# Patient Record
Sex: FEMALE | Race: WHITE | NOT HISPANIC OR LATINO | Employment: UNEMPLOYED | URBAN - METROPOLITAN AREA
[De-identification: names, ages, dates, MRNs, and addresses within clinical notes are randomized per-mention and may not be internally consistent; named-entity substitution may affect disease eponyms.]

---

## 2017-01-10 ENCOUNTER — ALLSCRIPTS OFFICE VISIT (OUTPATIENT)
Dept: OTHER | Facility: OTHER | Age: 52
End: 2017-01-10

## 2017-01-10 ENCOUNTER — TRANSCRIBE ORDERS (OUTPATIENT)
Dept: ADMINISTRATIVE | Facility: HOSPITAL | Age: 52
End: 2017-01-10

## 2017-01-10 DIAGNOSIS — R19.7 DIARRHEA, UNSPECIFIED TYPE: ICD-10-CM

## 2017-01-10 DIAGNOSIS — R10.9 ABDOMINAL PAIN, UNSPECIFIED SITE: Primary | ICD-10-CM

## 2017-01-16 ENCOUNTER — HOSPITAL ENCOUNTER (OUTPATIENT)
Dept: RADIOLOGY | Age: 52
Discharge: HOME | End: 2017-01-16
Payer: COMMERCIAL

## 2017-01-16 DIAGNOSIS — R10.9 ABDOMINAL PAIN: ICD-10-CM

## 2017-01-16 PROCEDURE — 74177 CT ABD & PELVIS W/CONTRAST: CPT

## 2017-01-16 RX ADMIN — IOHEXOL 100 ML: 350 INJECTION, SOLUTION INTRAVENOUS at 11:36

## 2017-01-27 ENCOUNTER — APPOINTMENT (OUTPATIENT)
Dept: LAB | Facility: CLINIC | Age: 52
End: 2017-01-27
Payer: COMMERCIAL

## 2017-01-27 DIAGNOSIS — R06.00 DYSPNEA: ICD-10-CM

## 2017-01-27 DIAGNOSIS — R53.83 OTHER FATIGUE: ICD-10-CM

## 2017-01-27 PROCEDURE — 82530 CORTISOL FREE: CPT

## 2017-01-30 ENCOUNTER — LAB (OUTPATIENT)
Dept: LAB | Facility: CLINIC | Age: 52
End: 2017-01-30
Payer: COMMERCIAL

## 2017-01-30 ENCOUNTER — GENERIC CONVERSION - ENCOUNTER (OUTPATIENT)
Dept: OTHER | Facility: OTHER | Age: 52
End: 2017-01-30

## 2017-01-30 DIAGNOSIS — R10.9 ABDOMINAL PAIN, UNSPECIFIED SITE: ICD-10-CM

## 2017-01-30 DIAGNOSIS — R74.8 ABNORMAL LEVELS OF OTHER SERUM ENZYMES: ICD-10-CM

## 2017-01-30 DIAGNOSIS — R53.83 OTHER FATIGUE: ICD-10-CM

## 2017-01-30 DIAGNOSIS — R19.7 DIARRHEA, UNSPECIFIED TYPE: ICD-10-CM

## 2017-01-30 PROCEDURE — 87046 STOOL CULTR AEROBIC BACT EA: CPT

## 2017-01-30 PROCEDURE — 87177 OVA AND PARASITES SMEARS: CPT

## 2017-01-30 PROCEDURE — 87209 SMEAR COMPLEX STAIN: CPT

## 2017-01-30 PROCEDURE — 87045 FECES CULTURE AEROBIC BACT: CPT

## 2017-01-30 PROCEDURE — 87015 SPECIMEN INFECT AGNT CONCNTJ: CPT

## 2017-01-30 PROCEDURE — 89055 LEUKOCYTE ASSESSMENT FECAL: CPT

## 2017-01-30 PROCEDURE — 87899 AGENT NOS ASSAY W/OPTIC: CPT

## 2017-01-30 PROCEDURE — 87493 C DIFF AMPLIFIED PROBE: CPT

## 2017-01-31 LAB — C DIFF TOX GENS STL QL NAA+PROBE: NORMAL

## 2017-02-01 LAB
BACTERIA STL CULT: NORMAL
BACTERIA STL CULT: NORMAL
CORTIS F 24H UR-MRATE: 30 UG/24 HR (ref 0–50)
CORTIS F UR-MCNC: 9 UG/L

## 2017-02-03 LAB
O+P STL CONC: NORMAL
WBC SPEC QL GRAM STN: NORMAL

## 2017-02-28 ENCOUNTER — LAB CONVERSION - ENCOUNTER (OUTPATIENT)
Dept: OTHER | Facility: OTHER | Age: 52
End: 2017-02-28

## 2017-02-28 PROCEDURE — 88305 TISSUE EXAM BY PATHOLOGIST: CPT | Performed by: INTERNAL MEDICINE

## 2017-03-01 ENCOUNTER — LAB REQUISITION (OUTPATIENT)
Dept: LAB | Facility: HOSPITAL | Age: 52
End: 2017-03-01
Payer: COMMERCIAL

## 2017-03-01 DIAGNOSIS — K22.719 BARRETT'S ESOPHAGUS WITH DYSPLASIA: ICD-10-CM

## 2017-03-02 ENCOUNTER — TRANSCRIBE ORDERS (OUTPATIENT)
Dept: LAB | Facility: CLINIC | Age: 52
End: 2017-03-02

## 2017-03-02 ENCOUNTER — APPOINTMENT (OUTPATIENT)
Dept: LAB | Facility: CLINIC | Age: 52
End: 2017-03-02
Payer: COMMERCIAL

## 2017-03-02 DIAGNOSIS — R53.83 OTHER FATIGUE: ICD-10-CM

## 2017-03-02 DIAGNOSIS — E78.5 HYPERLIPIDEMIA: ICD-10-CM

## 2017-03-02 DIAGNOSIS — R73.09 OTHER ABNORMAL GLUCOSE: ICD-10-CM

## 2017-03-02 LAB
ALBUMIN SERPL BCP-MCNC: 3.6 G/DL (ref 3.5–5)
ALP SERPL-CCNC: 109 U/L (ref 46–116)
ALT SERPL W P-5'-P-CCNC: 29 U/L (ref 12–78)
ANION GAP SERPL CALCULATED.3IONS-SCNC: 8 MMOL/L (ref 4–13)
AST SERPL W P-5'-P-CCNC: 15 U/L (ref 5–45)
BILIRUB SERPL-MCNC: 0.5 MG/DL (ref 0.2–1)
BUN SERPL-MCNC: 12 MG/DL (ref 5–25)
CALCIUM SERPL-MCNC: 8.9 MG/DL (ref 8.3–10.1)
CHLORIDE SERPL-SCNC: 106 MMOL/L (ref 100–108)
CHOLEST SERPL-MCNC: 179 MG/DL (ref 50–200)
CO2 SERPL-SCNC: 27 MMOL/L (ref 21–32)
CREAT SERPL-MCNC: 0.8 MG/DL (ref 0.6–1.3)
EST. AVERAGE GLUCOSE BLD GHB EST-MCNC: 123 MG/DL
GFR SERPL CREATININE-BSD FRML MDRD: >60 ML/MIN/1.73SQ M
GLUCOSE SERPL-MCNC: 97 MG/DL (ref 65–140)
HBA1C MFR BLD: 5.9 % (ref 4.2–6.3)
HDLC SERPL-MCNC: 49 MG/DL (ref 40–60)
LDLC SERPL DIRECT ASSAY-MCNC: 99 MG/DL (ref 0–100)
POTASSIUM SERPL-SCNC: 4 MMOL/L (ref 3.5–5.3)
PROT SERPL-MCNC: 6.8 G/DL (ref 6.4–8.2)
SODIUM SERPL-SCNC: 141 MMOL/L (ref 136–145)
TRIGL SERPL-MCNC: 233 MG/DL

## 2017-03-02 PROCEDURE — 80053 COMPREHEN METABOLIC PANEL: CPT

## 2017-03-02 PROCEDURE — 83721 ASSAY OF BLOOD LIPOPROTEIN: CPT

## 2017-03-02 PROCEDURE — 36415 COLL VENOUS BLD VENIPUNCTURE: CPT

## 2017-03-02 PROCEDURE — 83036 HEMOGLOBIN GLYCOSYLATED A1C: CPT

## 2017-03-02 PROCEDURE — 80061 LIPID PANEL: CPT

## 2017-03-03 ENCOUNTER — ALLSCRIPTS OFFICE VISIT (OUTPATIENT)
Dept: OTHER | Facility: OTHER | Age: 52
End: 2017-03-03

## 2017-03-09 ENCOUNTER — APPOINTMENT (OUTPATIENT)
Dept: LAB | Facility: CLINIC | Age: 52
End: 2017-03-09
Payer: COMMERCIAL

## 2017-03-09 ENCOUNTER — TRANSCRIBE ORDERS (OUTPATIENT)
Dept: LAB | Facility: CLINIC | Age: 52
End: 2017-03-09

## 2017-03-09 DIAGNOSIS — R55 SYNCOPE AND COLLAPSE: Primary | ICD-10-CM

## 2017-03-09 DIAGNOSIS — R55 SYNCOPE AND COLLAPSE: ICD-10-CM

## 2017-03-09 PROCEDURE — 83835 ASSAY OF METANEPHRINES: CPT

## 2017-03-09 PROCEDURE — 83497 ASSAY OF 5-HIAA: CPT

## 2017-03-09 PROCEDURE — 82384 ASSAY THREE CATECHOLAMINES: CPT

## 2017-03-11 LAB
5OH-INDOLEACETATE 24H UR-MCNC: 1.8 MG/L
5OH-INDOLEACETATE 24H UR-MRATE: 4.5 MG/24 HR (ref 0–14.9)

## 2017-03-12 LAB
METANEPH 24H UR-MRATE: 100 UG/24 HR (ref 45–290)
METANEPHS 24H UR-MCNC: 40 UG/L
NORMETANEPHRINE 24H UR-MCNC: 117 UG/L
NORMETANEPHRINE 24H UR-MRATE: 293 UG/24 HR (ref 82–500)

## 2017-03-15 LAB
DOPAMINE 24H UR-MRATE: 115 UG/24 HR (ref 0–510)
DOPAMINE UR-MCNC: 46 UG/L
EPINEPH 24H UR-MRATE: 3 UG/24 HR (ref 0–20)
EPINEPH UR-MCNC: 1 UG/L
NOREPINEPH 24H UR-MRATE: 33 UG/24 HR (ref 0–135)
NOREPINEPH UR-MCNC: 13 UG/L

## 2017-03-17 ENCOUNTER — ALLSCRIPTS OFFICE VISIT (OUTPATIENT)
Dept: OTHER | Facility: OTHER | Age: 52
End: 2017-03-17

## 2017-06-13 ENCOUNTER — ALLSCRIPTS OFFICE VISIT (OUTPATIENT)
Dept: OTHER | Facility: OTHER | Age: 52
End: 2017-06-13

## 2017-07-11 ENCOUNTER — GENERIC CONVERSION - ENCOUNTER (OUTPATIENT)
Dept: OTHER | Facility: OTHER | Age: 52
End: 2017-07-11

## 2017-10-01 DIAGNOSIS — M79.7 FIBROMYALGIA: ICD-10-CM

## 2017-10-01 DIAGNOSIS — R73.9 HYPERGLYCEMIA: ICD-10-CM

## 2017-10-01 DIAGNOSIS — E78.5 HYPERLIPIDEMIA: ICD-10-CM

## 2017-11-16 ENCOUNTER — OFFICE VISIT (OUTPATIENT)
Dept: URGENT CARE | Facility: MEDICAL CENTER | Age: 52
End: 2017-11-16
Payer: COMMERCIAL

## 2017-11-16 PROCEDURE — 99213 OFFICE O/P EST LOW 20 MIN: CPT

## 2017-11-21 ENCOUNTER — TRANSCRIBE ORDERS (OUTPATIENT)
Dept: ADMINISTRATIVE | Facility: HOSPITAL | Age: 52
End: 2017-11-21

## 2017-11-21 ENCOUNTER — ALLSCRIPTS OFFICE VISIT (OUTPATIENT)
Dept: OTHER | Facility: OTHER | Age: 52
End: 2017-11-21

## 2017-11-21 DIAGNOSIS — M54.16 LUMBAR RADICULOPATHY: Primary | ICD-10-CM

## 2017-11-21 DIAGNOSIS — R29.898 DEFICIENCIES OF LIMBS: ICD-10-CM

## 2017-11-21 DIAGNOSIS — M54.2 CERVICALGIA: ICD-10-CM
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
## 2017-11-25 ENCOUNTER — HOSPITAL ENCOUNTER (OUTPATIENT)
Dept: RADIOLOGY | Facility: HOSPITAL | Age: 52
Discharge: HOME | End: 2017-11-25
Payer: COMMERCIAL

## 2017-11-25 DIAGNOSIS — M54.2 CERVICALGIA: ICD-10-CM

## 2017-11-25 DIAGNOSIS — M54.16 LUMBAR RADICULOPATHY: ICD-10-CM

## 2017-11-25 DIAGNOSIS — R29.898 DEFICIENCIES OF LIMBS: ICD-10-CM

## 2017-11-25 PROCEDURE — 72141 MRI NECK SPINE W/O DYE: CPT

## 2017-11-25 PROCEDURE — 72148 MRI LUMBAR SPINE W/O DYE: CPT

## 2017-11-28 ENCOUNTER — GENERIC CONVERSION - ENCOUNTER (OUTPATIENT)
Dept: OTHER | Facility: OTHER | Age: 52
End: 2017-11-28

## 2017-11-28 ENCOUNTER — GENERIC CONVERSION - ENCOUNTER (OUTPATIENT)
Dept: INTERNAL MEDICINE CLINIC | Facility: CLINIC | Age: 52
End: 2017-11-28

## 2017-11-28 DIAGNOSIS — R51.9 HEADACHE: ICD-10-CM

## 2017-11-28 DIAGNOSIS — R29.898 OTHER SYMPTOMS AND SIGNS INVOLVING THE MUSCULOSKELETAL SYSTEM: ICD-10-CM

## 2017-11-28 DIAGNOSIS — R27.0 ATAXIA: ICD-10-CM

## 2017-12-01 ENCOUNTER — APPOINTMENT (OUTPATIENT)
Dept: LAB | Facility: CLINIC | Age: 52
End: 2017-12-01
Payer: COMMERCIAL

## 2017-12-01 ENCOUNTER — TRANSCRIBE ORDERS (OUTPATIENT)
Dept: LAB | Facility: CLINIC | Age: 52
End: 2017-12-01

## 2017-12-01 DIAGNOSIS — M79.7 FIBROMYALGIA: ICD-10-CM

## 2017-12-01 DIAGNOSIS — E78.5 HYPERLIPIDEMIA: ICD-10-CM

## 2017-12-01 DIAGNOSIS — R73.9 HYPERGLYCEMIA: ICD-10-CM

## 2017-12-01 LAB
ALBUMIN SERPL BCP-MCNC: 3.7 G/DL (ref 3.5–5)
ALP SERPL-CCNC: 98 U/L (ref 46–116)
ALT SERPL W P-5'-P-CCNC: 31 U/L (ref 12–78)
ANION GAP SERPL CALCULATED.3IONS-SCNC: 6 MMOL/L (ref 4–13)
AST SERPL W P-5'-P-CCNC: 14 U/L (ref 5–45)
BASOPHILS # BLD AUTO: 0.01 THOUSANDS/ΜL (ref 0–0.1)
BASOPHILS NFR BLD AUTO: 0 % (ref 0–1)
BILIRUB SERPL-MCNC: 0.46 MG/DL (ref 0.2–1)
BUN SERPL-MCNC: 15 MG/DL (ref 5–25)
CALCIUM SERPL-MCNC: 9.1 MG/DL (ref 8.3–10.1)
CHLORIDE SERPL-SCNC: 102 MMOL/L (ref 100–108)
CHOLEST SERPL-MCNC: 157 MG/DL (ref 50–200)
CO2 SERPL-SCNC: 29 MMOL/L (ref 21–32)
CREAT SERPL-MCNC: 0.86 MG/DL (ref 0.6–1.3)
EOSINOPHIL # BLD AUTO: 0.24 THOUSAND/ΜL (ref 0–0.61)
EOSINOPHIL NFR BLD AUTO: 3 % (ref 0–6)
ERYTHROCYTE [DISTWIDTH] IN BLOOD BY AUTOMATED COUNT: 12.8 % (ref 11.6–15.1)
EST. AVERAGE GLUCOSE BLD GHB EST-MCNC: 128 MG/DL
GFR SERPL CREATININE-BSD FRML MDRD: 77 ML/MIN/1.73SQ M
GLUCOSE P FAST SERPL-MCNC: 93 MG/DL (ref 65–99)
HBA1C MFR BLD: 6.1 % (ref 4.2–6.3)
HCT VFR BLD AUTO: 42.4 % (ref 34.8–46.1)
HDLC SERPL-MCNC: 59 MG/DL (ref 40–60)
HGB BLD-MCNC: 14.2 G/DL (ref 11.5–15.4)
LDLC SERPL DIRECT ASSAY-MCNC: 80 MG/DL (ref 0–100)
LYMPHOCYTES # BLD AUTO: 1.8 THOUSANDS/ΜL (ref 0.6–4.47)
LYMPHOCYTES NFR BLD AUTO: 19 % (ref 14–44)
MCH RBC QN AUTO: 30.2 PG (ref 26.8–34.3)
MCHC RBC AUTO-ENTMCNC: 33.5 G/DL (ref 31.4–37.4)
MCV RBC AUTO: 90 FL (ref 82–98)
MONOCYTES # BLD AUTO: 0.61 THOUSAND/ΜL (ref 0.17–1.22)
MONOCYTES NFR BLD AUTO: 6 % (ref 4–12)
NEUTROPHILS # BLD AUTO: 6.76 THOUSANDS/ΜL (ref 1.85–7.62)
NEUTS SEG NFR BLD AUTO: 72 % (ref 43–75)
NRBC BLD AUTO-RTO: 0 /100 WBCS
PLATELET # BLD AUTO: 253 THOUSANDS/UL (ref 149–390)
PMV BLD AUTO: 12.1 FL (ref 8.9–12.7)
POTASSIUM SERPL-SCNC: 4 MMOL/L (ref 3.5–5.3)
PROT SERPL-MCNC: 6.9 G/DL (ref 6.4–8.2)
RBC # BLD AUTO: 4.7 MILLION/UL (ref 3.81–5.12)
SODIUM SERPL-SCNC: 137 MMOL/L (ref 136–145)
TRIGL SERPL-MCNC: 163 MG/DL
WBC # BLD AUTO: 9.47 THOUSAND/UL (ref 4.31–10.16)

## 2017-12-01 PROCEDURE — 80053 COMPREHEN METABOLIC PANEL: CPT

## 2017-12-01 PROCEDURE — 85025 COMPLETE CBC W/AUTO DIFF WBC: CPT

## 2017-12-01 PROCEDURE — 36415 COLL VENOUS BLD VENIPUNCTURE: CPT

## 2017-12-01 PROCEDURE — 80061 LIPID PANEL: CPT

## 2017-12-01 PROCEDURE — 83721 ASSAY OF BLOOD LIPOPROTEIN: CPT

## 2017-12-01 PROCEDURE — 83036 HEMOGLOBIN GLYCOSYLATED A1C: CPT

## 2017-12-05 ENCOUNTER — GENERIC CONVERSION - ENCOUNTER (OUTPATIENT)
Dept: OTHER | Facility: OTHER | Age: 52
End: 2017-12-05

## 2017-12-05 ENCOUNTER — HOSPITAL ENCOUNTER (OUTPATIENT)
Dept: RADIOLOGY | Facility: HOSPITAL | Age: 52
Discharge: HOME | End: 2017-12-05
Payer: COMMERCIAL

## 2017-12-05 DIAGNOSIS — M54.16 LUMBAR RADICULOPATHY: ICD-10-CM

## 2017-12-05 DIAGNOSIS — M54.2 CERVICALGIA: ICD-10-CM

## 2017-12-05 DIAGNOSIS — R29.898 DEFICIENCIES OF LIMBS: ICD-10-CM

## 2017-12-05 PROCEDURE — A9585 GADOBUTROL INJECTION: HCPCS | Performed by: RADIOLOGY

## 2017-12-05 PROCEDURE — 70553 MRI BRAIN STEM W/O & W/DYE: CPT

## 2017-12-05 RX ADMIN — GADOBUTROL 8.75 ML: 604.72 INJECTION INTRAVENOUS at 15:00

## 2017-12-14 ENCOUNTER — GENERIC CONVERSION - ENCOUNTER (OUTPATIENT)
Dept: INTERNAL MEDICINE CLINIC | Facility: CLINIC | Age: 52
End: 2017-12-14

## 2018-01-02 ENCOUNTER — ALLSCRIPTS OFFICE VISIT (OUTPATIENT)
Dept: OTHER | Facility: OTHER | Age: 53
End: 2018-01-02

## 2018-01-03 NOTE — PROGRESS NOTES
Assessment   1  Lumbar radiculopathy (724 4) (M54 16)   2  Hypertension (401 9) (I10)   3  Hyperglycemia (790 29) (R73 9)   4  Hyperreflexia (796 1) (R29 2)   5  HTN (hypertension) (401 9) (I10)   6  Depression with anxiety (300 4) (F41 8)      1  Lumbar radiculopathy-neurosurgery feels on review of her MRI of her L-spine she has a definite disc herniation in the right medial L5-S1 foramen which displaces L5 and S1 nerve roots  She has some dorsiflexion weakness of the right foot but it is improving  She responded to steroids  At this point she will continue to walk to help with her foot weakness  She is scheduled to see Neurosurgery this week  If she has worsening pain she may require epidurals but at this point monitoring  2   Hyper reflexia-MRI of cervical spine shows disc bulging but spinal canal adequate  MRI of the brain shows no obvious abnormalities  No definite recent with hyper reflexia but appears to be benign  She was counseled on this-she was very fearful she would have MS with her sister's history of this there is no evidence of this     3  Pre diabetes-hemoglobin A1c 6 1 although this may be influenced by her recent use of oral corticosteroids and we reviewed this  She will have repeat prior to next visit     4  Hypertension-aggravated by weight-much improved with beta-blocker     5  Episodes of feeling poorly with palpitation-electrical feeling -24 urine for free cortisol normal   Dexamethasone suppression test normal   24 urine for 5 HIAA as well as 24 urine for fractionated metanephrines, catecholamines all normal   Symptoms resolved on beta-blocker     6 abdominal bloating with weight gain  CAT scan of abdomen pelvis showed questionable thickening of the colon-rule out inflammatory colitis  Stool for C&S, C  difficile, O&P negative  Saw Dr Summer Napoles told her on exam that her Klonopin was normal  We will be tracking that down     #7 dyspepsia-recent EGD benign   She was told this by Dr Mcbride-tract on results of this     #8 prior sensation of burning of the legs-watch for developing neuropathy-no asymptomatic     #9 hyperlipidemia with mixed dyslipidemia  Former smoker  Has prediabetes as well as family history of CAD  Now back on atorvastatin  Triglyceride had climbed with weight gain  4P prior to next visit     #10 prediabetes-most recent hemoglobin A1c 5 9-for recheck prior to next visit     #11 multiple antibiotic allergies-full discussion as per note of August 2015     #12 facial pain-was told by ENT should significant issues-much improved post sinus surgery     #13 hoarseness-ENT told her she had significant LPR  Now on an H2 blocker or PPI  Was also being considered for 24 pH probe     #14 allergies-screening allergy blood work benign but had abnormal skin testing and on hyposensitization therapy [address]     15  Progressive weight gain-felt related to her psychiatric meds  Initial 24 urine for free cortisol elevated but repeat normal as well as dexamethasone suppression     16  Folliculitis-require treatment in the past with quiescent          All other problems as per note of April 2015          MEDICAL REGIMEN: Colace and Senokot when needed, metoprolol ER 25 mg daily, singular 10 mg daily, omega-3 fish oil 1 g daily, Prozac 80 mg a day using 40 mg dosing, BuSpar 30 mg twice a day, Latuda 40 mg-half in the a m  and hold the p m , omeprazole she thinks 40 mg daily, Zantac 300 mg daily, atorvastatin 10 mg daily, lorazepam 1 mg by mouth twice a day when necessary, intermittent use of Benadryl, Robaxin 750 milligrams b i d  p r n ,          Appointment over the next several months with prior chemistry profile, cholesterol profile, hemoglobin A1c           Plan   Hyperlipidemia, Pre-diabetes    · (1) CHOLESTEROL, TOTAL; Status:Active - Retrospective Authorization;  Requested    for:01Apr2018;    · (1) COMPREHENSIVE METABOLIC PANEL; Status:Active - Retrospective Authorization; Requested for:01Apr2018;    · (1) HDL,DIRECT; Status:Active - Retrospective Authorization; Requested for:01Apr2018;    · (1) HEMOGLOBIN A1C; Status:Active - Retrospective Authorization; Requested    for:01Apr2018;    · (1) LDL,DIRECT; Status:Active - Retrospective Authorization; Requested for:01Apr2018;    · (1) TRIGLYCERIDE; Status:Active - Retrospective Authorization; Requested    for:01Apr2018;       Continue current medical regimen  Trying to follow appropriate diet with exercise  Obtain lab work prior to next visit       History of Present Illness   HPI: She returns for follow-up exam  She had an appointment with Neurosurgery  Neurosurgery felt she had significant herniated disc which was not read on the MRI  MRI of the brain was reviewed as well  He states lumbar MRI shows definite disc herniation on the right medial L5-S1 foramen and lateral recess which displaces L5 and S1 nerve roots  He felt cervical MRI shows bulging disc but no significant abnormality of the cord  Felt MRI showed nothing to suggest MS  He felt there was a thickening of the mucosa in the right maxillary sinus  He felt she has impressive hyper reflexia but there is no evidence for any cervical spinal canal compromise  Felt she was responding to conservative therapy and treated her with additional steroids  He felt she should have some walking the strength in her foot  Felt if her symptoms worsen she might need an epidural  He recommended she follow up with her ENT in reference to the sinus abnormality  had laboratory testing done prior to this visit showing CBC normal, chemistry profile normal with a sugar 93 and a creatinine 0 86, A1c 6 1, cholesterol 157, triglycerides 163, HDL 59 LDL 80     patient denies any systemic symptoms  Specifically there has been no evidence of fever, night sweats, significant weight loss or significant decrease in appetite  She said she has improved in reference to her depression   Have to watch carefully with use of the steroids but at this point appears to be tolerating  We had a long discussion today regarding the above  We reviewed hyper reflexia and she understands  She is very happy to know that MRI of the brain showed no major pathology  was a 30 minutes visit with more than 50% of the time spent counseling the patient and formulating a treatment plan  Multiple questions answered      Review of Systems   Complete-Female:      Constitutional: feeling poorly-- and-- feeling tired, but-- no fever,-- no recent weight gain-- and-- no chills  Eyes: No complaints of eye pain, no red eyes, no eyesight problems, no discharge, no dry eyes, no itching of eyes  ENT: no complaints of earache, no loss of hearing, no nose bleeds, no nasal discharge, no sore throat, no hoarseness  Cardiovascular: No complaints of slow heart rate, no fast heart rate, no chest pain, no palpitations, no leg claudication, no lower extremity edema  Respiratory: No complaints of shortness of breath, no wheezing, no cough, no SOB on exertion, no orthopnea, no PND  Gastrointestinal: No complaints of abdominal pain, no constipation, no nausea or vomiting, no diarrhea, no bloody stools,-- no abdominal pain,-- no nausea,-- no vomiting,-- no constipation,-- no diarrhea-- and-- no blood in stools  Genitourinary: No complaints of dysuria, no incontinence, no pelvic pain, no dysmenorrhea, no vaginal discharge or bleeding  Musculoskeletal: limb pain, but-- no arthralgias,-- no joint swelling,-- no myalgias,-- no joint stiffness-- and-- no limb swelling  Integumentary: No complaints of skin rash or lesions, no itching, no skin wounds, no breast pain or lump  Neurological: numbness,-- tingling,-- limb weakness-- and-- difficulty walking, but-- no headache,-- no confusion,-- no dizziness,-- no convulsions-- and-- no fainting        Psychiatric: anxiety,-- sleep disturbances-- and-- depression, but-- not suicidal,-- no personality change-- and-- no emotional problems  Endocrine: No complaints of proptosis, no hot flashes, no muscle weakness, no deepening of the voice, no feelings of weakness  Hematologic/Lymphatic: No complaints of swollen glands, no swollen glands in the neck, does not bleed easily, does not bruise easily  Active Problems   1  Abdominal pain (789 00) (R10 9)   2  Allergy (995 3) (T78 40XA)   3  Arm weakness (729 89) (R29 898)   4  Asthma (493 90) (J45 909)   5  Ataxia (781 3) (R27 0)   6  Atrophic vaginitis (627 3) (N95 2)   7  Breast hypertrophy (611 1) (N62)   8  Closed displaced fracture of neck of right radius with delayed healing, subsequent     encounter (V54 12) (S52 131G)   9  Constipation (564 00) (K59 00)   10  Depression with anxiety (300 4) (F41 8)   11  Dyspnea (786 09) (R06 00)   12  Elbow pain (719 42) (M25 529)   13  Elevated liver enzymes (790 5) (R74 8)   14  Encounter for gynecological examination without abnormal finding (V72 31) (Z01 419)   15  Encounter for screening mammogram for malignant neoplasm of breast (V76 12)      (Z12 31)   16  Esophageal reflux (530 81) (K21 9)   17  Fatigue (780 79) (R53 83)   18  Fibromyalgia (729 1) (M79 7)   19  Foot drop (736 79) (M21 379)   20  HTN (hypertension) (401 9) (I10)   21  Hyperglycemia (790 29) (R73 9)   22  Hyperlipidemia (272 4) (E78 5)   23  Hypertension (401 9) (I10)   24  Laryngopharyngeal reflux (478 79) (K21 9)   25  Lumbar radiculopathy (724 4) (M54 16)   26  Near syncope (780 2) (R55)   27  Neck pain (723 1) (M54 2)   28  Need for immunization against influenza (V04 81) (Z23)   29  Nicotine dependence (305 1)   30  Pain in lower limb (729 5) (M79 606)   31  Pain in upper limb (729 5) (M79 603)   32  Pre-diabetes (790 29) (R73 03)   33  S/P bilateral breast reduction (V45 89) (Z98 890)   34  S/P bilateral breast reduction (V45 89) (Z98 890)   35  Sciatica of right side (724 3) (M54 31)   36   Severe headache (784  0) (R51)   37  Skin rash (782 1) (R21)   38  SOB (shortness of breath) on exertion (786 05) (R06 02)   39  Syncope (780 2) (R55)   40  Transient right leg weakness (729 89) (R29 898)   41  Weight gain (783 1) (R63 5)    Past Medical History   1  History of Anxiety (300 00) (F41 9)   2  Former smoker (V15 82) (N92 679)   3  History of allergic rhinitis (V12 69) (Z87 09)   4  History of Myofascial pain syndrome (729 1) (M79 1)   5  History of Previously Pregnant With 2  Normal Vaginal Deliveries   6  History of Visit for pre-operative examination (V72 84) (T47 708)  Active Problems And Past Medical History Reviewed: The active problems and past medical history were reviewed and updated today  Surgical History   1  History of Laparoscopy With Vaginal Hysterectomy   2  History of Tubal Ligation  Surgical History Reviewed: The surgical history was reviewed and updated today  Family History   Brother    1  Family history of Diabetes Mellitus (V18 0)  Maternal Grandmother    2  Family history of Diabetes Mellitus (V18 0)   3  Family history of Hypertension (V17 49)  Maternal Grandfather    4  Family history of Colon Cancer (V16 0)   5  Family history of Diabetes Mellitus (V18 0)    Social History    · Being A Social Drinker   · Caffeine Use   · Current Smoker (305 1)   · Daily Coffee Consumption (1  Cups/Day)   · Daily Cola Consumption (3  Cans/Day)   · Denied: History of Drug Use   · Marital History - Currently   Social History Reviewed: The social history was reviewed and updated today  The social history was reviewed and is unchanged  Current Meds    1  Atorvastatin Calcium 10 MG Oral Tablet; Take 1 tablet daily; Therapy: 13Bky8249 to ([facility])  Requested for: 29ZJI4248; Last     Rx:46Ezm0728 Ordered   2  BusPIRone HCl - 15 MG Oral Tablet; Therapy: 95NLV9708 to (Last Rx:33Tjf0145)  Requested for: 35MMP9420 Ordered   3   Fluticasone Propionate 50 MCG/ACT Nasal Suspension (Flonase); USE 2 SPRAYS IN     EACH NOSTRIL ONCE DAILY; Therapy: 71FIR3305 to (Last Rx:73Qmr3896)  Requested for: 38Gmh9196 Ordered   4  LORazepam 1 MG Oral Tablet; Therapy: 74KDY0348 to (Last Rx:57Ypx8522)  Requested for: 90DTM9112 Ordered   5  Osphena 60 MG Oral Tablet; Take 1 tablet daily; Therapy: 46ESU3693 to (Evaluate:12Mar2018)  Requested for: 81LGF8003; Last     Rx:17Mar2017 Ordered   6  PROzac TABS (FLUoxetine HCl); Therapy: (Recorded:83Zom7472) to Recorded   7  Verapamil HCl  MG Oral Tablet Extended Release; TAKE 1 TABLET DAILY; Therapy: 09SFT9536 to (24-20-52-61)  Requested for: 25VBI7454; Last     Rx:11Yyg7248 Ordered   8  Zantac TABS (RaNITidine HCl) Recorded    Allergies   1  Augmentin TABS   2  Ceftin TABS   3  Levaquin TABS   4  Tetracyclines    Vitals   Signs   Recorded: 02Jan2018 08:38PM   Heart Rate: 78  Respiration: 14  Systolic: 307  Diastolic: 82  Recorded: 52EFE4599 04:49PM   Patient Refused Weight: Yes  Height Unobtainable: Yes  Patient Refused Weight: Yes    Physical Exam        Constitutional      General appearance: No acute distress, well appearing and well nourished  Head and Face      Head and face: Normal        Palpation of the face and sinuses: No sinus tenderness  Eyes      Conjunctiva and lids: No swelling, erythema or discharge  Pupils and irises: Equal, round, reactive to light  Ears, Nose, Mouth, and Throat      External inspection of ears and nose: Normal        Otoscopic examination: Tympanic membranes translucent with normal light reflex  Canals patent without erythema  Hearing: Normal        Nasal mucosa, septum, and turbinates: Normal without edema or erythema  Lips, teeth, and gums: Normal, good dentition  Oropharynx: Normal with no erythema, edema, exudate or lesions  Neck      Neck: Supple, symmetric, trachea midline, no masses  Thyroid: Normal, no thyromegaly  Pulmonary      Respiratory effort: No increased work of breathing or signs of respiratory distress  Percussion of chest: Normal        Palpation of chest: Normal        Auscultation of lungs: Clear to auscultation  Cardiovascular      Palpation of heart: Normal PMI, no thrills  Auscultation of heart: Normal rate and rhythm, normal S1 and S2, no murmurs  Carotid pulses: 2+ bilaterally  Abdominal aorta: Normal        Femoral pulses: 2+ bilaterally  Pedal pulses: 2+ bilaterally  Examination of extremities for edema and/or varicosities: Normal        Chest      Breasts: Normal, no dimpling or skin changes appreciated  Palpation of breasts and axillae: Normal, no masses palpated  Abdomen      Abdomen: Non-tender, no masses  Liver and spleen: No hepatomegaly or splenomegaly  Examination for hernias: No hernia appreciated  Anus, perineum, and rectum: Normal sphincter tone, no masses, no prolapse  Lymphatic      Palpation of lymph nodes in neck: No lymphadenopathy  Palpation of lymph nodes in axillae: No lymphadenopathy  Palpation of lymph nodes in groin: No lymphadenopathy  Palpation of lymph nodes in other areas: No lymphadenopathy  Musculoskeletal      Gait and station: Normal        Digits and nails: Normal without clubbing or cyanosis  Joints, bones, and muscles: Normal        Range of motion: Normal        Stability: Normal        Muscle strength/tone: Abnormal  -- Right foot dorsiflexion weakness decreased from prior exam  Questionable right arm weakness  Skin      Skin and subcutaneous tissue: Normal without rashes or lesions  Palpation of skin and subcutaneous tissue: Normal turgor  Neurologic      Cranial nerves: Cranial nerves II-XII intact  Reflexes: Abnormal  -- Hyper reflexia of the knee and ankles jerks with clonus  Sensation: No sensory loss         Psychiatric      Judgment and insight: Normal        Orientation to person, place, and time: Normal        Recent and remote memory: Intact  Mood and affect: Normal        Future Appointments      Date/Time Provider Specialty Site   03/23/2018 01:00 PM Cielo Parra, [facility] Obstetrics/Gynecology [facility]   05/09/2018 02:00 PM RASHAD Sheriff   Internal Medicine Evert Wynne MD     Signatures    Electronically signed by : RASHAD Sheikh ; Jan 2 2018  8:46PM EST                       (Author)

## 2018-01-13 VITALS — HEART RATE: 68 BPM | RESPIRATION RATE: 14 BRPM | SYSTOLIC BLOOD PRESSURE: 120 MMHG | DIASTOLIC BLOOD PRESSURE: 76 MMHG

## 2018-01-13 VITALS — SYSTOLIC BLOOD PRESSURE: 124 MMHG | HEART RATE: 78 BPM | RESPIRATION RATE: 14 BRPM | DIASTOLIC BLOOD PRESSURE: 80 MMHG

## 2018-01-13 VITALS
HEIGHT: 65 IN | DIASTOLIC BLOOD PRESSURE: 92 MMHG | BODY MASS INDEX: 32.72 KG/M2 | HEART RATE: 72 BPM | WEIGHT: 196.38 LBS | RESPIRATION RATE: 14 BRPM | SYSTOLIC BLOOD PRESSURE: 146 MMHG

## 2018-01-14 VITALS
HEIGHT: 65 IN | DIASTOLIC BLOOD PRESSURE: 84 MMHG | SYSTOLIC BLOOD PRESSURE: 130 MMHG | HEART RATE: 78 BPM | RESPIRATION RATE: 14 BRPM | WEIGHT: 191.25 LBS | BODY MASS INDEX: 31.86 KG/M2

## 2018-01-15 VITALS
WEIGHT: 195 LBS | HEIGHT: 65 IN | SYSTOLIC BLOOD PRESSURE: 122 MMHG | DIASTOLIC BLOOD PRESSURE: 78 MMHG | BODY MASS INDEX: 32.49 KG/M2

## 2018-01-22 VITALS — SYSTOLIC BLOOD PRESSURE: 130 MMHG | RESPIRATION RATE: 14 BRPM | HEART RATE: 78 BPM | DIASTOLIC BLOOD PRESSURE: 80 MMHG

## 2018-01-22 VITALS — DIASTOLIC BLOOD PRESSURE: 82 MMHG | HEART RATE: 78 BPM | SYSTOLIC BLOOD PRESSURE: 130 MMHG | RESPIRATION RATE: 14 BRPM

## 2018-03-08 RX ORDER — OSPEMIFENE 60 MG/1
TABLET, FILM COATED ORAL
Qty: 90 TABLET | OUTPATIENT
Start: 2018-03-08

## 2018-03-12 DIAGNOSIS — N95.2 ATROPHIC VAGINITIS: Primary | ICD-10-CM

## 2018-03-12 RX ORDER — OSPEMIFENE 60 MG/1
TABLET, FILM COATED ORAL
Qty: 90 TABLET | Refills: 3 | Status: SHIPPED | OUTPATIENT
Start: 2018-03-12 | End: 2018-04-11 | Stop reason: SDUPTHER

## 2018-03-14 DIAGNOSIS — N95.2 ATROPHIC VAGINITIS: ICD-10-CM

## 2018-03-15 RX ORDER — OSPEMIFENE 60 MG/1
TABLET, FILM COATED ORAL
Qty: 90 TABLET | OUTPATIENT
Start: 2018-03-15

## 2018-04-01 DIAGNOSIS — E78.5 HYPERLIPIDEMIA: ICD-10-CM

## 2018-04-01 DIAGNOSIS — R73.03 PREDIABETES: ICD-10-CM

## 2018-04-11 DIAGNOSIS — N95.2 ATROPHIC VAGINITIS: Primary | ICD-10-CM

## 2018-07-20 ENCOUNTER — APPOINTMENT (OUTPATIENT)
Dept: LAB | Facility: MEDICAL CENTER | Age: 53
End: 2018-07-20
Payer: COMMERCIAL

## 2018-07-20 DIAGNOSIS — E78.5 HYPERLIPIDEMIA: ICD-10-CM

## 2018-07-20 DIAGNOSIS — R73.03 PREDIABETES: ICD-10-CM

## 2018-07-20 LAB
ALBUMIN SERPL BCP-MCNC: 3.8 G/DL (ref 3.5–5)
ALP SERPL-CCNC: 109 U/L (ref 46–116)
ALT SERPL W P-5'-P-CCNC: 41 U/L (ref 12–78)
ANION GAP SERPL CALCULATED.3IONS-SCNC: 4 MMOL/L (ref 4–13)
AST SERPL W P-5'-P-CCNC: 22 U/L (ref 5–45)
BILIRUB SERPL-MCNC: 0.3 MG/DL (ref 0.2–1)
BUN SERPL-MCNC: 17 MG/DL (ref 5–25)
CALCIUM SERPL-MCNC: 8.7 MG/DL (ref 8.3–10.1)
CHLORIDE SERPL-SCNC: 108 MMOL/L (ref 100–108)
CHOLEST SERPL-MCNC: 136 MG/DL (ref 50–200)
CO2 SERPL-SCNC: 27 MMOL/L (ref 21–32)
CREAT SERPL-MCNC: 0.85 MG/DL (ref 0.6–1.3)
EST. AVERAGE GLUCOSE BLD GHB EST-MCNC: 103 MG/DL
GFR SERPL CREATININE-BSD FRML MDRD: 78 ML/MIN/1.73SQ M
GLUCOSE P FAST SERPL-MCNC: 86 MG/DL (ref 65–99)
HBA1C MFR BLD: 5.2 % (ref 4.2–6.3)
HDLC SERPL-MCNC: 37 MG/DL (ref 40–60)
LDLC SERPL DIRECT ASSAY-MCNC: 77 MG/DL (ref 0–100)
POTASSIUM SERPL-SCNC: 4 MMOL/L (ref 3.5–5.3)
PROT SERPL-MCNC: 7.2 G/DL (ref 6.4–8.2)
SODIUM SERPL-SCNC: 139 MMOL/L (ref 136–145)
TRIGL SERPL-MCNC: 121 MG/DL

## 2018-07-20 PROCEDURE — 80061 LIPID PANEL: CPT

## 2018-07-20 PROCEDURE — 80053 COMPREHEN METABOLIC PANEL: CPT

## 2018-07-20 PROCEDURE — 83721 ASSAY OF BLOOD LIPOPROTEIN: CPT

## 2018-07-20 PROCEDURE — 36415 COLL VENOUS BLD VENIPUNCTURE: CPT

## 2018-07-20 PROCEDURE — 83036 HEMOGLOBIN GLYCOSYLATED A1C: CPT

## 2018-07-23 ENCOUNTER — OFFICE VISIT (OUTPATIENT)
Dept: INTERNAL MEDICINE CLINIC | Facility: CLINIC | Age: 53
End: 2018-07-23
Payer: COMMERCIAL

## 2018-07-23 VITALS
BODY MASS INDEX: 30.99 KG/M2 | SYSTOLIC BLOOD PRESSURE: 128 MMHG | DIASTOLIC BLOOD PRESSURE: 78 MMHG | HEART RATE: 72 BPM | WEIGHT: 186 LBS | RESPIRATION RATE: 14 BRPM | HEIGHT: 65 IN

## 2018-07-23 DIAGNOSIS — R73.03 PREDIABETES: Primary | ICD-10-CM

## 2018-07-23 DIAGNOSIS — E78.5 HYPERLIPIDEMIA, UNSPECIFIED HYPERLIPIDEMIA TYPE: ICD-10-CM

## 2018-07-23 DIAGNOSIS — I10 HYPERTENSION, UNSPECIFIED TYPE: ICD-10-CM

## 2018-07-23 PROBLEM — M21.379 FOOT DROP: Status: ACTIVE | Noted: 2017-11-28

## 2018-07-23 PROBLEM — M54.16 LUMBAR RADICULOPATHY: Status: ACTIVE | Noted: 2017-11-21

## 2018-07-23 PROBLEM — R29.2 HYPERREFLEXIA: Status: ACTIVE | Noted: 2018-01-02

## 2018-07-23 PROBLEM — R63.5 WEIGHT GAIN: Status: ACTIVE | Noted: 2017-01-10

## 2018-07-23 PROBLEM — M54.2 NECK PAIN: Status: ACTIVE | Noted: 2017-11-21

## 2018-07-23 PROBLEM — M54.16 LUMBAR RADICULOPATHY: Chronic | Status: ACTIVE | Noted: 2017-11-21

## 2018-07-23 PROBLEM — R29.2 HYPERREFLEXIA: Chronic | Status: ACTIVE | Noted: 2018-01-02

## 2018-07-23 PROCEDURE — 3078F DIAST BP <80 MM HG: CPT | Performed by: INTERNAL MEDICINE

## 2018-07-23 PROCEDURE — 3074F SYST BP LT 130 MM HG: CPT | Performed by: INTERNAL MEDICINE

## 2018-07-23 PROCEDURE — 99214 OFFICE O/P EST MOD 30 MIN: CPT | Performed by: INTERNAL MEDICINE

## 2018-07-23 PROCEDURE — 3008F BODY MASS INDEX DOCD: CPT | Performed by: INTERNAL MEDICINE

## 2018-07-23 NOTE — PROGRESS NOTES
MEDICAL REGIMEN: Colace and Senokot when needed, metoprolol ER 25 mg daily, singular 10 mg daily, omega-3 fish oil 1 g daily, Prozac 80 mg a day using 40 mg dosing, BuSpar 30 mg twice a day, Latuda 40 mg-half in the a m  and hold the p m , omeprazole she thinks 40 mg daily, Zantac 300 mg daily, atorvastatin 10 mg daily, lorazepam 1 mg by mouth twice a day when necessary, intermittent use of Benadryl, Robaxin 750 milligrams b i d  p r n ,       D/C-   Metoprolol   Omega 3 fish oil   Zantac     Changes-  Latuda- only on 20mg daily     Add-  Verapramil- 240mg once daily   Miralax- daily

## 2018-07-23 NOTE — PROGRESS NOTES
Assessment/Plan:  1  Lumbar radiculopathy-neurosurgery felt on review her MRI of her L-spine she definite disc herniation in the right medial L5-S1 foramina which displaces L5 and S1 nerve roots  Had prior dorsiflexion weakness of the foot that is resolved  Responded to steroids and did not require epidural  2  Hyper reflexia-MRI of cervical spine shows bulging disc with spinal canal adequate  MRI of the brain shows no obvious abnormalities  Still has some hyper reflexia but appears to be benign  She was worried about MS with her sister's history  3  Pre diabetes-A1c climbed to 6 1 this now normal at 5 2  For repeat prior to next visit    4  Hypertension-aggravated by weight-much improved with beta-blocker     5  Episodes of feeling poorly with palpitation-electrical feeling -24 urine for free cortisol normal   Dexamethasone suppression test normal   24 urine for 5 HIAA as well as 24 urine for fractionated metanephrines, catecholamines all normal   Symptoms resolved on beta-blocker     6 abdominal bloating with weight gain  CAT scan of abdomen pelvis showed questionable thickening of the colon-rule out inflammatory colitis  Stool for C&S, C  difficile, O&P negative  Saw Dr Kathia Altamirano told her on exam that her Klonopin was normal  We will be tracking that down     #7 dyspepsia-recent EGD benign  She was told this by Dr Mcbride-tract on results of this     #8 prior sensation of burning of the legs-watch for developing neuropathy-no asymptomatic     #9 hyperlipidemia with mixed dyslipidemia  Former smoker  Has prediabetes as well as family history of CAD  Now back on atorvastatin  Triglyceride had climbed with weight gain   4P prior to next visit     #10 prediabetes-most recent hemoglobin A1c 5 9-for recheck prior to next visit     #11 multiple antibiotic allergies-full discussion as per note of August 2015     #12 facial pain-was told by ENT should significant issues-much improved post sinus surgery     #13 hoarseness-ENT told her she had significant LPR  Now on an H2 blocker or PPI  Was also being considered for 24 pH probe REVIEW NEXT VISIT REGARDING STATUS WITH ENT-IF STILL ON MEDS WILL NEED FOLLOW-UP B12 LEVEL ETC    #14 allergies-screening allergy blood work benign but had abnormal skin testing and on hyposensitization therapy [address]     15  Progressive weight gain-felt related to her psychiatric meds  Initial 24 urine for free cortisol elevated but repeat normal as well as dexamethasone suppression     16  Folliculitis-require treatment in the past with quiescent          All other problems as per note of April 2015          MEDICAL REGIMEN: Colace and Senokot when needed, metoprolol ER 25 mg daily, singular 10 mg daily, omega-3 fish oil 1 g daily, Prozac 80 mg a day using 40 mg dosing, BuSpar 30 mg twice a day, Latuda 40 mg-half in the a m  and hold the p m , omeprazole she thinks 40 mg daily, Zantac 300 mg daily, atorvastatin 10 mg daily, lorazepam 1 mg by mouth twice a day when necessary, intermittent use of Benadryl, Robaxin 750 milligrams b i d  p r n ,    Appointment in 6 months prior chemistry profile cholesterol profile hemoglobin A1c         No problem-specific Assessment & Plan notes found for this encounter  Diagnoses and all orders for this visit:    Prediabetes  -     Comprehensive metabolic panel; Future  -     HEMOGLOBIN A1C W/ EAG ESTIMATION; Future  -     Cholesterol, total; Future  -     Triglycerides; Future  -     LDL cholesterol, direct; Future  -     HDL cholesterol; Future    Hyperlipidemia, unspecified hyperlipidemia type  -     Comprehensive metabolic panel; Future  -     HEMOGLOBIN A1C W/ EAG ESTIMATION; Future  -     Cholesterol, total; Future  -     Triglycerides; Future  -     LDL cholesterol, direct; Future  -     HDL cholesterol; Future    Hypertension, unspecified type  -     Comprehensive metabolic panel; Future  -     HEMOGLOBIN A1C W/ EAG ESTIMATION;  Future  - Cholesterol, total; Future  -     Triglycerides; Future  -     LDL cholesterol, direct; Future  -     HDL cholesterol; Future          Subjective:      Patient ID: Maury Holstein is a 46 y o  female  She is trying to lose weight and follow appropriate diet  She has normalized her A1c from prior value of 6  Labs done prior to this visit show no LDL is 77 HDL 37 triglycerides 121 cholesterol 136 A1c of 5 2 chemistry profile normal with a fasting sugar of 86 with a creatinine of 0 85  She knows the value of exercise  She is exercising some  She was doing yoga previously but has stop that  She says her mental health is relatively stable  She is watching her grandchild 5 days per week which helps greatly with her mental health  She see psychiatrist and/or psychologist monthly  She has pre diabetes  A1c was 6 and is now normal   I applauded her for her efforts  She has known hypertension  This is aggravated by her weight  She is avoiding salt and decongestants  She denies hematemesis pounding of her heart sweats and headache  She remains on her statin  She understands the difference between status associated myalgias and arthralgias from degenerative arthritis  Triglycerides at climb with weight gain  She was a former smoker    In reference to her lumbar radiculopathy all symptoms resolved  She did not need an epidural   Dorsiflexion weakness has resolved and has not evident on examination today  This patient denies any systemic symptoms  Specifically there has been no evidence of fever, night sweats, significant weight loss or significant decrease in appetite  The following portions of the patient's history were reviewed and updated as appropriate: current medications, past family history, past medical history, past social history, past surgical history and problem list     Review of Systems   Constitutional: Positive for fatigue  Respiratory: Negative      Cardiovascular: Negative  Gastrointestinal: Negative  Endocrine: Negative  Genitourinary: Negative  Musculoskeletal: Negative  Neurological: Negative  Hematological: Negative  Psychiatric/Behavioral: Positive for dysphoric mood  Objective:      /78   Pulse 72   Resp 14   Ht 5' 5" (1 651 m)   Wt 84 4 kg (186 lb)   LMP  (LMP Unknown)   BMI 30 95 kg/m²          Physical Exam   Constitutional: She is oriented to person, place, and time  She appears well-developed and well-nourished  No distress  HENT:   Head: Normocephalic and atraumatic  Right Ear: External ear normal    Left Ear: External ear normal    Nose: Nose normal    Mouth/Throat: Oropharynx is clear and moist  No oropharyngeal exudate  Eyes: Conjunctivae and EOM are normal  Pupils are equal, round, and reactive to light  Right eye exhibits no discharge  Left eye exhibits no discharge  No scleral icterus  Neck: Normal range of motion  Neck supple  No JVD present  No tracheal deviation present  No thyromegaly present  Cardiovascular: Normal rate, regular rhythm, normal heart sounds and intact distal pulses  Exam reveals no gallop and no friction rub  No murmur heard  Pulmonary/Chest: Effort normal and breath sounds normal  No respiratory distress  She has no wheezes  She has no rales  She exhibits no tenderness  Abdominal: Soft  Bowel sounds are normal  She exhibits no distension and no mass  There is no tenderness  There is no rebound and no guarding  Genitourinary:   Genitourinary Comments: Evidence of prior breast reduction surgery   Musculoskeletal: Normal range of motion  She exhibits no edema or deformity  Lymphadenopathy:     She has no cervical adenopathy  Neurological: She is alert and oriented to person, place, and time  She has normal reflexes  No cranial nerve deficit  She exhibits normal muscle tone  Coordination normal    Skin: Skin is warm and dry  No rash noted  No erythema     Psychiatric: She has a normal mood and affect  Her behavior is normal  Judgment and thought content normal    Vitals reviewed

## 2018-08-06 ENCOUNTER — TELEPHONE (OUTPATIENT)
Dept: INTERNAL MEDICINE CLINIC | Facility: CLINIC | Age: 53
End: 2018-08-06

## 2018-08-06 NOTE — TELEPHONE ENCOUNTER
Patient wants to know does she still need to go to her obgyn, she only goes to get the orders for her mammos  Can you take over that

## 2019-01-08 DIAGNOSIS — E78.2 MIXED HYPERLIPIDEMIA: Primary | ICD-10-CM

## 2019-01-08 RX ORDER — ATORVASTATIN CALCIUM 10 MG/1
10 TABLET, FILM COATED ORAL DAILY
Qty: 90 TABLET | Refills: 3 | Status: SHIPPED | OUTPATIENT
Start: 2019-01-08 | End: 2020-04-15 | Stop reason: SDUPTHER

## 2019-01-21 DIAGNOSIS — Z12.39 SCREENING BREAST EXAMINATION: Primary | ICD-10-CM

## 2019-02-04 ENCOUNTER — APPOINTMENT (OUTPATIENT)
Dept: LAB | Facility: MEDICAL CENTER | Age: 54
End: 2019-02-04
Payer: COMMERCIAL

## 2019-02-04 DIAGNOSIS — E78.5 HYPERLIPIDEMIA, UNSPECIFIED HYPERLIPIDEMIA TYPE: ICD-10-CM

## 2019-02-04 DIAGNOSIS — I10 HYPERTENSION, UNSPECIFIED TYPE: ICD-10-CM

## 2019-02-04 DIAGNOSIS — R73.03 PREDIABETES: ICD-10-CM

## 2019-02-04 LAB
ALBUMIN SERPL BCP-MCNC: 4 G/DL (ref 3.5–5)
ALP SERPL-CCNC: 93 U/L (ref 46–116)
ALT SERPL W P-5'-P-CCNC: 30 U/L (ref 12–78)
ANION GAP SERPL CALCULATED.3IONS-SCNC: 6 MMOL/L (ref 4–13)
AST SERPL W P-5'-P-CCNC: 21 U/L (ref 5–45)
BILIRUB SERPL-MCNC: 0.37 MG/DL (ref 0.2–1)
BUN SERPL-MCNC: 13 MG/DL (ref 5–25)
CALCIUM SERPL-MCNC: 9.4 MG/DL (ref 8.3–10.1)
CHLORIDE SERPL-SCNC: 107 MMOL/L (ref 100–108)
CHOLEST SERPL-MCNC: 155 MG/DL (ref 50–200)
CO2 SERPL-SCNC: 26 MMOL/L (ref 21–32)
CREAT SERPL-MCNC: 0.77 MG/DL (ref 0.6–1.3)
EST. AVERAGE GLUCOSE BLD GHB EST-MCNC: 117 MG/DL
GFR SERPL CREATININE-BSD FRML MDRD: 88 ML/MIN/1.73SQ M
GLUCOSE P FAST SERPL-MCNC: 84 MG/DL (ref 65–99)
HBA1C MFR BLD: 5.7 % (ref 4.2–6.3)
HDLC SERPL-MCNC: 53 MG/DL (ref 40–60)
LDLC SERPL DIRECT ASSAY-MCNC: 84 MG/DL (ref 0–100)
POTASSIUM SERPL-SCNC: 4.2 MMOL/L (ref 3.5–5.3)
PROT SERPL-MCNC: 7 G/DL (ref 6.4–8.2)
SODIUM SERPL-SCNC: 139 MMOL/L (ref 136–145)
TRIGL SERPL-MCNC: 111 MG/DL

## 2019-02-04 PROCEDURE — 83721 ASSAY OF BLOOD LIPOPROTEIN: CPT

## 2019-02-04 PROCEDURE — 83036 HEMOGLOBIN GLYCOSYLATED A1C: CPT

## 2019-02-04 PROCEDURE — 80053 COMPREHEN METABOLIC PANEL: CPT

## 2019-02-04 PROCEDURE — 36415 COLL VENOUS BLD VENIPUNCTURE: CPT

## 2019-02-04 PROCEDURE — 80061 LIPID PANEL: CPT

## 2019-02-06 ENCOUNTER — OFFICE VISIT (OUTPATIENT)
Dept: INTERNAL MEDICINE CLINIC | Facility: CLINIC | Age: 54
End: 2019-02-06
Payer: COMMERCIAL

## 2019-02-06 ENCOUNTER — TELEPHONE (OUTPATIENT)
Dept: INTERNAL MEDICINE CLINIC | Facility: CLINIC | Age: 54
End: 2019-02-06

## 2019-02-06 VITALS
DIASTOLIC BLOOD PRESSURE: 70 MMHG | HEART RATE: 78 BPM | SYSTOLIC BLOOD PRESSURE: 120 MMHG | HEIGHT: 65 IN | RESPIRATION RATE: 14 BRPM | WEIGHT: 186.6 LBS | BODY MASS INDEX: 31.09 KG/M2

## 2019-02-06 DIAGNOSIS — K21.9 GASTROESOPHAGEAL REFLUX DISEASE WITHOUT ESOPHAGITIS: Primary | ICD-10-CM

## 2019-02-06 DIAGNOSIS — E78.01 FAMILIAL HYPERCHOLESTEROLEMIA: ICD-10-CM

## 2019-02-06 DIAGNOSIS — M85.89 OSTEOPENIA OF MULTIPLE SITES: ICD-10-CM

## 2019-02-06 DIAGNOSIS — Z12.39 ENCOUNTER FOR BREAST CANCER SCREENING OTHER THAN MAMMOGRAM: ICD-10-CM

## 2019-02-06 DIAGNOSIS — K21.9 LARYNGOPHARYNGEAL REFLUX: Chronic | ICD-10-CM

## 2019-02-06 DIAGNOSIS — R73.03 PRE-DIABETES: Chronic | ICD-10-CM

## 2019-02-06 PROBLEM — G47.33 OBSTRUCTIVE SLEEP APNEA SYNDROME: Status: ACTIVE | Noted: 2019-02-06

## 2019-02-06 PROCEDURE — 99214 OFFICE O/P EST MOD 30 MIN: CPT | Performed by: INTERNAL MEDICINE

## 2019-02-06 PROCEDURE — 3008F BODY MASS INDEX DOCD: CPT | Performed by: INTERNAL MEDICINE

## 2019-02-06 NOTE — PROGRESS NOTES
Assessment/Plan:  1  Sleep apnea-patient told she had mild obstructive sleep apnea by ENT-will be obtaining results of sleep study  She is now on CPAP  2  Hypertension-aggravated by weight-was on a beta-blocker but now off that and stable-remains on verapamil  3  Hoarseness-ENT told her she had significant LPR  Was on a PPI and H2 blocker  She is now off both of these and stable  She does have a follow-up ENT appointment  4  Lumbar radiculopathy-neurosurgery felt on review of her MRI of her L-spine she definite disc herniation on the right medial L5-S1 foramina which displaced L5 and S1 nerve roots  Had prior weakness of the foot that resolved  She responded to steroids and did not require an epidural  5  Hyper reflexia-MRI of cervical spine shows bulging disc with spinal canal adequate  MRI the brain shows no obvious abnormalities  REVIEW NEXT VISIT  6  Pre diabetes-A1c had been as high as 6 1  Prior to this visit now shows a value of 5 7-trying to follow appropriate diet  7  Episodes of feeling poorly with palpitation electrical feeling  Dexamethasone suppression test normal   24 urine for free cortisol normal   24 urine for 5 HIAA as well as 24 urine for fractionated metanephrines and catecholamines all normal   Symptoms had resolved on a beta-blocker  Now off the beta-blocker and stable  8  Abdominal bloating with weight gain-CT scan of abdomen pelvis showed questionable thickening of the colon  Stool studies negative  GI told her her exam was benign  9  Dyspepsia-had a benign EGD done by Dr Mcbride  10  Hyperlipidemia with mixed dyslipidemia  Former smoker  Family history CAD  Stable on statin therapy  She knows lose weight  11  Multiple antibiotic allergies-full discussion as per note of August 2015  12  Facial pain-was told by ENT she had significant issues-much improved post sinus surgery  13   Allergies-allergy blood work benign but had abnormal skin testing on hypo sensitization therapy via Dr Joseph  14  Progressive weight gain-felt related to her psychiatric meds  Initial 24 urine for free cortisol elevated but repeat normal as well as dexamethasone suppression test   Does have some mild sleep apnea apparently in this is being treated  Reviewed appropriate diet including restriction of carbohydrates  15  General care-patient had hysterectomy with unilateral oophorectomy approximately age 37-DEXA scan ordered    All other problems as per note of April 2015      MEDICAL REGIMEN:      Singulair 10 milligrams daily, lorazepam 1 milligram b i d , , Prozac 80 milligrams a day using 40 milligram dosing, buspirone 30 milligrams b i d ,Latuda-40 milligrams-half in a m  And hold the p m , atorvastatin 10 milligrams daily, Robaxin 750 milligrams b i d  P r n , verapamil  milligrams  DOUBLE CHECK NEXT VISIT,    Await results of DEXA scan  Appointment in 6 months with prior chemistry profile intermittent use of Benadryl, Robaxin 750 milligrams b i d  P r n , Colace and Senokot as needed    Addendum -obtain results of sleep study done in November 2018-read as mild obstructive sleep apnea    Addendum-DEXA scan done in February 2019 normal- JAMES CHART NEXT VISIT    Addendum- patient had a mammogram done  In March of 2019 showing abnormal area the left breast -she then underwent an ultrasound showing a circumscribed mass in the upper central breast correlates with simple cyst- patient was told she could return to routine mammogram testing in 1 year which would be March of 2020  No problem-specific Assessment & Plan notes found for this encounter  Diagnoses and all orders for this visit:    Gastroesophageal reflux disease without esophagitis  -     Comprehensive metabolic panel; Future  -     HEMOGLOBIN A1C W/ EAG ESTIMATION; Future  -     Cholesterol, total; Future  -     Triglycerides; Future  -     HDL cholesterol; Future  -     LDL cholesterol, direct;  Future    Familial hypercholesterolemia  - Comprehensive metabolic panel; Future  -     HEMOGLOBIN A1C W/ EAG ESTIMATION; Future  -     Cholesterol, total; Future  -     Triglycerides; Future  -     HDL cholesterol; Future  -     LDL cholesterol, direct; Future    Laryngopharyngeal reflux    Pre-diabetes    Osteopenia of multiple sites  -     DXA bone density spine hip and pelvis; Future    Encounter for breast cancer screening other than mammogram  -     Mammo screening bilateral w cad; Future          Subjective:      Patient ID: Tom Souza is a 53 y o  female  ENT had her do a home sleep study and she was told she has mild sleep apnea  She is now on CPAP and feels much better  Adjustments were done via the respiratory therapist at a local [address]  She says she feels much better in that regard  She had lost weight previously but has gained it back  We reviewed the literature regarding weight loss  She is trying to restrict carbohydrates  Recommended she exercise for 150 minutes per week  Recommended that she follow a low-carbohydrate diet  Reviewed that her sleep apnea treatment may also make it easier for her to lose weight  This is all counseled by the tendency towards we can associated with his psychiatric meds  She is well aware this  She is off her PPI or H2 blocker  She had significant LPR previously  She feels overall much better in that regard  She is also off her beta-blocker  She had elevated BP aggravated previously by her weight  We treated her because of some palpitations with beta-blocker  At this point she is off that  She denies any palpitations, syncope or near syncope chest pain or pressure  She remains on her statin  She understands the difference between status associated myalgias and arthralgias from degenerative arthritis  Labs done prior to this visit show BUN 13 creatinine 0 77 cholesterol 155 triglycerides 111 LDL 84 HDL 53      This patient denies any systemic symptoms  Specifically there has been no evidence of fever, night sweats, significant weight loss or significant decrease in appetite  Long discussion today held regarding diet and weight loss  This was a 30 minutes visit with more than 50% of the time spent counseling the patient and formulating a treatment plan  Multiple questions answered        The following portions of the patient's history were reviewed and updated as appropriate: allergies, current medications, past family history, past medical history, past social history, past surgical history and problem list     Review of Systems   Constitutional: Positive for fatigue  Respiratory: Negative  Cardiovascular: Negative  Gastrointestinal: Negative  Endocrine: Negative  Genitourinary: Negative  Musculoskeletal: Negative  Neurological: Negative  Hematological: Negative  Psychiatric/Behavioral: Negative  Objective:      Ht 5' 5" (1 651 m)   Wt 84 6 kg (186 lb 9 6 oz)   LMP  (LMP Unknown)   BMI 31 05 kg/m²          Physical Exam   Constitutional: She is oriented to person, place, and time  She appears well-developed and well-nourished  No distress  HENT:   Head: Normocephalic and atraumatic  Right Ear: External ear normal    Left Ear: External ear normal    Nose: Nose normal    Mouth/Throat: Oropharynx is clear and moist  No oropharyngeal exudate  Eyes: Pupils are equal, round, and reactive to light  Conjunctivae and EOM are normal  Right eye exhibits no discharge  Left eye exhibits no discharge  No scleral icterus  Neck: Normal range of motion  Neck supple  No JVD present  No tracheal deviation present  No thyromegaly present  Cardiovascular: Normal rate, regular rhythm, normal heart sounds and intact distal pulses  Exam reveals no gallop and no friction rub  No murmur heard  Pulmonary/Chest: Effort normal and breath sounds normal  No respiratory distress  She has no wheezes  She has no rales   She exhibits no tenderness  Abdominal: Soft  Bowel sounds are normal  She exhibits no distension and no mass  There is no tenderness  There is no rebound and no guarding  Musculoskeletal: Normal range of motion  She exhibits no edema or deformity  Lymphadenopathy:     She has no cervical adenopathy  Neurological: She is alert and oriented to person, place, and time  She has normal reflexes  No cranial nerve deficit  She exhibits normal muscle tone  Coordination normal    Skin: Skin is warm and dry  No rash noted  No erythema  Psychiatric: She has a normal mood and affect  Her behavior is normal  Judgment and thought content normal    Vitals reviewed

## 2019-02-13 NOTE — TELEPHONE ENCOUNTER
Received incorrect report again, not providing the sleep study    Lm to fax full report over with the impression

## 2019-02-22 DIAGNOSIS — K08.89 TOOTHACHE: Primary | ICD-10-CM

## 2019-02-25 ENCOUNTER — HOSPITAL ENCOUNTER (OUTPATIENT)
Dept: RADIOLOGY | Facility: MEDICAL CENTER | Age: 54
Discharge: HOME | End: 2019-02-25
Payer: COMMERCIAL

## 2019-02-25 VITALS — WEIGHT: 188 LBS | BODY MASS INDEX: 31.32 KG/M2 | HEIGHT: 65 IN

## 2019-02-25 DIAGNOSIS — Z12.39 ENCOUNTER FOR BREAST CANCER SCREENING OTHER THAN MAMMOGRAM: ICD-10-CM

## 2019-02-25 DIAGNOSIS — M85.89 OSTEOPENIA OF MULTIPLE SITES: ICD-10-CM

## 2019-02-25 PROCEDURE — 77080 DXA BONE DENSITY AXIAL: CPT

## 2019-02-25 PROCEDURE — 77067 SCR MAMMO BI INCL CAD: CPT

## 2019-02-27 ENCOUNTER — TELEPHONE (OUTPATIENT)
Dept: INTERNAL MEDICINE CLINIC | Facility: CLINIC | Age: 54
End: 2019-02-27

## 2019-02-27 NOTE — TELEPHONE ENCOUNTER
----- Message from Aramis Tsai MD sent at 2/26/2019  2:45 PM EST -----  Please call the patient regarding her DEXA scan-normal

## 2019-03-05 ENCOUNTER — HOSPITAL ENCOUNTER (OUTPATIENT)
Dept: ULTRASOUND IMAGING | Facility: CLINIC | Age: 54
Discharge: HOME | End: 2019-03-05
Payer: COMMERCIAL

## 2019-03-05 ENCOUNTER — HOSPITAL ENCOUNTER (OUTPATIENT)
Dept: MAMMOGRAPHY | Facility: CLINIC | Age: 54
Discharge: HOME | End: 2019-03-05
Payer: COMMERCIAL

## 2019-03-05 VITALS — BODY MASS INDEX: 30.82 KG/M2 | WEIGHT: 185 LBS | HEIGHT: 65 IN

## 2019-03-05 DIAGNOSIS — R92.8 ABNORMAL MAMMOGRAM: ICD-10-CM

## 2019-03-05 PROCEDURE — G0279 TOMOSYNTHESIS, MAMMO: HCPCS

## 2019-03-05 PROCEDURE — 76642 ULTRASOUND BREAST LIMITED: CPT

## 2019-03-05 PROCEDURE — 77065 DX MAMMO INCL CAD UNI: CPT

## 2019-03-13 ENCOUNTER — OFFICE VISIT (OUTPATIENT)
Dept: URGENT CARE | Facility: CLINIC | Age: 54
End: 2019-03-13
Payer: COMMERCIAL

## 2019-03-13 ENCOUNTER — TELEPHONE (OUTPATIENT)
Dept: INTERNAL MEDICINE CLINIC | Facility: CLINIC | Age: 54
End: 2019-03-13

## 2019-03-13 VITALS
BODY MASS INDEX: 30.82 KG/M2 | DIASTOLIC BLOOD PRESSURE: 86 MMHG | RESPIRATION RATE: 18 BRPM | TEMPERATURE: 98.7 F | HEIGHT: 65 IN | WEIGHT: 185 LBS | OXYGEN SATURATION: 98 % | HEART RATE: 102 BPM | SYSTOLIC BLOOD PRESSURE: 132 MMHG

## 2019-03-13 DIAGNOSIS — J01.01 ACUTE RECURRENT MAXILLARY SINUSITIS: Primary | ICD-10-CM

## 2019-03-13 PROCEDURE — 99203 OFFICE O/P NEW LOW 30 MIN: CPT | Performed by: NURSE PRACTITIONER

## 2019-03-13 RX ORDER — CLINDAMYCIN HYDROCHLORIDE 300 MG/1
300 CAPSULE ORAL 4 TIMES DAILY
Qty: 28 CAPSULE | Refills: 0 | Status: ON HOLD | OUTPATIENT
Start: 2019-03-13 | End: 2019-03-18 | Stop reason: SDUPTHER

## 2019-03-13 NOTE — TELEPHONE ENCOUNTER
Patient doesn't have thermometer , she states she's miserable , she states she may go to a walk in [facility] center

## 2019-03-13 NOTE — TELEPHONE ENCOUNTER
Want to make sure she does not have septic call trigeminal neuralgia - this needs full appointment with me as opposed to ENT- have her keep the appointment on Friday  At that time we can talk about other medication and evaluation    If she is running fever she needs to be seen before Friday

## 2019-03-13 NOTE — TELEPHONE ENCOUNTER
Patient states that doesn't help her at all any other suggestions ? ?   - patient wants to know should she call ENT     But she is scheduled for Friday @ [address] , is moved

## 2019-03-13 NOTE — PATIENT INSTRUCTIONS

## 2019-03-13 NOTE — TELEPHONE ENCOUNTER
PATIENT IS GOING TO URGENT CARE, SHE IS STILL ON SCHEDULE FOR Friday SINCE SHE STILL WANTS TO SEE YOU

## 2019-03-13 NOTE — PROGRESS NOTES
[facility]        NAME: Messi Hamilton is a 53 y o  female  : 1965    MRN: [id number]  DATE: 2019  TIME: 12:04 PM    Assessment and Plan   Acute recurrent maxillary sinusitis [J01 01]  1  Acute recurrent maxillary sinusitis  clindamycin (CLEOCIN) 300 MG capsule         Patient Instructions     Flonase nasal spray as directed  Saline nasal rinses as needed  Follow up with PCP in 3-5 days  Proceed to  ER if symptoms worsen  Chief Complaint     Chief Complaint   Patient presents with    Sinusitis     Pt c/o sinus pain, sinus pressure, and jaw pain x 1 day  History of Present Illness       Sinusitis   This is a new problem  The current episode started yesterday  Associated symptoms include chills, congestion, headaches, sinus pressure and swollen glands  Pertinent negatives include no shortness of breath or sore throat  (Right-sided facial pain that extends into the jaw and right neck area ) Past treatments include nothing  She has a history of recurrent sinus infections and has had sinus surgeries in the past   She reports that the whole right side of her face feels swollen  She states that the only antibiotic she can take is clindamycin  She reports having reactions to every other antibiotic she has ever been prescribed  Review of Systems   Review of Systems   Constitutional: Positive for chills  HENT: Positive for congestion, sinus pressure and sinus pain  Negative for sore throat  Eyes: Negative  Respiratory: Negative  Negative for shortness of breath  Cardiovascular: Negative  Gastrointestinal: Negative  Genitourinary: Negative  Musculoskeletal: Negative  Neurological: Positive for headaches           Current Medications       Current Outpatient Medications:     atorvastatin (LIPITOR) 10 mg tablet, Take 1 tablet (10 mg total) by mouth daily, Disp: 90 tablet, Rfl: 3    busPIRone (BUSPAR) 30 MG tablet, 2 (two) times a day  , Disp: , Rfl:    clindamycin (CLEOCIN) 300 MG capsule, Take 1 capsule (300 mg total) by mouth 4 (four) times a day for 7 days, Disp: 28 capsule, Rfl: 0    FLUoxetine (PROZAC) 40 MG capsule, Take 80 mg by mouth 2 (two) times a day  , Disp: , Rfl:     LATUDA 20 MG tablet, 20 mg daily with breakfast  , Disp: , Rfl:     levocetirizine (XYZAL) 5 MG tablet, , Disp: , Rfl:     LORazepam (ATIVAN) 1 mg tablet, Take 1 mg by mouth every 6 (six) hours as needed for anxiety, Disp: , Rfl:     montelukast (SINGULAIR) 10 mg tablet, Take 1 tablet (10 mg total) by mouth daily for 360 days, Disp: 90 tablet, Rfl: 3    polyethylene glycol (MIRALAX) 17 g packet, Take 17 g by mouth daily, Disp: , Rfl:     ranitidine (ZANTAC) 300 MG capsule, Take 300 mg by mouth every evening, Disp: , Rfl:     verapamil (CALAN-SR) 240 mg CR tablet, Take 1 tablet by mouth daily, Disp: , Rfl:     Current Allergies     Allergies as of 03/13/2019 - Reviewed 03/13/2019   Allergen Reaction Noted    Other  09/29/2016    Augmentin es-600  [amoxicillin-pot clavulanate]  04/20/2013    Cefuroxime  04/20/2013    Erythromycin  12/12/2016    Levofloxacin  04/20/2013    Morphine  12/12/2016    Morphine and related Hives 09/29/2016    Oxycodone-acetaminophen  12/12/2016    Penicillins  12/12/2016    Percocet [oxycodone-acetaminophen] Hives 09/29/2016    Sulfa antibiotics  12/12/2016    Tetracyclines & related  04/20/2013            The following portions of the patient's history were reviewed and updated as appropriate: allergies, current medications, past family history, past medical history, past social history, past surgical history and problem list      Past Medical History:   Diagnosis Date    Anesthesia complication     V TACH after her reduction mammoplasty, done at [address] Depression     Ethmoid sinusitis     GERD (gastroesophageal reflux disease)     Maxillary sinusitis     Multiple allergies     Myofascial pain syndrome     Nasal turbinate hypertrophy        Past Surgical History:   Procedure Laterality Date    HYSTERECTOMY  2004    LAPAROSCOPY      with vaginal hysterectomy; 11/3/2003 rso    OOPHORECTOMY Left 2004    PARTIAL HYSTERECTOMY  2004    AR NASAL/SINUS ENDOSCOPY,RMV TISS MAXILL SINUS N/A 9/30/2016    Procedure: IMAGE GUIDED FUNCTIONAL ENDOSCOPIC SINUS SURGERY;  Surgeon: Shannon Grewal MD;  Location: [facility];  Service: ENT    REDUCTION MAMMAPLASTY Bilateral 02/27/2015    TUBAL LIGATION      WISDOM TOOTH EXTRACTION         Family History   Problem Relation Age of Onset    CLEM disease Mother     Atrial fibrillation Father     Heart disease Father     Diabetes Brother     Diabetes Maternal Grandmother     Hypertension Maternal Grandmother     Colon cancer Maternal Grandfather     Diabetes Maternal Grandfather     Cancer Paternal Grandfather     Endometrial cancer Cousin     Breast cancer Cousin          Medications have been verified  Objective   /86   Pulse 102   Temp 98 7 °F (37 1 °C)   Resp 18   Ht 5' 5" (1 651 m)   Wt 83 9 kg (185 lb)   LMP  (LMP Unknown)   SpO2 98%   BMI 30 79 kg/m²        Physical Exam     Physical Exam   Constitutional: She is oriented to person, place, and time  She appears well-developed and well-nourished  No distress  HENT:   Head: Normocephalic and atraumatic  Right Ear: External ear normal    Left Ear: External ear normal    Nose: Mucosal edema and rhinorrhea present  Right sinus exhibits maxillary sinus tenderness  Right sinus exhibits no frontal sinus tenderness  Left sinus exhibits no maxillary sinus tenderness and no frontal sinus tenderness  Mouth/Throat: Oropharynx is clear and moist  No oropharyngeal exudate, posterior oropharyngeal edema or posterior oropharyngeal erythema  Eyes: Pupils are equal, round, and reactive to light  Conjunctivae and EOM are normal    Neck: Normal range of motion  Neck supple     Cardiovascular: Normal rate, regular rhythm and normal heart sounds  Pulmonary/Chest: Effort normal and breath sounds normal  No stridor  No respiratory distress  She has no wheezes  She has no rales  Abdominal: Soft  Bowel sounds are normal    Lymphadenopathy:     She has cervical adenopathy  Neurological: She is alert and oriented to person, place, and time  Skin: Skin is warm and dry  She is not diaphoretic  Psychiatric: She has a normal mood and affect  Her behavior is normal  Judgment and thought content normal    Nursing note and vitals reviewed

## 2019-03-13 NOTE — TELEPHONE ENCOUNTER
Use acetaminophen as needed  Schedule her on  Friday March 15th in place of [employer] Mrs Conner Watts to Friday April 5

## 2019-03-13 NOTE — TELEPHONE ENCOUNTER
Facial pain going down to her jaw  - last night her face down to her neck was red  - dry mouth  - chills - she states it been like a heat flash , but its cold, happens out of no where      I asked if she had any other symptoms & she denied

## 2019-03-13 NOTE — TELEPHONE ENCOUNTER
She can either be seen today at 5:00 p m   As a same-day appointment or go to [facility] center

## 2019-03-14 ENCOUNTER — HOSPITAL ENCOUNTER (INPATIENT)
Facility: HOSPITAL | Age: 54
LOS: 2 days | Discharge: HOME | DRG: 158 | End: 2019-03-18
Attending: EMERGENCY MEDICINE | Admitting: INTERNAL MEDICINE
Payer: MEDICARE

## 2019-03-14 ENCOUNTER — APPOINTMENT (EMERGENCY)
Dept: RADIOLOGY | Facility: HOSPITAL | Age: 54
DRG: 158 | End: 2019-03-14
Payer: MEDICARE

## 2019-03-14 DIAGNOSIS — K01.1 TOOTH IMPACTION: ICD-10-CM

## 2019-03-14 DIAGNOSIS — L03.211 FACIAL CELLULITIS: Primary | ICD-10-CM

## 2019-03-14 DIAGNOSIS — R22.0 RIGHT FACIAL SWELLING: ICD-10-CM

## 2019-03-14 DIAGNOSIS — J01.01 ACUTE RECURRENT MAXILLARY SINUSITIS: ICD-10-CM

## 2019-03-14 DIAGNOSIS — K01.1 IMPACTED THIRD MOLAR TOOTH: ICD-10-CM

## 2019-03-14 LAB
ANION GAP SERPL CALCULATED.3IONS-SCNC: 10 MMOL/L (ref 4–13)
BASOPHILS # BLD AUTO: 0.02 THOUSANDS/ΜL (ref 0–0.1)
BASOPHILS NFR BLD AUTO: 0 % (ref 0–1)
BUN SERPL-MCNC: 7 MG/DL (ref 5–25)
CALCIUM SERPL-MCNC: 9.2 MG/DL (ref 8.3–10.1)
CHLORIDE SERPL-SCNC: 100 MMOL/L (ref 100–108)
CO2 SERPL-SCNC: 26 MMOL/L (ref 21–32)
CREAT SERPL-MCNC: 0.74 MG/DL (ref 0.6–1.3)
EOSINOPHIL # BLD AUTO: 0.26 THOUSAND/ΜL (ref 0–0.61)
EOSINOPHIL NFR BLD AUTO: 2 % (ref 0–6)
ERYTHROCYTE [DISTWIDTH] IN BLOOD BY AUTOMATED COUNT: 12.3 % (ref 11.6–15.1)
GFR SERPL CREATININE-BSD FRML MDRD: 91 ML/MIN/1.73SQ M
GLUCOSE SERPL-MCNC: 115 MG/DL (ref 65–140)
HCT VFR BLD AUTO: 45.8 % (ref 34.8–46.1)
HGB BLD-MCNC: 15 G/DL (ref 11.5–15.4)
IMM GRANULOCYTES # BLD AUTO: 0.04 THOUSAND/UL (ref 0–0.2)
IMM GRANULOCYTES NFR BLD AUTO: 0 % (ref 0–2)
LYMPHOCYTES # BLD AUTO: 2.03 THOUSANDS/ΜL (ref 0.6–4.47)
LYMPHOCYTES NFR BLD AUTO: 16 % (ref 14–44)
MCH RBC QN AUTO: 29.8 PG (ref 26.8–34.3)
MCHC RBC AUTO-ENTMCNC: 32.8 G/DL (ref 31.4–37.4)
MCV RBC AUTO: 91 FL (ref 82–98)
MONOCYTES # BLD AUTO: 1.28 THOUSAND/ΜL (ref 0.17–1.22)
MONOCYTES NFR BLD AUTO: 10 % (ref 4–12)
NEUTROPHILS # BLD AUTO: 8.73 THOUSANDS/ΜL (ref 1.85–7.62)
NEUTS SEG NFR BLD AUTO: 72 % (ref 43–75)
NRBC BLD AUTO-RTO: 0 /100 WBCS
PLATELET # BLD AUTO: 219 THOUSANDS/UL (ref 149–390)
PMV BLD AUTO: 11 FL (ref 8.9–12.7)
POTASSIUM SERPL-SCNC: 3.9 MMOL/L (ref 3.5–5.3)
RBC # BLD AUTO: 5.04 MILLION/UL (ref 3.81–5.12)
SODIUM SERPL-SCNC: 136 MMOL/L (ref 136–145)
WBC # BLD AUTO: 12.36 THOUSAND/UL (ref 4.31–10.16)

## 2019-03-14 PROCEDURE — 99219 PR INITIAL OBSERVATION CARE/DAY 50 MINUTES: CPT | Performed by: INTERNAL MEDICINE

## 2019-03-14 PROCEDURE — 80048 BASIC METABOLIC PNL TOTAL CA: CPT | Performed by: EMERGENCY MEDICINE

## 2019-03-14 PROCEDURE — 87040 BLOOD CULTURE FOR BACTERIA: CPT | Performed by: INTERNAL MEDICINE

## 2019-03-14 PROCEDURE — 85025 COMPLETE CBC W/AUTO DIFF WBC: CPT | Performed by: EMERGENCY MEDICINE

## 2019-03-14 PROCEDURE — 99285 EMERGENCY DEPT VISIT HI MDM: CPT

## 2019-03-14 PROCEDURE — 70491 CT SOFT TISSUE NECK W/DYE: CPT

## 2019-03-14 PROCEDURE — 94660 CPAP INITIATION&MGMT: CPT

## 2019-03-14 PROCEDURE — 36415 COLL VENOUS BLD VENIPUNCTURE: CPT | Performed by: EMERGENCY MEDICINE

## 2019-03-14 RX ORDER — POLYETHYLENE GLYCOL 3350 17 G/17G
17 POWDER, FOR SOLUTION ORAL DAILY
Status: DISCONTINUED | OUTPATIENT
Start: 2019-03-14 | End: 2019-03-18 | Stop reason: HOSPADM

## 2019-03-14 RX ORDER — LORATADINE 10 MG/1
10 TABLET ORAL DAILY
Status: DISCONTINUED | OUTPATIENT
Start: 2019-03-14 | End: 2019-03-18 | Stop reason: HOSPADM

## 2019-03-14 RX ORDER — SACCHAROMYCES BOULARDII 250 MG
250 CAPSULE ORAL 2 TIMES DAILY
Status: DISCONTINUED | OUTPATIENT
Start: 2019-03-14 | End: 2019-03-18 | Stop reason: HOSPADM

## 2019-03-14 RX ORDER — BUSPIRONE HYDROCHLORIDE 10 MG/1
30 TABLET ORAL 2 TIMES DAILY
Status: DISCONTINUED | OUTPATIENT
Start: 2019-03-14 | End: 2019-03-18 | Stop reason: HOSPADM

## 2019-03-14 RX ORDER — ATORVASTATIN CALCIUM 10 MG/1
10 TABLET, FILM COATED ORAL
Status: DISCONTINUED | OUTPATIENT
Start: 2019-03-14 | End: 2019-03-18 | Stop reason: HOSPADM

## 2019-03-14 RX ORDER — VERAPAMIL HYDROCHLORIDE 240 MG/1
240 TABLET, FILM COATED, EXTENDED RELEASE ORAL DAILY
Status: DISCONTINUED | OUTPATIENT
Start: 2019-03-14 | End: 2019-03-18 | Stop reason: HOSPADM

## 2019-03-14 RX ORDER — PREDNISONE 20 MG/1
40 TABLET ORAL DAILY
Status: DISCONTINUED | OUTPATIENT
Start: 2019-03-15 | End: 2019-03-18 | Stop reason: HOSPADM

## 2019-03-14 RX ORDER — LORAZEPAM 1 MG/1
1 TABLET ORAL EVERY 6 HOURS PRN
Status: DISCONTINUED | OUTPATIENT
Start: 2019-03-14 | End: 2019-03-18 | Stop reason: HOSPADM

## 2019-03-14 RX ORDER — ECHINACEA PURPUREA EXTRACT 125 MG
2 TABLET ORAL 2 TIMES DAILY
Status: DISCONTINUED | OUTPATIENT
Start: 2019-03-14 | End: 2019-03-18 | Stop reason: HOSPADM

## 2019-03-14 RX ORDER — IBUPROFEN 400 MG/1
400 TABLET ORAL EVERY 6 HOURS PRN
Status: DISCONTINUED | OUTPATIENT
Start: 2019-03-14 | End: 2019-03-18 | Stop reason: HOSPADM

## 2019-03-14 RX ORDER — FLUOXETINE HYDROCHLORIDE 20 MG/1
40 CAPSULE ORAL 2 TIMES DAILY
Status: DISCONTINUED | OUTPATIENT
Start: 2019-03-14 | End: 2019-03-15

## 2019-03-14 RX ORDER — ACETAMINOPHEN 325 MG/1
650 TABLET ORAL EVERY 6 HOURS PRN
Status: DISCONTINUED | OUTPATIENT
Start: 2019-03-14 | End: 2019-03-18 | Stop reason: HOSPADM

## 2019-03-14 RX ORDER — CLINDAMYCIN PHOSPHATE 600 MG/50ML
600 INJECTION INTRAVENOUS EVERY 8 HOURS
Status: DISCONTINUED | OUTPATIENT
Start: 2019-03-14 | End: 2019-03-14

## 2019-03-14 RX ORDER — FAMOTIDINE 20 MG/1
40 TABLET, FILM COATED ORAL
Status: DISCONTINUED | OUTPATIENT
Start: 2019-03-14 | End: 2019-03-15

## 2019-03-14 RX ORDER — OXYMETAZOLINE HYDROCHLORIDE 0.05 G/100ML
2 SPRAY NASAL EVERY 12 HOURS SCHEDULED
Status: DISPENSED | OUTPATIENT
Start: 2019-03-14 | End: 2019-03-17

## 2019-03-14 RX ORDER — MONTELUKAST SODIUM 10 MG/1
10 TABLET ORAL DAILY
Status: DISCONTINUED | OUTPATIENT
Start: 2019-03-14 | End: 2019-03-18 | Stop reason: HOSPADM

## 2019-03-14 RX ORDER — OXYMETAZOLINE HYDROCHLORIDE 0.05 G/100ML
2 SPRAY NASAL EVERY 12 HOURS SCHEDULED
Status: DISCONTINUED | OUTPATIENT
Start: 2019-03-14 | End: 2019-03-14

## 2019-03-14 RX ORDER — IBUPROFEN 600 MG/1
600 TABLET ORAL ONCE
Status: COMPLETED | OUTPATIENT
Start: 2019-03-14 | End: 2019-03-14

## 2019-03-14 RX ADMIN — OXYMETAZOLINE HYDROCHLORIDE 2 SPRAY: 0.05 SPRAY NASAL at 22:19

## 2019-03-14 RX ADMIN — MONTELUKAST SODIUM 10 MG: 10 TABLET, FILM COATED ORAL at 18:30

## 2019-03-14 RX ADMIN — LORAZEPAM 1 MG: 1 TABLET ORAL at 19:44

## 2019-03-14 RX ADMIN — Medication 2 SPRAY: at 22:20

## 2019-03-14 RX ADMIN — Medication 250 MG: at 18:30

## 2019-03-14 RX ADMIN — LORATADINE 10 MG: 10 TABLET ORAL at 18:30

## 2019-03-14 RX ADMIN — ATORVASTATIN CALCIUM 10 MG: 10 TABLET, FILM COATED ORAL at 18:29

## 2019-03-14 RX ADMIN — ACETAMINOPHEN 650 MG: 325 TABLET ORAL at 19:17

## 2019-03-14 RX ADMIN — VANCOMYCIN HYDROCHLORIDE 1250 MG: 10 INJECTION, POWDER, LYOPHILIZED, FOR SOLUTION INTRAVENOUS at 19:26

## 2019-03-14 RX ADMIN — IBUPROFEN 600 MG: 600 TABLET ORAL at 14:12

## 2019-03-14 RX ADMIN — ENOXAPARIN SODIUM 40 MG: 40 INJECTION SUBCUTANEOUS at 18:29

## 2019-03-14 RX ADMIN — BUSPIRONE HYDROCHLORIDE 30 MG: 10 TABLET ORAL at 18:32

## 2019-03-14 RX ADMIN — IOHEXOL 85 ML: 350 INJECTION, SOLUTION INTRAVENOUS at 13:08

## 2019-03-14 NOTE — PROGRESS NOTES
Vancomycin Assessment    Bernardino Bernheim is a 53 y o  female who is currently receiving vancomycin 1250mg q 12 h for skin-soft tissue infection     Relevant clinical data and objective history reviewed:  Creatinine   Date Value Ref Range Status   03/14/2019 0 74 0 60 - 1 30 mg/dL Final     Comment:     Standardized to IDMS reference method   02/04/2019 0 77 0 60 - 1 30 mg/dL Final     Comment:     Standardized to IDMS reference method   07/20/2018 0 85 0 60 - 1 30 mg/dL Final     Comment:     Standardized to IDMS reference method   04/20/2015 0 67 0 60 - 1 30 mg/dL Final     Comment:     Standardized to IDMS reference method     /55   Pulse 83   Temp 99 4 °F (37 4 °C) (Tympanic)   Resp 18   Ht 5' 5 25" (1 657 m)   Wt 83 9 kg (185 lb)   LMP  (LMP Unknown)   SpO2 95%   BMI 30 55 kg/m²   No intake/output data recorded  Lab Results   Component Value Date/Time    BUN 7 03/14/2019 12:19 PM    BUN 11 04/20/2015 11:35 AM    WBC 12 36 (H) 03/14/2019 12:19 PM    WBC 9 19 04/20/2015 11:35 AM    HGB 15 0 03/14/2019 12:19 PM    HGB 14 2 04/20/2015 11:35 AM    HCT 45 8 03/14/2019 12:19 PM    HCT 42 7 04/20/2015 11:35 AM    MCV 91 03/14/2019 12:19 PM    MCV 89 04/20/2015 11:35 AM     03/14/2019 12:19 PM     04/20/2015 11:35 AM     Temp Readings from Last 3 Encounters:   03/14/19 99 4 °F (37 4 °C) (Tympanic)   03/13/19 98 7 °F (37 1 °C)   09/30/16 99 2 °F (37 3 °C) (Tympanic Core)     Vancomycin Days of Therapy: 1    Assessment/Plan  The patient is currently on vancomycin utilizing scheduled dosing based on adjusted body weight (due to obesity)  The patient is currently receiving 1250mg q 12 h  Pharmacy will also follow closely for s/sx of nephrotoxicity, infusion reactions and appropriateness of therapy  BMP and CBC will be ordered per protocol  Plan for trough as patient approaches steady state, prior to the 5th  dose at approximately 1630 on 3/16     Due to infection severity, will target a trough of 15-20 (appropriate for most indications)   Pharmacy will continue to follow the patient?s culture results and clinical progress daily      [employer], Pharmacist

## 2019-03-14 NOTE — PLAN OF CARE
Problem: PAIN - ADULT  Goal: Verbalizes/displays adequate comfort level or baseline comfort level  Description  Interventions:  - Encourage patient to monitor pain and request assistance  - Assess pain using appropriate pain scale  - Administer analgesics based on type and severity of pain and evaluate response  - Implement non-pharmacological measures as appropriate and evaluate response  - Consider cultural and social influences on pain and pain management  - Notify physician/advanced practitioner if interventions unsuccessful or patient reports new pain  Outcome: Progressing     Problem: INFECTION - ADULT  Goal: Absence or prevention of progression during hospitalization  Description  INTERVENTIONS:  - Assess and monitor for signs and symptoms of infection  - Monitor lab/diagnostic results  - Monitor all insertion sites, i e  indwelling lines, tubes, and drains  - Monitor endotracheal (as able) and nasal secretions for changes in amount and color  - [place] appropriate cooling/warming therapies per order  - Administer medications as ordered  - Instruct and encourage patient and family to use good hand hygiene technique  - Identify and instruct in appropriate isolation precautions for identified infection/condition  Outcome: Progressing  Goal: Absence of fever/infection during neutropenic period  Description  INTERVENTIONS:  - Monitor WBC  - Implement neutropenic guidelines  Outcome: Progressing     Problem: SAFETY ADULT  Goal: Patient will remain free of falls  Description  INTERVENTIONS:  - Assess patient frequently for physical needs  -  Identify cognitive and physical deficits and behaviors that affect risk of falls    -  [place] fall precautions as indicated by assessment   - Educate patient/family on patient safety including physical limitations  - Instruct patient to call for assistance with activity based on assessment  - Modify environment to reduce risk of injury  - Consider OT/PT consult to assist with strengthening/mobility  Outcome: Progressing  Goal: Maintain or return to baseline ADL function  Description  INTERVENTIONS:  -  Assess patient's ability to carry out ADLs; assess patient's baseline for ADL function and identify physical deficits which impact ability to perform ADLs (bathing, care of mouth/teeth, toileting, grooming, dressing, etc )  - Assess/evaluate cause of self-care deficits   - Assess range of motion  - Assess patient's mobility; develop plan if impaired  - Assess patient's need for assistive devices and provide as appropriate  - Encourage maximum independence but intervene and supervise when necessary  ¯ Involve family in performance of ADLs  ¯ Assess for home care needs following discharge   ¯ Request OT consult to assist with ADL evaluation and planning for discharge  ¯ Provide patient education as appropriate  Outcome: Progressing  Goal: Maintain or return mobility status to optimal level  Description  INTERVENTIONS:  - Assess patient's baseline mobility status (ambulation, transfers, stairs, etc )    - Identify cognitive and physical deficits and behaviors that affect mobility  - Identify mobility aids required to assist with transfers and/or ambulation (gait belt, sit-to-stand, lift, walker, cane, etc )  - [place] fall precautions as indicated by assessment  - Record patient progress and toleration of activity level on Mobility SBAR; progress patient to next Phase/Stage  - Instruct patient to call for assistance with activity based on assessment  - Request Rehabilitation consult to assist with strengthening/weightbearing, etc   Outcome: Progressing     Problem: DISCHARGE PLANNING  Goal: Discharge to home or other facility with appropriate resources  Description  INTERVENTIONS:  - Identify barriers to discharge w/patient and caregiver  - Arrange for needed discharge resources and transportation as appropriate  - Identify discharge learning needs (meds, wound care, etc )  - Arrange for interpretive services to assist at discharge as needed  - Refer to Case Management Department for coordinating discharge planning if the patient needs post-hospital services based on physician/advanced practitioner order or complex needs related to functional status, cognitive ability, or social support system  Outcome: Progressing     Problem: Knowledge Deficit  Goal: Patient/family/caregiver demonstrates understanding of disease process, treatment plan, medications, and discharge instructions  Description  Complete learning assessment and assess knowledge base    Interventions:  - Provide teaching at level of understanding  - Provide teaching via preferred learning methods  Outcome: Progressing

## 2019-03-14 NOTE — H&P
H&P- González Greenberg 1965, 53 y o  female MRN: [id number]    Unit/Bed#: ED 10 Encounter: [id number]    Primary Care Provider: Dandy Vivas MD   Date and time admitted to hospital: 3/14/2019 11:33 AM        Obstructive sleep apnea syndrome  Assessment & Plan  Continue with CPAP HS    * Facial cellulitis  Assessment & Plan  Patient presenting with facial cellulitis that did not improve with p o  Clindamycin, patient took at least 6 tablets of 300 mg clindamycin in the past 24 hours without improvement of the symptoms  History of bilateral antrectomies with recurrent sinusitis and severe allergy  Allergies to multiple antibiotics causing hives and hypotension  Patient is willing to try vancomycin, never tried in the past  Vancomycin IV b i d  With pharmacy to dose  Consult ENT  Consult Infectious if not improving on Vanco  Tylenol and ibuprofen p r n  For pain and fever  Monitor clinically  Serial facial exam        VTE Prophylaxis: Enoxaparin (Lovenox)  / sequential compression device   Code Status:  Full code  POLST: POLST is not applicable to this patient  Discussion with family:   at bedside    Anticipated Length of Stay:  Patient will be admitted on an Observation basis with an anticipated length of stay of  greater than 2 midnights  Justification for Hospital Stay:  Refer to above    Total Time for Visit, including Counseling / Coordination of Care: 1 hour  Greater than 50% of this total time spent on direct patient counseling and coordination of care  Chief Complaint:   Facial swelling    History of Present Illness:    González Greenebrg is a 53 y o  female who presents with facial swelling    This is a 62-year-old female with past medical history of recurrent sinusitis status post bilateral antrectomy, multiple allergist currently ongoing treatment with [facility] as an outpatient, history of anxiety and depression, history of multiple antibiotic allergies with hypotension and hives all came to the hospital today for evaluation of right facial swelling  As per patient she started to have sinus pressure and pain, throbbing, constant, on the right side of her face with involvement of the mandible  She tried to reach out to her ENT, she was advised to go for evaluation to urgent care clinic and she was started on clindamycin about 24 hours ago  300 mg 4 times a day, she took 6 doses  She woke up this morning although with known improvement of the pain and also patient started to note developing swelling of the right side of her face including the eye about yesterday afternoon which also did not improve with antibiotics by mouth  She came to the hospital today for further evaluation  CT scan in the ER shows a bilateral sinusitis with postsurgical changes without any lymphadenopathy or drainable collection  No fever or chills are reported  No postnasal drip, no nasal discharge, no cough  Review of Systems:    Review of Systems   Constitutional: Negative for chills, diaphoresis, fatigue and fever  HENT: Positive for facial swelling and sinus pain  Negative for congestion, dental problem, ear pain, postnasal drip and trouble swallowing  Respiratory: Negative for choking and shortness of breath  All other systems reviewed and are negative        Past Medical and Surgical History:     Past Medical History:   Diagnosis Date    Anesthesia complication     V TACH after her reduction mammoplasty, done at [address] Depression     Ethmoid sinusitis     GERD (gastroesophageal reflux disease)     Maxillary sinusitis     Multiple allergies     Myofascial pain syndrome     Nasal turbinate hypertrophy        Past Surgical History:   Procedure Laterality Date    HYSTERECTOMY  2004    LAPAROSCOPY      with vaginal hysterectomy; 11/3/2003 rso    OOPHORECTOMY Left 2004    PARTIAL HYSTERECTOMY  2004    AL NASAL/SINUS ENDOSCOPY,RMV TISS MAXILL SINUS N/A 9/30/2016 Procedure: IMAGE GUIDED FUNCTIONAL ENDOSCOPIC SINUS SURGERY;  Surgeon: Derrek Lowery MD;  Location: [facility];  Service: ENT    REDUCTION MAMMAPLASTY Bilateral 02/27/2015    TUBAL LIGATION      WISDOM TOOTH EXTRACTION         Meds/Allergies:    Prior to Admission medications    Medication Sig Start Date End Date Taking? Authorizing Provider   atorvastatin (LIPITOR) 10 mg tablet Take 1 tablet (10 mg total) by mouth daily 1/8/19 1/8/20 Yes Vikram Blackwell MD   busPIRone (BUSPAR) 30 MG tablet 2 (two) times a day   2/25/18  Yes Historical Provider, MD   clindamycin (CLEOCIN) 300 MG capsule Take 1 capsule (300 mg total) by mouth 4 (four) times a day for 7 days 3/13/19 3/20/19 Yes WILLY Hanley   FLUoxetine (PROZAC) 40 MG capsule Take 80 mg by mouth 2 (two) times a day     Yes Historical Provider, MD   LATUDA 20 MG tablet 20 mg daily with breakfast   2/25/18  Yes Historical Provider, MD   levocetirizine (XYZAL) 5 MG tablet  2/26/18  Yes Historical Provider, MD   LORazepam (ATIVAN) 1 mg tablet Take 1 mg by mouth every 6 (six) hours as needed for anxiety   Yes Historical Provider, MD   montelukast (SINGULAIR) 10 mg tablet Take 1 tablet (10 mg total) by mouth daily for 360 days 2/21/19 2/16/20 Yes Radha Mendiola,    polyethylene glycol (MIRALAX) 17 g packet Take 17 g by mouth daily   Yes Historical Provider, MD   ranitidine (ZANTAC) 300 MG capsule Take 300 mg by mouth every evening   Yes Historical Provider, MD   verapamil (CALAN-SR) 240 mg CR tablet Take 1 tablet by mouth daily 7/14/17  Yes Historical Provider, MD         Allergies:    Allergies   Allergen Reactions    Other      Most all antibiotics- hives, hypotensive    "no problem with clindamycin "    Augmentin Es-600  [Amoxicillin-Pot Clavulanate]     Cefuroxime     Erythromycin     Levofloxacin     Morphine     Morphine And Related Hives    Oxycodone-Acetaminophen     Penicillins     Percocet [Oxycodone-Acetaminophen] Hives    Sulfa Antibiotics     Tetracyclines & Related        Social History:     Marital Status: /Civil Union     Substance Use History:   Social History     Substance and Sexual Activity   Alcohol Use Yes    Comment: social     Social History     Tobacco Use   Smoking Status Current Some Day Smoker     Social History     Substance and Sexual Activity   Drug Use No       Family History:    non-contributory    Physical Exam:     Vitals:   Blood Pressure: 118/55 (03/14/19 1408)  Pulse: 83 (03/14/19 1408)  Temperature: 99 4 °F (37 4 °C) (03/14/19 1116)  Temp Source: Tympanic (03/14/19 1116)  Respirations: 18 (03/14/19 1408)  Height: 5' 5 25" (165 7 cm) (03/14/19 1116)  Weight - Scale: 83 9 kg (185 lb) (03/14/19 1116)  SpO2: 95 % (03/14/19 1408)    Physical Exam   Constitutional: She is oriented to person, place, and time  She appears well-developed  No distress  HENT:   Head: Normocephalic and atraumatic  Swelling of upper lower eyelid on the right, erythema and mild swelling of the zygomathic area, mild erythema extending close to the corner of the mouth on the right  Unremarkable the left   Cardiovascular: Normal rate, regular rhythm and normal heart sounds  Exam reveals no friction rub  No murmur heard  Pulmonary/Chest: Effort normal and breath sounds normal  No stridor  No respiratory distress  She has no wheezes  Abdominal: Soft  Bowel sounds are normal  She exhibits no distension  There is no tenderness  There is no guarding  Musculoskeletal: She exhibits no edema  Neurological: She is alert and oriented to person, place, and time  Skin: There is erythema (Erythema of the face as described)  Psychiatric: She has a normal mood and affect  Judgment and thought content normal            Additional Data:     Lab Results: I have personally reviewed pertinent reports        Results from last 7 days   Lab Units 03/14/19  1219   WBC Thousand/uL 12 36*   HEMOGLOBIN g/dL 15 0   HEMATOCRIT % 45 8   PLATELETS Thousands/uL 219   NEUTROS PCT % 72   LYMPHS PCT % 16   MONOS PCT % 10   EOS PCT % 2     Results from last 7 days   Lab Units 03/14/19  1219   SODIUM mmol/L 136   POTASSIUM mmol/L 3 9   CHLORIDE mmol/L 100   CO2 mmol/L 26   BUN mg/dL 7   CREATININE mg/dL 0 74   ANION GAP mmol/L 10   CALCIUM mg/dL 9 2   GLUCOSE RANDOM mg/dL 115                       Imaging: I have personally reviewed pertinent reports  CT soft tissue neck with contrast   Final Result by Ra Cullen MD (03/14 6675)         1  Bilateral maxillary sinus disease, right greater than left, with evidence of prior bilateral antrectomies  No air-fluid levels  2  Otherwise, unremarkable exam  No abnormal fluid collection/abscess nor pathologic lymphadenopathy  Workstation performed: [id number]                 [employer] / Epic Records Reviewed: Yes     ** Please Note: This note has been constructed using a voice recognition system   **

## 2019-03-14 NOTE — TELEPHONE ENCOUNTER
Pt's  called to cancel Friday appt due to pt being admitted to the hospital, will call back to schedule follow up when released

## 2019-03-14 NOTE — ASSESSMENT & PLAN NOTE
Patient presenting with facial cellulitis that did not improve with p o  Clindamycin, patient took at least 6 tablets of 300 mg clindamycin in the past 24 hours without improvement of the symptoms  History of bilateral antrectomies with recurrent sinusitis and severe allergy  Allergies to multiple antibiotics causing hives and hypotension  Patient is willing to try vancomycin, never tried in the past  Vancomycin IV b i d  With pharmacy to dose  Consult ENT  Consult Infectious  Tylenol and ibuprofen p r n   For pain and fever  Monitor clinically  Serial facial exam

## 2019-03-15 PROBLEM — J01.01 ACUTE RECURRENT MAXILLARY SINUSITIS: Status: ACTIVE | Noted: 2019-03-14

## 2019-03-15 PROBLEM — R22.0 RIGHT FACIAL SWELLING: Status: ACTIVE | Noted: 2019-03-14

## 2019-03-15 PROBLEM — K01.1 TOOTH IMPACTION: Status: ACTIVE | Noted: 2019-03-14

## 2019-03-15 PROBLEM — K01.1 IMPACTED THIRD MOLAR TOOTH: Status: ACTIVE | Noted: 2019-03-14

## 2019-03-15 LAB
ALBUMIN SERPL BCP-MCNC: 3.4 G/DL (ref 3.5–5)
ALP SERPL-CCNC: 94 U/L (ref 46–116)
ALT SERPL W P-5'-P-CCNC: 26 U/L (ref 12–78)
ANION GAP SERPL CALCULATED.3IONS-SCNC: 7 MMOL/L (ref 4–13)
AST SERPL W P-5'-P-CCNC: 14 U/L (ref 5–45)
BILIRUB SERPL-MCNC: 0.55 MG/DL (ref 0.2–1)
BUN SERPL-MCNC: 8 MG/DL (ref 5–25)
CALCIUM SERPL-MCNC: 8.8 MG/DL (ref 8.3–10.1)
CHLORIDE SERPL-SCNC: 103 MMOL/L (ref 100–108)
CO2 SERPL-SCNC: 27 MMOL/L (ref 21–32)
CREAT SERPL-MCNC: 0.7 MG/DL (ref 0.6–1.3)
ERYTHROCYTE [DISTWIDTH] IN BLOOD BY AUTOMATED COUNT: 12.1 % (ref 11.6–15.1)
GFR SERPL CREATININE-BSD FRML MDRD: 98 ML/MIN/1.73SQ M
GLUCOSE P FAST SERPL-MCNC: 101 MG/DL (ref 65–99)
GLUCOSE SERPL-MCNC: 101 MG/DL (ref 65–140)
HCT VFR BLD AUTO: 40.8 % (ref 34.8–46.1)
HGB BLD-MCNC: 13.2 G/DL (ref 11.5–15.4)
MAGNESIUM SERPL-MCNC: 2.3 MG/DL (ref 1.6–2.6)
MCH RBC QN AUTO: 29.5 PG (ref 26.8–34.3)
MCHC RBC AUTO-ENTMCNC: 32.4 G/DL (ref 31.4–37.4)
MCV RBC AUTO: 91 FL (ref 82–98)
PHOSPHATE SERPL-MCNC: 3.4 MG/DL (ref 2.7–4.5)
PLATELET # BLD AUTO: 185 THOUSANDS/UL (ref 149–390)
PMV BLD AUTO: 11.3 FL (ref 8.9–12.7)
POTASSIUM SERPL-SCNC: 3.5 MMOL/L (ref 3.5–5.3)
PROT SERPL-MCNC: 7 G/DL (ref 6.4–8.2)
RBC # BLD AUTO: 4.47 MILLION/UL (ref 3.81–5.12)
SODIUM SERPL-SCNC: 137 MMOL/L (ref 136–145)
WBC # BLD AUTO: 10.36 THOUSAND/UL (ref 4.31–10.16)

## 2019-03-15 PROCEDURE — 85027 COMPLETE CBC AUTOMATED: CPT | Performed by: INTERNAL MEDICINE

## 2019-03-15 PROCEDURE — 94660 CPAP INITIATION&MGMT: CPT

## 2019-03-15 PROCEDURE — 84100 ASSAY OF PHOSPHORUS: CPT | Performed by: INTERNAL MEDICINE

## 2019-03-15 PROCEDURE — 94760 N-INVAS EAR/PLS OXIMETRY 1: CPT

## 2019-03-15 PROCEDURE — 80053 COMPREHEN METABOLIC PANEL: CPT | Performed by: INTERNAL MEDICINE

## 2019-03-15 PROCEDURE — 99226 PR SBSQ OBSERVATION CARE/DAY 35 MINUTES: CPT | Performed by: NURSE PRACTITIONER

## 2019-03-15 PROCEDURE — 83735 ASSAY OF MAGNESIUM: CPT | Performed by: INTERNAL MEDICINE

## 2019-03-15 RX ORDER — FLUOXETINE HYDROCHLORIDE 20 MG/1
80 CAPSULE ORAL DAILY
Status: DISCONTINUED | OUTPATIENT
Start: 2019-03-15 | End: 2019-03-18 | Stop reason: HOSPADM

## 2019-03-15 RX ADMIN — LORATADINE 10 MG: 10 TABLET ORAL at 08:32

## 2019-03-15 RX ADMIN — LORAZEPAM 1 MG: 1 TABLET ORAL at 17:32

## 2019-03-15 RX ADMIN — OXYMETAZOLINE HYDROCHLORIDE 2 SPRAY: 0.05 SPRAY NASAL at 20:22

## 2019-03-15 RX ADMIN — Medication 250 MG: at 17:19

## 2019-03-15 RX ADMIN — MONTELUKAST SODIUM 10 MG: 10 TABLET, FILM COATED ORAL at 08:32

## 2019-03-15 RX ADMIN — PREDNISONE 40 MG: 20 TABLET ORAL at 08:32

## 2019-03-15 RX ADMIN — FLUOXETINE 40 MG: 20 CAPSULE ORAL at 08:32

## 2019-03-15 RX ADMIN — IBUPROFEN 400 MG: 400 TABLET ORAL at 02:03

## 2019-03-15 RX ADMIN — VANCOMYCIN HYDROCHLORIDE 1250 MG: 10 INJECTION, POWDER, LYOPHILIZED, FOR SOLUTION INTRAVENOUS at 17:24

## 2019-03-15 RX ADMIN — ATORVASTATIN CALCIUM 10 MG: 10 TABLET, FILM COATED ORAL at 17:19

## 2019-03-15 RX ADMIN — Medication 2 SPRAY: at 08:37

## 2019-03-15 RX ADMIN — IBUPROFEN 400 MG: 400 TABLET ORAL at 08:33

## 2019-03-15 RX ADMIN — Medication 2 SPRAY: at 17:20

## 2019-03-15 RX ADMIN — FLUOXETINE HYDROCHLORIDE 40 MG: 20 CAPSULE ORAL at 11:30

## 2019-03-15 RX ADMIN — OXYMETAZOLINE HYDROCHLORIDE 2 SPRAY: 0.05 SPRAY NASAL at 08:35

## 2019-03-15 RX ADMIN — VERAPAMIL HYDROCHLORIDE 240 MG: 240 TABLET, FILM COATED, EXTENDED RELEASE ORAL at 08:36

## 2019-03-15 RX ADMIN — IBUPROFEN 400 MG: 400 TABLET ORAL at 17:30

## 2019-03-15 RX ADMIN — Medication 250 MG: at 08:36

## 2019-03-15 RX ADMIN — POLYETHYLENE GLYCOL 3350 17 G: 17 POWDER, FOR SOLUTION ORAL at 08:33

## 2019-03-15 RX ADMIN — BUSPIRONE HYDROCHLORIDE 30 MG: 10 TABLET ORAL at 08:31

## 2019-03-15 RX ADMIN — LURASIDONE HYDROCHLORIDE 20 MG: 20 TABLET, FILM COATED ORAL at 08:30

## 2019-03-15 RX ADMIN — BUSPIRONE HYDROCHLORIDE 30 MG: 10 TABLET ORAL at 17:20

## 2019-03-15 RX ADMIN — VANCOMYCIN HYDROCHLORIDE 1250 MG: 10 INJECTION, POWDER, LYOPHILIZED, FOR SOLUTION INTRAVENOUS at 04:17

## 2019-03-15 RX ADMIN — ENOXAPARIN SODIUM 40 MG: 40 INJECTION SUBCUTANEOUS at 17:19

## 2019-03-15 NOTE — SOCIAL WORK
Met with the patient to complete assessment and explain role of CM  She lives in a ranch style home with one ROHINI whit her spouse  Spouse works FT  Prior to admission she was independent with all care  Only DME is CPAP from [facility]  Patient has  No HHC  Her PCP is Dr Norris Romo  She has prescription coverage and uses [employer], [employer]  Patient has no Advance directive  Her primary contacts are her sister, Reyes Rain, [phone] and son, Nemesio Gardiner [phone]  Patient reports she will have transportation home and help if needed  She has no past inpatient MH or D&A treatment  Patient follows with her psychiatrist and therapist monthly for treatment of depression and anxiety  CM reviewed d/c planning process including the following: identifying help at home, patient preference for d/c planning needs, Discharge Lounge, [facility] Meds to Bed program, availability of treatment team to discuss questions or concerns patient and/or family may have regarding understanding medications and recognizing signs and symptoms once discharged  CM also encouraged patient to follow up with all recommended appointments after discharge  Patient advised of importance for patient and family to participate in managing patients medical well being  Patient/caregiver received discharge checklist  Content reviewed  Patient/caregiver encouraged to participate in discharge plan of care prior to discharge home

## 2019-03-15 NOTE — UTILIZATION REVIEW
Initial Clinical Review    03/16/19 1315  Inpatient Admission Once     Transfer Service: Hospitalist       Question Answer Comment   Admitting Physician Soham BROWNE    Level of Care Med Surg    Estimated length of stay More than 2 Midnights    Certification I certify that inpatient services are medically necessary for this patient for a duration of greater than two midnights  See H&P and MD Progress Notes for additional information about the patient's course of treatment         Start Status    03/16/19 1315 Completed Details       03/16/19 1323     Swelling and pain nio improvement  24 hrs s/p iv vanco  Will admit as inpatient due to failed outpatient treatment  Pain and swelling right side of face and right eye     03-16-19  SURGERY DATE: 3/16/2019     Surgeon(s) and Role:     * Leyda Hernandez DDS - Primary     Preop Diagnosis:  Right facial swelling [R22 0]  Facial cellulitis [L03 211]  Acute recurrent maxillary sinusitis [J01 01]  Tooth impaction [K01 1]  Impacted third molar tooth [K01 1]     Post-Op Diagnosis Codes:     * Right facial swelling [R22 0]     * Facial cellulitis [L03 211]     * Acute recurrent maxillary sinusitis [J01 01]     * Tooth impaction [K01 1]     * Impacted third molar tooth [K01 1]     Procedure(s) (LRB):  EXTRACTION TOOTH #1 (Impacted Third Molar Tooth) (Right)     Specimen(s):  ID Type Source Tests Collected by Time Destination   A : right maxilla Tissue Soft Tissue, Other ANAEROBIC CULTURE AND GRAM STAIN, CULTURE, TISSUE AND GRAM STAIN        Hob at 30 degree   ice to face  Saline lock  Iv clindamycin  Po prednisone            03/14/19 1520  Place in Observation (expected length of stay for this patient is less than two midnights) (ED Bridging Orders Panel) Once     Transfer Service: General Medicine       Question Answer Comment   Admitting Physician Marilyn Casas    Level of Care Med Surg       Start Status    03/14/19 1520 Completed Details       03/14/19 4408 Admission: Date/Time/Statement:    Orders Placed This Encounter   Procedures    Place in Observation (expected length of stay for this patient is less than two midnights)     Standing Status:   Standing     Number of Occurrences:   1     Order Specific Question:   Admitting Physician     Answer:   Thao Noonan [[id number]]     Order Specific Question:   Level of Care     Answer:   Med Surg [16]     ED: Date/Time/Mode of Arrival:   ED Arrival Information     Expected Arrival Acuity Means of Arrival Escorted By Service Admission Type    - 3/14/2019 10:58 Urgent Walk-In Self Hospitalist Urgent    Arrival Complaint    swollen face        Chief Complaint:   Chief Complaint   Patient presents with    Facial Swelling     Pt reports being seen by [facility] now  Pt is currently taking abx for a sinus infection  Pt presents with swelling to the R side of the face  Pt states "The inside of my mouth is swelling" Pt denies a cough  Assessment/Plan:  54 YO FEMALE PRESENTED TO ER WITH FACIAL SWELLING S/P BEING TREATED WITH CLINDAMYCIN  TOOK 6 DOSES NOW SWELLING TO RIGHT SIDE OF HER FACE INCLUDING EYE  (+) SINUS PAIN    ED Vital Signs:   ED Triage Vitals   Temperature Pulse Respirations Blood Pressure SpO2   03/14/19 1116 03/14/19 1116 03/14/19 1116 03/14/19 1116 03/14/19 1116   99 4 °F (37 4 °C) 96 18 136/68 96 %      Temp Source Heart Rate Source Patient Position - Orthostatic VS BP Location FiO2 (%)   03/14/19 1116 03/14/19 1230 03/14/19 1230 03/14/19 1830 --   Tympanic Monitor Lying Left arm       Pain Score       03/14/19 1230       No Pain        Wt Readings from Last 1 Encounters:   03/14/19 83 9 kg (185 lb)     WBC 12 36  CT SOFT TISSUE AND NECK  1  Bilateral maxillary sinus disease, right greater than left, with evidence of prior bilateral antrectomies   No air-fluid levels        2  Otherwise, unremarkable exam  No abnormal fluid collection/abscess nor pathologic lymphadenopathy            ED Treatment: Medication Administration from 03/14/2019 1058 to 03/14/2019 1639       Date/Time Order Dose Route Action Action by Comments     03/14/2019 1308 iohexol (OMNIPAQUE) 350 MG/ML injection (MULTI-DOSE) 85 mL 85 mL Intravenous Given Moreno Borne      03/14/2019 1412 ibuprofen (MOTRIN) tablet 600 mg 600 mg Oral Given Drea Bell RN      03/14/2019 1553 polyethylene glycol (MIRALAX) packet 17 g 17 g Oral Not Given Dung Fitzpatrick RN      03/14/2019 1554 verapamil (CALAN-SR) CR tablet 240 mg 240 mg Oral Not Given Dung Fitzpatrick RN pt took this morning     03/14/2019 1553 nicotine (NICODERM CQ) 7 mg/24hr TD 24 hr patch 1 patch 1 patch Transdermal Not Given Dung Fitzpatrick RN      03/14/2019 1547 clindamycin (CLEOCIN) IVPB (premix) 600 mg   Intravenous Canceled Entry Dung Fitzpatrick RN         Past Medical/Surgical History:    Active Ambulatory Problems     Diagnosis Date Noted    Nasal turbinate hypertrophy     Maxillary sinusitis     Ethmoid sinusitis     Allergy 08/12/2016    Asthma 04/26/2013    Atrophic vaginitis 12/10/2012    Depression with anxiety 04/20/2013    Elevated liver enzymes 08/12/2016    Esophageal reflux 04/26/2013    Fibromyalgia 12/18/2013    Foot drop 11/28/2017    HTN (hypertension) 03/03/2017    Hyperlipidemia 10/31/2014    Hyperreflexia 01/02/2018    Laryngopharyngeal reflux 08/12/2016    Lumbar radiculopathy 11/21/2017    Neck pain 11/21/2017    Pre-diabetes 10/31/2014    Weight gain 01/10/2017    Obstructive sleep apnea syndrome 02/06/2019     Resolved Ambulatory Problems     Diagnosis Date Noted    Hypertension 06/13/2017     Past Medical History:   Diagnosis Date    Anesthesia complication     Anxiety     Depression     Ethmoid sinusitis     GERD (gastroesophageal reflux disease)     Maxillary sinusitis     Multiple allergies     Myofascial pain syndrome     Nasal turbinate hypertrophy      Admitting Diagnosis: Swollen face [R22 0]  Right facial swelling [R22 0]  Acute recurrent maxillary sinusitis [J01 01]  Facial cellulitis [L03 211]  Age/Sex: 53 y o  female  Admission Orders:  Scheduled Meds:   Current Facility-Administered Medications:  acetaminophen 650 mg Oral Q6H PRN Kaiden Oreilly MD    atorvastatin 10 mg Oral Daily With Kizzy Silveira MD    busPIRone 30 mg Oral BID Kaiden Oreilly MD    enoxaparin 40 mg Subcutaneous Daily Kaiden Oreilly MD    famotidine 40 mg Oral HS Kaiden Oreilly MD    FLUoxetine 40 mg Oral BID Kaiden Oreilly MD    ibuprofen 400 mg Oral Q6H PRN Kadien Oreilly MD    loratadine 10 mg Oral Daily Kaiden Oreilly MD    LORazepam 1 mg Oral Q6H PRN Kaiden Oreilly MD    lurasidone 20 mg Oral Daily With Breakfast Kaiden Oreilly MD    montelukast 10 mg Oral Daily Kaiden Oreilly MD    nicotine 1 patch Transdermal Daily Kaiden Oreilly MD    oxymetazoline 2 spray Each Nare Q12H [facility] Shira Queen MD    polyethylene glycol 17 g Oral Daily Kaiden Oreilly MD    predniSONE 40 mg Oral Daily Shira uQeen MD    saccharomyces boulardii 250 mg Oral BID Kaiden Oreilly MD    sodium chloride 2 spray Each Nare BID Shira Queen MD    vancomycin 17 5 mg/kg (Adjusted) Intravenous Q12H Kaiden Oreilly MD Last Rate: 1,250 mg (03/15/19 0417)   verapamil 240 mg Oral Daily Kaiden Oreilly MD      [facility]   [address] [place] [address] Utilization Review Department  Phone: [phone]; Fax [phone]  [email or web address]  ATTENTION: Please call with any questions or concerns to [phone]  and carefully listen to the prompts so that you are directed to the right person  Send all requests for admission clinical reviews, approved or denied determinations and any other requests to fax [phone]   All voicemails are confidential       Tracking Number: [id number] Authorization Number:    Status: PENDED

## 2019-03-15 NOTE — PROGRESS NOTES
Progress Note - [facility] 1965, 53 y o  female MRN: [id number]    Unit/Bed#: -01 Encounter: [id number]    Primary Care Provider: Gabby Cassidy MD   Date and time admitted to hospital: 3/14/2019 11:33 AM        * Facial cellulitis  Assessment & Plan  · Presented  with facial cellulitis that did not improve with p o  Clindamycin, patient took at least 6 tablets of 300 mg clindamycin in the past 24 hours prior to admission without improvement of the symptoms  · History of bilateral antrectomies with recurrent sinusitis and severe allergy  · CT Bilateral maxillary sinus disease, right greater than left, with evidence of prior bilateral antrectomies  No air-fluid levels, no fluid collection of abscess  · Continue vanco (has multi allergies to abx), pharmacy following for dosing  · ENT following, concerned that this is dental in origin, awaiting OMFS consult  · Continue pain control  · Serial exams  · Follow up BC, trend WBC count    Obstructive sleep apnea syndrome  Assessment & Plan  · Due to swelling and pain of right face and eye not able to tolerate CPAP mask  · Continue to encourage CPAP as kathy    HTN (hypertension)  Assessment & Plan  · BP controlled  · Continue calan-sr      VTE Pharmacologic Prophylaxis:   Pharmacologic: Enoxaparin (Lovenox)  Mechanical VTE Prophylaxis in Place: No    Patient Centered Rounds: I have performed bedside rounds with nursing staff today  Discussions with Specialists or Other Care Team Provider: ENT    Education and Discussions with Family / Patient: patient    Time Spent for Care: 30 minutes  More than 50% of total time spent on counseling and coordination of care as described above      Current Length of Stay: 0 day(s)    Current Patient Status: Observation   Certification Statement: will require another MN secondary to need for OMFS eval and IV abx    Discharge Plan: home when medically stable and cleared by ENTKeely will need another 24-48 hours    Code Status: Level 1 - Full Code      Subjective:   Reports pain in face but thinks swelling improved  No visual disturbance  No fever or chills  No cough or SOB    Objective:     Vitals:   Temp (24hrs), Av °F (37 2 °C), Min:98 3 °F (36 8 °C), Max:99 9 °F (37 7 °C)    Temp:  [98 3 °F (36 8 °C)-99 9 °F (37 7 °C)] 99 9 °F (37 7 °C)  HR:  [83-93] 86  Resp:  [18] 18  BP: (101-126)/(54-70) 118/64  SpO2:  [93 %-95 %] 95 %  Body mass index is 30 55 kg/m²  Input and Output Summary (last 24 hours): Intake/Output Summary (Last 24 hours) at 3/15/2019 1524  Last data filed at 3/14/2019 2300  Gross per 24 hour   Intake 240 ml   Output 550 ml   Net -310 ml       Physical Exam:     Physical Exam   Constitutional: She is oriented to person, place, and time  She appears well-developed and well-nourished  No distress  HENT:   Head: Normocephalic  Mouth/Throat: Oropharynx is clear and moist    Eyes: Pupils are equal, round, and reactive to light  EOM are normal  Right eye exhibits no discharge  Left eye exhibits no discharge  Neck: Neck supple  Cardiovascular: Normal rate and regular rhythm  Pulmonary/Chest: Effort normal and breath sounds normal  No respiratory distress  Abdominal: Soft  Bowel sounds are normal  She exhibits no distension  Musculoskeletal: Normal range of motion  She exhibits no edema  Neurological: She is alert and oriented to person, place, and time  No cranial nerve deficit  Skin: Skin is warm and dry  There is erythema  Periorbital  swelling with erythema right eye   Psychiatric: She has a normal mood and affect  Her behavior is normal    Vitals reviewed        Additional Data:     Labs:    Results from last 7 days   Lab Units 03/15/19  0453 19  1219   WBC Thousand/uL 10 36* 12 36*   HEMOGLOBIN g/dL 13 2 15 0   HEMATOCRIT % 40 8 45 8   PLATELETS Thousands/uL 185 219   NEUTROS PCT %  --  72   LYMPHS PCT %  --  16   MONOS PCT %  --  10   EOS PCT %  --  2     Results from last 7 days   Lab Units 03/15/19  0453   POTASSIUM mmol/L 3 5   CHLORIDE mmol/L 103   CO2 mmol/L 27   BUN mg/dL 8   CREATININE mg/dL 0 70   CALCIUM mg/dL 8 8   ALK PHOS U/L 94   ALT U/L 26   AST U/L 14           * I Have Reviewed All Lab Data Listed Above  * Additional Pertinent Lab Tests Reviewed: Gerald 66 Admission Reviewed    Imaging:    Imaging Reports Reviewed Today Include: CT soft tissue neck  Imaging Personally Reviewed by Myself Includes:  none    Recent Cultures (last 7 days):           Last 24 Hours Medication List:     Current Facility-Administered Medications:  acetaminophen 650 mg Oral Q6H PRN Hope Melendrez MD    atorvastatin 10 mg Oral Daily With Chuy Stephens MD    busPIRone 30 mg Oral BID Hope Melendrez MD    enoxaparin 40 mg Subcutaneous Daily Hope Melendrez MD    FLUoxetine 80 mg Oral Daily WILLY Anders    ibuprofen 400 mg Oral Q6H PRN Hope Melendrez MD    loratadine 10 mg Oral Daily Hope Melendrez MD    LORazepam 1 mg Oral Q6H PRN Hope Melendrez MD    lurasidone 20 mg Oral Daily With Breakfast Hope Melendrez MD    montelukast 10 mg Oral Daily Hope Melendrez MD    nicotine 1 patch Transdermal Daily Hope Melendrez MD    oxymetazoline 2 spray Each Nare Q12H [facility] Papo Lerma MD    polyethylene glycol 17 g Oral Daily Hope Melendrez MD    predniSONE 40 mg Oral Daily Papo Lerma MD    saccharomyces boulardii 250 mg Oral BID Hope Melendrez MD    sodium chloride 2 spray Each Nare BID Papo Lerma MD    vancomycin 17 5 mg/kg (Adjusted) Intravenous Q12H Hope Melendrez MD Last Rate: 1,250 mg (03/15/19 0417)   verapamil 240 mg Oral Daily Hope Melendrez MD         Today, Patient Was Seen By: Druscilla Goodell, CRNP    ** Please Note: Dictation voice to text software may have been used in the creation of this document   **

## 2019-03-15 NOTE — ASSESSMENT & PLAN NOTE
· Presented  with facial cellulitis that did not improve with p o  Clindamycin, patient took at least 6 tablets of 300 mg clindamycin in the past 24 hours prior to admission without improvement of the symptoms  · History of bilateral antrectomies with recurrent sinusitis and severe allergy  · CT Bilateral maxillary sinus disease, right greater than left, with evidence of prior bilateral antrectomies   No air-fluid levels, no fluid collection of abscess  · Continue vanco (has multi allergies to abx), pharmacy following for dosing  · ENT following, concerned that this is dental in origin, awaiting OMFS consult  · Continue pain control  · Serial exams  · Follow up BC, trend WBC count

## 2019-03-15 NOTE — ASSESSMENT & PLAN NOTE
· Due to swelling and pain of right face and eye not able to tolerate CPAP mask  · Continue to encourage CPAP as kathy

## 2019-03-15 NOTE — CONSULTS
Patient Name: Nimo Noriega YOB: 1965    Medical Record No : [id number]     Admit/Registration Date: 3/14/2019 11:33 AM  Date of Consult: 03/15/19      Oral and Maxillofacial Surgery Consult Note    Assessment:  53 y o  female with right midface cellulitis related to acute maxillary sinusitis  Reoccurring sinusitis possibly due to odontogenic source  Teeth #1,2,3,5,15 have periapical pathology associated with them and have communication with the adjacent maxillary sinus  Teeth #2,3,5,15 appear restorable with root canal therapy which can be performed by an endodontist in the outpatient setting  Complete bony impacted tooth #1 would require extraction to eliminate it as a potential source of infection  Reviewed findings with patient in detail  Patient elects to have complete bony impacted tooth #1 extracted under general anesthesia tomorrow and upon discharge seek out an endodontist to evaluate teeth #2,3,5,15 for root canal therapy  All R/B/A to surgical extraction of tooth #1 discussed with patient  Patient is amendable to treatment signed informed consent  Plan/Recs:  -Add on OR tomorrow for Surgical Extraction of Tooth #1 under GA/ETT  -NPO MN  -Continue Prophylactic antibiotic course, IV Vancomycin  -Motrin and Tylenol prn pain    -----------------------------------------    Chief Complaint:  Right Facial Swelling    HPI:  53 y o  female who presents with right sided midface swelling and pain  She reports swelling and pain began on Tuesday 3/12 and worsened progressively since despite going to urgent care and beginning Clindamycin course  As swelling progresses and began extending toward neck, patient [facility]  She reports a history of bilateral maxillary sinus antrostomies right upper jaw in the past for sinusitis but has reoccurring issues with her sinuses  She reports the ENT service has concerns her dentition may be seeding infection into the sinuses   She also reports at the age of 23 an oral surgeon was extracting her third molars but left the upper right one because of its location near the sinus and encountering fat in the area  Pain location: right cheek and under eye,   Pain quality: throbbing  Pain scale: 5/10  Pain radiates to: right upper jaw  Pain relieved by: partial pain control with ibuprofen    Pre-admission records reviewed  PMH/PSH/Meds/Allergies Reviewed      Past Medical History:   Diagnosis Date    Anesthesia complication     V TACH after her reduction mammoplasty, done at [address] Depression     Ethmoid sinusitis     GERD (gastroesophageal reflux disease)     Maxillary sinusitis     Multiple allergies     Myofascial pain syndrome     Nasal turbinate hypertrophy      Past Surgical History:   Procedure Laterality Date    HYSTERECTOMY  2004    LAPAROSCOPY      with vaginal hysterectomy; 11/3/2003 rso    OOPHORECTOMY Left 2004    PARTIAL HYSTERECTOMY  2004    WA NASAL/SINUS ENDOSCOPY,RMV TISS MAXILL SINUS N/A 9/30/2016    Procedure: IMAGE GUIDED FUNCTIONAL ENDOSCOPIC SINUS SURGERY;  Surgeon: Bette Durant MD;  Location: [facility];  Service: ENT    REDUCTION MAMMAPLASTY Bilateral 02/27/2015    TUBAL LIGATION      WISDOM TOOTH EXTRACTION         Allergies   Allergen Reactions    Other      Most all antibiotics- hives, hypotensive    "no problem with clindamycin "    Augmentin Es-600  [Amoxicillin-Pot Clavulanate]     Cefuroxime     Erythromycin     Levofloxacin     Morphine     Morphine And Related Hives    Oxycodone-Acetaminophen     Penicillins     Percocet [Oxycodone-Acetaminophen] Hives    Sulfa Antibiotics     Tetracyclines & Related        Social History     Socioeconomic History    Marital status: /Civil Union     Spouse name: Not on file    Number of children: Not on file    Years of education: Not on file    Highest education level: Not on file   Occupational History    Not on file Social Needs    Financial resource strain: Not on file    Food insecurity:     Worry: Not on file     Inability: Not on file    Transportation needs:     Medical: Not on file     Non-medical: Not on file   Tobacco Use    Smoking status: Current Some Day Smoker    Smokeless tobacco: Never Used   Substance and Sexual Activity    Alcohol use: Not Currently     Comment: social    Drug use: No    Sexual activity: Not on file   Lifestyle    Physical activity:     Days per week: Not on file     Minutes per session: Not on file    Stress: Not on file   Relationships    Social connections:     Talks on phone: Not on file     Gets together: Not on file     Attends Yazdanism service: Not on file     Active member of club or organization: Not on file     Attends meetings of clubs or organizations: Not on file     Relationship status: Not on file    Intimate partner violence:     Fear of current or ex partner: Not on file     Emotionally abused: Not on file     Physically abused: Not on file     Forced sexual activity: Not on file   Other Topics Concern    Not on file   Social History Narrative    Caffeine use    Daily coffee consumption ( 1 cup/day)    Daily cola consumption (3 cans/day)       Scheduled Medications    Current Facility-Administered Medications:  acetaminophen 650 mg Oral Q6H PRN Angela Carter MD    atorvastatin 10 mg Oral Daily With Jenn Castrejon MD    busPIRone 30 mg Oral BID Angela Carter MD    enoxaparin 40 mg Subcutaneous Daily Angela Carter MD    FLUoxetine 80 mg Oral Daily WILLY Anders    ibuprofen 400 mg Oral Q6H PRN Angela Carter MD    loratadine 10 mg Oral Daily Angela Carter MD    LORazepam 1 mg Oral Q6H PRN Angela Carter MD    lurasidone 20 mg Oral Daily With Breakfast Angela Carter MD    montelukast 10 mg Oral Daily Angela Carter MD    nicotine 1 patch Transdermal Daily Angela Carter MD    oxymetazoline 2 spray Each Nare Q12H [address] Pawan Castellanos MD    polyethylene glycol 17 g Oral Daily Roberta No MD    predniSONE 40 mg Oral Daily Pawan Castellanos MD    saccharomyces boulardii 250 mg Oral BID Roberta No MD    sodium chloride 2 spray Each Nare BID Pawan Castellanos MD    vancomycin 17 5 mg/kg (Adjusted) Intravenous Q12H Roberta No MD Last Rate: 1,250 mg (03/15/19 0417)   verapamil 240 mg Oral Daily Roberta No MD        PRN Medications    acetaminophen    ibuprofen    LORazepam    Medication Infusions       Review of Systems  ROS was positive for right sided facial pain and swelling, oral pain   ROS was negative for fever, chills, nausea, vomitting, altered sensorium, gait disturbance, dizziness, ear drainage, dysuria     Vitals:    Temp:  [98 3 °F (36 8 °C)-99 9 °F (37 7 °C)] 99 2 °F (37 3 °C)  HR:  [83-93] 88  Resp:  [18] 18  BP: (101-130)/(54-70) 130/68  Wt Readings from Last 1 Encounters:   03/14/19 83 9 kg (185 lb)     Ht Readings from Last 1 Encounters:   03/14/19 5' 5 25" (1 657 m)     Body mass index is 30 55 kg/m²  Respiratory    Lab Data (Last 4 hours)    None         O2/Vent Data (Last 4 hours)    None              Patient Lines/Drains/Airways Status    Active Airway     None              I/O:  Current Diet Order:        Diet Orders   (From admission, onward)            Start     Ordered    03/14/19 1542  Diet Regular; Regular House  Diet effective now     Question Answer Comment   Diet Type Regular    Regular Regular House    RD to adjust diet per protocol?  Yes        03/14/19 1541           Intake/Output Summary (Last 24 hours) at 3/15/2019 1706  Last data filed at 3/14/2019 2300  Gross per 24 hour   Intake 240 ml   Output 550 ml   Net -310 ml       Labs:  Results from last 7 days   Lab Units 03/15/19  0453 03/14/19  1219   WBC Thousand/uL 10 36* 12 36*   HEMOGLOBIN g/dL 13 2 15 0   HEMATOCRIT % 40 8 45 8   PLATELETS Thousands/uL 185 219     Results from last 7 days   Lab Units 03/15/19  045 03/14/19  1219   POTASSIUM mmol/L 3 5 3 9   CHLORIDE mmol/L 103 100   CO2 mmol/L 27 26   BUN mg/dL 8 7   CREATININE mg/dL 0 70 0 74   CALCIUM mg/dL 8 8 9 2             Pain Management Panel     There is no flowsheet data to display  Imaging:   CT Sof Tissue Neck: bilateral maxillary sinusitis (right greater than left), periapical radiolucencies #3,5,15; complete bony impacted third molar tooth #1 intimate with adjacent maxillary sinus and roots of #2      Physical Exam:   General: Integment: skin warm and dry, patient is WD/WN, Voice quality: wnl, in NAD  Neuro Exam: AAO x 3, CN V,VII grossly intact, mentation and affect wnl  Head: Normocephalic  Face: Right midface edema and tenderness  Ears: Pinna wnl bilaterally, no otorrhea, hearing grossly intact   Eyes/Periorbital: Pupils equal, round, reactive to light and Extraocular movements intact, righht infraorbital erythema and ecchymosis, no proptosis  Nose: External nose symmetrical/no gross deformity, no nasal crepitus, no nasal septal hematoma, no rhinorrhea, no epistaxis, bilateral nares patent   Oral Exam:Lips and mucosal surfaces wnl, floor of mouth is soft with no palpable masses, tongue protrusion is midline and has full range of motion, no pharyngeal edema or exudate   Dentalalveolar Exam: right maxillary buccal gingival sensitive to palpation, no gross mobility of teeth, no purulence drainage, maxillary and mandibular vestibules non fluctuant and occlusion stable, gold crown #15-no sensitivity to palpation, CHARLES 4 cm, lateral excursive movements wnl   Lymph/Neck Exam: Neck is soft, trachea is midline, no gross cervical lymphadenopathy bilaterally  Musculoskeletal: Neck with FROM  Cardiovascular: regular rate and rhythm   Respiratory: symmetric chest rise   Gastrointestinal: nondistended  Genitourinary: Defer   Extremities: No gross peripheral edema          Leyda Goldberg DDS

## 2019-03-15 NOTE — PROGRESS NOTES
Pharmacy Vancomycin Consult Service    Vanco Assessment:  1  Indication/Cultures/Status: soft tissue infection (cellulitis); blood cultures pending  2  Renal function: stable with baseline SCr ~0 7; last Scr 0 7 (3/15)  3  Days of therapy: 2  4  Current dose: 1250 mg IV q12h (17 5 mg/kg using Adj BW)  5  Last level: n/a  6  Goal trough: 15-20    Vanco Plan:  1  Continue current dose  2  Next level: plan for trough on 3/16 at 1630 prior to the 5th dose as patient approaches steady state    Pharmacy will continue to follow closely for s/sx of nephrotoxicity, infusion reactions and appropriateness of therapy  BMP and CBC will be ordered per protocol  We will continue to follow the patient?s culture results and clinical progress daily      Kelly Flower, PharmD  Pharmacist

## 2019-03-15 NOTE — CONSULTS
Consultation - ENT   Matthew Meeks 53 y o  female MRN: [id number]  Unit/Bed#: -01 Encounter: [id number]      Assessment/Plan     Assessment:  Acute on chronic sinusitis - mild; max antrostomies are patent; small ethmoid cell on the right adjacent to lamina papyracea with some opacification - although this could be a potential source of infection, it is not very suspicious    Right preseptal orbital cellulitis    Nonerupted right 3rd molar  -penetrates the maxillary sinus and exits somewhat laterally through the sinus wall; another potential source of infection      Plan:  Would manage this episode nonsurgically  Abx to cover sinus/odontogenic pathogens  Short course of steroids (ie prednisone 40 daily x 1 week, 20 daily x 1 week)  Afrin x 3 days  Nasal saline irrigations BID  Consider OMFS consult to evaluate for possible odontogenic source of infection  Will follow    History of Present Illness   Physician Requesting Consult: Angela Carter MD  Reason for Consult / Principal Problem: Right facial cellulitis  HPI: Matthew Meeks is a 53y o  year old female who presented with progressive right facial swelling and pain for the last 2 days  Started on clindamycin yesterday  Symptoms progressed  Started on vancomycin here  Known to me for hx of chronic sinusitis s/p FESS  Adheres to nasal/sinus regimen and allergy treatment  Denied blurry or double vision  No odontalgia  She does have hx of unerupted 3rd molar right side that was never removed  CT neck from today personally reviewed    -patent maxillary antrostomies with mucosal thickening R>L  -ethmoid air cell adjacent to lamina papyracea is opacified on the right  -right maxillary molar roots are within the maxillary sinus including 3rd molar that extends lateral through the sinus at an angle    Inpatient consult to ENT  Consult performed by: Caryle Billings, MD  Consult ordered by:  Angela Carter MD          Review of Systems  Gen: no fatigue, no fevers/chills  ENT: as per HPI  GI: no nausea  Allergy/Immun: +allergies/asthma  Neuro: +headache  Heme: no bleeding/bruising concerns  Pulm: no SOB; no asthma; no cough  CV: no chest pain or palpitations  Derm: no rashes      Historical Information   Past Medical History:   Diagnosis Date    Anesthesia complication     V TACH after her reduction mammoplasty, done at [address] Depression     Ethmoid sinusitis     GERD (gastroesophageal reflux disease)     Maxillary sinusitis     Multiple allergies     Myofascial pain syndrome     Nasal turbinate hypertrophy      Past Surgical History:   Procedure Laterality Date    HYSTERECTOMY  2004    LAPAROSCOPY      with vaginal hysterectomy; 11/3/2003 rso    OOPHORECTOMY Left 2004    PARTIAL HYSTERECTOMY  2004    [place] NASAL/SINUS ENDOSCOPY,[facility] TISS MAXILL SINUS N/A 9/30/2016    Procedure: IMAGE GUIDED FUNCTIONAL ENDOSCOPIC SINUS SURGERY;  Surgeon: Anil Guzman MD;  Location: [facility];  Service: ENT    REDUCTION MAMMAPLASTY Bilateral 02/27/2015    TUBAL LIGATION      WISDOM TOOTH EXTRACTION       Social History   Social History     Substance and Sexual Activity   Alcohol Use Not Currently    Comment: social     Social History     Substance and Sexual Activity   Drug Use No     Social History     Tobacco Use   Smoking Status Current Some Day Smoker   Smokeless Tobacco Never Used     Family History:   Family History   Problem Relation Age of Onset    CLEM disease Mother     Atrial fibrillation Father     Heart disease Father     Diabetes Brother     Diabetes Maternal Grandmother     Hypertension Maternal Grandmother     Colon cancer Maternal Grandfather     Diabetes Maternal Grandfather     Cancer Paternal Grandfather     Endometrial cancer Cousin     Breast cancer Cousin        Meds/Allergies   current meds:   Current Facility-Administered Medications   Medication Dose Route Frequency    acetaminophen (TYLENOL) tablet 650 mg  650 mg Oral Q6H PRN    atorvastatin (LIPITOR) tablet 10 mg  10 mg Oral Daily With Dinner    busPIRone (BUSPAR) tablet 30 mg  30 mg Oral BID    enoxaparin (LOVENOX) subcutaneous injection 40 mg  40 mg Subcutaneous Daily    famotidine (PEPCID) tablet 40 mg  40 mg Oral HS    FLUoxetine (PROzac) capsule 40 mg  40 mg Oral BID    ibuprofen (MOTRIN) tablet 400 mg  400 mg Oral Q6H PRN    loratadine (CLARITIN) tablet 10 mg  10 mg Oral Daily    LORazepam (ATIVAN) tablet 1 mg  1 mg Oral Q6H PRN    [START ON 3/15/2019] lurasidone (LATUDA) tablet 20 mg  20 mg Oral Daily With Breakfast    montelukast (SINGULAIR) tablet 10 mg  10 mg Oral Daily    nicotine (NICODERM CQ) 7 mg/24hr TD 24 hr patch 1 patch  1 patch Transdermal Daily    polyethylene glycol (MIRALAX) packet 17 g  17 g Oral Daily    saccharomyces boulardii (FLORASTOR) capsule 250 mg  250 mg Oral BID    vancomycin (VANCOCIN) 1,250 mg in sodium chloride 0 9 % 250 mL IVPB  17 5 mg/kg (Adjusted) Intravenous Q12H    verapamil (CALAN-SR) CR tablet 240 mg  240 mg Oral Daily    and PTA meds:   Prior to Admission Medications   Prescriptions Last Dose Informant Patient Reported? Taking?    FLUoxetine (PROZAC) 40 MG capsule   Yes Yes   Sig: Take 80 mg by mouth 2 (two) times a day     LATUDA 20 MG tablet   Yes Yes   Si mg daily with breakfast     LORazepam (ATIVAN) 1 mg tablet   Yes Yes   Sig: Take 1 mg by mouth every 6 (six) hours as needed for anxiety   atorvastatin (LIPITOR) 10 mg tablet   No Yes   Sig: Take 1 tablet (10 mg total) by mouth daily   busPIRone (BUSPAR) 30 MG tablet   Yes Yes   Si (two) times a day     clindamycin (CLEOCIN) 300 MG capsule   No Yes   Sig: Take 1 capsule (300 mg total) by mouth 4 (four) times a day for 7 days   levocetirizine (XYZAL) 5 MG tablet   Yes Yes   montelukast (SINGULAIR) 10 mg tablet   No Yes   Sig: Take 1 tablet (10 mg total) by mouth daily for 360 days   polyethylene glycol (MIRALAX) 17 g packet   Yes Yes   Sig: Take 17 g by mouth daily   ranitidine (ZANTAC) 300 MG capsule   Yes Yes   Sig: Take 300 mg by mouth every evening   verapamil (CALAN-SR) 240 mg CR tablet   Yes Yes   Sig: Take 1 tablet by mouth daily      Facility-Administered Medications: None     No current facility-administered medications on file prior to encounter        Current Outpatient Medications on File Prior to Encounter   Medication Sig Dispense Refill    atorvastatin (LIPITOR) 10 mg tablet Take 1 tablet (10 mg total) by mouth daily 90 tablet 3    busPIRone (BUSPAR) 30 MG tablet 2 (two) times a day        clindamycin (CLEOCIN) 300 MG capsule Take 1 capsule (300 mg total) by mouth 4 (four) times a day for 7 days 28 capsule 0    FLUoxetine (PROZAC) 40 MG capsule Take 80 mg by mouth 2 (two) times a day        LATUDA 20 MG tablet 20 mg daily with breakfast        levocetirizine (XYZAL) 5 MG tablet       LORazepam (ATIVAN) 1 mg tablet Take 1 mg by mouth every 6 (six) hours as needed for anxiety      montelukast (SINGULAIR) 10 mg tablet Take 1 tablet (10 mg total) by mouth daily for 360 days 90 tablet 3    polyethylene glycol (MIRALAX) 17 g packet Take 17 g by mouth daily      ranitidine (ZANTAC) 300 MG capsule Take 300 mg by mouth every evening      verapamil (CALAN-SR) 240 mg CR tablet Take 1 tablet by mouth daily         Allergies   Allergen Reactions    Other      Most all antibiotics- hives, hypotensive    "no problem with clindamycin "    Augmentin Es-600  [Amoxicillin-Pot Clavulanate]     Cefuroxime     Erythromycin     Levofloxacin     Morphine     Morphine And Related Hives    Oxycodone-Acetaminophen     Penicillins     Percocet [Oxycodone-Acetaminophen] Hives    Sulfa Antibiotics     Tetracyclines & Related          Objective     Vitals:    03/14/19 1116 03/14/19 1230 03/14/19 1408 03/14/19 1830   BP: 136/68 118/60 118/55 126/70   BP Location:    Left arm   Pulse: 96 87 83 93   Resp: 18 18 18 18   Temp: 99 4 °F (37 4 °C)   98 3 °F (36 8 °C)   TempSrc: Tympanic   Oral   SpO2: 96% 95% 95% 94%   Weight: 83 9 kg (185 lb)      Height: 5' 5 25" (1 657 m)            Intake/Output Summary (Last 24 hours) at 3/14/2019 2013  Last data filed at 3/14/2019 1917  Gross per 24 hour   Intake 240 ml   Output --   Net 240 ml       Invasive Devices     Peripheral Intravenous Line            Peripheral IV 03/14/19 Right Antecubital less than 1 day                Physical Exam  Gen: NAD, awake/alert, no stridor  Head: Normocephalic/atraumatic  Eyes: EOMI, no chemosis or injection; +right periorbital edema  Face: Right sided facial edema/periorbital edema  Ears: pinnae unremarkable  Nose: no external abnormality; nares patent, somwhat dry;  inferior turbinates pink; clear secretions; no pus; no polyps  Oral cavity: no lesions; tongue soft/mobile; no dental pain to pressure/percussion  Oropharynx: no lesions; tonsils without ulceration/exudate; soft palate/posterior wall unremarkable  Neck:soft, nontender, no masses or LAD  CN: III-XII grossly intact  Salivary glands: parotids/submandibular glands soft, nontender    Lab Results:   CBC:   Lab Results   Component Value Date    WBC 12 36 (H) 03/14/2019    HGB 15 0 03/14/2019    HCT 45 8 03/14/2019    MCV 91 03/14/2019     03/14/2019    MCH 29 8 03/14/2019    MCHC 32 8 03/14/2019    RDW 12 3 03/14/2019    MPV 11 0 03/14/2019    NRBC 0 03/14/2019   , CMP:   Lab Results   Component Value Date    SODIUM 136 03/14/2019    K 3 9 03/14/2019     03/14/2019    CO2 26 03/14/2019    BUN 7 03/14/2019    CREATININE 0 74 03/14/2019    CALCIUM 9 2 03/14/2019    EGFR 91 03/14/2019     Imaging Studies: I have personally reviewed pertinent films in PACS  EKG, Pathology, and Other Studies:     Code Status: Level 1 - Full Code  Advance Directive and Living Will:      Power of :    POLST:

## 2019-03-15 NOTE — PROGRESS NOTES
S: feeling much better  Prednisone has helped bring down the edema  Less pain/light sensitivity  O:  Vitals:    03/14/19 1408 03/14/19 1830 03/14/19 2300 03/15/19 0700   BP: 118/55 126/70 101/54 118/64   BP Location:  Left arm Left arm Right arm   Pulse: 83 93 83 86   Resp: 18 18 18 18   Temp:  98 3 °F (36 8 °C) 98 7 °F (37 1 °C) 99 9 °F (37 7 °C)   TempSrc:  Oral Oral Oral   SpO2: 95% 94% 93% 95%   Weight:       Height:         NAD  Right facial/periorbital edema improved  Nares patent    A/P: right facial cellulitis- favor odontogenic origin over sinus   Doing better today  -cont abx/steroids  -nasal regimen  -await OMFS eval  -From ENT standpoint, pt may be suitable for discharge as soon as tomorrow

## 2019-03-16 ENCOUNTER — ANESTHESIA (OUTPATIENT)
Dept: PERIOP | Facility: HOSPITAL | Age: 54
DRG: 158 | End: 2019-03-16
Payer: MEDICARE

## 2019-03-16 ENCOUNTER — ANESTHESIA EVENT (OUTPATIENT)
Dept: PERIOP | Facility: HOSPITAL | Age: 54
DRG: 158 | End: 2019-03-16
Payer: MEDICARE

## 2019-03-16 PROBLEM — D72.829 LEUKOCYTOSIS: Status: ACTIVE | Noted: 2019-03-16

## 2019-03-16 PROBLEM — Z72.0 TOBACCO USE: Status: ACTIVE | Noted: 2019-03-16

## 2019-03-16 LAB — VANCOMYCIN TROUGH SERPL-MCNC: 11 UG/ML (ref 10–20)

## 2019-03-16 PROCEDURE — 87205 SMEAR GRAM STAIN: CPT | Performed by: DENTIST

## 2019-03-16 PROCEDURE — 0CTW0Z0 RESECTION OF UPPER TOOTH, SINGLE, OPEN APPROACH: ICD-10-PCS | Performed by: DENTIST

## 2019-03-16 PROCEDURE — 87075 CULTR BACTERIA EXCEPT BLOOD: CPT | Performed by: DENTIST

## 2019-03-16 PROCEDURE — 99232 SBSQ HOSP IP/OBS MODERATE 35: CPT | Performed by: PHYSICIAN ASSISTANT

## 2019-03-16 PROCEDURE — 87176 TISSUE HOMOGENIZATION CULTR: CPT | Performed by: DENTIST

## 2019-03-16 PROCEDURE — 87070 CULTURE OTHR SPECIMN AEROBIC: CPT | Performed by: DENTIST

## 2019-03-16 PROCEDURE — 80202 ASSAY OF VANCOMYCIN: CPT | Performed by: INTERNAL MEDICINE

## 2019-03-16 RX ORDER — LIDOCAINE HYDROCHLORIDE AND EPINEPHRINE 10; 10 MG/ML; UG/ML
INJECTION, SOLUTION INFILTRATION; PERINEURAL AS NEEDED
Status: DISCONTINUED | OUTPATIENT
Start: 2019-03-16 | End: 2019-03-16 | Stop reason: HOSPADM

## 2019-03-16 RX ORDER — KETOROLAC TROMETHAMINE 30 MG/ML
INJECTION, SOLUTION INTRAMUSCULAR; INTRAVENOUS AS NEEDED
Status: DISCONTINUED | OUTPATIENT
Start: 2019-03-16 | End: 2019-03-16 | Stop reason: SURG

## 2019-03-16 RX ORDER — BUPIVACAINE HYDROCHLORIDE AND EPINEPHRINE 5; 5 MG/ML; UG/ML
INJECTION, SOLUTION PERINEURAL AS NEEDED
Status: DISCONTINUED | OUTPATIENT
Start: 2019-03-16 | End: 2019-03-16 | Stop reason: HOSPADM

## 2019-03-16 RX ORDER — SODIUM CHLORIDE, SODIUM LACTATE, POTASSIUM CHLORIDE, CALCIUM CHLORIDE 600; 310; 30; 20 MG/100ML; MG/100ML; MG/100ML; MG/100ML
20 INJECTION, SOLUTION INTRAVENOUS CONTINUOUS
Status: DISCONTINUED | OUTPATIENT
Start: 2019-03-16 | End: 2019-03-16

## 2019-03-16 RX ORDER — FENTANYL CITRATE/PF 50 MCG/ML
25 SYRINGE (ML) INJECTION
Status: DISCONTINUED | OUTPATIENT
Start: 2019-03-16 | End: 2019-03-16 | Stop reason: HOSPADM

## 2019-03-16 RX ORDER — PROPOFOL 10 MG/ML
INJECTION, EMULSION INTRAVENOUS AS NEEDED
Status: DISCONTINUED | OUTPATIENT
Start: 2019-03-16 | End: 2019-03-16 | Stop reason: SURG

## 2019-03-16 RX ORDER — DEXAMETHASONE SODIUM PHOSPHATE 10 MG/ML
INJECTION, SOLUTION INTRAMUSCULAR; INTRAVENOUS AS NEEDED
Status: DISCONTINUED | OUTPATIENT
Start: 2019-03-16 | End: 2019-03-16 | Stop reason: SURG

## 2019-03-16 RX ORDER — ONDANSETRON 2 MG/ML
4 INJECTION INTRAMUSCULAR; INTRAVENOUS ONCE AS NEEDED
Status: DISCONTINUED | OUTPATIENT
Start: 2019-03-16 | End: 2019-03-16 | Stop reason: HOSPADM

## 2019-03-16 RX ORDER — VANCOMYCIN HYDROCHLORIDE 1 G/200ML
1000 INJECTION, SOLUTION INTRAVENOUS EVERY 8 HOURS
Status: DISCONTINUED | OUTPATIENT
Start: 2019-03-16 | End: 2019-03-17

## 2019-03-16 RX ORDER — ONDANSETRON 2 MG/ML
INJECTION INTRAMUSCULAR; INTRAVENOUS AS NEEDED
Status: DISCONTINUED | OUTPATIENT
Start: 2019-03-16 | End: 2019-03-16 | Stop reason: SURG

## 2019-03-16 RX ORDER — SODIUM CHLORIDE, SODIUM LACTATE, POTASSIUM CHLORIDE, CALCIUM CHLORIDE 600; 310; 30; 20 MG/100ML; MG/100ML; MG/100ML; MG/100ML
INJECTION, SOLUTION INTRAVENOUS CONTINUOUS PRN
Status: DISCONTINUED | OUTPATIENT
Start: 2019-03-16 | End: 2019-03-16 | Stop reason: SURG

## 2019-03-16 RX ORDER — SUCCINYLCHOLINE/SOD CL,ISO/PF 100 MG/5ML
SYRINGE (ML) INTRAVENOUS AS NEEDED
Status: DISCONTINUED | OUTPATIENT
Start: 2019-03-16 | End: 2019-03-16 | Stop reason: SURG

## 2019-03-16 RX ORDER — CHLORHEXIDINE GLUCONATE 0.12 MG/ML
RINSE ORAL AS NEEDED
Status: DISCONTINUED | OUTPATIENT
Start: 2019-03-16 | End: 2019-03-16 | Stop reason: HOSPADM

## 2019-03-16 RX ORDER — DIPHENHYDRAMINE HCL 25 MG
25 TABLET ORAL EVERY 6 HOURS PRN
Status: DISCONTINUED | OUTPATIENT
Start: 2019-03-16 | End: 2019-03-18 | Stop reason: HOSPADM

## 2019-03-16 RX ORDER — FENTANYL CITRATE 50 UG/ML
INJECTION, SOLUTION INTRAMUSCULAR; INTRAVENOUS AS NEEDED
Status: DISCONTINUED | OUTPATIENT
Start: 2019-03-16 | End: 2019-03-16 | Stop reason: SURG

## 2019-03-16 RX ADMIN — VANCOMYCIN HYDROCHLORIDE 1000 MG: 1 INJECTION, SOLUTION INTRAVENOUS at 13:17

## 2019-03-16 RX ADMIN — KETOROLAC TROMETHAMINE 15 MG: 30 INJECTION, SOLUTION INTRAMUSCULAR at 08:44

## 2019-03-16 RX ADMIN — ONDANSETRON 4 MG: 2 INJECTION INTRAMUSCULAR; INTRAVENOUS at 08:24

## 2019-03-16 RX ADMIN — ENOXAPARIN SODIUM 40 MG: 40 INJECTION SUBCUTANEOUS at 17:06

## 2019-03-16 RX ADMIN — FENTANYL CITRATE 25 MCG: 50 INJECTION, SOLUTION INTRAMUSCULAR; INTRAVENOUS at 08:24

## 2019-03-16 RX ADMIN — VANCOMYCIN HYDROCHLORIDE 1250 MG: 10 INJECTION, POWDER, LYOPHILIZED, FOR SOLUTION INTRAVENOUS at 05:01

## 2019-03-16 RX ADMIN — MONTELUKAST SODIUM 10 MG: 10 TABLET, FILM COATED ORAL at 09:00

## 2019-03-16 RX ADMIN — PHENYLEPHRINE HYDROCHLORIDE 100 MCG: 10 INJECTION INTRAVENOUS at 08:42

## 2019-03-16 RX ADMIN — FENTANYL CITRATE 25 MCG: 50 INJECTION, SOLUTION INTRAMUSCULAR; INTRAVENOUS at 08:13

## 2019-03-16 RX ADMIN — LORAZEPAM 1 MG: 1 TABLET ORAL at 13:07

## 2019-03-16 RX ADMIN — LORATADINE 10 MG: 10 TABLET ORAL at 09:00

## 2019-03-16 RX ADMIN — VANCOMYCIN HYDROCHLORIDE 1000 MG: 1 INJECTION, SOLUTION INTRAVENOUS at 21:22

## 2019-03-16 RX ADMIN — BUSPIRONE HYDROCHLORIDE 30 MG: 10 TABLET ORAL at 17:06

## 2019-03-16 RX ADMIN — PROPOFOL 200 MG: 10 INJECTION, EMULSION INTRAVENOUS at 08:16

## 2019-03-16 RX ADMIN — IBUPROFEN 400 MG: 400 TABLET ORAL at 05:03

## 2019-03-16 RX ADMIN — OXYMETAZOLINE HYDROCHLORIDE 2 SPRAY: 0.05 SPRAY NASAL at 21:15

## 2019-03-16 RX ADMIN — DIPHENHYDRAMINE HCL 25 MG: 25 TABLET, FILM COATED ORAL at 14:35

## 2019-03-16 RX ADMIN — ATORVASTATIN CALCIUM 10 MG: 10 TABLET, FILM COATED ORAL at 17:06

## 2019-03-16 RX ADMIN — DEXAMETHASONE SODIUM PHOSPHATE 10 MG: 10 INJECTION, SOLUTION INTRAMUSCULAR; INTRAVENOUS at 08:24

## 2019-03-16 RX ADMIN — BUSPIRONE HYDROCHLORIDE 30 MG: 10 TABLET ORAL at 10:13

## 2019-03-16 RX ADMIN — Medication 250 MG: at 17:07

## 2019-03-16 RX ADMIN — PREDNISONE 40 MG: 20 TABLET ORAL at 09:00

## 2019-03-16 RX ADMIN — FLUOXETINE HYDROCHLORIDE 80 MG: 20 CAPSULE ORAL at 10:13

## 2019-03-16 RX ADMIN — SODIUM CHLORIDE, SODIUM LACTATE, POTASSIUM CHLORIDE, AND CALCIUM CHLORIDE: .6; .31; .03; .02 INJECTION, SOLUTION INTRAVENOUS at 08:00

## 2019-03-16 RX ADMIN — Medication 100 MG: at 08:16

## 2019-03-16 RX ADMIN — PHENYLEPHRINE HYDROCHLORIDE 50 MCG: 10 INJECTION INTRAVENOUS at 08:30

## 2019-03-16 RX ADMIN — LIDOCAINE HYDROCHLORIDE 100 MG: 20 INJECTION, SOLUTION INTRAVENOUS at 08:16

## 2019-03-16 NOTE — ASSESSMENT & PLAN NOTE
· Presented  with facial cellulitis that did not improve with p o  Clindamycin  · History of bilateral antrectomies with recurrent sinusitis and severe allergy  · CT soft tissue neck revealed Bilateral maxillary sinus disease, right greater than left, with evidence of prior bilateral antrectomies  No air-fluid levels, no fluid collection of abscess  · Continue vanco per OMFS recs   (has multi allergies to abx), pharmacy following for dosing  · ENT following and suspected that this is dental in origin  · OMFS consult appreciated - they performed extraction of tooth #1 today  · Pain is well controlled  · Currently on PO steroids which ENT recommends: prednisone 40mg daily x1 week then 20mg daily x 1 week  · Will need close outpatient follow up with ENT and OMFS  · Follow up cultures    ·

## 2019-03-16 NOTE — ASSESSMENT & PLAN NOTE
· Patient initially stated that she quit smoking but then admits that she smokes more when she is feeling anxious  · Complete cessation advised and discussed  · Nicotine patch on board

## 2019-03-16 NOTE — ANESTHESIA POSTPROCEDURE EVALUATION
Post-Op Assessment Note    CV Status:  Stable  Pain Score: 0    Pain management: adequate     Mental Status:  Alert and awake   Hydration Status:  Euvolemic   PONV Controlled:  Controlled   Airway Patency:  Patent   Post Op Vitals Reviewed: Yes      Staff: CRNA           BP   123/59   Temp   98 5   Pulse  96   Resp   16   SpO2   100

## 2019-03-16 NOTE — ASSESSMENT & PLAN NOTE
· Due to swelling and pain of right face and eye was not able to tolerate CPAP mask  · Continue to encourage CPAP as tolerated

## 2019-03-16 NOTE — PROGRESS NOTES
Vancomycin IV Pharmacy-to-Dose Consultation    Saman Blakely is a 53 y o  female who is currently receiving Vancomycin IV with management by the Pharmacy Consult service  Relevant clinical data and objective history reviewed:  Temp Readings from Last 3 Encounters:   03/16/19 97 6 °F (36 4 °C)   03/13/19 98 7 °F (37 1 °C)   09/30/16 99 2 °F (37 3 °C) (Tympanic Core)     /61   Pulse 72   Temp 97 6 °F (36 4 °C)   Resp 14   Ht 5' 5 25" (1 657 m)   Wt 83 9 kg (185 lb)   LMP  (LMP Unknown)   SpO2 98%   BMI 30 55 kg/m²     I/O last 3 completed shifts: In: 520 [P O :240; IV Piggyback:280]  Out: 1450 [Urine:1450]    Lab Results   Component Value Date/Time    BUN 8 03/15/2019 04:53 AM    BUN 11 04/20/2015 11:35 AM    WBC 10 36 (H) 03/15/2019 04:53 AM    WBC 9 19 04/20/2015 11:35 AM    HGB 13 2 03/15/2019 04:53 AM    HGB 14 2 04/20/2015 11:35 AM    HCT 40 8 03/15/2019 04:53 AM    HCT 42 7 04/20/2015 11:35 AM    MCV 91 03/15/2019 04:53 AM    MCV 89 04/20/2015 11:35 AM     03/15/2019 04:53 AM     04/20/2015 11:35 AM     Creatinine   Date Value Ref Range Status   03/15/2019 0 70 0 60 - 1 30 mg/dL Final     Comment:     Standardized to IDMS reference method   03/14/2019 0 74 0 60 - 1 30 mg/dL Final     Comment:     Standardized to IDMS reference method   04/20/2015 0 67 0 60 - 1 30 mg/dL Final     Comment:     Standardized to IDMS reference method     Vancomycin Tr   Date Value Ref Range Status   03/16/2019 11 0 10 0 - 20 0 ug/mL Final         Vancomycin Assessment:  Indication: Facial cellulitis and Maxillary sinusitis  3/14 BC x2 - NG x 24 hours  3/16 Anaerobic Cx & gram stain - pending  3/16 Tissue cx from extraction socket - pending  Renal Function: SCr= 0 7 (stable);  CrCl ~ 83 mL/min  Potential Nephrotoxicity Factors:  Medications: Ibuprofen (ordered PRN moderate pain, fever)  Days of Therapy: 3  Current Dose: 1250 mg q12h   Goal Trough: 15-20 (appropriate for most indications)   Goal AUC(24h): 400-600  Last Level: 3/16 @ 0411= 11 (drawn ~ 20 minutes early)    Vancomycin Plan:  New Dosing: change to 1000 mg q8h (next dose: 3/16 @ 1300 )  Predicted Trough: 15 5  Next Level: 3/17 @ 1230    Pharmacy will continue to follow closely for s/sx of nephrotoxicity, infusion reactions and appropriateness of therapy  BMP and CBC will be ordered per protocol  We will continue to follow the patient's culture results and clinical progress daily         Von Ignacio, PharmD  Emergency Medicine Clinical Pharmacist

## 2019-03-16 NOTE — OP NOTE
OPERATIVE REPORT  PATIENT NAME: Keaton Malcolm    :  1965  MRN: [id number]  Pt Location: [facility] 06    SURGERY DATE: 3/16/2019    Surgeon(s) and Role:     * Leyda Goldberg DDS - Primary    Preop Diagnosis:  Right facial swelling [R22 0]  Facial cellulitis [L03 211]  Acute recurrent maxillary sinusitis [J01 01]  Tooth impaction [K01 1]  Impacted third molar tooth [K01 1]    Post-Op Diagnosis Codes:     * Right facial swelling [R22 0]     * Facial cellulitis [L03 211]     * Acute recurrent maxillary sinusitis [J01 01]     * Tooth impaction [K01 1]     * Impacted third molar tooth [K01 1]    Procedure(s) (LRB):  EXTRACTION TOOTH #1 (Impacted Third Molar Tooth) (Right)    Specimen(s):  ID Type Source Tests Collected by Time Destination   A : right maxilla Tissue Soft Tissue, Other ANAEROBIC CULTURE AND GRAM STAIN, CULTURE, TISSUE AND GRAM STAIN Leyda Goldberg DDS 3/16/2019 6168        Estimated Blood Loss:   Minimal    Drains:  * No LDAs found *    Anesthesia Type:   General    Operative Indications:  Right facial swelling [R22 0]  Facial cellulitis [L03 211]  Acute recurrent maxillary sinusitis [J01 01]  Tooth impaction [K01 1]  Impacted third molar tooth [K01 1]    Operative Findings:  Third Molar #1 communicating partially with maxillary sinus and partially in the subperiosteal plane of buccal mucosa    Complications:   None    Procedure and Technique:  Mrs Ida Bearden was greeted at the bedside in the prep and hold area  I reviewed previously signed informed consent  All questions regarding extraction of teeth #1 under general anesthesia were answered  The patient was brought in the operating room and placed in a supine position on the operating room table  A time out was performed with surgical, nursing, and anesthesia staff verifying patient procedure and laterality  Anesthesia placed appropriate monitors and intubated patient without issue  The patient was draped in the usual sterile fashion    A throat pack was moistened and placed in the oropharynx  6 cc of 1% lidocaine 1:100,000 epinephrine was used to anesthetize right buccal nerve, superior alveolar nerve, greater palatine nerve  Injections were reinforced with 0 5% marcaine 1:200,000 epinephrine, a total of 6 cc  Attention was first made to the right maxilla  A 15 blade was used to make a sulcular incision with distal release  A full thickness mucoperiosteal flap was reflected  A brewster drill with #8 round bur and copious normal saline irrigation was used to make a bony window exposing tooth #1 in its length  An elevator was used to luxate the tooth and it was extracted with a forceps  Aerobic and anaerobic cultures taken from socket and sent for microbiology evaluation  No purulent drainage encountered  Copious normal saline irrigation of the flap and sockets was performed  Adjacent buccal fat was teased into the extraction site  3-0 chromic gut sutures were placed  Primary closure achieved  Positive hemostasis was achieved  The throat pack was removed and the oral cavity suctioned  Sterile drapes were removed and the face cleansed with normal saline and dried  Patient was emerged from anesthesia and transported to the post anesthesia care unit  All sponge counts were correct with nursing staff  No complications encountered   I was present for the entire procedure    Patient Disposition:  PACU  and extubated and stable    SIGNATURE: Mark Bhakta DDS  DATE: March 16, 2019  TIME: 8:52 AM

## 2019-03-16 NOTE — PROGRESS NOTES
S: feeling much better  Had right maxillary 3rd molar extraction today  Facial swelling almost completely resolved    O:  Vitals:    03/16/19 0915 03/16/19 0930 03/16/19 1010 03/16/19 1049   BP: 106/55 104/61 109/57    Pulse: 80 72 76    Resp: 16 14 16    Temp:  97 6 °F (36 4 °C) 98 °F (36 7 °C)    TempSrc:   Oral    SpO2: 100% 98% 95% 93%   Weight:       Height:         NAD  Right facial/periorbital edema minimal  Nares patent    A/P: right facial cellulitis, probably odontogenic s/p right max 3rd molar extraction   Doing well   -Ok to d/c tomorrow from ENT standpoint  -duration of antibiotics as per OMFS (would favor total of 10-14 day course, clindamycin or similar)  -prednisone 40 daily for 1 week, then 20 daily for 1 week  -follow up with ENT in 1-2 weeks

## 2019-03-16 NOTE — ASSESSMENT & PLAN NOTE
· OMFS consult appreciated - they performed extraction of tooth #1 today  · Continue IV Vanco per OMFS recs  · Pain controlled  · Follow up cultures  · Will need close follow up with OMFS

## 2019-03-16 NOTE — PROGRESS NOTES
Vancomycin IV Pharmacy-to-Dose Consultation    Paul Mancini is a 53 y o  female who is currently receiving Vancomycin IV with management by the Pharmacy Consult service  Relevant clinical data and objective history reviewed:  Temp Readings from Last 3 Encounters:   03/16/19 97 6 °F (36 4 °C)   03/13/19 98 7 °F (37 1 °C)   09/30/16 99 2 °F (37 3 °C) (Tympanic Core)     /61   Pulse 72   Temp 97 6 °F (36 4 °C)   Resp 14   Ht 5' 5 25" (1 657 m)   Wt 83 9 kg (185 lb)   LMP  (LMP Unknown)   SpO2 98%   BMI 30 55 kg/m²     I/O last 3 completed shifts: In: 520 [P O :240; IV Piggyback:280]  Out: 1450 [Urine:1450]    Lab Results   Component Value Date/Time    BUN 8 03/15/2019 04:53 AM    BUN 11 04/20/2015 11:35 AM    WBC 10 36 (H) 03/15/2019 04:53 AM    WBC 9 19 04/20/2015 11:35 AM    HGB 13 2 03/15/2019 04:53 AM    HGB 14 2 04/20/2015 11:35 AM    HCT 40 8 03/15/2019 04:53 AM    HCT 42 7 04/20/2015 11:35 AM    MCV 91 03/15/2019 04:53 AM    MCV 89 04/20/2015 11:35 AM     03/15/2019 04:53 AM     04/20/2015 11:35 AM     Creatinine   Date Value Ref Range Status   03/15/2019 0 70 0 60 - 1 30 mg/dL Final     Comment:     Standardized to IDMS reference method   03/14/2019 0 74 0 60 - 1 30 mg/dL Final     Comment:     Standardized to IDMS reference method   04/20/2015 0 67 0 60 - 1 30 mg/dL Final     Comment:     Standardized to IDMS reference method     Vancomycin Tr   Date Value Ref Range Status   03/16/2019 11 0 10 0 - 20 0 ug/mL Final       Vancomycin Assessment:  Indication: Facial cellulitis and Maxillary sinusitis  3/14 BC x2 - NG x 24 hours  3/16 Anaerobic Cx & gram stain - pending  3/16 Tissue cx from extraction socket - pending  Renal Function: SCr= 0 7 (stable);  CrCl ~ 83 mL/min  Potential Nephrotoxicity Factors:  Medications: Ibuprofen (ordered PRN moderate pain, fever)  Days of Therapy: 3  Current Dose: 1250 mg q12h   Goal Trough: 15-20 (appropriate for most indications)   Goal AUC(24h): 400-600  Last Level: 3/16 @ 0411= 11 (drawn ~ 20 minutes early)    Vancomycin Plan:  New Dosing: change to 1750 mg q12h (next dose: 3/16 @ 1700 )  Predicted Trough: 15  Next Level: 3/18 @ 0430    Pharmacy will continue to follow closely for s/sx of nephrotoxicity, infusion reactions and appropriateness of therapy  BMP and CBC will be ordered per protocol  We will continue to follow the patient's culture results and clinical progress daily         Nuris Carpenter, PharmD  Emergency Medicine Clinical Pharmacist

## 2019-03-16 NOTE — PROGRESS NOTES
Progress Note - Alyssa Mcconnell 1965, 53 y o  female MRN: [id number]    Unit/Bed#: -01 Encounter: [id number]    Primary Care Provider: Angelica Vivas MD   Date and time admitted to hospital: 3/14/2019 11:33 AM        * Facial cellulitis  Assessment & Plan  · Presented  with facial cellulitis that did not improve with p o  Clindamycin  · History of bilateral antrectomies with recurrent sinusitis and severe allergy  · CT soft tissue neck revealed Bilateral maxillary sinus disease, right greater than left, with evidence of prior bilateral antrectomies  No air-fluid levels, no fluid collection of abscess  · Continue vanco per OMFS recs   (has multi allergies to abx), pharmacy following for dosing  · ENT following and suspected that this is dental in origin  · OMFS consult appreciated - they performed extraction of tooth #1 today  · Pain is well controlled  · Currently on PO steroids which ENT recommends: prednisone 40mg daily x1 week then 20mg daily x 1 week  · Will need close outpatient follow up with ENT and OMFS  · Follow up cultures    ·     Impacted third molar tooth  Assessment & Plan  · OMFS consult appreciated - they performed extraction of tooth #1 today  · Continue IV Vanco per OMFS recs  · Pain controlled  · Follow up cultures  · Will need close follow up with OMFS    Leukocytosis  Assessment & Plan  · Suspect secondary to the above  · Continue IV vanco per OMFS recs  · CBC in AM    HTN (hypertension)  Assessment & Plan  · BP controlled  · Continue verapamil    Obstructive sleep apnea syndrome  Assessment & Plan  · Due to swelling and pain of right face and eye was not able to tolerate CPAP mask  · Continue to encourage CPAP as tolerated    Depression with anxiety  Assessment & Plan  · Continue home meds  · She is to follow up with her psychiatrist and psychologist     Tobacco use  Assessment & Plan  · Patient initially stated that she quit smoking but then admits that she smokes more when she is feeling anxious  · Complete cessation advised and discussed  · Nicotine patch on board      VTE Pharmacologic Prophylaxis:   Pharmacologic: Enoxaparin (Lovenox)  Mechanical VTE Prophylaxis in Place: Yes    Patient Centered Rounds: I have performed bedside rounds with nursing staff today  Discussions with Specialists or Other Care Team Provider: nurse    Education and Discussions with Family / Patient: patient; when asked if there was anyone she would like me to call today with an update the patient kindly declined     Time Spent for Care: 30 minutes  More than 50% of total time spent on counseling and coordination of care as described above  Current Length of Stay: 0 day(s)    Current Patient Status: Inpatient   Certification Statement: The patient, admitted on an observation basis, will now require > 2 midnight hospital stay due to OMFS recommending continuing IV vanco post-rocedurally, await culture results    Discharge Plan: pending ENT and OMFS clearance, possibly d/c tomorrow    Code Status: Level 1 - Full Code      Subjective:   Ms Samy Duenas reports that she is feeling much better today  Her pain is well controlled and the area is numb from the anesthesia  She denies any difficulty swallowing or breathing  She is feeling anxious    Objective:     Vitals:   Temp (24hrs), Av 4 °F (36 9 °C), Min:97 6 °F (36 4 °C), Max:99 2 °F (37 3 °C)    Temp:  [97 6 °F (36 4 °C)-99 2 °F (37 3 °C)] 98 °F (36 7 °C)  HR:  [] 76  Resp:  [14-18] 16  BP: (104-130)/(55-68) 109/57  SpO2:  [93 %-100 %] 93 %  Body mass index is 30 55 kg/m²  Input and Output Summary (last 24 hours): Intake/Output Summary (Last 24 hours) at 3/16/2019 1323  Last data filed at 3/16/2019 1306  Gross per 24 hour   Intake 1567 32 ml   Output 900 ml   Net 667 32 ml       Physical Exam:     Physical Exam   Constitutional: She is oriented to person, place, and time     Patient seen sitting upright comfortably resting in bed with lunch tray in front of her  Very pleasant and cooperative   HENT:   Minimal right sided facial swelling   Cardiovascular: Normal rate and regular rhythm  Pulmonary/Chest: Effort normal and breath sounds normal  No stridor  No respiratory distress  She has no wheezes  Abdominal: Soft  Bowel sounds are normal  There is no tenderness  Musculoskeletal: She exhibits no edema  Neurological: She is alert and oriented to person, place, and time  Skin: Skin is warm and dry  Psychiatric: She has a normal mood and affect  Her behavior is normal    Vitals reviewed  Additional Data:     Labs:    Results from last 7 days   Lab Units 03/15/19  0453 03/14/19  1219   WBC Thousand/uL 10 36* 12 36*   HEMOGLOBIN g/dL 13 2 15 0   HEMATOCRIT % 40 8 45 8   PLATELETS Thousands/uL 185 219   NEUTROS PCT %  --  72   LYMPHS PCT %  --  16   MONOS PCT %  --  10   EOS PCT %  --  2     Results from last 7 days   Lab Units 03/15/19  0453   SODIUM mmol/L 137   POTASSIUM mmol/L 3 5   CHLORIDE mmol/L 103   CO2 mmol/L 27   BUN mg/dL 8   CREATININE mg/dL 0 70   ANION GAP mmol/L 7   CALCIUM mg/dL 8 8   ALBUMIN g/dL 3 4*   TOTAL BILIRUBIN mg/dL 0 55   ALK PHOS U/L 94   ALT U/L 26   AST U/L 14   GLUCOSE RANDOM mg/dL 101                           * I Have Reviewed All Lab Data Listed Above  * Additional Pertinent Lab Tests Reviewed: All Labs Within Last 24 Hours Reviewed    Imaging:    Imaging Reports Reviewed Today Include: CT neck  Imaging Personally Reviewed by Myself Includes:  None    Recent Cultures (last 7 days):     Results from last 7 days   Lab Units 03/14/19  1554 03/14/19  1551   BLOOD CULTURE  No Growth at 24 hrs  No Growth at 24 hrs         Last 24 Hours Medication List:     Current Facility-Administered Medications:  acetaminophen 650 mg Oral Q6H PRN Leyda Zepeda, DDS    atorvastatin 10 mg Oral Daily With Dinner Leyda Berry, DASHAWNS    busPIRone 30 mg Oral BID Leyda Lyons, DDS    diphenhydrAMINE 25 mg Oral Q6H PRN Sourav Murphy RADHA    enoxaparin 40 mg Subcutaneous Daily Assfrancis A Eloy, DDS    FLUoxetine 80 mg Oral Daily Assabi A Eloy, DDS    ibuprofen 400 mg Oral Q6H PRN Assabi A Eloy, DDS    lactated ringers 20 mL/hr Intravenous Continuous Meghan Lainez CRNA    loratadine 10 mg Oral Daily Assabi A Eloy, DDS    LORazepam 1 mg Oral Q6H PRN Assfrancis Mccarthy, DDS    lurasidone 20 mg Oral Daily With Breakfast Assabi A Eloy, DDS    montelukast 10 mg Oral Daily Assabi A Eloy, DDS    nicotine 1 patch Transdermal Daily Assabi A Eloy, DDS    oxymetazoline 2 spray Each Nare Q12H [address] Assabi A Eloy, DDS    polyethylene glycol 17 g Oral Daily Assabi A Eloy, DDS    predniSONE 40 mg Oral Daily Assabi A Eloy, DDS    saccharomyces boulardii 250 mg Oral BID Assabi A Eloy, DDS    sodium chloride 2 spray Each Nare BID Assabi A Eloy, DDS    vancomycin 1,000 mg Intravenous Q8H Dayan Gillis MD Last Rate: 1,000 mg (03/16/19 1317)   verapamil 240 mg Oral Daily Leyda Mohamud DDS         Today, Patient Was Seen By: Kerney Opitz, PA-C    ** Please Note: Dictation voice to text software may have been used in the creation of this document   **

## 2019-03-16 NOTE — PROGRESS NOTES
Oral and Maxillofacial Surgery Note:    54 y/o F POD #0 s/p Surgical Extraction of Complete Bony Impacted Third Molar Tooth #1  Hemostatic  No complications  Site primarily closed with 3-0 chromic gut sutures  Cultures taken from extraction socket  Recs:  -HOB 30 degrees  -Ice to face 20 min on, 10 min off, up to 24 hours  -Diet: dental soft 48 hrs  -Trend CBC  -Follow cultures  -Antibiotics: continue IV Vancomycin empiric antibiotic course  -Moistened gauze with biting pressure, change every 30-45 min prn bleeding  -Peridex 0 2% rinse BID x 5 days, start day after extraction  -Warm salt water rinses start day after extraction  -Maintain good oral hygiene, brush teeth BID start day after extractions  -No spitting, no straws, no suction  -Sinus precautions: no nose blowing or bearing down forcefully  -OMS following  ?   Leyda Barrios DDS

## 2019-03-16 NOTE — ANESTHESIA PREPROCEDURE EVALUATION
Review of Systems/Medical History  Patient summary reviewed        Cardiovascular  Hyperlipidemia, Hypertension , Dysrhythmias (V TACH after her reduction mammoplasty, done at [facility]) ,    Pulmonary  Smoker cigarette smoker  , Tobacco cessation counseling given , Asthma , Sleep apnea CPAP,        GI/Hepatic    GERD ,        Negative  ROS        Endo/Other  Negative endo/other ROS      GYN  Negative gynecology ROS          Hematology  Negative hematology ROS      Musculoskeletal  Negative musculoskeletal ROS        Neurology  Negative neurology ROS      Psychology   Anxiety, Depression ,              Physical Exam    Airway    Mallampati score: II  TM Distance: >3 FB  Neck ROM: full     Dental       Cardiovascular  Cardiovascular exam normal    Pulmonary  Pulmonary exam normal     Other Findings        Anesthesia Plan  ASA Score- 2     Anesthesia Type- general with ASA Monitors  Additional Monitors:   Airway Plan:         Plan Factors-    Induction- intravenous  Postoperative Plan-     Informed Consent- Anesthetic plan and risks discussed with patient  I personally reviewed this patient with the CRNA  Discussed and agreed on the Anesthesia Plan with the CHAD Chung

## 2019-03-17 LAB
ANION GAP SERPL CALCULATED.3IONS-SCNC: 6 MMOL/L (ref 4–13)
BUN SERPL-MCNC: 10 MG/DL (ref 5–25)
CALCIUM SERPL-MCNC: 8.8 MG/DL (ref 8.3–10.1)
CHLORIDE SERPL-SCNC: 104 MMOL/L (ref 100–108)
CO2 SERPL-SCNC: 26 MMOL/L (ref 21–32)
CREAT SERPL-MCNC: 0.57 MG/DL (ref 0.6–1.3)
ERYTHROCYTE [DISTWIDTH] IN BLOOD BY AUTOMATED COUNT: 11.8 % (ref 11.6–15.1)
GFR SERPL CREATININE-BSD FRML MDRD: 105 ML/MIN/1.73SQ M
GLUCOSE SERPL-MCNC: 116 MG/DL (ref 65–140)
HCT VFR BLD AUTO: 36.2 % (ref 34.8–46.1)
HGB BLD-MCNC: 11.9 G/DL (ref 11.5–15.4)
MCH RBC QN AUTO: 29.7 PG (ref 26.8–34.3)
MCHC RBC AUTO-ENTMCNC: 32.9 G/DL (ref 31.4–37.4)
MCV RBC AUTO: 90 FL (ref 82–98)
PLATELET # BLD AUTO: 214 THOUSANDS/UL (ref 149–390)
PMV BLD AUTO: 11.4 FL (ref 8.9–12.7)
POTASSIUM SERPL-SCNC: 3.8 MMOL/L (ref 3.5–5.3)
RBC # BLD AUTO: 4.01 MILLION/UL (ref 3.81–5.12)
SODIUM SERPL-SCNC: 136 MMOL/L (ref 136–145)
VANCOMYCIN TROUGH SERPL-MCNC: 17.5 UG/ML (ref 10–20)
WBC # BLD AUTO: 12.93 THOUSAND/UL (ref 4.31–10.16)

## 2019-03-17 PROCEDURE — 80202 ASSAY OF VANCOMYCIN: CPT | Performed by: INTERNAL MEDICINE

## 2019-03-17 PROCEDURE — 99233 SBSQ HOSP IP/OBS HIGH 50: CPT | Performed by: PHYSICIAN ASSISTANT

## 2019-03-17 PROCEDURE — 80048 BASIC METABOLIC PNL TOTAL CA: CPT | Performed by: PHYSICIAN ASSISTANT

## 2019-03-17 PROCEDURE — 99254 IP/OBS CNSLTJ NEW/EST MOD 60: CPT | Performed by: INTERNAL MEDICINE

## 2019-03-17 PROCEDURE — 85027 COMPLETE CBC AUTOMATED: CPT | Performed by: PHYSICIAN ASSISTANT

## 2019-03-17 RX ORDER — CLINDAMYCIN PHOSPHATE 600 MG/50ML
600 INJECTION INTRAVENOUS EVERY 8 HOURS
Status: DISCONTINUED | OUTPATIENT
Start: 2019-03-17 | End: 2019-03-18 | Stop reason: HOSPADM

## 2019-03-17 RX ADMIN — PREDNISONE 40 MG: 20 TABLET ORAL at 08:09

## 2019-03-17 RX ADMIN — BUSPIRONE HYDROCHLORIDE 30 MG: 10 TABLET ORAL at 17:48

## 2019-03-17 RX ADMIN — Medication 2 SPRAY: at 08:15

## 2019-03-17 RX ADMIN — OXYMETAZOLINE HYDROCHLORIDE 2 SPRAY: 0.05 SPRAY NASAL at 08:13

## 2019-03-17 RX ADMIN — VANCOMYCIN HYDROCHLORIDE 1000 MG: 1 INJECTION, SOLUTION INTRAVENOUS at 05:09

## 2019-03-17 RX ADMIN — IBUPROFEN 400 MG: 400 TABLET ORAL at 02:52

## 2019-03-17 RX ADMIN — LORAZEPAM 1 MG: 1 TABLET ORAL at 13:04

## 2019-03-17 RX ADMIN — LORATADINE 10 MG: 10 TABLET ORAL at 08:09

## 2019-03-17 RX ADMIN — BUSPIRONE HYDROCHLORIDE 30 MG: 10 TABLET ORAL at 08:09

## 2019-03-17 RX ADMIN — ENOXAPARIN SODIUM 40 MG: 40 INJECTION SUBCUTANEOUS at 17:47

## 2019-03-17 RX ADMIN — ATORVASTATIN CALCIUM 10 MG: 10 TABLET, FILM COATED ORAL at 17:47

## 2019-03-17 RX ADMIN — LURASIDONE HYDROCHLORIDE 20 MG: 20 TABLET, FILM COATED ORAL at 08:14

## 2019-03-17 RX ADMIN — CLINDAMYCIN PHOSPHATE 600 MG: 600 INJECTION, SOLUTION INTRAVENOUS at 21:19

## 2019-03-17 RX ADMIN — Medication 2 SPRAY: at 17:48

## 2019-03-17 RX ADMIN — FLUOXETINE HYDROCHLORIDE 80 MG: 20 CAPSULE ORAL at 08:11

## 2019-03-17 RX ADMIN — Medication 250 MG: at 17:48

## 2019-03-17 RX ADMIN — CLINDAMYCIN PHOSPHATE 600 MG: 600 INJECTION, SOLUTION INTRAVENOUS at 14:06

## 2019-03-17 RX ADMIN — VERAPAMIL HYDROCHLORIDE 240 MG: 240 TABLET, FILM COATED, EXTENDED RELEASE ORAL at 08:14

## 2019-03-17 RX ADMIN — Medication 250 MG: at 08:14

## 2019-03-17 RX ADMIN — MONTELUKAST SODIUM 10 MG: 10 TABLET, FILM COATED ORAL at 08:09

## 2019-03-17 RX ADMIN — IBUPROFEN 400 MG: 400 TABLET ORAL at 21:17

## 2019-03-17 RX ADMIN — POLYETHYLENE GLYCOL 3350 17 G: 17 POWDER, FOR SOLUTION ORAL at 21:17

## 2019-03-17 NOTE — ASSESSMENT & PLAN NOTE
· Patient initially stated that she quit smoking but then admits that she smokes more when she is feeling anxious  · Complete cessation advised and discussed  · Nicotine patch on board - patient is refusing

## 2019-03-17 NOTE — DISCHARGE INSTRUCTIONS
Mrs Katty Blackwell (: 1965) has a history of chronic sinusitis with reoccurring acute episodes  She has been managed by ENT for antrostomies but after admission to [facility] for right midface and infraorbital swelling and CT imaging, ENT service noted potential dental pathology and had concerns that this may be the source of reoccurring infection  [facility] OMS consultation revealed periapical radiolucent lesion associated with teeth #2,3,5,15 with roots intimate with maxillary sinuses  Complete bony impacted third molar tooth intimate with right maxillary sinus was extracted this admission to eliminate its potential contribution to infection  Mrs Navas requires endodontic evaluation and treatment of any necrotic teeth potentially contributing to reoccurring sinusitis  Of note, she has many drug allergies and treatment is required in a more urgent nature while she is on current antibiotic regiment  [facility] Oral [facility] Maxillof[facility]    --    POST OPERATIVE INSTRUCTIONS FOLLOWING ORAL SURGERY    Swelling: To reduce swelling, place ice bag on your face up to 12 hours following surgery  This is an important factor in keeping swelling to a minimum  Swelling is common and need not cause alarm  Rinsing: DO NOT RINSE for the first 12 hours after surgery  After 24 hours it is important to rinse, using warm salt water (not over the counter mouthwash) 3 to 4 times a day, particularly after eating  Brush areas of mouth not affected by the surgery starting tomorrow  Spitting: DO NOT SPIT OUT frequent spitting will cause bleeding to continue  Exercise Jaw: In some cases following oral surgery, it becomes difficult to open your mouth  Exercise your jaw frequently by attempting to open your mouth wide  You may experience discomfort at first, however, with continued exercise the discomfort is reduced      Diet: After having oral surgery it is recommended the patient maintain a semi-liquid diet for 24 hours  A regular diet should be resumed as soon as possible, avoiding peanuts, pretzels, and foods with seeds  Vomiting: Occasionally, patients will have nausea after surgery  Tea, ginger ale, and soup broth will help this complication  Pain: A prescription for pain relieving drugs is given when surgery is extensive  For lesser surgical procedures, it is recommended the patient use Motrin or Advil  If you are in pain and the drug you are taking does not help, please contact us and we will try to remedy the situation  Bleeding: Bite on gauze for 30 minutes  If the bleeding continues, bite on new gauze for an additional 30 minutes  If bleeding continues, place a damp tea bag over the socket and continue to bite down for an additional 30 minutes  Frequent gauze changes allow bleeding to continue  Smoking: It is important you DO NOT SMOKE after surgery  Smoking caused dry socket, which is very painful  Concerns: May call [facility] for Oral Surgery and Implantology at [phone]  Emergency: Go to the [address] at [facility] and ask for the Oral Surgeon on call  DO NOT WAIT until your post-operative appointment to consult us  [facility] [phone]

## 2019-03-17 NOTE — CONSULTS
Vancomycin IV Pharmacy-to-Dose Consultation    Rosario Bailey is a 53 y o  female who was receiving Vancomycin IV with management by the Pharmacy Consult service  The patient?s Vancomycin therapy has been discontinued and changed to clindamycin  Thank you for allowing us to take part in this patient's care  Pharmacy will sign-off now; please call or re-consult if there are any questions        Inez Avendano, PharmABBY  Emergency Medicine Clinical Pharmacist

## 2019-03-17 NOTE — ASSESSMENT & PLAN NOTE
· Presented  with facial cellulitis that did not improve with p o  Clindamycin  · History of bilateral antrectomies with recurrent sinusitis and severe allergy  · CT soft tissue neck revealed Bilateral maxillary sinus disease, right greater than left, with evidence of prior bilateral antrectomies  No air-fluid levels, no fluid collection of abscess  · Continue vanco per OMFS recs  (has multi allergies to abx), pharmacy following for dosing  · ENT following and suspected that this is dental in origin  · OMFS consult appreciated - they performed extraction of tooth #1 on 3/16/19  I am awaiting call back from [facility] to discuss patient further  · Pain remains well controlled  · Currently on PO steroids which ENT recommends: prednisone 40mg daily x1 week then 20mg daily x 1 week  · Will need close outpatient follow up with ENT, OMFS, and an endodontist (for which [facility] has provided the patient with 2 possible recommendations)  · Tissue culture and anaerobic cultures pending - I discussed with the micro lab this AM, final cultures hopefully resulted by tomorrow  · Patient had a bump in white count today (12 93 up from 10 36) however she is on steroids   She is afebrile, VSS, and pain and swelling are improved

## 2019-03-17 NOTE — CONSULTS
Consultation - Infectious Disease   Oliverio Brady [address] y o  female MRN: [id number]  Unit/Bed#: -01 Encounter: [id number]      IMPRESSION & RECOMMENDATIONS:   Impression/Recommendations:  1  Facial cellulitis  Due to # 2/3  Failure to improve with clindamycin as outpatient likely due to need for dental extraction  Clinically stable without sepsis  Improving status post dental extraction  Rec:  · Attempt to narrow antibiotics to clindamycin  · Serial exams  · If continues to improve anticipate transition to oral antibiotics as below    2  Acute maxillary sinusitis  # 3  Clinically improving  Rec:  · Continue clindamycin for now    3  Periapical dental infection  Status post extraction of impacted molar  OR cultures pending  Rec:  · Continue clindamycin for now  · Follow-up OR cultures  · If ontinues to improve anticipate transition to clindamycin 300 mg p o  Q 6 hours to continue until evaluated by endodontist next week  4   Multiple antibiotic allergies  Has tolerated clindamycin  Rec:  · Changed to clindamycin as above    Antibiotics:  Vancomycin # 4    Thank you for this consultation  We will follow along with you  HISTORY OF PRESENT ILLNESS:  Reason for Consult:  Facial cellulitis    HPI: Oliverio Brady is a [address] y o  female with multiple antibiotic allergies followed closely by an allergist   She presented to the emergency department on 03/14 with facial swelling and sinus pressure  She was seen by an urgent care clinic 24 hours prior and was started on clindamycin  Despite taking that she continued to have symptoms and came to the hospital   Upon presentation she was noted to be afebrile with a normal WBC  She underwent a CT which showed bilateral sinusitis  She was seen by both ENT and OMFS who felt that she had acute maxillary sinusitis due to impacted wisdom tooth and periapical infection of several other teeth  She was started on vancomycin and steroids    She was taken to the operating room and underwent surgical extraction of her wisdom tube  Since admission her symptoms have improved  We are asked to comment in antibiotic management in the setting of multiple antibiotics allergies  REVIEW OF SYSTEMS:  Denies fevers, chills, sweats, nausea, vomiting, or diarrhea  A complete system-based review of systems is otherwise negative      PAST MEDICAL HISTORY:  Past Medical History:   Diagnosis Date    Anesthesia complication     V TACH after her reduction mammoplasty, done at [address] Depression     Ethmoid sinusitis     GERD (gastroesophageal reflux disease)     Maxillary sinusitis     Multiple allergies     Myofascial pain syndrome     Nasal turbinate hypertrophy      Past Surgical History:   Procedure Laterality Date    HYSTERECTOMY  2004    LAPAROSCOPY      with vaginal hysterectomy; 11/3/2003 rso    OOPHORECTOMY Left 2004    PARTIAL HYSTERECTOMY  2004    OK NASAL/SINUS ENDOSCOPY,RMV TISS MAXILL SINUS N/A 9/30/2016    Procedure: IMAGE GUIDED FUNCTIONAL ENDOSCOPIC SINUS SURGERY;  Surgeon: Macy Cobian MD;  Location: [facility];  Service: ENT    REDUCTION MAMMAPLASTY Bilateral 02/27/2015    TUBAL LIGATION      WISDOM TOOTH EXTRACTION         FAMILY HISTORY:  Non-contributory    SOCIAL HISTORY:  Social History     Substance and Sexual Activity   Alcohol Use Not Currently    Comment: social     Social History     Substance and Sexual Activity   Drug Use No     Social History     Tobacco Use   Smoking Status Current Some Day Smoker   Smokeless Tobacco Never Used       ALLERGIES:  Allergies   Allergen Reactions    Other      Most all antibiotics- hives, hypotensive    "no problem with clindamycin "    Augmentin Es-600  [Amoxicillin-Pot Clavulanate]     Cefuroxime     Erythromycin     Levofloxacin     Morphine     Morphine And Related Hives    Oxycodone-Acetaminophen     Penicillins     Percocet [Oxycodone-Acetaminophen] Hives    Sulfa Antibiotics     Tetracyclines & Related        MEDICATIONS:  All current active medications have been reviewed  PHYSICAL EXAM:  Vitals:  Temp:  [98 °F (36 7 °C)-98 5 °F (36 9 °C)] 98 °F (36 7 °C)  HR:  [76-85] 79  Resp:  [18] 18  BP: (110-130)/(60-75) 130/70  SpO2:  [91 %-98 %] 98 %  Temp (24hrs), Av 3 °F (36 8 °C), Min:98 °F (36 7 °C), Max:98 5 °F (36 9 °C)  Current: Temperature: 98 °F (36 7 °C)     Physical Exam:  General:  Well-nourished, well-developed, in no acute distress  Eyes:  Conjunctive clear with no hemorrhages or effusions  Oropharynx:  No ulcers, no lesions  Neck:  Supple, no lymphadenopathy  Lungs:  Clear to auscultation bilaterally, no accessory muscle use  Cardiac:  Regular rate and rhythm, no murmurs  Abdomen:  Soft, non-tender, non-distended  Extremities:  No peripheral cyanosis, clubbing, or edema  Skin:  No rashes, no ulcers  Neurological:  Moves all four extremities spontaneously, sensation grossly intact    LABS, IMAGING, & OTHER STUDIES:  Lab Results:  I have personally reviewed pertinent labs  Results from last 7 days   Lab Units 19  0511 03/15/19  0453 03/14/19  1219   POTASSIUM mmol/L 3 8 3 5 3 9   CHLORIDE mmol/L 104 103 100   CO2 mmol/L 26 27 26   BUN mg/dL 10 8 7   CREATININE mg/dL 0 57* 0 70 0 74   EGFR ml/min/1 73sq m 105 98 91   CALCIUM mg/dL 8 8 8 8 9 2   AST U/L  --  14  --    ALT U/L  --  26  --    ALK PHOS U/L  --  94  --      Results from last 7 days   Lab Units 19  0511 03/15/19  0453 19  1219   WBC Thousand/uL 12 93* 10 36* 12 36*   HEMOGLOBIN g/dL 11 9 13 2 15 0   PLATELETS Thousands/uL 214 185 219     Results from last 7 days   Lab Units 19  0842 19  1554 19  1551   BLOOD CULTURE   --  No Growth at 48 hrs  No Growth at 48 hrs  GRAM STAIN RESULT  Rare Polys  2+ RBC's  No bacteria seen  --   --        Imaging Studies:   I have personally reviewed pertinent imaging study reports and images in PACS    CT head bilateral maxillary sinus disease    EKG, Pathology, and Other Studies:   I have personally reviewed pertinent reports

## 2019-03-17 NOTE — PLAN OF CARE
Problem: PAIN - ADULT  Goal: Verbalizes/displays adequate comfort level or baseline comfort level  Description  Interventions:  - Encourage patient to monitor pain and request assistance  - Assess pain using appropriate pain scale  - Administer analgesics based on type and severity of pain and evaluate response  - Implement non-pharmacological measures as appropriate and evaluate response  - Consider cultural and social influences on pain and pain management  - Notify physician/advanced practitioner if interventions unsuccessful or patient reports new pain  Outcome: Progressing     Problem: INFECTION - ADULT  Goal: Absence or prevention of progression during hospitalization  Description  INTERVENTIONS:  - Assess and monitor for signs and symptoms of infection  - Monitor lab/diagnostic results  - Monitor all insertion sites, i e  indwelling lines, tubes, and drains  - Monitor endotracheal (as able) and nasal secretions for changes in amount and color  - [place] appropriate cooling/warming therapies per order  - Administer medications as ordered  - Instruct and encourage patient and family to use good hand hygiene technique  - Identify and instruct in appropriate isolation precautions for identified infection/condition  Outcome: Progressing     Problem: SAFETY ADULT  Goal: Patient will remain free of falls  Description  INTERVENTIONS:  - Assess patient frequently for physical needs  -  Identify cognitive and physical deficits and behaviors that affect risk of falls    -  [place] fall precautions as indicated by assessment   - Educate patient/family on patient safety including physical limitations  - Instruct patient to call for assistance with activity based on assessment  - Modify environment to reduce risk of injury  - Consider OT/PT consult to assist with strengthening/mobility  Outcome: Progressing     Problem: DISCHARGE PLANNING  Goal: Discharge to home or other facility with appropriate resources  Description  INTERVENTIONS:  - Identify barriers to discharge w/patient and caregiver  - Arrange for needed discharge resources and transportation as appropriate  - Identify discharge learning needs (meds, wound care, etc )  - Arrange for interpretive services to assist at discharge as needed  - Refer to Case Management Department for coordinating discharge planning if the patient needs post-hospital services based on physician/advanced practitioner order or complex needs related to functional status, cognitive ability, or social support system  Outcome: Progressing     Problem: Knowledge Deficit  Goal: Patient/family/caregiver demonstrates understanding of disease process, treatment plan, medications, and discharge instructions  Description  Complete learning assessment and assess knowledge base    Interventions:  - Provide teaching at level of understanding  - Provide teaching via preferred learning methods  Outcome: Progressing     Problem: DISCHARGE PLANNING - CARE MANAGEMENT  Goal: Discharge to post-acute care or home with appropriate resources  Description  INTERVENTIONS:  - Conduct assessment to determine patient/family and health care team treatment goals, and need for post-acute services based on payer coverage, community resources, and patient preferences, and barriers to discharge  - Address psychosocial, clinical, and financial barriers to discharge as identified in assessment in conjunction with the patient/family and health care team  - Arrange appropriate level of post-acute services according to patient's   needs and preference and payer coverage in collaboration with the physician and health care team  - Communicate with and update the patient/family, physician, and health care team regarding progress on the discharge plan  - Arrange appropriate transportation to post-acute venues   Outcome: Progressing     Problem: Potential for Falls  Goal: Patient will remain free of falls  Description  INTERVENTIONS:  - Assess patient frequently for physical needs  -  Identify cognitive and physical deficits and behaviors that affect risk of falls    -  [place] fall precautions as indicated by assessment   - Educate patient/family on patient safety including physical limitations  - Instruct patient to call for assistance with activity based on assessment  - Modify environment to reduce risk of injury  - Consider OT/PT consult to assist with strengthening/mobility  Outcome: Progressing     Problem: SAFETY ADULT  Goal: Maintain or return to baseline ADL function  Description  INTERVENTIONS:  -  Assess patient's ability to carry out ADLs; assess patient's baseline for ADL function and identify physical deficits which impact ability to perform ADLs (bathing, care of mouth/teeth, toileting, grooming, dressing, etc )  - Assess/evaluate cause of self-care deficits   - Assess range of motion  - Assess patient's mobility; develop plan if impaired  - Assess patient's need for assistive devices and provide as appropriate  - Encourage maximum independence but intervene and supervise when necessary  ¯ Involve family in performance of ADLs  ¯ Assess for home care needs following discharge   ¯ Request OT consult to assist with ADL evaluation and planning for discharge  ¯ Provide patient education as appropriate  Outcome: Adequate for Discharge  Goal: Maintain or return mobility status to optimal level  Description  INTERVENTIONS:  - Assess patient's baseline mobility status (ambulation, transfers, stairs, etc )    - Identify cognitive and physical deficits and behaviors that affect mobility  - Identify mobility aids required to assist with transfers and/or ambulation (gait belt, sit-to-stand, lift, walker, cane, etc )  - [place] fall precautions as indicated by assessment  - Record patient progress and toleration of activity level on Mobility SBAR; progress patient to next Phase/Stage  - Instruct patient to call for assistance with activity based on assessment  - Request Rehabilitation consult to assist with strengthening/weightbearing, etc   Outcome: Adequate for Discharge     Problem: INFECTION - ADULT  Goal: Absence of fever/infection during neutropenic period  Description  INTERVENTIONS:  - Monitor WBC  - Implement neutropenic guidelines  Outcome: Completed

## 2019-03-17 NOTE — PROGRESS NOTES
Oral and Maxillofacial Surgery Note:    52 y/o F POD #1 s/p Surgical Extraction of Complete Bony Impacted Third Molar Tooth #1  AVSS, afebrile, increase in leukocytosis 12 36->10 36->12 95 (likely due to immediate post op period), Cx: gram stain rare polys, 2+ rbcs, no bacteria  Patient reports facial swelling is reduced and pain is well controlled  She is tolerating a soft diet      Right midface and infraorbital edema reduced, right infraorbital erythema resolved, right midface non fluctuant, #1 surgical site closed primarily-no drainage, sutures intact; good oral hygiene, FOM soft/no elevation, FROM tongue, no orophyrngeal edema    Recs:  -ID Consult recommended to better roxann antibiotics, patient has multiple drug allergies  -HOB 30 degrees  -Ice to face 20 min on, 10 min off, up to 24 hours  -Diet: dental soft 24 hrs, then advance to normal diet as tolerated  -Trend CBC  -Follow cultures  -Antibiotics: continue IV Vancomycin empiric antibiotic course  -Moistened gauze with biting pressure, change every 30-45 min prn bleeding  -Peridex 0 2% rinse BID x 5 days  -Warm salt water rinses  -Maintain good oral hygiene, brush teeth BID start day after extractions  -No spitting, no straws, no suction  -Sinus precautions: no nose blowing or bearing down forcefully  -OMS following, will require root canal therapy as outpatient with an endodontist (two local endodontist contact information given to patient)    Heather Bach DDS

## 2019-03-17 NOTE — PROGRESS NOTES
Patient suitable for d/c from ENT standpoint  Final dispo deferred to primary team and OMFS  May follow up with ENT in 1-2 weeks  Agree with clindamycin, steroid taper, home nasal regimen

## 2019-03-17 NOTE — PROGRESS NOTES
Vancomycin IV Pharmacy-to-Dose Consultation    Casper Conway is a 53 y o  female who is currently receiving Vancomycin IV with management by the Pharmacy Consult service  Relevant clinical data and objective history reviewed:    /70 (BP Location: Right arm)   Pulse 79   Temp 98 °F (36 7 °C) (Oral)   Resp 18   Ht 5' 5 25" (1 657 m)   Wt 83 9 kg (185 lb)   LMP  (LMP Unknown)   SpO2 98%   BMI 30 55 kg/m²     I/O last 3 completed shifts: In: 2067 3 [P O :540; I V :550; IV Piggyback:977 3]  Out: 900 [Urine:900]    Lab Results   Component Value Date/Time    BUN 10 03/17/2019 05:11 AM    BUN 11 04/20/2015 11:35 AM    WBC 12 93 (H) 03/17/2019 05:11 AM    WBC 9 19 04/20/2015 11:35 AM    HGB 11 9 03/17/2019 05:11 AM    HGB 14 2 04/20/2015 11:35 AM    HCT 36 2 03/17/2019 05:11 AM    HCT 42 7 04/20/2015 11:35 AM    MCV 90 03/17/2019 05:11 AM    MCV 89 04/20/2015 11:35 AM     03/17/2019 05:11 AM     04/20/2015 11:35 AM     Creatinine   Date Value Ref Range Status   03/17/2019 0 57 (L) 0 60 - 1 30 mg/dL Final     Comment:     Standardized to IDMS reference method   03/15/2019 0 70 0 60 - 1 30 mg/dL Final     Comment:     Standardized to IDMS reference method   04/20/2015 0 67 0 60 - 1 30 mg/dL Final     Comment:     Standardized to IDMS reference method     Vancomycin Tr   Date Value Ref Range Status   03/17/2019 17 5 10 0 - 20 0 ug/mL Final   03/16/2019 11 0 10 0 - 20 0 ug/mL Final     Vancomycin Assessment:  1  Indication: Bilateral maxillary sinus disease  · Micro:   3/14 BC x2 - NG x 48 hours  3/16 Anaerobic Cx & gram stain - pending  3/16 Tissue cx from extraction socket - no bacteria seen to date  2  Renal Function: SCr= 0 57; CrCl ~ 100 mL/min  3  Potential Nephrotoxicity Factors:  · Medications: Ibuprofen PRN moderate pain, fever  4  Days of Therapy: 4  5  Current Dose: 1000 mg q8h   6  Goal Trough: 15-20 (appropriate for most indications)   7   Last Level: 3/17 @ 1214= 17 5 (drawn ~ 15 minutes early)    Vancomycin Plan:  1  Dosing: continue 1000 mg q8h  2  Next Level: 3/22 @ 1230    Pharmacy will continue to follow closely for s/sx of nephrotoxicity, infusion reactions and appropriateness of therapy  BMP and CBC will be ordered per protocol  We will continue to follow the patient's culture results and clinical progress daily      Shelby Anne, PharmD  Emergency Medicine Clinical Pharmacist

## 2019-03-17 NOTE — ASSESSMENT & PLAN NOTE
· OMFS consult appreciated - they performed extraction of tooth #1 3/16/19  · Continue IV Vanco per OMFS recs - patient seems to be tolerating well  · Pain remains controlled  · Follow up final cultures  · Will need close follow up with OMFS and endodontist

## 2019-03-17 NOTE — ASSESSMENT & PLAN NOTE
· Suspect steroid-induced  · Continue IV vanco per OMFS recs  · CBC in AM  · She is afebrile, VSS, pain ad swelling are improving

## 2019-03-17 NOTE — PROGRESS NOTES
Progress Note - Romaine Nava 1965, 53 y o  female MRN: [id number]    Unit/Bed#: -01 Encounter: [id number]    Primary Care Provider: Lakshmi Hernandez MD   Date and time admitted to hospital: 3/14/2019 11:33 AM        * Facial cellulitis  Assessment & Plan  · Presented  with facial cellulitis that did not improve with p o  Clindamycin  · History of bilateral antrectomies with recurrent sinusitis and severe allergy  · CT soft tissue neck revealed Bilateral maxillary sinus disease, right greater than left, with evidence of prior bilateral antrectomies  No air-fluid levels, no fluid collection of abscess  · Continue vanco per OMFS recs  (has multi allergies to abx), pharmacy following for dosing  · ENT following and suspected that this is dental in origin  · OMFS consult appreciated - they performed extraction of tooth #1 on 3/16/19  I am awaiting call back from OMFS to discuss patient further  · Pain remains well controlled  · Currently on PO steroids which ENT recommends: prednisone 40mg daily x1 week then 20mg daily x 1 week  · Will need close outpatient follow up with ENT, OMFS, and an endodontist (for which [facility] has provided the patient with 2 possible recommendations)  · Tissue culture and anaerobic cultures pending - I discussed with the micro lab this AM, final cultures hopefully resulted by tomorrow  · Patient had a bump in white count today (12 93 up from 10 36) however she is on steroids   She is afebrile, VSS, and pain and swelling are improved    Impacted third molar tooth  Assessment & Plan  · OMFS consult appreciated - they performed extraction of tooth #1 3/16/19  · Continue IV Vanco per OMFS recs - patient seems to be tolerating well  · Pain remains controlled  · Follow up final cultures  · Will need close follow up with OMFS and endodontist    Leukocytosis  Assessment & Plan  · Suspect steroid-induced  · Continue IV vanco per OMFS recs  · CBC in AM  · She is afebrile, VSS, pain ad swelling are improving    HTN (hypertension)  Assessment & Plan  · BP controlled  · Continue verapamil    Obstructive sleep apnea syndrome  Assessment & Plan  · Due to swelling and pain of right face and eye was not able to tolerate CPAP mask  · Continue to encourage CPAP as tolerated    Depression with anxiety  Assessment & Plan  · Continue home meds  · She is to follow up with her psychiatrist and psychologist     Tobacco use  Assessment & Plan  · Patient initially stated that she quit smoking but then admits that she smokes more when she is feeling anxious  · Complete cessation advised and discussed  · Nicotine patch on board - patient is refusing      VTE Pharmacologic Prophylaxis:   Pharmacologic: Enoxaparin (Lovenox)  Mechanical VTE Prophylaxis in Place: Yes    Patient Centered Rounds: I have performed bedside rounds with nursing staff today  Ginger    Discussions with Specialists or Other Care Team Provider: Micro Lab  Also have page out to OMFS, awaiting return call    Education and Discussions with Family / Patient: Patient; when asked if there was anyone she would like me to call today with an update, the patient kindly declined  I reiterated to the patient that if she or any family members have any questions or would like to discuss I am available    Time Spent for Care: 20 minutes  More than 50% of total time spent on counseling and coordination of care as described above  Current Length of Stay: 1 day(s)    Current Patient Status: Inpatient   Certification Statement: The patient will continue to require additional inpatient hospital stay due to Continue IV antibiotics, await final culture result    Discharge Plan:  Pending OMFS recommendations and final cultures  Will need close follow-up with PCP, OMFS, endodontist, and ENT  Steroids as above  Await the above for antibiotic recommendations    Code Status: Level 1 - Full Code      Subjective:   Ms Robert Vivas reports that she is feeling better today  She reports occasional soreness or her tooth was extracted  She reports that her swelling is much improved  She denies any difficulty speaking, swallowing, or breathing  Denies chest pain  No diarrhea or nausea  Appetite is improved today  Objective:     Vitals:   Temp (24hrs), Av 3 °F (36 8 °C), Min:98 °F (36 7 °C), Max:98 5 °F (36 9 °C)    Temp:  [98 °F (36 7 °C)-98 5 °F (36 9 °C)] 98 °F (36 7 °C)  HR:  [76-85] 79  Resp:  [18] 18  BP: (110-130)/(60-75) 130/70  SpO2:  [91 %-98 %] 98 %  Body mass index is 30 55 kg/m²  Input and Output Summary (last 24 hours): Intake/Output Summary (Last 24 hours) at 3/17/2019 1023  Last data filed at 3/16/2019 2222  Gross per 24 hour   Intake 740 ml   Output --   Net 740 ml       Physical Exam:     Physical Exam   Constitutional: She is oriented to person, place, and time  No distress  Patient seen lying in bed comfortably resting  She is very pleasant and cooperative  HENT:   Minimal, if any swelling on right side of face   Cardiovascular: Normal rate and regular rhythm  Pulmonary/Chest: Effort normal and breath sounds normal  No stridor  No respiratory distress  She has no wheezes  Abdominal: Soft  Bowel sounds are normal  There is no tenderness  Musculoskeletal: She exhibits no edema  Neurological: She is alert and oriented to person, place, and time  Skin: Skin is warm and dry  She is not diaphoretic  Psychiatric: She has a normal mood and affect  Her behavior is normal    Vitals reviewed  Additional Data:     Labs:    Results from last 7 days   Lab Units 19  0511  19  1219   WBC Thousand/uL 12 93*   < > 12 36*   HEMOGLOBIN g/dL 11 9   < > 15 0   HEMATOCRIT % 36 2   < > 45 8   PLATELETS Thousands/uL 214   < > 219   NEUTROS PCT %  --   --  72   LYMPHS PCT %  --   --  16   MONOS PCT %  --   --  10   EOS PCT %  --   --  2    < > = values in this interval not displayed       Results from last 7 days   Lab Units 19  0511 03/15/19  0453   SODIUM mmol/L 136 137   POTASSIUM mmol/L 3 8 3 5   CHLORIDE mmol/L 104 103   CO2 mmol/L 26 27   BUN mg/dL 10 8   CREATININE mg/dL 0 57* 0 70   ANION GAP mmol/L 6 7   CALCIUM mg/dL 8 8 8 8   ALBUMIN g/dL  --  3 4*   TOTAL BILIRUBIN mg/dL  --  0 55   ALK PHOS U/L  --  94   ALT U/L  --  26   AST U/L  --  14   GLUCOSE RANDOM mg/dL 116 101                           * I Have Reviewed All Lab Data Listed Above  * Additional Pertinent Lab Tests Reviewed: All Labs Within Last 24 Hours Reviewed    Imaging:    Imaging Reports Reviewed Today Include: None  Imaging Personally Reviewed by Myself Includes:  None    Recent Cultures (last 7 days):     Results from last 7 days   Lab Units 03/16/19  0842 03/14/19  1554 03/14/19  1551   BLOOD CULTURE   --  No Growth at 48 hrs  No Growth at 48 hrs     GRAM STAIN RESULT  Rare Polys  2+ RBC's  No bacteria seen  --   --        Last 24 Hours Medication List:     Current Facility-Administered Medications:  acetaminophen 650 mg Oral Q6H PRN Leyda Wolf, DDS    atorvastatin 10 mg Oral Daily With Dinner Leyda Choe, DDS    busPIRone 30 mg Oral BID Leyda Wolf, DDS    diphenhydrAMINE 25 mg Oral Q6H PRN Petra Dyson PA-C    enoxaparin 40 mg Subcutaneous Daily Leyda Lyons, DDS    FLUoxetine 80 mg Oral Daily Leyda Lyons, DDS    ibuprofen 400 mg Oral Q6H PRN Leyda Wolf, DDS    loratadine 10 mg Oral Daily Leyda Lyons, DDS    LORazepam 1 mg Oral Q6H PRN Leyda Wolf, DDS    lurasidone 20 mg Oral Daily With Breakfast Leyda Lyons, DDS    montelukast 10 mg Oral Daily Leyda Lyons, DDS    nicotine 1 patch Transdermal Daily Leyda Lyons, DDS    oxymetazoline 2 spray Each Nare Q12H [facility] Leyda Lyons, DDS    polyethylene glycol 17 g Oral Daily Leyda Lyons, DDS    predniSONE 40 mg Oral Daily Leyda Lyons, DDS    saccharomyces boulardii 250 mg Oral BID Leyda Lyons, DDS    sodium chloride 2 spray Each Nare BID Leyda Lyons DDS    vancomycin 1,000 mg Intravenous Q8H Carmela Thomas MD Last Rate: 1,000 mg (03/17/19 2194)   verapamil 240 mg Oral Daily Leyda Lamb DDS         Today, Patient Was Seen By: Jessica Herrera PA-C    ** Please Note: Dictation voice to text software may have been used in the creation of this document   **

## 2019-03-18 VITALS
HEART RATE: 64 BPM | BODY MASS INDEX: 30.82 KG/M2 | RESPIRATION RATE: 16 BRPM | SYSTOLIC BLOOD PRESSURE: 112 MMHG | HEIGHT: 65 IN | TEMPERATURE: 98.1 F | OXYGEN SATURATION: 93 % | DIASTOLIC BLOOD PRESSURE: 67 MMHG | WEIGHT: 185 LBS

## 2019-03-18 PROBLEM — D72.829 LEUKOCYTOSIS: Status: RESOLVED | Noted: 2019-03-16 | Resolved: 2019-03-18

## 2019-03-18 PROBLEM — K01.1 IMPACTED THIRD MOLAR TOOTH: Status: RESOLVED | Noted: 2019-03-14 | Resolved: 2019-03-18

## 2019-03-18 LAB
BACTERIA SPEC ANAEROBE CULT: NORMAL
BACTERIA TISS AEROBE CULT: NORMAL
ERYTHROCYTE [DISTWIDTH] IN BLOOD BY AUTOMATED COUNT: 11.8 % (ref 11.6–15.1)
GRAM STN SPEC: NORMAL
HCT VFR BLD AUTO: 34.6 % (ref 34.8–46.1)
HGB BLD-MCNC: 11.3 G/DL (ref 11.5–15.4)
MCH RBC QN AUTO: 29.7 PG (ref 26.8–34.3)
MCHC RBC AUTO-ENTMCNC: 32.7 G/DL (ref 31.4–37.4)
MCV RBC AUTO: 91 FL (ref 82–98)
PLATELET # BLD AUTO: 200 THOUSANDS/UL (ref 149–390)
PMV BLD AUTO: 11.2 FL (ref 8.9–12.7)
RBC # BLD AUTO: 3.81 MILLION/UL (ref 3.81–5.12)
WBC # BLD AUTO: 8.93 THOUSAND/UL (ref 4.31–10.16)

## 2019-03-18 PROCEDURE — 99239 HOSP IP/OBS DSCHRG MGMT >30: CPT | Performed by: PHYSICIAN ASSISTANT

## 2019-03-18 PROCEDURE — 99232 SBSQ HOSP IP/OBS MODERATE 35: CPT | Performed by: INTERNAL MEDICINE

## 2019-03-18 PROCEDURE — 85027 COMPLETE CBC AUTOMATED: CPT | Performed by: PHYSICIAN ASSISTANT

## 2019-03-18 RX ORDER — PREDNISONE 20 MG/1
TABLET ORAL
Qty: 13 TABLET | Refills: 0 | Status: ON HOLD | OUTPATIENT
Start: 2019-03-19 | End: 2021-04-03 | Stop reason: CLARIF

## 2019-03-18 RX ORDER — ECHINACEA PURPUREA EXTRACT 125 MG
2 TABLET ORAL 2 TIMES DAILY
Qty: 15 ML | Refills: 0 | Status: SHIPPED | OUTPATIENT
Start: 2019-03-18

## 2019-03-18 RX ORDER — CLINDAMYCIN HYDROCHLORIDE 300 MG/1
300 CAPSULE ORAL 4 TIMES DAILY
Qty: 20 CAPSULE | Refills: 0 | Status: SHIPPED | OUTPATIENT
Start: 2019-03-18 | End: 2019-03-23

## 2019-03-18 RX ADMIN — LORATADINE 10 MG: 10 TABLET ORAL at 08:53

## 2019-03-18 RX ADMIN — PREDNISONE 40 MG: 20 TABLET ORAL at 08:53

## 2019-03-18 RX ADMIN — BUSPIRONE HYDROCHLORIDE 30 MG: 10 TABLET ORAL at 08:55

## 2019-03-18 RX ADMIN — LORAZEPAM 1 MG: 1 TABLET ORAL at 09:55

## 2019-03-18 RX ADMIN — CLINDAMYCIN PHOSPHATE 600 MG: 600 INJECTION, SOLUTION INTRAVENOUS at 15:06

## 2019-03-18 RX ADMIN — FLUOXETINE HYDROCHLORIDE 80 MG: 20 CAPSULE ORAL at 08:56

## 2019-03-18 RX ADMIN — VERAPAMIL HYDROCHLORIDE 240 MG: 240 TABLET, FILM COATED, EXTENDED RELEASE ORAL at 08:55

## 2019-03-18 RX ADMIN — Medication 250 MG: at 08:55

## 2019-03-18 RX ADMIN — CLINDAMYCIN PHOSPHATE 600 MG: 600 INJECTION, SOLUTION INTRAVENOUS at 05:34

## 2019-03-18 RX ADMIN — LURASIDONE HYDROCHLORIDE 20 MG: 20 TABLET, FILM COATED ORAL at 08:55

## 2019-03-18 RX ADMIN — IBUPROFEN 400 MG: 400 TABLET ORAL at 08:52

## 2019-03-18 RX ADMIN — Medication 2 SPRAY: at 08:54

## 2019-03-18 RX ADMIN — MONTELUKAST SODIUM 10 MG: 10 TABLET, FILM COATED ORAL at 08:53

## 2019-03-18 NOTE — NURSING NOTE
Discharge reviewed with patient, prescriptions sent to [facility] to be picked up by patient  Patient discharged via wheelchair with PCA to go home with parents

## 2019-03-18 NOTE — PROGRESS NOTES
S/p extraction and I#D  POD #2  Improvement over 24 hours  Minimal swelling  Cultures show sensitivity to clindamycin  D/w medicine and pt  Reinforced need to see endodontist after discharge  Discharge today

## 2019-03-18 NOTE — DISCHARGE SUMMARY
Discharge Summary - [facility] Internal Medicine    Patient Information: oPrsche Salvador 53 y o  female MRN: [id number]  Unit/Bed#: -01 Encounter: [id number]    Discharging Physician / Practitioner: Tsering Dawson PA-C  PCP: Bhargavi Williamson MD  Admission Date: 3/14/2019  Discharge Date: 03/18/19    Reason for Admission: facial cellulitis     Discharge Diagnoses:     Principal Problem:    Facial cellulitis  Active Problems:    HTN (hypertension)    Obstructive sleep apnea syndrome    Depression with anxiety    Tobacco use  Resolved Problems:    Impacted third molar tooth    Leukocytosis      Consultations During Hospital Stay:  · Pharmacy  · ENT  · OMFS  · ID    Procedures Performed:   · CT soft tissue neck with contrast  · BMP/CMP  · CBC  · Blood cultures x2  · Tissue culture and Gram stain  · Anaerobic culture and Gram stain  · Extraction tooth #1     Significant Findings:   · CT soft tissue neck:  Bilateral maxillary sinus disease, right greater than left, with evidence of prior bilateral antrectomy is  No fluid air levels  Otherwise unremarkable exam   No abnormal fluid collection/abscess or pathologic lymphadenopathy  · Blood cultures with no growth x2 at 74 hours  · Tissue culture with mixed respiratory nirmala  · Anaerobic culture with no anaerobes isolated  · WBC count:  12 36, 10 36, 12 93, 8 93    Incidental Findings:   · None     Test Results Pending at Discharge (will require follow up): · None     Outpatient Tests Requested:  · Follow up with Endodontist as soon as possible  · Follow up with ENT  · Follow up with PCP  · Follow up with OMFS    Complications:  None    Hospital Course:     Porsche Salvador is a 53 y o  female with a history of bilateral antrectomies with recurrent cellulitis and multiple antibiotic allergies, hypertension, sleep apnea on CPAP, depression and anxiety, and tobacco use who originally presented to the hospital on 3/14/2019 due to facial swelling    She was placed on clindamycin as outpatient prior to admission, and took 6 doses prior to admission (300mg Q6hr)  She experienced swelling of the right side of her face and no improvement of the pain despite her outpatient antibiotics and came to the emergency department  CT soft tissue neck revealed bilateral maxillary sinus disease, right greater than left, with evidence of prior bilateral antrostomies with no fluid air levels and no abnormal fluid collection or abscess or pathologic lymphadenopathy  She was initially started on vancomycin and pharmacy was consulted for dosing  She was initially seen in consultation by ENT who recommended steroids, Afrin, and nasal irrigations along with OMFS consult for possible odontogenic source of infection  White count on admission was elevated at 12 36  The patient was unable to tolerate her CPAP while in the hospital due to pain and swelling of the right side of her face  She was seen in consultation by OMFS who performed extraction of tooth #1 on 3/16/19  White count trended throughout hospitalization as above  ID was consulted for assistance with abx management given her multiple abx allergies  Blood cultures with no growth x2 at 72 hours  Cultures taken during tooth extraction revealed mixed respiratory nirmala and no anaerobes  ID transitioned the patient's abx to clindamycin which the patient tolerated  I discussed with ID on day of discharge who recommended discharge with PO clindaymcin 300mg Q6hr until seen by endodontist or for 5 more days, whichever is longer  Patient was seen by OMFS on day of discharge who I also spoke to  She is recommended to follow up with an endodontist as soon as possible  She states that she still has another 5 5 days of her original outpatient clindamycin (300mg tabs) and we discussed that she will be prescribed another 5 days in case she is not able to get in to see an endodontist sooner than the course of abx she has left   She was informed and agreeable that if she is not able to see an endodontist before the above clindamycin scripts run out, she is to contact her PCP/OMFS/ENT for further clindamycin until she is able to see the endodontist  She is prescribed steroid taper (40mg prednisone daily for 1 week, followed by 20mg daily for 1 week as was recommended by her ENT - she completed the first 4 days of the 40mg dosing in the hospital)  Patient educated with concerning signs and symptoms for which to prompt medical attention/return to ED  Condition at Discharge: stable     Discharge Day Visit / Exam:     Subjective:    Ms Mica Clay reports some right extraction site discomfort and swelling  Anxious and wants to go home today  Vitals: Blood Pressure: 112/67 (03/18/19 0700)  Pulse: 64 (03/18/19 0700)  Temperature: 98 1 °F (36 7 °C) (03/18/19 0700)  Temp Source: Oral (03/18/19 0700)  Respirations: 16 (03/18/19 0700)  Height: 5' 5 25" (165 7 cm) (03/14/19 1116)  Weight - Scale: 83 9 kg (185 lb) (03/14/19 1116)  SpO2: 93 % (03/18/19 0700)  Exam:   Physical Exam   Constitutional: She is oriented to person, place, and time  No distress  Patient seen lying in bed comfortably resting  She is very pleasant and cooperative  No acute distress   Cardiovascular: Normal rate and regular rhythm  Pulmonary/Chest: Effort normal and breath sounds normal  No stridor  No respiratory distress  She has no wheezes  Abdominal: Soft  Bowel sounds are normal  There is no tenderness  Musculoskeletal: She exhibits no edema  Neurological: She is alert and oriented to person, place, and time  Skin: Skin is warm and dry  She is not diaphoretic  Psychiatric: She has a normal mood and affect  Her behavior is normal    Vitals reviewed  Discharge instructions/Information to patient and family:   See after visit summary for information provided to patient and family        Provisions for Follow-Up Care:  See after visit summary for information related to follow-up care and any pertinent home health orders  Disposition:     Home    For Discharges to [facility] SNF:   · Not Applicable to this Patient - Not Applicable to this Patient    Planned Readmission: None     Discharge Statement:  I spent 40 minutes discharging the patient  This time was spent on the day of discharge  I had direct contact with the patient on the day of discharge  Greater than 50% of the total time was spent examining patient, answering all patient questions, arranging and discussing plan of care with patient as well as directly providing post-discharge instructions  Additional time then spent on discharge activities  Discharge Medications:  See after visit summary for reconciled discharge medications provided to patient and family  ** Please Note: Dragon 360 Dictation voice to text software may have been used in the creation of this document   **

## 2019-03-18 NOTE — PROGRESS NOTES
Progress Note - Infectious Disease   [place]  53 y o  female MRN: [id number]  Unit/Bed#: -01 Encounter: [id number]      Impression/Recommendations:  1  Facial cellulitis  Due to # 2/3  Failure to improve with clindamycin as outpatient likely due to need for dental extraction  Clinically stable without sepsis  Improving status post dental extraction  Rec:  ? Continue antibiotics as below     2  Acute maxillary sinusitis  # 3  Clinically improving  Rec:  ? Continue clindamycin for now     3  Periapical dental infection  Status post extraction of impacted molar  OR cultures with mixed nirmala  Rec:  ? OK to D/C on clindamycin 300 mg PO Q6 to continue until evaluated by endodontist this week or for 5 more days which ever is longer     4   Multiple antibiotic allergies  Has tolerated clindamycin  Rec:  ? Monitor closely clindamycin as above     Antibiotics:  Clindamycin  Antibiotics #5    Subjective:  Patient seen on AM rounds  Feels better today  Still with mild facial pain and swelling  Denies fevers, chills, sweats, nausea, vomiting, or diarrhea  24 Hour Events:  No documented fevers, chills, sweats, nausea, vomiting, or diarrhea  Objective:  Vitals:  Temp:  [97 9 °F (36 6 °C)-98 1 °F (36 7 °C)] 98 1 °F (36 7 °C)  HR:  [64-74] 64  Resp:  [16] 16  BP: (112-118)/(55-67) 112/67  SpO2:  [93 %-94 %] 93 %  Temp (24hrs), Av °F (36 7 °C), Min:97 9 °F (36 6 °C), Max:98 1 °F (36 7 °C)  Current: Temperature: 98 1 °F (36 7 °C)    Physical Exam:   General:  No acute distress  Psychiatric:  Awake and alert  Face:  No appreciable facial swelling or erythema  Pulmonary:  Normal respiratory excursion without accessory muscle use  Abdomen:  Soft, nontender  Extremities:  No edema  Skin:  No rashes    Lab Results:  I have personally reviewed pertinent labs    Results from last 7 days   Lab Units 19  0511 03/15/19  0453 19  1219   POTASSIUM mmol/L 3 8 3 5 3 9   CHLORIDE mmol/L 104 103 100 CO2 mmol/L 26 27 26   BUN mg/dL 10 8 7   CREATININE mg/dL 0 57* 0 70 0 74   EGFR ml/min/1 73sq m 105 98 91   CALCIUM mg/dL 8 8 8 8 9 2   AST U/L  --  14  --    ALT U/L  --  26  --    ALK PHOS U/L  --  94  --      Results from last 7 days   Lab Units 03/18/19  0532 03/17/19  0511 03/15/19  0453   WBC Thousand/uL 8 93 12 93* 10 36*   HEMOGLOBIN g/dL 11 3* 11 9 13 2   PLATELETS Thousands/uL 200 214 185     Results from last 7 days   Lab Units 03/16/19  0842 03/14/19  1554 03/14/19  1551   BLOOD CULTURE   --  No Growth at 72 hrs  No Growth at 72 hrs  GRAM STAIN RESULT  Rare Polys  2+ RBC's  No bacteria seen  --   --        Imaging Studies:   I have personally reviewed pertinent imaging study reports and images in PACS  EKG, Pathology, and Other Studies:   I have personally reviewed pertinent reports

## 2019-03-18 NOTE — PLAN OF CARE
Problem: PAIN - ADULT  Goal: Verbalizes/displays adequate comfort level or baseline comfort level  Description  Interventions:  - Encourage patient to monitor pain and request assistance  - Assess pain using appropriate pain scale  - Administer analgesics based on type and severity of pain and evaluate response  - Implement non-pharmacological measures as appropriate and evaluate response  - Consider cultural and social influences on pain and pain management  - Notify physician/advanced practitioner if interventions unsuccessful or patient reports new pain  Outcome: Progressing     Problem: INFECTION - ADULT  Goal: Absence or prevention of progression during hospitalization  Description  INTERVENTIONS:  - Assess and monitor for signs and symptoms of infection  - Monitor lab/diagnostic results  - Monitor all insertion sites, i e  indwelling lines, tubes, and drains  - Monitor endotracheal (as able) and nasal secretions for changes in amount and color  - [place] appropriate cooling/warming therapies per order  - Administer medications as ordered  - Instruct and encourage patient and family to use good hand hygiene technique  - Identify and instruct in appropriate isolation precautions for identified infection/condition  Outcome: Progressing     Problem: SAFETY ADULT  Goal: Patient will remain free of falls  Description  INTERVENTIONS:  - Assess patient frequently for physical needs  -  Identify cognitive and physical deficits and behaviors that affect risk of falls    -  [place] fall precautions as indicated by assessment   - Educate patient/family on patient safety including physical limitations  - Instruct patient to call for assistance with activity based on assessment  - Modify environment to reduce risk of injury  - Consider OT/PT consult to assist with strengthening/mobility  Outcome: Progressing     Problem: DISCHARGE PLANNING  Goal: Discharge to home or other facility with appropriate resources  Description  INTERVENTIONS:  - Identify barriers to discharge w/patient and caregiver  - Arrange for needed discharge resources and transportation as appropriate  - Identify discharge learning needs (meds, wound care, etc )  - Arrange for interpretive services to assist at discharge as needed  - Refer to Case Management Department for coordinating discharge planning if the patient needs post-hospital services based on physician/advanced practitioner order or complex needs related to functional status, cognitive ability, or social support system  Outcome: Progressing     Problem: Knowledge Deficit  Goal: Patient/family/caregiver demonstrates understanding of disease process, treatment plan, medications, and discharge instructions  Description  Complete learning assessment and assess knowledge base    Interventions:  - Provide teaching at level of understanding  - Provide teaching via preferred learning methods  Outcome: Progressing     Problem: DISCHARGE PLANNING - CARE MANAGEMENT  Goal: Discharge to post-acute care or home with appropriate resources  Description  INTERVENTIONS:  - Conduct assessment to determine patient/family and health care team treatment goals, and need for post-acute services based on payer coverage, community resources, and patient preferences, and barriers to discharge  - Address psychosocial, clinical, and financial barriers to discharge as identified in assessment in conjunction with the patient/family and health care team  - Arrange appropriate level of post-acute services according to patient's   needs and preference and payer coverage in collaboration with the physician and health care team  - Communicate with and update the patient/family, physician, and health care team regarding progress on the discharge plan  - Arrange appropriate transportation to post-acute venues   Outcome: Progressing     Problem: Potential for Falls  Goal: Patient will remain free of falls  Description  INTERVENTIONS:  - Assess patient frequently for physical needs  -  Identify cognitive and physical deficits and behaviors that affect risk of falls    -  [place] fall precautions as indicated by assessment   - Educate patient/family on patient safety including physical limitations  - Instruct patient to call for assistance with activity based on assessment  - Modify environment to reduce risk of injury  - Consider OT/PT consult to assist with strengthening/mobility  Outcome: Progressing     Problem: SAFETY ADULT  Goal: Maintain or return to baseline ADL function  Description  INTERVENTIONS:  -  Assess patient's ability to carry out ADLs; assess patient's baseline for ADL function and identify physical deficits which impact ability to perform ADLs (bathing, care of mouth/teeth, toileting, grooming, dressing, etc )  - Assess/evaluate cause of self-care deficits   - Assess range of motion  - Assess patient's mobility; develop plan if impaired  - Assess patient's need for assistive devices and provide as appropriate  - Encourage maximum independence but intervene and supervise when necessary  ¯ Involve family in performance of ADLs  ¯ Assess for home care needs following discharge   ¯ Request OT consult to assist with ADL evaluation and planning for discharge  ¯ Provide patient education as appropriate  Outcome: Adequate for Discharge  Goal: Maintain or return mobility status to optimal level  Description  INTERVENTIONS:  - Assess patient's baseline mobility status (ambulation, transfers, stairs, etc )    - Identify cognitive and physical deficits and behaviors that affect mobility  - Identify mobility aids required to assist with transfers and/or ambulation (gait belt, sit-to-stand, lift, walker, cane, etc )  - [place] fall precautions as indicated by assessment  - Record patient progress and toleration of activity level on Mobility SBAR; progress patient to next Phase/Stage  - Instruct patient to call for assistance with activity based on assessment  - Request Rehabilitation consult to assist with strengthening/weightbearing, etc   Outcome: Adequate for Discharge

## 2019-03-19 ENCOUNTER — TRANSITIONAL CARE MANAGEMENT (OUTPATIENT)
Dept: INTERNAL MEDICINE CLINIC | Facility: CLINIC | Age: 54
End: 2019-03-19

## 2019-03-19 LAB
BACTERIA BLD CULT: NORMAL
BACTERIA BLD CULT: NORMAL

## 2019-03-21 RX ORDER — CHLORHEXIDINE GLUCONATE 0.12 MG/ML
RINSE ORAL
Qty: 473 ML | Refills: 3 | Status: ON HOLD | OUTPATIENT
Start: 2019-03-21 | End: 2021-04-03 | Stop reason: CLARIF

## 2019-03-23 NOTE — ED PROVIDER NOTES
History  Chief Complaint   Patient presents with    Facial Swelling     Pt reports being seen by [facility] [address]'[facility] now  Pt is currently taking abx for a sinus infection  Pt presents with swelling to the R side of the face  Pt states "The inside of my mouth is swelling" Pt denies a cough  41-year-old female, history of previous sinus problems and status post sinus surgery, presenting to the emergency department for evaluation of a several day history of progressive right-sided facial erythema and edema  Patient has been seen for this, and was started on clindamycin, and states that she has taken a total of 5 doses of the clindamycin, but presents to the emergency department today because the erythema and swelling that originally started around the angle of her mandible, has progressed to include her lower eyelid as well  No reported fever  No significant dental pain  Patient states that she does have some mild erythema and pain in the right superior neck  No change in vision  Mild trismus is present  No change in hearing  No chest pain, cough, shortness of breath  Prior to Admission Medications   Prescriptions Last Dose Informant Patient Reported? Taking?    FLUoxetine (PROZAC) 40 MG capsule   Yes Yes   Sig: Take 80 mg by mouth 2 (two) times a day     LATUDA 20 MG tablet   Yes Yes   Si mg daily with breakfast     LORazepam (ATIVAN) 1 mg tablet   Yes Yes   Sig: Take 1 mg by mouth every 6 (six) hours as needed for anxiety   atorvastatin (LIPITOR) 10 mg tablet   No Yes   Sig: Take 1 tablet (10 mg total) by mouth daily   busPIRone (BUSPAR) 30 MG tablet   Yes Yes   Si (two) times a day     clindamycin (CLEOCIN) 300 MG capsule   No Yes   Sig: Take 1 capsule (300 mg total) by mouth 4 (four) times a day for 7 days   levocetirizine (XYZAL) 5 MG tablet   Yes Yes   montelukast (SINGULAIR) 10 mg tablet   No Yes   Sig: Take 1 tablet (10 mg total) by mouth daily for 360 days   polyethylene glycol (MIRALAX) 17 g packet   Yes Yes   Sig: Take 17 g by mouth daily   ranitidine (ZANTAC) 300 MG capsule   Yes Yes   Sig: Take 300 mg by mouth every evening   verapamil (CALAN-SR) 240 mg CR tablet   Yes Yes   Sig: Take 1 tablet by mouth daily      Facility-Administered Medications: None       Past Medical History:   Diagnosis Date    Anesthesia complication     V TACH after her reduction mammoplasty, done at [facility] Ethmoid sinusitis     GERD (gastroesophageal reflux disease)     Maxillary sinusitis     Multiple allergies     Myofascial pain syndrome     Nasal turbinate hypertrophy        Past Surgical History:   Procedure Laterality Date    HYSTERECTOMY  2004    LAPAROSCOPY      with vaginal hysterectomy; 11/3/2003 rso    OOPHORECTOMY Left 2004    PARTIAL HYSTERECTOMY  2004    UT NASAL/SINUS ENDOSCOPY,RMV TISS MAXILL SINUS N/A 9/30/2016    Procedure: IMAGE GUIDED FUNCTIONAL ENDOSCOPIC SINUS SURGERY;  Surgeon: James Miranda MD;  Location: [facility];  Service: ENT    REDUCTION MAMMAPLASTY Bilateral 02/27/2015    TOOTH EXTRACTION Right 3/16/2019    Procedure: EXTRACTION TOOTH #1 (Impacted Third Molar Tooth); Surgeon: Ruby Phillips DDS;  Location: [facility];  Service: Maxillofacial    TUBAL LIGATION      WISDOM TOOTH EXTRACTION         Family History   Problem Relation Age of Onset    CLEM disease Mother     Atrial fibrillation Father     Heart disease Father     Diabetes Brother     Diabetes Maternal Grandmother     Hypertension Maternal Grandmother     Colon cancer Maternal Grandfather     Diabetes Maternal Grandfather     Cancer Paternal Grandfather     Endometrial cancer Cousin     Breast cancer Cousin      I have reviewed and agree with the history as documented      Social History     Tobacco Use    Smoking status: Current Some Day Smoker    Smokeless tobacco: Never Used   Substance Use Topics    Alcohol use: Not Currently     Comment: social    Drug use: No        Review of Systems   Constitutional: Negative for diaphoresis, fatigue and fever  HENT: Positive for facial swelling  Negative for congestion, drooling, mouth sores and sore throat  Eyes: Negative for redness and visual disturbance  Respiratory: Negative for cough and shortness of breath  Cardiovascular: Negative for chest pain, palpitations and leg swelling  Gastrointestinal: Negative for abdominal pain, diarrhea and vomiting  Genitourinary: Negative for difficulty urinating and dysuria  Musculoskeletal: Negative for neck pain and neck stiffness  Skin: Negative for color change and rash  Neurological: Negative for speech difficulty and headaches  All other systems reviewed and are negative  Physical Exam  Physical Exam   Constitutional: She is oriented to person, place, and time  She appears well-developed and well-nourished  HENT:   Head: Atraumatic  Right Ear: External ear normal    Left Ear: External ear normal    Mouth/Throat: Oropharynx is clear and moist    Eyes: Pupils are equal, round, and reactive to light  Conjunctivae are normal    Neck: Normal range of motion  Neck supple  Cardiovascular: Normal rate, regular rhythm and intact distal pulses  Pulmonary/Chest: Effort normal and breath sounds normal    Abdominal: Soft  There is no tenderness  Musculoskeletal: Normal range of motion  Neurological: She is alert and oriented to person, place, and time  She has normal strength  Skin: Skin is warm and dry         Vital Signs  ED Triage Vitals   Temperature Pulse Respirations Blood Pressure SpO2   03/14/19 1116 03/14/19 1116 03/14/19 1116 03/14/19 1116 03/14/19 1116   99 4 °F (37 4 °C) 96 18 136/68 96 %      Temp Source Heart Rate Source Patient Position - Orthostatic VS BP Location FiO2 (%)   03/14/19 1116 03/14/19 1230 03/14/19 1230 03/14/19 1830 --   Tympanic Monitor Lying Left arm       Pain Score       03/14/19 1230       No Pain Vitals:    03/17/19 0700 03/17/19 1500 03/17/19 2300 03/18/19 0700   BP: 130/70 112/55 118/59 112/67   Pulse: 79 74 71 64   Patient Position - Orthostatic VS: Lying  Lying Lying         Visual Acuity      ED Medications  Medications   oxymetazoline (AFRIN) 0 05 % nasal spray 2 spray (2 sprays Each Nare Given 3/17/19 0813)   iohexol (OMNIPAQUE) 350 MG/ML injection (MULTI-DOSE) 85 mL (85 mL Intravenous Given 3/14/19 1308)   ibuprofen (MOTRIN) tablet 600 mg (600 mg Oral Given 3/14/19 1412)       Diagnostic Studies  Results Reviewed     Procedure Component Value Units Date/Time    Blood culture [[id number]] Collected:  03/14/19 1551    Lab Status:  Final result Specimen:  Blood from Arm, Right Updated:  03/19/19 1901     Blood Culture No Growth After 5 Days  Blood culture [[id number]] Collected:  03/14/19 1554    Lab Status:  Final result Specimen:  Blood from Arm, Left Updated:  03/19/19 1901     Blood Culture No Growth After 5 Days  Comprehensive metabolic panel [[id number]]  (Abnormal) Collected:  03/15/19 0453    Lab Status:  Final result Specimen:  Blood from Arm, Left Updated:  03/15/19 9840     Sodium 137 mmol/L      Potassium 3 5 mmol/L      Chloride 103 mmol/L      CO2 27 mmol/L      ANION GAP 7 mmol/L      BUN 8 mg/dL      Creatinine 0 70 mg/dL      Glucose 101 mg/dL      Glucose, Fasting 101 mg/dL      Calcium 8 8 mg/dL      AST 14 U/L      ALT 26 U/L      Alkaline Phosphatase 94 U/L      Total Protein 7 0 g/dL      Albumin 3 4 g/dL      Total Bilirubin 0 55 mg/dL      eGFR 98 ml/min/1 73sq m     Narrative:       National Kidney Disease Education Program recommendations are as follows:  GFR calculation is accurate only with a steady state creatinine  Chronic Kidney disease less than 60 ml/min/1 73 sq  meters  Kidney failure less than 15 ml/min/1 73 sq  meters      Magnesium [[id number]]  (Normal) Collected:  03/15/19 0073    Lab Status:  Final result Specimen:  Blood from Arm, Left Updated:  03/15/19 6143     Magnesium 2 3 mg/dL     Phosphorus [[id number]]  (Normal) Collected:  03/15/19 0453    Lab Status:  Final result Specimen:  Blood from Arm, Left Updated:  03/15/19 0625     Phosphorus 3 4 mg/dL     CBC (With Platelets) [[id number]]  (Abnormal) Collected:  03/15/19 0453    Lab Status:  Final result Specimen:  Blood from Arm, Left Updated:  03/15/19 0552     WBC 10 36 Thousand/uL      RBC 4 47 Million/uL      Hemoglobin 13 2 g/dL      Hematocrit 40 8 %      MCV 91 fL      MCH 29 5 pg      MCHC 32 4 g/dL      RDW 12 1 %      Platelets 474 Thousands/uL      MPV 11 3 fL     Basic metabolic panel [[id number]] Collected:  03/14/19 1219    Lab Status:  Final result Specimen:  Blood from Arm, Right Updated:  03/14/19 1241     Sodium 136 mmol/L      Potassium 3 9 mmol/L      Chloride 100 mmol/L      CO2 26 mmol/L      ANION GAP 10 mmol/L      BUN 7 mg/dL      Creatinine 0 74 mg/dL      Glucose 115 mg/dL      Calcium 9 2 mg/dL      eGFR 91 ml/min/1 73sq m     Narrative:       National Kidney Disease Education Program recommendations are as follows:  GFR calculation is accurate only with a steady state creatinine  Chronic Kidney disease less than 60 ml/min/1 73 sq  meters  Kidney failure less than 15 ml/min/1 73 sq  meters      CBC and differential [[id number]]  (Abnormal) Collected:  03/14/19 1219    Lab Status:  Final result Specimen:  Blood from Arm, Right Updated:  03/14/19 1227     WBC 12 36 Thousand/uL      RBC 5 04 Million/uL      Hemoglobin 15 0 g/dL      Hematocrit 45 8 %      MCV 91 fL      MCH 29 8 pg      MCHC 32 8 g/dL      RDW 12 3 %      MPV 11 0 fL      Platelets 285 Thousands/uL      nRBC 0 /100 WBCs      Neutrophils Relative 72 %      Immat GRANS % 0 %      Lymphocytes Relative 16 %      Monocytes Relative 10 %      Eosinophils Relative 2 %      Basophils Relative 0 %      Neutrophils Absolute 8 73 Thousands/µL      Immature Grans Absolute 0 04 Thousand/uL      Lymphocytes Absolute 2 03 Thousands/µL Monocytes Absolute 1 28 Thousand/µL      Eosinophils Absolute 0 26 Thousand/µL      Basophils Absolute 0 02 Thousands/µL                  CT soft tissue neck with contrast   Final Result by David Brand MD (03/14 0843)         1  Bilateral maxillary sinus disease, right greater than left, with evidence of prior bilateral antrectomies  No air-fluid levels  2  Otherwise, unremarkable exam  No abnormal fluid collection/abscess nor pathologic lymphadenopathy  Workstation performed: [id number]                    Procedures  Procedures       Phone Contacts  ED Phone Contact    ED Course                               MDM  Number of Diagnoses or Management Options  Facial cellulitis:   Right facial swelling:   Diagnosis management comments: Facial cellulitis  Plan is CT face  Likely admission for failure of outpatient antibiotics        Disposition  Final diagnoses:   Facial cellulitis   Right facial swelling     Time reflects when diagnosis was documented in both MDM as applicable and the Disposition within this note     Time User Action Codes Description Comment    3/14/2019  3:18 PM Miranda Morris Add [X76 098] Facial cellulitis     3/14/2019  3:18 PM Darleene Dayo [R22 0] Right facial swelling     3/14/2019  3:38 PM Nalini Lush Modify [G43 097] Facial cellulitis     3/14/2019  3:39 PM Nalini Lush Modify [Y26 286] Facial cellulitis     3/14/2019  3:39 PM Nalini Lush Add [J01 01] Acute recurrent maxillary sinusitis     3/14/2019  3:39 PM Nalini Lush Modify [Y33 983] Facial cellulitis     3/14/2019  3:39 PM Nalini Lush Modify [X03 035] Facial cellulitis     3/15/2019  1:51 PM [place] Keto L Add [K01 1] Tooth impaction     3/15/2019  5:05 PM Leyda Orozco Add [K01 1] Impacted third molar tooth     3/16/2019  8:42 AM Racheal Searsmont Modify [K97 654] Facial cellulitis     3/16/2019  8:42 AM Racheal Searsmont Modify [R22 0] Right facial swelling     3/16/2019  8:42 AM Yunior Gray Modify [J01 01] Acute recurrent maxillary sinusitis     3/16/2019  8:42 AM Jacobo Putnam J Modify [K01 1] Tooth impaction     3/16/2019  8:42 AM Jacobo Putnam J Modify [K01 1] Impacted third molar tooth     3/16/2019 10:08 AM Peter Punt Modify [P18 482] Facial cellulitis     3/16/2019 10:08 AM Epter Punt Modify [R22 0] Right facial swelling     3/16/2019 10:08 AM Peter Punt Modify [J01 01] Acute recurrent maxillary sinusitis     3/16/2019 10:08 AM Peter Punt Modify [K01 1] Tooth impaction     3/16/2019 10:08 AM Karlee Stacy Modify [K01 1] Impacted third molar tooth       ED Disposition     ED Disposition Condition Date/Time Comment    Admit Stable Thu Mar 14, 2019  3:19 PM Case was discussed with Dr Georgia Shankar and the patient's admission status was agreed to be observation to the service of Dr Larissa Berger up With Specialties Details Why 800 Umair Felipe MD Internal Medicine Schedule an appointment as soon as possible for a visit  [address]  [address], [address] [place] [address]      Vickie Santiago MD Otolaryngology Schedule an appointment as soon as possible for a visit  [facility] [address] Oral Maxillofacial Surgery, Oral Surgery Schedule an appointment as soon as possible for a visit  [address]            Discharge Medication List as of 3/18/2019  3:35 PM      START taking these medications    Details   predniSONE 20 mg tablet Take 40mg PO daily x 3 days beginning 3/19/19, followed by 20mg PO daily x 1 week, Print      sodium chloride (OCEAN) 0 65 % nasal spray 2 sprays into each nostril 2 (two) times a day, Starting Mon 3/18/2019, Print         CONTINUE these medications which have CHANGED    Details   clindamycin (CLEOCIN) 300 MG capsule Take 1 capsule (300 mg total) by mouth 4 (four) times a day for 5 days, Starting Mon 3/18/2019, Until Sat 3/23/2019, Print         CONTINUE these medications which have NOT CHANGED    Details   atorvastatin (LIPITOR) 10 mg tablet Take 1 tablet (10 mg total) by mouth daily, Starting Tue 1/8/2019, Until Wed 1/8/2020, Normal      busPIRone (BUSPAR) 30 MG tablet 2 (two) times a day  , Starting Sun 2/25/2018, Historical Med      FLUoxetine (PROZAC) 40 MG capsule Take 80 mg by mouth 2 (two) times a day  , Historical Med      LATUDA 20 MG tablet 20 mg daily with breakfast  , Starting Sun 2/25/2018, Historical Med      levocetirizine (XYZAL) 5 MG tablet Starting Mon 2/26/2018, Historical Med      LORazepam (ATIVAN) 1 mg tablet Take 1 mg by mouth every 6 (six) hours as needed for anxiety, Until Discontinued, Historical Med      montelukast (SINGULAIR) 10 mg tablet Take 1 tablet (10 mg total) by mouth daily for 360 days, Starting Thu 2/21/2019, Until Sun 2/16/2020, Normal      polyethylene glycol (MIRALAX) 17 g packet Take 17 g by mouth daily, Until Discontinued, Historical Med      ranitidine (ZANTAC) 300 MG capsule Take 300 mg by mouth every evening, Until Discontinued, Historical Med      verapamil (CALAN-SR) 240 mg CR tablet Take 1 tablet by mouth daily, Starting Fri 7/14/2017, Historical Med           Outpatient Discharge Orders   Discharge Diet     Call provider for:  persistent nausea or vomiting     Call provider for:  severe uncontrolled pain     Call provider for:  redness, tenderness, or signs of infection (pain, swelling, redness, odor or green/yellow discharge around incision site)     Call provider for: active or persistent bleeding     Call provider for:  difficulty breathing, headache or visual disturbances     Call provider for:  hives     Call provider for:  persistent dizziness or light-headedness     Call provider for:  extreme fatigue       ED Provider  Electronically Signed by           Sydney Rubio MD  03/22/19 8097

## 2019-03-25 ENCOUNTER — OFFICE VISIT (OUTPATIENT)
Dept: INTERNAL MEDICINE CLINIC | Facility: CLINIC | Age: 54
End: 2019-03-25
Payer: COMMERCIAL

## 2019-03-25 VITALS — RESPIRATION RATE: 14 BRPM | SYSTOLIC BLOOD PRESSURE: 130 MMHG | HEART RATE: 72 BPM | DIASTOLIC BLOOD PRESSURE: 80 MMHG

## 2019-03-25 DIAGNOSIS — I10 ESSENTIAL HYPERTENSION: Chronic | ICD-10-CM

## 2019-03-25 DIAGNOSIS — I10 HYPERTENSION, UNSPECIFIED TYPE: Primary | ICD-10-CM

## 2019-03-25 DIAGNOSIS — K01.1 TOOTH IMPACTION: ICD-10-CM

## 2019-03-25 DIAGNOSIS — R22.0 RIGHT FACIAL SWELLING: Primary | ICD-10-CM

## 2019-03-25 DIAGNOSIS — J01.01 ACUTE RECURRENT MAXILLARY SINUSITIS: ICD-10-CM

## 2019-03-25 PROCEDURE — 1111F DSCHRG MED/CURRENT MED MERGE: CPT | Performed by: INTERNAL MEDICINE

## 2019-03-25 PROCEDURE — 99496 TRANSJ CARE MGMT HIGH F2F 7D: CPT | Performed by: INTERNAL MEDICINE

## 2019-03-25 RX ORDER — VERAPAMIL HYDROCHLORIDE 240 MG/1
240 TABLET, FILM COATED, EXTENDED RELEASE ORAL DAILY
Qty: 90 TABLET | Refills: 3 | Status: SHIPPED | OUTPATIENT
Start: 2019-03-25 | End: 2020-08-13 | Stop reason: SDUPTHER

## 2019-03-25 NOTE — PROGRESS NOTES
Assessment/Plan:   1  Facial swelling with right paraseptal cellulitis triggered by underlying dental disease with associated acute maxillary sinusitis  Underwent removal of impacted 3rd molar in the hospital and had outpatient surgery with removal of another 2  Completing therapy with clindamycin and prednisone  CT scan of soft tissue of the neck in the hospital showed bilateral maxillary sinus disease with evidence of  Prior bilateral antrectomy is but no air-fluid levels  2  History of sinus disease-as noted had prior sinus surgery  3  Sleep apnea- mild-now on therapy with CPAP although is temporarily office while in the hospital  4  Hypertension-aggravated by weight-was on a beta-blocker but now off that and stable-remains on verapamil  5  Hoarseness-ENT told her she had significant LPR  Was on a PPI and H2 blocker  She is now off both of these and stable  She does have a follow-up ENT appointment  6  Lumbar radiculopathy-neurosurgery felt on review of her MRI of her L-spine she definite disc herniation on the right medial L5-S1 foramina which displaced L5 and S1 nerve roots  Had prior weakness of the foot that resolved  She responded to steroids and did not require an epidural  7  Hyper reflexia-MRI of cervical spine shows bulging disc with spinal canal adequate  MRI the brain shows no obvious abnormalities  REVIEW NEXT VISIT  8  Pre diabetes-A1c had been as high as 6 1  Prior  To last visit showed a value of 5 7-watching for exacerbation with steroid therapy  9  Episodes of feeling poorly with palpitation electrical feeling  Dexamethasone suppression test normal   24 urine for free cortisol normal   24 urine for 5 HIAA as well as 24 urine for fractionated metanephrines and catecholamines all normal   Symptoms had resolved on a beta-blocker  Now off the beta-blocker and stable  10  Abdominal bloating with weight gain-CT scan of abdomen pelvis showed questionable thickening of the colon    Stool studies negative  GI told her her exam was benign  11  Dyspepsia-had a benign EGD done by Dr Mcbride  12  Hyperlipidemia with mixed dyslipidemia  Former smoker  Family history CAD  Stable on statin therapy  She knows lose weight  13  Multiple antibiotic allergies-full discussion as per note of August 2015  14  Allergies-allergy blood work benign but had abnormal skin testing on hypo sensitization therapy via Dr Joseph  15  Progressive weight gain-felt related to her psychiatric meds  Initial 24 urine for free cortisol elevated but repeat normal as well as dexamethasone suppression test   Does have some mild sleep apnea apparently in this is being treated  Reviewed appropriate diet including restriction of carbohydrates  16 General care-patient had hysterectomy with unilateral oophorectomy approximately age 37-DEXA scan  Done in February of this year and normal  17  Abnormal mammogram in March 2019 showing area the left breast -then underwent ultrasound showing an area in the left breast corresponding to simple cyst-returning to repeat mammography in 1 year which will be March of 2001     All other problems as per note of April 2015        MEDICAL REGIMEN:                                                 completing clindamycin 300 milligrams q i d  For total of 2 weeks post discharge, probiotic, prednisone taper  Singulair 10 milligrams daily, lorazepam 1 milligram b i d , , Prozac 80 milligrams a day using 40 milligram dosing, buspirone 30 milligrams b i d ,Latuda-40 milligrams-half in a m  And hold the p m , atorvastatin 10 milligrams daily, Robaxin 750 milligrams b i d  P r n , verapamil  milligrams  Daily, intermittent use of Benadryl, Robaxin 750 milligrams p o  B i d  P r n , Colace and Senokot as needed             No problem-specific Assessment & Plan notes found for this encounter         Diagnoses and all orders for this visit:    Right facial swelling    Tooth impaction    Acute recurrent maxillary sinusitis    Essential hypertension          Subjective:      Patient ID: Beverley Taylor is a 53 y o  female  She is seen after recent hospitalization  She developed commented facial swelling  She was unsure she had a fever  She was admitted to the hospital and had full evaluation  She has a history of  Prior sinus surgery  CT scan soft tissue of the neck showed bilateral maxillary sinus disease right greater than left with evidence of bilateral surgery with no air-fluid levels  She was treated with vancomycin initially  She was treated with steroids  She had evidence of an elevated white count of 12 36  Oral surgery saw the patient and performed extraction of 2 on March 16th  She was seen by infectious disease  Blood cultures were negative  She was transferred to oral clindamycin  As an outpatient she subsequently underwent removal of a 2nd 2  At this point she is overall feeling better  Facial swelling has cleared and she is on a prednisone taper  Appetite is improved  She has not had any further fever  It was felt in the hospital she had a right preseptal orbital cellulitis  He was felt her dental issues were predisposing to this sinusitis and facial cellulitis prior to discharge her white count was normal with a hemoglobin of 11 3 and a creatinine of point 5 7  Magnesium was normal     She was fearful the above would exacerbate her hypertension but BP appears stable at this point  She has a history of multiple antibiotic allergies as per prior discussion  Tolerated vancomycin was then transitioned to clindamycin as noted      The following portions of the patient's history were reviewed and updated as appropriate: allergies, current medications, past family history, past medical history, past social history, past surgical history and problem list     Review of Systems   Constitutional: Negative  HENT:         Facial swelling   Respiratory: Negative      Cardiovascular: Negative  Gastrointestinal: Negative  Endocrine: Negative  Genitourinary: Negative  Musculoskeletal: Negative  Neurological: Negative  Hematological: Negative  Psychiatric/Behavioral: Negative  Objective:      LMP  (LMP Unknown)          Physical Exam   Constitutional: She is oriented to person, place, and time  She appears well-developed and well-nourished  No distress  HENT:   Head: Normocephalic and atraumatic  Right Ear: External ear normal    Left Ear: External ear normal    Nose: Nose normal    Mouth/Throat: No oropharyngeal exudate  Facial swelling has resolved  Evidence of removal of 2 teeth with recent oral surgery   Eyes: Pupils are equal, round, and reactive to light  Conjunctivae and EOM are normal  Right eye exhibits no discharge  Left eye exhibits no discharge  No scleral icterus  Neck: Normal range of motion  Neck supple  No JVD present  No tracheal deviation present  No thyromegaly present  Cardiovascular: Normal rate, regular rhythm and intact distal pulses  Exam reveals no gallop and no friction rub  No murmur heard  Pulmonary/Chest: Effort normal and breath sounds normal  No respiratory distress  She has no wheezes  She has no rales  She exhibits no tenderness  Abdominal: Soft  Bowel sounds are normal  She exhibits no distension and no mass  There is no tenderness  There is no rebound and no guarding  Musculoskeletal: Normal range of motion  She exhibits no edema or deformity  Lymphadenopathy:     She has no cervical adenopathy  Neurological: She is alert and oriented to person, place, and time  She has normal reflexes  She displays normal reflexes  No cranial nerve deficit  She exhibits normal muscle tone  Coordination normal    Skin: Skin is warm and dry  No rash noted  No erythema  Psychiatric: She has a normal mood and affect  Her behavior is normal  Judgment and thought content normal    Vitals reviewed

## 2019-08-09 PROBLEM — H10.13 ALLERGIC CONJUNCTIVITIS OF BOTH EYES: Status: ACTIVE | Noted: 2019-08-09

## 2019-08-09 PROBLEM — J30.1 SEASONAL ALLERGIC RHINITIS DUE TO POLLEN: Status: ACTIVE | Noted: 2019-08-09

## 2019-08-09 PROBLEM — J30.89 ALLERGIC RHINITIS DUE TO HOUSE DUST MITE: Status: ACTIVE | Noted: 2019-08-09

## 2019-08-26 PROBLEM — K21.9 REFLUX LARYNGITIS: Status: ACTIVE | Noted: 2019-08-26

## 2019-08-26 PROBLEM — K21.9 GASTROESOPHAGEAL REFLUX DISEASE: Status: ACTIVE | Noted: 2019-08-26

## 2019-08-26 PROBLEM — J04.0 REFLUX LARYNGITIS: Status: ACTIVE | Noted: 2019-08-26

## 2019-08-26 PROBLEM — J34.2 NASAL SEPTAL DEVIATION: Status: ACTIVE | Noted: 2019-08-26

## 2019-08-26 PROBLEM — K90.41 GLUTEN INTOLERANCE: Status: ACTIVE | Noted: 2019-08-26

## 2019-08-26 PROBLEM — J38.4 LARYNGEAL EDEMA: Status: ACTIVE | Noted: 2019-08-26

## 2019-10-15 ENCOUNTER — OFFICE VISIT (OUTPATIENT)
Dept: INTERNAL MEDICINE CLINIC | Facility: CLINIC | Age: 54
End: 2019-10-15
Payer: MEDICARE

## 2019-10-15 VITALS — HEART RATE: 78 BPM | DIASTOLIC BLOOD PRESSURE: 82 MMHG | SYSTOLIC BLOOD PRESSURE: 130 MMHG | RESPIRATION RATE: 14 BRPM

## 2019-10-15 DIAGNOSIS — R73.9 HYPERGLYCEMIA: ICD-10-CM

## 2019-10-15 DIAGNOSIS — E78.01 FAMILIAL HYPERCHOLESTEROLEMIA: Primary | Chronic | ICD-10-CM

## 2019-10-15 DIAGNOSIS — R53.83 FATIGUE, UNSPECIFIED TYPE: ICD-10-CM

## 2019-10-15 DIAGNOSIS — M79.671 RIGHT FOOT PAIN: ICD-10-CM

## 2019-10-15 DIAGNOSIS — Z23 NEED FOR INFLUENZA VACCINATION: ICD-10-CM

## 2019-10-15 PROCEDURE — 90471 IMMUNIZATION ADMIN: CPT

## 2019-10-15 PROCEDURE — 90682 RIV4 VACC RECOMBINANT DNA IM: CPT

## 2019-10-15 PROCEDURE — 99213 OFFICE O/P EST LOW 20 MIN: CPT | Performed by: INTERNAL MEDICINE

## 2019-10-15 PROCEDURE — G0402 INITIAL PREVENTIVE EXAM: HCPCS | Performed by: INTERNAL MEDICINE

## 2019-10-15 NOTE — PROGRESS NOTES
Assessment/Plan:   1  Health maintenance -given influenza vaccine today  2  Right foot pain -1st MTP -likely underlying DJD and she was counseled on this  3  Hypertension -aggravated by weight loss -was on a beta-blocker previously but now off that is stable  Remains on Rapamune  4  Sleep apnea -mild -now on therapy with CPAP as she uses at a minimum of of 46 hours per night    All other problems as per noted March 2019 and April 2015       MEDICAL REGIMEN:      Probiotic, Singulair 10 milligrams daily, lorazepam 1 milligram b i d , Prozac 80 milligrams a day using 40 milligram dosing, buspirone 30 milligrams b i d , now off Latuda and on Abilify 5 milligrams daily, atorvastatin 10 milligrams daily, Robaxin 750 milligrams b i d  P r n , verapamil  milligrams daily, intermittent use of Benadryl,  Prior use of Colace and Senokot    Will be going for labs now  Has  A separate slip for labs prior to next visit    Depression Screening Follow-up Plan: Patient's depression screening was positive with a PHQ-2 score of 2  Their PHQ-9 score was 4  Continue regular follow-up with their psychologist/therapist/psychiatrist who is managing their mental health condition(s)  No problem-specific Assessment & Plan notes found for this encounter  Diagnoses and all orders for this visit:    Familial hypercholesterolemia  -     Comprehensive metabolic panel; Future  -     HEMOGLOBIN A1C W/ EAG ESTIMATION; Future  -     Cholesterol, total; Future  -     Triglycerides; Future  -     HDL cholesterol; Future  -     LDL cholesterol, direct; Future    Fatigue, unspecified type  -     Comprehensive metabolic panel; Future  -     HEMOGLOBIN A1C W/ EAG ESTIMATION; Future  -     Cholesterol, total; Future  -     Triglycerides; Future  -     HDL cholesterol; Future  -     LDL cholesterol, direct; Future    Hyperglycemia  -     Comprehensive metabolic panel; Future  -     HEMOGLOBIN A1C W/ EAG ESTIMATION;  Future  -     Cholesterol, total; Future  -     Triglycerides; Future  -     HDL cholesterol; Future  -     LDL cholesterol, direct; Future    Need for influenza vaccination  -     influenza vaccine, [id number], quadrivalent, recombinant, PF, 0 5 mL, for patients 18 yr+ (FLUBLOK)          Subjective:      Patient ID: Fabian Dhaliwal is a 53 y o  female  she was here for her Medicare wellness wanted to go over couple of other issues  -she is having some pain in the right 1st MTP joint she wants to note this is related to her history of lumbar radiculopathy  She can have occasional numbness in the area  She denies numbness or pain in the rest of the foot  She denies radicular symptoms at present  She has evidence on exam of a bunion we reviewed that she likely has underlying DJD of the 1st MTP joint of that foot  Her  became unemployed and her medical insurance changed  She was switched because of this and cost from Iotum 2 Abilify -she hopes to resume Latuda over the next month  She     She was post have labs prior to this visit and did go     She was given influenza vaccine today  A the -she is aware that she will need pneumococcal vaccine at age 71  A    She is having a difficult time losing weight  She understands this is aggravated by use of the antipsychotic  She has a she had difficulty in the past with Abilify as noted is now on that    She is considering fasting twice per week for 16 hours  We reviewed mechanism of action affect on fasting  on insulin levels  A      This patient denies any systemic symptoms  Specifically there has been no evidence of fever, night sweats, significant weight loss or significant decrease in appetite  a she has not had any further issues with her recent dental surgery  She feels as though the areas have a healed              The following portions of the patient's history were reviewed and updated as appropriate: allergies, current medications, past family history, past medical history, past social history, past surgical history and problem list     Review of Systems   Constitutional: Negative  Respiratory: Negative  Cardiovascular: Negative  Gastrointestinal: Negative  Endocrine: Negative  Genitourinary: Negative  Musculoskeletal: Negative  Pain in the right 1st MTP joint   Neurological: Negative  Hematological: Negative  Psychiatric/Behavioral: Positive for dysphoric mood  Objective:      LMP  (LMP Unknown)          Physical Exam   Constitutional: She is oriented to person, place, and time  She appears well-developed and well-nourished  No distress  HENT:   Head: Normocephalic and atraumatic  Right Ear: External ear normal    Left Ear: External ear normal    Nose: Nose normal    Mouth/Throat: Oropharynx is clear and moist  No oropharyngeal exudate  Eyes: Pupils are equal, round, and reactive to light  Conjunctivae and EOM are normal  Right eye exhibits no discharge  Left eye exhibits no discharge  No scleral icterus  Neck: Normal range of motion  Neck supple  No JVD present  No tracheal deviation present  No thyromegaly present  Cardiovascular: Normal rate, regular rhythm and intact distal pulses  Exam reveals no gallop and no friction rub  No murmur heard  Pulmonary/Chest: Effort normal and breath sounds normal  No respiratory distress  She has no wheezes  She has no rales  She exhibits no tenderness  Abdominal: Soft  Bowel sounds are normal  She exhibits no distension and no mass  There is no tenderness  There is no rebound and no guarding  Musculoskeletal: Normal range of motion  She exhibits no edema or deformity  Pain and tenderness of the right 1st MTP joint   Lymphadenopathy:     She has no cervical adenopathy  Neurological: She is alert and oriented to person, place, and time  She has normal reflexes  She displays normal reflexes  No cranial nerve deficit  She exhibits normal muscle tone   Coordination normal  Skin: Skin is warm and dry  No rash noted  No erythema  Evidence of resolving subcu infection of the left breast   Psychiatric: She has a normal mood and affect   Her behavior is normal  Judgment and thought content normal

## 2019-10-15 NOTE — PROGRESS NOTES
A a a stay a a pr Assessment and Plan:     Problem List Items Addressed This Visit     None           Preventive health issues were discussed with patient, and age appropriate screening tests were ordered as noted in patient's After Visit Summary  Personalized health advice and appropriate referrals for health education or preventive services given if needed, as noted in patient's After Visit Summary       History of Present Illness:     Patient presents for Medicare Annual Wellness visit    Patient Care Team:  Love Aschoff, MD as PCP - General  Josephina Hu, MD Love Aschoff, MD Abby Lambing, MD Lena Kohut, MD     Problem List:     Patient Active Problem List   Diagnosis    Nasal turbinate hypertrophy    Maxillary sinusitis    Ethmoid sinusitis    Allergy    Asthma    Atrophic vaginitis    Depression with anxiety    Elevated liver enzymes    Esophageal reflux    Fibromyalgia    Foot drop    HTN (hypertension)    Hyperlipidemia    Hyperreflexia    Laryngopharyngeal reflux    Lumbar radiculopathy    Neck pain    Pre-diabetes    Weight gain    Obstructive sleep apnea syndrome    Facial cellulitis    Right facial swelling    Acute recurrent maxillary sinusitis    Tooth impaction    Tobacco use    Seasonal allergic rhinitis due to pollen    Allergic rhinitis due to house dust mite    Allergic conjunctivitis of both eyes    Nasal septal deviation    Gastroesophageal reflux disease    Reflux laryngitis    Laryngeal edema    Gluten intolerance      Past Medical and Surgical History:     Past Medical History:   Diagnosis Date    Anesthesia complication     V TACH after her reduction mammoplasty, done at [address] Depression     Ethmoid sinusitis     GERD (gastroesophageal reflux disease)     Maxillary sinusitis     Multiple allergies     Myofascial pain syndrome     Nasal turbinate hypertrophy      Past Surgical History:   Procedure Laterality Date    HYSTERECTOMY  2004    LAPAROSCOPY      with vaginal hysterectomy; 11/3/2003 rso    OOPHORECTOMY Left 2004    PARTIAL HYSTERECTOMY  2004    MN NASAL/SINUS ENDOSCOPY,RMV TISS MAXILL SINUS N/A 9/30/2016    Procedure: IMAGE GUIDED FUNCTIONAL ENDOSCOPIC SINUS SURGERY;  Surgeon: Sherry De La O MD;  Location: [facility];  Service: ENT    REDUCTION MAMMAPLASTY Bilateral 02/27/2015    TOOTH EXTRACTION Right 3/16/2019    Procedure: EXTRACTION TOOTH #1 (Impacted Third Molar Tooth);   Surgeon: Jeanette Barr DDS;  Location: [facility];  Service: Maxillofacial    TUBAL LIGATION      WISDOM TOOTH EXTRACTION        Family History:     Family History   Problem Relation Age of Onset    CLEM disease Mother     Atrial fibrillation Father     Heart disease Father     Diabetes Brother     Diabetes Maternal Grandmother     Hypertension Maternal Grandmother     Colon cancer Maternal Grandfather     Diabetes Maternal Grandfather     Cancer Paternal Grandfather     Endometrial cancer Cousin     Breast cancer Cousin       Social History:     Social History     Socioeconomic History    Marital status: /Civil Union     Spouse name: Not on file    Number of children: Not on file    Years of education: Not on file    Highest education level: Not on file   Occupational History    Not on file   Social Needs    Financial resource strain: Not on file    Food insecurity:     Worry: Not on file     Inability: Not on file    Transportation needs:     Medical: Not on file     Non-medical: Not on file   Tobacco Use    Smoking status: Current Some Day Smoker    Smokeless tobacco: Never Used   Substance and Sexual Activity    Alcohol use: Not Currently     Comment: social    Drug use: No    Sexual activity: Not on file   Lifestyle    Physical activity:     Days per week: Not on file     Minutes per session: Not on file    Stress: Not on file   Relationships    Social connections:     Talks on phone: Not on file     Gets together: Not on file     Attends Judaism service: Not on file     Active member of club or organization: Not on file     Attends meetings of clubs or organizations: Not on file     Relationship status: Not on file    Intimate partner violence:     Fear of current or ex partner: Not on file     Emotionally abused: Not on file     Physically abused: Not on file     Forced sexual activity: Not on file   Other Topics Concern    Not on file   Social History Narrative    Caffeine use    Daily coffee consumption ( 1 cup/day)    Daily cola consumption (3 cans/day)       Medications and Allergies:     Current Outpatient Medications   Medication Sig Dispense Refill    atorvastatin (LIPITOR) 10 mg tablet Take 1 tablet (10 mg total) by mouth daily 90 tablet 3    busPIRone (BUSPAR) 30 MG tablet 2 (two) times a day        chlorhexidine (PERIDEX) 0 12 % solution RINSE MOUTH WITH 15 MILLILITERS EVERY 12 HOURS AS DIRECTED 473 mL 3    FLUoxetine (PROZAC) 40 MG capsule Take 80 mg by mouth 2 (two) times a day        LATUDA 20 MG tablet 20 mg daily with breakfast        levocetirizine (XYZAL) 5 MG tablet       LORazepam (ATIVAN) 1 mg tablet Take 1 mg by mouth 2 (two) times a day       montelukast (SINGULAIR) 10 mg tablet Take 1 tablet (10 mg total) by mouth daily for 360 days 90 tablet 3    polyethylene glycol (MIRALAX) 17 g packet Take 17 g by mouth daily      predniSONE 20 mg tablet Take 40mg PO daily x 3 days beginning 3/19/19, followed by 20mg PO daily x 1 week 13 tablet 0    sodium chloride (OCEAN) 0 65 % nasal spray 2 sprays into each nostril 2 (two) times a day 15 mL 0    verapamil (CALAN-SR) 240 mg CR tablet Take 1 tablet (240 mg total) by mouth daily 90 tablet 3     No current facility-administered medications for this visit        Allergies   Allergen Reactions    Other      Most all antibiotics- hives, hypotensive    "no problem with clindamycin "    Augmentin Es-600  [Amoxicillin-Pot Clavulanate]     Cefuroxime     Erythromycin     Levofloxacin     Morphine     Morphine And Related Hives    Oxycodone-Acetaminophen     Penicillins     Percocet [Oxycodone-Acetaminophen] Hives    Sulfa Antibiotics     Tetracyclines & Related       Immunizations:     Immunization History   Administered Date(s) Administered    INFLUENZA 11/05/2015, 11/10/2016, 11/21/2017, 10/26/2018    Influenza Quadrivalent Preservative Free 3 years and older IM 10/31/2014    Influenza Quadrivalent, 6-35 Months IM 11/21/2017    Influenza TIV (IM) 11/17/2012, 10/30/2013, 11/05/2015, 11/10/2016    Influenza, injectable, quadrivalent, preservative free 0 5 mL 10/26/2018    Tdap 01/23/2014      Health Maintenance:         Topic Date Due    Hepatitis C Screening  1965    Cervical Cancer Screening  02/05/1985    MAMMOGRAM  03/05/2021    CRC Screening: Colonoscopy  02/28/2027         Topic Date Due    Pneumococcal Vaccine: Pediatrics (0 to 5 Years) and At-Risk Patients (6 to 59 Years) (1 of 1 - PPSV23) 02/05/1970    INFLUENZA VACCINE  07/01/2019      Medicare Health Risk Assessment:     LMP  (LMP Unknown)      Brad Lopez is here for her Initial Wellness visit  Health Risk Assessment:   Patient rates overall health as very good  Patient feels that their physical health rating is slightly better  Eyesight was rated as same  Hearing was rated as same  Patient feels that their emotional and mental health rating is same  Pain experienced in the last 7 days has been none  Patient states that she has experienced no weight loss or gain in last 6 months  Depression Screening:   PHQ-2 Score: 2  PHQ-9 Score: 4      Fall Risk Screening: In the past year, patient has experienced: no history of falling in past year      Urinary Incontinence Screening:   Patient has not leaked urine accidently in the last six months  Home Safety:  Patient does not have trouble with stairs inside or outside of their home   Patient has working smoke alarms and has working carbon monoxide detector  Home safety hazards include: none  Nutrition:   Current diet is Regular  Medications:   Patient is not currently taking any over-the-counter supplements  Patient is able to manage medications  Activities of Daily Living (ADLs)/Instrumental Activities of Daily Living (IADLs):   Walk and transfer into and out of bed and chair?: Yes  Dress and groom yourself?: Yes    Bathe or shower yourself?: Yes    Feed yourself?  Yes  Do your laundry/housekeeping?: Yes  Manage your money, pay your bills and track your expenses?: Yes  Make your own meals?: Yes    Do your own shopping?: Yes    Previous Hospitalizations:   Any hospitalizations or ED visits within the last 12 months?: Yes    How many hospitalizations have you had in the last year?: 1-2    Hospitalization Comments: 3/14/2019 - 3/18/2019 (4 days)  [address]  Facial cellulitis   Principal problem     Advance Care Planning:   Living will: No    Durable POA for healthcare: No    Advanced directive: No      Cognitive Screening:   Provider or family/friend/caregiver concerned regarding cognition?: No    PREVENTIVE SCREENINGS      Cardiovascular Screening:    General: History Lipid Disorder and Risks and Benefits Discussed      Diabetes Screening:     General: Screening Current and Risks and Benefits Discussed      Colorectal Cancer Screening:     General: Screening Current      Breast Cancer Screening:     General: Screening Current and Risks and Benefits Discussed      Cervical Cancer Screening:    General: Risks and Benefits Discussed and Screening Current      Osteoporosis Screening:    General: Risks and Benefits Discussed and Screening Not Indicated      Lung Cancer Screening:     General: Screening Not Indicated      Hepatitis C Screening:    General: Screening Current      Elaina Stern MD

## 2019-10-15 NOTE — PATIENT INSTRUCTIONS
Medicare Preventive Visit Patient Instructions  Thank you for completing your Welcome to Medicare Visit or Medicare Annual Wellness Visit today  Your next wellness visit will be due in one year (10/15/2020)  The screening/preventive services that you may require over the next 5-10 years are detailed below  Some tests may not apply to you based off risk factors and/or age  Screening tests ordered at today's visit but not completed yet may show as past due  Also, please note that scanned in results may not display below  Preventive Screenings:  Service Recommendations Previous Testing/Comments   Colorectal Cancer Screening  * Colonoscopy    * Fecal Occult Blood Test (FOBT)/Fecal Immunochemical Test (FIT)  * Fecal DNA/Cologuard Test  * Flexible Sigmoidoscopy Age: 54-65 years old   Colonoscopy: every 10 years (may be performed more frequently if at higher risk)  OR  FOBT/FIT: every 1 year  OR  Cologuard: every 3 years  OR  Sigmoidoscopy: every 5 years  Screening may be recommended earlier than age 48 if at higher risk for colorectal cancer  Also, an individualized decision between you and your healthcare provider will decide whether screening between the ages of 74-80 would be appropriate  Colonoscopy: 02/28/2017  FOBT/FIT: Not on file  Cologuard: Not on file  Sigmoidoscopy: Not on file    Screening Current     Breast Cancer Screening Age: 36 years old  Frequency: every 1-2 years  Not required if history of left and right mastectomy Mammogram: 03/05/2019    Screening Current   Cervical Cancer Screening Between the ages of 21-29, pap smear recommended once every 3 years  Between the ages of 33-67, can perform pap smear with HPV co-testing every 5 years     Recommendations may differ for women with a history of total hysterectomy, cervical cancer, or abnormal pap smears in past  Pap Smear: Not on file       Hepatitis C Screening Once for adults born between 1945 and 1965  More frequently in patients at high risk for Hepatitis C Hep C Antibody: Not on file       Diabetes Screening 1-2 times per year if you're at risk for diabetes or have pre-diabetes Fasting glucose: 101 mg/dL   A1C: 5 7 %    Screening Current   Cholesterol Screening Once every 5 years if you don't have a lipid disorder  May order more often based on risk factors  Lipid panel: Not on file    Screening Not Indicated  History Lipid Disorder     Other Preventive Screenings Covered by Medicare:  1  Abdominal Aortic Aneurysm (AAA) Screening: covered once if your at risk  You're considered to be at risk if you have a family history of AAA  2  Lung Cancer Screening: covers low dose CT scan once per year if you meet all of the following conditions: (1) Age 50-69; (2) No signs or symptoms of lung cancer; (3) Current smoker or have quit smoking within the last 15 years; (4) You have a tobacco smoking history of at least 30 pack years (packs per day multiplied by number of years you smoked); (5) You get a written order from a healthcare provider  3  Glaucoma Screening: covered annually if you're considered high risk: (1) You have diabetes OR (2) Family history of glaucoma OR (3)  aged 48 and older OR (3)  American aged 72 and older  3  Osteoporosis Screening: covered every 2 years if you meet one of the following conditions: (1) You're estrogen deficient and at risk for osteoporosis based off medical history and other findings; (2) Have a vertebral abnormality; (3) On glucocorticoid therapy for more than 3 months; (4) Have primary hyperparathyroidism; (5) On osteoporosis medications and need to assess response to drug therapy  · Last bone density test (DXA Scan): 02/25/2019   5  HIV Screening: covered annually if you're between the age of 15-65  Also covered annually if you are younger than 13 and older than 72 with risk factors for HIV infection  For pregnant patients, it is covered up to 3 times per pregnancy      Immunizations:  Immunization Recommendations   Influenza Vaccine Annual influenza vaccination during flu season is recommended for all persons aged >= 6 months who do not have contraindications   Pneumococcal Vaccine (Prevnar and Pneumovax)  * Prevnar = PCV13  * Pneumovax = PPSV23   Adults 25-60 years old: 1-3 doses may be recommended based on certain risk factors  Adults 72 years old: Prevnar (PCV13) vaccine recommended followed by Pneumovax (PPSV23) vaccine  If already received PPSV23 since turning 65, then PCV13 recommended at least one year after PPSV23 dose  Hepatitis B Vaccine 3 dose series if at intermediate or high risk (ex: diabetes, end stage renal disease, liver disease)   Tetanus (Td) Vaccine - COST NOT COVERED BY MEDICARE PART B Following completion of primary series, a booster dose should be given every 10 years to maintain immunity against tetanus  Td may also be given as tetanus wound prophylaxis  Tdap Vaccine - COST NOT COVERED BY MEDICARE PART B Recommended at least once for all adults  For pregnant patients, recommended with each pregnancy  Shingles Vaccine (Shingrix) - COST NOT COVERED BY MEDICARE PART B  2 shot series recommended in those aged 48 and above     Health Maintenance Due:      Topic Date Due    Hepatitis C Screening  1965    Cervical Cancer Screening  02/05/1985    MAMMOGRAM  03/05/2021    CRC Screening: Colonoscopy  02/28/2027     Immunizations Due:      Topic Date Due    Pneumococcal Vaccine: Pediatrics (0 to 5 Years) and At-Risk Patients (6 to 59 Years) (1 of 1 - PPSV23) 02/05/1970    INFLUENZA VACCINE  07/01/2019     Advance Directives   What are advance directives? Advance directives are legal documents that state your wishes and plans for medical care  These plans are made ahead of time in case you lose your ability to make decisions for yourself  Advance directives can apply to any medical decision, such as the treatments you want, and if you want to donate organs     What are the types of advance directives? There are many types of advance directives, and each state has rules about how to use them  You may choose a combination of any of the following:  · Living will: This is a written record of the treatment you want  You can also choose which treatments you do not want, which to limit, and which to stop at a certain time  This includes surgery, medicine, IV fluid, and tube feedings  · Durable power of  for healthcare [facility]): This is a written record that states who you want to make healthcare choices for you when you are unable to make them for yourself  This person, called a proxy, is usually a family member or a friend  You may choose more than 1 proxy  · Do not resuscitate (DNR) order:  A DNR order is used in case your heart stops beating or you stop breathing  It is a request not to have certain forms of treatment, such as CPR  A DNR order may be included in other types of advance directives  · Medical directive: This covers the care that you want if you are in a coma, near death, or unable to make decisions for yourself  You can list the treatments you want for each condition  Treatment may include pain medicine, surgery, blood transfusions, dialysis, IV or tube feedings, and a ventilator (breathing machine)  · Values history: This document has questions about your views, beliefs, and how you feel and think about life  This information can help others choose the care that you would choose  Why are advance directives important? An advance directive helps you control your care  Although spoken wishes may be used, it is better to have your wishes written down  Spoken wishes can be misunderstood, or not followed  Treatments may be given even if you do not want them  An advance directive may make it easier for your family to make difficult choices about your care     Cigarette Smoking and Your Health   Risks to your health if you smoke:  Nicotine and other chemicals found in tobacco damage every cell in your body  Even if you are a light smoker, you have an increased risk for cancer, heart disease, and lung disease  If you are pregnant or have diabetes, smoking increases your risk for complications  Benefits to your health if you stop smoking:   · You decrease respiratory symptoms such as coughing, wheezing, and shortness of breath  · You reduce your risk for cancers of the lung, mouth, throat, kidney, bladder, pancreas, stomach, and cervix  If you already have cancer, you increase the benefits of chemotherapy  You also reduce your risk for cancer returning or a second cancer from developing  · You reduce your risk for heart disease, blood clots, heart attack, and stroke  · You reduce your risk for lung infections, and diseases such as pneumonia, asthma, chronic bronchitis, and emphysema  · Your circulation improves  More oxygen can be delivered to your body  If you have diabetes, you lower your risk for complications, such as kidney, artery, and eye diseases  You also lower your risk for nerve damage  Nerve damage can lead to amputations, poor vision, and blindness  · You improve your body's ability to heal and to fight infections  For more information and support to stop smoking:   · [employer]  Phone: 9- 694 - [phone]  Web Address: www [employer]  Weight Management   Why it is important to manage your weight:  Being overweight increases your risk of health conditions such as heart disease, high blood pressure, type 2 diabetes, and certain types of cancer  It can also increase your risk for osteoarthritis, sleep apnea, and other respiratory problems  Aim for a slow, steady weight loss  Even a small amount of weight loss can lower your risk of health problems  How to lose weight safely:  A safe and healthy way to lose weight is to eat fewer calories and get regular exercise   You can lose up about 1 pound a week by decreasing the number of calories you eat by 500 calories each day  Healthy meal plan for weight management:  A healthy meal plan includes a variety of foods, contains fewer calories, and helps you stay healthy  A healthy meal plan includes the following:  · Eat whole-grain foods more often  A healthy meal plan should contain fiber  Fiber is the part of grains, fruits, and vegetables that is not broken down by your body  Whole-grain foods are healthy and provide extra fiber in your diet  Some examples of whole-grain foods are whole-wheat breads and pastas, oatmeal, brown rice, and bulgur  · Eat a variety of vegetables every day  Include dark, leafy greens such as spinach, kale, ariana greens, and mustard greens  Eat yellow and orange vegetables such as carrots, sweet potatoes, and winter squash  · Eat a variety of fruits every day  Choose fresh or canned fruit (canned in its own juice or light syrup) instead of juice  Fruit juice has very little or no fiber  · Eat low-fat dairy foods  Drink fat-free (skim) milk or 1% milk  Eat fat-free yogurt and low-fat cottage cheese  Try low-fat cheeses such as mozzarella and other reduced-fat cheeses  · Choose meat and other protein foods that are low in fat  Choose beans or other legumes such as split peas or lentils  Choose fish, skinless poultry (chicken or turkey), or lean cuts of red meat (beef or pork)  Before you cook meat or poultry, cut off any visible fat  · Use less fat and oil  Try baking foods instead of frying them  Add less fat, such as margarine, sour cream, regular salad dressing and mayonnaise to foods  Eat fewer high-fat foods  Some examples of high-fat foods include french fries, doughnuts, ice cream, and cakes  · Eat fewer sweets  Limit foods and drinks that are high in sugar  This includes candy, cookies, regular soda, and sweetened drinks  Exercise:  Exercise at least 30 minutes per day on most days of the week  Some examples of exercise include walking, biking, dancing, and swimming  You can also fit in more physical activity by taking the stairs instead of the elevator or parking farther away from stores  Ask your healthcare provider about the best exercise plan for you  © Copyright [employer] 2018 Information is for End User's use only and may not be sold, redistributed or otherwise used for commercial purposes   All illustrations and images included in CareNotes® are the copyrighted property of [employer]  or [address] [place] [address] [employer]

## 2019-12-09 PROBLEM — R51.9 SEVERE HEADACHE: Status: ACTIVE | Noted: 2017-11-21

## 2019-12-09 PROBLEM — J30.81 ALLERGIC RHINITIS DUE TO ANIMAL (CAT) (DOG) HAIR AND DANDER: Status: ACTIVE | Noted: 2019-12-09

## 2019-12-09 PROBLEM — Z88.1: Status: ACTIVE | Noted: 2019-12-09

## 2019-12-09 PROBLEM — Z88.1 ALLERGY TO CEPHALOSPORIN: Status: ACTIVE | Noted: 2019-12-09

## 2019-12-09 PROBLEM — L20.84 INTRINSIC ATOPIC DERMATITIS: Status: ACTIVE | Noted: 2019-12-09

## 2019-12-09 PROBLEM — R73.9 HYPERGLYCEMIA: Status: ACTIVE | Noted: 2019-12-09

## 2019-12-09 PROBLEM — J30.0 VASOMOTOR RHINITIS: Status: ACTIVE | Noted: 2019-12-09

## 2019-12-09 PROBLEM — S52.131A CLOSED DISPLACED FRACTURE OF NECK OF RIGHT RADIUS: Status: ACTIVE | Noted: 2019-12-09

## 2019-12-09 PROBLEM — R29.898 ARM WEAKNESS: Status: ACTIVE | Noted: 2019-12-09

## 2019-12-09 PROBLEM — K59.00 CONSTIPATION: Status: ACTIVE | Noted: 2019-12-09

## 2019-12-09 PROBLEM — R21 SKIN RASH: Status: ACTIVE | Noted: 2019-12-09

## 2019-12-09 PROBLEM — R55 SYNCOPE: Status: ACTIVE | Noted: 2019-12-09

## 2019-12-09 PROBLEM — Z98.890 S/P BILATERAL BREAST REDUCTION: Status: ACTIVE | Noted: 2019-12-09

## 2019-12-09 PROBLEM — R10.9 ABDOMINAL PAIN: Status: ACTIVE | Noted: 2019-12-09

## 2019-12-09 PROBLEM — R29.898 TRANSIENT RIGHT LEG WEAKNESS: Status: ACTIVE | Noted: 2019-12-09

## 2019-12-09 PROBLEM — F17.200 NICOTINE DEPENDENCE: Status: ACTIVE | Noted: 2019-12-09

## 2019-12-09 PROBLEM — M79.603 PAIN IN UPPER LIMB: Status: ACTIVE | Noted: 2019-12-09

## 2019-12-09 PROBLEM — R06.02 SOB (SHORTNESS OF BREATH) ON EXERTION: Status: ACTIVE | Noted: 2019-12-09

## 2019-12-09 PROBLEM — N62 BREAST HYPERTROPHY: Status: ACTIVE | Noted: 2019-12-09

## 2019-12-09 PROBLEM — Z91.048 ALLERGY TO MOLD: Status: ACTIVE | Noted: 2019-12-09

## 2019-12-09 PROBLEM — R27.0 ATAXIA: Status: ACTIVE | Noted: 2019-12-09

## 2020-01-07 PROBLEM — R49.0 DYSPHONIA: Status: ACTIVE | Noted: 2020-01-07

## 2020-02-18 PROBLEM — J31.0 CHRONIC RHINITIS: Status: ACTIVE | Noted: 2020-02-18

## 2020-04-14 ENCOUNTER — TELEPHONE (OUTPATIENT)
Dept: INTERNAL MEDICINE CLINIC | Facility: CLINIC | Age: 55
End: 2020-04-14

## 2020-04-15 ENCOUNTER — OFFICE VISIT (OUTPATIENT)
Dept: INTERNAL MEDICINE CLINIC | Facility: CLINIC | Age: 55
End: 2020-04-15
Payer: COMMERCIAL

## 2020-04-15 VITALS — RESPIRATION RATE: 14 BRPM | SYSTOLIC BLOOD PRESSURE: 128 MMHG | HEART RATE: 72 BPM | DIASTOLIC BLOOD PRESSURE: 78 MMHG

## 2020-04-15 DIAGNOSIS — E03.9 HYPOTHYROIDISM, UNSPECIFIED TYPE: ICD-10-CM

## 2020-04-15 DIAGNOSIS — E78.01 FAMILIAL HYPERCHOLESTEROLEMIA: Chronic | ICD-10-CM

## 2020-04-15 DIAGNOSIS — I10 ESSENTIAL HYPERTENSION: Chronic | ICD-10-CM

## 2020-04-15 DIAGNOSIS — R73.03 PRE-DIABETES: Primary | Chronic | ICD-10-CM

## 2020-04-15 DIAGNOSIS — G47.33 OBSTRUCTIVE SLEEP APNEA SYNDROME: ICD-10-CM

## 2020-04-15 DIAGNOSIS — K21.9 LARYNGOPHARYNGEAL REFLUX: Chronic | ICD-10-CM

## 2020-04-15 DIAGNOSIS — E78.2 MIXED HYPERLIPIDEMIA: ICD-10-CM

## 2020-04-15 DIAGNOSIS — K21.9 GASTROESOPHAGEAL REFLUX DISEASE, ESOPHAGITIS PRESENCE NOT SPECIFIED: ICD-10-CM

## 2020-04-15 DIAGNOSIS — Z12.31 VISIT FOR SCREENING MAMMOGRAM: ICD-10-CM

## 2020-04-15 LAB — SL AMB POCT HEMOGLOBIN AIC: 5.7 (ref ?–6.5)

## 2020-04-15 PROCEDURE — 99214 OFFICE O/P EST MOD 30 MIN: CPT | Performed by: INTERNAL MEDICINE

## 2020-04-15 PROCEDURE — 3074F SYST BP LT 130 MM HG: CPT | Performed by: INTERNAL MEDICINE

## 2020-04-15 PROCEDURE — 3078F DIAST BP <80 MM HG: CPT | Performed by: INTERNAL MEDICINE

## 2020-04-15 PROCEDURE — 4004F PT TOBACCO SCREEN RCVD TLK: CPT | Performed by: INTERNAL MEDICINE

## 2020-04-15 PROCEDURE — 83036 HEMOGLOBIN GLYCOSYLATED A1C: CPT | Performed by: INTERNAL MEDICINE

## 2020-04-15 RX ORDER — ARIPIPRAZOLE 10 MG/1
10 TABLET ORAL DAILY
Status: ON HOLD | COMMUNITY
Start: 2020-04-01 | End: 2021-04-03 | Stop reason: ALTCHOICE

## 2020-04-15 RX ORDER — LORAZEPAM 0.5 MG/1
0.5 TABLET ORAL 2 TIMES DAILY
COMMUNITY
Start: 2020-03-19

## 2020-04-15 RX ORDER — ATORVASTATIN CALCIUM 10 MG/1
10 TABLET, FILM COATED ORAL DAILY
Qty: 90 TABLET | Refills: 3 | Status: SHIPPED | OUTPATIENT
Start: 2020-04-15 | End: 2021-01-08 | Stop reason: SDUPTHER

## 2020-08-13 DIAGNOSIS — I10 HYPERTENSION, UNSPECIFIED TYPE: ICD-10-CM

## 2020-08-13 RX ORDER — VERAPAMIL HYDROCHLORIDE 240 MG/1
240 TABLET, FILM COATED, EXTENDED RELEASE ORAL DAILY
Qty: 90 TABLET | Refills: 3 | Status: SHIPPED | OUTPATIENT
Start: 2020-08-13 | End: 2021-04-13 | Stop reason: HOSPADM

## 2020-08-22 ENCOUNTER — APPOINTMENT (OUTPATIENT)
Dept: LAB | Facility: CLINIC | Age: 55
End: 2020-08-22
Payer: COMMERCIAL

## 2020-08-22 ENCOUNTER — TRANSCRIBE ORDERS (OUTPATIENT)
Dept: URGENT CARE | Facility: CLINIC | Age: 55
End: 2020-08-22

## 2020-08-22 DIAGNOSIS — R73.03 PRE-DIABETES: Chronic | ICD-10-CM

## 2020-08-22 DIAGNOSIS — E78.01 FAMILIAL HYPERCHOLESTEROLEMIA: Chronic | ICD-10-CM

## 2020-08-22 DIAGNOSIS — I10 ESSENTIAL HYPERTENSION: Chronic | ICD-10-CM

## 2020-08-22 DIAGNOSIS — E03.9 HYPOTHYROIDISM, UNSPECIFIED TYPE: ICD-10-CM

## 2020-08-22 DIAGNOSIS — Z79.899 ENCOUNTER FOR LONG-TERM (CURRENT) USE OF OTHER MEDICATIONS: Primary | ICD-10-CM

## 2020-08-22 LAB
ALBUMIN SERPL BCP-MCNC: 4.2 G/DL (ref 3.5–5)
ALP SERPL-CCNC: 95 U/L (ref 46–116)
ALT SERPL W P-5'-P-CCNC: 30 U/L (ref 12–78)
ANION GAP SERPL CALCULATED.3IONS-SCNC: 5 MMOL/L (ref 4–13)
AST SERPL W P-5'-P-CCNC: 17 U/L (ref 5–45)
BASOPHILS # BLD AUTO: 0.03 THOUSANDS/ΜL (ref 0–0.1)
BASOPHILS NFR BLD AUTO: 0 % (ref 0–1)
BILIRUB SERPL-MCNC: 0.47 MG/DL (ref 0.2–1)
BUN SERPL-MCNC: 13 MG/DL (ref 5–25)
CALCIUM SERPL-MCNC: 9.3 MG/DL (ref 8.3–10.1)
CHLORIDE SERPL-SCNC: 106 MMOL/L (ref 100–108)
CHOLEST SERPL-MCNC: 188 MG/DL (ref 50–200)
CO2 SERPL-SCNC: 29 MMOL/L (ref 21–32)
CREAT SERPL-MCNC: 0.76 MG/DL (ref 0.6–1.3)
EOSINOPHIL # BLD AUTO: 0.87 THOUSAND/ΜL (ref 0–0.61)
EOSINOPHIL NFR BLD AUTO: 12 % (ref 0–6)
ERYTHROCYTE [DISTWIDTH] IN BLOOD BY AUTOMATED COUNT: 12.1 % (ref 11.6–15.1)
EST. AVERAGE GLUCOSE BLD GHB EST-MCNC: 111 MG/DL
GFR SERPL CREATININE-BSD FRML MDRD: 88 ML/MIN/1.73SQ M
GLUCOSE P FAST SERPL-MCNC: 113 MG/DL (ref 65–99)
HBA1C MFR BLD: 5.5 %
HCT VFR BLD AUTO: 45.6 % (ref 34.8–46.1)
HDLC SERPL-MCNC: 43 MG/DL
HGB BLD-MCNC: 15.2 G/DL (ref 11.5–15.4)
IMM GRANULOCYTES # BLD AUTO: 0.01 THOUSAND/UL (ref 0–0.2)
IMM GRANULOCYTES NFR BLD AUTO: 0 % (ref 0–2)
LDLC SERPL CALC-MCNC: 113 MG/DL (ref 0–100)
LDLC SERPL DIRECT ASSAY-MCNC: 115 MG/DL (ref 0–100)
LYMPHOCYTES # BLD AUTO: 1.9 THOUSANDS/ΜL (ref 0.6–4.47)
LYMPHOCYTES NFR BLD AUTO: 27 % (ref 14–44)
MCH RBC QN AUTO: 30.5 PG (ref 26.8–34.3)
MCHC RBC AUTO-ENTMCNC: 33.3 G/DL (ref 31.4–37.4)
MCV RBC AUTO: 91 FL (ref 82–98)
MONOCYTES # BLD AUTO: 0.56 THOUSAND/ΜL (ref 0.17–1.22)
MONOCYTES NFR BLD AUTO: 8 % (ref 4–12)
NEUTROPHILS # BLD AUTO: 3.63 THOUSANDS/ΜL (ref 1.85–7.62)
NEUTS SEG NFR BLD AUTO: 53 % (ref 43–75)
NONHDLC SERPL-MCNC: 145 MG/DL
NRBC BLD AUTO-RTO: 0 /100 WBCS
PLATELET # BLD AUTO: 206 THOUSANDS/UL (ref 149–390)
PMV BLD AUTO: 12.1 FL (ref 8.9–12.7)
POTASSIUM SERPL-SCNC: 4.5 MMOL/L (ref 3.5–5.3)
PROT SERPL-MCNC: 7.2 G/DL (ref 6.4–8.2)
RBC # BLD AUTO: 4.99 MILLION/UL (ref 3.81–5.12)
SODIUM SERPL-SCNC: 140 MMOL/L (ref 136–145)
TRIGL SERPL-MCNC: 158 MG/DL
TSH SERPL DL<=0.05 MIU/L-ACNC: 0.86 UIU/ML (ref 0.36–3.74)
WBC # BLD AUTO: 7 THOUSAND/UL (ref 4.31–10.16)

## 2020-08-22 PROCEDURE — 83721 ASSAY OF BLOOD LIPOPROTEIN: CPT

## 2020-08-22 PROCEDURE — 36415 COLL VENOUS BLD VENIPUNCTURE: CPT

## 2020-08-22 PROCEDURE — 83036 HEMOGLOBIN GLYCOSYLATED A1C: CPT

## 2020-08-22 PROCEDURE — 80061 LIPID PANEL: CPT

## 2020-08-22 PROCEDURE — 84443 ASSAY THYROID STIM HORMONE: CPT

## 2020-08-22 PROCEDURE — 85025 COMPLETE CBC W/AUTO DIFF WBC: CPT

## 2020-08-22 PROCEDURE — 80053 COMPREHEN METABOLIC PANEL: CPT

## 2020-08-26 ENCOUNTER — OFFICE VISIT (OUTPATIENT)
Dept: INTERNAL MEDICINE CLINIC | Facility: CLINIC | Age: 55
End: 2020-08-26
Payer: COMMERCIAL

## 2020-08-26 VITALS
WEIGHT: 187.6 LBS | SYSTOLIC BLOOD PRESSURE: 120 MMHG | DIASTOLIC BLOOD PRESSURE: 78 MMHG | TEMPERATURE: 98 F | HEIGHT: 65 IN | HEART RATE: 72 BPM | BODY MASS INDEX: 31.25 KG/M2 | RESPIRATION RATE: 14 BRPM

## 2020-08-26 DIAGNOSIS — E78.01 FAMILIAL HYPERCHOLESTEROLEMIA: Primary | Chronic | ICD-10-CM

## 2020-08-26 DIAGNOSIS — I10 ESSENTIAL HYPERTENSION: Chronic | ICD-10-CM

## 2020-08-26 DIAGNOSIS — R73.03 PRE-DIABETES: Chronic | ICD-10-CM

## 2020-08-26 DIAGNOSIS — Z00.00 HEALTHCARE MAINTENANCE: ICD-10-CM

## 2020-08-26 DIAGNOSIS — R63.5 WEIGHT GAIN: ICD-10-CM

## 2020-08-26 PROBLEM — F32.A DEPRESSION: Status: ACTIVE | Noted: 2020-08-26

## 2020-08-26 PROCEDURE — 3074F SYST BP LT 130 MM HG: CPT | Performed by: INTERNAL MEDICINE

## 2020-08-26 PROCEDURE — 4004F PT TOBACCO SCREEN RCVD TLK: CPT | Performed by: INTERNAL MEDICINE

## 2020-08-26 PROCEDURE — 99214 OFFICE O/P EST MOD 30 MIN: CPT | Performed by: INTERNAL MEDICINE

## 2020-08-26 PROCEDURE — 3008F BODY MASS INDEX DOCD: CPT | Performed by: INTERNAL MEDICINE

## 2020-08-26 PROCEDURE — 3078F DIAST BP <80 MM HG: CPT | Performed by: INTERNAL MEDICINE

## 2020-08-26 NOTE — PROGRESS NOTES
Assessment/Plan:   1  Obesity -predisposed by lack of activity as well as her meds- specifically Abilify which can lead to significant weight gain  Also aggravated by inadequately treated sleep apnea  Prior screen for Cushing's including dexamethasone suppression test in 24 urine for free cortisol normal -as noted she is now following the keto diet we went over this in detail  2  Hyperlipidemia -aggravated by the keto diet -has mixed dyslipidemia  Former smoker  Family history of CAD  She remains on statin  I did not change her dose as expect improvement when she is no longer on her current diet  3  Sleep apnea -mild -encouraged her to resume therapy with CPAP potentially help with weight loss  4  Prediabetes -for repeat A1c prior to next visit  5  Hypertension -stable on current dose of verapamil  In the past was also on a beta-blocker-continue current regimen  6  On psychiatric disease significant anxiety depression -as noted seen psychiatry currently Prozac Abilify  BuSpar and lorazepam    All other problems as per note of April 1020 in April 2015       MEDICAL REGIMEN:      Probiotic, lorazepam 1 milligram b i d , Prozac 80 milligrams daily using 40 milligram dosing, buspirone 30 milligrams b i d , Abilify 5 milligrams daily, atorvastatin 10 milligrams daily, Robaxin 750 milligrams b i d  p r n , rapid muscle are 240 milligrams daily, intermittent use of Benadryl  Appointment several months chemistry profile cholesterol profile hepatitis-C testing    Addendum- seen by ENT group in September they feel she is doing better with clarifix-- -they felt she should continue allergy management with saline nasal rinse daily, Flonase and has a last seen spray, continue reflux diet, resume CPAP -they stated she may require future septoplasty and inferior turbinate reduction and they are seeing her again 6      No problem-specific Assessment & Plan notes found for this encounter         Diagnoses and all orders for this visit:    Familial hypercholesterolemia  -     Comprehensive metabolic panel; Future  -     Cholesterol, total; Future  -     HDL cholesterol; Future  -     LDL cholesterol, direct; Future  -     Triglycerides; Future  -     HEMOGLOBIN A1C W/ EAG ESTIMATION; Future  -     Hepatitis C antibody; Future    Weight gain    Essential hypertension  -     Comprehensive metabolic panel; Future  -     Cholesterol, total; Future  -     HDL cholesterol; Future  -     LDL cholesterol, direct; Future  -     Triglycerides; Future  -     HEMOGLOBIN A1C W/ EAG ESTIMATION; Future  -     Hepatitis C antibody; Future    Pre-diabetes  -     Comprehensive metabolic panel; Future  -     Cholesterol, total; Future  -     HDL cholesterol; Future  -     LDL cholesterol, direct; Future  -     Triglycerides; Future  -     HEMOGLOBIN A1C W/ EAG ESTIMATION; Future  -     Hepatitis C antibody; Future    Healthcare maintenance  -     Comprehensive metabolic panel; Future  -     Cholesterol, total; Future  -     HDL cholesterol; Future  -     LDL cholesterol, direct; Future  -     Triglycerides; Future  -     HEMOGLOBIN A1C W/ EAG ESTIMATION; Future  -     Hepatitis C antibody; Future          Subjective:      Patient ID: Lisa Nails is a 63 y o  female  She recently started the keto diet and attempt to lose weight  As noted she is on meds which can aggravate her weight  She is very much frustrated with this  She feels better however on the diet and we talked about this in detail  Noted she is now on Abilify 5 milligrams daily which appears to have helped her  She had a minor surgical procedure earlier this year in reference to her sinuses it was told she may need more major surgery in the future  She has mixed dyslipidemia  She remains on atorvastatin 10 milligrams daily    She had been a smoker in the past Results for orders placed or performed in visit on 08/22/20  -CBC and differential       Result Value             Ref Range           WBC                         7 00              4 31 - 10 16*       RBC                         4 99              3 81 - 5 12 *       Hemoglobin                  15 2              11 5 - 15 4 *       Hematocrit                  45 6              34 8 - 46 1 %       MCV                         91                82 - 98 fL          MCH                         30 5              26 8 - 34 3 *       MCHC                        33 3              31 4 - 37 4 *       RDW                         12 1              11 6 - 15 1 %       MPV                         12 1              8 9 - 12 7 fL       Platelets                   206               149 - 390 Th*       nRBC                        0                 /100 WBCs           Neutrophils Relative        53                43 - 75 %           Immat GRANS %               0                 0 - 2 %             Lymphocytes Relative        27                14 - 44 %           Monocytes Relative          8                 4 - 12 %            Eosinophils Relative        12 (H)            0 - 6 %             Basophils Relative          0                 0 - 1 %             Neutrophils Absolute        3 63              1 85 - 7 62 *       Immature Grans Absolute     0 01              0 00 - 0 20 *       Lymphocytes Absolute        1 90              0 60 - 4 47 *       Monocytes Absolute          0 56              0 17 - 1 22 *       Eosinophils Absolute        0 87 (H)          0 00 - 0 61 *       Basophils Absolute          0 03              0 00 - 0 10 *  -Comprehensive metabolic panel       Result                      Value             Ref Range           Sodium                      140               136 - 145 mm*       Potassium                   4 5               3 5 - 5 3 mm*       Chloride                    106               100 - 108 mm*       CO2                         29                21 - 32 mmol*       ANION GAP 5                 4 - 13 mmol/L       BUN                         13                5 - 25 mg/dL        Creatinine                  0 76              0 60 - 1 30 *       Glucose, Fasting            113 (H)           65 - 99 mg/dL       Calcium                     9 3               8 3 - 10 1 m*       AST                         17                5 - 45 U/L          ALT                         30                12 - 78 U/L         Alkaline Phosphatase        95                46 - 116 U/L        Total Protein               7 2               6 4 - 8 2 g/*       Albumin                     4 2               3 5 - 5 0 g/*       Total Bilirubin             0 47              0 20 - 1 00 *       eGFR                        88                ml/min/1 73s*  -LDL cholesterol, direct       Result                      Value             Ref Range           LDL Direct                  115 (H)           0 - 100 mg/dl  -TSH, 3rd generation       Result                      Value             Ref Range           TSH 3RD GENERATON           0 860             0 358 - 3 74*  -HEMOGLOBIN A1C W/ EAG ESTIMATION       Result                      Value             Ref Range           Hemoglobin A1C              5 5               Normal 3 8-5*       EAG                         111               mg/dl          -Lipid panel       Result                      Value             Ref Range           Cholesterol                 188               50 - 200 mg/*       Triglycerides               158 (H)           <=150 mg/dL         HDL, Direct                 43                >=40 mg/dL          LDL Calculated              113 (H)           0 - 100 mg/dL       Non-HDL-Chol (CHOL-HDL)     145               mg/dl          I did not increase the dose of her statin because of the likely affect of the change in diet on her lipids  She has known hypertension  Stable on current dose of verapamil  She avoid salt and decongestants    She does have sleep apnea and is aware she needs to use her CPAP more often  She is aware this can help with weight loss  We had previously gone over this in detail  This patient denies any systemic symptoms  Specifically there has been no evidence of fever, night sweats, significant weight loss or significant decrease in appetite  The following portions of the patient's history were reviewed and updated as appropriate: allergies, current medications, past family history, past medical history, past social history, past surgical history and problem list     Review of Systems   Constitutional: Positive for fatigue  HENT: Negative  Respiratory: Negative  Cardiovascular: Negative  Gastrointestinal: Negative  Endocrine: Negative  Genitourinary: Negative  Musculoskeletal: Negative  Skin: Negative  Neurological: Negative  Hematological: Negative  Psychiatric/Behavioral: Negative  Objective:      Temp 98 °F (36 7 °C) (Oral)   Ht 5' 5" (1 651 m)   Wt 85 1 kg (187 lb 9 6 oz)   LMP  (LMP Unknown)   BMI 31 22 kg/m²          Physical Exam  Vitals signs reviewed  Constitutional:       General: She is not in acute distress  Appearance: Normal appearance  She is obese  She is not ill-appearing, toxic-appearing or diaphoretic  HENT:      Head: Normocephalic and atraumatic  Right Ear: Tympanic membrane, ear canal and external ear normal       Left Ear: Tympanic membrane, ear canal and external ear normal       Nose: Nose normal  No congestion or rhinorrhea  Mouth/Throat:      Mouth: Mucous membranes are moist       Pharynx: Oropharynx is clear  No oropharyngeal exudate or posterior oropharyngeal erythema  Eyes:      General: No scleral icterus  Right eye: No discharge  Left eye: No discharge  Extraocular Movements: Extraocular movements intact  Pupils: Pupils are equal, round, and reactive to light     Neck:      Musculoskeletal: Normal range of motion and neck supple  No neck rigidity or muscular tenderness  Vascular: No carotid bruit  Cardiovascular:      Rate and Rhythm: Normal rate and regular rhythm  Pulses: Normal pulses  Heart sounds: Normal heart sounds  No murmur  No friction rub  No gallop  Pulmonary:      Effort: Pulmonary effort is normal  No respiratory distress  Breath sounds: Normal breath sounds  No stridor  No wheezing, rhonchi or rales  Chest:      Chest wall: No tenderness  Abdominal:      General: Abdomen is flat  Bowel sounds are normal  There is no distension  Palpations: Abdomen is soft  There is no mass  Tenderness: There is no abdominal tenderness  There is no right CVA tenderness, left CVA tenderness, guarding or rebound  Hernia: No hernia is present  Musculoskeletal: Normal range of motion  General: No swelling, tenderness, deformity or signs of injury  Right lower leg: No edema  Left lower leg: No edema  Lymphadenopathy:      Cervical: No cervical adenopathy  Skin:     General: Skin is warm and dry  Coloration: Skin is not jaundiced or pale  Findings: No bruising, erythema, lesion or rash  Comments:  Residual changes of folliculitis of the breast tissue   Neurological:      General: No focal deficit present  Mental Status: She is alert and oriented to person, place, and time  Mental status is at baseline  Cranial Nerves: No cranial nerve deficit  Sensory: No sensory deficit  Motor: No weakness  Coordination: Coordination normal       Gait: Gait normal       Deep Tendon Reflexes: Reflexes normal    Psychiatric:         Mood and Affect: Mood normal          Behavior: Behavior normal          Thought Content:  Thought content normal          Judgment: Judgment normal

## 2020-11-06 ENCOUNTER — TELEMEDICINE (OUTPATIENT)
Dept: INTERNAL MEDICINE CLINIC | Facility: CLINIC | Age: 55
End: 2020-11-06
Payer: COMMERCIAL

## 2020-11-06 DIAGNOSIS — R53.83 OTHER FATIGUE: ICD-10-CM

## 2020-11-06 DIAGNOSIS — R53.83 OTHER FATIGUE: Primary | ICD-10-CM

## 2020-11-06 PROCEDURE — U0003 INFECTIOUS AGENT DETECTION BY NUCLEIC ACID (DNA OR RNA); SEVERE ACUTE RESPIRATORY SYNDROME CORONAVIRUS 2 (SARS-COV-2) (CORONAVIRUS DISEASE [COVID-19]), AMPLIFIED PROBE TECHNIQUE, MAKING USE OF HIGH THROUGHPUT TECHNOLOGIES AS DESCRIBED BY CMS-2020-01-R: HCPCS | Performed by: INTERNAL MEDICINE

## 2020-11-06 PROCEDURE — 99213 OFFICE O/P EST LOW 20 MIN: CPT | Performed by: INTERNAL MEDICINE

## 2020-11-07 LAB — SARS-COV-2 RNA SPEC QL NAA+PROBE: NOT DETECTED

## 2020-11-09 ENCOUNTER — TELEPHONE (OUTPATIENT)
Dept: INTERNAL MEDICINE CLINIC | Facility: CLINIC | Age: 55
End: 2020-11-09

## 2020-11-09 DIAGNOSIS — J01.01 ACUTE RECURRENT MAXILLARY SINUSITIS: Primary | ICD-10-CM

## 2020-11-09 RX ORDER — CLINDAMYCIN HYDROCHLORIDE 300 MG/1
300 CAPSULE ORAL 3 TIMES DAILY
Qty: 30 CAPSULE | Refills: 0 | Status: SHIPPED | OUTPATIENT
Start: 2020-11-09 | End: 2020-11-19

## 2021-01-02 ENCOUNTER — LAB (OUTPATIENT)
Dept: LAB | Facility: CLINIC | Age: 56
End: 2021-01-02
Payer: COMMERCIAL

## 2021-01-02 DIAGNOSIS — R73.03 PRE-DIABETES: Chronic | ICD-10-CM

## 2021-01-02 DIAGNOSIS — Z00.00 HEALTHCARE MAINTENANCE: ICD-10-CM

## 2021-01-02 DIAGNOSIS — I10 ESSENTIAL HYPERTENSION: Chronic | ICD-10-CM

## 2021-01-02 DIAGNOSIS — E78.01 FAMILIAL HYPERCHOLESTEROLEMIA: Chronic | ICD-10-CM

## 2021-01-02 LAB
ALBUMIN SERPL BCP-MCNC: 3.7 G/DL (ref 3.5–5)
ALP SERPL-CCNC: 110 U/L (ref 46–116)
ALT SERPL W P-5'-P-CCNC: 30 U/L (ref 12–78)
ANION GAP SERPL CALCULATED.3IONS-SCNC: 3 MMOL/L (ref 4–13)
AST SERPL W P-5'-P-CCNC: 19 U/L (ref 5–45)
BILIRUB SERPL-MCNC: 0.34 MG/DL (ref 0.2–1)
BUN SERPL-MCNC: 16 MG/DL (ref 5–25)
CALCIUM SERPL-MCNC: 9.5 MG/DL (ref 8.3–10.1)
CHLORIDE SERPL-SCNC: 105 MMOL/L (ref 100–108)
CHOLEST SERPL-MCNC: 237 MG/DL (ref 50–200)
CO2 SERPL-SCNC: 29 MMOL/L (ref 21–32)
CREAT SERPL-MCNC: 0.8 MG/DL (ref 0.6–1.3)
EST. AVERAGE GLUCOSE BLD GHB EST-MCNC: 117 MG/DL
GFR SERPL CREATININE-BSD FRML MDRD: 83 ML/MIN/1.73SQ M
GLUCOSE P FAST SERPL-MCNC: 103 MG/DL (ref 65–99)
HBA1C MFR BLD: 5.7 %
HCV AB SER QL: NORMAL
HDLC SERPL-MCNC: 47 MG/DL
LDLC SERPL DIRECT ASSAY-MCNC: 155 MG/DL (ref 0–100)
POTASSIUM SERPL-SCNC: 4.4 MMOL/L (ref 3.5–5.3)
PROT SERPL-MCNC: 7.2 G/DL (ref 6.4–8.2)
SODIUM SERPL-SCNC: 137 MMOL/L (ref 136–145)
TRIGL SERPL-MCNC: 144 MG/DL

## 2021-01-02 PROCEDURE — 86803 HEPATITIS C AB TEST: CPT

## 2021-01-02 PROCEDURE — 83721 ASSAY OF BLOOD LIPOPROTEIN: CPT

## 2021-01-02 PROCEDURE — 83036 HEMOGLOBIN GLYCOSYLATED A1C: CPT

## 2021-01-02 PROCEDURE — 80061 LIPID PANEL: CPT

## 2021-01-02 PROCEDURE — 36415 COLL VENOUS BLD VENIPUNCTURE: CPT

## 2021-01-02 PROCEDURE — 80053 COMPREHEN METABOLIC PANEL: CPT

## 2021-01-08 ENCOUNTER — OFFICE VISIT (OUTPATIENT)
Dept: INTERNAL MEDICINE CLINIC | Facility: CLINIC | Age: 56
End: 2021-01-08
Payer: COMMERCIAL

## 2021-01-08 VITALS
HEART RATE: 72 BPM | SYSTOLIC BLOOD PRESSURE: 130 MMHG | RESPIRATION RATE: 14 BRPM | TEMPERATURE: 98.6 F | DIASTOLIC BLOOD PRESSURE: 80 MMHG

## 2021-01-08 DIAGNOSIS — E78.01 FAMILIAL HYPERCHOLESTEROLEMIA: Chronic | ICD-10-CM

## 2021-01-08 DIAGNOSIS — E55.9 VITAMIN D DEFICIENCY: ICD-10-CM

## 2021-01-08 DIAGNOSIS — F41.8 DEPRESSION WITH ANXIETY: Chronic | ICD-10-CM

## 2021-01-08 DIAGNOSIS — E78.2 MIXED HYPERLIPIDEMIA: ICD-10-CM

## 2021-01-08 DIAGNOSIS — R73.03 PRE-DIABETES: Chronic | ICD-10-CM

## 2021-01-08 DIAGNOSIS — I10 ESSENTIAL HYPERTENSION: Primary | Chronic | ICD-10-CM

## 2021-01-08 PROCEDURE — 3075F SYST BP GE 130 - 139MM HG: CPT | Performed by: INTERNAL MEDICINE

## 2021-01-08 PROCEDURE — 99214 OFFICE O/P EST MOD 30 MIN: CPT | Performed by: INTERNAL MEDICINE

## 2021-01-08 PROCEDURE — 3725F SCREEN DEPRESSION PERFORMED: CPT | Performed by: INTERNAL MEDICINE

## 2021-01-08 PROCEDURE — 4004F PT TOBACCO SCREEN RCVD TLK: CPT | Performed by: INTERNAL MEDICINE

## 2021-01-08 PROCEDURE — 3079F DIAST BP 80-89 MM HG: CPT | Performed by: INTERNAL MEDICINE

## 2021-01-08 RX ORDER — ATORVASTATIN CALCIUM 10 MG/1
10 TABLET, FILM COATED ORAL DAILY
Qty: 90 TABLET | Refills: 3 | Status: SHIPPED | OUTPATIENT
Start: 2021-01-08 | End: 2022-03-11 | Stop reason: SDUPTHER

## 2021-01-08 NOTE — PROGRESS NOTES
BMI Counseling: There is no height or weight on file to calculate BMI  The BMI is above normal  Nutrition recommendations include decreasing portion sizes, encouraging healthy choices of fruits and vegetables and moderation in carbohydrate intake  Exercise recommendations include moderate physical activity 150 minutes/week and exercising 3-5 times per week  No pharmacotherapy was ordered  Assessment/Plan:   1  Health maintenance- encourage patient to obtain COVID vaccine  2  Some pain -suspect DJD of the 1st carpometacarpal joint-she wants to use an over-the-counter brace  If unsuccessful she can colon be referred for formal brace with physical therapy   3  Pre diabetes -A1c 5 7-trying to follow appropriate diet  4  obesity-predisposed by lack of activity as well as her meds -specifically Abilify  Aggravated potentially by inadequately treated sleep apnea  Prior screen for Cushing's including dexamethasone suppression test as well as 24 urine for free cortisol normal   5  Hyperlipidemia-aggravated by her meds -Abilify -strong family history CAD  I increased her atorvastatin 20 milligrams daily  6  Sleep apnea-mild -encouraged her to resume therapy with CPAP  7  Hypertension -stable on current dose of rapid mold - in the past was also on a beta-blocker  8  Longstanding psychiatric disease with significant anxiety depression -stable on Prozac, Abilify, BuSpar and lorazepam  9  Rhinitis- she has allergy management, saline nasal rinse, Flonase nasal spray,Azelastine nasal spray and patient recently underwent the Clarifix procedure which she feels helped  10  History of abnormal mammogram March 2019 showing area the left breast -then underwent ultrasound showing it corresponded with simple cyst -she is long overdue for follow-up mammogram and says she will be going  11  Right foot pain -1st MTP -felt to represent DJD  12   Facial swelling with right paraseptal cellulitis triggered by underlying dental disease with associated acute maxillary sinusitis   Underwent removal of impacted 3rd molar in the hospital and had outpatient surgery with removal of another 2   Completing therapy with clindamycin and prednisone   CT scan of soft tissue of the neck in the hospital showed bilateral maxillary sinus disease with evidence of  Prior bilateral antrectomy is but no air-fluid levels  13  History of sinus disease-as noted had prior sinus surgery  14  Hoarseness-ENT told her she had significant LPR   Was on a PPI and H2 blocker   She is now off both of these and stable     15  Lumbar radiculopathy-neurosurgery felt on review of her MRI of her L-spine she definite disc herniation on the right medial L5-S1 foramina which displaced L5 and S1 nerve roots   Had prior weakness of the foot that resolved   She responded to steroids and did not require an epidural  16  Hyper reflexia-MRI of cervical spine shows bulging disc with spinal canal adequate   MRI the brain shows no obvious abnormalities   REVIEW NEXT VISIT  17  Episodes of feeling poorly with palpitation electrical feeling   Dexamethasone suppression test normal   24 urine for free cortisol normal   24 urine for 5 HIAA as well as 24 urine for fractionated metanephrines and catecholamines all normal   Symptoms had resolved on a beta-blocker   Now off the beta-blocker and stable  18  Abdominal bloating with weight gain-CT scan of abdomen pelvis showed questionable thickening of the colon   Stool studies negative   GI told her her exam was benign  23  Dyspepsia-had a benign EGD done by Dr Mcbride  20  Multiple antibiotic allergies-full discussion as per note of August 2015  21   Allergies-allergy blood work benign but had abnormal skin testing on hypo sensitization therapy via Alexi Cousins  22 General care-patient had hysterectomy with unilateral oophorectomy approximately age -DEXA scan  Done in February 2019 normal     All other problems as per note of April 2015      MEDICAL REGIMEN:      Probiotic, lorazepam 1 milligram b i d , Prozac 80 milligrams daily using 40 milligram dosing, buspirone 30 milligrams b i d , Abilify 5 milligrams daily, atorvastatin -using up 10 milligrams and switching to 20 milligrams daily, Robaxin 750 milligrams b i d  p r n , rap of mill 240 milligrams daily,Azelastin nasal spray, aFlonase nasal spray, intermittent use of Benadryl    Addendum- patient saw Margarethlissette Green in March 2021- he commented she is doing better post Clarifix with nasal congestion and mucus improved but not completely resolved  They felt she should continue allergy management, saline nasal  Rinse at least once daily,Xhance trial to replace Flonase, Astelin nasal spray and consider an oral antihistamine  They felt she should resume CPAP and consider in the future CT, septoplasty, inferior turbinate reduction        No problem-specific Assessment & Plan notes found for this encounter  Diagnoses and all orders for this visit:    Essential hypertension  -     Comprehensive metabolic panel; Future  -     Cholesterol, total; Future  -     HDL cholesterol; Future  -     LDL cholesterol, direct; Future  -     Triglycerides; Future  -     Vitamin D 25 hydroxy; Future  -     HEMOGLOBIN A1C W/ EAG ESTIMATION; Future    Familial hypercholesterolemia  -     Comprehensive metabolic panel; Future  -     Cholesterol, total; Future  -     HDL cholesterol; Future  -     LDL cholesterol, direct; Future  -     Triglycerides; Future  -     Vitamin D 25 hydroxy; Future  -     HEMOGLOBIN A1C W/ EAG ESTIMATION; Future    Pre-diabetes  -     Comprehensive metabolic panel; Future  -     Cholesterol, total; Future  -     HDL cholesterol; Future  -     LDL cholesterol, direct; Future  -     Triglycerides; Future  -     Vitamin D 25 hydroxy; Future  -     HEMOGLOBIN A1C W/ EAG ESTIMATION; Future    Vitamin D deficiency  -     Comprehensive metabolic panel;  Future  -     Cholesterol, total; Future  -     HDL cholesterol; Future  -     LDL cholesterol, direct; Future  -     Triglycerides; Future  -     Vitamin D 25 hydroxy; Future  -     HEMOGLOBIN A1C W/ EAG ESTIMATION; Future    Depression with anxiety        Subjective:      Patient ID: Orion Mcguire is a 63 y o  female  We talked about multiple issues  She has yet to restart her CPAP with her history mild sleep apnea  We reviewed that this may potentially help with weight loss  She has yet to have her follow-up mammogram will be scheduling that  We talked about COVID vaccine she hopes to obtain that in the future    Results for orders placed or performed in visit on 01/02/21  -Comprehensive metabolic panel       Result                      Value             Ref Range           Sodium                      137               136 - 145 mm*       Potassium                   4 4               3 5 - 5 3 mm*       Chloride                    105               100 - 108 mm*       CO2                         29                21 - 32 mmol*       ANION GAP                   3 (L)             4 - 13 mmol/L       BUN                         16                5 - 25 mg/dL        Creatinine                  0 80              0 60 - 1 30 *       Glucose, Fasting            103 (H)           65 - 99 mg/dL       Calcium                     9 5               8 3 - 10 1 m*       AST                         19                5 - 45 U/L          ALT                         30                12 - 78 U/L         Alkaline Phosphatase        110               46 - 116 U/L        Total Protein               7 2               6 4 - 8 2 g/*       Albumin                     3 7               3 5 - 5 0 g/*       Total Bilirubin             0 34              0 20 - 1 00 *       eGFR                        83                ml/min/1 73s*  -Cholesterol, total       Result                      Value             Ref Range           Cholesterol                 237 (H)           50 - 200 mg/*  -HDL cholesterol       Result                      Value             Ref Range           HDL, Direct                 47                >=40 mg/dL     -LDL cholesterol, direct       Result                      Value             Ref Range           LDL Direct                  155 (H)           0 - 100 mg/dl  -Triglycerides       Result                      Value             Ref Range           Triglycerides               144               <=150 mg/dL    -HEMOGLOBIN A1C W/ EAG ESTIMATION       Result                      Value             Ref Range           Hemoglobin A1C              5 7 (H)           Normal 3 8-5*       EAG                         117               mg/dl          -Hepatitis C antibody       Result                      Value             Ref Range           Hepatitis C Ab              Non-reactive      Non-reactive     She has known prediabetes  Hemoglobin A1c 5 7  She is no longer following the keto diet was trying to substitute fruits and vegetables for breads and pasta as etcetera  She remains on her statin  She has a strong family history of vascular disease  Lipids will be aggravated by her use of Abilify we reviewed that  She is currently on 10 milligrams daily  She had missed several doses prior to her labs which may be influenced some  The I recommended she increase to 20 milligrams daily  She will use upper 10 milligrams and then switch to 20 milligrams     In the past there was an issue with hyper reflexia  She has some of this on exam but has no pathologic reflexes  This does not appear to be an issue and monitoring     She was seen by the ENT group in September  They felt she was doing better with Clarifix a a-they felt she should continue allergy management with saline nasal spray, Flonase and antihistamine nasal spray    She has known hypertension  BP adequately controlled on current therapy  Avoiding salt decongestants  Does not use frequent nonsteroidals    As noted has mild sleep apnea and hopes to resume treatment of that    She has some pain with her left thumb at the 1st carpometacarpal joint  She wanted to know about use of her brace  She will be trying an over-the-counter brace but if notes no improvement can be referred to physical therapy      The following portions of the patient's history were reviewed and updated as appropriate: allergies, current medications, past family history, past medical history, past social history, past surgical history and problem list     Review of Systems   Constitutional: Negative  HENT: Negative  Respiratory: Negative  Cardiovascular: Negative  Gastrointestinal: Negative  Endocrine: Negative  Genitourinary: Negative  Musculoskeletal: Negative  Left thumb pain   Skin: Negative  Neurological: Negative  Hematological: Negative  Psychiatric/Behavioral: Negative  Objective:      Temp 98 6 °F (37 °C) (Oral)   LMP  (LMP Unknown)          Physical Exam  Vitals signs reviewed  Constitutional:       General: She is not in acute distress  Appearance: Normal appearance  She is not ill-appearing, toxic-appearing or diaphoretic  HENT:      Head: Normocephalic and atraumatic  Right Ear: Tympanic membrane, ear canal and external ear normal       Left Ear: Tympanic membrane, ear canal and external ear normal       Nose: Nose normal  No congestion or rhinorrhea  Mouth/Throat:      Mouth: Mucous membranes are moist       Pharynx: Oropharynx is clear  No oropharyngeal exudate or posterior oropharyngeal erythema  Eyes:      General: No scleral icterus  Right eye: No discharge  Left eye: No discharge  Extraocular Movements: Extraocular movements intact  Pupils: Pupils are equal, round, and reactive to light  Neck:      Musculoskeletal: Normal range of motion and neck supple  No neck rigidity or muscular tenderness  Vascular: No carotid bruit  Cardiovascular:      Rate and Rhythm: Normal rate and regular rhythm  Pulses: Normal pulses  Heart sounds: Normal heart sounds  No murmur  No friction rub  No gallop  Pulmonary:      Effort: Pulmonary effort is normal  No respiratory distress  Breath sounds: Normal breath sounds  No stridor  No wheezing, rhonchi or rales  Chest:      Chest wall: No tenderness  Abdominal:      General: Abdomen is flat  Bowel sounds are normal  There is no distension  Palpations: Abdomen is soft  There is no mass  Tenderness: There is no abdominal tenderness  There is no right CVA tenderness, left CVA tenderness, guarding or rebound  Hernia: No hernia is present  Musculoskeletal: Normal range of motion  General: No swelling, tenderness, deformity or signs of injury  Right lower leg: No edema  Left lower leg: No edema  Comments:  Pain on range of motion of the 1st carpometacarpal joint of left hand   Lymphadenopathy:      Cervical: No cervical adenopathy  Skin:     General: Skin is warm and dry  Coloration: Skin is not jaundiced or pale  Findings: No bruising, erythema, lesion or rash  Comments:  Scar from prior breast surgery   Neurological:      General: No focal deficit present  Mental Status: She is alert and oriented to person, place, and time  Mental status is at baseline  Cranial Nerves: No cranial nerve deficit  Sensory: No sensory deficit  Motor: No weakness  Coordination: Coordination normal       Gait: Gait normal       Deep Tendon Reflexes: Reflexes normal    Psychiatric:         Mood and Affect: Mood normal          Behavior: Behavior normal          Thought Content:  Thought content normal          Judgment: Judgment normal

## 2021-03-10 DIAGNOSIS — Z23 ENCOUNTER FOR IMMUNIZATION: ICD-10-CM

## 2021-03-17 ENCOUNTER — IMMUNIZATIONS (OUTPATIENT)
Dept: FAMILY MEDICINE CLINIC | Facility: HOSPITAL | Age: 56
End: 2021-03-17

## 2021-03-17 DIAGNOSIS — Z23 ENCOUNTER FOR IMMUNIZATION: Primary | ICD-10-CM

## 2021-03-17 PROCEDURE — 0001A SARS-COV-2 / COVID-19 MRNA VACCINE (PFIZER-BIONTECH) 30 MCG: CPT

## 2021-03-17 PROCEDURE — 91300 SARS-COV-2 / COVID-19 MRNA VACCINE (PFIZER-BIONTECH) 30 MCG: CPT

## 2021-03-22 PROBLEM — R22.0 RIGHT FACIAL SWELLING: Status: RESOLVED | Noted: 2019-03-14 | Resolved: 2021-03-22

## 2021-03-22 PROBLEM — L03.211 FACIAL CELLULITIS: Status: RESOLVED | Noted: 2019-03-14 | Resolved: 2021-03-22

## 2021-04-01 ENCOUNTER — TELEMEDICINE (OUTPATIENT)
Dept: INTERNAL MEDICINE CLINIC | Facility: CLINIC | Age: 56
End: 2021-04-01
Payer: COMMERCIAL

## 2021-04-01 ENCOUNTER — TELEPHONE (OUTPATIENT)
Dept: INTERNAL MEDICINE CLINIC | Facility: CLINIC | Age: 56
End: 2021-04-01

## 2021-04-01 DIAGNOSIS — R50.9 FEVER, UNSPECIFIED FEVER CAUSE: ICD-10-CM

## 2021-04-01 DIAGNOSIS — R50.9 FEVER, UNSPECIFIED FEVER CAUSE: Primary | ICD-10-CM

## 2021-04-01 LAB — SARS-COV-2 RNA RESP QL NAA+PROBE: NEGATIVE

## 2021-04-01 PROCEDURE — 99213 OFFICE O/P EST LOW 20 MIN: CPT | Performed by: INTERNAL MEDICINE

## 2021-04-01 PROCEDURE — U0005 INFEC AGEN DETEC AMPLI PROBE: HCPCS | Performed by: INTERNAL MEDICINE

## 2021-04-01 PROCEDURE — U0003 INFECTIOUS AGENT DETECTION BY NUCLEIC ACID (DNA OR RNA); SEVERE ACUTE RESPIRATORY SYNDROME CORONAVIRUS 2 (SARS-COV-2) (CORONAVIRUS DISEASE [COVID-19]), AMPLIFIED PROBE TECHNIQUE, MAKING USE OF HIGH THROUGHPUT TECHNOLOGIES AS DESCRIBED BY CMS-2020-01-R: HCPCS | Performed by: INTERNAL MEDICINE

## 2021-04-01 NOTE — PROGRESS NOTES
COVID-19 Outpatient Progress Note    Assessment/Plan:    Problem List Items Addressed This Visit        Other    Fever - Primary    Relevant Orders    Novel Coronavirus (Covid-19),PCR [facility] - Collected at [facility] or [facility]         Disposition:     I recommended self-quarantine for 10 days and to watch for symptoms until 14 days after exposure  If patient were to develop symptoms, they should self isolate and call our office for further guidance  I referred patient to one of our centralized sites for a COVID-19 swab  I have spent 8 minutes directly with the patient  Greater than 50% of this time was spent in counseling/coordination of care regarding: instructions for management  Impression 1  Fever -chills-congestion with temple 1 of 2 3 and diffuse myalgias- date of onset March 31st-rule out COVID -recommended the patient go for testing and self quarantine until results are available  As noted patient has had 1st dose of COVID vaccination is due for her 2nd dose week of April 5th       Encounter provider General Renzo MD    Provider located at [address] [place] [address]  Via [address]  [phone]    Recent Visits  No visits were found meeting these conditions  Showing recent visits within past 7 days and meeting all other requirements     Today's Visits  Date Type Provider Dept   04/01/21 [address],  [facility] Internal Med   04/01/21 Telephone [address] Internal Med   Showing today's visits and meeting all other requirements     Future Appointments  No visits were found meeting these conditions  Showing future appointments within next 150 days and meeting all other requirements        Patient agrees to participate in a virtual check in via telephone or video visit instead of presenting to the office to address urgent/immediate medical needs   Patient is aware this is a billable service  After connecting through Telephone, the patient was identified by name and date of birth  Radha Garcia was informed that this was a telemedicine visit and that the exam was being conducted confidentially over secure lines  My office door was closed  No one else was in the room  Radha Garcia acknowledged consent and understanding of privacy and security of the telemedicine visit  I informed the patient that I have reviewed her record in Epic and presented the opportunity for her to ask any questions regarding the visit today  The patient agreed to participate  Subjective:   Radha Garcia is a 61 y o  female who is concerned about COVID-19  Patient's symptoms include fever, chills, nasal congestion (unknown ), rhinorrhea and headache  Date of symptom onset: 3/31/2021    Exposure:   Contact with a person who is under investigation (PUI) for or who is positive for COVID-19 within the last 14 days?: No    Hospitalized recently for fever and/or lower respiratory symptoms?: No      Currently a healthcare worker that is involved in direct patient care?: No      Works in a special setting where the risk of COVID-19 transmission may be high? (this may include long-term care, correctional and snf facilities; homeless shelters; assisted-living facilities and group homes ): No      Resident in a special setting where the risk of COVID-19 transmission may be high? (this may include long-term care, correctional and snf facilities; homeless shelters; assisted-living facilities and group homes ): No       This patient began on March 31st with fever, diffuse aches and chills  Recent temp was 102 3 degrees  She denies cough, shortness of breath  She denies definite COVID exposure  She denies GI symptoms    She had her 1st dose of coronavirus vaccine is due for her 2nd dose next week    Lab Results   Component Value Date    SARSCOV2 Not Detected 11/06/2020     Past Medical History:   Diagnosis Date    Allergies     Anesthesia complication     V TACH after her reduction mammoplasty, done at [address] Depression     Ethmoid sinusitis     GERD (gastroesophageal reflux disease)     Maxillary sinusitis     Multiple allergies     Myofascial pain syndrome     Nasal turbinate hypertrophy     Wears glasses      Past Surgical History:   Procedure Laterality Date    LAPAROSCOPY      with vaginal hysterectomy; 11/3/2003 rso    OOPHORECTOMY Left 2004    PARTIAL HYSTERECTOMY  2004    MI NASAL/SINUS ENDOSCOPY,RMV TISS MAXILL SINUS N/A 9/30/2016    Procedure: IMAGE GUIDED FUNCTIONAL ENDOSCOPIC SINUS SURGERY;  Surgeon: Aaliyah Garcia MD;  Location: [facility];  Service: ENT    REDUCTION MAMMAPLASTY Bilateral 02/27/2015    TOOTH EXTRACTION Right 3/16/2019    Procedure: EXTRACTION TOOTH #1 (Impacted Third Molar Tooth);   Surgeon: Reuben Camp DDS;  Location: [facility];  Service: Maxillofacial    TUBAL LIGATION      WISDOM TOOTH EXTRACTION       Current Outpatient Medications   Medication Sig Dispense Refill    ARIPiprazole (ABILIFY) 10 mg tablet Take 10 mg by mouth daily      atorvastatin (LIPITOR) 10 mg tablet Take 1 tablet (10 mg total) by mouth daily 90 tablet 3    azelastine (ASTELIN) 0 1 % nasal spray 1 spray into each nostril 2 (two) times a day Use in each nostril as directed 1 Bottle 6    busPIRone (BUSPAR) 30 MG tablet 2 (two) times a day        chlorhexidine (PERIDEX) 0 12 % solution RINSE MOUTH WITH 15 MILLILITERS EVERY 12 HOURS AS DIRECTED 473 mL 3    FLUoxetine (PROZAC) 40 MG capsule Take 80 mg by mouth 2 (two) times a day        LATUDA 20 MG tablet 20 mg daily with breakfast        levocetirizine (XYZAL) 5 MG tablet       LORazepam (ATIVAN) 0 5 mg tablet Take 0 5 mg by mouth 2 (two) times a day      LORazepam (ATIVAN) 1 mg tablet Take 1 mg by mouth 2 (two) times a day       montelukast (SINGULAIR) 10 mg tablet Take 1 tablet (10 mg total) by mouth daily for 360 days 90 tablet 3    polyethylene glycol (MIRALAX) 17 g packet Take 17 g by mouth daily      predniSONE 20 mg tablet Take 40mg PO daily x 3 days beginning 3/19/19, followed by 20mg PO daily x 1 week 13 tablet 0    sodium chloride (OCEAN) 0 65 % nasal spray 2 sprays into each nostril 2 (two) times a day 15 mL 0    verapamil (CALAN-SR) 240 mg CR tablet Take 1 tablet (240 mg total) by mouth daily 90 tablet 3     No current facility-administered medications for this visit  Allergies   Allergen Reactions    Other      Most all antibiotics- hives, hypotensive    "no problem with clindamycin "    Augmentin Es-600  [Amoxicillin-Pot Clavulanate]     Cefuroxime     Erythromycin     Levofloxacin     Morphine     Morphine And Related Hives    Oxycodone-Acetaminophen     Penicillins     Percocet [Oxycodone-Acetaminophen] Hives    Sulfa Antibiotics     Tetracyclines & Related        Review of Systems   Constitutional: Positive for chills and fever  HENT: Positive for congestion (unknown ) and rhinorrhea  Neurological: Positive for headaches  Objective: There were no vitals filed for this visit  Physical Exam  VIRTUAL VISIT DISCLAIMER    [employer] acknowledges that she has consented to an online visit or consultation  She understands that the online visit is based solely on information provided by her, and that, in the absence of a face-to-face physical evaluation by the physician, the diagnosis she receives is both limited and provisional in terms of accuracy and completeness  This is not intended to replace a full medical face-to-face evaluation by the physician  [employer] understands and accepts these terms

## 2021-04-01 NOTE — TELEPHONE ENCOUNTER
PT STATED THAT YESTERDAY SHE STARTED WITH SINUS CONGESTION, POST NASAL DRIP, CHILLS, FATIGUE, A TEMP  3, RALLY BAD BODY ACHES BUT NO COUGH AND NO SOB    PT HASN'T BEEN AROUND ANYONE SICK AND HAD HER FIRST COVID VACCINE ABOUT 2 WEEKS     HER GRANDSON WHO SHE WATCHES DOES HAVE A 'TYPICAL COLD/RUNNY NOSE"    PLEASE ADVISE

## 2021-04-02 ENCOUNTER — TELEMEDICINE (OUTPATIENT)
Dept: INTERNAL MEDICINE CLINIC | Facility: CLINIC | Age: 56
End: 2021-04-02
Payer: COMMERCIAL

## 2021-04-02 ENCOUNTER — TELEPHONE (OUTPATIENT)
Dept: INTERNAL MEDICINE CLINIC | Facility: CLINIC | Age: 56
End: 2021-04-02

## 2021-04-02 DIAGNOSIS — R50.9 FEVER, UNSPECIFIED FEVER CAUSE: Primary | ICD-10-CM

## 2021-04-02 PROCEDURE — 99214 OFFICE O/P EST MOD 30 MIN: CPT | Performed by: INTERNAL MEDICINE

## 2021-04-02 RX ORDER — CLINDAMYCIN HYDROCHLORIDE 300 MG/1
300 CAPSULE ORAL 4 TIMES DAILY
Qty: 40 CAPSULE | Refills: 0 | Status: SHIPPED | OUTPATIENT
Start: 2021-04-02 | End: 2021-04-13 | Stop reason: HOSPADM

## 2021-04-02 NOTE — PROGRESS NOTES
COVID-19 Outpatient Progress Note    Assessment/Plan:    Problem List Items Addressed This Visit        Other    Fever - Primary         Disposition:     I have spent 8 minutes directly with the patient  Greater than 50% of this time was spent in counseling/coordination of care regarding: instructions for management  1  Fever with facial pain, diffuse aches, chills, slight cough  Date of onset of symptoms March 31st- patient tested COVID negative but I feel we should still treated as potential COVID with a false negative test   She has a history of recurrent sinusitis and be because of this empirically also treating with Clindamycin 300 mg qid for 10 days       Encounter provider Deangelo Argueta MD    Provider located at [address] [place] [address] [id number] [facility] [id number] [phone]    Recent Visits  Date Type Provider Dept   04/01/21 [facility] [address],  [facility] Internal Med   04/01/21 Telephone [address] Internal Med   Showing recent visits within past 7 days and meeting all other requirements     Today's Visits  Date Type Provider Dept   04/02/21 [address] [place] [address] [address], MD [address] [place] [address] Internal Med   Showing today's visits and meeting all other requirements     Future Appointments  No visits were found meeting these conditions  Showing future appointments within next 150 days and meeting all other requirements        Patient agrees to participate in a virtual check in via telephone or video visit instead of presenting to the office to address urgent/immediate medical needs  Patient is aware this is a billable service  After connecting through Telephone, the patient was identified by name and date of birth  Jessica Moreno was informed that this was a telemedicine visit and that the exam was being conducted confidentially over secure lines   Jessica Moreno acknowledged consent and understanding of privacy and security of the telemedicine visit  I informed the patient that I have reviewed her record in Epic and presented the opportunity for her to ask any questions regarding the visit today  The patient agreed to participate  Subjective:   Jessica Moreno is a 61 y o  female who has been screened for COVID-19  Patient's symptoms include fever, chills, nasal congestion, cough and headache  Kalani Lu has been staying home and has isolated themselves in her home  She is taking care to not share personal items and is cleaning all surfaces that are touched often, like counters, tabletops, and doorknobs using household cleaning sprays or wipes  She is wearing a mask when she leaves her room  Date of symptom onset: 3/31/2021     This patient continues to feel poorly- she has temperature of 102 8 degrees  She has slight cough  She has myalgias with chills  She has minimal sore throat  She denies GI symptoms  COVID tested came back negative  She is worried this all represents sinusitis with her history of sinus disease and multiple antibiotic allergies  I told the patient I am very concerned that this may represent COVID with a false negative test which can occurred up to 20% of patients  I recommended we treat his COVID that she self quarantine for 10 days from onset of symptoms    Empirically also placed on clindamycin 300 mg qid for 10 days  With her history of significant recurrent sinusitis    Lab Results   Component Value Date    SARSCOV2 Negative 04/01/2021    SARSCOV2 Not Detected 11/06/2020     Past Medical History:   Diagnosis Date    Allergies     Anesthesia complication     V TACH after her reduction mammoplasty, done at [address] Depression     Ethmoid sinusitis     GERD (gastroesophageal reflux disease)     Maxillary sinusitis     Multiple allergies     Myofascial pain syndrome     Nasal turbinate hypertrophy     Wears glasses      Past Surgical History:   Procedure Laterality Date    LAPAROSCOPY      with vaginal hysterectomy; 11/3/2003 rso    OOPHORECTOMY Left 2004    PARTIAL HYSTERECTOMY  2004    LA NASAL/SINUS ENDOSCOPY,RMV TISS MAXILL SINUS N/A 9/30/2016    Procedure: IMAGE GUIDED FUNCTIONAL ENDOSCOPIC SINUS SURGERY;  Surgeon: Nils Watson MD;  [facility];  Service: ENT    REDUCTION MAMMAPLASTY Bilateral 02/27/2015    TOOTH EXTRACTION Right 3/16/2019    Procedure: EXTRACTION TOOTH #1 (Impacted Third Molar Tooth);   Surgeon: Dixie Holter, DDS;  [facility];  Service: Maxillofacial    TUBAL LIGATION      WISDOM TOOTH EXTRACTION       Current Outpatient Medications   Medication Sig Dispense Refill    ARIPiprazole (ABILIFY) 10 mg tablet Take 10 mg by mouth daily      atorvastatin (LIPITOR) 10 mg tablet Take 1 tablet (10 mg total) by mouth daily 90 tablet 3    azelastine (ASTELIN) 0 1 % nasal spray 1 spray into each nostril 2 (two) times a day Use in each nostril as directed 1 Bottle 6    busPIRone (BUSPAR) 30 MG tablet 2 (two) times a day        chlorhexidine (PERIDEX) 0 12 % solution RINSE MOUTH WITH 15 MILLILITERS EVERY 12 HOURS AS DIRECTED 473 mL 3    FLUoxetine (PROZAC) 40 MG capsule Take 80 mg by mouth 2 (two) times a day        LATUDA 20 MG tablet 20 mg daily with breakfast        levocetirizine (XYZAL) 5 MG tablet       LORazepam (ATIVAN) 0 5 mg tablet Take 0 5 mg by mouth 2 (two) times a day      LORazepam (ATIVAN) 1 mg tablet Take 1 mg by mouth 2 (two) times a day       montelukast (SINGULAIR) 10 mg tablet Take 1 tablet (10 mg total) by mouth daily for 360 days 90 tablet 3    polyethylene glycol (MIRALAX) 17 g packet Take 17 g by mouth daily      predniSONE 20 mg tablet Take 40mg PO daily x 3 days beginning 3/19/19, followed by 20mg PO daily x 1 week 13 tablet 0    sodium chloride (OCEAN) 0 65 % nasal spray 2 sprays into each nostril 2 (two) times a day 15 mL 0    verapamil (CALAN-SR) 240 mg CR tablet Take 1 tablet (240 mg total) by mouth daily 90 tablet 3     No current facility-administered medications for this visit  Allergies   Allergen Reactions    Other      Most all antibiotics- hives, hypotensive    "no problem with clindamycin "    Augmentin Es-600  [Amoxicillin-Pot Clavulanate]     Cefuroxime     Erythromycin     Levofloxacin     Morphine     Morphine And Related Hives    Oxycodone-Acetaminophen     Penicillins     Percocet [Oxycodone-Acetaminophen] Hives    Sulfa Antibiotics     Tetracyclines & Related        Review of Systems   Constitutional: Positive for chills and fever  HENT: Positive for congestion  Respiratory: Positive for cough  Neurological: Positive for headaches  Objective: There were no vitals filed for this visit  Physical Exam  VIRTUAL VISIT DISCLAIMER    Alba Mora acknowledges that she has consented to an online visit or consultation  She understands that the online visit is based solely on information provided by her, and that, in the absence of a face-to-face physical evaluation by the physician, the diagnosis she receives is both limited and provisional in terms of accuracy and completeness  This is not intended to replace a full medical face-to-face evaluation by the physician  lAba Mora understands and accepts these terms

## 2021-04-02 NOTE — TELEPHONE ENCOUNTER
PT'S  CALLED, SAID THAT SHE IS FEELING WORSE THEN YESTERDAY       HE SAID THAT HER TEMP IS STILL  6, SHE DOES TAKE TYLENOL BUT IT DOESN'T BRING IT DOWN AND IT INCREASED  8    SHE WANTS YOU TO CALL HER

## 2021-04-02 NOTE — TELEPHONE ENCOUNTER
----- Message from Sarthak Glasgow MD sent at 4/2/2021  9:12 AM EDT -----   Let patient know I sent her script  for clindamycin to the [facility] in [place] that she usually uses

## 2021-04-03 ENCOUNTER — HOSPITAL ENCOUNTER (INPATIENT)
Facility: HOSPITAL | Age: 56
LOS: 10 days | Discharge: HOME | DRG: 871 | End: 2021-04-13
Attending: EMERGENCY MEDICINE | Admitting: INTERNAL MEDICINE
Payer: COMMERCIAL

## 2021-04-03 ENCOUNTER — APPOINTMENT (EMERGENCY)
Dept: RADIOLOGY | Facility: HOSPITAL | Age: 56
DRG: 871 | End: 2021-04-03
Payer: COMMERCIAL

## 2021-04-03 DIAGNOSIS — E87.1 HYPONATREMIA: ICD-10-CM

## 2021-04-03 DIAGNOSIS — J18.9 PNEUMONIA: Primary | ICD-10-CM

## 2021-04-03 DIAGNOSIS — J32.9 SINUSITIS: ICD-10-CM

## 2021-04-03 DIAGNOSIS — J96.01 ACUTE RESPIRATORY FAILURE WITH HYPOXIA (HCC): ICD-10-CM

## 2021-04-03 DIAGNOSIS — D72.829 LEUKOCYTOSIS: ICD-10-CM

## 2021-04-03 PROBLEM — R94.6 ABNORMAL THYROID FUNCTION TEST: Status: ACTIVE | Noted: 2021-04-03

## 2021-04-03 PROBLEM — Z88.1 ALLERGY TO MULTIPLE ANTIBIOTICS: Status: ACTIVE | Noted: 2021-04-03

## 2021-04-03 LAB
ALBUMIN SERPL BCP-MCNC: 2.7 G/DL (ref 3.5–5)
ALP SERPL-CCNC: 95 U/L (ref 46–116)
ALT SERPL W P-5'-P-CCNC: 28 U/L (ref 12–78)
ANION GAP SERPL CALCULATED.3IONS-SCNC: 10 MMOL/L (ref 4–13)
ANION GAP SERPL CALCULATED.3IONS-SCNC: 7 MMOL/L (ref 4–13)
ANION GAP SERPL CALCULATED.3IONS-SCNC: 8 MMOL/L (ref 4–13)
APTT PPP: 27 SECONDS (ref 23–37)
AST SERPL W P-5'-P-CCNC: 26 U/L (ref 5–45)
BACTERIA UR QL AUTO: ABNORMAL /HPF
BASOPHILS # BLD MANUAL: 0 THOUSAND/UL (ref 0–0.1)
BASOPHILS NFR MAR MANUAL: 0 % (ref 0–1)
BILIRUB SERPL-MCNC: 0.5 MG/DL (ref 0.2–1)
BILIRUB UR QL STRIP: NEGATIVE
BUN SERPL-MCNC: 11 MG/DL (ref 5–25)
BUN SERPL-MCNC: 11 MG/DL (ref 5–25)
BUN SERPL-MCNC: 12 MG/DL (ref 5–25)
CALCIUM ALBUM COR SERPL-MCNC: 9.9 MG/DL (ref 8.3–10.1)
CALCIUM SERPL-MCNC: 8.7 MG/DL (ref 8.3–10.1)
CALCIUM SERPL-MCNC: 8.8 MG/DL (ref 8.3–10.1)
CALCIUM SERPL-MCNC: 8.9 MG/DL (ref 8.3–10.1)
CHLORIDE SERPL-SCNC: 95 MMOL/L (ref 100–108)
CHLORIDE SERPL-SCNC: 96 MMOL/L (ref 100–108)
CHLORIDE SERPL-SCNC: 98 MMOL/L (ref 100–108)
CLARITY UR: CLEAR
CO2 SERPL-SCNC: 22 MMOL/L (ref 21–32)
CO2 SERPL-SCNC: 24 MMOL/L (ref 21–32)
CO2 SERPL-SCNC: 25 MMOL/L (ref 21–32)
COLOR UR: YELLOW
CREAT SERPL-MCNC: 0.64 MG/DL (ref 0.6–1.3)
CREAT SERPL-MCNC: 0.67 MG/DL (ref 0.6–1.3)
CREAT SERPL-MCNC: 0.69 MG/DL (ref 0.6–1.3)
EOSINOPHIL # BLD MANUAL: 0 THOUSAND/UL (ref 0–0.4)
EOSINOPHIL NFR BLD MANUAL: 0 % (ref 0–6)
ERYTHROCYTE [DISTWIDTH] IN BLOOD BY AUTOMATED COUNT: 12.7 % (ref 11.6–15.1)
FLUAV RNA RESP QL NAA+PROBE: NEGATIVE
FLUBV RNA RESP QL NAA+PROBE: NEGATIVE
GFR SERPL CREATININE-BSD FRML MDRD: 97 ML/MIN/1.73SQ M
GFR SERPL CREATININE-BSD FRML MDRD: 98 ML/MIN/1.73SQ M
GFR SERPL CREATININE-BSD FRML MDRD: 99 ML/MIN/1.73SQ M
GLUCOSE SERPL-MCNC: 109 MG/DL (ref 65–140)
GLUCOSE SERPL-MCNC: 120 MG/DL (ref 65–140)
GLUCOSE SERPL-MCNC: 130 MG/DL (ref 65–140)
GLUCOSE UR STRIP-MCNC: NEGATIVE MG/DL
HCT VFR BLD AUTO: 35.5 % (ref 34.8–46.1)
HGB BLD-MCNC: 11.8 G/DL (ref 11.5–15.4)
HGB UR QL STRIP.AUTO: ABNORMAL
HYALINE CASTS #/AREA URNS LPF: ABNORMAL /LPF
INR PPP: 1.12 (ref 0.84–1.19)
KETONES UR STRIP-MCNC: ABNORMAL MG/DL
L PNEUMO1 AG UR QL IA.RAPID: NEGATIVE
LACTATE SERPL-SCNC: 1.4 MMOL/L (ref 0.5–2)
LEUKOCYTE ESTERASE UR QL STRIP: NEGATIVE
LG PLATELETS BLD QL SMEAR: PRESENT
LYMPHOCYTES # BLD AUTO: 0.93 THOUSAND/UL (ref 0.6–4.47)
LYMPHOCYTES # BLD AUTO: 6 % (ref 14–44)
MCH RBC QN AUTO: 29.5 PG (ref 26.8–34.3)
MCHC RBC AUTO-ENTMCNC: 33.2 G/DL (ref 31.4–37.4)
MCV RBC AUTO: 89 FL (ref 82–98)
MONOCYTES # BLD AUTO: 0.31 THOUSAND/UL (ref 0–1.22)
MONOCYTES NFR BLD: 2 % (ref 4–12)
NEUTROPHILS # BLD MANUAL: 14.32 THOUSAND/UL (ref 1.85–7.62)
NEUTS BAND NFR BLD MANUAL: 3 % (ref 0–8)
NEUTS SEG NFR BLD AUTO: 89 % (ref 43–75)
NITRITE UR QL STRIP: NEGATIVE
NON-SQ EPI CELLS URNS QL MICRO: ABNORMAL /HPF
NRBC BLD AUTO-RTO: 0 /100 WBCS
OSMOLALITY UR: 489 MMOL/KG
PH UR STRIP.AUTO: 6.5 [PH]
PLATELET # BLD AUTO: 144 THOUSANDS/UL (ref 149–390)
PLATELET BLD QL SMEAR: ABNORMAL
PMV BLD AUTO: 11.7 FL (ref 8.9–12.7)
POTASSIUM SERPL-SCNC: 3.5 MMOL/L (ref 3.5–5.3)
POTASSIUM SERPL-SCNC: 3.6 MMOL/L (ref 3.5–5.3)
POTASSIUM SERPL-SCNC: 4.1 MMOL/L (ref 3.5–5.3)
PROCALCITONIN SERPL-MCNC: 0.86 NG/ML
PROCALCITONIN SERPL-MCNC: 1.08 NG/ML
PROT SERPL-MCNC: 6.8 G/DL (ref 6.4–8.2)
PROT UR STRIP-MCNC: ABNORMAL MG/DL
PROTHROMBIN TIME: 14.4 SECONDS (ref 11.6–14.5)
RBC # BLD AUTO: 4 MILLION/UL (ref 3.81–5.12)
RBC #/AREA URNS AUTO: ABNORMAL /HPF
RBC MORPH BLD: NORMAL
RSV RNA RESP QL NAA+PROBE: NEGATIVE
S PNEUM AG UR QL: NEGATIVE
SARS-COV-2 RNA RESP QL NAA+PROBE: NEGATIVE
SODIUM 24H UR-SCNC: <5 MOL/L
SODIUM SERPL-SCNC: 127 MMOL/L (ref 136–145)
SODIUM SERPL-SCNC: 128 MMOL/L (ref 136–145)
SODIUM SERPL-SCNC: 130 MMOL/L (ref 136–145)
SP GR UR STRIP.AUTO: 1.04 (ref 1–1.03)
TOTAL CELLS COUNTED SPEC: 100
TSH SERPL DL<=0.05 MIU/L-ACNC: 0.28 UIU/ML (ref 0.36–3.74)
UROBILINOGEN UR QL STRIP.AUTO: 1 E.U./DL
WBC # BLD AUTO: 15.56 THOUSAND/UL (ref 4.31–10.16)
WBC #/AREA URNS AUTO: ABNORMAL /HPF

## 2021-04-03 PROCEDURE — 84300 ASSAY OF URINE SODIUM: CPT | Performed by: EMERGENCY MEDICINE

## 2021-04-03 PROCEDURE — 87449 NOS EACH ORGANISM AG IA: CPT | Performed by: EMERGENCY MEDICINE

## 2021-04-03 PROCEDURE — 80048 BASIC METABOLIC PNL TOTAL CA: CPT | Performed by: INTERNAL MEDICINE

## 2021-04-03 PROCEDURE — 83935 ASSAY OF URINE OSMOLALITY: CPT | Performed by: EMERGENCY MEDICINE

## 2021-04-03 PROCEDURE — 84145 PROCALCITONIN (PCT): CPT | Performed by: EMERGENCY MEDICINE

## 2021-04-03 PROCEDURE — 99223 1ST HOSP IP/OBS HIGH 75: CPT | Performed by: INTERNAL MEDICINE

## 2021-04-03 PROCEDURE — RECHECK: Performed by: INTERNAL MEDICINE

## 2021-04-03 PROCEDURE — 96374 THER/PROPH/DIAG INJ IV PUSH: CPT

## 2021-04-03 PROCEDURE — 84145 PROCALCITONIN (PCT): CPT | Performed by: INTERNAL MEDICINE

## 2021-04-03 PROCEDURE — 93005 ELECTROCARDIOGRAM TRACING: CPT

## 2021-04-03 PROCEDURE — 71260 CT THORAX DX C+: CPT

## 2021-04-03 PROCEDURE — 99285 EMERGENCY DEPT VISIT HI MDM: CPT | Performed by: EMERGENCY MEDICINE

## 2021-04-03 PROCEDURE — 85027 COMPLETE CBC AUTOMATED: CPT | Performed by: EMERGENCY MEDICINE

## 2021-04-03 PROCEDURE — 85007 BL SMEAR W/DIFF WBC COUNT: CPT | Performed by: EMERGENCY MEDICINE

## 2021-04-03 PROCEDURE — 71045 X-RAY EXAM CHEST 1 VIEW: CPT

## 2021-04-03 PROCEDURE — 85610 PROTHROMBIN TIME: CPT | Performed by: EMERGENCY MEDICINE

## 2021-04-03 PROCEDURE — 87040 BLOOD CULTURE FOR BACTERIA: CPT | Performed by: EMERGENCY MEDICINE

## 2021-04-03 PROCEDURE — 99285 EMERGENCY DEPT VISIT HI MDM: CPT

## 2021-04-03 PROCEDURE — 36415 COLL VENOUS BLD VENIPUNCTURE: CPT | Performed by: EMERGENCY MEDICINE

## 2021-04-03 PROCEDURE — 84443 ASSAY THYROID STIM HORMONE: CPT | Performed by: INTERNAL MEDICINE

## 2021-04-03 PROCEDURE — 83605 ASSAY OF LACTIC ACID: CPT | Performed by: EMERGENCY MEDICINE

## 2021-04-03 PROCEDURE — 0241U HB NFCT DS VIR RESP RNA 4 TRGT: CPT | Performed by: EMERGENCY MEDICINE

## 2021-04-03 PROCEDURE — 80053 COMPREHEN METABOLIC PANEL: CPT | Performed by: EMERGENCY MEDICINE

## 2021-04-03 PROCEDURE — 81001 URINALYSIS AUTO W/SCOPE: CPT | Performed by: EMERGENCY MEDICINE

## 2021-04-03 PROCEDURE — 85730 THROMBOPLASTIN TIME PARTIAL: CPT | Performed by: EMERGENCY MEDICINE

## 2021-04-03 RX ORDER — KETOROLAC TROMETHAMINE 30 MG/ML
15 INJECTION, SOLUTION INTRAMUSCULAR; INTRAVENOUS ONCE
Status: COMPLETED | OUTPATIENT
Start: 2021-04-03 | End: 2021-04-03

## 2021-04-03 RX ORDER — BUSPIRONE HYDROCHLORIDE 10 MG/1
30 TABLET ORAL 2 TIMES DAILY
Status: DISCONTINUED | OUTPATIENT
Start: 2021-04-03 | End: 2021-04-03

## 2021-04-03 RX ORDER — MONTELUKAST SODIUM 10 MG/1
10 TABLET ORAL DAILY
Status: DISCONTINUED | OUTPATIENT
Start: 2021-04-03 | End: 2021-04-03

## 2021-04-03 RX ORDER — LORAZEPAM 0.5 MG/1
0.5 TABLET ORAL 2 TIMES DAILY
Status: DISCONTINUED | OUTPATIENT
Start: 2021-04-03 | End: 2021-04-05

## 2021-04-03 RX ORDER — AZELASTINE 1 MG/ML
1 SPRAY, METERED NASAL 2 TIMES DAILY
Status: DISCONTINUED | OUTPATIENT
Start: 2021-04-03 | End: 2021-04-13 | Stop reason: HOSPADM

## 2021-04-03 RX ORDER — MELATONIN
2000 DAILY
COMMUNITY

## 2021-04-03 RX ORDER — ACETAMINOPHEN 325 MG/1
650 TABLET ORAL EVERY 6 HOURS PRN
Status: DISCONTINUED | OUTPATIENT
Start: 2021-04-03 | End: 2021-04-13 | Stop reason: HOSPADM

## 2021-04-03 RX ORDER — FLUOXETINE HYDROCHLORIDE 20 MG/1
80 CAPSULE ORAL DAILY
Status: DISCONTINUED | OUTPATIENT
Start: 2021-04-03 | End: 2021-04-13 | Stop reason: HOSPADM

## 2021-04-03 RX ORDER — VERAPAMIL HYDROCHLORIDE 240 MG/1
240 TABLET, FILM COATED, EXTENDED RELEASE ORAL DAILY
Status: DISCONTINUED | OUTPATIENT
Start: 2021-04-03 | End: 2021-04-05

## 2021-04-03 RX ORDER — ATORVASTATIN CALCIUM 10 MG/1
10 TABLET, FILM COATED ORAL DAILY
Status: DISCONTINUED | OUTPATIENT
Start: 2021-04-03 | End: 2021-04-13 | Stop reason: HOSPADM

## 2021-04-03 RX ORDER — ONDANSETRON 2 MG/ML
4 INJECTION INTRAMUSCULAR; INTRAVENOUS EVERY 6 HOURS PRN
Status: DISCONTINUED | OUTPATIENT
Start: 2021-04-03 | End: 2021-04-13 | Stop reason: HOSPADM

## 2021-04-03 RX ORDER — ALBUTEROL SULFATE 90 UG/1
2 AEROSOL, METERED RESPIRATORY (INHALATION) EVERY 4 HOURS PRN
Status: DISCONTINUED | OUTPATIENT
Start: 2021-04-03 | End: 2021-04-13 | Stop reason: HOSPADM

## 2021-04-03 RX ORDER — BUSPIRONE HYDROCHLORIDE 10 MG/1
10 TABLET ORAL EVERY EVENING
Status: DISCONTINUED | OUTPATIENT
Start: 2021-04-04 | End: 2021-04-13 | Stop reason: HOSPADM

## 2021-04-03 RX ORDER — SODIUM CHLORIDE 9 MG/ML
125 INJECTION, SOLUTION INTRAVENOUS CONTINUOUS
Status: DISCONTINUED | OUTPATIENT
Start: 2021-04-03 | End: 2021-04-05

## 2021-04-03 RX ORDER — FLUOXETINE HYDROCHLORIDE 20 MG/1
80 CAPSULE ORAL 2 TIMES DAILY
Status: DISCONTINUED | OUTPATIENT
Start: 2021-04-03 | End: 2021-04-03

## 2021-04-03 RX ORDER — ARIPIPRAZOLE 10 MG/1
10 TABLET ORAL DAILY
Status: DISCONTINUED | OUTPATIENT
Start: 2021-04-03 | End: 2021-04-03

## 2021-04-03 RX ADMIN — IOHEXOL 85 ML: 350 INJECTION, SOLUTION INTRAVENOUS at 03:38

## 2021-04-03 RX ADMIN — ENOXAPARIN SODIUM 40 MG: 40 INJECTION SUBCUTANEOUS at 09:50

## 2021-04-03 RX ADMIN — LORAZEPAM 0.5 MG: 0.5 TABLET ORAL at 09:46

## 2021-04-03 RX ADMIN — SODIUM CHLORIDE 125 ML/HR: 0.9 INJECTION, SOLUTION INTRAVENOUS at 23:34

## 2021-04-03 RX ADMIN — CEFTRIAXONE 1000 MG: 1 INJECTION, POWDER, FOR SOLUTION INTRAMUSCULAR; INTRAVENOUS at 16:50

## 2021-04-03 RX ADMIN — FLUOXETINE 80 MG: 20 CAPSULE ORAL at 09:47

## 2021-04-03 RX ADMIN — BUSPIRONE HYDROCHLORIDE 10 MG: 10 TABLET ORAL at 20:49

## 2021-04-03 RX ADMIN — KETOROLAC TROMETHAMINE 15 MG: 30 INJECTION, SOLUTION INTRAMUSCULAR at 02:47

## 2021-04-03 RX ADMIN — ACETAMINOPHEN 650 MG: 325 TABLET, FILM COATED ORAL at 23:33

## 2021-04-03 RX ADMIN — LORAZEPAM 0.5 MG: 0.5 TABLET ORAL at 18:07

## 2021-04-03 RX ADMIN — CEFEPIME HYDROCHLORIDE 2000 MG: 2 INJECTION, POWDER, FOR SOLUTION INTRAVENOUS at 05:37

## 2021-04-03 RX ADMIN — SODIUM CHLORIDE 125 ML/HR: 0.9 INJECTION, SOLUTION INTRAVENOUS at 15:07

## 2021-04-03 RX ADMIN — ATORVASTATIN CALCIUM 10 MG: 10 TABLET, FILM COATED ORAL at 20:50

## 2021-04-03 RX ADMIN — ACETAMINOPHEN 650 MG: 325 TABLET, FILM COATED ORAL at 13:25

## 2021-04-03 RX ADMIN — SODIUM CHLORIDE 125 ML/HR: 0.9 INJECTION, SOLUTION INTRAVENOUS at 06:48

## 2021-04-03 NOTE — PROGRESS NOTES
[address] 1965, 61 y o  female MRN: [id number]  Unit/Bed#: CW2 218-01 Encounter: [id number]  Primary Care Provider: Moreno Bowman MD   Date and time admitted to hospital: 4/3/2021  1:47 AM    [facility] Internal Medicine Post Admit Check:     Date of visit: 04/03/21    The patient was admitted earlier to the [facility] Internal Medicine Service  Please see initial intake history and physical for detailed admission history  This is a supplemental update following a post admit checkup  Subjective: reports not feeling well overall  Started with diarrhea this AM  Ongoing nasal congestion, nonproductive cough  Not overly SOB but oxygen does help  Exam:   Gen: awake, alert, oriented  NAD  On 2L via NC  CV:RRR  Lungs: decreased, overall clear without wheezes  Abd: soft, nontender, normal BS  Ext: no edema  Skin: no diaphoresis    I offered to call family with update, patient politely declined  Assessment and Plan:  * Community acquired pneumonia of right lower lobe of lung  Assessment & Plan  Presents with 4 day history of reported flu-like symptoms and sinus congestion, initially treated by PCP as acute sinusitis with clindamycin    Presented to ER with worsening symptoms and increased WOB  · X-ray and CT chest suspicious for multifocal pneumonia; notably COVID-19/influenza/RSV PCR negative and patient with recent prior negative COVID-19 test     · Procalcitonin is additionally noted as elevated  · Patient with reported multiple allergies to antibiotics, seems to have tolerated cefepime provided in ED will continue for now  · Consult ID for additional input given several allergies  · Follow up results of urine Legionella/strep, blood cultures, check sputum culture if able   · Trend procal  · Trend WBC, temperature curve, hemodynamics  · Continue supplemental oxygen and titrate as needed to maintain SaO2 greater than 88%    Allergy to multiple antibiotics  Assessment & Plan  Consult ID as above  · For now, seems to be tolerating cefepime    Hyponatremia  Assessment & Plan  Sodium 127 on admission, possible hypovolemia as patient admits to decreased oral intake  · Urine Na < 5, UOsm 489  · Started IV fluids for now and will f/u repeat BMP this afternoon  · TSH low, check FTF  · If persistently abnormal despite IVF, consider renal input     Abnormal thyroid function test  Assessment & Plan  TSH low, check FT4  · Would likely repeat as outpatient in 4 weeks when not acutely ill    Tobacco use  Assessment & Plan  · Nicotine patch offered however patient declines  · Counseled on smoking cessation    HTN (hypertension)  Assessment & Plan  · Continue home verapamil with hold parameters  · Monitor blood pressure per protocol    Depression with anxiety  Assessment & Plan  · Mood stable  · Continue home medications       Kayla El PA-C

## 2021-04-03 NOTE — H&P
[address] [facility] 1965, 61 y o  female MRN: [id number]  Unit/Bed#: CW2 218-01 Encounter: [id number]  Primary Care Provider: Emanuel Sanderson MD   Date and time admitted to hospital: 4/3/2021  1:47 AM    * Community acquired pneumonia of right lower lobe of lung  Assessment & Plan  · Patient with 4 day history of reported flu-like symptoms and sinus congestion, initially treated by PCP as acute sinusitis with clindamycin  Presented this evening with worsening symptoms and now increased work of breathing  · X-ray and CT chest suspicious for multifocal pneumonia; notably COVID-19/influenza/RSV PCR negative and patient with recent prior negative COVID-19 test   Procalcitonin is additionally noted as elevated  · Patient with reported multiple allergies to antibiotics, seems to have tolerated cefepime provided in ED will continue for now  Low threshold to discuss with ID if reaction should occur  · Follow up results of urine Legionella/strep, check sputum culture  Repeat procalcitonin tomorrow  · Trend WBC, temperature curve, hemodynamics  · Continue supplemental oxygen and titrate as needed to maintain SaO2 greater than 88%    Hyponatremia  Assessment & Plan  · Sodium 127 on admission, possible hypovolemia as patient admits to decreased oral intake  · Urine studies were obtained in ED, will follow-up    Start IV fluids for now and will repeat BMP this afternoon  · Follow-up TSH level    Tobacco use  Assessment & Plan  · Nicotine patch offered however patient declines  · Counseled on smoking cessation    HTN (hypertension)  Assessment & Plan  · Continue home verapamil with hold parameters  · Monitor blood pressure per protocol    Depression with anxiety  Assessment & Plan  · Mood stable, continue BuSpar, Abilify, Prozac, Ativan        VTE Prophylaxis: Enoxaparin (Lovenox)  / sequential compression device   Code Status: Level 1 - Full Code  POLST: POLST form is not discussed and not completed at this time  Discussion with family:  Offered however patient declines at this time    Anticipated Length of Stay:  Patient will be admitted on an Inpatient basis with an anticipated length of stay of  greater than 2 midnights  Justification for Hospital Stay: Please see detailed plans noted above  Chief Complaint:     Fever, sinus pain, fatigue  History of Present Illness:  Beverley Taylor is a 61 y o  female who presented with worsening symptoms of apparent sinus infection and increased work of breathing  Has past medical history significant for recurrent sinusitis s/p bilateral antrectomy, anxiety/depression, hypertension, and reported history multiple antibiotic allergies with apparent development of urticaria with some antibiotics  She reported onset of symptoms similar to prior apparent infectious sinusitis episodes including fevers/chills, nasal congestion, rhinorrhea, and headache approximately 03/31/2021, additionally associated with decreased appetite and subsequently diminished oral intake  She was seen by her PCP via telemedicine visit 04/01/2021 and empirically treated with clindamycin; additionally COVID-19 PCR was ordered and obtained and found to be negative  Unfortunately patient had worsening of symptoms and presented this evening for further evaluation; additionally was noted by family with increased work of breathing however patient denies any shortness of breath herself  Was found with hypoxia to 88% on ED arrival improved with 3 L NC  Subsequent workup with CXR and CT chest suspicious for multifocal pneumonia; a repeat COVID-19/influenza/RSV PCR was found to be negative  Additionally patient was found hyponatremic to 127 which urine studies were obtained  She was started on cefepime in light of multiple allergies and is admitted for further treatment of apparent community-acquired pneumonia      Currently, patient is lying in bed in no acute distress, feeling somewhat improved from presentation  Without other acute complaints at this time aside from those previously mentioned  Review of Systems:    Constitutional:  Endorsed fever, chills, decreased appetite, fatigue  Eyes:  Denies change in visual acuity   HENT:  Denies sore throat but endorsed nasal congestion and sinus pain  Respiratory:  Denies shortness of breath but endorses cough  Cardiovascular:  Denies chest pain or edema   GI:  Denies abdominal pain, nausea, vomiting, bloody stools or diarrhea   :  Denies dysuria   Musculoskeletal:  Denies back pain or joint pain   Integument:  Denies rash   Neurologic:  Denies headache, focal weakness or sensory changes   Endocrine:  Denies polyuria or polydipsia   Lymphatic:  Denies swollen glands   Psychiatric:  Denies depression or anxiety     Past Medical and Surgical History:   Past Medical History:   Diagnosis Date    Allergies     Anesthesia complication     V TACH after her reduction mammoplasty, done at [address] [place] [address] Ethmoid sinusitis     GERD (gastroesophageal reflux disease)     Maxillary sinusitis     Multiple allergies     Myofascial pain syndrome     Nasal turbinate hypertrophy     Wears glasses      Past Surgical History:   Procedure Laterality Date    LAPAROSCOPY      with vaginal hysterectomy; 11/3/2003 rso    OOPHORECTOMY Left 2004    PARTIAL HYSTERECTOMY  2004    LA NASAL/SINUS ENDOSCOPY,RMV TISS MAXILL SINUS N/A 9/30/2016    Procedure: IMAGE GUIDED FUNCTIONAL ENDOSCOPIC SINUS SURGERY;  Surgeon: Rosalio Coronel MD;  [facility];  Service: ENT    REDUCTION MAMMAPLASTY Bilateral 02/27/2015    TOOTH EXTRACTION Right 3/16/2019    Procedure: EXTRACTION TOOTH #1 (Impacted Third Molar Tooth);   Surgeon: Tyler Rodriguez DDS;  [facility];  Service: Maxillofacial    TUBAL LIGATION      WISDOM TOOTH EXTRACTION         Meds/Allergies:  Medications Prior to Admission   Medication    atorvastatin (LIPITOR) 10 mg tablet    busPIRone (BUSPAR) 30 MG tablet    clindamycin (CLEOCIN) 300 MG capsule    FLUoxetine (PROZAC) 40 MG capsule    LORazepam (ATIVAN) 1 mg tablet    verapamil (CALAN-SR) 240 mg CR tablet    ARIPiprazole (ABILIFY) 10 mg tablet    azelastine (ASTELIN) 0 1 % nasal spray    chlorhexidine (PERIDEX) 0 12 % solution    LATUDA 20 MG tablet    levocetirizine (XYZAL) 5 MG tablet    LORazepam (ATIVAN) 0 5 mg tablet    montelukast (SINGULAIR) 10 mg tablet    polyethylene glycol (MIRALAX) 17 g packet    predniSONE 20 mg tablet    sodium chloride (OCEAN) 0 65 % nasal spray       Allergies:    Allergies   Allergen Reactions    Other      Most all antibiotics- hives, hypotensive    "no problem with clindamycin "    Augmentin Es-600  [Amoxicillin-Pot Clavulanate]     Cefuroxime     Erythromycin     Levofloxacin     Morphine     Morphine And Related Hives    Oxycodone-Acetaminophen     Penicillins     Percocet [Oxycodone-Acetaminophen] Hives    Sulfa Antibiotics     Tetracyclines & Related      History:  Marital Status: /Civil Union     Substance Use History:   Social History     Substance and Sexual Activity   Alcohol Use Not Currently    Comment: social     Social History     Tobacco Use   Smoking Status Current Some Day Smoker    Types: Cigarettes   Smokeless Tobacco Never Used     Social History     Substance and Sexual Activity   Drug Use No       Family History:  Family History   Problem Relation Age of Onset    CLEM disease Mother     Atrial fibrillation Mother     Atrial fibrillation Father     Heart disease Father     Diabetes Maternal Grandmother     Hypertension Maternal Grandmother     Colon cancer Maternal Grandfather     Diabetes Maternal Grandfather     Cancer Paternal Grandfather     Endometrial cancer Cousin     Breast cancer Cousin     Multiple sclerosis Sister     No Known Problems Son     No Known Problems Son        Physical Exam: Vitals:   Blood Pressure: 119/56 (04/03/21 0150)  Pulse: 79 (04/03/21 0150)  Temperature: 98 7 °F (37 1 °C) (04/03/21 0150)  Temp Source: Oral (04/03/21 0150)  Respirations: 22 (04/03/21 0150)  Height: 5' 5" (165 1 cm) (04/03/21 0150)  Weight - Scale: 83 9 kg (185 lb) (04/03/21 0150)  SpO2: 95 % (04/03/21 0150)    Constitutional:  Well developed, well nourished, no acute distress, non-toxic appearance   Eyes:  PERRL, conjunctiva normal   HENT:  Atraumatic, external ears normal, nose normal, oropharynx moist, no pharyngeal exudates  Neck- normal range of motion, no tenderness, supple   Respiratory:  No respiratory distress, normal breath sounds, no rales, no wheezing   Cardiovascular:  Normal rate, normal rhythm, no murmurs, no gallops, no rubs   GI:  Soft, nondistended, normal bowel sounds, nontender, no organomegaly, no mass, no rebound, no guarding   :  No costovertebral angle tenderness   Musculoskeletal:  No edema, no tenderness, no deformities  Back- no tenderness  Integument:  Well hydrated, no rash   Lymphatic:  No lymphadenopathy noted   Neurologic:  Alert &awake, communicative, CN 2-12 normal, normal motor function, normal sensory function, no focal deficits noted   Psychiatric:  Speech and behavior appropriate       Lab Results: I have personally reviewed pertinent reports  Results from last 7 days   Lab Units 04/03/21  0232   WBC Thousand/uL 15 56*   HEMOGLOBIN g/dL 11 8   HEMATOCRIT % 35 5   PLATELETS Thousands/uL 144*   LYMPHO PCT % 6*   MONO PCT % 2*   EOS PCT % 0     Results from last 7 days   Lab Units 04/03/21  0232   POTASSIUM mmol/L 3 5   CHLORIDE mmol/L 95*   CO2 mmol/L 22   BUN mg/dL 11   CREATININE mg/dL 0 64   CALCIUM mg/dL 8 9   ALK PHOS U/L 95   ALT U/L 28   AST U/L 26     Results from last 7 days   Lab Units 04/03/21  0232   INR  1 12       EKG: Normal sinus rhythm    Imaging: I have personally reviewed pertinent reports        Ct Chest W Contrast    Result Date: 4/3/2021  Narrative: CT CHEST WITH IV CONTRAST INDICATION:   shortness of breath  COMPARISON:  None  TECHNIQUE: CT examination of the chest was performed  Axial, sagittal, and coronal 2D reformatted images were created from the source data and submitted for interpretation  Radiation dose length product (DLP) for this visit:  384 31 mGy-cm   This examination, like all CT scans performed in the [facility], was performed utilizing techniques to minimize radiation dose exposure, including the use of iterative  reconstruction and automated exposure control  IV Contrast:  85 mL of iohexol (OMNIPAQUE) FINDINGS: LUNGS:  Multifocal mostly consolidative airspace disease with a perihilar predominance  There is no tracheal or endobronchial lesion  PLEURA:  Small bilateral pleural effusions  HEART/GREAT VESSELS:  Unremarkable for patient's age  MEDIASTINUM AND OLAMIDE:  Unremarkable  CHEST WALL AND LOWER NECK:   Unremarkable  VISUALIZED STRUCTURES IN THE UPPER ABDOMEN:  Unremarkable  OSSEOUS STRUCTURES:  No acute fracture or destructive osseous lesion  Impression: Multifocal mostly consolidative airspace disease with a perihilar predominance most likely to represent pneumonia Workstation performed: [id number]         ** Please Note: Dragon 360 Dictation voice to text software was used in the creation of this document   **

## 2021-04-03 NOTE — ASSESSMENT & PLAN NOTE
Sodium 127 on admission, possible hypovolemia as patient admits to decreased oral intake  · Urine Na < 5, UOsm 489  · Started IV fluids for now and will f/u repeat BMP this afternoon  · TSH low, check FTF  · If persistently abnormal despite IVF, consider renal input

## 2021-04-03 NOTE — ED ATTENDING ATTESTATION
Lizabeth Rosenthal DO, saw and evaluated the patient  I have discussed the patient with the resident/non-physician practitioner and agree with the resident's/non-physician practitioner's findings, Plan of Care, and MDM as documented in the resident's/non-physician practitioner's note, except where noted  All available labs and Radiology studies were reviewed  At this point I agree with the current assessment done in the Emergency Department  I have conducted an independent evaluation of this patient including a focused history and a physical exam     ED Note - Nickolas Mathias 61 y o  female MRN: [id number]  Unit/Bed#: ED 26 Encounter: [id number]    History of Present Illness   HPI  Nickolas Mathias is a 61 y o  female who presents for evaluation of malaise, fatigue and generalized myalgias associated with fever T-max 103°  Patient describes rhinorrhea nasal congestion with postnasal drip  Has been tolerating p o  Liquid intake  Patient recently had COVID vaccination but was tested negative approximately 2 days ago  No chest pain, shortness of breath, nausea vomiting or dizziness  No lower extremity edema or swelling  No urinary symptoms  No wounds or skin breakdown reported  REVIEW OF SYSTEMS  See HPI for further details  12 systems reviewed and otherwise negative except as noted     Historical Information     PAST MEDICAL HISTORY  Past Medical History:   Diagnosis Date    Allergies     Anesthesia complication     V TACH after her reduction mammoplasty, done at [address] Depression     Ethmoid sinusitis     GERD (gastroesophageal reflux disease)     Maxillary sinusitis     Multiple allergies     Myofascial pain syndrome     Nasal turbinate hypertrophy     Wears glasses        FAMILY HISTORY  Family History   Problem Relation Age of Onset    CLEM disease Mother     Atrial fibrillation Mother     Atrial fibrillation Father     Heart disease Father     Diabetes Maternal Grandmother     Hypertension Maternal Grandmother     Colon cancer Maternal Grandfather     Diabetes Maternal Grandfather     Cancer Paternal Grandfather     Endometrial cancer Cousin     Breast cancer Cousin     Multiple sclerosis Sister     No Known Problems Son     No Known Problems Son        SOCIAL HISTORY  Social History     Socioeconomic History    Marital status: /Civil Union     Spouse name: Micki Ashraf Number of children: 2    Years of education: None    Highest education level: None   Occupational History    None   Social Needs    Financial resource strain: None    Food insecurity     Worry: None     Inability: None    Transportation needs     Medical: None     Non-medical: None   Tobacco Use    Smoking status: Current Some Day Smoker     Types: Cigarettes    Smokeless tobacco: Never Used   Substance and Sexual Activity    Alcohol use: Not Currently     Comment: social    Drug use: No    Sexual activity: None   Lifestyle    Physical activity     Days per week: 5 days     Minutes per session: 30 min    Stress: None   Relationships    Social connections     Talks on phone: None     Gets together: None     Attends Baptism service: None     Active member of club or organization: None     Attends meetings of clubs or organizations: None     Relationship status: None    Intimate partner violence     Fear of current or ex partner: None     Emotionally abused: None     Physically abused: None     Forced sexual activity: None   Other Topics Concern    None   Social History Narrative    Caffeine use    Daily coffee consumption ( 1 cup/day)    Daily cola consumption (3 cans/day)        Patient lives in a home that was built in [address] delfin in the bedroom     Unfinished basement-dry-damp-no mold-musty smell     Dehumidifier in the basement     No air  or purifiers     No humidifier-has c-pap     Home is smoke free     Window air conditioning Patient lives close the wooded area and open fields         Dog(Jose A) he is allowed in the bedroom         Caffeine: 2 cups of coffee daily                     Occasionally drinks ice tea and diet soda                     Hot tea rarely     Chocolate consumed occasionally         Patient lives with spouse            SURGICAL HISTORY  Past Surgical History:   Procedure Laterality Date    LAPAROSCOPY      with vaginal hysterectomy; 11/3/2003 rso    OOPHORECTOMY Left 2004    PARTIAL HYSTERECTOMY  2004    OK NASAL/SINUS ENDOSCOPY,RMV TISS MAXILL SINUS N/A 9/30/2016    Procedure: IMAGE GUIDED FUNCTIONAL ENDOSCOPIC SINUS SURGERY;  Surgeon: Derrek Lowery MD;  [facility];  Service: ENT    REDUCTION MAMMAPLASTY Bilateral 02/27/2015    TOOTH EXTRACTION Right 3/16/2019    Procedure: EXTRACTION TOOTH #1 (Impacted Third Molar Tooth); Surgeon: Missy Frost DDS;  [facility];  Service: Maxillofacial    TUBAL LIGATION      WISDOM TOOTH EXTRACTION       Meds/Allergies     CURRENT MEDICATIONS  No current facility-administered medications for this encounter       Current Outpatient Medications:     atorvastatin (LIPITOR) 10 mg tablet, Take 1 tablet (10 mg total) by mouth daily, Disp: 90 tablet, Rfl: 3    busPIRone (BUSPAR) 30 MG tablet, Take 10 mg by mouth , Disp: , Rfl:     clindamycin (CLEOCIN) 300 MG capsule, Take 1 capsule (300 mg total) by mouth 4 (four) times a day for 10 days, Disp: 40 capsule, Rfl: 0    FLUoxetine (PROZAC) 40 MG capsule, Take 80 mg by mouth 2 (two) times a day  , Disp: , Rfl:     LORazepam (ATIVAN) 1 mg tablet, Take 1 mg by mouth 2 (two) times a day , Disp: , Rfl:     verapamil (CALAN-SR) 240 mg CR tablet, Take 1 tablet (240 mg total) by mouth daily, Disp: 90 tablet, Rfl: 3    ARIPiprazole (ABILIFY) 10 mg tablet, Take 10 mg by mouth daily, Disp: , Rfl:     azelastine (ASTELIN) 0 1 % nasal spray, 1 spray into each nostril 2 (two) times a day Use in each nostril as directed, Disp: 1 Bottle, Rfl: 6    chlorhexidine (PERIDEX) 0 12 % solution, RINSE MOUTH WITH 15 MILLILITERS EVERY 12 HOURS AS DIRECTED, Disp: 473 mL, Rfl: 3    LATUDA 20 MG tablet, 20 mg daily with breakfast  , Disp: , Rfl:     levocetirizine (XYZAL) 5 MG tablet, , Disp: , Rfl:     LORazepam (ATIVAN) 0 5 mg tablet, Take 0 5 mg by mouth 2 (two) times a day, Disp: , Rfl:     montelukast (SINGULAIR) 10 mg tablet, Take 1 tablet (10 mg total) by mouth daily for 360 days, Disp: 90 tablet, Rfl: 3    polyethylene glycol (MIRALAX) 17 g packet, Take 17 g by mouth daily, Disp: , Rfl:     predniSONE 20 mg tablet, Take 40mg PO daily x 3 days beginning 3/19/19, followed by 20mg PO daily x 1 week, Disp: 13 tablet, Rfl: 0    sodium chloride (OCEAN) 0 65 % nasal spray, 2 sprays into each nostril 2 (two) times a day, Disp: 15 mL, Rfl: 0  (Not in a hospital admission)      ALLERGIES  Allergies   Allergen Reactions    Other      Most all antibiotics- hives, hypotensive    "no problem with clindamycin "    Augmentin Es-600  [Amoxicillin-Pot Clavulanate]     Cefuroxime     Erythromycin     Levofloxacin     Morphine     Morphine And Related Hives    Oxycodone-Acetaminophen     Penicillins     Percocet [Oxycodone-Acetaminophen] Hives    Sulfa Antibiotics     Tetracyclines & Related      Objective     PHYSICAL EXAM    VITAL SIGNS: Blood pressure 119/56, pulse 79, temperature 98 7 °F (37 1 °C), temperature source Oral, resp  rate 22, height 5' 5" (1 651 m), weight 83 9 kg (185 lb), SpO2 95 %, not currently breastfeeding      Constitutional:  Appears well developed and well nourished, no acute distress, non-toxic but ill appearance, appears fatigued   Eyes:  PERRL, EOMI, conjunctivae pink, sclerae non-icteric    HENT:  Normocephalic/Atraumatic, no rhinorrhea, mucous membranes moist   Neck: normal range of motion, no tenderness, supple   Respiratory:  No respiratory distress, normal breath sounds   Cardiovascular:  Normal rate, normal rhythm    GI:  Soft, non-tender, non-distended  :  No CVAT, no flank ecchymosis  Musculoskeletal:  No swelling or edema, no tenderness, no deformities  Integument:  Pink, warm, dry, Well hydrated, no rash, no erythema, no bullae   Lymphatic:  No cervical/ tonsillar/ submandibular lymphadenopathy noted   Neurologic:  Awake, Alert & oriented x 3, CN 2-12 intact, no focal neurological deficits, motor function intact  Psychiatric:  Speech and behavior appropriate       ED COURSE and MDM:    Assessment/Plan   Assessment:  Guevara Beach is a 61 y o  female presents for evaluation of malaise, fatigue generalized weakness and myalgias  Fever  Plan:  Labs, IV fluids, imaging as appropriate, infectious/ sepsis workup, Antibiotics/ antivirals, symptom management, disposition as appropriate  CRITICAL CARE TIME:   0      Portions of the record may have been created with voice recognition software  Occasional wrong word or "sound a like" substitutions may have occurred due to the inherent limitations of voice recognition software       ED Provider  Electronically Signed by

## 2021-04-03 NOTE — ED NOTES
Pt 88% on RA at this time, pt reports no SOB, pt placed on 3L NC and O2 currently stable at 97%     Anel Flores, BRO  04/03/21 [address] [facility] [address] Cora Cowden, BRO  04/03/21 0095

## 2021-04-03 NOTE — ASSESSMENT & PLAN NOTE
· Patient with 4 day history of reported flu-like symptoms and sinus congestion, initially treated by PCP as acute sinusitis with clindamycin  Presented this evening with worsening symptoms and now increased work of breathing  · X-ray and CT chest suspicious for multifocal pneumonia; notably COVID-19/influenza/RSV PCR negative and patient with recent prior negative COVID-19 test   Procalcitonin is additionally noted as elevated  · Patient with reported multiple allergies to antibiotics, seems to have tolerated cefepime provided in ED will continue for now  Low threshold to discuss with ID if reaction should occur  · Follow up results of urine Legionella/strep, check sputum culture    Repeat procalcitonin tomorrow  · Trend WBC, temperature curve, hemodynamics  · Continue supplemental oxygen and titrate as needed to maintain SaO2 greater than 88%

## 2021-04-03 NOTE — CONSULTS
Consultation - Infectious Disease   González Greenberg 61 y o  female MRN: [id number]  Unit/Bed#: CW2 218-01 Encounter: [id number]      IMPRESSION & RECOMMENDATIONS:   Impression/Recommendations:  1  Sepsis, POA  Tachypnea, leukocytosis  Secondary to #2  No other clear explanation  Negative UA  Patient remains hemodynamically stable     -antibiotic plan as below  -monitor temperatures and hemodynamics  -recheck CBC in a m   -follow up pending blood cultures  -supportive care    2  Multifocal pneumonia  Consider viral etiology given constellation of symptoms, positive sick contacts  Negative COVID-19 PCR x2  Negative influenza, RSV PCR  Consider early bacterial pneumonia based on CT findings, elevated procalcitonin  Low suspicion for MDRO     -discontinue cefepime  -start IV ceftriaxone 1 g Q 24 hours  -follow-up urine Legionella and strep antigens  -recheck procalcitonin level in a m   -monitor respiratory symptoms    3  Multiple antibiotic allergies  Patient has extensive antibiotic allergy history, which significantly limits treatment options  She has previously tolerated vancomycin and clindamycin  Patient tolerated IV cefepime given in the ER     -antibiotic plan as above  -monitor closely for reactions    4  Chronic ethmoid sinusitis  With prior bilateral antrectomy  Follows with ENT last seen on 03/01/2021     5  Tobacco use  Risk factor for pulmonary infection  Recommend smoking cessation  Antibiotics:  Cefepime 1    I discussed above plan with patient, and with Hernesto Jacome from Internal Medicine Service  I personally reviewed prior hospitalization records  Thank you for this consultation  We will follow along with you          HISTORY OF PRESENT ILLNESS:  Reason for Consult:  Pneumonia    HPI: González Greenberg is a 61 y o  female with multiple antibiotic allergies, chronic ethmoid sinusitis post bilateral antrectomy, tobacco use who presented to ER due to concern by her family for increased work of breathing  O2 sat was 88% in ED and improved with 3 L NC  She was initially evaluated via telemedicine visit on 04/01 due to complaint of fever, chills, nasal congestion, rhinorrhea and headache  She tested negative for COVID-19 on 04/01 and again on 04/03  She was prescribed clindamycin on 04/02 but ultimately presented to ER due to aforementioned complaints  CT chest shows multifocal consolidative airspace disease  Leukocytosis of 15  No high fevers  Patient received dose of IV cefepime  States she feels about the same  Denies shortness of breath  Mild dry cough  Does complain of sinus pain  Currently no runny nose, loss of sense of smell or taste, or sore throat  She was taking care of her grandchildren a week ago who have had runny noses  She lives with her  who also currently has some congestion  Denies chest pain, abdominal pain, urinary symptoms, diarrhea  REVIEW OF SYSTEMS:  A complete system-based review of systems is otherwise negative  PAST MEDICAL HISTORY:  Past Medical History:   Diagnosis Date    Allergies     Anesthesia complication     V TACH after her reduction mammoplasty, done at [address] Chronic rhinitis 2/18/2020    Depression     Ethmoid sinusitis     GERD (gastroesophageal reflux disease)     Maxillary sinusitis     Multiple allergies     Myofascial pain syndrome     Nasal turbinate hypertrophy     Wears glasses      Past Surgical History:   Procedure Laterality Date    LAPAROSCOPY      with vaginal hysterectomy; 11/3/2003 rso    OOPHORECTOMY Left 2004    PARTIAL HYSTERECTOMY  2004    DE NASAL/SINUS ENDOSCOPY,RMV TISS MAXILL SINUS N/A 9/30/2016    Procedure: IMAGE GUIDED FUNCTIONAL ENDOSCOPIC SINUS SURGERY;  Surgeon: Caryle Billings, MD;  [facility];  Service: ENT    REDUCTION MAMMAPLASTY Bilateral 02/27/2015    TOOTH EXTRACTION Right 3/16/2019    Procedure: EXTRACTION TOOTH #1 (Impacted Third Molar Tooth);   Surgeon: Leyda Goodwin DDS;  [facility];  Service: Maxillofacial    TUBAL LIGATION      WISDOM TOOTH EXTRACTION         FAMILY HISTORY:  Non-contributory    SOCIAL HISTORY:  Social History     Substance and Sexual Activity   Alcohol Use Not Currently    Comment: social     Social History     Substance and Sexual Activity   Drug Use No     Social History     Tobacco Use   Smoking Status Current Some Day Smoker    Types: Cigarettes   Smokeless Tobacco Never Used       ALLERGIES:  Allergies   Allergen Reactions    Other      Most all antibiotics- hives, hypotensive    "no problem with clindamycin "    Augmentin Es-600  [Amoxicillin-Pot Clavulanate]     Cefuroxime     Erythromycin     Levofloxacin     Morphine     Morphine And Related Hives    Oxycodone-Acetaminophen     Penicillins     Percocet [Oxycodone-Acetaminophen] Hives    Sulfa Antibiotics     Tetracyclines & Related        MEDICATIONS:  All current active medications have been reviewed  PHYSICAL EXAM:  Vitals:  Temp:  [98 7 °F (37 1 °C)-100 1 °F (37 8 °C)] 100 1 °F (37 8 °C)  HR:  [77-85] 85  Resp:  [18-22] 18  BP: (100-119)/(56) 100/56  SpO2:  [91 %-95 %] 91 %  Temp (24hrs), Av 2 °F (37 3 °C), Min:98 7 °F (37 1 °C), Max:100 1 °F (37 8 °C)  Current: Temperature: 100 1 °F (37 8 °C)     Physical Exam:  General:  Awake, alert, nontoxic, resting comfortably in bed  Eyes:  Conjunctive clear with no hemorrhages or effusions  Oropharynx:  No ulcers, no lesions  Neck:  Supple, no lymphadenopathy  Lungs:  Nonlabored respirations  Abdomen:  Soft, non-tender, non-distended  Extremities:  No peripheral cyanosis, clubbing, or edema  Skin:  No rashes, no ulcers  Neurological:  Moves all four extremities spontaneously    LABS, IMAGING, & OTHER STUDIES:  Lab Results:  I have personally reviewed pertinent labs    Results from last 7 days   Lab Units 21  0232   POTASSIUM mmol/L 3 5   CHLORIDE mmol/L 95*   CO2 mmol/L 22   BUN mg/dL 11   CREATININE mg/dL 0 64   EGFR ml/min/1 73sq m 99   CALCIUM mg/dL 8 9   AST U/L 26   ALT U/L 28   ALK PHOS U/L 95     Results from last 7 days   Lab Units 04/03/21  0232   WBC Thousand/uL 15 56*   HEMOGLOBIN g/dL 11 8   PLATELETS Thousands/uL 144*     Results from last 7 days   Lab Units 04/03/21  0237 04/03/21  0232   BLOOD CULTURE  Received in Microbiology Lab  Culture in Progress  Received in Microbiology Lab  Culture in Progress  Imaging Studies:   I have personally reviewed pertinent imaging study reports and images in PACS  CT chest shows multifocal mostly consolidative airspace disease with a perihilar predominance most likely    EKG, Pathology, and Other Studies:   I have personally reviewed pertinent reports

## 2021-04-03 NOTE — RESPIRATORY THERAPY NOTE
RT Protocol Note  Nickolas Mathias 61 y o  female MRN: [id number]  Unit/Bed#: CW2 218-01 Encounter: [id number]    Assessment    Principal Problem:    Community acquired pneumonia of right lower lobe of lung  Active Problems:    Depression with anxiety    HTN (hypertension)    Tobacco use    Hyponatremia      Home Pulmonary Medications:  Uses Albuterol MDI at home prn       Past Medical History:   Diagnosis Date    Allergies     Anesthesia complication     V TACH after her reduction mammoplasty, done at [address] Depression     Ethmoid sinusitis     GERD (gastroesophageal reflux disease)     Maxillary sinusitis     Multiple allergies     Myofascial pain syndrome     Nasal turbinate hypertrophy     Wears glasses      Social History     Socioeconomic History    Marital status: /Civil Union     Spouse name: Thalia Andrew Number of children: 2    Years of education: None    Highest education level: None   Occupational History    None   Social Needs    Financial resource strain: None    Food insecurity     Worry: None     Inability: None    Transportation needs     Medical: None     Non-medical: None   Tobacco Use    Smoking status: Current Some Day Smoker     Types: Cigarettes    Smokeless tobacco: Never Used   Substance and Sexual Activity    Alcohol use: Not Currently     Comment: social    Drug use: No    Sexual activity: None   Lifestyle    Physical activity     Days per week: 5 days     Minutes per session: 30 min    Stress: None   Relationships    Social connections     Talks on phone: None     Gets together: None     Attends Scientologist service: None     Active member of club or organization: None     Attends meetings of clubs or organizations: None     Relationship status: None    Intimate partner violence     Fear of current or ex partner: None     Emotionally abused: None     Physically abused: None     Forced sexual activity: None   Other Topics Concern    None Social History Narrative    Caffeine use    Daily coffee consumption ( 1 cup/day)    Daily cola consumption (3 cans/day)        Patient lives in a home that was built in [employer] delfin in the bedroom     Unfinished basement-dry-damp-no mold-musty smell     Dehumidifier in the basement     No air  or purifiers     No humidifier-has c-pap     Home is smoke free     Window air conditioning     Patient lives close the wooded area and open fields         Dog(Jose A) he is allowed in the bedroom         Caffeine: 2 cups of coffee daily                     Occasionally drinks ice tea and diet soda                     Hot tea rarely     Chocolate consumed occasionally         Patient lives with spouse            Subjective Breathing feels good       Objective    Physical Exam:   Assessment Type: Assess only  General Appearance: Alert, Awake  Respiratory Pattern: Normal  Chest Assessment: Chest expansion symmetrical  Bilateral Breath Sounds: Clear    Vitals:  Blood pressure 100/56, pulse 77, temperature 98 7 °F (37 1 °C), temperature source Oral, resp  rate 18, height 5' 5" (1 651 m), weight 83 9 kg (185 lb), SpO2 93 %, not currently breastfeeding  Imaging and other studies: I have personally reviewed pertinent reports  Plan    Respiratory Plan: Home Bronchodilator Patient pathway        Resp Comments: Pt  in no resp  distress  Pt uses Albuterol MDI at home for Asthma  Will continue same here  Pt feels breathing is good

## 2021-04-03 NOTE — UTILIZATION REVIEW
Notification of Inpatient Admission/Inpatient Authorization Request   This is a Notification of Inpatient Admission for [address]  Be advised that this patient was admitted to our facility under Inpatient Status  Contact Rocio Ramos at [phone] for additional admission information  [facility]  DEDICATED [phone]  Patient Name:   Monroe Bronson   YOB: 1965       [address]   [address]:   [place] [address]  Tax ID: [id number]  NPI: [id number] Attending Provider/NPI:  Phone:  Address: Jose Alejandro Rodriguez [[id number]]  [phone]  Same as Facility   Place of Service Code: 24 Place of Service Name:  [address] [place] [address]   Start Date: 4/3/21 5483 Discharge Date & Time: No discharge date for patient encounter  Type of Admission: Inpatient Status Discharge Disposition (if discharged): Home/Self Care   Patient Diagnoses: Pneumonia [J18 9]  Fever [R50 9]     Orders: Admission Orders (From admission, onward)     Ordered        04/03/21 0511  Inpatient Admission  Once                    Assigned Utilization Review Contact: Rocio Ramos  Utilization   Network Utilization Review Department  Phone: [phone]; Fax [phone]  Email: [email or web address]   ATTENTION PAYERS: Please call the assigned Utilization  directly with any questions or concerns ALL voicemails in the department are confidential  Send all requests for admission clinical reviews, approved or denied determinations and any other requests to dedicated fax number belonging to the campus where the patient is receiving treatment

## 2021-04-03 NOTE — ED PROVIDER NOTES
History  Chief Complaint   Patient presents with    Fever - 76 years or older     pt reports fatigue and generalized weakness since Wednesday, Tmax of 103 1 at 0000, pt had neg COVID test yesterday, PCP started her on clindamycin due to prev history of sinus cellulitis      69-year-old female presents the emergency department for fever, fatigue, myalgias, right maxillary sinus pain, rhinorrhea/ congestion  History of orbital cellulitis 2 years ago  Hx of occasional tobacco use  Patient was given her 1st COVID immunization 2 weeks ago  On Wednesday of this week patient developed symptoms mentioned above  Called her primary care provider who placed her on clindamycin yesterday for sinusitis  Denies pain with EOM movements  Denies swelling of face  Notes pain over right maxillary sinus with post nasal drip  No nausea, vomiting, diarrhea, dysuria, hematuria, increased frequency of urination  Patient received her flu immunization this year  Fatigue  Timing:  Constant  Progression:  Worsening  Chronicity:  New  Ineffective treatments:  None tried  Associated symptoms: fever and myalgias    Fever:     Timing:  Constant    Temp source:  Oral      Prior to Admission Medications   Prescriptions Last Dose Informant Patient Reported? Taking?    ARIPiprazole (ABILIFY) 10 mg tablet   Yes No   Sig: Take 10 mg by mouth daily   FLUoxetine (PROZAC) 40 MG capsule 4/3/2021 at Unknown time  Yes Yes   Sig: Take 80 mg by mouth 2 (two) times a day     LATUDA 20 MG tablet   Yes No   Si mg daily with breakfast     LORazepam (ATIVAN) 0 5 mg tablet   Yes No   Sig: Take 0 5 mg by mouth 2 (two) times a day   LORazepam (ATIVAN) 1 mg tablet 4/3/2021 at Unknown time  Yes Yes   Sig: Take 1 mg by mouth 2 (two) times a day    atorvastatin (LIPITOR) 10 mg tablet 4/3/2021 at Unknown time  No Yes   Sig: Take 1 tablet (10 mg total) by mouth daily   azelastine (ASTELIN) 0 1 % nasal spray   No No   Si spray into each nostril 2 (two) times a day Use in each nostril as directed   busPIRone (BUSPAR) 30 MG tablet 4/3/2021 at Unknown time  Yes Yes   Sig: Take 10 mg by mouth    chlorhexidine (PERIDEX) 0 12 % solution   No No   Sig: RINSE MOUTH WITH 15 MILLILITERS EVERY 12 HOURS AS DIRECTED   clindamycin (CLEOCIN) 300 MG capsule 4/3/2021 at Unknown time  No Yes   Sig: Take 1 capsule (300 mg total) by mouth 4 (four) times a day for 10 days   levocetirizine (XYZAL) 5 MG tablet   Yes No   montelukast (SINGULAIR) 10 mg tablet   No No   Sig: Take 1 tablet (10 mg total) by mouth daily for 360 days   polyethylene glycol (MIRALAX) 17 g packet   Yes No   Sig: Take 17 g by mouth daily   predniSONE 20 mg tablet   No No   Sig: Take 40mg PO daily x 3 days beginning 3/19/19, followed by 20mg PO daily x 1 week   sodium chloride (OCEAN) 0 65 % nasal spray   No No   Si sprays into each nostril 2 (two) times a day   verapamil (CALAN-SR) 240 mg CR tablet 4/3/2021 at Unknown time  No Yes   Sig: Take 1 tablet (240 mg total) by mouth daily      Facility-Administered Medications: None       Past Medical History:   Diagnosis Date    Allergies     Anesthesia complication     V TACH after her reduction mammoplasty, done at [address] [place] [address] Ethmoid sinusitis     GERD (gastroesophageal reflux disease)     Maxillary sinusitis     Multiple allergies     Myofascial pain syndrome     Nasal turbinate hypertrophy     Wears glasses        Past Surgical History:   Procedure Laterality Date    LAPAROSCOPY      with vaginal hysterectomy; 11/3/2003 rso    OOPHORECTOMY Left 2004    PARTIAL HYSTERECTOMY      NM NASAL/SINUS ENDOSCOPY,RMV TISS MAXILL SINUS N/A 2016    Procedure: IMAGE GUIDED FUNCTIONAL ENDOSCOPIC SINUS SURGERY;  Surgeon: Naomi Campoverde MD;  Location: [facility];  Service: ENT    REDUCTION MAMMAPLASTY Bilateral 2015    TOOTH EXTRACTION Right 3/16/2019    Procedure: EXTRACTION TOOTH #1 (Impacted Third Molar Tooth); Surgeon: Dixie Holter, DDS;  Location: [facility];  Service: Maxillofacial    TUBAL LIGATION      WISDOM TOOTH EXTRACTION         Family History   Problem Relation Age of Onset    CLEM disease Mother     Atrial fibrillation Mother     Atrial fibrillation Father     Heart disease Father     Diabetes Maternal Grandmother     Hypertension Maternal Grandmother     Colon cancer Maternal Grandfather     Diabetes Maternal Grandfather     Cancer Paternal Grandfather     Endometrial cancer Cousin     Breast cancer Cousin     Multiple sclerosis Sister     No Known Problems Son     No Known Problems Son      I have reviewed and agree with the history as documented  E-Cigarette/Vaping     E-Cigarette/Vaping Substances     Social History     Tobacco Use    Smoking status: Current Some Day Smoker     Types: Cigarettes    Smokeless tobacco: Never Used   Substance Use Topics    Alcohol use: Not Currently     Comment: social    Drug use: No        Review of Systems   Constitutional: Positive for activity change, appetite change, chills, fatigue and fever  Musculoskeletal: Positive for myalgias  All other systems reviewed and are negative  Physical Exam  ED Triage Vitals [04/03/21 0150]   Temperature Pulse Respirations Blood Pressure SpO2   98 7 °F (37 1 °C) 79 22 119/56 95 %      Temp Source Heart Rate Source Patient Position - Orthostatic VS BP Location FiO2 (%)   Oral Monitor Lying Left arm --      Pain Score       4             Orthostatic Vital Signs  Vitals:    04/03/21 0150   BP: 119/56   Pulse: 79   Patient Position - Orthostatic VS: Lying       Physical Exam  Vitals signs and nursing note reviewed  Constitutional:       General: She is not in acute distress  Appearance: Normal appearance  She is ill-appearing  Comments: Fatigued     HENT:      Head: Normocephalic and atraumatic        Right Ear: External ear normal       Left Ear: External ear normal       Nose: Nose normal       Mouth/Throat:      Mouth: Mucous membranes are moist    Eyes:      General:         Right eye: No discharge  Left eye: No discharge  Conjunctiva/sclera: Conjunctivae normal    Neck:      Musculoskeletal: Normal range of motion  Cardiovascular:      Rate and Rhythm: Normal rate and regular rhythm  Pulses: Normal pulses  Heart sounds: No murmur  Pulmonary:      Comments: Increased respiratory effort  3L Nasal canula satting 96 percent  Abdominal:      General: Abdomen is flat  There is no distension  Tenderness: There is no abdominal tenderness  Musculoskeletal: Normal range of motion  Skin:     General: Skin is warm  Capillary Refill: Capillary refill takes less than 2 seconds  Findings: No rash  Neurological:      General: No focal deficit present  Mental Status: She is alert  Mental status is at baseline     Psychiatric:         Mood and Affect: Mood normal          Behavior: Behavior normal          ED Medications  Medications   ARIPiprazole (ABILIFY) tablet 10 mg (has no administration in time range)   atorvastatin (LIPITOR) tablet 10 mg (has no administration in time range)   azelastine (ASTELIN) 0 1 % nasal spray 1 spray (has no administration in time range)   FLUoxetine (PROzac) capsule 80 mg (has no administration in time range)   verapamil (CALAN-SR) CR tablet 240 mg (has no administration in time range)   montelukast (SINGULAIR) tablet 10 mg (has no administration in time range)   LORazepam (ATIVAN) tablet 0 5 mg (has no administration in time range)   busPIRone (BUSPAR) tablet 30 mg (has no administration in time range)   acetaminophen (TYLENOL) tablet 650 mg (has no administration in time range)   ondansetron (ZOFRAN) injection 4 mg (has no administration in time range)   enoxaparin (LOVENOX) subcutaneous injection 40 mg (has no administration in time range)   cefepime (MAXIPIME) 2,000 mg in dextrose 5 % 50 mL IVPB (has no administration in time range)   sodium chloride 0 9 % infusion (has no administration in time range)   ketorolac (TORADOL) injection 15 mg (15 mg Intravenous Given 4/3/21 0247)   iohexol (OMNIPAQUE) 350 MG/ML injection (MULTI-DOSE) 85 mL (85 mL Intravenous Given 4/3/21 7628)   cefepime (MAXIPIME) 2 g/50 mL dextrose IVPB (2,000 mg Intravenous New Bag 4/3/21 8537)       Diagnostic Studies  Results Reviewed     Procedure Component Value Units Date/Time    Sodium, urine, random [[id number]] Collected: 04/03/21 0539    Lab Status: In process Specimen: Urine, Clean Catch Updated: 04/03/21 0551    UA w Reflex to Microscopic w Reflex to Culture [[id number]] Collected: 04/03/21 0540    Lab Status: In process Specimen: Urine, Other Updated: 04/03/21 0551    Legionella antigen, urine [[id number]] Collected: 04/03/21 0539    Lab Status: In process Specimen: Urine, Clean Catch Updated: 04/03/21 0551    Strep Pneumoniae, Urine [[id number]] Collected: 04/03/21 0539    Lab Status: In process Specimen: Urine, Clean Catch Updated: 04/03/21 0551    Osmolality, urine [[id number]] Collected: 04/03/21 0539    Lab Status: In process Specimen: Urine, Clean Catch Updated: 04/03/21 0551    Procalcitonin with AM Reflex [[id number]]  (Abnormal) Collected: 04/03/21 0232    Lab Status: Final result Specimen: Blood from Arm, Right Updated: 04/03/21 0341     Procalcitonin 1 08 ng/ml     Procalcitonin Reflex [[id number]]     Lab Status: No result Specimen: Blood     COVID19, Influenza A/B, RSV [facility], [facility] [[id number]]  (Normal) Collected: 04/03/21 0231    Lab Status: Final result Specimen: Nares from Nasopharyngeal Swab Updated: 04/03/21 0339     SARS-CoV-2 Negative     INFLUENZA A PCR Negative     INFLUENZA B PCR Negative     RSV PCR Negative    Narrative: This test has been authorized by FDA under an EUA (Emergency Use Assay) for use by authorized laboratories    Clinical caution and judgement should be used with the interpretation of these results with consideration of the clinical impression and other laboratory testing  Testing reported as "Positive" or "Negative" has been proven to be accurate according to standard laboratory validation requirements  All testing is performed with control materials showing appropriate reactivity at standard intervals  CBC and differential [[id number]]  (Abnormal) Collected: 04/03/21 0232    Lab Status: Final result Specimen: Blood from Arm, Right Updated: 04/03/21 0336     WBC 15 56 Thousand/uL      RBC 4 00 Million/uL      Hemoglobin 11 8 g/dL      Hematocrit 35 5 %      MCV 89 fL      MCH 29 5 pg      MCHC 33 2 g/dL      RDW 12 7 %      MPV 11 7 fL      Platelets 254 Thousands/uL      nRBC 0 /100 WBCs     Narrative: This is an appended report  These results have been appended to a previously verified report      Manual Differential(PHLEBS Do Not Order) [[id number]]  (Abnormal) Collected: 04/03/21 0232    Lab Status: Final result Specimen: Blood from Arm, Right Updated: 04/03/21 0336     Segmented % 89 %      Bands % 3 %      Lymphocytes % 6 %      Monocytes % 2 %      Eosinophils, % 0 %      Basophils % 0 %      Absolute Neutrophils 14 32 Thousand/uL      Lymphocytes Absolute 0 93 Thousand/uL      Monocytes Absolute 0 31 Thousand/uL      Eosinophils Absolute 0 00 Thousand/uL      Basophils Absolute 0 00 Thousand/uL      Total Counted 100     RBC Morphology Normal     Platelet Estimate Borderline     Large Platelet Present    Osmolality-"If this is regarding a toxic alcohol, please STOP and consult medical  for further guidance " [[id number]]     Lab Status: No result Specimen: Blood     Comprehensive metabolic panel [[id number]]  (Abnormal) Collected: 04/03/21 0232    Lab Status: Final result Specimen: Blood from Arm, Right Updated: 04/03/21 0307     Sodium 127 mmol/L      Potassium 3 5 mmol/L      Chloride 95 mmol/L      CO2 22 mmol/L      ANION GAP 10 mmol/L      BUN 11 mg/dL      Creatinine 0 64 mg/dL      Glucose 130 mg/dL      Calcium 8 9 mg/dL      Corrected Calcium 9 9 mg/dL      AST 26 U/L      ALT 28 U/L      Alkaline Phosphatase 95 U/L      Total Protein 6 8 g/dL      Albumin 2 7 g/dL      Total Bilirubin 0 50 mg/dL      eGFR 99 ml/min/1 73sq m     Narrative:      [place] guidelines for Chronic Kidney Disease (CKD):     Stage 1 with normal or high GFR (GFR > 90 mL/min/1 73 square meters)    Stage 2 Mild CKD (GFR = 60-89 mL/min/1 73 square meters)    Stage 3A Moderate CKD (GFR = 45-59 mL/min/1 73 square meters)    Stage 3B Moderate CKD (GFR = 30-44 mL/min/1 73 square meters)    Stage 4 Severe CKD (GFR = 15-29 mL/min/1 73 square meters)    Stage 5 End Stage CKD (GFR <15 mL/min/1 73 square meters)  Note: GFR calculation is accurate only with a steady state creatinine    Lactic acid [[id number]]  (Normal) Collected: 04/03/21 0232    Lab Status: Final result Specimen: Blood from Arm, Right Updated: 04/03/21 0307     LACTIC ACID 1 4 mmol/L     Narrative:      Result may be elevated if tourniquet was used during collection  Protime-INR [[id number]]  (Normal) Collected: 04/03/21 0232    Lab Status: Final result Specimen: Blood from Arm, Right Updated: 04/03/21 0300     Protime 14 4 seconds      INR 1 12    APTT [[id number]]  (Normal) Collected: 04/03/21 0232    Lab Status: Final result Specimen: Blood from Arm, Right Updated: 04/03/21 0300     PTT 27 seconds     Blood culture #2 [[id number]] Collected: 04/03/21 0237    Lab Status: In process Specimen: Blood from Arm, Left Updated: 04/03/21 0245    Blood culture #1 [[id number]] Collected: 04/03/21 0232    Lab Status:  In process Specimen: Blood from Arm, Right Updated: 04/03/21 0245                 CT chest w contrast   Final Result by Alonso Sandoval MD (04/03 7457)      Multifocal mostly consolidative airspace disease with a perihilar predominance most likely to represent pneumonia               Workstation performed: [id number]         XR chest 1 view portable    (Results Pending)         Procedures  Procedures      ED Course  ED Course as of Apr 03 0622   Sat Apr 03, 2021   0252 ECG dictated by me  Normal sinus rhythm, ventricular rate 70 beats per minute, normal axis, no ST elevations, no ST depressions, no T-wave inversions, p r  176   QRS 94      0311 Sodium(!): 127   0311 WBC(!): 15 56                             SBIRT 20yo+      Most Recent Value   SBIRT (23 yo +)   In order to provide better care to our patients, we are screening all of our patients for alcohol and drug use  Would it be okay to ask you these screening questions? No Filed at: 04/03/2021 0157                MDM  Number of Diagnoses or Management Options  Hyponatremia: new and requires workup  Leukocytosis: new and requires workup  Pneumonia: new and requires workup  Diagnosis management comments: 58-year-old female presents with fever, fatigue, myalgias  Appears fatigued on examination  Afebrile here, but recently took Tylenol  Likely influenza  Possibly COVID  Will check  Clear to auscultation bilaterally, but needing supplemental oxygen  Will evaluate for pneumonia, pneumothorax, mass  Xray with R inflitrate, suspicious for possible mass vs multifocal pneumonia  Will send sepsis workup including evaluations for urinary tract infection, blood counts, electrolyte abnormalities, arrhythmia  No suspicion orbital cellulitis at this point  No pain with extraocular muscle movements  No significant swelling of the face  Hyponatremia noted  Will order CT  CT shows multifocal pneumonia  Started on cefepime secondary to multiple allergies  WBC elevated  Elevated procal  Will order legionella antigen and S pneumo  Will admit to medicine for pneumonia         Amount and/or Complexity of Data Reviewed  Clinical lab tests: ordered and reviewed  Tests in the radiology section of CPT®: ordered and reviewed  Review and summarize past medical records: yes  Discuss the patient with other providers: yes  Independent visualization of images, tracings, or specimens: yes    Risk of Complications, Morbidity, and/or Mortality  Presenting problems: moderate  Diagnostic procedures: moderate  Management options: moderate    Patient Progress  Patient progress: stable      Disposition  Final diagnoses:   Pneumonia   Hyponatremia   Leukocytosis     Time reflects when diagnosis was documented in both MDM as applicable and the Disposition within this note     Time User Action Codes Description Comment    4/3/2021  5:10 AM Georgiana Mandujano Add [J18 9] Pneumonia     4/3/2021  6:19 AM Kat Morrison Add [E87 1] Hyponatremia     4/3/2021  6:21 AM Georgiana Delbert Add [H27 092] Leukocytosis       ED Disposition     ED Disposition Condition Date/Time Comment    Admit Stable Sat Apr 3, 2021  5:10 AM Case was discussed with kelby and the patient's admission status was agreed to be Admission Status: inpatient status to the service of Dr Marilyn Bashir   Follow-up Information    None         Current Discharge Medication List      CONTINUE these medications which have NOT CHANGED    Details   atorvastatin (LIPITOR) 10 mg tablet Take 1 tablet (10 mg total) by mouth daily  Qty: 90 tablet, Refills: 3    Associated Diagnoses: Mixed hyperlipidemia      busPIRone (BUSPAR) 30 MG tablet Take 10 mg by mouth       clindamycin (CLEOCIN) 300 MG capsule Take 1 capsule (300 mg total) by mouth 4 (four) times a day for 10 days  Qty: 40 capsule, Refills: 0    Associated Diagnoses: Fever, unspecified fever cause      FLUoxetine (PROZAC) 40 MG capsule Take 80 mg by mouth 2 (two) times a day        !!  LORazepam (ATIVAN) 1 mg tablet Take 1 mg by mouth 2 (two) times a day       verapamil (CALAN-SR) 240 mg CR tablet Take 1 tablet (240 mg total) by mouth daily  Qty: 90 tablet, Refills: 3    Associated Diagnoses: Hypertension, unspecified type      ARIPiprazole (ABILIFY) 10 mg tablet Take 10 mg by mouth daily azelastine (ASTELIN) 0 1 % nasal spray 1 spray into each nostril 2 (two) times a day Use in each nostril as directed  Qty: 1 Bottle, Refills: 6    Associated Diagnoses: Chronic rhinitis      chlorhexidine (PERIDEX) 0 12 % solution RINSE MOUTH WITH 15 MILLILITERS EVERY 12 HOURS AS DIRECTED  Qty: 473 mL, Refills: 3    Associated Diagnoses: Toothache      LATUDA 20 MG tablet 20 mg daily with breakfast        levocetirizine (XYZAL) 5 MG tablet       !! LORazepam (ATIVAN) 0 5 mg tablet Take 0 5 mg by mouth 2 (two) times a day      montelukast (SINGULAIR) 10 mg tablet Take 1 tablet (10 mg total) by mouth daily for 360 days  Qty: 90 tablet, Refills: 3    Associated Diagnoses: Allergic rhinitis caused by mold; Allergic rhinitis due to animal (cat) (dog) hair and dander; Seasonal allergic rhinitis due to pollen      polyethylene glycol (MIRALAX) 17 g packet Take 17 g by mouth daily      predniSONE 20 mg tablet Take 40mg PO daily x 3 days beginning 3/19/19, followed by 20mg PO daily x 1 week  Qty: 13 tablet, Refills: 0    Associated Diagnoses: Acute recurrent maxillary sinusitis      sodium chloride (OCEAN) 0 65 % nasal spray 2 sprays into each nostril 2 (two) times a day  Qty: 15 mL, Refills: 0    Associated Diagnoses: Acute recurrent maxillary sinusitis       ! ! - Potential duplicate medications found  Please discuss with provider  No discharge procedures on file  PDMP Review       Value Time User    PDMP Reviewed  Yes 4/3/2021  5:43 AM Krystian Sheriff DO           ED Provider  Attending physically available and evaluated Guilherme Price  I managed the patient along with the ED Attending      Electronically Signed by         Shabana Shore DO  04/03/21 9520

## 2021-04-03 NOTE — PLAN OF CARE
Problem: Potential for Falls  Goal: Patient will remain free of falls  Description: INTERVENTIONS:  - Assess patient frequently for physical needs  -  Identify cognitive and physical deficits and behaviors that affect risk of falls    -  [place] fall precautions as indicated by assessment   - Educate patient/family on patient safety including physical limitations  - Instruct patient to call for assistance with activity based on assessment  - Modify environment to reduce risk of injury  - Consider OT/PT consult to assist with strengthening/mobility  Outcome: Progressing     Problem: PAIN - ADULT  Goal: Verbalizes/displays adequate comfort level or baseline comfort level  Description: Interventions:  - Encourage patient to monitor pain and request assistance  - Assess pain using appropriate pain scale  - Administer analgesics based on type and severity of pain and evaluate response  - Implement non-pharmacological measures as appropriate and evaluate response  - Consider cultural and social influences on pain and pain management  - Notify physician/advanced practitioner if interventions unsuccessful or patient reports new pain  Outcome: Progressing     Problem: INFECTION - ADULT  Goal: Absence or prevention of progression during hospitalization  Description: INTERVENTIONS:  - Assess and monitor for signs and symptoms of infection  - Monitor lab/diagnostic results  - Monitor all insertion sites, i e  indwelling lines, tubes, and drains  - Monitor endotracheal if appropriate and nasal secretions for changes in amount and color  - [place] appropriate cooling/warming therapies per order  - Administer medications as ordered  - Instruct and encourage patient and family to use good hand hygiene technique  - Identify and instruct in appropriate isolation precautions for identified infection/condition  Outcome: Progressing  Goal: Absence of fever/infection during neutropenic period  Description: INTERVENTIONS:  - Monitor WBC    Outcome: Progressing     Problem: SAFETY ADULT  Goal: Patient will remain free of falls  Description: INTERVENTIONS:  - Assess patient frequently for physical needs  -  Identify cognitive and physical deficits and behaviors that affect risk of falls    -  [place] fall precautions as indicated by assessment   - Educate patient/family on patient safety including physical limitations  - Instruct patient to call for assistance with activity based on assessment  - Modify environment to reduce risk of injury  - Consider OT/PT consult to assist with strengthening/mobility  Outcome: Progressing  Goal: Maintain or return to baseline ADL function  Description: INTERVENTIONS:  -  Assess patient's ability to carry out ADLs; assess patient's baseline for ADL function and identify physical deficits which impact ability to perform ADLs (bathing, care of mouth/teeth, toileting, grooming, dressing, etc )  - Assess/evaluate cause of self-care deficits   - Assess range of motion  - Assess patient's mobility; develop plan if impaired  - Assess patient's need for assistive devices and provide as appropriate  - Encourage maximum independence but intervene and supervise when necessary  - Involve family in performance of ADLs  - Assess for home care needs following discharge   - Consider OT consult to assist with ADL evaluation and planning for discharge  - Provide patient education as appropriate  Outcome: Progressing  Goal: Maintain or return mobility status to optimal level  Description: INTERVENTIONS:  - Assess patient's baseline mobility status (ambulation, transfers, stairs, etc )    - Identify cognitive and physical deficits and behaviors that affect mobility  - Identify mobility aids required to assist with transfers and/or ambulation (gait belt, sit-to-stand, lift, walker, cane, etc )  - [place] fall precautions as indicated by assessment  - Record patient progress and toleration of activity level on Mobility SBAR; progress patient to next Phase/Stage  - Instruct patient to call for assistance with activity based on assessment  - Consider rehabilitation consult to assist with strengthening/weightbearing, etc   Outcome: Progressing     Problem: DISCHARGE PLANNING  Goal: Discharge to home or other facility with appropriate resources  Description: INTERVENTIONS:  - Identify barriers to discharge w/patient and caregiver  - Arrange for needed discharge resources and transportation as appropriate  - Identify discharge learning needs (meds, wound care, etc )  - Arrange for interpretive services to assist at discharge as needed  - Refer to Case Management Department for coordinating discharge planning if the patient needs post-hospital services based on physician/advanced practitioner order or complex needs related to functional status, cognitive ability, or social support system  Outcome: Progressing     Problem: Knowledge Deficit  Goal: Patient/family/caregiver demonstrates understanding of disease process, treatment plan, medications, and discharge instructions  Description: Complete learning assessment and assess knowledge base  Interventions:  - Provide teaching at level of understanding  - Provide teaching via preferred learning methods  Outcome: Progressing     Problem: Nutrition/Hydration-ADULT  Goal: Nutrient/Hydration intake appropriate for improving, restoring or maintaining nutritional needs  Description: Monitor and assess patient's nutrition/hydration status for malnutrition  Collaborate with interdisciplinary team and initiate plan and interventions as ordered  Monitor patient's weight and dietary intake as ordered or per policy  Utilize nutrition screening tool and intervene as necessary  Determine patient's food preferences and provide high-protein, high-caloric foods as appropriate       INTERVENTIONS:  - Monitor oral intake, urinary output, labs, and treatment plans  - Assess nutrition and hydration status and recommend course of action  - Evaluate amount of meals eaten  - Assist patient with eating if necessary   - Allow adequate time for meals  - Recommend/ encourage appropriate diets, oral nutritional supplements, and vitamin/mineral supplements  - Order, calculate, and assess calorie counts as needed  - Recommend, monitor, and adjust tube feedings and TPN/PPN based on assessed needs  - Assess need for intravenous fluids  - Provide specific nutrition/hydration education as appropriate  - Include patient/family/caregiver in decisions related to nutrition  Outcome: Progressing

## 2021-04-03 NOTE — ASSESSMENT & PLAN NOTE
Presents with 4 day history of reported flu-like symptoms and sinus congestion, initially treated by PCP as acute sinusitis with clindamycin    Presented to ER with worsening symptoms and increased WOB  · X-ray and CT chest suspicious for multifocal pneumonia; notably COVID-19/influenza/RSV PCR negative and patient with recent prior negative COVID-19 test     · Procalcitonin is additionally noted as elevated  · Patient with reported multiple allergies to antibiotics, seems to have tolerated cefepime provided in ED will continue for now  · Consult ID for additional input given several allergies  · Follow up results of urine Legionella/strep, blood cultures, check sputum culture if able   · Trend procal  · Trend WBC, temperature curve, hemodynamics  · Continue supplemental oxygen and titrate as needed to maintain SaO2 greater than 88%

## 2021-04-03 NOTE — ASSESSMENT & PLAN NOTE
· Sodium 127 on admission, possible hypovolemia as patient admits to decreased oral intake  · Urine studies were obtained in ED, will follow-up    Start IV fluids for now and will repeat BMP this afternoon  · Follow-up TSH level

## 2021-04-04 PROBLEM — J96.01 ACUTE RESPIRATORY FAILURE WITH HYPOXIA (HCC): Status: ACTIVE | Noted: 2021-04-04

## 2021-04-04 LAB
ANION GAP SERPL CALCULATED.3IONS-SCNC: 6 MMOL/L (ref 4–13)
BUN SERPL-MCNC: 9 MG/DL (ref 5–25)
CALCIUM SERPL-MCNC: 8.2 MG/DL (ref 8.3–10.1)
CHLORIDE SERPL-SCNC: 102 MMOL/L (ref 100–108)
CO2 SERPL-SCNC: 24 MMOL/L (ref 21–32)
CREAT SERPL-MCNC: 0.61 MG/DL (ref 0.6–1.3)
ERYTHROCYTE [DISTWIDTH] IN BLOOD BY AUTOMATED COUNT: 12.9 % (ref 11.6–15.1)
GFR SERPL CREATININE-BSD FRML MDRD: 101 ML/MIN/1.73SQ M
GLUCOSE SERPL-MCNC: 98 MG/DL (ref 65–140)
HCT VFR BLD AUTO: 34.5 % (ref 34.8–46.1)
HGB BLD-MCNC: 11.4 G/DL (ref 11.5–15.4)
MCH RBC QN AUTO: 29.5 PG (ref 26.8–34.3)
MCHC RBC AUTO-ENTMCNC: 33 G/DL (ref 31.4–37.4)
MCV RBC AUTO: 89 FL (ref 82–98)
NRBC BLD AUTO-RTO: 0 /100 WBCS
PLATELET # BLD AUTO: 159 THOUSANDS/UL (ref 149–390)
PMV BLD AUTO: 11.8 FL (ref 8.9–12.7)
POTASSIUM SERPL-SCNC: 3.5 MMOL/L (ref 3.5–5.3)
PROCALCITONIN SERPL-MCNC: 0.63 NG/ML
RBC # BLD AUTO: 3.86 MILLION/UL (ref 3.81–5.12)
SODIUM SERPL-SCNC: 132 MMOL/L (ref 136–145)
T4 FREE SERPL-MCNC: 0.91 NG/DL (ref 0.76–1.46)
WBC # BLD AUTO: 12.03 THOUSAND/UL (ref 4.31–10.16)

## 2021-04-04 PROCEDURE — 99233 SBSQ HOSP IP/OBS HIGH 50: CPT | Performed by: INTERNAL MEDICINE

## 2021-04-04 PROCEDURE — 84439 ASSAY OF FREE THYROXINE: CPT | Performed by: INTERNAL MEDICINE

## 2021-04-04 PROCEDURE — 84145 PROCALCITONIN (PCT): CPT | Performed by: INTERNAL MEDICINE

## 2021-04-04 PROCEDURE — 99232 SBSQ HOSP IP/OBS MODERATE 35: CPT | Performed by: INTERNAL MEDICINE

## 2021-04-04 PROCEDURE — 80048 BASIC METABOLIC PNL TOTAL CA: CPT | Performed by: INTERNAL MEDICINE

## 2021-04-04 PROCEDURE — 94760 N-INVAS EAR/PLS OXIMETRY 1: CPT

## 2021-04-04 PROCEDURE — 85027 COMPLETE CBC AUTOMATED: CPT | Performed by: INTERNAL MEDICINE

## 2021-04-04 RX ORDER — LORATADINE 10 MG/1
10 TABLET ORAL DAILY
Status: DISCONTINUED | OUTPATIENT
Start: 2021-04-04 | End: 2021-04-13 | Stop reason: HOSPADM

## 2021-04-04 RX ORDER — DIPHENHYDRAMINE HCL 25 MG
25 TABLET ORAL EVERY 6 HOURS PRN
Status: DISCONTINUED | OUTPATIENT
Start: 2021-04-04 | End: 2021-04-13 | Stop reason: HOSPADM

## 2021-04-04 RX ORDER — ECHINACEA PURPUREA EXTRACT 125 MG
1 TABLET ORAL
Status: DISCONTINUED | OUTPATIENT
Start: 2021-04-04 | End: 2021-04-13 | Stop reason: HOSPADM

## 2021-04-04 RX ADMIN — LORAZEPAM 0.5 MG: 0.5 TABLET ORAL at 17:08

## 2021-04-04 RX ADMIN — AZELASTINE HYDROCHLORIDE 1 SPRAY: 137 SPRAY, METERED NASAL at 08:39

## 2021-04-04 RX ADMIN — VERAPAMIL HYDROCHLORIDE 240 MG: 240 TABLET ORAL at 08:40

## 2021-04-04 RX ADMIN — LORATADINE 10 MG: 10 TABLET ORAL at 15:00

## 2021-04-04 RX ADMIN — ACETAMINOPHEN 650 MG: 325 TABLET, FILM COATED ORAL at 07:29

## 2021-04-04 RX ADMIN — ACETAMINOPHEN 650 MG: 325 TABLET, FILM COATED ORAL at 20:40

## 2021-04-04 RX ADMIN — AZELASTINE HYDROCHLORIDE 1 SPRAY: 137 SPRAY, METERED NASAL at 17:08

## 2021-04-04 RX ADMIN — CEFTRIAXONE 1000 MG: 1 INJECTION, POWDER, FOR SOLUTION INTRAMUSCULAR; INTRAVENOUS at 17:09

## 2021-04-04 RX ADMIN — SODIUM CHLORIDE 125 ML/HR: 0.9 INJECTION, SOLUTION INTRAVENOUS at 22:53

## 2021-04-04 RX ADMIN — FLUOXETINE 80 MG: 20 CAPSULE ORAL at 08:39

## 2021-04-04 RX ADMIN — ENOXAPARIN SODIUM 40 MG: 40 INJECTION SUBCUTANEOUS at 08:39

## 2021-04-04 RX ADMIN — ACETAMINOPHEN 650 MG: 325 TABLET, FILM COATED ORAL at 13:31

## 2021-04-04 RX ADMIN — DIPHENHYDRAMINE HCL 25 MG: 25 TABLET ORAL at 13:31

## 2021-04-04 RX ADMIN — LORAZEPAM 0.5 MG: 0.5 TABLET ORAL at 08:39

## 2021-04-04 NOTE — PROGRESS NOTES
[facility]  Progress Note - Anabella Cuenca 1965, 61 y o  female MRN: [id number]  Unit/Bed#: CW2 218-01 Encounter: [id number]  Primary Care Provider: Sajan Hidalgo MD   Date and time admitted to hospital: 4/3/2021  1:47 AM    * Community acquired pneumonia of right lower lobe of lung  Assessment & Plan  Presented with 4 day history of reported flu-like symptoms and sinus congestion, initially treated by PCP as acute sinusitis with clindamycin  Presented to ER with worsening symptoms and increased WOB  · X-ray and CT chest suspicious for multifocal pneumonia; notably COVID-19/influenza/RSV PCR negative and patient with recent prior negative COVID-19 test     · Procalcitonin is additionally noted as elevated  · Patient with reported multiple allergies to antibiotics  Was initially started on cefepime, switched to IV Ceftriaxone per ID  · Legionella/strep urinary antigens negative  Blood cultures thus far negative   Procalcitonin improving   · Trend WBC, temperature curve, hemodynamics  · Continue supplemental oxygen and titrate as needed to maintain SaO2 greater than 88%    Allergy to multiple antibiotics  Assessment & Plan  Consulted ID as above  · Was switched from cefepime to ceftriaxone     Hyponatremia  Assessment & Plan  Sodium 127 on admission, possible hypovolemia as patient admits to decreased oral intake x several days  · Urine Na < 5, UOsm 489  · Started IV fluids, continue for now given improvement in Na    Abnormal thyroid function test  Assessment & Plan  TSH low, FT4 normal  · Would likely repeat as outpatient in 4 weeks when not acutely ill    Tobacco use  Assessment & Plan  · Nicotine patch offered however patient declines  · Counseled on smoking cessation    HTN (hypertension)  Assessment & Plan  · Continue home verapamil with hold parameters  · Monitor blood pressure per protocol    Depression with anxiety  Assessment & Plan  · Mood stable  · Continue home medications     Acute respiratory failure with hypoxia (HCC)  Assessment & Plan  Likely 2/2 PNA  · Encourage IS  · Treat as above  · Wean oxygen as able         VTE Pharmacologic Prophylaxis: VTE Score: 3 Moderate Risk (Score 3-4) - Pharmacological DVT Prophylaxis Ordered: enoxaparin (Lovenox)  Patient Centered Rounds: I performed bedside rounds with nursing staff today  Discussions with Specialists or Other Care Team Provider: Appreciate ID input    Education and Discussions with Family / Patient: Patient declined call to   Time Spent for Care: 30 minutes  More than 50% of total time spent on counseling and coordination of care as described above  Current Length of Stay: 1 day(s)  Current Patient Status: Inpatient   Certification Statement: The patient will continue to require additional inpatient hospital stay due to as above   Discharge Plan: Anticipate discharge in 48 hrs to home  Code Status: Level 1 - Full Code    Subjective:   Doing okay today  Clinically looks better however is reporting ongoing sinus pain/congestion and requesting benadryl  Diarrhea resolved  Cough nonproductive  Objective:     Vitals:   Temp (24hrs), Av 5 °F (38 1 °C), Min:98 °F (36 7 °C), Max:102 2 °F (39 °C)    Temp:  [98 °F (36 7 °C)-102 2 °F (39 °C)] 100 8 °F (38 2 °C)  HR:  [] 86  Resp:  [18-19] 18  BP: (114-127)/() 116/74  SpO2:  [88 %-91 %] 90 %  Body mass index is 30 79 kg/m²  Input and Output Summary (last 24 hours): Intake/Output Summary (Last 24 hours) at 2021 0915  Last data filed at 4/3/2021 2245  Gross per 24 hour   Intake 3282 5 ml   Output --   Net 3282 5 ml       Physical Exam:   Physical Exam  Vitals signs and nursing note reviewed  Constitutional:       Comments: On 3L via NC    Cardiovascular:      Rate and Rhythm: Normal rate and regular rhythm  Pulmonary:      Comments: Decreased, no distress   Abdominal:      General: There is no distension  Musculoskeletal:      Right lower leg: No edema  Left lower leg: No edema  Skin:     Coloration: Skin is not pale  Neurological:      Mental Status: She is oriented to person, place, and time  Psychiatric:         Mood and Affect: Mood normal           Additional Data:     Labs:  Results from last 7 days   Lab Units 04/04/21  0517 04/03/21  0232   WBC Thousand/uL 12 03* 15 56*   HEMOGLOBIN g/dL 11 4* 11 8   HEMATOCRIT % 34 5* 35 5   PLATELETS Thousands/uL 159 144*   BANDS PCT %  --  3   LYMPHO PCT %  --  6*   MONO PCT %  --  2*   EOS PCT %  --  0     Results from last 7 days   Lab Units 04/04/21  0517  04/03/21  0232   SODIUM mmol/L 132*   < > 127*   POTASSIUM mmol/L 3 5   < > 3 5   CHLORIDE mmol/L 102   < > 95*   CO2 mmol/L 24   < > 22   BUN mg/dL 9   < > 11   CREATININE mg/dL 0 61   < > 0 64   ANION GAP mmol/L 6   < > 10   CALCIUM mg/dL 8 2*   < > 8 9   ALBUMIN g/dL  --   --  2 7*   TOTAL BILIRUBIN mg/dL  --   --  0 50   ALK PHOS U/L  --   --  95   ALT U/L  --   --  28   AST U/L  --   --  26   GLUCOSE RANDOM mg/dL 98   < > 130    < > = values in this interval not displayed  Results from last 7 days   Lab Units 04/03/21  0232   INR  1 12             Results from last 7 days   Lab Units 04/04/21  0518 04/03/21  0834 04/03/21  0232   LACTIC ACID mmol/L  --   --  1 4   PROCALCITONIN ng/ml 0 63* 0 86* 1 08*       Lines/Drains:  Invasive Devices     Peripheral Intravenous Line            Peripheral IV 04/03/21 Right Antecubital 1 day                      Imaging: No pertinent imaging reviewed  Recent Cultures (last 7 days):   Results from last 7 days   Lab Units 04/03/21  0539 04/03/21  0237 04/03/21  0232   BLOOD CULTURE   --  No Growth at 24 hrs  No Growth at 24 hrs     LEGIONELLA URINARY ANTIGEN  Negative  --   --        Last 24 Hours Medication List:   Current Facility-Administered Medications   Medication Dose Route Frequency Provider Last Rate    acetaminophen  650 mg Oral Q6H PRN Jeane Evans Pedro Luis,       albuterol  2 puff Inhalation Q4H PRN Rochelle Estrada MD      atorvastatin  10 mg Oral Daily Alvarez Milton, DO      azelastine  1 spray Each Nare BID Alvarez Milton, DO      busPIRone  10 mg Oral QPM Nella Millan PA-C      cefTRIAXone  1,000 mg Intravenous Q24H Jasvir Arroyo DO Stopped (04/03/21 1722)    diphenhydrAMINE  25 mg Oral Q6H PRN Neean Sadler PA-C      enoxaparin  40 mg Subcutaneous Daily Alvarez Milton, DO      FLUoxetine  80 mg Oral Daily Neena Sadler PA-C      glycerin-hypromellose-  1 drop Both Eyes Q3H PRN Neena Sadler PA-C      LORazepam  0 5 mg Oral BID Alvarez Milton, DO      ondansetron  4 mg Intravenous Q6H PRN Alvarez Milton, DO      sodium chloride  125 mL/hr Intravenous Continuous Alvarez Milton,  mL/hr (04/03/21 2334)    verapamil  240 mg Oral Daily Alvarez Milton, DO          Today, Patient Was Seen By: Neena Sadler PA-C    **Please Note: This note may have been constructed using a voice recognition system  **

## 2021-04-04 NOTE — ASSESSMENT & PLAN NOTE
Sodium 127 on admission, possible hypovolemia as patient admits to decreased oral intake x several days  · Urine Na < 5, UOsm 489  · Started IV fluids, continue for now given improvement in Na

## 2021-04-04 NOTE — PROGRESS NOTES
Progress Note - Infectious Disease   Keaton Malcolm 61 y o  female MRN: [id number]  Unit/Bed#: CW2 218-01 Encounter: [id number]      IMPRESSION & RECOMMENDATIONS:   Impression/Recommendations:  1  Sepsis, POA  Tachypnea, leukocytosis  Secondary to #2  No other clear explanation  Negative UA  Patient remains hemodynamically stable  Blood cultures are negative thus far      -antibiotic plan as below  -monitor temperatures and hemodynamics  -recheck CBC in a m   -follow up pending blood cultures  -supportive care     2  Multifocal pneumonia  Consider viral etiology given constellation of symptoms, positive sick contacts  Negative COVID-19 PCR x2  Negative influenza, RSV PCR  Consider early bacterial pneumonia based on CT findings, elevated procalcitonin  Negative Legionella, strep  Low suspicion for MDRO  Procalcitonin level is improving      -continue IV ceftriaxone 1 g Q 24 hours  -recheck procalcitonin level in a m   -monitor respiratory symptoms  -anticipate ultimate transition to cefdinir to complete 7 days total      3  Multiple antibiotic allergies  Patient has extensive antibiotic allergy history, which significantly limits treatment options  She has previously tolerated vancomycin and clindamycin  Patient tolerated IV cefepime given in the ER      -antibiotic plan as above  -monitor closely for reactions     4  Chronic ethmoid sinusitis  With prior bilateral antrectomy  Follows with ENT last seen on 03/01/2021      5  Tobacco use  Risk factor for pulmonary infection  Recommend smoking cessation      Antibiotics:  Antibiotic 2  Ceftriaxone           Subjective:  States she feels about the same today  Requiring between 3-4 L supplemental oxygen  T-max 102 2°  No worsening shortness of breath or cough  Denies other new focal symptoms        Objective:  Vitals:  Temp:  [98 °F (36 7 °C)-102 2 °F (39 °C)] 100 8 °F (38 2 °C)  HR:  [] 86  Resp:  [18-19] 18  BP: (114-127)/() 116/74  SpO2:  [88 %-91 %] 90 %  Temp (24hrs), Av 5 °F (38 1 °C), Min:98 °F (36 7 °C), Max:102 2 °F (39 °C)  Current: Temperature: (!) 100 8 °F (38 2 °C)    Physical Exam:   General:  No acute distress, nontoxic  HEENT:  Atraumatic normocephalic, no oral lesion  Psychiatric:  Awake and alert  Pulmonary:  Normal respiratory excursion without accessory muscle use  Abdomen:  Soft, nontender  Extremities:  No edema  Skin:  No rashes  Neuro: Moves all extremities spontaneously    Lab Results:  I have personally reviewed pertinent labs  Results from last 7 days   Lab Units 21  0517 21  2034 21  1301 21  0232   POTASSIUM mmol/L 3 5 3 6 4 1 3 5   CHLORIDE mmol/L 102 98* 96* 95*   CO2 mmol/L 24 24 25 22   BUN mg/dL 9 11 12 11   CREATININE mg/dL 0 61 0 69 0 67 0 64   EGFR ml/min/1 73sq m 101 97 98 99   CALCIUM mg/dL 8 2* 8 7 8 8 8 9   AST U/L  --   --   --  26   ALT U/L  --   --   --  28   ALK PHOS U/L  --   --   --  95     Results from last 7 days   Lab Units 21  0517 21  0232   WBC Thousand/uL 12 03* 15 56*   HEMOGLOBIN g/dL 11 4* 11 8   PLATELETS Thousands/uL 159 144*     Results from last 7 days   Lab Units 21  0539 21  0237 21  0232   BLOOD CULTURE   --  No Growth at 24 hrs  No Growth at 24 hrs  LEGIONELLA URINARY ANTIGEN  Negative  --   --        Imaging Studies:   I have personally reviewed pertinent imaging study reports and images in PACS  EKG, Pathology, and Other Studies:   I have personally reviewed pertinent reports

## 2021-04-04 NOTE — ASSESSMENT & PLAN NOTE
Presented with 4 day history of reported flu-like symptoms and sinus congestion, initially treated by PCP as acute sinusitis with clindamycin  Presented to ER with worsening symptoms and increased WOB  · X-ray and CT chest suspicious for multifocal pneumonia; notably COVID-19/influenza/RSV PCR negative and patient with recent prior negative COVID-19 test     · Procalcitonin is additionally noted as elevated  · Patient with reported multiple allergies to antibiotics  Was initially started on cefepime, switched to IV Ceftriaxone per ID  · Legionella/strep urinary antigens negative  Blood cultures thus far negative   Procalcitonin improving   · Trend WBC, temperature curve, hemodynamics  · Continue supplemental oxygen and titrate as needed to maintain SaO2 greater than 88%

## 2021-04-04 NOTE — QUICK NOTE
Spoke with family will request ENT eval due to c/o burning b/l eyes, sinuses and worsening congestion  Will continue abx  Order benadryl PRN, Claritin daily  Add saline spray  TT'd ENT on call to make aware of consultation  Will defer imaging at this time unless recommended by ENT      Brisa Self PA-C

## 2021-04-05 ENCOUNTER — APPOINTMENT (INPATIENT)
Dept: RADIOLOGY | Facility: HOSPITAL | Age: 56
DRG: 871 | End: 2021-04-05
Payer: COMMERCIAL

## 2021-04-05 PROBLEM — J32.9 CHRONIC SINUSITIS: Status: ACTIVE | Noted: 2021-04-05

## 2021-04-05 LAB
ANION GAP SERPL CALCULATED.3IONS-SCNC: 6 MMOL/L (ref 4–13)
B PARAP IS1001 DNA NPH QL NAA+NON-PROBE: NOT DETECTED
B PERT.PT PRMT NPH QL NAA+NON-PROBE: NOT DETECTED
BUN SERPL-MCNC: 6 MG/DL (ref 5–25)
C PNEUM DNA NPH QL NAA+NON-PROBE: NOT DETECTED
CALCIUM SERPL-MCNC: 7.9 MG/DL (ref 8.3–10.1)
CCP AB SER IA-ACNC: 1
CHLORIDE SERPL-SCNC: 101 MMOL/L (ref 100–108)
CO2 SERPL-SCNC: 24 MMOL/L (ref 21–32)
CREAT SERPL-MCNC: 0.55 MG/DL (ref 0.6–1.3)
ERYTHROCYTE [DISTWIDTH] IN BLOOD BY AUTOMATED COUNT: 13 % (ref 11.6–15.1)
FLUAV RNA NPH QL NAA+NON-PROBE: NOT DETECTED
FLUBV RNA NPH QL NAA+NON-PROBE: NOT DETECTED
GFR SERPL CREATININE-BSD FRML MDRD: 104 ML/MIN/1.73SQ M
GLUCOSE SERPL-MCNC: 112 MG/DL (ref 65–140)
HADV DNA NPH QL NAA+NON-PROBE: NOT DETECTED
HCT VFR BLD AUTO: 35.1 % (ref 34.8–46.1)
HGB BLD-MCNC: 11.6 G/DL (ref 11.5–15.4)
HMPV RNA NPH QL NAA+NON-PROBE: NOT DETECTED
HPIV 1+2+3+4 RNA NPH QL NAA+NON-PROBE: NOT DETECTED
HPIV 1+2+3+4 RNA NPH QL NAA+NON-PROBE: NOT DETECTED
M PNEUMO DNA NPH QL NAA+NON-PROBE: NOT DETECTED
MCH RBC QN AUTO: 29.3 PG (ref 26.8–34.3)
MCHC RBC AUTO-ENTMCNC: 33 G/DL (ref 31.4–37.4)
MCV RBC AUTO: 89 FL (ref 82–98)
NRBC BLD AUTO-RTO: 0 /100 WBCS
PLATELET # BLD AUTO: 188 THOUSANDS/UL (ref 149–390)
PMV BLD AUTO: 11.5 FL (ref 8.9–12.7)
POTASSIUM SERPL-SCNC: 3.2 MMOL/L (ref 3.5–5.3)
PROCALCITONIN SERPL-MCNC: 0.44 NG/ML
RBC # BLD AUTO: 3.96 MILLION/UL (ref 3.81–5.12)
RSV RNA NPH QL NAA+NON-PROBE: NOT DETECTED
RV+EV RNA NPH QL NAA+NON-PROBE: NOT DETECTED
SODIUM SERPL-SCNC: 131 MMOL/L (ref 136–145)
WBC # BLD AUTO: 12.99 THOUSAND/UL (ref 4.31–10.16)

## 2021-04-05 PROCEDURE — NC001 PR NO CHARGE: Performed by: INTERNAL MEDICINE

## 2021-04-05 PROCEDURE — 99222 1ST HOSP IP/OBS MODERATE 55: CPT | Performed by: INTERNAL MEDICINE

## 2021-04-05 PROCEDURE — 86431 RHEUMATOID FACTOR QUANT: CPT | Performed by: INTERNAL MEDICINE

## 2021-04-05 PROCEDURE — 94760 N-INVAS EAR/PLS OXIMETRY 1: CPT

## 2021-04-05 PROCEDURE — 87486 CHLMYD PNEUM DNA AMP PROBE: CPT | Performed by: INTERNAL MEDICINE

## 2021-04-05 PROCEDURE — 80048 BASIC METABOLIC PNL TOTAL CA: CPT | Performed by: INTERNAL MEDICINE

## 2021-04-05 PROCEDURE — 99233 SBSQ HOSP IP/OBS HIGH 50: CPT | Performed by: INTERNAL MEDICINE

## 2021-04-05 PROCEDURE — 87081 CULTURE SCREEN ONLY: CPT | Performed by: INTERNAL MEDICINE

## 2021-04-05 PROCEDURE — 94640 AIRWAY INHALATION TREATMENT: CPT

## 2021-04-05 PROCEDURE — 87633 RESP VIRUS 12-25 TARGETS: CPT | Performed by: INTERNAL MEDICINE

## 2021-04-05 PROCEDURE — 86200 CCP ANTIBODY: CPT | Performed by: INTERNAL MEDICINE

## 2021-04-05 PROCEDURE — 85027 COMPLETE CBC AUTOMATED: CPT | Performed by: INTERNAL MEDICINE

## 2021-04-05 PROCEDURE — 71045 X-RAY EXAM CHEST 1 VIEW: CPT

## 2021-04-05 PROCEDURE — 86255 FLUORESCENT ANTIBODY SCREEN: CPT | Performed by: INTERNAL MEDICINE

## 2021-04-05 PROCEDURE — 86430 RHEUMATOID FACTOR TEST QUAL: CPT | Performed by: INTERNAL MEDICINE

## 2021-04-05 PROCEDURE — 86038 ANTINUCLEAR ANTIBODIES: CPT | Performed by: INTERNAL MEDICINE

## 2021-04-05 PROCEDURE — 87798 DETECT AGENT NOS DNA AMP: CPT | Performed by: INTERNAL MEDICINE

## 2021-04-05 PROCEDURE — 87581 M.PNEUMON DNA AMP PROBE: CPT | Performed by: INTERNAL MEDICINE

## 2021-04-05 PROCEDURE — 84145 PROCALCITONIN (PCT): CPT | Performed by: INTERNAL MEDICINE

## 2021-04-05 PROCEDURE — 94664 DEMO&/EVAL PT USE INHALER: CPT

## 2021-04-05 RX ORDER — SACCHAROMYCES BOULARDII 250 MG
250 CAPSULE ORAL 2 TIMES DAILY
Status: DISCONTINUED | OUTPATIENT
Start: 2021-04-05 | End: 2021-04-13 | Stop reason: HOSPADM

## 2021-04-05 RX ORDER — METHYLPREDNISOLONE SODIUM SUCCINATE 125 MG/2ML
70 INJECTION, POWDER, LYOPHILIZED, FOR SOLUTION INTRAMUSCULAR; INTRAVENOUS DAILY
Status: DISCONTINUED | OUTPATIENT
Start: 2021-04-05 | End: 2021-04-05

## 2021-04-05 RX ORDER — LORAZEPAM 0.5 MG/1
0.5 TABLET ORAL EVERY 12 HOURS PRN
Status: DISCONTINUED | OUTPATIENT
Start: 2021-04-05 | End: 2021-04-13 | Stop reason: HOSPADM

## 2021-04-05 RX ORDER — METHYLPREDNISOLONE SODIUM SUCCINATE 125 MG/2ML
60 INJECTION, POWDER, LYOPHILIZED, FOR SOLUTION INTRAMUSCULAR; INTRAVENOUS EVERY 6 HOURS SCHEDULED
Status: DISCONTINUED | OUTPATIENT
Start: 2021-04-05 | End: 2021-04-09

## 2021-04-05 RX ORDER — LORAZEPAM 2 MG/ML
0.25 INJECTION INTRAMUSCULAR ONCE
Status: COMPLETED | OUTPATIENT
Start: 2021-04-05 | End: 2021-04-05

## 2021-04-05 RX ADMIN — CEFEPIME HYDROCHLORIDE 2000 MG: 2 INJECTION, POWDER, FOR SOLUTION INTRAVENOUS at 19:30

## 2021-04-05 RX ADMIN — METRONIDAZOLE 500 MG: 500 INJECTION, SOLUTION INTRAVENOUS at 21:59

## 2021-04-05 RX ADMIN — METHYLPREDNISOLONE SODIUM SUCCINATE 60 MG: 125 INJECTION, POWDER, FOR SOLUTION INTRAMUSCULAR; INTRAVENOUS at 23:23

## 2021-04-05 RX ADMIN — CEFEPIME HYDROCHLORIDE 2000 MG: 2 INJECTION, POWDER, FOR SOLUTION INTRAVENOUS at 10:55

## 2021-04-05 RX ADMIN — ALBUTEROL SULFATE 2 PUFF: 90 AEROSOL, METERED RESPIRATORY (INHALATION) at 08:19

## 2021-04-05 RX ADMIN — FLUOXETINE 80 MG: 20 CAPSULE ORAL at 09:37

## 2021-04-05 RX ADMIN — ENOXAPARIN SODIUM 40 MG: 40 INJECTION SUBCUTANEOUS at 09:37

## 2021-04-05 RX ADMIN — AZELASTINE HYDROCHLORIDE 1 SPRAY: 137 SPRAY, METERED NASAL at 09:36

## 2021-04-05 RX ADMIN — BUSPIRONE HYDROCHLORIDE 10 MG: 10 TABLET ORAL at 21:36

## 2021-04-05 RX ADMIN — ACETAMINOPHEN 650 MG: 325 TABLET, FILM COATED ORAL at 17:24

## 2021-04-05 RX ADMIN — Medication 250 MG: at 09:37

## 2021-04-05 RX ADMIN — VANCOMYCIN HYDROCHLORIDE 1500 MG: 10 INJECTION, POWDER, LYOPHILIZED, FOR SOLUTION INTRAVENOUS at 17:20

## 2021-04-05 RX ADMIN — METRONIDAZOLE 500 MG: 500 INJECTION, SOLUTION INTRAVENOUS at 15:39

## 2021-04-05 RX ADMIN — LORAZEPAM 0.5 MG: 0.5 TABLET ORAL at 09:37

## 2021-04-05 RX ADMIN — METHYLPREDNISOLONE SODIUM SUCCINATE 70 MG: 125 INJECTION, POWDER, FOR SOLUTION INTRAMUSCULAR; INTRAVENOUS at 13:51

## 2021-04-05 RX ADMIN — LORAZEPAM 0.25 MG: 2 INJECTION INTRAMUSCULAR; INTRAVENOUS at 13:51

## 2021-04-05 RX ADMIN — ACETAMINOPHEN 650 MG: 325 TABLET, FILM COATED ORAL at 09:37

## 2021-04-05 RX ADMIN — VANCOMYCIN HYDROCHLORIDE 1500 MG: 10 INJECTION, POWDER, LYOPHILIZED, FOR SOLUTION INTRAVENOUS at 23:23

## 2021-04-05 RX ADMIN — ATORVASTATIN CALCIUM 10 MG: 10 TABLET, FILM COATED ORAL at 21:36

## 2021-04-05 RX ADMIN — METHYLPREDNISOLONE SODIUM SUCCINATE 60 MG: 125 INJECTION, POWDER, FOR SOLUTION INTRAMUSCULAR; INTRAVENOUS at 17:21

## 2021-04-05 RX ADMIN — Medication 250 MG: at 18:17

## 2021-04-05 RX ADMIN — LORATADINE 10 MG: 10 TABLET ORAL at 09:37

## 2021-04-05 NOTE — CONSULTS
PULMONOLOGY CONSULT NOTE     Name: Julieta Shirley   Age & Sex: 61 y o  female   MRN: [id number]  Unit/Bed#: CW2 218-01   Encounter: [id number]        Reason for consultation:  Hypoxic respiratory failure and multifocal pneumonia    Requesting  Provider: Cristin Andujar PA-C    Assessment:     1  Acute hypoxic respiratory failure -  Secondary to multifocal pneumonia  Concern for cryptogenic organizing pneumonia  2  Sirs/sepsis -  POA -  Tachypnea, leukocytosis -  Suspect secondary to multifocal pneumonia versus cryptogenic organizing pneumonia versus other viral or atypical sources  3  Multifocal pneumonia versus cryptogenic organizing pneumonia  4  Abnormal chest CT -  Secondary to above  5  Chronic ethmoidal sinusitis -  Follows ENT as an outpatient -  Status post bilateral antrectomy  6  Allergies -  Pollen/season, dust mite, cat and dog hair  7  Peripheral eosinophilia -  Eosinophilic count 442 in August 2020 -  Possibly secondary to underlying allergies  also consider Eosinophilic granulomatosis with polyangiitis  8  Nicotine dependence    Plan:    - patient currently saturating over 90% on 15 L via mid flow  - continue supplemental oxygen and titrate as tolerated; goal SpO2 greater than 90%  - continue antibiotics per Infectious Disease  - given patient's age, history of sinusitis, peripheral eosinophilia, chest CT findings, and lack of robust response to antibiotics, there is concern that patient has cryptogenic organizing pneumonia  - plan for bronchoscopy with BAL to aid in diagnosis and to rule out Bacterial infections, atypical infections, and fungal etiology; bronchoscopy scheduled for 1:30 p m  on 4/6/21  NPO after midnight  - Given patient's rapid decline and increased oxygen requirements  Will start IV Solu-Medrol 70 mg daily with plan for a 12 week taper  - obtain CLINT, rheumatoid factor, CCP, Anca as cryptogenic organizing pneumonia may be associated with rheumatologic etiology   Increased suspicion for rheumatoid etiology given chronic sinusitis and  Elevated eosinophil count  - repeat CT chest in 6-8 weeks as an outpatient  - outpatient follow-up with ENT regarding sinusitis  - counseled on  Tobacco cessation      History of Present Illness   HPI:  Matthew Meeks is a 61 y o  female  With chronic sinusitis status post bilateral antrectomy, anxiety/depression, hypertension, multiple antibiotic allergies, nicotine dependence presenting for fever/chills, nasal congestion, rhinorrhea, headache, shortness of breath on 4/3/21  Patient had onset of symptoms approximately 3/31/21  COVID negative  Patient initially started on antibiotics via her  Primary care provider but this did not alleviate her symptoms  She came to the emergency department for further evaluation  In the emergency department patient was found to be hypoxic to 88% with improvement with 3 L nasal cannula oxygen  CT chest revealed multifocal pneumonia  Infectious disease consultant patient was empirically started on antibiotics  Despite antibiotic treatment patient continued to have fever, chills, increased oxygen requirements which have gone as high as 15 L by mid flow  Pulmonology consulted for increasing oxygen requirements in setting of multifocal pneumonia  Review of systems:  12 point review of systems was completed and was otherwise negative except as listed in HPI        Historical Information   Past Medical History:   Diagnosis Date    Allergies     Anesthesia complication     V TACH after her reduction mammoplasty, done at [address] Chronic rhinitis 2/18/2020    Depression     Ethmoid sinusitis     GERD (gastroesophageal reflux disease)     Maxillary sinusitis     Multiple allergies     Myofascial pain syndrome     Nasal turbinate hypertrophy     Wears glasses      Past Surgical History:   Procedure Laterality Date    LAPAROSCOPY      with vaginal hysterectomy; 11/3/2003 rso    OOPHORECTOMY Left 2004    PARTIAL HYSTERECTOMY  2004    UT NASAL/SINUS ENDOSCOPY,RMV TISS MAXILL SINUS N/A 9/30/2016    Procedure: IMAGE GUIDED FUNCTIONAL ENDOSCOPIC SINUS SURGERY;  Surgeon: Naomi Campoverde MD;  [facility]: [facility];  Service: ENT    REDUCTION MAMMAPLASTY Bilateral 02/27/2015    TOOTH EXTRACTION Right 3/16/2019    Procedure: EXTRACTION TOOTH #1 (Impacted Third Molar Tooth);   Surgeon: Kendal Gonzalez DDS;  Location: [facility];  Service: Maxillofacial    TUBAL LIGATION      WISDOM TOOTH EXTRACTION       Family History   Problem Relation Age of Onset    CLEM disease Mother     Atrial fibrillation Mother     Atrial fibrillation Father     Heart disease Father     Diabetes Maternal Grandmother     Hypertension Maternal Grandmother     Colon cancer Maternal Grandfather     Diabetes Maternal Grandfather     Cancer Paternal Grandfather     Endometrial cancer Cousin     Breast cancer Cousin     Multiple sclerosis Sister     No Known Problems Son     No Known Problems Son        Occupational History:  noncontributory    Social History:  Current some day smoker    Meds/Allergies   Current Facility-Administered Medications   Medication Dose Route Frequency    acetaminophen (TYLENOL) tablet 650 mg  650 mg Oral Q6H PRN    albuterol (PROVENTIL HFA,VENTOLIN HFA) inhaler 2 puff  2 puff Inhalation Q4H PRN    atorvastatin (LIPITOR) tablet 10 mg  10 mg Oral Daily    azelastine (ASTELIN) 0 1 % nasal spray 1 spray  1 spray Each Nare BID    busPIRone (BUSPAR) tablet 10 mg  10 mg Oral QPM    cefepime (MAXIPIME) 2,000 mg in dextrose 5 % 50 mL IVPB  2,000 mg Intravenous Q12H    diphenhydrAMINE (BENADRYL) tablet 25 mg  25 mg Oral Q6H PRN    enoxaparin (LOVENOX) subcutaneous injection 40 mg  40 mg Subcutaneous Daily    FLUoxetine (PROzac) capsule 80 mg  80 mg Oral Daily    glycerin-hypromellose- (ARTIFICIAL TEARS) ophthalmic solution 1 drop  1 drop Both Eyes Q3H PRN    loratadine (CLARITIN) tablet 10 mg 10 mg Oral Daily    LORazepam (ATIVAN) tablet 0 5 mg  0 5 mg Oral BID    ondansetron (ZOFRAN) injection 4 mg  4 mg Intravenous Q6H PRN    saccharomyces boulardii (FLORASTOR) capsule 250 mg  250 mg Oral BID    sodium chloride (OCEAN) 0 65 % nasal spray 1 spray  1 spray Each Nare Q1H PRN    verapamil (CALAN-SR) CR tablet 240 mg  240 mg Oral Daily     Medications Prior to Admission   Medication    atorvastatin (LIPITOR) 10 mg tablet    azelastine (ASTELIN) 0 1 % nasal spray    busPIRone (BUSPAR) 30 MG tablet    cholecalciferol (VITAMIN D3) 1,000 units tablet    clindamycin (CLEOCIN) 300 MG capsule    FLUoxetine (PROZAC) 40 MG capsule    LORazepam (ATIVAN) 1 mg tablet    polyethylene glycol (MIRALAX) 17 g packet    sodium chloride (OCEAN) 0 65 % nasal spray    verapamil (CALAN-SR) 240 mg CR tablet    LORazepam (ATIVAN) 0 5 mg tablet    montelukast (SINGULAIR) 10 mg tablet     Allergies   Allergen Reactions    Other      Most all antibiotics- hives, hypotensive    "no problem with clindamycin "    Augmentin Es-600  [Amoxicillin-Pot Clavulanate]     Cefuroxime     Erythromycin     Levofloxacin     Morphine     Morphine And Related Hives    Oxycodone-Acetaminophen     Penicillins     Percocet [Oxycodone-Acetaminophen] Hives    Sulfa Antibiotics     Tetracyclines & Related        Vitals: Blood pressure 102/54, pulse 69, temperature 98 9 °F (37 2 °C), resp  rate 20, height 5' 5" (1 651 m), weight 83 9 kg (185 lb), SpO2 93 %, not currently breastfeeding , Body mass index is 30 79 kg/m²  Intake/Output Summary (Last 24 hours) at 4/5/2021 1051  Last data filed at 4/5/2021 1025  Gross per 24 hour   Intake 2460 83 ml   Output 2025 ml   Net 435 83 ml       Physical Exam  Vitals signs reviewed  Constitutional:       General: She is not in acute distress  Appearance: She is ill-appearing  She is not toxic-appearing or diaphoretic  HENT:      Head: Normocephalic and atraumatic        Right Ear: External ear normal       Left Ear: External ear normal    Eyes:      General: No scleral icterus  Right eye: No discharge  Left eye: No discharge  Conjunctiva/sclera: Conjunctivae normal    Cardiovascular:      Rate and Rhythm: Normal rate and regular rhythm  Pulses: Normal pulses  Heart sounds: Normal heart sounds  No murmur  No friction rub  No gallop  Pulmonary:      Effort: No respiratory distress  Breath sounds: No stridor  No wheezing, rhonchi or rales  Comments: Increased work of breathing  Mildly diminished breath sounds at bilateral lung bases  Chest:      Chest wall: No tenderness  Abdominal:      General: Abdomen is flat  Bowel sounds are normal  There is no distension  Palpations: Abdomen is soft  Tenderness: There is no abdominal tenderness  There is no guarding or rebound  Musculoskeletal:      Right lower leg: No edema  Left lower leg: No edema  Skin:     General: Skin is warm and dry  Neurological:      Mental Status: She is alert and oriented to person, place, and time  Labs: I have personally reviewed pertinent lab results    Laboratory and Diagnostics  Results from last 7 days   Lab Units 04/05/21  0624 04/04/21  0517 04/03/21  0232   WBC Thousand/uL 12 99* 12 03* 15 56*   HEMOGLOBIN g/dL 11 6 11 4* 11 8   HEMATOCRIT % 35 1 34 5* 35 5   PLATELETS Thousands/uL 188 159 144*   BANDS PCT %  --   --  3   MONO PCT %  --   --  2*     Results from last 7 days   Lab Units 04/05/21  0624 04/04/21  0517 04/03/21  2034 04/03/21  1301 04/03/21  0232   SODIUM mmol/L 131* 132* 130* 128* 127*   POTASSIUM mmol/L 3 2* 3 5 3 6 4 1 3 5   CHLORIDE mmol/L 101 102 98* 96* 95*   CO2 mmol/L 24 24 24 25 22   ANION GAP mmol/L 6 6 8 7 10   BUN mg/dL 6 9 11 12 11   CREATININE mg/dL 0 55* 0 61 0 69 0 67 0 64   CALCIUM mg/dL 7 9* 8 2* 8 7 8 8 8 9   GLUCOSE RANDOM mg/dL 112 98 109 120 130   ALT U/L  --   --   --   --  28   AST U/L  --   --   --   -- 26   ALK PHOS U/L  --   --   --   --  95   ALBUMIN g/dL  --   --   --   --  2 7*   TOTAL BILIRUBIN mg/dL  --   --   --   --  0 50          Results from last 7 days   Lab Units 04/03/21  0232   INR  1 12   PTT seconds 27          Results from last 7 days   Lab Units 04/03/21  0232   LACTIC ACID mmol/L 1 4                     Results from last 7 days   Lab Units 04/05/21  0624 04/04/21  0518 04/03/21  0834 04/03/21  0232   PROCALCITONIN ng/ml 0 44* 0 63* 0 86* 1 08*           Imaging and other studies: I have personally reviewed pertinent reports  and I have personally reviewed pertinent films in PACS     CT chest 04/03/2021: bilateral areas of focal consolidation with ground-glass opacity surrounding consolidated areas  Small bilateral pleural effusion  Chest x-ray 4/3/21:  Bilateral infiltrates      Pulmonary function testing: n/a      EKG, Pathology, and Other Studies: I have personally reviewed pertinent reports  Code Status: Level 1 - Full Code        Portions of the record may have been created with voice recognition software  Occasional wrong word or "sound a like" substitutions may have occurred due to the inherent limitations of voice recognition software  Read the chart carefully and recognize, using context, where substitutions have occurred      DO [facility]  Pulmonary/Critical Care Fellowship, PGY-4

## 2021-04-05 NOTE — UTILIZATION REVIEW
Initial Clinical Review    Admission: Date/Time/Statement:   Admission Orders (From admission, onward)     Ordered        04/03/21 0511  Inpatient Admission  Once                   Orders Placed This Encounter   Procedures    Inpatient Admission     Standing Status:   Standing     Number of Occurrences:   1     Order Specific Question:   Level of Care     Answer:   Med Surg [16]     Order Specific Question:   Estimated length of stay     Answer:   More than 2 Midnights     Order Specific Question:   Certification     Answer:   I certify that inpatient services are medically necessary for this patient for a duration of greater than two midnights  See H&P and MD Progress Notes for additional information about the patient's course of treatment  ED Arrival Information     Expected Arrival Acuity Means of Arrival Escorted By Service Admission Type    - 4/3/2021 01:47 Emergent Wheelchair Family Member Hospitalist Emergency    Arrival Complaint    -Weakness with increasing SOB        Chief Complaint   Patient presents with    Fever - 75 years or older     pt reports fatigue and generalized weakness since Wednesday, Tmax of 103 1 at 0000, pt had neg COVID test yesterday, PCP started her on clindamycin due to prev history of sinus cellulitis      Assessment/Plan: 61year old female, presented to the ED @ [facility] from home via wheel chaired in by family member  Admitted as Inpatient due to  CAP right lower lobe   4 day h/o flu like symptoms  Treated by PCP for sinusitis with clindamycin  This evening worswening SOB, PO 80% - applied O2 @ 3L via NC  X-ray and CT chest suspicious for multifocal pneumonia  IV flds  IV abx  Follow up on cultures  Prakash WBC, Temperature curve and hemodynamics  Continue O2 and titrate to maintain sats > 88%  Hyponatremia:  127, IV flds  Repeat BNP in afternoon      VTE Prophylaxis: Enoxaparin (Lovenox)  / sequential compression device     04/02/2021 Consult ID:  Sepsis, POA   Tachypnea, leukocytosis  Secondary to #2  No other clear explanation  Negative UA  Patient remains hemodynamically stable   -antibiotic plan as below   -monitor temperatures and hemodynamics  -recheck CBC in a m   -follow up pending blood cultures  -supportive care  2  Multifocal pneumonia  Consider viral etiology given constellation of symptoms, positive sick contacts  Negative COVID-19 PCR x2  Negative influenza, RSV PCR  Consider early bacterial pneumonia based on CT findings, elevated procalcitonin  Low suspicion for MDRO   -discontinue cefepime   -start IV ceftriaxone 1 g Q 24 hours  -follow-up urine Legionella and strep antigens  -recheck procalcitonin level in a m   -monitor respiratory symptoms  04/04/2021  Progress Note:  Continue with new IV Abx  Pulmonary toilet - IS, cough, deep breath  Wean O2 as able  04/05/2021  Consult Pulmonary:  Hypoxic respiratory failure and multifocal pneumonia  patient currently saturating over 90% on 15 L via mid flow  Continue IV Abx  Bronchoscopy scheduled for 1:30 p m  on 4/6/21  NPO after midnight    04/05/2021  Progress Note:  Overnight 4/4-4/5 developed worsening hypoxia and WOB requiring midflow  Currently on 15L  Suspicion for cryptogenic organizing pneumonia  Suspect steroids are needed however will first require a bronchoscopy to r/o fungal infection        ED Triage Vitals [04/03/21 0150]   Temperature Pulse Respirations Blood Pressure SpO2   98 7 °F (37 1 °C) 79 22 119/56 95 %      Temp Source Heart Rate Source Patient Position - Orthostatic VS BP Location FiO2 (%)   Oral Monitor Lying Left arm --      Pain Score       4          Wt Readings from Last 1 Encounters:   04/03/21 83 9 kg (185 lb)     Additional Vital Signs:   Date/Time  Temp  Pulse  Resp  BP  MAP (mmHg)  SpO2  Calculated FIO2 (%) - Nasal Cannula  Nasal Cannula O2 Flow Rate (L/min)  O2 Device  Patient Position - Orthostatic VS   04/05/21 0812  --  --  --  --  -- 93 %  80  15 L/min  --  --   04/05/21 0810  --  --  --  --  --  87 %Abnormal   60  10 L/min  Mid flow nasal cannula  --   04/05/21 0718  98 9 °F (37 2 °C)  69  20  102/54  --  92 %  60  10 L/min  Mid flow nasal cannula  --   04/05/21 0352  --  --  --  --  --  --  60  10 L/min  Mid flow nasal cannula  --   04/05/21 0300  --  --  24Abnormal   --  --  --  --  --  --  --   04/04/21 2133  --  --  --  --  --  --  36  4 L/min  Nasal cannula  --   04/04/21 15:43:58  99 3 °F (37 4 °C)  72  17  99/48Abnormal   65  89 %Abnormal   --  --  Nasal cannula  Lying   04/04/21 1205  100 2 °F (37 9 °C)  --  --  --  --  --  --  --  --  --   04/04/21 0745  --  86  --  --  --  90 %  --  --  --  --   04/04/21 07:19:34  100 8 °F (38 2 °C)Abnormal   86  18  116/74  88  91 %  32  3 L/min  Nasal cannula  Lying   04/04/21 0032  99 3 °F (37 4 °C)  --  --  --  --  --  --  --  --  --   04/03/21 23:00:45  --  96  --  116/73  87  88 %Abnormal   --  --  --  Lying   04/03/21 22:55:42  102 2 °F (39 °C)Abnormal   96  19  121/101Abnormal   108  88 %Abnormal   --  --  --  Lying   04/03/21 15:10:06  98 °F (36 7 °C)  86  --  114/53  73  91 %  --  --  --  --   04/03/21 13:21:45  102 °F (38 9 °C)Abnormal   93  --  --  --  91 %  --  --  --  --   04/03/21 13:05:21  --  109Abnormal   --  127/60  82  90 %  --  --  --  --   04/03/21 08:20:39  100 1 °F (37 8 °C)  85  18  --  --  91 %  30  2 5 L/min  Nasal cannula  --   04/03/21 0800  --  --  --  --  --  --  32  3 L/min  Nasal cannula  --   04/03/21 06:53:10  98 7 °F (37 1 °C)  77  18  100/56  71  93 %  --  --  --  Lying   04/03/21 0157  --  --  --  --  --  --  --  --  Nasal cannula  --     Date and Time Eye Opening Best Verbal Response Best Motor Response [place] Coma Scale Score   04/04/21 2133 4 5 6 15   04/04/21 0900 4 5 6 15   04/04/21 0100 4 5 6 15   04/03/21 0820 4 5 6 15   04/03/21 0157 4 5 6 15     04/03/2021 @ 0343  CT chest:  Multifocal mostly consolidative airspace disease with a perihilar predominance most likely to represent pneumonia    04/03/02021  @ 1116  Chest X:  Bilateral multilobar alveolar infiltrates       Pertinent Labs/Diagnostic Test Results:   Results from last 7 days   Lab Units 04/03/21  0231 04/01/21  1145   SARS-COV-2  Negative Negative     Results from last 7 days   Lab Units 04/05/21  0624 04/04/21  0517 04/03/21  0232   WBC Thousand/uL 12 99* 12 03* 15 56*   HEMOGLOBIN g/dL 11 6 11 4* 11 8   HEMATOCRIT % 35 1 34 5* 35 5   PLATELETS Thousands/uL 188 159 144*   BANDS PCT %  --   --  3     Results from last 7 days   Lab Units 04/05/21  0624 04/04/21  0517 04/03/21  2034 04/03/21  1301 04/03/21  0232   SODIUM mmol/L 131* 132* 130* 128* 127*   POTASSIUM mmol/L 3 2* 3 5 3 6 4 1 3 5   CHLORIDE mmol/L 101 102 98* 96* 95*   CO2 mmol/L 24 24 24 25 22   ANION GAP mmol/L 6 6 8 7 10   BUN mg/dL 6 9 11 12 11   CREATININE mg/dL 0 55* 0 61 0 69 0 67 0 64   EGFR ml/min/1 73sq m 104 101 97 98 99   CALCIUM mg/dL 7 9* 8 2* 8 7 8 8 8 9     Results from last 7 days   Lab Units 04/03/21  0232   AST U/L 26   ALT U/L 28   ALK PHOS U/L 95   TOTAL PROTEIN g/dL 6 8   ALBUMIN g/dL 2 7*   TOTAL BILIRUBIN mg/dL 0 50     Results from last 7 days   Lab Units 04/05/21  0624 04/04/21  0517 04/03/21 2034 04/03/21  1301 04/03/21  0232   GLUCOSE RANDOM mg/dL 112 98 109 120 130     Results from last 7 days   Lab Units 04/03/21  0232   PROTIME seconds 14 4   INR  1 12   PTT seconds 27     Results from last 7 days   Lab Units 04/03/21  0232   TSH 3RD GENERATON uIU/mL 0 285*     Results from last 7 days   Lab Units 04/05/21  0624 04/04/21  0518 04/03/21  0834 04/03/21  0232   PROCALCITONIN ng/ml 0 44* 0 63* 0 86* 1 08*     Results from last 7 days   Lab Units 04/03/21  0232   LACTIC ACID mmol/L 1 4     Results from last 7 days   Lab Units 04/03/21  0540 04/03/21  0539   CLARITY UA  Clear  --    COLOR UA  Yellow  --    SPEC GRAV UA  1 043*  --    PH UA  6 5  --    GLUCOSE UA mg/dl Negative  --    KETONES UA mg/dl 40 (2+)*  --    BLOOD UA  Small*  --    PROTEIN UA mg/dl 30 (1+)*  --    NITRITE UA  Negative  --    BILIRUBIN UA  Negative  --    UROBILINOGEN UA E U /dl 1 0  --    LEUKOCYTES UA  Negative  --    WBC UA /hpf 2-4  --    RBC UA /hpf 4-10*  --    BACTERIA UA /hpf None Seen  --    EPITHELIAL CELLS WET PREP /hpf None Seen  --    SODIUM UR   --  <5     Results from last 7 days   Lab Units 04/03/21  0539 04/03/21  0231   STREP PNEUMONIAE ANTIGEN, URINE  Negative  --    LEGIONELLA URINARY ANTIGEN  Negative  --    INFLUENZA A PCR   --  Negative   INFLUENZA B PCR   --  Negative   RSV PCR   --  Negative     Results from last 7 days   Lab Units 04/03/21  0237 04/03/21  0232   BLOOD CULTURE  No Growth at 48 hrs  No Growth at 48 hrs       Results from last 7 days   Lab Units 04/03/21  0232   TOTAL COUNTED  100     ED Treatment:   Medication Administration from 04/03/2021 0146 to 04/03/2021 0602       Date/Time Order Dose Route Action     04/03/2021 0247 ketorolac (TORADOL) injection 15 mg 15 mg Intravenous Given     04/03/2021 0338 iohexol (OMNIPAQUE) 350 MG/ML injection (MULTI-DOSE) 85 mL 85 mL Intravenous Given     04/03/2021 0537 cefepime (MAXIPIME) 2 g/50 mL dextrose IVPB 2,000 mg Intravenous New Bag        Past Medical History:   Diagnosis Date    Allergies     Anesthesia complication     V TACH after her reduction mammoplasty, done at [address] Chronic rhinitis 2/18/2020    Depression     Ethmoid sinusitis     GERD (gastroesophageal reflux disease)     Maxillary sinusitis     Multiple allergies     Myofascial pain syndrome     Nasal turbinate hypertrophy     Wears glasses      Present on Admission:   Community acquired pneumonia of right lower lobe of lung   HTN (hypertension)   Hyponatremia   Depression with anxiety   Tobacco use      Admitting Diagnosis: Pneumonia [J18 9]  Fever [R50 9]  Age/Sex: 61 y o  female  Admission Orders:  Scheduled Medications:  atorvastatin, 10 mg, Oral, Daily  azelastine, 1 spray, Each Nare, BID  busPIRone, 10 mg, Oral, QPM  cefepime, 2,000 mg, Intravenous, Q12H  enoxaparin, 40 mg, Subcutaneous, Daily  FLUoxetine, 80 mg, Oral, Daily  loratadine, 10 mg, Oral, Daily  LORazepam, 0 5 mg, Oral, BID  saccharomyces boulardii, 250 mg, Oral, BID  verapamil, 240 mg, Oral, Daily      Continuous IV Infusions:     PRN Meds:  acetaminophen, 650 mg, Oral, Q6H PRN  albuterol, 2 puff, Inhalation, Q4H PRN  diphenhydrAMINE, 25 mg, Oral, Q6H PRN  glycerin-hypromellose-, 1 drop, Both Eyes, Q3H PRN  ondansetron, 4 mg, Intravenous, Q6H PRN  sodium chloride, 1 spray, Each Nare, Q1H PRN      NPO, Oral Care  Elevate HOB 30 degrees or greater  Azael SCDs  IP CONSULT TO INFECTIOUS DISEASES  IP CONSULT TO ENT  IP CONSULT TO PULMONOLOGY    Network Utilization Review Department  ATTENTION: Please call with any questions or concerns to [phone] and carefully listen to the prompts so that you are directed to the right person  All voicemails are confidential   Henok Soliman all requests for admission clinical reviews, approved or denied determinations and any other requests to dedicated fax number below belonging to the campus where the patient is receiving treatment   List of dedicated fax numbers for the Facilities:  [address] DENIALS (Administrative/Medical Necessity) [phone]   [address] (Maternity/NICU/Pediatrics) [phone]   [address] Storm Dimas 7413 (  Christopher Conklin "Danielle" 103) [address] [place] [address] Lilliam Reyes [address] [facility] [address] [place] [address] [phone]

## 2021-04-05 NOTE — ASSESSMENT & PLAN NOTE
Consulted ID as above  · Was switched from cefepime to ceftriaxone by ID  · Will now switch back to IV Cefepime pending bronch

## 2021-04-05 NOTE — PROGRESS NOTES
[facility]  Progress Note - Jamin Rueda 1965, 61 y o  female MRN: [id number]  Unit/Bed#: CW2 218-01 Encounter: [id number]  Primary Care Provider: Zonia Yap MD   Date and time admitted to hospital: 4/3/2021  1:47 AM    * Community acquired pneumonia of right lower lobe of lung  Assessment & Plan  Presented with 4 day history of reported flu-like symptoms and sinus congestion, initially treated by PCP as acute sinusitis with clindamycin  Presented to ER with worsening symptoms and increased WOB  · X-ray and CT chest suspicious for multifocal pneumonia; notably COVID-19/influenza/RSV PCR negative and patient with recent prior negative COVID-19 test     · Procalcitonin is additionally noted as elevated though improving  · Patient with reported multiple allergies to antibiotics  Was initially started on cefepime, switched to IV Ceftriaxone per ID, however will switch back to cefepime now pending bronch  · Legionella/strep urinary antigens negative  Blood cultures thus far negative  · Trend WBC, temperature curve, hemodynamics  · Continue supplemental oxygen and titrate as needed to maintain SaO2 greater than 88%  · Overnight 4/4-4/5 developed worsening hypoxia and WOB requiring midflow  Currently on 15L  · Spoke with pulmonary and ID  Suspicion for cryptogenic organizing pneumonia  Suspect steroids are needed however will first require a bronchoscopy to r/o fungal infection  · Upgrade to Level 2 SD    Acute respiratory failure with hypoxia (HCC)  Assessment & Plan  Likely 2/2 PNA  Given lack of response to abx, suspecting organizing PNA  · Encourage IS/OOB as able  · Now on mid-flow NC, COVID negative   Pulmonary does not feel need to repeat  · Treating as above  · Will require bronchoscopy     Allergy to multiple antibiotics  Assessment & Plan  Consulted ID as above  · Was switched from cefepime to ceftriaxone by ID  · Will now switch back to IV Cefepime pending bronch     Hyponatremia  Assessment & Plan  Sodium 127 on admission, possible hypovolemia as patient admits to decreased oral intake x several days  · Urine Na < 5, UOsm 489  · Started IV fluids with some improvement however now worsening again  · Hold further IVF  · AM BMP  · Consider FR/salt tabs if continues to worsen     Abnormal thyroid function test  Assessment & Plan  TSH low, FT4 normal  · Would likely repeat as outpatient in 4 weeks when not acutely ill    Tobacco use  Assessment & Plan  Nicotine patch offered however patient declines  · Counseled on smoking cessation    HTN (hypertension)  Assessment & Plan  Continue home verapamil with hold parameters  · Monitor blood pressure per protocol    Depression with anxiety  Assessment & Plan  Mood stable presently  · Continue home medications         VTE Pharmacologic Prophylaxis: VTE Score: 3 Moderate Risk (Score 3-4) - Pharmacological DVT Prophylaxis Ordered: enoxaparin (Lovenox)  Patient Centered Rounds: I performed bedside rounds with nursing staff today  Discussions with Specialists or Other Care Team Provider: Pulmonary, ID, Charge RN    Education and Discussions with Family / Patient: Updated  (sister) at bedside  Time Spent for Care: 45 minutes  More than 50% of total time spent on counseling and coordination of care as described above  Current Length of Stay: 2 day(s)  Current Patient Status: Inpatient   Certification Statement: The patient will continue to require additional inpatient hospital stay due to worsening respiratory status, need for bronch   Discharge Plan: Anticipate discharge in >72 hrs to home  Code Status: Level 1 - Full Code    Subjective:   Feels much worse today  Overnight required mid flow nasal cannula  She denies any chest pain  Still complaining of a dry nonproductive cough  No nausea or vomiting  She is able to drink water  No further diarrhea       Objective:     Vitals:   Temp (24hrs), Av 5 °F (37 5 °C), Min:98 9 °F (37 2 °C), Max:100 2 °F (37 9 °C)    Temp:  [98 9 °F (37 2 °C)-100 2 °F (37 9 °C)] 98 9 °F (37 2 °C)  HR:  [69-72] 69  Resp:  [17-24] 20  BP: ()/(48-54) 102/54  SpO2:  [87 %-93 %] 93 %  Body mass index is 30 79 kg/m²  Input and Output Summary (last 24 hours): Intake/Output Summary (Last 24 hours) at 4/5/2021 1005  Last data filed at 4/5/2021 0900  Gross per 24 hour   Intake 2460 83 ml   Output 1700 ml   Net 760 83 ml       Physical Exam:   Physical Exam  Vitals signs and nursing note reviewed  Constitutional:       Appearance: She is obese  She is ill-appearing  Comments: On midflow NC   Cardiovascular:      Rate and Rhythm: Normal rate  Pulmonary:      Comments: Tachypnea noted, decreased throughout without wheezes   Abdominal:      General: There is no distension  Musculoskeletal:      Right lower leg: No edema  Left lower leg: No edema  Skin:     Coloration: Skin is pale  Neurological:      Mental Status: She is oriented to person, place, and time  Psychiatric:         Mood and Affect: Mood normal           Additional Data:     Labs:  Results from last 7 days   Lab Units 04/05/21 0624 04/03/21  0232   WBC Thousand/uL 12 99*   < > 15 56*   HEMOGLOBIN g/dL 11 6   < > 11 8   HEMATOCRIT % 35 1   < > 35 5   PLATELETS Thousands/uL 188   < > 144*   BANDS PCT %  --   --  3   LYMPHO PCT %  --   --  6*   MONO PCT %  --   --  2*   EOS PCT %  --   --  0    < > = values in this interval not displayed       Results from last 7 days   Lab Units 04/05/21  0624 04/03/21  0232   SODIUM mmol/L 131*   < > 127*   POTASSIUM mmol/L 3 2*   < > 3 5   CHLORIDE mmol/L 101   < > 95*   CO2 mmol/L 24   < > 22   BUN mg/dL 6   < > 11   CREATININE mg/dL 0 55*   < > 0 64   ANION GAP mmol/L 6   < > 10   CALCIUM mg/dL 7 9*   < > 8 9   ALBUMIN g/dL  --   --  2 7*   TOTAL BILIRUBIN mg/dL  --   --  0 50   ALK PHOS U/L  --   --  95   ALT U/L  --   --  28   AST U/L  --   --  26   GLUCOSE RANDOM mg/dL 112   < > 130    < > = values in this interval not displayed  Results from last 7 days   Lab Units 04/03/21  0232   INR  1 12             Results from last 7 days   Lab Units 04/05/21  0624 04/04/21  0518 04/03/21  0834 04/03/21  0232   LACTIC ACID mmol/L  --   --   --  1 4   PROCALCITONIN ng/ml 0 44* 0 63* 0 86* 1 08*       Lines/Drains:  Invasive Devices     Peripheral Intravenous Line            Peripheral IV 04/03/21 Right Antecubital 2 days                      Imaging: No pertinent imaging reviewed  Recent Cultures (last 7 days):   Results from last 7 days   Lab Units 04/03/21  0539 04/03/21  0237 04/03/21  0232   BLOOD CULTURE   --  No Growth at 48 hrs  No Growth at 48 hrs     LEGIONELLA URINARY ANTIGEN  Negative  --   --        Last 24 Hours Medication List:   Current Facility-Administered Medications   Medication Dose Route Frequency Provider Last Rate    acetaminophen  650 mg Oral Q6H PRN Donya Catherine, DO      albuterol  2 puff Inhalation Q4H PRN Dom Vergara MD      atorvastatin  10 mg Oral Daily Donya Catherine, DO      azelastine  1 spray Each Nare BID Donya Catherine,       busPIRone  10 mg Oral QPM Darcie Monroe PA-C      cefepime  2,000 mg Intravenous Q12H Vearl Ringer, PA-C      diphenhydrAMINE  25 mg Oral Q6H PRN Vearl Ringer, PA-C      enoxaparin  40 mg Subcutaneous Daily Donya Catherine, DO      FLUoxetine  80 mg Oral Daily Vearl Ringer, PA-C      glycerin-hypromellose-  1 drop Both Eyes Q3H PRN Vearl Ringer, PA-C      loratadine  10 mg Oral Daily Vearl Ringer, PA-C      LORazepam  0 5 mg Oral BID Donya Catherine,       ondansetron  4 mg Intravenous Q6H PRN Donya Catherine, DO      saccharomyces boulardii  250 mg Oral BID Vearl Ringer, PA-C      sodium chloride  1 spray Each Nare Q1H PRN Vearl Ringer, PA-C      verapamil  240 mg Oral Daily Donya Catherine,           Today, Patient Was Seen By: Emilia Vegas PA-C    **Please Note: This note may have been constructed using a voice recognition system  **

## 2021-04-05 NOTE — QUICK NOTE
Escalating oxygen requirements noted  Spoke with family and Dr Jim Boyce at bedside  Planned to trial on HiFlo, patient failed this  Will be sent to MICU  Dr Jim Boyce had mentioned adding vanc/flagyl  ID does not feel this is bacterial however ok with this if MICU team agrees       Joshua Callahan PA-C

## 2021-04-05 NOTE — ASSESSMENT & PLAN NOTE
Presented with 4 day history of reported flu-like symptoms and sinus congestion, initially treated by PCP as acute sinusitis with clindamycin  Presented to ER with worsening symptoms and increased WOB  · X-ray and CT chest suspicious for multifocal pneumonia; notably COVID-19/influenza/RSV PCR negative and patient with recent prior negative COVID-19 test     · Procalcitonin is additionally noted as elevated though improving  · Patient with reported multiple allergies to antibiotics  Was initially started on cefepime, switched to IV Ceftriaxone per ID, however will switch back to cefepime now pending bronch  · Legionella/strep urinary antigens negative  Blood cultures thus far negative  · Trend WBC, temperature curve, hemodynamics  · Continue supplemental oxygen and titrate as needed to maintain SaO2 greater than 88%  · Overnight 4/4-4/5 developed worsening hypoxia and WOB requiring midflow  Currently on 15L  · Spoke with pulmonary and ID  Suspicion for cryptogenic organizing pneumonia   Suspect steroids are needed however will first require a bronchoscopy to r/o fungal infection  · Upgrade to Level 2 SD

## 2021-04-05 NOTE — PROGRESS NOTES
Progress Note - Infectious Disease   Ivana Inch 61 y o  female MRN: [id number]  Unit/Bed#: CW2 218-01 Encounter: [id number]      IMPRESSION & RECOMMENDATIONS:   Impression/Recommendations:  1  Sepsis, POA  Tachypnea, leukocytosis  Secondary to #2  No other clear explanation  Negative UA  Blood cultures are negative  Fevers have come down  Patient remains hemodynamically stable      -antibiotic plan as below  -monitor temperatures and hemodynamics  -recheck CBC in a m   -supportive care     2  Multifocal pneumonia  Consider viral etiology given constellation of symptoms, positive sick contacts  Negative COVID-19 PCR x2  Negative influenza, RSV PCR  Consider early bacterial pneumonia based on CT findings, elevated procalcitonin  Low suspicion for MDRO  Procalcitonin level is coming down  However, worsening hypoxia overnight      -will change back to IV cefepime for now pending bronchoscopy findings  -follow-up pulmonology evaluation  Tentative plan for bronchoscopy noted  Will follow-up findings to guide further antibiotic recommendations  -monitor respiratory symptoms    3  Acute hypoxic respiratory failure  Secondary to #2 likely viral in etiology  Doubt fungal process as patient is not immunocompromised  Also consider other process such as cryptogenic organizing pneumonia  O2 requirements have increased, now on mid flow     -antibiotic plan as above  -okay to trial steroid from ID standpoint  -follow-up pulmonology recommendations  -monitor O2 requirements     4  Multiple antibiotic allergies  Patient has extensive antibiotic allergy history, which significantly limits treatment options  She has previously tolerated vancomycin and clindamycin  Patient tolerated IV cefepime given in the ER      -antibiotic plan as above  -monitor closely for reactions     5  Chronic ethmoid sinusitis  With prior bilateral antrectomy  Follows with ENT last seen on 03/01/2021      6  Tobacco use    Risk factor for pulmonary infection  Recommend smoking cessation      Antibiotics:  Antibiotic 4  Ceftriaxone 3     I discussed above plan with patient, and with Rosa Tsai from Internal Medicine Service  Subjective:  Fevers have come down  However, developed worsening hypoxia overnight now on 15 L mid flow  Minimal cough  She does have congestion  Objective:  Vitals:  Temp:  [98 9 °F (37 2 °C)-100 2 °F (37 9 °C)] 98 9 °F (37 2 °C)  HR:  [69-72] 69  Resp:  [17-24] 20  BP: ()/(48-54) 102/54  SpO2:  [87 %-93 %] 93 %  Temp (24hrs), Av 5 °F (37 5 °C), Min:98 9 °F (37 2 °C), Max:100 2 °F (37 9 °C)  Current: Temperature: 98 9 °F (37 2 °C)    Physical Exam:   General:  No acute distress, on mid flow  HEENT:  Atraumatic normocephalic  Psychiatric:  Awake and alert  Pulmonary:  Normal respiratory excursion without accessory muscle use  Abdomen:  Soft, nontender  Extremities:  No edema  Skin:  No rashes  Neuro: Moves all extremities spontaneously    Lab Results:  I have personally reviewed pertinent labs  Results from last 7 days   Lab Units 21  0624 21  0232   POTASSIUM mmol/L 3 2* 3 5 3 6   < > 3 5   CHLORIDE mmol/L 101 102 98*   < > 95*   CO2 mmol/L 24 24 24   < > 22   BUN mg/dL 6 9 11   < > 11   CREATININE mg/dL 0 55* 0 61 0 69   < > 0 64   EGFR ml/min/1 73sq m 104 101 97   < > 99   CALCIUM mg/dL 7 9* 8 2* 8 7   < > 8 9   AST U/L  --   --   --   --  26   ALT U/L  --   --   --   --  28   ALK PHOS U/L  --   --   --   --  95    < > = values in this interval not displayed  Results from last 7 days   Lab Units 21  0624 21  0232   WBC Thousand/uL 12 99* 12 03* 15 56*   HEMOGLOBIN g/dL 11 6 11 4* 11 8   PLATELETS Thousands/uL 188 159 144*     Results from last 7 days   Lab Units 21  0539 21  0237 21  0232   BLOOD CULTURE   --  No Growth at 48 hrs  No Growth at 48 hrs     LEGIONELLA URINARY ANTIGEN  Negative  --   -- Imaging Studies:   I have personally reviewed pertinent imaging study reports and images in PACS  EKG, Pathology, and Other Studies:   I have personally reviewed pertinent reports

## 2021-04-05 NOTE — PROGRESS NOTES
Vancomycin IV Pharmacy-to-Dose Consultation    Julieta Shirley is a 61 y o  female who is currently receiving Vancomycin IV with management by the Pharmacy Consult service  Relevant clinical data and objective history reviewed:  Temp Readings from Last 3 Encounters:   04/05/21 98 9 °F (37 2 °C)   03/01/21 97 9 °F (36 6 °C)   01/08/21 98 6 °F (37 °C) (Oral)     /54   Pulse 69   Temp 98 9 °F (37 2 °C)   Resp 20   Ht 5' 5" (1 651 m)   Wt 83 9 kg (185 lb)   LMP  (LMP Unknown)   SpO2 (!) 89%   BMI 30 79 kg/m²     I/O last 3 completed shifts: In: 3510 8 [P O :1965; I V :1545 8]  Out: 1450 [Urine:1450]    Lab Results   Component Value Date/Time    BUN 6 04/05/2021 06:24 AM    BUN 11 04/20/2015 11:35 AM    WBC 12 99 (H) 04/05/2021 06:24 AM    WBC 9 19 04/20/2015 11:35 AM    HGB 11 6 04/05/2021 06:24 AM    HGB 14 2 04/20/2015 11:35 AM    HCT 35 1 04/05/2021 06:24 AM    HCT 42 7 04/20/2015 11:35 AM    MCV 89 04/05/2021 06:24 AM    MCV 89 04/20/2015 11:35 AM     04/05/2021 06:24 AM     04/20/2015 11:35 AM     Creatinine   Date Value Ref Range Status   04/05/2021 0 55 (L) 0 60 - 1 30 mg/dL Final     Comment:     Standardized to IDMS reference method   04/04/2021 0 61 0 60 - 1 30 mg/dL Final     Comment:     Standardized to IDMS reference method   04/20/2015 0 67 0 60 - 1 30 mg/dL Final     Comment:     Standardized to IDMS reference method     Vancomycin Tr   Date Value Ref Range Status   03/17/2019 17 5 10 0 - 20 0 ug/mL Final   03/16/2019 11 0 10 0 - 20 0 ug/mL Final         Vancomycin Assessment:  Indication: Pneumonia    Status: stable  Micro: collected 04/03/2021 @ 5966   No growth at 48 hrs  Procalcitonin: 0 44  Renal Function: SCr 0 55  Potential Nephrotoxicity Factors:  Medications n/a  Patient-Factors: n/a  Days of Therapy: 1  Current Dose: vancomycin 1500mg q 12 h (dose prior to level:n/a ; next dose:04/05/2021 @ 1530)  Goal Trough:   15-20  Goal AUC(24h): 400-600  Last Level: n/a        Vancomycin Plan:  New Dosing:    Predicted Trough / AUC: 17 2/667  Next Level: 04/07/2021 @0300  Renal Function Monitoring: stable  Pharmacy will continue to follow closely for s/sx of nephrotoxicity, infusion reactions and appropriateness of therapy  BMP and CBC will be ordered per protocol  We will continue to follow the patient's culture results and clinical progress daily       Mela Orta RPh

## 2021-04-05 NOTE — ASSESSMENT & PLAN NOTE
Known to ENT as outpatient with chronic sinus issues  · ENT input noted, continue abx  · Could consider CT sinus but even if +, no inpatient recs  · Recommend outpatient f/u and supportive care

## 2021-04-05 NOTE — ASSESSMENT & PLAN NOTE
Sodium 127 on admission, possible hypovolemia as patient admits to decreased oral intake x several days  · Urine Na < 5, UOsm 489  · Started IV fluids with some improvement however now worsening again  · Hold further IVF  · AM BMP  · Consider FR/salt tabs if continues to worsen

## 2021-04-05 NOTE — CONSULTS
Patient known to our service, most recently seen in the office 3/1/21 by Dr Amber Mckee  Formal consultation deferred, as there is little for us to offer while the patient is admitted for pneumonia  She is on antibiotics, and appropriate medical management for chronic sinusitis, and as symptoms are persistent CT scan of sinuses is reasonable, but even if significant disease is evident, we would defer any possible surgical management until pneumonia is improved  She can follow up as an outpatient with Dr Amber Mckee

## 2021-04-05 NOTE — PROGRESS NOTES
61year old woman with chronic sinusitis, CRISTINA, tobacco abuse, HTN, depression/anxiety, presented for fevers, chills, myalgias and dyspnea  She had noted sick contacts with grand children week prior  She noted symptoms started 3/31  She denied sputum production, no hemoptysis, no gross hematuria, no rashes  She was found to have multilobar infiltrates, fever, and leukocytosis  She was started on antibiotics and ID/Pulmonary consults requested  She had rapidly increased oxygen requirements overnight and subsequently required HFNC  Given decline, she was transferred to MICU  She notes smoking 5cig per day, no vaping, pet dog, no exotic animal exposures  No new home exposures      VS  2, HR 70-80's,  MAPS 70-80's, SpO2 92% HFNC 0 95/50L flow, I/Os +785cc  Exam  GEN middle aged woman in bed, conversant, no conversational dyspnea, slight cough, nontoxic  HEENT MMM, no thrush, no scleral icterus  NECK no accessory muscle use, JVP not elevated  CV reg, single s1/2, no m/r  Pulm scattered mixed rales/rhonchi - mild b/l, no wheeze, no pleural rubs  ABD +BS soft NTND, no rebound  EXT warm, brisk cap refill, no edema    Laboratory and Diagnostics  Results from last 7 days   Lab Units 04/05/21  0624 04/04/21  0517 04/03/21  0232   WBC Thousand/uL 12 99* 12 03* 15 56*   HEMOGLOBIN g/dL 11 6 11 4* 11 8   HEMATOCRIT % 35 1 34 5* 35 5   PLATELETS Thousands/uL 188 159 144*   BANDS PCT %  --   --  3   MONO PCT %  --   --  2*     Results from last 7 days   Lab Units 04/05/21  0624 04/04/21  0517 04/03/21  2034 04/03/21  1301 04/03/21  0232   SODIUM mmol/L 131* 132* 130* 128* 127*   POTASSIUM mmol/L 3 2* 3 5 3 6 4 1 3 5   CHLORIDE mmol/L 101 102 98* 96* 95*   CO2 mmol/L 24 24 24 25 22   ANION GAP mmol/L 6 6 8 7 10   BUN mg/dL 6 9 11 12 11   CREATININE mg/dL 0 55* 0 61 0 69 0 67 0 64   CALCIUM mg/dL 7 9* 8 2* 8 7 8 8 8 9   GLUCOSE RANDOM mg/dL 112 98 109 120 130   ALT U/L  --   --   --   --  28   AST U/L  --   --   --   --  26 ALK PHOS U/L  --   --   --   --  95   ALBUMIN g/dL  --   --   --   --  2 7*   TOTAL BILIRUBIN mg/dL  --   --   --   --  0 50          Results from last 7 days   Lab Units 04/03/21  0232   INR  1 12   PTT seconds 27          Results from last 7 days   Lab Units 04/03/21  0232   LACTIC ACID mmol/L 1 4                     Results from last 7 days   Lab Units 04/05/21  0624 04/04/21  0518 04/03/21  0834 04/03/21  0232   PROCALCITONIN ng/ml 0 44* 0 63* 0 86* 1 08*     CCP neg  ANCA, RF, CLINT, Pending    MICRO  FLU/RSV neg  COVID-19 neg x 2  Bld Cx 4/3 pending  Strep/legionella ag neg  UA neg nit/LE, 4-10 RBCs    RADIOGRAPHS - images personally reviewed  CT Chest 4/3 - mixed central and peripheral scattered consolidative infiltrates with air bronchograms, trace effusions, no PTX    Assessment  1  Acute hypoxic respiratory failure  2  Abnormal CT Chest - broad differential but includes bacterial pneumonia, resistant pathogens, atypical/viral/fungal pneumonia, , AEP, IPAF, vasculitis, etc  3  Sepsis - POA  4  Hyponatremia  5  Hypokalemia  6  Prior Eosinophilia  7  Tobacco abuse  8  Chronic sinusitis    PLAN  · NEURO - continue outpatient anxiety/depression regimen, delirium precautions  · CARDIAC - hold verapamil for now, currently HDS  · PULM - transitioned to HFNC, transfer to MICU, should clinical decline occur - low threshold for intubation and MV support but currently no sig WOB  Will continue broad spectrum abx (add crandall/flagyl) to cefepime  Will start systemic steroids for now, hopeful for bronchoscopy in next 24hrs, will make NPO after midnight    Follow up rheum serology testing, repeat CXR now  · GI - NPO at midnight for FOB  · RENAL - Na improving slowly, replete potassium, minimize IVF  · ID - trend temp/WBC, follow up cultures, send COVID-19 ab (had single vaccine), send RP2 panel, plan for bronchoscopy for culture and BAL cell analysis  · ENDO - no active issues  · HEME - no active issues, no current eosinophilia  · ICU Care  - [address], Full Code  · Kristopher Canela, her son, updated at bedside    Karyle Horns, DO

## 2021-04-05 NOTE — PROGRESS NOTES
[address] [facility] RASHAD Navas 61 y o  female MRN: [id number]  Unit/Bed#: CW2 218-01 Encounter: [id number]      -------------------------------------------------------------------------------------------------------------  Chief Complaint: Acute hypoxic respiratory failure    History of Present Illness   HX and PE limited by: Naye Huntley is a 61 y o  female with a PMHx of recurrent sinusitis s/p bilateral antrectomy and hypertension who presents with a 6 day history of flu-like symptoms sinus congestion, post-nasal drip, and difficulty breathing  She was initially treated by PCP as acute sinusitis with clindamycin and received negative COVID test on 4/1  Presented to the ED on 4/3 with malasie, fatigue, myalgias, and increased WOB with spO2 of 88% on RA which improved with 3L of O2  CXR and chest Ct was suspicious for multifocal pneumonia, repeat COVID test was negative  She was also found to be hyponatremia to 127, WBC of 15, and elevated procalcitonin  Since admission, patient has had a tmax of 102 2 with but currently afebrile  However, has had increasing work of breathing beginning overnight on 4/4 and has been titrated to 50L 100% of high flow nasal cannula abx broadened to cefepime, vancomycin flagyl  Patient was started on high dose solumedrol (60mg Q6)  She will be transferred to  ICU for increasing oxygen requirement  History obtained from the patient   -------------------------------------------------------------------------------------------------------------  Assessment and Plan:    Neuro:    Diagnosis: anxiety/depression  o Plan: continue Buspar 10mg, PRN ativan 0 5 mg, continue Prozac 80mg daily  o Monitor for CAM-ICU, delirium precautions, monitor sleep/wake cycle      CV:    Diagnosis: hypertension/hyperlipidemia   o Plan: hold verapamil until pressures improve, atorvastatin 10mg QID  o Continue to monitor on telemetry       Pulm:  o Diagnosis: acute hypoxic respiratory failure 2/2 possible multifocal pneumonia   o Plan: DDx to include, autoimmune, vasculitis, cyptogenic organizing pneumonia, viral pneumonia, bacterial pneumonia    o F/u results of blanca, anc, RF, and CCP  o Send RP2  o F/u COVID antibodies  o Continue solumedrol 60mg q6h  o Consider bronch for washings and culture  o Continue cefepime, vancomycin, flagyl  o Ween high flow NC as tolerated with spO2 >92%   Diagnosis: allergic sinusitis  o Plan: continue claratin, azelastine      GI:    Diagnosis: Diarrhea  o Plan: continue probiotics, hold bowel regimen, continue to montior      :    Diagnosis: no active issues      F/E/N:    Fluids: no indication for fluids at this time   Electrolytes: Hypokalemic - replenish as needed, K > 4, PO4 > 3, Mg > 2   Hyponatremia - improving, continue to monitor   Nutrition: clear liquid diet pending respiratory status      Heme/Onc:    Diagnosis: DVT prophylaxis  o Plan: Lovenox SC    Endo:   Diagnosis: no active issues   Monitor blood sugar 140-180 goal     ID:    Diagnosis: Concern for pneumonia  o Plan: see plan above      MSK/Skin:    Diagnosis: no acute issues  o Plan: out of bed as tolerated    Disposition: Admit to Critical Care   Code Status: Level 1 - Full Code  --------------------------------------------------------------------------------------------------------------  Review of Systems   Constitutional: Positive for fatigue and fever  Negative for chills  HENT: Positive for congestion, sinus pressure and sinus pain  Eyes: Negative  Respiratory: Positive for shortness of breath  Cardiovascular: Negative for chest pain and leg swelling  Gastrointestinal: Positive for diarrhea  Endocrine: Negative  Genitourinary: Negative  Musculoskeletal: Positive for arthralgias  Skin: Negative  Allergic/Immunologic: Negative  Neurological: Negative  Hematological: Negative  Psychiatric/Behavioral: Negative          A 12-point, complete review of systems was reviewed and negative except as stated above     Physical Exam  Constitutional:       Appearance: Normal appearance  HENT:      Head: Normocephalic and atraumatic  Nose: Congestion present  No rhinorrhea  Eyes:      Pupils: Pupils are equal, round, and reactive to light  Neck:      Musculoskeletal: Normal range of motion  Cardiovascular:      Rate and Rhythm: Normal rate and regular rhythm  Heart sounds: Normal heart sounds  Pulmonary:      Comments: Diminished at the bases  Abdominal:      General: Abdomen is flat  Palpations: Abdomen is soft  Tenderness: There is no abdominal tenderness  Musculoskeletal: Normal range of motion  Skin:     General: Skin is warm and dry  Neurological:      General: No focal deficit present  Mental Status: She is alert and oriented to person, place, and time  Psychiatric:         Mood and Affect: Mood normal          Behavior: Behavior normal        --------------------------------------------------------------------------------------------------------------  Vitals:   Vitals:    04/05/21 0718 04/05/21 0810 04/05/21 0812 04/05/21 1330   BP: 102/54      BP Location:       Pulse: 69      Resp: 20      Temp: 98 9 °F (37 2 °C)      TempSrc:       SpO2: 92% (!) 87% 93% (!) 89%   Weight:       Height:         Temp  Min: 98 °F (36 7 °C)  Max: 102 2 °F (39 °C)  IBW: 57 kg  Height: 5' 5" (165 1 cm)  Body mass index is 30 79 kg/m²    N/A    Laboratory and Diagnostics:  Results from last 7 days   Lab Units 04/05/21  0624 04/04/21  0517 04/03/21  0232   WBC Thousand/uL 12 99* 12 03* 15 56*   HEMOGLOBIN g/dL 11 6 11 4* 11 8   HEMATOCRIT % 35 1 34 5* 35 5   PLATELETS Thousands/uL 188 159 144*   BANDS PCT %  --   --  3   MONO PCT %  --   --  2*     Results from last 7 days   Lab Units 04/05/21  0624 04/04/21  0517 04/03/21  2034 04/03/21  1301 04/03/21  0232   SODIUM mmol/L 131* 132* 130* 128* 127*   POTASSIUM mmol/L 3 2* 3 5 3 6 4 1 3 5   CHLORIDE mmol/L 101 102 98* 96* 95*   CO2 mmol/L 24 24 24 25 22   ANION GAP mmol/L 6 6 8 7 10   BUN mg/dL 6 9 11 12 11   CREATININE mg/dL 0 55* 0 61 0 69 0 67 0 64   CALCIUM mg/dL 7 9* 8 2* 8 7 8 8 8 9   GLUCOSE RANDOM mg/dL 112 98 109 120 130   ALT U/L  --   --   --   --  28   AST U/L  --   --   --   --  26   ALK PHOS U/L  --   --   --   --  95   ALBUMIN g/dL  --   --   --   --  2 7*   TOTAL BILIRUBIN mg/dL  --   --   --   --  0 50          Results from last 7 days   Lab Units 04/03/21  0232   INR  1 12   PTT seconds 27          Results from last 7 days   Lab Units 04/03/21  0232   LACTIC ACID mmol/L 1 4     ABG:    VBG:    Results from last 7 days   Lab Units 04/05/21  0624 04/04/21  0518 04/03/21  0834 04/03/21  0232   PROCALCITONIN ng/ml 0 44* 0 63* 0 86* 1 08*       Micro:  Results from last 7 days   Lab Units 04/03/21  0539 04/03/21  0237 04/03/21  0232   BLOOD CULTURE   --  No Growth at 48 hrs  No Growth at 48 hrs  LEGIONELLA URINARY ANTIGEN  Negative  --   --    STREP PNEUMONIAE ANTIGEN, URINE  Negative  --   --        EKG: n/a  Imaging: I have personally reviewed pertinent reports        Historical Information   Past Medical History:   Diagnosis Date    Allergies     Anesthesia complication     V TACH after her reduction mammoplasty, done at [address] Chronic rhinitis 2/18/2020    Depression     Ethmoid sinusitis     GERD (gastroesophageal reflux disease)     Maxillary sinusitis     Multiple allergies     Myofascial pain syndrome     Nasal turbinate hypertrophy     Wears glasses      Past Surgical History:   Procedure Laterality Date    LAPAROSCOPY      with vaginal hysterectomy; 11/3/2003 rso    OOPHORECTOMY Left 2004    PARTIAL HYSTERECTOMY  2004    VT NASAL/SINUS ENDOSCOPY,RMV TISS MAXILL SINUS N/A 9/30/2016    Procedure: IMAGE GUIDED FUNCTIONAL ENDOSCOPIC SINUS SURGERY;  Surgeon: Christina Aleman MD;  Location: [facility];  Service: ENT    REDUCTION MAMMAPLASTY Bilateral 02/27/2015    TOOTH EXTRACTION Right 3/16/2019    Procedure: EXTRACTION TOOTH #1 (Impacted Third Molar Tooth);   Surgeon: Alyx Ernst DDS;  Location: [facility];  Service: Maxillofacial    TUBAL LIGATION      WISDOM TOOTH EXTRACTION       Social History   Social History     Substance and Sexual Activity   Alcohol Use Not Currently    Comment: social     Social History     Substance and Sexual Activity   Drug Use No     Social History     Tobacco Use   Smoking Status Current Some Day Smoker    Types: Cigarettes   Smokeless Tobacco Never Used     Exercise History: n/a  Family History:   Family History   Problem Relation Age of Onset    CLEM disease Mother     Atrial fibrillation Mother     Atrial fibrillation Father     Heart disease Father     Diabetes Maternal Grandmother     Hypertension Maternal Grandmother     Colon cancer Maternal Grandfather     Diabetes Maternal Grandfather     Cancer Paternal Grandfather     Endometrial cancer Cousin     Breast cancer Cousin     Multiple sclerosis Sister     No Known Problems Son     No Known Problems Son      I have reviewed this patient's family history and commented on sigificant items within the HPI      Medications:  Current Facility-Administered Medications   Medication Dose Route Frequency    acetaminophen (TYLENOL) tablet 650 mg  650 mg Oral Q6H PRN    albuterol (PROVENTIL HFA,VENTOLIN HFA) inhaler 2 puff  2 puff Inhalation Q4H PRN    atorvastatin (LIPITOR) tablet 10 mg  10 mg Oral Daily    azelastine (ASTELIN) 0 1 % nasal spray 1 spray  1 spray Each Nare BID    busPIRone (BUSPAR) tablet 10 mg  10 mg Oral QPM    cefepime (MAXIPIME) 2,000 mg in dextrose 5 % 50 mL IVPB  2,000 mg Intravenous Q12H    diphenhydrAMINE (BENADRYL) tablet 25 mg  25 mg Oral Q6H PRN    enoxaparin (LOVENOX) subcutaneous injection 40 mg  40 mg Subcutaneous Daily    FLUoxetine (PROzac) capsule 80 mg  80 mg Oral Daily    glycerin-hypromellose- (ARTIFICIAL TEARS) ophthalmic solution 1 drop  1 drop Both Eyes Q3H PRN    loratadine (CLARITIN) tablet 10 mg  10 mg Oral Daily    LORazepam (ATIVAN) tablet 0 5 mg  0 5 mg Oral BID    methylPREDNISolone sodium succinate (Solu-MEDROL) injection 60 mg  60 mg Intravenous Q6H [address]    ondansetron (ZOFRAN) injection 4 mg  4 mg Intravenous Q6H PRN    saccharomyces boulardii (FLORASTOR) capsule 250 mg  250 mg Oral BID    sodium chloride (OCEAN) 0 65 % nasal spray 1 spray  1 spray Each Nare Q1H PRN    verapamil (CALAN-SR) CR tablet 240 mg  240 mg Oral Daily     Home medications:  Prior to Admission Medications   Prescriptions Last Dose Informant Patient Reported? Taking?    FLUoxetine (PROZAC) 40 MG capsule 4/3/2021 at Unknown time Self Yes Yes   Sig: Take 80 mg by mouth daily    LORazepam (ATIVAN) 0 5 mg tablet   Yes No   Sig: Take 0 5 mg by mouth 2 (two) times a day   LORazepam (ATIVAN) 1 mg tablet 2021 at Unknown time  Yes Yes   Sig: Take 1 mg by mouth 2 (two) times a day    atorvastatin (LIPITOR) 10 mg tablet 2021 at Unknown time  No Yes   Sig: Take 1 tablet (10 mg total) by mouth daily   azelastine (ASTELIN) 0 1 % nasal spray 2021 at Unknown time  No Yes   Si spray into each nostril 2 (two) times a day Use in each nostril as directed   busPIRone (BUSPAR) 30 MG tablet 4/3/2021 at Unknown time  Yes Yes   Sig: Take 10 mg by mouth    cholecalciferol (VITAMIN D3) 1,000 units tablet 2021 at Unknown time Self Yes Yes   Sig: Take 2,000 Units by mouth daily   clindamycin (CLEOCIN) 300 MG capsule 2021 at Unknown time  No Yes   Sig: Take 1 capsule (300 mg total) by mouth 4 (four) times a day for 10 days   montelukast (SINGULAIR) 10 mg tablet Not Taking at Unknown time  No No   Sig: Take 1 tablet (10 mg total) by mouth daily for 360 days   Patient not taking: Reported on 4/3/2021   polyethylene glycol (MIRALAX) 17 g packet 2021 at Unknown time  Yes Yes   Sig: Take 17 g by mouth daily   sodium chloride (OCEAN) 0 65 % nasal spray 2021 at Unknown time  No Yes   Si sprays into each nostril 2 (two) times a day   verapamil (CALAN-SR) 240 mg CR tablet 2021 at Unknown time  No Yes   Sig: Take 1 tablet (240 mg total) by mouth daily      Facility-Administered Medications: None     Allergies: Allergies   Allergen Reactions    Other      Most all antibiotics- hives, hypotensive    "no problem with clindamycin "    Augmentin Es-600  [Amoxicillin-Pot Clavulanate]     Cefuroxime     Erythromycin     Levofloxacin     Morphine     Morphine And Related Hives    Oxycodone-Acetaminophen     Penicillins     Percocet [Oxycodone-Acetaminophen] Hives    Sulfa Antibiotics     Tetracyclines & Related      ------------------------------------------------------------------------------------------------------------  Advance Directive and Living Will:      Power of :    POLST:    ------------------------------------------------------------------------------------------------------------  Anticipated Length of Stay is > 2 midnights    Care Time Delivered:   No Critical Care time spent       Kleber Lopez        Portions of the record may have been created with voice recognition software  Occasional wrong word or "sound a like" substitutions may have occurred due to the inherent limitations of voice recognition software    Read the chart carefully and recognize, using context, where substitutions have occurred

## 2021-04-05 NOTE — ASSESSMENT & PLAN NOTE
Likely 2/2 PNA  Given lack of response to abx, suspecting organizing PNA  · Encourage IS/OOB as able  · Now on mid-flow NC, COVID negative   Pulmonary does not feel need to repeat  · Treating as above  · Will require bronchoscopy

## 2021-04-05 NOTE — NUTRITION
Suggest advance diet as tolerated, provide Ensure Clear TID, apple flavor while pt is on clear liq diet

## 2021-04-06 LAB
ANION GAP SERPL CALCULATED.3IONS-SCNC: 8 MMOL/L (ref 4–13)
ATRIAL RATE: 78 BPM
BASOPHILS # BLD MANUAL: 0 THOUSAND/UL (ref 0–0.1)
BASOPHILS NFR MAR MANUAL: 0 % (ref 0–1)
BUN SERPL-MCNC: 10 MG/DL (ref 5–25)
CALCIUM SERPL-MCNC: 8.7 MG/DL (ref 8.3–10.1)
CHLORIDE SERPL-SCNC: 102 MMOL/L (ref 100–108)
CO2 SERPL-SCNC: 24 MMOL/L (ref 21–32)
CREAT SERPL-MCNC: 0.53 MG/DL (ref 0.6–1.3)
CRYOGLOB RF SER-ACNC: ABNORMAL [IU]/ML
EOSINOPHIL # BLD MANUAL: 0 THOUSAND/UL (ref 0–0.4)
EOSINOPHIL NFR BLD MANUAL: 0 % (ref 0–6)
ERYTHROCYTE [DISTWIDTH] IN BLOOD BY AUTOMATED COUNT: 13.3 % (ref 11.6–15.1)
GFR SERPL CREATININE-BSD FRML MDRD: 106 ML/MIN/1.73SQ M
GLUCOSE SERPL-MCNC: 133 MG/DL (ref 65–140)
GLUCOSE SERPL-MCNC: 160 MG/DL (ref 65–140)
GLUCOSE SERPL-MCNC: 168 MG/DL (ref 65–140)
GLUCOSE SERPL-MCNC: 186 MG/DL (ref 65–140)
HCT VFR BLD AUTO: 34.2 % (ref 34.8–46.1)
HGB BLD-MCNC: 11.6 G/DL (ref 11.5–15.4)
LYMPHOCYTES # BLD AUTO: 1.04 THOUSAND/UL (ref 0.6–4.47)
LYMPHOCYTES # BLD AUTO: 4 % (ref 14–44)
MAGNESIUM SERPL-MCNC: 2.5 MG/DL (ref 1.6–2.6)
MCH RBC QN AUTO: 29.6 PG (ref 26.8–34.3)
MCHC RBC AUTO-ENTMCNC: 33.9 G/DL (ref 31.4–37.4)
MCV RBC AUTO: 87 FL (ref 82–98)
METAMYELOCYTES NFR BLD MANUAL: 1 % (ref 0–1)
MONOCYTES # BLD AUTO: 0.26 THOUSAND/UL (ref 0–1.22)
MONOCYTES NFR BLD: 1 % (ref 4–12)
NEUTROPHILS # BLD MANUAL: 24.37 THOUSAND/UL (ref 1.85–7.62)
NEUTS BAND NFR BLD MANUAL: 7 % (ref 0–8)
NEUTS SEG NFR BLD AUTO: 87 % (ref 43–75)
NRBC BLD AUTO-RTO: 0 /100 WBCS
P AXIS: 61 DEGREES
PHOSPHATE SERPL-MCNC: 2.6 MG/DL (ref 2.7–4.5)
PLATELET # BLD AUTO: 236 THOUSANDS/UL (ref 149–390)
PLATELET BLD QL SMEAR: ADEQUATE
PMV BLD AUTO: 11.7 FL (ref 8.9–12.7)
POTASSIUM SERPL-SCNC: 3.2 MMOL/L (ref 3.5–5.3)
PR INTERVAL: 176 MS
QRS AXIS: -5 DEGREES
QRSD INTERVAL: 94 MS
QT INTERVAL: 394 MS
QTC INTERVAL: 449 MS
RBC # BLD AUTO: 3.92 MILLION/UL (ref 3.81–5.12)
RBC MORPH BLD: NORMAL
RHEUMATOID FACT SER QL LA: POSITIVE
SARS-COV-2 IGG SERPL QL IA: REACTIVE
SARS-COV-2 IGG+IGM SERPL QL IA: REACTIVE
SODIUM SERPL-SCNC: 134 MMOL/L (ref 136–145)
T WAVE AXIS: 55 DEGREES
VENTRICULAR RATE: 78 BPM
WBC # BLD AUTO: 25.93 THOUSAND/UL (ref 4.31–10.16)

## 2021-04-06 PROCEDURE — 94760 N-INVAS EAR/PLS OXIMETRY 1: CPT

## 2021-04-06 PROCEDURE — 86331 IMMUNODIFFUSION OUCHTERLONY: CPT | Performed by: INTERNAL MEDICINE

## 2021-04-06 PROCEDURE — 86602 ANTINOMYCES ANTIBODY: CPT | Performed by: INTERNAL MEDICINE

## 2021-04-06 PROCEDURE — 83735 ASSAY OF MAGNESIUM: CPT | Performed by: PHYSICIAN ASSISTANT

## 2021-04-06 PROCEDURE — 86671 FUNGUS NES ANTIBODY: CPT | Performed by: INTERNAL MEDICINE

## 2021-04-06 PROCEDURE — 93010 ELECTROCARDIOGRAM REPORT: CPT | Performed by: INTERNAL MEDICINE

## 2021-04-06 PROCEDURE — 86769 SARS-COV-2 COVID-19 ANTIBODY: CPT | Performed by: INTERNAL MEDICINE

## 2021-04-06 PROCEDURE — 85007 BL SMEAR W/DIFF WBC COUNT: CPT | Performed by: PHYSICIAN ASSISTANT

## 2021-04-06 PROCEDURE — 84100 ASSAY OF PHOSPHORUS: CPT | Performed by: PHYSICIAN ASSISTANT

## 2021-04-06 PROCEDURE — 85027 COMPLETE CBC AUTOMATED: CPT | Performed by: PHYSICIAN ASSISTANT

## 2021-04-06 PROCEDURE — 80048 BASIC METABOLIC PNL TOTAL CA: CPT | Performed by: PHYSICIAN ASSISTANT

## 2021-04-06 PROCEDURE — 86606 ASPERGILLUS ANTIBODY: CPT | Performed by: INTERNAL MEDICINE

## 2021-04-06 PROCEDURE — 82948 REAGENT STRIP/BLOOD GLUCOSE: CPT

## 2021-04-06 PROCEDURE — 99233 SBSQ HOSP IP/OBS HIGH 50: CPT | Performed by: INTERNAL MEDICINE

## 2021-04-06 RX ORDER — POTASSIUM CHLORIDE 20 MEQ/1
40 TABLET, EXTENDED RELEASE ORAL ONCE
Status: COMPLETED | OUTPATIENT
Start: 2021-04-06 | End: 2021-04-06

## 2021-04-06 RX ADMIN — METHYLPREDNISOLONE SODIUM SUCCINATE 60 MG: 125 INJECTION, POWDER, FOR SOLUTION INTRAMUSCULAR; INTRAVENOUS at 12:00

## 2021-04-06 RX ADMIN — METRONIDAZOLE 500 MG: 500 INJECTION, SOLUTION INTRAVENOUS at 22:58

## 2021-04-06 RX ADMIN — INSULIN LISPRO 1 UNITS: 100 INJECTION, SOLUTION INTRAVENOUS; SUBCUTANEOUS at 12:00

## 2021-04-06 RX ADMIN — INSULIN LISPRO 1 UNITS: 100 INJECTION, SOLUTION INTRAVENOUS; SUBCUTANEOUS at 17:21

## 2021-04-06 RX ADMIN — CEFEPIME HYDROCHLORIDE 2000 MG: 2 INJECTION, POWDER, FOR SOLUTION INTRAVENOUS at 02:58

## 2021-04-06 RX ADMIN — METHYLPREDNISOLONE SODIUM SUCCINATE 60 MG: 125 INJECTION, POWDER, FOR SOLUTION INTRAMUSCULAR; INTRAVENOUS at 17:31

## 2021-04-06 RX ADMIN — METHYLPREDNISOLONE SODIUM SUCCINATE 60 MG: 125 INJECTION, POWDER, FOR SOLUTION INTRAMUSCULAR; INTRAVENOUS at 05:26

## 2021-04-06 RX ADMIN — METRONIDAZOLE 500 MG: 500 INJECTION, SOLUTION INTRAVENOUS at 06:03

## 2021-04-06 RX ADMIN — CEFEPIME HYDROCHLORIDE 2000 MG: 2 INJECTION, POWDER, FOR SOLUTION INTRAVENOUS at 18:46

## 2021-04-06 RX ADMIN — BUSPIRONE HYDROCHLORIDE 10 MG: 10 TABLET ORAL at 21:07

## 2021-04-06 RX ADMIN — FLUOXETINE 80 MG: 20 CAPSULE ORAL at 10:39

## 2021-04-06 RX ADMIN — AZELASTINE HYDROCHLORIDE 1 SPRAY: 137 SPRAY, METERED NASAL at 08:25

## 2021-04-06 RX ADMIN — VANCOMYCIN HYDROCHLORIDE 1500 MG: 10 INJECTION, POWDER, LYOPHILIZED, FOR SOLUTION INTRAVENOUS at 23:03

## 2021-04-06 RX ADMIN — POTASSIUM CHLORIDE 40 MEQ: 1500 TABLET, EXTENDED RELEASE ORAL at 08:24

## 2021-04-06 RX ADMIN — LORATADINE 10 MG: 10 TABLET ORAL at 08:24

## 2021-04-06 RX ADMIN — VANCOMYCIN HYDROCHLORIDE 1500 MG: 10 INJECTION, POWDER, LYOPHILIZED, FOR SOLUTION INTRAVENOUS at 12:17

## 2021-04-06 RX ADMIN — ENOXAPARIN SODIUM 40 MG: 40 INJECTION SUBCUTANEOUS at 08:24

## 2021-04-06 RX ADMIN — POTASSIUM PHOSPHATE, MONOBASIC AND POTASSIUM PHOSPHATE, DIBASIC 30 MMOL: 224; 236 INJECTION, SOLUTION, CONCENTRATE INTRAVENOUS at 07:57

## 2021-04-06 RX ADMIN — METHYLPREDNISOLONE SODIUM SUCCINATE 60 MG: 125 INJECTION, POWDER, FOR SOLUTION INTRAMUSCULAR; INTRAVENOUS at 23:03

## 2021-04-06 RX ADMIN — CEFEPIME HYDROCHLORIDE 2000 MG: 2 INJECTION, POWDER, FOR SOLUTION INTRAVENOUS at 12:09

## 2021-04-06 RX ADMIN — AZELASTINE HYDROCHLORIDE 1 SPRAY: 137 SPRAY, METERED NASAL at 17:25

## 2021-04-06 RX ADMIN — METRONIDAZOLE 500 MG: 500 INJECTION, SOLUTION INTRAVENOUS at 14:48

## 2021-04-06 RX ADMIN — LORAZEPAM 0.5 MG: 0.5 TABLET ORAL at 23:02

## 2021-04-06 RX ADMIN — ATORVASTATIN CALCIUM 10 MG: 10 TABLET, FILM COATED ORAL at 21:07

## 2021-04-06 RX ADMIN — Medication 250 MG: at 12:07

## 2021-04-06 RX ADMIN — Medication 250 MG: at 17:25

## 2021-04-06 RX ADMIN — INSULIN LISPRO 1 UNITS: 100 INJECTION, SOLUTION INTRAVENOUS; SUBCUTANEOUS at 21:07

## 2021-04-06 NOTE — PROGRESS NOTES
Daily Progress Note - [facility] Jez Navas 61 y o  female MRN: [id number]  Unit/Bed#: MICU 06 Encounter: [id number]        ----------------------------------------------------------------------------------------  HPI/24hr events:Taylor Hamlin is a 61 y o  female with a PMHx of recurrent sinusitis s/p bilateral antrectomy and hypertension who presents with a 6 day history of flu-like symptoms sinus congestion, post-nasal drip, and difficulty breathing  She was transferred to ICU for increased WOB and increased O2 requirement currently on 50L 100% HFNC  No acute events overnight    ---------------------------------------------------------------------------------------  SUBJECTIVE  Patients states she feels a little better this morning, but not enough to resume activity  Still on 50L 100% HFNC spO2 low 90s  Review of Systems   Constitutional: Positive for fatigue  Negative for fever  HENT: Positive for congestion, postnasal drip, sinus pressure and sinus pain  Respiratory: Positive for cough and chest tightness  Cardiovascular: Negative  Gastrointestinal: Negative for diarrhea and nausea  Endocrine: Negative  Genitourinary: Negative  Musculoskeletal: Negative  Skin: Negative  Neurological: Negative  Hematological: Negative  Psychiatric/Behavioral: Negative        Review of systems was reviewed and negative unless stated above in HPI/24-hour events   ---------------------------------------------------------------------------------------  Assessment and Plan:  Acute hypoxic respiratory failure   vs Pneumonia (viral or bacterial) vs COVID   Steroid Induced Leukocytosis  Hypokalemia  Chronic Sinusitis  Hypertension/Hyperlipidemia  Anxiety/Depression  Chronic Sinusitis  Diarrhea - resolved  Hyponatremia - resolved       Neuro:    Diagnosis: anxiety/depression  o Plan: continue Buspar 10mg, PRN ativan 0 5 mg, continue Prozac 80mg QID  o Plan: monitor fo CAM-ICU, delirium precautions, monitor sleep/wake cycle      CV:    Diagnosis: hypertension/hyperlipidemia  o Plan: hold verapamil until MAPs improve, continue atorvastatin 10mg QID  o Plan: continue telemetry monitoring    Pulm:   Diagnosis: acute hypoxic respiratory failure  - RV2 negative  o Plan: f/u results of CLINT, ANC, RF, COVID Abs,  o Continue broad spectrum Abx with cefepime, vancomycin, flagyl  o Continue solumedrol 60mg q6h  o Bronch for washings and culture  o Ween HFNC as tolerated with spO2 > 92%    Diagnosis: chronic sinusitis  o Plan: continue claratin, azelastine      GI:    Diagnosis: Diarrhea - resolved  o Plan: continue probiotics, hold bowel regimen      :    Diagnosis: no acute issues    F/E/N:    Fluids: no indication for fluids at this time   Electrolytes: hypokalemic - replenish K > 4, PO4 > 3, Mg > 2   Hyponatremia: improved, continue to monitor   Nutrition: NPO with plan for bronch      Heme/Onc:    Diagnosis: Leukocytosis to 26 up from 13, likely due to solumedrol   o Plan: continue to monitor   Diagnosis: DVT prophylaxis  o Plan: Lovenox SC       Endo:    Diagnosis: no active issues  o Plan: monitor blood sugar with 140-180 goal      ID:    Diagnosis: Concern for pneumonia vs    o Plan: trend temp/WBC, see above      Disposition: Continue Critical Care   Code Status: Level 1 - Full Code  ---------------------------------------------------------------------------------------  ICU CORE MEASURES    Prophylaxis   VTE Pharmacologic Prophylaxis: Enoxaparin (Lovenox)  VTE Mechanical Prophylaxis: sequential compression device  Stress Ulcer Prophylaxis: Prophylaxis Not Indicated     ABCDE Protocol (if indicated)  Plan to perform spontaneous awakening trial today? Not applicable  Plan to perform spontaneous breathing trial today? Not applicable  Obvious barriers to extubation?  Not applicable  CAM-ICU: Negative    Invasive Devices Review  Invasive Devices     Peripheral Intravenous Line Peripheral IV 21 Right;Dorsal (posterior) Hand less than 1 day    Peripheral IV 21 Distal;Left;Ventral (anterior) Forearm less than 1 day              Can any invasive devices be discontinued today? Not applicable  ---------------------------------------------------------------------------------------  OBJECTIVE    Vitals   Vitals:    21 0400 21 0500 21 0600 21 0612   BP: 115/54 117/50  128/52   Pulse: 78 78 82 78   Resp: (!) 30 (!) 29 (!) 40 (!) 33   Temp:       TempSrc:       SpO2: (!) 88% 94% 91% 93%   Weight:   92 3 kg (203 lb 7 8 oz)    Height:         Temp (24hrs), Av 7 °F (37 1 °C), Min:97 7 °F (36 5 °C), Max:100 4 °F (38 °C)  Current: Temperature: 98 4 °F (36 9 °C)  HR: 78    BP: 128/52  RR: 33  SpO2: 93% 50L 100% HFNC    Respiratory:  SpO2: SpO2: 93 %  Nasal Cannula O2 Flow Rate (L/min): 15 L/min    Invasive/non-invasive ventilation settings   Respiratory    Lab Data (Last 4 hours)    None         O2/Vent Data (Last 4 hours)       0346          Non-Invasive Ventilation Mode HFNC (High flow)                   Physical Exam  Constitutional:       Appearance: Normal appearance  HENT:      Head: Normocephalic and atraumatic  Nose: Congestion present  Eyes:      Pupils: Pupils are equal, round, and reactive to light  Neck:      Musculoskeletal: Normal range of motion and neck supple  Cardiovascular:      Rate and Rhythm: Normal rate and regular rhythm  Pulses: Normal pulses  Heart sounds: No murmur  No friction rub  No gallop  Pulmonary:      Effort: Pulmonary effort is normal       Breath sounds: Rhonchi present  Abdominal:      General: Abdomen is flat  Bowel sounds are normal       Palpations: Abdomen is soft  Musculoskeletal: Normal range of motion  Skin:     General: Skin is warm and dry  Neurological:      General: No focal deficit present  Mental Status: She is alert and oriented to person, place, and time  Laboratory and Diagnostics:  Results from last 7 days   Lab Units 04/06/21  0525 04/05/21  0624 04/04/21  0517 04/03/21  0232   WBC Thousand/uL 25 93* 12 99* 12 03* 15 56*   HEMOGLOBIN g/dL 11 6 11 6 11 4* 11 8   HEMATOCRIT % 34 2* 35 1 34 5* 35 5   PLATELETS Thousands/uL 236 188 159 144*   BANDS PCT %  --   --   --  3   MONO PCT %  --   --   --  2*     Results from last 7 days   Lab Units 04/06/21  0525 04/05/21  0624 04/04/21  0517 04/03/21  2034 04/03/21  1301 04/03/21  0232   SODIUM mmol/L 134* 131* 132* 130* 128* 127*   POTASSIUM mmol/L 3 2* 3 2* 3 5 3 6 4 1 3 5   CHLORIDE mmol/L 102 101 102 98* 96* 95*   CO2 mmol/L 24 24 24 24 25 22   ANION GAP mmol/L 8 6 6 8 7 10   BUN mg/dL 10 6 9 11 12 11   CREATININE mg/dL 0 53* 0 55* 0 61 0 69 0 67 0 64   CALCIUM mg/dL 8 7 7 9* 8 2* 8 7 8 8 8 9   GLUCOSE RANDOM mg/dL 133 112 98 109 120 130   ALT U/L  --   --   --   --   --  28   AST U/L  --   --   --   --   --  26   ALK PHOS U/L  --   --   --   --   --  95   ALBUMIN g/dL  --   --   --   --   --  2 7*   TOTAL BILIRUBIN mg/dL  --   --   --   --   --  0 50     Results from last 7 days   Lab Units 04/06/21  0525   MAGNESIUM mg/dL 2 5   PHOSPHORUS mg/dL 2 6*      Results from last 7 days   Lab Units 04/03/21  0232   INR  1 12   PTT seconds 27          Results from last 7 days   Lab Units 04/03/21  0232   LACTIC ACID mmol/L 1 4     ABG:    VBG:    Results from last 7 days   Lab Units 04/05/21  0624 04/04/21  0518 04/03/21  0834 04/03/21  0232   PROCALCITONIN ng/ml 0 44* 0 63* 0 86* 1 08*       Micro  Results from last 7 days   Lab Units 04/03/21  0539 04/03/21  0237 04/03/21  0232   BLOOD CULTURE   --  No Growth at 72 hrs  No Growth at 72 hrs  LEGIONELLA URINARY ANTIGEN  Negative  --   --    STREP PNEUMONIAE ANTIGEN, URINE  Negative  --   --        EKG: N/A  Imaging: CXR I have personally reviewed pertinent reports        Intake and Output  I/O       04/04 0701 - 04/05 0700 04/05 0701 - 04/06 0700 04/06 0701 - 04/07 0700    P  O  1190 1425     I V  (mL/kg) 1045 8 (12 5)      IV Piggyback  900     Total Intake(mL/kg) 2235 8 (26 6) 2325 (25 2)     Urine (mL/kg/hr) 1450 (0 7) 2375 (1 1)     Stool  0     Total Output 1450 2375     Net +785 8 -50            Unmeasured Urine Occurrence  2 x     Unmeasured Stool Occurrence  0 x         UOP: 1 1 ml/hr     Height and Weights   Height: 5' 5" (165 1 cm)  IBW: 57 kg  Body mass index is 33 86 kg/m²  Weight (last 2 days)     Date/Time   Weight    04/06/21 0600   92 3 (203 48)                Nutrition       Diet Orders   (From admission, onward)             Start     Ordered    04/06/21 0000  Diet NPO  Diet effective now     Question Answer Comment   Diet Type NPO    RD to adjust diet per protocol? Yes        04/05/21 1638              TF currently running at n/a ml/hr with a goal of n/a ml/hr   Formula: n/a      Active Medications  Scheduled Meds:  Current Facility-Administered Medications   Medication Dose Route Frequency Provider Last Rate    acetaminophen  650 mg Oral Q6H PRN Krystian Sheriff DO      albuterol  2 puff Inhalation Q4H PRN Jose Raul Schroeder MD      atorvastatin  10 mg Oral Daily Krystian Sheriff DO      azelastine  1 spray Each Nare BID Krystian Sheriff DO      busPIRone  10 mg Oral QPM Catia Parra PA-C      cefepime  2,000 mg Intravenous Q8H Kaylah Delgado PA-C Stopped (04/06/21 0603)    diphenhydrAMINE  25 mg Oral Q6H PRN Rosalie Sweeney PA-C      enoxaparin  40 mg Subcutaneous Daily Krystian Sheriff DO      FLUoxetine  80 mg Oral Daily Rosalie Sweeney PA-C      glycerin-hypromellose-  1 drop Both Eyes Q3H PRN Rosalie Sweeney PA-C      loratadine  10 mg Oral Daily Rosalie Sweeney PA-C      LORazepam  0 5 mg Oral Q12H PRN Kaylah Delgado PA-C      methylPREDNISolone sodium succinate  60 mg Intravenous Q6H [address] Shandra Martin MD      metroNIDAZOLE  500 mg Intravenous Q8H Shandra Martin  mg (04/06/21 0603)    ondansetron  4 mg Intravenous Q6H PRN Quinten Nelson DO      saccharomyces boulardii  250 mg Oral BID Rosmery Campos PA-C      sodium chloride  1 spray Each Nare Q1H PRN Rosmery Campos PA-C      vancomycin  1,500 mg Intravenous Q12H Clarence Moss MD Stopped (04/06/21 0314)     Continuous Infusions:     PRN Meds:   acetaminophen, 650 mg, Q6H PRN  albuterol, 2 puff, Q4H PRN  diphenhydrAMINE, 25 mg, Q6H PRN  glycerin-hypromellose-, 1 drop, Q3H PRN  LORazepam, 0 5 mg, Q12H PRN  ondansetron, 4 mg, Q6H PRN  sodium chloride, 1 spray, Q1H PRN        Allergies   Allergies   Allergen Reactions    Other      Most all antibiotics- hives, hypotensive    "no problem with clindamycin "    Augmentin Es-600  [Amoxicillin-Pot Clavulanate]     Cefuroxime     Erythromycin     Levofloxacin     Morphine     Morphine And Related Hives    Oxycodone-Acetaminophen     Penicillins     Percocet [Oxycodone-Acetaminophen] Hives    Sulfa Antibiotics     Tetracyclines & Related      ---------------------------------------------------------------------------------------  Advance Directive and Living Will:      Power of :    POLST:    ---------------------------------------------------------------------------------------  Care Time Delivered:   No Critical Care time spent     Mart Mckeon      Portions of the record may have been created with voice recognition software  Occasional wrong word or "sound a like" substitutions may have occurred due to the inherent limitations of voice recognition software    Read the chart carefully and recognize, using context, where substitutions have occurred

## 2021-04-06 NOTE — PROGRESS NOTES
Vancomycin IV Pharmacy-to-Dose Consultation    Alyssa Mcconnell is a 61 y o  female who is currently receiving Vancomycin IV with management by the Pharmacy Consult service for the treatment of pneumonia  Assessment/Plan:    The patient's chart was reviewed  Renal function is stable  There are no signs or symptoms of nephrotoxicity and/or infusion reactions documented  Based on todays assessment, will continue current vancomycin (Day #2) dosing of vancomycin 1500 mg every 12 hours, with a plan for trough to be drawn at 1130 on 4/7 (before fifth dose)  Pharmacy will continue to follow closely for s/sx of nephrotoxicity, infusion reactions and appropriateness of therapy  BMP and CBC will be ordered per protocol  We will continue to follow the patients culture results and clinical progress daily        Bernardo Mendoza, PharmD  PGY-1 Pharmacy Resident  Available via [employer]

## 2021-04-06 NOTE — PROGRESS NOTES
04/06/21 1000   Clinical Encounter Type   Visited With Patient   Yarsani Encounters   Yarsani Needs Prayer   Sacramental Encounters   Sacrament of Sick-Anointing Anointed

## 2021-04-06 NOTE — CASE MANAGEMENT
Pt on HFNC 50L 100%  CM placed call to pt's emergency contact sister Radha Villatoro ([phone]) to introduce self/role with dcp  Pt resides in [address] [place] [address] with her  Rui Luna  Pt independent at baseline; no devices  No DME  Pt uses [employer] in [place]  Pt's sister is first emergency contact as she works for [employer] as [address]  Pt was not working PTA, pt on disability  Pt was driving but sister states family can transport at d/c  No hx of HHC, no hx of STR  No hx of D&A tx  Pt has hx of depression  Pt's depression managed by outpatient neuropsychiatrist and psychiatrist Dr Kishan Posada  Sister states patient has been stable and has no had inpt psych tx for the past 35 years  CM to follow for dcp  CM reviewed d/c planning process including the following: identifying help at home, patient preference for d/c planning needs, Discharge Lounge, [facility] Meds to Bed program, availability of treatment team to discuss questions or concerns patient and/or family may have regarding understanding medications and recognizing signs and symptoms once discharged  CM also encouraged patient to follow up with all recommended appointments after discharge  Patient advised of importance for patient and family to participate in managing patients medical well being

## 2021-04-06 NOTE — PROGRESS NOTES
Progress Note - Infectious Disease   Lisa Nails 61 y o  female MRN: [id number]  Unit/Bed#: MICU 06 Encounter: [id number]         IMPRESSION & RECOMMENDATIONS:   Impression/Recommendations:  1  Sepsis, POA   Tachypnea, leukocytosis   Secondary to #2   No other clear explanation   Negative UA  Blood cultures are negative  Fevers are overall improved  Hemodynamics remain stable      -antibiotic plan as below  -monitor temperatures and hemodynamics  -recheck CBC in a m   -supportive care     2  Multifocal pneumonia  More likely viral etiology given constellation of symptoms, positive sick contacts   Negative COVID-19 PCR x2   Negative influenza, RSV PCR, RP2   Consider early bacterial pneumonia based on CT findings, elevated procalcitonin   Low suspicion for MDRO  Procalcitonin level has trended down  However, worsening hypoxia now on high-flow      -continue empiric vancomycin/Flagyl/cefepime for now  -vancomycin dosing per pharmacy  -follow-up MRSA culture  -continue IV Solu-Medrol  -follow-up hypersensitivity profile  -follow-up COVID antibody  -close critical care follow-up ongoing regarding possible bronchoscopy    3  Acute hypoxic respiratory failure  Secondary to #2 likely viral in etiology  Doubt fungal process as patient is not immunocompromised  Also consider other process such as cryptogenic organizing pneumonia  Also consider component of pulmonary edema  Now on HFNC      -antibiotic plan as above  -continue IV Solu-Medrol  -monitor O2 requirements  -volume management     4  Multiple antibiotic allergies   Patient has extensive antibiotic allergy history, which significantly limits treatment options   She has previously tolerated vancomycin and clindamycin   Patient tolerated IV cefepime given in the ER      -antibiotic plan as above  -monitor closely for reactions     5  Chronic ethmoid sinusitis   With prior bilateral antrectomy   Follows with ENT last seen on 03/01/2021      6   Tobacco use   Risk factor for pulmonary infection   Recommend smoking cessation      Antibiotics:  Antibiotic 5  Cefepime   Vancomycin/Flagyl 2    Discussed above plan with patient and pulmonology service            Subjective:  Developed worsening O2 requirements yesterday afternoon, now on high-flow  She was transferred to MICU for closer observation  IV steroid was started  Vancomycin and Flagyl was also added  T-max 100 4°  No sputum production or hemoptysis  She does feel like her breathing is improving  Mild cough  Objective:  Vitals:  Temp:  [97 7 °F (36 5 °C)-100 4 °F (38 °C)] 99 4 °F (37 4 °C)  HR:  [68-86] 78  Resp:  [19-40] 27  BP: ()/(44-72) 114/55  SpO2:  [87 %-96 %] 93 %  Temp (24hrs), Av 8 °F (37 1 °C), Min:97 7 °F (36 5 °C), Max:100 4 °F (38 °C)  Current: Temperature: 99 4 °F (37 4 °C)    Physical Exam:   General:  No acute distress, on high-flow nasal cannula  HEENT:  Atraumatic normocephalic  Psychiatric:  Awake and alert  Pulmonary:  Normal respiratory excursion without accessory muscle use  Abdomen:  Soft, nontender  Extremities:  No edema  Skin:  No rashes  Neuro: Moves all extremities spontaneously    Lab Results:  I have personally reviewed pertinent labs  Results from last 7 days   Lab Units 21  0521  0232   POTASSIUM mmol/L 3 2* 3 2* 3 5   < > 3 5   CHLORIDE mmol/L 102 101 102   < > 95*   CO2 mmol/L 24 24 24   < > 22   BUN mg/dL 10 6 9   < > 11   CREATININE mg/dL 0 53* 0 55* 0 61   < > 0 64   EGFR ml/min/1 73sq m 106 104 101   < > 99   CALCIUM mg/dL 8 7 7 9* 8 2*   < > 8 9   AST U/L  --   --   --   --  26   ALT U/L  --   --   --   --  28   ALK PHOS U/L  --   --   --   --  95    < > = values in this interval not displayed       Results from last 7 days   Lab Units 21  0517   WBC Thousand/uL 25 93* 12 99* 12 03*   HEMOGLOBIN g/dL 11 6 11 6 11 4*   PLATELETS Thousands/uL 236 188 159     Results from last 7 days   Lab Units 04/03/21  0539 04/03/21  0237 04/03/21  0232   BLOOD CULTURE   --  No Growth at 72 hrs  No Growth at 72 hrs  LEGIONELLA URINARY ANTIGEN  Negative  --   --        Imaging Studies:   I have personally reviewed pertinent imaging study reports and images in PACS  Repeat chest x-ray shows increased diffuse airspace disease with upper lobe predominance  EKG, Pathology, and Other Studies:   I have personally reviewed pertinent reports

## 2021-04-07 ENCOUNTER — ANESTHESIA (INPATIENT)
Dept: PERIOP | Facility: HOSPITAL | Age: 56
DRG: 871 | End: 2021-04-07
Payer: COMMERCIAL

## 2021-04-07 ENCOUNTER — APPOINTMENT (INPATIENT)
Dept: PERIOP | Facility: HOSPITAL | Age: 56
DRG: 871 | End: 2021-04-07
Payer: COMMERCIAL

## 2021-04-07 ENCOUNTER — APPOINTMENT (INPATIENT)
Dept: RADIOLOGY | Facility: HOSPITAL | Age: 56
DRG: 871 | End: 2021-04-07
Payer: COMMERCIAL

## 2021-04-07 ENCOUNTER — ANESTHESIA EVENT (INPATIENT)
Dept: PERIOP | Facility: HOSPITAL | Age: 56
DRG: 871 | End: 2021-04-07
Payer: COMMERCIAL

## 2021-04-07 LAB
ANION GAP SERPL CALCULATED.3IONS-SCNC: 9 MMOL/L (ref 4–13)
BUN SERPL-MCNC: 14 MG/DL (ref 5–25)
C-ANCA TITR SER IF: NORMAL TITER
CALCIUM SERPL-MCNC: 8.6 MG/DL (ref 8.3–10.1)
CHLORIDE SERPL-SCNC: 102 MMOL/L (ref 100–108)
CO2 SERPL-SCNC: 22 MMOL/L (ref 21–32)
CREAT SERPL-MCNC: 0.48 MG/DL (ref 0.6–1.3)
ERYTHROCYTE [DISTWIDTH] IN BLOOD BY AUTOMATED COUNT: 13.4 % (ref 11.6–15.1)
GFR SERPL CREATININE-BSD FRML MDRD: 109 ML/MIN/1.73SQ M
GLUCOSE SERPL-MCNC: 152 MG/DL (ref 65–140)
GLUCOSE SERPL-MCNC: 152 MG/DL (ref 65–140)
GLUCOSE SERPL-MCNC: 156 MG/DL (ref 65–140)
GLUCOSE SERPL-MCNC: 164 MG/DL (ref 65–140)
GLUCOSE SERPL-MCNC: 243 MG/DL (ref 65–140)
HCT VFR BLD AUTO: 32.9 % (ref 34.8–46.1)
HGB BLD-MCNC: 11 G/DL (ref 11.5–15.4)
MAGNESIUM SERPL-MCNC: 2.4 MG/DL (ref 1.6–2.6)
MCH RBC QN AUTO: 29.6 PG (ref 26.8–34.3)
MCHC RBC AUTO-ENTMCNC: 33.4 G/DL (ref 31.4–37.4)
MCV RBC AUTO: 88 FL (ref 82–98)
MRSA NOSE QL CULT: NORMAL
MYELOPEROXIDASE AB SER IA-ACNC: <9 U/ML (ref 0–9)
NRBC BLD AUTO-RTO: 0 /100 WBCS
P-ANCA ATYPICAL TITR SER IF: NORMAL TITER
P-ANCA TITR SER IF: NORMAL TITER
PHOSPHATE SERPL-MCNC: 3.2 MG/DL (ref 2.7–4.5)
PLATELET # BLD AUTO: 293 THOUSANDS/UL (ref 149–390)
PMV BLD AUTO: 11.6 FL (ref 8.9–12.7)
POTASSIUM SERPL-SCNC: 3.7 MMOL/L (ref 3.5–5.3)
PROTEINASE3 AB SER IA-ACNC: <3.5 U/ML (ref 0–3.5)
RBC # BLD AUTO: 3.72 MILLION/UL (ref 3.81–5.12)
RYE IGE QN: NEGATIVE
SODIUM SERPL-SCNC: 133 MMOL/L (ref 136–145)
VANCOMYCIN TROUGH SERPL-MCNC: 12.5 UG/ML (ref 10–20)
WBC # BLD AUTO: 32.33 THOUSAND/UL (ref 4.31–10.16)

## 2021-04-07 PROCEDURE — 87118 MYCOBACTERIC IDENTIFICATION: CPT | Performed by: INTERNAL MEDICINE

## 2021-04-07 PROCEDURE — 88112 CYTOPATH CELL ENHANCE TECH: CPT | Performed by: PATHOLOGY

## 2021-04-07 PROCEDURE — 99233 SBSQ HOSP IP/OBS HIGH 50: CPT | Performed by: INTERNAL MEDICINE

## 2021-04-07 PROCEDURE — 82948 REAGENT STRIP/BLOOD GLUCOSE: CPT

## 2021-04-07 PROCEDURE — 71045 X-RAY EXAM CHEST 1 VIEW: CPT

## 2021-04-07 PROCEDURE — 87070 CULTURE OTHR SPECIMN AEROBIC: CPT | Performed by: INTERNAL MEDICINE

## 2021-04-07 PROCEDURE — 85027 COMPLETE CBC AUTOMATED: CPT | Performed by: NURSE PRACTITIONER

## 2021-04-07 PROCEDURE — 87116 MYCOBACTERIA CULTURE: CPT | Performed by: INTERNAL MEDICINE

## 2021-04-07 PROCEDURE — 80202 ASSAY OF VANCOMYCIN: CPT | Performed by: INTERNAL MEDICINE

## 2021-04-07 PROCEDURE — 94760 N-INVAS EAR/PLS OXIMETRY 1: CPT

## 2021-04-07 PROCEDURE — 87102 FUNGUS ISOLATION CULTURE: CPT | Performed by: INTERNAL MEDICINE

## 2021-04-07 PROCEDURE — 80048 BASIC METABOLIC PNL TOTAL CA: CPT | Performed by: NURSE PRACTITIONER

## 2021-04-07 PROCEDURE — 87106 FUNGI IDENTIFICATION YEAST: CPT | Performed by: INTERNAL MEDICINE

## 2021-04-07 PROCEDURE — 0B9H8ZX DRAINAGE OF LUNG LINGULA, VIA NATURAL OR ARTIFICIAL OPENING ENDOSCOPIC, DIAGNOSTIC: ICD-10-PCS | Performed by: INTERNAL MEDICINE

## 2021-04-07 PROCEDURE — 83735 ASSAY OF MAGNESIUM: CPT | Performed by: NURSE PRACTITIONER

## 2021-04-07 PROCEDURE — 31624 DX BRONCHOSCOPE/LAVAGE: CPT | Performed by: INTERNAL MEDICINE

## 2021-04-07 PROCEDURE — 84100 ASSAY OF PHOSPHORUS: CPT | Performed by: NURSE PRACTITIONER

## 2021-04-07 PROCEDURE — 89051 BODY FLUID CELL COUNT: CPT | Performed by: PATHOLOGY

## 2021-04-07 PROCEDURE — 87206 SMEAR FLUORESCENT/ACID STAI: CPT | Performed by: INTERNAL MEDICINE

## 2021-04-07 PROCEDURE — 87252 VIRUS INOCULATION TISSUE: CPT | Performed by: INTERNAL MEDICINE

## 2021-04-07 RX ORDER — FENTANYL CITRATE 50 UG/ML
INJECTION, SOLUTION INTRAMUSCULAR; INTRAVENOUS AS NEEDED
Status: DISCONTINUED | OUTPATIENT
Start: 2021-04-07 | End: 2021-04-07

## 2021-04-07 RX ORDER — POTASSIUM CHLORIDE 20 MEQ/1
40 TABLET, EXTENDED RELEASE ORAL ONCE
Status: COMPLETED | OUTPATIENT
Start: 2021-04-07 | End: 2021-04-07

## 2021-04-07 RX ORDER — SODIUM CHLORIDE, SODIUM LACTATE, POTASSIUM CHLORIDE, CALCIUM CHLORIDE 600; 310; 30; 20 MG/100ML; MG/100ML; MG/100ML; MG/100ML
INJECTION, SOLUTION INTRAVENOUS CONTINUOUS PRN
Status: DISCONTINUED | OUTPATIENT
Start: 2021-04-07 | End: 2021-04-07

## 2021-04-07 RX ORDER — PROPOFOL 10 MG/ML
INJECTION, EMULSION INTRAVENOUS CONTINUOUS PRN
Status: DISCONTINUED | OUTPATIENT
Start: 2021-04-07 | End: 2021-04-07

## 2021-04-07 RX ORDER — KETAMINE HCL IN NACL, ISO-OSM 100MG/10ML
SYRINGE (ML) INJECTION AS NEEDED
Status: DISCONTINUED | OUTPATIENT
Start: 2021-04-07 | End: 2021-04-07

## 2021-04-07 RX ORDER — PROPOFOL 10 MG/ML
INJECTION, EMULSION INTRAVENOUS AS NEEDED
Status: DISCONTINUED | OUTPATIENT
Start: 2021-04-07 | End: 2021-04-07

## 2021-04-07 RX ORDER — LIDOCAINE HYDROCHLORIDE 10 MG/ML
INJECTION, SOLUTION EPIDURAL; INFILTRATION; INTRACAUDAL; PERINEURAL AS NEEDED
Status: DISCONTINUED | OUTPATIENT
Start: 2021-04-07 | End: 2021-04-07

## 2021-04-07 RX ORDER — GLYCOPYRROLATE 0.2 MG/ML
INJECTION INTRAMUSCULAR; INTRAVENOUS AS NEEDED
Status: DISCONTINUED | OUTPATIENT
Start: 2021-04-07 | End: 2021-04-07

## 2021-04-07 RX ORDER — MIDAZOLAM HYDROCHLORIDE 2 MG/2ML
INJECTION, SOLUTION INTRAMUSCULAR; INTRAVENOUS AS NEEDED
Status: DISCONTINUED | OUTPATIENT
Start: 2021-04-07 | End: 2021-04-07

## 2021-04-07 RX ADMIN — ATORVASTATIN CALCIUM 10 MG: 10 TABLET, FILM COATED ORAL at 21:06

## 2021-04-07 RX ADMIN — Medication 250 MG: at 09:00

## 2021-04-07 RX ADMIN — POTASSIUM CHLORIDE 40 MEQ: 1500 TABLET, EXTENDED RELEASE ORAL at 07:52

## 2021-04-07 RX ADMIN — LIDOCAINE HYDROCHLORIDE 50 MG: 10 INJECTION, SOLUTION EPIDURAL; INFILTRATION; INTRACAUDAL; PERINEURAL at 16:16

## 2021-04-07 RX ADMIN — SODIUM CHLORIDE, SODIUM LACTATE, POTASSIUM CHLORIDE, AND CALCIUM CHLORIDE 500 ML: .6; .31; .03; .02 INJECTION, SOLUTION INTRAVENOUS at 17:11

## 2021-04-07 RX ADMIN — METRONIDAZOLE 500 MG: 500 INJECTION, SOLUTION INTRAVENOUS at 06:21

## 2021-04-07 RX ADMIN — FLUOXETINE 80 MG: 20 CAPSULE ORAL at 09:47

## 2021-04-07 RX ADMIN — CEFEPIME HYDROCHLORIDE 2000 MG: 2 INJECTION, POWDER, FOR SOLUTION INTRAVENOUS at 02:24

## 2021-04-07 RX ADMIN — CEFEPIME HYDROCHLORIDE 2000 MG: 2 INJECTION, POWDER, FOR SOLUTION INTRAVENOUS at 18:20

## 2021-04-07 RX ADMIN — FENTANYL CITRATE 25 MCG: 50 INJECTION INTRAMUSCULAR; INTRAVENOUS at 16:16

## 2021-04-07 RX ADMIN — CEFEPIME HYDROCHLORIDE 2000 MG: 2 INJECTION, POWDER, FOR SOLUTION INTRAVENOUS at 12:32

## 2021-04-07 RX ADMIN — ENOXAPARIN SODIUM 40 MG: 40 INJECTION SUBCUTANEOUS at 09:46

## 2021-04-07 RX ADMIN — PHENYLEPHRINE HYDROCHLORIDE 200 MCG: 10 INJECTION INTRAVENOUS at 16:30

## 2021-04-07 RX ADMIN — PHENYLEPHRINE HYDROCHLORIDE 200 MCG: 10 INJECTION INTRAVENOUS at 16:18

## 2021-04-07 RX ADMIN — PROPOFOL 120 MG: 10 INJECTION, EMULSION INTRAVENOUS at 16:16

## 2021-04-07 RX ADMIN — METHYLPREDNISOLONE SODIUM SUCCINATE 60 MG: 125 INJECTION, POWDER, FOR SOLUTION INTRAMUSCULAR; INTRAVENOUS at 12:24

## 2021-04-07 RX ADMIN — INSULIN LISPRO 1 UNITS: 100 INJECTION, SOLUTION INTRAVENOUS; SUBCUTANEOUS at 06:28

## 2021-04-07 RX ADMIN — MIDAZOLAM 1 MG: 1 INJECTION INTRAMUSCULAR; INTRAVENOUS at 16:11

## 2021-04-07 RX ADMIN — SODIUM CHLORIDE, SODIUM LACTATE, POTASSIUM CHLORIDE, AND CALCIUM CHLORIDE: .6; .31; .03; .02 INJECTION, SOLUTION INTRAVENOUS at 16:10

## 2021-04-07 RX ADMIN — VANCOMYCIN HYDROCHLORIDE 1500 MG: 10 INJECTION, POWDER, LYOPHILIZED, FOR SOLUTION INTRAVENOUS at 12:30

## 2021-04-07 RX ADMIN — PROPOFOL 120 MCG/KG/MIN: 10 INJECTION, EMULSION INTRAVENOUS at 16:16

## 2021-04-07 RX ADMIN — BUSPIRONE HYDROCHLORIDE 10 MG: 10 TABLET ORAL at 21:06

## 2021-04-07 RX ADMIN — Medication 20 MG: at 16:16

## 2021-04-07 RX ADMIN — METHYLPREDNISOLONE SODIUM SUCCINATE 60 MG: 125 INJECTION, POWDER, FOR SOLUTION INTRAMUSCULAR; INTRAVENOUS at 17:31

## 2021-04-07 RX ADMIN — PHENYLEPHRINE HYDROCHLORIDE 200 MCG: 10 INJECTION INTRAVENOUS at 16:22

## 2021-04-07 RX ADMIN — INSULIN LISPRO 2 UNITS: 100 INJECTION, SOLUTION INTRAVENOUS; SUBCUTANEOUS at 21:08

## 2021-04-07 RX ADMIN — AZELASTINE HYDROCHLORIDE 1 SPRAY: 137 SPRAY, METERED NASAL at 09:47

## 2021-04-07 RX ADMIN — LORATADINE 10 MG: 10 TABLET ORAL at 09:46

## 2021-04-07 RX ADMIN — Medication 10 MG: at 16:23

## 2021-04-07 RX ADMIN — AZELASTINE HYDROCHLORIDE 1 SPRAY: 137 SPRAY, METERED NASAL at 18:19

## 2021-04-07 RX ADMIN — GLYCOPYRROLATE 0.2 MG: 0.2 INJECTION, SOLUTION INTRAMUSCULAR; INTRAVENOUS at 16:16

## 2021-04-07 RX ADMIN — METHYLPREDNISOLONE SODIUM SUCCINATE 60 MG: 125 INJECTION, POWDER, FOR SOLUTION INTRAMUSCULAR; INTRAVENOUS at 06:21

## 2021-04-07 NOTE — ANESTHESIA PREPROCEDURE EVALUATION
Procedure:  BRONCHOSCOPY    Septic with tachypnea and leukocytosis - suspected pulmonary cause - suspected viral vs fungal  Resp panel negative    SpO2 91-94% on high inspired O2    Relevant Problems   CARDIO   (+) HTN (hypertension)   (+) Hyperlipidemia      GI/HEPATIC   (+) Gastroesophageal reflux disease      MUSCULOSKELETAL   (+) Fibromyalgia      NEURO/PSYCH   (+) Depression with anxiety   (+) Fibromyalgia      PULMONARY   (+) Acute respiratory failure with hypoxia (HCC)   (+) Asthma   (+) Community acquired pneumonia of right lower lobe of lung   (+) Obstructive sleep apnea syndrome      Other   (+) Tobacco use        Physical Exam    Airway    Mallampati score: II  TM Distance: >3 FB  Neck ROM: full     Dental   No notable dental hx     Cardiovascular  Rhythm: regular, Rate: normal, Cardiovascular exam normal    Pulmonary  Pulmonary exam normal Breath sounds clear to auscultation,     Other Findings        Anesthesia Plan  ASA Score- 4     Anesthesia Type- general with ASA Monitors  Additional Monitors:   Airway Plan: LMA  Comment: Risks/benefits and alternatives discussed with patient including likely possibility of PONV and sore throat, as well as the rare possibilities of aspiration, dental/oropharyngeal/ocular injuries, or grave/life threatening anesthetic and surgical emergencies          Plan Factors-Exercise tolerance (METS): >4 METS  Patient summary reviewed  Patient instructed to abstain from smoking on day of procedure  Patient did not smoke on day of surgery  Induction- intravenous  Postoperative Plan- Plan for postoperative opioid use  Planned trial extubation    Informed Consent- Anesthetic plan and risks discussed with patient  I personally reviewed this patient with the CRNA  Discussed and agreed on the Anesthesia Plan with the CRNA  Benson Koch

## 2021-04-07 NOTE — CONSULTS
Vancomycin IV Pharmacy-to-Dose Consultation    Bernardino Bernheim is a 61 y o  female who was receiving Vancomycin IV with management by the Pharmacy Consult service for treatment of Pneumonia  The patients Vancomycin therapy has been discontinued 2/2 negative MRSA culture  Thank you for allowing us to take part in this patient's care  Pharmacy will sign-off now; please call or re-consult if there are any questions          Shayy Yan, PharmD, [address] Clinical Pharmacist  [phone]

## 2021-04-07 NOTE — PROGRESS NOTES
Progress Note - Infectious Disease   Romaine Nava 61 y o  female MRN: [id number]  Unit/Bed#: MICU 06 Encounter: [id number]      IMPRESSION & RECOMMENDATIONS:   Impression/Recommendations:  1  Sepsis, POA   Tachypnea, leukocytosis   Secondary to #2   No other clear explanation   Negative UA  Blood cultures are negative  Fevers have improved  WBC count is uptrending, high-dose IV steroid likely contributing  Hemodynamics remain stable and patient is nontoxic      -antibiotic plan as below  -monitor temperatures and hemodynamics  -recheck CBC in a m   -supportive care     2  Multifocal pneumonia  More likely viral etiology given constellation of symptoms, positive sick contacts   Negative COVID-19 PCR x2   Negative influenza, RSV PCR, RP2   Consider early bacterial pneumonia based on CT findings, elevated procalcitonin   Low suspicion for MDRO    Procalcitonin level has trended down  COVID antibody is positive likely reflective of prior exposure      -continue empiric cefepime for now pending bronchoscopy  -agree with discontinuation of Flagyl as low suspicion for anaerobic infection  -continue IV vancomycin for now with dosing recommendations per pharmacy  If MRSA culture is negative, can discontinue   -follow-up MRSA culture  -continue IV Solu-Medrol  -follow-up bronchoscopy findings and cultures     3  Acute hypoxic respiratory failure   Secondary to #2 likely viral in etiology   Doubt fungal process as patient is not immunocompromised   Also consider other process such as cryptogenic organizing pneumonia  Also consider component of pulmonary edema     remains on HFNC      -antibiotic plan as above  -continue IV Solu-Medrol  -monitor O2 requirements  -volume management     4  Multiple antibiotic allergies   Patient has extensive antibiotic allergy history, which significantly limits treatment options   She has previously tolerated vancomycin and clindamycin   Patient is tolerating cefepime      -antibiotic plan as above  -monitor closely for reactions     5  Chronic ethmoid sinusitis   With prior bilateral antrectomy   Follows with ENT last seen on 2021      6  Tobacco use   Risk factor for pulmonary infection   Recommend smoking cessation      Antibiotics:  Antibiotic 6  Cefepime   Vancomycin/Flagyl 3     I discussed above plan with patient, and with CCM         Subjective:  Feels a little better today  Denies cough or any sputum production  Remains on HFNC  No fevers  Some looser stools but no watery diarrhea  No abdominal pain  Feels a little less congested  Awaiting bronchoscopy  Objective:  Vitals:  Temp:  [97 8 °F (36 6 °C)-98 8 °F (37 1 °C)] 98 °F (36 7 °C)  HR:  [62-80] 64  Resp:  [15-37] 22  BP: ()/(41-78) 108/60  SpO2:  [85 %-95 %] 92 %  Temp (24hrs), Av 2 °F (36 8 °C), Min:97 8 °F (36 6 °C), Max:98 8 °F (37 1 °C)  Current: Temperature: 98 °F (36 7 °C)    Physical Exam:   General:  No acute distress, nontoxic, resting comfortably in bed on HFNC  HEENT:  Atraumatic normocephalic  Psychiatric:  Awake and alert  Pulmonary:  Normal respiratory excursion without accessory muscle use  Abdomen:  Soft, nontender  Extremities:  No edema  Skin:  No rashes  Neuro: Moves all extremities spontaneously    Lab Results:  I have personally reviewed pertinent labs  Results from last 7 days   Lab Units 21  0518 21  0525 21  0624  21  0232   POTASSIUM mmol/L 3 7 3 2* 3 2*   < > 3 5   CHLORIDE mmol/L 102 102 101   < > 95*   CO2 mmol/L 22 24 24   < > 22   BUN mg/dL 14 10 6   < > 11   CREATININE mg/dL 0 48* 0 53* 0 55*   < > 0 64   EGFR ml/min/1 73sq m 109 106 104   < > 99   CALCIUM mg/dL 8 6 8 7 7 9*   < > 8 9   AST U/L  --   --   --   --  26   ALT U/L  --   --   --   --  28   ALK PHOS U/L  --   --   --   --  95    < > = values in this interval not displayed       Results from last 7 days   Lab Units 21  0518 21  0525 21  0624   WBC Thousand/uL 32 33* 25 93* 12 99*   HEMOGLOBIN g/dL 11 0* 11 6 11 6   PLATELETS Thousands/uL 293 236 188     Results from last 7 days   Lab Units 04/03/21  0539 04/03/21  0237 04/03/21  0232   BLOOD CULTURE   --  No Growth After 4 Days  No Growth After 4 Days  LEGIONELLA URINARY ANTIGEN  Negative  --   --        Imaging Studies:   I have personally reviewed pertinent imaging study reports and images in PACS  EKG, Pathology, and Other Studies:   I have personally reviewed pertinent reports

## 2021-04-07 NOTE — ANESTHESIA POSTPROCEDURE EVALUATION
Post-Op Assessment Note    CV Status:  Stable  Pain Score: 0    Pain management: adequate     Mental Status:  Awake and lethargic   Hydration Status:  Euvolemic   PONV Controlled:  Controlled   Airway Patency:  Patent      Post Op Vitals Reviewed: Yes      Staff: CRNA   Comments: Pt awake, able to maintain own airway, VSS, O2 sats stable on hi-flow, report to ICU RN/team        No complications documented      BP (!) 84/48 (04/07/21 1708)    Temp      Pulse 68 (04/07/21 1708)   Resp 20 (04/07/21 1708)    SpO2 98 % (04/07/21 1708)

## 2021-04-07 NOTE — PROGRESS NOTES
Daily Progress Note - [facility] Jez Navas 61 y o  female MRN: [id number]  Unit/Bed#: MICU 06 Encounter: [id number]        ----------------------------------------------------------------------------------------  HPI/24hr events:Taylor Sierra is a 61 y o  female with a PMHx of recurrent sinusitis, hypertension/hyperlipidemia, tobacco use who presents with a 7 day history of flu-like symptoms sinus congestion, post-nasal drip, and difficulty breathing  She was transferred to ICU for increased WOB and increased O2 requirement currently on 50L 75% HFNC  No acute events overnight  Weaned to 75% FiO2 as stated above  Plan for bronch today  COVID Ab(+) - patient is 3 weeks post first dose of vaccine   ---------------------------------------------------------------------------------------  SUBJECTIVE  Patients states she feels a little less fatigued  Still on 50L 75% HFNC spO2 low 90s  No increase in difficulty breathing      Review of Systems   Constitutional: Positive for fatigue  Negative for fever  HENT: Positive for congestion, postnasal drip, sinus pressure and sinus pain  Respiratory: Positive for cough and chest tightness  Cardiovascular: Negative  Gastrointestinal: Negative for diarrhea and nausea  Endocrine: Negative  Genitourinary: Negative  Musculoskeletal: Negative  Skin: Negative  Neurological: Negative  Hematological: Negative  Psychiatric/Behavioral: Negative        Review of systems was reviewed and negative unless stated above in HPI/24-hour events   ---------------------------------------------------------------------------------------  Assessment and Plan:  Acute hypoxic respiratory failure  Abnormal Chest CT -  vs Pneumonia (viral or bacterial) vs COVID (Ab +) vs Autoimmune  Sepsis - POA  Steroid Induced Leukocytosis  Hypokalemia - repleted   Hyponatremia - improving  Chronic Sinusitis  Hypertension/Hyperlipidemia  Anxiety/Depression  Chronic Sinusitis  Prior eosinophilia  Tobacco abuse  Diarrhea - resolved      Neuro:    Diagnosis: anxiety/depression  o Plan: continue Buspar 10mg, PRN ativan 0 5 mg, continue Prozac 80mg QID  o Plan: monitor fo CAM-ICU, delirium precautions, monitor sleep/wake cycle      CV:    Diagnosis: hypertension/hyperlipidemia  o Plan: hold verapamil, continue atorvastatin 10mg QID  o Plan: continue telemetry monitoring    Pulm:   Diagnosis: acute hypoxic respiratory failure  - RV2 negative  - COVID Ab +, RF + (minimally) , CCP -,   o Plan: f/u results of MRSA swab,  o Continue broad spectrum Abx with cefepime, vancomycin, flagyl  o Vanc troph prior to 4th dose today  o Continue solumedrol 60mg q6h  o Bronch for washings and culture  o Ween HFNC as tolerated with spO2 > 92%    Diagnosis: chronic sinusitis  o Plan: continue claratin, azelastine      GI:    Diagnosis: Diarrhea - resolved  o Plan: continue probiotics, hold bowel regimen      :    Diagnosis: no acute issues    F/E/N:    Fluids: no indication for fluids at this time   Electrolytes: hypokalemic - replenish K > 4, PO4 > 3, Mg > 2   Hyponatremia: improved, continue to monitor   Nutrition: NPO with plan for bronch      Heme/Onc:    Diagnosis: Leukocytosis to 33 up from 26, likely due to solumedrol  o Plan: continue to monitor   Diagnosis: DVT prophylaxis  o Plan: Lovenox SC       Endo:    Diagnosis: no active issues  o Plan: monitor blood sugar with 140-180 goal      ID:    Diagnosis: Abnormal chest ct  -Concern for pneumonia vs  vs COVID (among others)  o Plan: trend temp/WBC, see above      Disposition: Continue Critical Care   Code Status: Level 1 - Full Code  ---------------------------------------------------------------------------------------  ICU CORE MEASURES    Prophylaxis   VTE Pharmacologic Prophylaxis: Enoxaparin (Lovenox)  VTE Mechanical Prophylaxis: sequential compression device  Stress Ulcer Prophylaxis: Prophylaxis Not Indicated     ABCDE Protocol (if indicated)  Plan to perform spontaneous awakening trial today? Not applicable  Plan to perform spontaneous breathing trial today? Not applicable  Obvious barriers to extubation? Not applicable  CAM-ICU: Negative    Invasive Devices Review  Invasive Devices     Peripheral Intravenous Line            Peripheral IV 21 Right;Dorsal (posterior) Hand 1 day    Peripheral IV 21 Distal;Left;Ventral (anterior) Forearm 1 day              Can any invasive devices be discontinued today? Not applicable  ---------------------------------------------------------------------------------------  OBJECTIVE    Vitals   Vitals:    21 0300 21 0400 21 0500 21 0600   BP: 102/53 101/52 92/51 104/52   Pulse: 66 64 68 64   Resp: (!) 25 (!) 37 (!) 28 (!) 24   Temp:  98 2 °F (36 8 °C)     TempSrc:  Oral     SpO2: 92% 92% 91% 95%   Weight:       Height:         Temp (24hrs), Av 6 °F (37 °C), Min:97 8 °F (36 6 °C), Max:99 4 °F (37 4 °C)  Current: Temperature: 98 2 °F (36 8 °C)  HR: 78    BP: 128/52  RR: 33  SpO2: 93% 50L 100% HFNC    Respiratory:  SpO2: SpO2: 95 %  Nasal Cannula O2 Flow Rate (L/min): 15 L/min    Invasive/non-invasive ventilation settings   Respiratory    Lab Data (Last 4 hours)    None         O2/Vent Data (Last 4 hours)       0349          Non-Invasive Ventilation Mode HFNC (High flow)                   Physical Exam  Constitutional:       Appearance: Normal appearance  HENT:      Head: Normocephalic and atraumatic  Nose: Congestion present  Eyes:      Pupils: Pupils are equal, round, and reactive to light  Neck:      Musculoskeletal: Normal range of motion and neck supple  Cardiovascular:      Rate and Rhythm: Normal rate and regular rhythm  Pulses: Normal pulses  Heart sounds: No murmur  No friction rub  No gallop  Pulmonary:      Effort: Pulmonary effort is normal       Breath sounds: Rhonchi present  Abdominal:      General: Abdomen is flat  Bowel sounds are normal       Palpations: Abdomen is soft  Musculoskeletal: Normal range of motion  Skin:     General: Skin is warm and dry  Neurological:      General: No focal deficit present  Mental Status: She is alert and oriented to person, place, and time           Laboratory and Diagnostics:  Results from last 7 days   Lab Units 04/06/21 0525 04/05/21 0624 04/04/21 0517 04/03/21  0232   WBC Thousand/uL 25 93* 12 99* 12 03* 15 56*   HEMOGLOBIN g/dL 11 6 11 6 11 4* 11 8   HEMATOCRIT % 34 2* 35 1 34 5* 35 5   PLATELETS Thousands/uL 236 188 159 144*   BANDS PCT % 7  --   --  3   MONO PCT % 1*  --   --  2*     Results from last 7 days   Lab Units 04/07/21 0518 04/06/21 0525 04/05/21  0624 04/04/21 0517 04/03/21  2034 04/03/21  1301 04/03/21  0232   SODIUM mmol/L 133* 134* 131* 132* 130* 128* 127*   POTASSIUM mmol/L 3 7 3 2* 3 2* 3 5 3 6 4 1 3 5   CHLORIDE mmol/L 102 102 101 102 98* 96* 95*   CO2 mmol/L 22 24 24 24 24 25 22   ANION GAP mmol/L 9 8 6 6 8 7 10   BUN mg/dL 14 10 6 9 11 12 11   CREATININE mg/dL 0 48* 0 53* 0 55* 0 61 0 69 0 67 0 64   CALCIUM mg/dL 8 6 8 7 7 9* 8 2* 8 7 8 8 8 9   GLUCOSE RANDOM mg/dL 152* 133 112 98 109 120 130   ALT U/L  --   --   --   --   --   --  28   AST U/L  --   --   --   --   --   --  26   ALK PHOS U/L  --   --   --   --   --   --  95   ALBUMIN g/dL  --   --   --   --   --   --  2 7*   TOTAL BILIRUBIN mg/dL  --   --   --   --   --   --  0 50     Results from last 7 days   Lab Units 04/07/21 0518 04/06/21  0525   MAGNESIUM mg/dL 2 4 2 5   PHOSPHORUS mg/dL 3 2 2 6*      Results from last 7 days   Lab Units 04/03/21  0232   INR  1 12   PTT seconds 27          Results from last 7 days   Lab Units 04/03/21  0232   LACTIC ACID mmol/L 1 4     ABG:    VBG:    Results from last 7 days   Lab Units 04/05/21  0624 04/04/21  0518 04/03/21  0834 04/03/21  0232   PROCALCITONIN ng/ml 0 44* 0 63* 0 86* 1 08*       Micro  Results from last 7 days   Lab Units 04/03/21  0539 04/03/21  0237 04/03/21  0232   BLOOD CULTURE   --  No Growth After 4 Days  No Growth After 4 Days  LEGIONELLA URINARY ANTIGEN  Negative  --   --    STREP PNEUMONIAE ANTIGEN, URINE  Negative  --   --        EKG: N/A  Imaging: CXR I have personally reviewed pertinent reports  Intake and Output  I/O       04/04 0701 - 04/05 0700 04/05 0701 - 04/06 0700 04/06 0701 - 04/07 0700    P  O  1190 1425     I V  (mL/kg) 1045 8 (12 5)      IV Piggyback  900     Total Intake(mL/kg) 2235 8 (26 6) 2325 (25 2)     Urine (mL/kg/hr) 1450 (0 7) 2375 (1 1)     Stool  0     Total Output 1450 2375     Net +785 8 -50            Unmeasured Urine Occurrence  2 x     Unmeasured Stool Occurrence  0 x         UOP: 1 1 ml/hr     Height and Weights   Height: 5' 5" (165 1 cm)  IBW: 57 kg  Body mass index is 33 86 kg/m²  Weight (last 2 days)     Date/Time   Weight    04/06/21 0600   92 3 (203 48)                Nutrition       Diet Orders   (From admission, onward)             Start     Ordered    04/07/21 0001  Diet NPO  Diet effective midnight     Question Answer Comment   Diet Type NPO    RD to adjust diet per protocol? Yes        04/06/21 1759              TF currently running at n/a ml/hr with a goal of n/a ml/hr   Formula: n/a      Active Medications  Scheduled Meds:  Current Facility-Administered Medications   Medication Dose Route Frequency Provider Last Rate    acetaminophen  650 mg Oral Q6H PRN Thee Rdz DO      albuterol  2 puff Inhalation Q4H PRN Teresa Rosenthal MD      atorvastatin  10 mg Oral Daily Thee Rdz DO      azelastine  1 spray Each Nare BID Thee Rdz DO      busPIRone  10 mg Oral QPM Elliott Bishop PA-C      cefepime  2,000 mg Intravenous Q8H Jonathon Flanagan PA-C Stopped (04/07/21 0324)    diphenhydrAMINE  25 mg Oral Q6H PRN Nevaeh Maldonado PA-C      enoxaparin  40 mg Subcutaneous Daily Thee Rdz DO      FLUoxetine  80 mg Oral Daily Nevaeh Maldonado PA-C      glycerin-hypromellose-  1 drop Both Eyes Q3H PRN Eleonora Field PA-C      insulin lispro  1-5 Units Subcutaneous HS WILLY Colvin      insulin lispro  1-6 Units Subcutaneous TID AC WILLY Colvin      loratadine  10 mg Oral Daily Eleonora Field PA-C      LORazepam  0 5 mg Oral Q12H PRN Minesh RADHA Nj      methylPREDNISolone sodium succinate  60 mg Intravenous Q6H [address] Magaly Jimenez MD      metroNIDAZOLE  500 mg Intravenous Q8H Magaly Jimenez  mg (04/07/21 6235)    ondansetron  4 mg Intravenous Q6H PRN Aby Puentes DO      potassium chloride  40 mEq Oral Once WILLY Colvin      saccharomyces boulardii  250 mg Oral BID Eleonora Field PA-C      sodium chloride  1 spray Each Nare Q1H PRN Eleonora Field PA-C      vancomycin  1,500 mg Intravenous Q12H Magaly Jimenez MD Stopped (04/07/21 0324)     Continuous Infusions:     PRN Meds:   acetaminophen, 650 mg, Q6H PRN  albuterol, 2 puff, Q4H PRN  diphenhydrAMINE, 25 mg, Q6H PRN  glycerin-hypromellose-, 1 drop, Q3H PRN  LORazepam, 0 5 mg, Q12H PRN  ondansetron, 4 mg, Q6H PRN  sodium chloride, 1 spray, Q1H PRN        Allergies   Allergies   Allergen Reactions    Other      Most all antibiotics- hives, hypotensive    "no problem with clindamycin "    Augmentin Es-600  [Amoxicillin-Pot Clavulanate]     Cefuroxime     Erythromycin     Levofloxacin     Morphine     Morphine And Related Hives    Oxycodone-Acetaminophen     Penicillins     Percocet [Oxycodone-Acetaminophen] Hives    Sulfa Antibiotics     Tetracyclines & Related      ---------------------------------------------------------------------------------------  Advance Directive and Living Will:      Power of :    POLST:    ---------------------------------------------------------------------------------------  Care Time Delivered:   No Critical Care time spent     Park Began      Portions of the record may have been created with voice recognition software  Occasional wrong word or "sound a like" substitutions may have occurred due to the inherent limitations of voice recognition software    Read the chart carefully and recognize, using context, where substitutions have occurred

## 2021-04-07 NOTE — UTILIZATION REVIEW
Continued Stay Review    Date: 4-7                       Current Patient Class: inpatient Current Level of Care: critical care     HPI:56 y o  female initially admitted on 4-3-21 for community acquired pneumoniamultifocal  lobe, sepsis    Assessment/Plan:       4-7   Afebrile  Chest x ray today shows bilateral infiltrates mildly improved  Persistent tachypnea, O2 sats 85%, hypotensive wbc increased to 32 33  Bronchoscopy today  Continue iv antibiotics and iv solumedrol  Follow up bronchoscopy cultures  Requires 50 ml oxygen via high flow nasal cannula  Pertinent Labs/Diagnostic Results:       CHEST    4-7  INDICATION:   pneumonia of unclear etiology, on steroids, no known infectious source      COMPARISON:  4/5/2021; CT from 4/3/2021   Bilateral infiltrates are mildly improved      Results from last 7 days   Lab Units 04/03/21  0231 04/01/21  1145   SARS-COV-2  Negative Negative     Results from last 7 days   Lab Units 04/07/21  0518 04/06/21  0525 04/05/21  0624 04/04/21  0517 04/03/21  0232   WBC Thousand/uL 32 33* 25 93* 12 99* 12 03* 15 56*   HEMOGLOBIN g/dL 11 0* 11 6 11 6 11 4* 11 8   HEMATOCRIT % 32 9* 34 2* 35 1 34 5* 35 5   PLATELETS Thousands/uL 293 236 188 159 144*   BANDS PCT %  --  7  --   --  3         Results from last 7 days   Lab Units 04/07/21  0518 04/06/21  0525 04/05/21  0624 04/04/21  0517 04/03/21  2034   SODIUM mmol/L 133* 134* 131* 132* 130*   POTASSIUM mmol/L 3 7 3 2* 3 2* 3 5 3 6   CHLORIDE mmol/L 102 102 101 102 98*   CO2 mmol/L 22 24 24 24 24   ANION GAP mmol/L 9 8 6 6 8   BUN mg/dL 14 10 6 9 11   CREATININE mg/dL 0 48* 0 53* 0 55* 0 61 0 69   EGFR ml/min/1 73sq m 109 106 104 101 97   CALCIUM mg/dL 8 6 8 7 7 9* 8 2* 8 7   MAGNESIUM mg/dL 2 4 2 5  --   --   --    PHOSPHORUS mg/dL 3 2 2 6*  --   --   --      Results from last 7 days   Lab Units 04/03/21  0232   AST U/L 26   ALT U/L 28   ALK PHOS U/L 95   TOTAL PROTEIN g/dL 6 8   ALBUMIN g/dL 2 7*   TOTAL BILIRUBIN mg/dL 0 50 Results from last 7 days   Lab Units 04/07/21  1205 04/07/21  0626 04/06/21  2105 04/06/21  1718 04/06/21  1155   POC GLUCOSE mg/dl 152* 164* 186* 168* 160*     Results from last 7 days   Lab Units 04/07/21  0518 04/06/21  0525 04/05/21  0624 04/04/21  0517 04/03/21  2034 04/03/21  1301 04/03/21  0232   GLUCOSE RANDOM mg/dL 152* 133 112 98 109 120 130         Results from last 7 days   Lab Units 04/03/21  0232   PROTIME seconds 14 4   INR  1 12   PTT seconds 27     Results from last 7 days   Lab Units 04/03/21  0232   TSH 3RD GENERATON uIU/mL 0 285*     Results from last 7 days   Lab Units 04/05/21  0624 04/04/21  0518 04/03/21  0834 04/03/21  0232   PROCALCITONIN ng/ml 0 44* 0 63* 0 86* 1 08*     Results from last 7 days   Lab Units 04/03/21  0232   LACTIC ACID mmol/L 1 4           Results from last 7 days   Lab Units 04/03/21  0540 04/03/21  0539   CLARITY UA  Clear  --    COLOR UA  Yellow  --    SPEC GRAV UA  1 043*  --    PH UA  6 5  --    GLUCOSE UA mg/dl Negative  --    KETONES UA mg/dl 40 (2+)*  --    BLOOD UA  Small*  --    PROTEIN UA mg/dl 30 (1+)*  --    NITRITE UA  Negative  --    BILIRUBIN UA  Negative  --    UROBILINOGEN UA E U /dl 1 0  --    LEUKOCYTES UA  Negative  --    WBC UA /hpf 2-4  --    RBC UA /hpf 4-10*  --    BACTERIA UA /hpf None Seen  --    EPITHELIAL CELLS WET PREP /hpf None Seen  --    SODIUM UR   --  <5     Results from last 7 days   Lab Units 04/05/21  1555 04/03/21  0539 04/03/21  0231   STREP PNEUMONIAE ANTIGEN, URINE   --  Negative  --    LEGIONELLA URINARY ANTIGEN   --  Negative  --    INFLUENZA A PCR   --   --  Negative   INFLUENZA B PCR   --   --  Negative   RSV PCR   --   --  Negative   RESPIRATORY SYNCYTIAL VIRUS  Not Detected  --   --      Results from last 7 days   Lab Units 04/05/21  1555   ADENOVIRUS  Not Detected   BORDETELLA PARAPERTUSSIS  Not Detected   BORDETELLA PERTUSSIS  Not Detected   CHLAMYDIA PNEUMONIAE  Not Detected   (NOT NOVEL COVID-19) CORONAVIRUS  Not Detected   METAPNEUMOVIRUS  Not Detected   RHINOVIRUS  Not Detected   MYCOPLASMA PNEUMONIAE  Not Detected   PARAINFLUENZA VIRUS  Not Detected       Results from last 7 days   Lab Units 04/03/21  0237 04/03/21  0232   BLOOD CULTURE  No Growth After 4 Days  No Growth After 4 Days         Vital Signs:         Date/Time  Temp  Pulse  Resp  BP  MAP (mmHg)  SpO2   O2 Flow Rate (L/min)   O2 Device  O2 Interface Device   04/07/21 1400  --  66  30Abnormal   106/74  90  94 %   --   --  --   04/07/21 1300  --  64  29Abnormal   109/54  75  91 %   --   --  --   04/07/21 1224  --  --  --  --  --  92 %   --   --  HFNC prongs   04/07/21 1200  98 °F (36 7 °C)  64  22  108/60  79  93 %   --   --  --   04/07/21 1100  --  62  20  108/60  76  94 %   --   --  --   04/07/21 1000  --  64  32Abnormal   104/51  68  91 %   --   --  --   04/07/21 0901  --  62  26Abnormal   101/49  Abnormal   64  93 %   --   --  --   04/07/21 0801  --  62  21  111/57  74  91 %   --   --  --   04/07/21 0752  98 1 °F (36 7 °C)  66  24Abnormal   --  --  91 %   50 L/min   High flow nasal cannula  --   04/07/21 0742  --  --  --  --  --  91 %   --   --  HFNC prongs   04/07/21 0700  --  68  35Abnormal   97/55  67  85 %Abnormal    --   --  --   04/07/21 0600  --  64  24Abnormal   104/52  70  95 %   --   --  --   04/07/21 0500  --  68  28Abnormal   92/51  62  91 %   --   --  --   04/07/21 0400  98 2 °F (36 8 °C)  64  37Abnormal   101/52  68  92 %   --   --  --   04/07/21 0349  --  --  --  --  --  --   --   --  HFNC prongs   04/07/21 0300  --  66  25Abnormal   102/53  64  92 %   --   --  --   04/07/21 0200  --  66  26Abnormal   99/48Abnormal   66  93 %   --   --  --   04/07/21 0024  --  --  --  --  --  --   --   --  HFNC prongs   04/06/21 2300  97 8 °F (36 6 °C)  66  26Abnormal   101/54  67  91 %   --   --  --   04/06/21 2240  --  66  15  94/52  63  92 %   --   --  --   04/06/21 2200  --  68  26Abnormal   98/41  Abnormal   53  88 %Abnormal    --   --  --   04/06/21 2100 --  72  28Abnormal   115/67  79  94 %   --   --  --   04/06/21 2000  98 2 °F (36 8 °C)  72  26Abnormal   114/56  72  91 %   --   --  --   04/06/21 1918  --  --  --  --  --  --   --   --  HFNC prongs   04/06/21 1900  --  74  30Abnormal   99/53  64  91 %   --   --  --   04/06/21 1800  --  74  22  110/48  Abnormal   69  91 %   --   --  --   04/06/21 1719  98 8 °F (37 1 °C)  74  28Abnormal   119/78  75  90 %   50 L/min   High flow nasal cannula  --   04/06/21 1623  --  --  --  --  --  --   --   --  HFNC prongs   04/06/21 1600  --  78  30Abnormal   108/55  71  90 %   50 L/min   High flow nasal cannula  --   04/06/21 1500  --  76  26Abnormal   103/51  69  91 %   --   --  --   04/06/21 1400  --  74  26Abnormal   99/51  72  91 %   --   --  --   04/06/21 1300  --  80  28Abnormal   110/54  74  91 %   --   --  --   04/06/21 1201  --  --  --  --  --  --   --   --  HFNC prongs   04/06/21 1200  99 2 °F (37 3 °C)  76  30Abnormal   99/47  Abnormal   67  92 %   --   --  --   04/06/21 1100  --  80  32Abnormal   106/51  69  92 %   --   --  --   04/06/21 1000  --  78  27Abnormal   114/55  63  93 %   --   --  --   04/06/21 0900  --  76  33Abnormal   111/52  71  94 %   --   --  --   04/06/21 0800  --  80  28Abnormal   113/56  74  87 %Abnormal    --   --  --   04/06/21 0752  --  84  19  --  --  90 %   --   --  --   04/06/21 0748  99 4 °F (37 4 °C)  84  39Abnormal   --  --  88 %Abnormal    50 L/min   High flow nasal cannula  --   04/06/21 0746  --  --  --  --  --  92 %   --   --  HFNC prongs   04/06/21 0700  --  80  26Abnormal   121/56  76  91 %   --   --  --   04/06/21 0612  --  78  33Abnormal   128/52  77  93 %   --   --  --   04/06/21 0600  --  82  40Abnormal   --  --  91 %   --   --  --   04/06/21 0500  --  78  29Abnormal   117/50  73  94 %   --   --  --   04/06/21 0400  --  78  30Abnormal   115/54  72  88 %Abnormal    --   --  --   04/06/21 0346  --  --  --  --  --  --   --   --  HFNC prongs   04/06/21 0314  98 4 °F (36 9 °C)  78 33Abnormal   111/50  71  92 %   --   --  --   04/06/21 0200  --  74  32Abnormal   110/46  Abnormal   58  91 %   --   --  --   04/06/21 0100  --  74  34Abnormal   109/55  73  92 %   --   --  --   04/06/21 0026  --  --  --  --  --  --   --   --  HFNC prongs   04/06/21 0014  --  70  29Abnormal   102/44  Abnormal   51  91 %   50 L/min   High flow nasal cannula  --   04/06/21 0000  --  70  27Abnormal   --  --  94 %   --   --  --   04/05/21 2328  98 1 °F (36 7 °C)  72  34Abnormal   115/59  77  94 %   --   --  --   04/05/21 2200  --  70  37Abnormal   105/60  71  95 %   --   --  --   04/05/21 2100  --  68  28Abnormal   96/48  Abnormal   62  96 %   --   --  --   04/05/21 2000  --  70  27Abnormal   112/57  76  96 %   --   --  --   04/05/21 1932  --  --  --  --  --  --   --   --  HFNC prongs   04/05/21 1900  97 7 °F (36 5 °C)  72  29Abnormal   124/67  76  96 %   --   --  --   04/05/21 1800  --  84  33Abnormal   122/72  106  94 %   --   High flow nasal cannula  --   04/05/21 1600  100 4 °F (38 °C)  86  36Abnormal   134/64  91  94 %   --   --  --   04/05/21 1555  --  --  --  --  --  93 %   --   --  HFNC prongs   04/05/21 1530  --  86  36Abnormal   --  --  95 %   --   --  --   04/05/21 1500  --  84  37Abnormal   119/59  84  96 %   --   --  --   04/05/21 1435  --  --  --  --  --  95 %   --   --  --   04/05/21 1330  --  --  --  --  --  89 %  Abnormal    --   --  HFNC prongs   04/05/21 0812  --  --  --  --  --  93 %   --   --  --   04/05/21 0810  --  --  --  --  --  87 %  Abnormal    --   Mid flow nasal cannula  --   04/05/21 0718  98 9 °F (37 2 °C)  69  20  102/54  --  92 %   --   Mid flow nasal cannula  --   04/05/21 0352  --  --  --  --  --  --   --   Mid flow nasal cannula  --   04/05/21 0300  --  --  24Abnormal   --  --  --   --   --  --           Medications:     atorvastatin, 10 mg, Oral, Daily  azelastine, 1 spray, Each Nare, BID  busPIRone, 10 mg, Oral, QPM  cefepime, 2,000 mg, Intravenous, Q8H  enoxaparin, 40 mg, Subcutaneous, Daily  FLUoxetine, 80 mg, Oral, Daily  insulin lispro, 1-5 Units, Subcutaneous, HS  insulin lispro, 1-6 Units, Subcutaneous, TID AC  loratadine, 10 mg, Oral, Daily  methylPREDNISolone sodium succinate, 60 mg, Intravenous, Q6H YASEMIN  saccharomyces boulardii, 250 mg, Oral, BID      Continuous IV Infusions:     PRN Meds:  acetaminophen, 650 mg, Oral, Q6H PRN  albuterol, 2 puff, Inhalation, Q4H PRN  diphenhydrAMINE, 25 mg, Oral, Q6H PRN  glycerin-hypromellose-, 1 drop, Both Eyes, Q3H PRN  LORazepam, 0 5 mg, Oral, Q12H PRN  ondansetron, 4 mg, Intravenous, Q6H PRN  sodium chloride, 1 spray, Each Nare, Q1H PRN        Discharge Plan: to be determined     Network Utilization Review Department  ATTENTION: Please call with any questions or concerns to [phone] and carefully listen to the prompts so that you are directed to the right person  All voicemails are confidential   Antony Olivia all requests for admission clinical reviews, approved or denied determinations and any other requests to dedicated fax number below belonging to the campus where the patient is receiving treatment   List of dedicated fax numbers for the Facilities:  [address] DENIALS (Administrative/Medical Necessity) [phone]   [address] (Maternity/NICU/Pediatrics) [phone] [address] Storm Dimas 6516 (  [facility] Robles [facility]) [address] [place] [address] [address]yes [address] [place] [address] [phone]

## 2021-04-08 LAB
ANION GAP SERPL CALCULATED.3IONS-SCNC: 8 MMOL/L (ref 4–13)
BACTERIA BLD CULT: NORMAL
BACTERIA BLD CULT: NORMAL
BUN SERPL-MCNC: 18 MG/DL (ref 5–25)
CALCIUM SERPL-MCNC: 8.4 MG/DL (ref 8.3–10.1)
CHLORIDE SERPL-SCNC: 105 MMOL/L (ref 100–108)
CO2 SERPL-SCNC: 24 MMOL/L (ref 21–32)
CREAT SERPL-MCNC: 0.5 MG/DL (ref 0.6–1.3)
CRP SERPL QL: 132 MG/L
ERYTHROCYTE [DISTWIDTH] IN BLOOD BY AUTOMATED COUNT: 13.9 % (ref 11.6–15.1)
ERYTHROCYTE [SEDIMENTATION RATE] IN BLOOD: 103 MM/HOUR (ref 0–29)
GFR SERPL CREATININE-BSD FRML MDRD: 108 ML/MIN/1.73SQ M
GLUCOSE SERPL-MCNC: 136 MG/DL (ref 65–140)
GLUCOSE SERPL-MCNC: 150 MG/DL (ref 65–140)
GLUCOSE SERPL-MCNC: 164 MG/DL (ref 65–140)
GLUCOSE SERPL-MCNC: 198 MG/DL (ref 65–140)
GLUCOSE SERPL-MCNC: 201 MG/DL (ref 65–140)
HCT VFR BLD AUTO: 34.2 % (ref 34.8–46.1)
HGB BLD-MCNC: 11.2 G/DL (ref 11.5–15.4)
MAGNESIUM SERPL-MCNC: 2.7 MG/DL (ref 1.6–2.6)
MCH RBC QN AUTO: 29.9 PG (ref 26.8–34.3)
MCHC RBC AUTO-ENTMCNC: 32.7 G/DL (ref 31.4–37.4)
MCV RBC AUTO: 91 FL (ref 82–98)
NRBC BLD AUTO-RTO: 0 /100 WBCS
PHOSPHATE SERPL-MCNC: 4 MG/DL (ref 2.7–4.5)
PLATELET # BLD AUTO: 312 THOUSANDS/UL (ref 149–390)
PMV BLD AUTO: 11.7 FL (ref 8.9–12.7)
POTASSIUM SERPL-SCNC: 4.4 MMOL/L (ref 3.5–5.3)
RBC # BLD AUTO: 3.74 MILLION/UL (ref 3.81–5.12)
SODIUM SERPL-SCNC: 137 MMOL/L (ref 136–145)
WBC # BLD AUTO: 34.61 THOUSAND/UL (ref 4.31–10.16)

## 2021-04-08 PROCEDURE — 85007 BL SMEAR W/DIFF WBC COUNT: CPT | Performed by: NURSE PRACTITIONER

## 2021-04-08 PROCEDURE — 82948 REAGENT STRIP/BLOOD GLUCOSE: CPT

## 2021-04-08 PROCEDURE — 99233 SBSQ HOSP IP/OBS HIGH 50: CPT | Performed by: INTERNAL MEDICINE

## 2021-04-08 PROCEDURE — 84100 ASSAY OF PHOSPHORUS: CPT | Performed by: NURSE PRACTITIONER

## 2021-04-08 PROCEDURE — 94760 N-INVAS EAR/PLS OXIMETRY 1: CPT

## 2021-04-08 PROCEDURE — 85025 COMPLETE CBC W/AUTO DIFF WBC: CPT | Performed by: NURSE PRACTITIONER

## 2021-04-08 PROCEDURE — 85652 RBC SED RATE AUTOMATED: CPT | Performed by: STUDENT IN AN ORGANIZED HEALTH CARE EDUCATION/TRAINING PROGRAM

## 2021-04-08 PROCEDURE — 86140 C-REACTIVE PROTEIN: CPT | Performed by: STUDENT IN AN ORGANIZED HEALTH CARE EDUCATION/TRAINING PROGRAM

## 2021-04-08 PROCEDURE — 80048 BASIC METABOLIC PNL TOTAL CA: CPT | Performed by: NURSE PRACTITIONER

## 2021-04-08 PROCEDURE — 83735 ASSAY OF MAGNESIUM: CPT | Performed by: NURSE PRACTITIONER

## 2021-04-08 PROCEDURE — 85027 COMPLETE CBC AUTOMATED: CPT | Performed by: NURSE PRACTITIONER

## 2021-04-08 RX ADMIN — ENOXAPARIN SODIUM 40 MG: 40 INJECTION SUBCUTANEOUS at 09:09

## 2021-04-08 RX ADMIN — BUSPIRONE HYDROCHLORIDE 10 MG: 10 TABLET ORAL at 21:10

## 2021-04-08 RX ADMIN — AZELASTINE HYDROCHLORIDE 1 SPRAY: 137 SPRAY, METERED NASAL at 18:01

## 2021-04-08 RX ADMIN — INSULIN LISPRO 1 UNITS: 100 INJECTION, SOLUTION INTRAVENOUS; SUBCUTANEOUS at 09:10

## 2021-04-08 RX ADMIN — Medication 250 MG: at 18:01

## 2021-04-08 RX ADMIN — CEFEPIME HYDROCHLORIDE 2000 MG: 2 INJECTION, POWDER, FOR SOLUTION INTRAVENOUS at 03:00

## 2021-04-08 RX ADMIN — INSULIN LISPRO 1 UNITS: 100 INJECTION, SOLUTION INTRAVENOUS; SUBCUTANEOUS at 21:13

## 2021-04-08 RX ADMIN — LORAZEPAM 0.5 MG: 0.5 TABLET ORAL at 15:29

## 2021-04-08 RX ADMIN — FLUOXETINE 80 MG: 20 CAPSULE ORAL at 09:20

## 2021-04-08 RX ADMIN — AZELASTINE HYDROCHLORIDE 1 SPRAY: 137 SPRAY, METERED NASAL at 09:09

## 2021-04-08 RX ADMIN — CEFEPIME HYDROCHLORIDE 2000 MG: 2 INJECTION, POWDER, FOR SOLUTION INTRAVENOUS at 11:41

## 2021-04-08 RX ADMIN — LORATADINE 10 MG: 10 TABLET ORAL at 09:09

## 2021-04-08 RX ADMIN — METHYLPREDNISOLONE SODIUM SUCCINATE 60 MG: 125 INJECTION, POWDER, FOR SOLUTION INTRAMUSCULAR; INTRAVENOUS at 00:11

## 2021-04-08 RX ADMIN — METHYLPREDNISOLONE SODIUM SUCCINATE 60 MG: 125 INJECTION, POWDER, FOR SOLUTION INTRAMUSCULAR; INTRAVENOUS at 11:42

## 2021-04-08 RX ADMIN — ACETAMINOPHEN 650 MG: 325 TABLET, FILM COATED ORAL at 00:11

## 2021-04-08 RX ADMIN — DIPHENHYDRAMINE HCL 25 MG: 25 TABLET ORAL at 00:11

## 2021-04-08 RX ADMIN — CEFEPIME HYDROCHLORIDE 2000 MG: 2 INJECTION, POWDER, FOR SOLUTION INTRAVENOUS at 18:00

## 2021-04-08 RX ADMIN — INSULIN LISPRO 2 UNITS: 100 INJECTION, SOLUTION INTRAVENOUS; SUBCUTANEOUS at 12:23

## 2021-04-08 RX ADMIN — Medication 250 MG: at 09:12

## 2021-04-08 RX ADMIN — METHYLPREDNISOLONE SODIUM SUCCINATE 60 MG: 125 INJECTION, POWDER, FOR SOLUTION INTRAMUSCULAR; INTRAVENOUS at 18:00

## 2021-04-08 RX ADMIN — ATORVASTATIN CALCIUM 10 MG: 10 TABLET, FILM COATED ORAL at 21:10

## 2021-04-08 RX ADMIN — METHYLPREDNISOLONE SODIUM SUCCINATE 60 MG: 125 INJECTION, POWDER, FOR SOLUTION INTRAMUSCULAR; INTRAVENOUS at 05:55

## 2021-04-08 NOTE — PROGRESS NOTES
Progress Note - Infectious Disease   Beverley Taylor 61 y o  female MRN: [id number]  Unit/Bed#: MICU 13 Encounter: [id number]      IMPRESSION & RECOMMENDATIONS:   Impression/Recommendations:  1  Sepsis, POA   Tachypnea, leukocytosis   Secondary to #2   No other clear explanation   Negative UA  Blood cultures are negative  WBC count remains elevated, high-dose IV steroid likely contributing  Fevers remain improved  Remains hemodynamically stable      -antibiotic plan as below  -monitor temperatures and hemodynamics  -recheck CBC in a m   -supportive care     2  Multifocal pneumonia   More likely viral etiology given constellation of symptoms, positive sick contacts   Negative COVID-19 PCR x2   Negative influenza, RSV PCR, RP2   Consider early bacterial pneumonia based on CT findings, elevated procalcitonin   Low suspicion for MDRO    Procalcitonin level has trended down  COVID antibody is positive likely reflective of prior exposure  Status post bronchoscopy with normal anatomy, no significant secretions  Gram stain showed 1+ yeast, 1+ GPRs which I suspect are colonizers  Remains on HFNC but is symptomatically improving      -continue cefepime for now pending BAL cultures  -agree with discontinuation of vancomycin as MRSA culture is negative  -continue IV Solu-Medrol  -follow-up BAL cultures     3  Acute hypoxic respiratory failure   Secondary to #2 likely viral in etiology   Doubt fungal process as patient is not immunocompromised   Also consider other process such as cryptogenic organizing pneumonia   Also consider component of pulmonary edema    Remains on HFNC      -antibiotic plan as above  -continue IV Solu-Medrol  -monitor O2 requirements  -volume management     4  Multiple antibiotic allergies   Patient has extensive antibiotic allergy history, which significantly limits treatment options   She has previously tolerated vancomycin and clindamycin   Patient is tolerating cefepime      -antibiotic plan as above  -monitor closely for reactions     5  Chronic ethmoid sinusitis   With prior bilateral antrectomy   Follows with ENT last seen on 2021      6  Tobacco use   Risk factor for pulmonary infection   Recommend smoking cessation      Antibiotics:  Antibiotic 7  Cefepime   Vancomycin 4             Subjective:  Patient underwent bronchoscopy yesterday with normal anatomy and no significant secretions  Remains on HFNC today but feeling a little stronger with less shortness of breath  No productive cough  No fevers  Blood pressures are stable  Objective:  Vitals:  Temp:  [97 5 °F (36 4 °C)-98 5 °F (36 9 °C)] 97 7 °F (36 5 °C)  HR:  [56-68] 60  Resp:  [14-38] 23  BP: ()/(48-74) 97/53  SpO2:  [91 %-99 %] 94 %  Temp (24hrs), Av 9 °F (36 6 °C), Min:97 5 °F (36 4 °C), Max:98 5 °F (36 9 °C)  Current: Temperature: 97 7 °F (36 5 °C)    Physical Exam:   General:  No acute distress, nontoxic, sitting comfortably in chair  HEENT:  Atraumatic normocephalic  Psychiatric:  Awake and alert  Pulmonary:  Normal respiratory excursion without accessory muscle use  Abdomen:  Soft, nontender  Extremities:  No edema  Skin:  No rashes  Neuro: Moves all extremities spontaneously    Lab Results:  I have personally reviewed pertinent labs  Results from last 7 days   Lab Units 21  0525  21  0232   POTASSIUM mmol/L 4 4 3 7 3 2*   < > 3 5   CHLORIDE mmol/L 105 102 102   < > 95*   CO2 mmol/L 24 22 24   < > 22   BUN mg/dL 18 14 10   < > 11   CREATININE mg/dL 0 50* 0 48* 0 53*   < > 0 64   EGFR ml/min/1 73sq m 108 109 106   < > 99   CALCIUM mg/dL 8 4 8 6 8 7   < > 8 9   AST U/L  --   --   --   --  26   ALT U/L  --   --   --   --  28   ALK PHOS U/L  --   --   --   --  95    < > = values in this interval not displayed       Results from last 7 days   Lab Units 2154 21  0518 21  0525   WBC Thousand/uL 34 61* 32 33* 25 93*   HEMOGLOBIN g/dL 11 2* 11 0* 11 6 PLATELETS Thousands/uL 312 293 236     Results from last 7 days   Lab Units 04/07/21  1645 04/05/21  1555 04/03/21  0539 04/03/21  0237 04/03/21  0232   BLOOD CULTURE   --   --   --  No Growth After 5 Days  No Growth After 5 Days  GRAM STAIN RESULT  1+ Yeast*  1+ Gram positive rods*  1+ Epithelial Cells*  --   --   --   --    MRSA CULTURE ONLY   --  No Methicillin Resistant Staphlyococcus aureus (MRSA) isolated  --   --   --    LEGIONELLA URINARY ANTIGEN   --   --  Negative  --   --        Imaging Studies:   I have personally reviewed pertinent imaging study reports and images in PACS  EKG, Pathology, and Other Studies:   I have personally reviewed pertinent reports

## 2021-04-08 NOTE — PROGRESS NOTES
Daily Progress Note - [facility] Jez Navas 61 y o  female MRN: [id number]  Unit/Bed#: MICU 13 Encounter: [id number]        ----------------------------------------------------------------------------------------  HPI/24hr events:Taylor Bear is a 61 y o  female with a PMHx of recurrent sinusitis, hypertension/hyperlipidemia, tobacco use who presents with a 8 day history of flu-like symptoms sinus congestion, post-nasal drip, and difficulty breathing  She was transferred to ICU for increased WOB and increased O2 requirement currently on 45 L 100% HFNC  No acute events overnight  Bronch performed yesterday  Patient tolerated procedure well  Gram stain from BAL shows normal respiratory nirmala  Awaiting culture results  ---------------------------------------------------------------------------------------  SUBJECTIVE  Patients states she feels a little less fatigued  Still on 45L 100% HFNC spO2 mid 90s  No increase in difficulty breathing      Review of Systems   Constitutional: Positive for fatigue  Negative for fever  HENT: Positive for congestion, postnasal drip, sinus pressure and sinus pain  Respiratory: Positive for cough  Negative for chest tightness  Cardiovascular: Negative  Gastrointestinal: Negative for diarrhea and nausea  Endocrine: Negative  Genitourinary: Negative  Musculoskeletal: Negative  Skin: Negative  Neurological: Negative  Hematological: Negative  Psychiatric/Behavioral: Negative        Review of systems was reviewed and negative unless stated above in HPI/24-hour events   ---------------------------------------------------------------------------------------  Assessment and Plan:  Acute hypoxic respiratory failure  Abnormal Chest CT -  vs Pneumonia (viral, fungal, bacterial) vs Autoimmune vs pneumonitis  Sepsis - POA  Steroid Induced Leukocytosis  Chronic Sinusitis  Hypertension/Hyperlipidemia  Anxiety/Depression  Chronic Sinusitis  Prior eosinophilia  Tobacco abuse  Diarrhea - resolved  Hyponatremia - resolved  Hypokalemia - resolved, continue to monitor    Neuro:    Diagnosis: anxiety/depression  o Plan: continue Buspar 10mg, PRN ativan 0 5 mg, continue Prozac 80mg QID  o Plan: monitor fo CAM-ICU, delirium precautions, monitor sleep/wake cycle      CV:    Diagnosis: hypertension/hyperlipidemia  o Plan: hold verapamil, continue atorvastatin 10mg QID  o Plan: continue telemetry monitoring    Pulm:   Diagnosis: acute hypoxic respiratory failure  - RV2 negative  - COVID Ab +, RF + (minimally) , CCP -  - Ab + due to covid vaccine 3 weeks prior  - MRSA negative - d/c'ed vanc  - To consider repeat CXR  - Gram stain from bronch showed normal respiratory nirmala  o To consider scaling back antibiotics  o Continue solumedrol 60mg q6h  o Ween HFNC as tolerated with spO2 > 92%    Diagnosis: chronic sinusitis  o Plan: continue claratin, azelastine      GI:    Diagnosis: Diarrhea - resolved  o Plan: continue probiotics, hold bowel regimen      :    Diagnosis: no acute issues    F/E/N:    Fluids: no indication for fluids at this time   Electrolytes: replenish K > 4, PO4 > 3, Mg > 2   Hyponatremia: resolved, continue to monitor   Nutrition: full diet      Heme/Onc:    Diagnosis: Leukocytosis 34, likely due to solumedrol  o Plan: continue to monitor   Diagnosis: DVT prophylaxis  o Plan: Lovenox SC       Endo:    Diagnosis: no active issues  o Plan: monitor blood sugar with 140-180 goal  o Continue with sliding scale as necessary      ID:    Diagnosis: Abnormal chest ct  -Concern for pneumonia vs  vs COVID (among others)  o Plan: trend temp/WBC, see above      Disposition: Continue Critical Care   Code Status: Level 1 - Full Code  ---------------------------------------------------------------------------------------  ICU CORE MEASURES    Prophylaxis   VTE Pharmacologic Prophylaxis: Enoxaparin (Lovenox)  VTE Mechanical Prophylaxis: sequential compression device  Stress Ulcer Prophylaxis: Prophylaxis Not Indicated     ABCDE Protocol (if indicated)  Plan to perform spontaneous awakening trial today? Not applicable  Plan to perform spontaneous breathing trial today? Not applicable  Obvious barriers to extubation? Not applicable  CAM-ICU: Negative    Invasive Devices Review  Invasive Devices     Peripheral Intravenous Line            Peripheral IV 21 Right;Dorsal (posterior) Hand 2 days    Peripheral IV 21 Distal;Left;Ventral (anterior) Forearm 2 days              Can any invasive devices be discontinued today? Not applicable  ---------------------------------------------------------------------------------------  OBJECTIVE    Vitals   Vitals:    21 0510 21 0556 21 0600 21 0610   BP: 95/56   101/59   BP Location:       Pulse: 56   56   Resp: 19  (!) 32 22   Temp:       TempSrc:       SpO2: 97%  95% 96%   Weight:  92 8 kg (204 lb 9 4 oz)     Height:         Temp (24hrs), Av 9 °F (36 6 °C), Min:97 5 °F (36 4 °C), Max:98 1 °F (36 7 °C)  Current: Temperature: 97 5 °F (36 4 °C)  HR: 78    BP: 128/52  RR: 33  SpO2: 93% 50L 100% HFNC    Respiratory:  SpO2: SpO2: 96 %  Nasal Cannula O2 Flow Rate (L/min): 15 L/min    Invasive/non-invasive ventilation settings   Respiratory    Lab Data (Last 4 hours)    None         O2/Vent Data (Last 4 hours)    None                Physical Exam  Constitutional:       Appearance: Normal appearance  HENT:      Head: Normocephalic and atraumatic  Nose: Congestion present  Eyes:      Pupils: Pupils are equal, round, and reactive to light  Neck:      Musculoskeletal: Normal range of motion and neck supple  Cardiovascular:      Rate and Rhythm: Normal rate and regular rhythm  Pulses: Normal pulses  Heart sounds: No murmur  No friction rub  No gallop  Pulmonary:      Effort: Pulmonary effort is normal       Breath sounds: Rhonchi present     Abdominal:      General: Abdomen is flat  Bowel sounds are normal       Palpations: Abdomen is soft  Musculoskeletal: Normal range of motion  Skin:     General: Skin is warm and dry  Neurological:      General: No focal deficit present  Mental Status: She is alert and oriented to person, place, and time           Laboratory and Diagnostics:  Results from last 7 days   Lab Units 04/08/21  0554 04/07/21  0518 04/06/21  0525 04/05/21  0624 04/04/21  0517 04/03/21  0232   WBC Thousand/uL 34 61* 32 33* 25 93* 12 99* 12 03* 15 56*   HEMOGLOBIN g/dL 11 2* 11 0* 11 6 11 6 11 4* 11 8   HEMATOCRIT % 34 2* 32 9* 34 2* 35 1 34 5* 35 5   PLATELETS Thousands/uL 312 293 236 188 159 144*   BANDS PCT %  --   --  7  --   --  3   MONO PCT %  --   --  1*  --   --  2*     Results from last 7 days   Lab Units 04/08/21  0554 04/07/21  0518 04/06/21  0525 04/05/21  0624 04/04/21  0517 04/03/21  2034 04/03/21  1301 04/03/21  0232   SODIUM mmol/L 137 133* 134* 131* 132* 130* 128* 127*   POTASSIUM mmol/L 4 4 3 7 3 2* 3 2* 3 5 3 6 4 1 3 5   CHLORIDE mmol/L 105 102 102 101 102 98* 96* 95*   CO2 mmol/L 24 22 24 24 24 24 25 22   ANION GAP mmol/L 8 9 8 6 6 8 7 10   BUN mg/dL 18 14 10 6 9 11 12 11   CREATININE mg/dL 0 50* 0 48* 0 53* 0 55* 0 61 0 69 0 67 0 64   CALCIUM mg/dL 8 4 8 6 8 7 7 9* 8 2* 8 7 8 8 8 9   GLUCOSE RANDOM mg/dL 150* 152* 133 112 98 109 120 130   ALT U/L  --   --   --   --   --   --   --  28   AST U/L  --   --   --   --   --   --   --  26   ALK PHOS U/L  --   --   --   --   --   --   --  95   ALBUMIN g/dL  --   --   --   --   --   --   --  2 7*   TOTAL BILIRUBIN mg/dL  --   --   --   --   --   --   --  0 50     Results from last 7 days   Lab Units 04/08/21  0554 04/07/21  0518 04/06/21  0525   MAGNESIUM mg/dL 2 7* 2 4 2 5   PHOSPHORUS mg/dL 4 0 3 2 2 6*      Results from last 7 days   Lab Units 04/03/21  0232   INR  1 12   PTT seconds 27          Results from last 7 days   Lab Units 04/03/21  0232   LACTIC ACID mmol/L 1 4     ABG:    VBG:    Results from last 7 days   Lab Units 04/05/21  0624 04/04/21  0518 04/03/21  0834 04/03/21  0232   PROCALCITONIN ng/ml 0 44* 0 63* 0 86* 1 08*       Micro  Results from last 7 days   Lab Units 04/07/21  1645 04/05/21  1555 04/03/21  0539 04/03/21  0237 04/03/21  0232   BLOOD CULTURE   --   --   --  No Growth After 5 Days  No Growth After 5 Days  GRAM STAIN RESULT  1+ Yeast*  1+ Gram positive rods*  1+ Epithelial Cells*  --   --   --   --    MRSA CULTURE ONLY   --  No Methicillin Resistant Staphlyococcus aureus (MRSA) isolated  --   --   --    LEGIONELLA URINARY ANTIGEN   --   --  Negative  --   --    STREP PNEUMONIAE ANTIGEN, URINE   --   --  Negative  --   --        EKG: N/A  Imaging: CXR I have personally reviewed pertinent reports  Intake and Output  I/O       04/04 0701 - 04/05 0700 04/05 0701 - 04/06 0700 04/06 0701 - 04/07 0700    P  O  1190 1425     I V  (mL/kg) 1045 8 (12 5)      IV Piggyback  900     Total Intake(mL/kg) 2235 8 (26 6) 2325 (25 2)     Urine (mL/kg/hr) 1450 (0 7) 2375 (1 1)     Stool  0     Total Output 1450 2375     Net +785 8 -50            Unmeasured Urine Occurrence  2 x     Unmeasured Stool Occurrence  0 x         UOP: 1 1 ml/hr     Height and Weights   Height: 5' 5" (165 1 cm)  IBW: 57 kg  Body mass index is 34 05 kg/m²  Weight (last 2 days)     Date/Time   Weight    04/08/21 0556   92 8 (204 59)    04/06/21 0600   92 3 (203 48)                Nutrition       Diet Orders   (From admission, onward)             Start     Ordered    04/07/21 1830  Diet Regular; Regular House  Diet effective now     Question Answer Comment   Diet Type Regular    Regular Regular House    RD to adjust diet per protocol? Yes        04/07/21 1829              TF currently running at n/a ml/hr with a goal of n/a ml/hr   Formula: n/a      Active Medications  Scheduled Meds:  Current Facility-Administered Medications   Medication Dose Route Frequency Provider Last Rate    acetaminophen  650 mg Oral Q6H PRN Racheal Way DO Pedro Luis      albuterol  2 puff Inhalation Q4H PRN Violeta Jimenez MD      atorvastatin  10 mg Oral Daily Troy Jaron, DO      azelastine  1 spray Each Nare BID Troy Salmeron, DO      busPIRone  10 mg Oral QPM Elian Vera PA-C      cefepime  2,000 mg Intravenous Q8H Triny Hutchison PA-C 2,000 mg (04/08/21 0300)    diphenhydrAMINE  25 mg Oral Q6H PRN Gary Chaudhary PA-C      enoxaparin  40 mg Subcutaneous Daily Troy Salmeron, DO      FLUoxetine  80 mg Oral Daily Gary Chaudhary PA-C      glycerin-hypromellose-  1 drop Both Eyes Q3H PRN Gary Chaudhary PA-C      insulin lispro  1-5 Units Subcutaneous HS Aura Castles, CRNP      insulin lispro  1-6 Units Subcutaneous TID AC Aura Castles, CRNP      loratadine  10 mg Oral Daily Gary Chaudhary PA-C      LORazepam  0 5 mg Oral Q12H PRN Frances Burkett PA-C      methylPREDNISolone sodium succinate  60 mg Intravenous Q6H [address] Terri Tapia MD      ondansetron  4 mg Intravenous Q6H PRN Troy Salmeron, DO      saccharomyces boulardii  250 mg Oral BID Gary Chaudhary PA-C      sodium chloride  1 spray Each Nare Q1H PRN Jodi Casiano PA-C       Continuous Infusions:     PRN Meds:   acetaminophen, 650 mg, Q6H PRN  albuterol, 2 puff, Q4H PRN  diphenhydrAMINE, 25 mg, Q6H PRN  glycerin-hypromellose-, 1 drop, Q3H PRN  LORazepam, 0 5 mg, Q12H PRN  ondansetron, 4 mg, Q6H PRN  sodium chloride, 1 spray, Q1H PRN        Allergies   Allergies   Allergen Reactions    Other      Most all antibiotics- hives, hypotensive    "no problem with clindamycin "    Augmentin Es-600  [Amoxicillin-Pot Clavulanate]     Cefuroxime     Erythromycin     Levofloxacin     Morphine     Morphine And Related Hives    Oxycodone-Acetaminophen     Penicillins     Percocet [Oxycodone-Acetaminophen] Hives    Sulfa Antibiotics     Tetracyclines & Related ---------------------------------------------------------------------------------------  Advance Directive and Living Will:      Power of :    POLST:    ---------------------------------------------------------------------------------------  Care Time Delivered:   No Critical Care time spent     Estrellita Jimenez      Portions of the record may have been created with voice recognition software  Occasional wrong word or "sound a like" substitutions may have occurred due to the inherent limitations of voice recognition software    Read the chart carefully and recognize, using context, where substitutions have occurred

## 2021-04-09 ENCOUNTER — APPOINTMENT (INPATIENT)
Dept: RADIOLOGY | Facility: HOSPITAL | Age: 56
DRG: 871 | End: 2021-04-09
Payer: COMMERCIAL

## 2021-04-09 LAB
ANION GAP SERPL CALCULATED.3IONS-SCNC: 8 MMOL/L (ref 4–13)
BACTERIA BRONCH AEROBE CULT: ABNORMAL
BACTERIA BRONCH AEROBE CULT: ABNORMAL
BUN SERPL-MCNC: 19 MG/DL (ref 5–25)
CALCIUM SERPL-MCNC: 8.2 MG/DL (ref 8.3–10.1)
CHLORIDE SERPL-SCNC: 102 MMOL/L (ref 100–108)
CO2 SERPL-SCNC: 25 MMOL/L (ref 21–32)
CREAT SERPL-MCNC: 0.54 MG/DL (ref 0.6–1.3)
ERYTHROCYTE [DISTWIDTH] IN BLOOD BY AUTOMATED COUNT: 13.9 % (ref 11.6–15.1)
GFR SERPL CREATININE-BSD FRML MDRD: 105 ML/MIN/1.73SQ M
GLUCOSE SERPL-MCNC: 163 MG/DL (ref 65–140)
GLUCOSE SERPL-MCNC: 164 MG/DL (ref 65–140)
GLUCOSE SERPL-MCNC: 164 MG/DL (ref 65–140)
GLUCOSE SERPL-MCNC: 167 MG/DL (ref 65–140)
GLUCOSE SERPL-MCNC: 233 MG/DL (ref 65–140)
GRAM STN SPEC: ABNORMAL
HCT VFR BLD AUTO: 33.9 % (ref 34.8–46.1)
HGB BLD-MCNC: 11 G/DL (ref 11.5–15.4)
MAGNESIUM SERPL-MCNC: 2.6 MG/DL (ref 1.6–2.6)
MCH RBC QN AUTO: 29.1 PG (ref 26.8–34.3)
MCHC RBC AUTO-ENTMCNC: 32.4 G/DL (ref 31.4–37.4)
MCV RBC AUTO: 90 FL (ref 82–98)
PHOSPHATE SERPL-MCNC: 4.2 MG/DL (ref 2.7–4.5)
PLATELET # BLD AUTO: 348 THOUSANDS/UL (ref 149–390)
PMV BLD AUTO: 11.7 FL (ref 8.9–12.7)
POTASSIUM SERPL-SCNC: 4.2 MMOL/L (ref 3.5–5.3)
RBC # BLD AUTO: 3.78 MILLION/UL (ref 3.81–5.12)
SODIUM SERPL-SCNC: 135 MMOL/L (ref 136–145)
WBC # BLD AUTO: 27.9 THOUSAND/UL (ref 4.31–10.16)

## 2021-04-09 PROCEDURE — 84100 ASSAY OF PHOSPHORUS: CPT | Performed by: STUDENT IN AN ORGANIZED HEALTH CARE EDUCATION/TRAINING PROGRAM

## 2021-04-09 PROCEDURE — 85027 COMPLETE CBC AUTOMATED: CPT | Performed by: STUDENT IN AN ORGANIZED HEALTH CARE EDUCATION/TRAINING PROGRAM

## 2021-04-09 PROCEDURE — 83735 ASSAY OF MAGNESIUM: CPT | Performed by: STUDENT IN AN ORGANIZED HEALTH CARE EDUCATION/TRAINING PROGRAM

## 2021-04-09 PROCEDURE — 94760 N-INVAS EAR/PLS OXIMETRY 1: CPT | Performed by: SOCIAL WORKER

## 2021-04-09 PROCEDURE — 99233 SBSQ HOSP IP/OBS HIGH 50: CPT | Performed by: INTERNAL MEDICINE

## 2021-04-09 PROCEDURE — 82948 REAGENT STRIP/BLOOD GLUCOSE: CPT

## 2021-04-09 PROCEDURE — 80048 BASIC METABOLIC PNL TOTAL CA: CPT | Performed by: STUDENT IN AN ORGANIZED HEALTH CARE EDUCATION/TRAINING PROGRAM

## 2021-04-09 PROCEDURE — 94760 N-INVAS EAR/PLS OXIMETRY 1: CPT

## 2021-04-09 PROCEDURE — 71045 X-RAY EXAM CHEST 1 VIEW: CPT

## 2021-04-09 RX ORDER — METHYLPREDNISOLONE SODIUM SUCCINATE 125 MG/2ML
60 INJECTION, POWDER, LYOPHILIZED, FOR SOLUTION INTRAMUSCULAR; INTRAVENOUS EVERY 12 HOURS SCHEDULED
Status: DISCONTINUED | OUTPATIENT
Start: 2021-04-09 | End: 2021-04-11

## 2021-04-09 RX ADMIN — METHYLPREDNISOLONE SODIUM SUCCINATE 60 MG: 125 INJECTION, POWDER, FOR SOLUTION INTRAMUSCULAR; INTRAVENOUS at 12:19

## 2021-04-09 RX ADMIN — INSULIN LISPRO 1 UNITS: 100 INJECTION, SOLUTION INTRAVENOUS; SUBCUTANEOUS at 16:56

## 2021-04-09 RX ADMIN — INSULIN LISPRO 2 UNITS: 100 INJECTION, SOLUTION INTRAVENOUS; SUBCUTANEOUS at 23:12

## 2021-04-09 RX ADMIN — ATORVASTATIN CALCIUM 10 MG: 10 TABLET, FILM COATED ORAL at 23:09

## 2021-04-09 RX ADMIN — Medication 250 MG: at 18:33

## 2021-04-09 RX ADMIN — ENOXAPARIN SODIUM 40 MG: 40 INJECTION SUBCUTANEOUS at 08:18

## 2021-04-09 RX ADMIN — LORAZEPAM 0.5 MG: 0.5 TABLET ORAL at 14:21

## 2021-04-09 RX ADMIN — Medication 250 MG: at 08:15

## 2021-04-09 RX ADMIN — ACETAMINOPHEN 650 MG: 325 TABLET, FILM COATED ORAL at 00:21

## 2021-04-09 RX ADMIN — INSULIN LISPRO 1 UNITS: 100 INJECTION, SOLUTION INTRAVENOUS; SUBCUTANEOUS at 08:18

## 2021-04-09 RX ADMIN — METHYLPREDNISOLONE SODIUM SUCCINATE 60 MG: 125 INJECTION, POWDER, FOR SOLUTION INTRAMUSCULAR; INTRAVENOUS at 23:09

## 2021-04-09 RX ADMIN — CEFEPIME HYDROCHLORIDE 2000 MG: 2 INJECTION, POWDER, FOR SOLUTION INTRAVENOUS at 03:08

## 2021-04-09 RX ADMIN — INSULIN LISPRO 1 UNITS: 100 INJECTION, SOLUTION INTRAVENOUS; SUBCUTANEOUS at 12:20

## 2021-04-09 RX ADMIN — BUSPIRONE HYDROCHLORIDE 10 MG: 10 TABLET ORAL at 23:09

## 2021-04-09 RX ADMIN — AZELASTINE HYDROCHLORIDE 1 SPRAY: 137 SPRAY, METERED NASAL at 08:18

## 2021-04-09 RX ADMIN — DIPHENHYDRAMINE HCL 25 MG: 25 TABLET ORAL at 16:56

## 2021-04-09 RX ADMIN — AZELASTINE HYDROCHLORIDE 1 SPRAY: 137 SPRAY, METERED NASAL at 18:33

## 2021-04-09 RX ADMIN — CEFEPIME HYDROCHLORIDE 2000 MG: 2 INJECTION, POWDER, FOR SOLUTION INTRAVENOUS at 12:19

## 2021-04-09 RX ADMIN — FLUOXETINE 80 MG: 20 CAPSULE ORAL at 08:16

## 2021-04-09 RX ADMIN — METHYLPREDNISOLONE SODIUM SUCCINATE 60 MG: 125 INJECTION, POWDER, FOR SOLUTION INTRAMUSCULAR; INTRAVENOUS at 00:17

## 2021-04-09 RX ADMIN — LORAZEPAM 0.5 MG: 0.5 TABLET ORAL at 00:20

## 2021-04-09 RX ADMIN — METHYLPREDNISOLONE SODIUM SUCCINATE 60 MG: 125 INJECTION, POWDER, FOR SOLUTION INTRAMUSCULAR; INTRAVENOUS at 05:29

## 2021-04-09 RX ADMIN — LORATADINE 10 MG: 10 TABLET ORAL at 08:17

## 2021-04-09 NOTE — PROGRESS NOTES
Progress Note - Infectious Disease   Radha Garcia 61 y o  female MRN: [id number]  Unit/Bed#: MICU 13 Encounter: [id number]      IMPRESSION & RECOMMENDATIONS:   Impression/Recommendations:  1  Sepsis, POA   Tachypnea, leukocytosis   Secondary to #2   No other clear explanation   Negative UA  Blood cultures are negative  WBC count remains elevated, high-dose IV steroid likely contributing  It is now coming down  Fevers remain improved  Remains hemodynamically stable      -antibiotic plan as below  -monitor temperatures and hemodynamics  -recheck CBC in a m   -supportive care     2  Multifocal pneumonia   More likely viral etiology given constellation of symptoms, positive sick contacts   Negative COVID-19 PCR x2   Negative influenza, RSV PCR, RP2   Consider early bacterial pneumonia based on CT findings, elevated procalcitonin   Low suspicion for MDRO    Procalcitonin level has trended down   COVID antibody is positive likely reflective of prior exposure  Status post bronchoscopy with normal anatomy, no significant secretions  Culture shows Candida albicans, which is a colonizer  Remains on HFNC but is symptomatically improving       -discontinue cefepime as patient has received adequate and BAL cultures are negative   -continue IV Solu-Medrol  -monitor symptoms     3  Acute hypoxic respiratory failure   Secondary to #2 likely viral in etiology   Doubt fungal process as patient is not immunocompromised   Also consider other process such as cryptogenic organizing pneumonia   Also consider component of pulmonary edema    Remains on HFNC with improving requirements      -antibiotic plan as above  -continue IV Solu-Medrol  -monitor O2 requirements  -volume management     4  Multiple antibiotic allergies   Patient has extensive antibiotic allergy history, which significantly limits treatment options   She has previously tolerated vancomycin and clindamycin   Patient is tolerating cefepime      -antibiotic plan as above  -monitor closely for reactions     5  Chronic ethmoid sinusitis   With prior bilateral antrectomy   Follows with ENT last seen on 2021      6  Tobacco use   Risk factor for pulmonary infection   Recommend smoking cessation      Antibiotics:  Antibiotic 8  Cefepime     Discussed above plan with patient, and with CCM  Will formally re-evaluate patient on   Please call us with any new questions in the interim          Subjective:  Remains on high-flow nasal cannula with improving requirements  Feeling much better with less shortness of breath  Minimal cough  No sputum production  No fevers  Tolerating oral intake  Objective:  Vitals:  Temp:  [97 5 °F (36 4 °C)-98 5 °F (36 9 °C)] 98 5 °F (36 9 °C)  HR:  [54-68] 60  Resp:  [15-35] 25  BP: ()/(54-73) 103/66  SpO2:  [93 %-97 %] 93 %  Temp (24hrs), Av 9 °F (36 6 °C), Min:97 5 °F (36 4 °C), Max:98 5 °F (36 9 °C)  Current: Temperature: 98 5 °F (36 9 °C)    Physical Exam:   General:  No acute distress, nontoxic, sitting comfortably in chair  HEENT:  Atraumatic normocephalic  Psychiatric:  Awake and alert  Pulmonary:  Normal respiratory excursion without accessory muscle use  Abdomen:  Soft, nontender  Extremities:  No edema  Skin:  No rashes  Neuro: Moves all extremities spontaneously    Lab Results:  I have personally reviewed pertinent labs  Results from last 7 days   Lab Units 21  0605 21  0554 21  0518  21  0232   POTASSIUM mmol/L 4 2 4 4 3 7   < > 3 5   CHLORIDE mmol/L 102 105 102   < > 95*   CO2 mmol/L 25 24 22   < > 22   BUN mg/dL 19 18 14   < > 11   CREATININE mg/dL 0 54* 0 50* 0 48*   < > 0 64   EGFR ml/min/1 73sq m 105 108 109   < > 99   CALCIUM mg/dL 8 2* 8 4 8 6   < > 8 9   AST U/L  --   --   --   --  26   ALT U/L  --   --   --   --  28   ALK PHOS U/L  --   --   --   --  95    < > = values in this interval not displayed       Results from last 7 days   Lab Units 21  0605 21  7990 04/07/21  0518   WBC Thousand/uL 27 90* 34 61* 32 33*   HEMOGLOBIN g/dL 11 0* 11 2* 11 0*   PLATELETS Thousands/uL 348 312 293     Results from last 7 days   Lab Units 04/07/21  1645 04/05/21  1555 04/03/21  0539 04/03/21  0237 04/03/21  0232   BLOOD CULTURE   --   --   --  No Growth After 5 Days  No Growth After 5 Days  GRAM STAIN RESULT  1+ Yeast*  1+ Gram positive rods*  1+ Epithelial Cells*  --   --   --   --    MRSA CULTURE ONLY   --  No Methicillin Resistant Staphlyococcus aureus (MRSA) isolated  --   --   --    LEGIONELLA URINARY ANTIGEN   --   --  Negative  --   --        Imaging Studies:   I have personally reviewed pertinent imaging study reports and images in PACS  EKG, Pathology, and Other Studies:   I have personally reviewed pertinent reports

## 2021-04-09 NOTE — RESPIRATORY THERAPY NOTE
resp care      04/09/21 0844   Respiratory Assessment   Assessment Type Assess only   General Appearance Alert; Awake   Respiratory Pattern Normal   Chest Assessment Chest expansion symmetrical   Bilateral Breath Sounds Diminished;Clear   Resp Comments found on 40lpm with sat 93%, 02 decreased to 60  Will change to  when pt is on 40/40 hfnc      Additional Assessments   SpO2 93 %

## 2021-04-09 NOTE — PLAN OF CARE
Problem: Potential for Falls  Goal: Patient will remain free of falls  Description: INTERVENTIONS:  - Assess patient frequently for physical needs  -  Identify cognitive and physical deficits and behaviors that affect risk of falls  -  [place] fall precautions as indicated by assessment   - Educate patient/family on patient safety including physical limitations  - Instruct patient to call for assistance with activity based on assessment  - Modify environment to reduce risk of injury  - Consider OT/PT consult to assist with strengthening/mobility  Outcome: Progressing     Problem: SAFETY ADULT  Goal: Patient will remain free of falls  Description: INTERVENTIONS:  - Assess patient frequently for physical needs  -  Identify cognitive and physical deficits and behaviors that affect risk of falls    -  [place] fall precautions as indicated by assessment   - Educate patient/family on patient safety including physical limitations  - Instruct patient to call for assistance with activity based on assessment  - Modify environment to reduce risk of injury  - Consider OT/PT consult to assist with strengthening/mobility  Outcome: Progressing

## 2021-04-09 NOTE — PROGRESS NOTES
04/09/21 1500   Clinical Encounter Type   Visited With Patient   [facility] Encounters   [facility] Needs Prayer   Sacramental Encounters   Sacrament of Sick-Anointing Anointed

## 2021-04-09 NOTE — PROGRESS NOTES
Daily Progress Note - [facility] Jez Navas 61 y o  female MRN: [id number]  Unit/Bed#: MICU 13 Encounter: [id number]        ----------------------------------------------------------------------------------------  HPI/24hr events:Taylor Sierra is a 61 y o  female with a PMHx of recurrent sinusitis, hypertension/hyperlipidemia, tobacco use who presents with a 9 day history of flu-like symptoms sinus congestion, post-nasal drip, and difficulty breathing  She was transferred to ICU for increased WOB and increased O2 requirement currently on 40 L 60% HFNC  Bronch performed 4/7  Awaiting culture results  Patient being weaned on HFNC to 40 L 60% as stated above  Sating in the mid 90s  ---------------------------------------------------------------------------------------  SUBJECTIVE  Patients states she was anxious yesterday required PRN ativan 0 5 mg for anxiety at 0020 and tylenol for sinus pain  Review of Systems   Constitutional: Positive for fatigue  Negative for fever  HENT: Positive for congestion, postnasal drip, sinus pressure and sinus pain  Respiratory: Positive for cough  Negative for chest tightness  Cardiovascular: Negative  Gastrointestinal: Negative for diarrhea and nausea  Endocrine: Negative  Genitourinary: Negative  Musculoskeletal: Negative  Skin: Negative  Neurological: Negative  Hematological: Negative  Psychiatric/Behavioral: Negative  Review of systems was reviewed and negative unless stated above in HPI/24-hour events   ---------------------------------------------------------------------------------------  Assessment and Plan:  Acute hypoxic respiratory failure  Abnormal Chest CT -  vs Pneumonia (viral, fungal, bacterial) vs Autoimmune vs pneumonitis  Sepsis - POA  Leukocytosis - suspected secondary to steroids     Chronic Sinusitis  Hypertension/Hyperlipidemia  Anxiety/Depression  Prior eosinophilia  Tobacco abuse  Diarrhea - resolved  Hyponatremia - resolved  Hypokalemia - resolved, continue to monitor    Neuro:    Diagnosis: anxiety/depression  o Plan: continue Buspar 10mg, PRN ativan 0 5 mg, continue Prozac 80mg QID  o Plan: monitor fo CAM-ICU, delirium precautions, monitor sleep/wake cycle      CV:    Diagnosis: hypertension/hyperlipidemia  o Plan: hold verapamil, continue atorvastatin 10mg QID  o Plan: continue telemetry monitoring    Pulm:   Diagnosis: acute hypoxic respiratory failure  - RV2 negative  - COVID Ab +, RF + (minimally) , CCP -  - Ab + due to covid vaccine 3 weeks prior  - MRSA negative - d/c'ed vanc   - Cefepime started on 4/5, continue until 7 days of treatment reached  - D/c'ed flagyl  - ESR, CRP positive  - Gram stain from bronch showed normal respiratory nirmala  o Awaiting pneumonitis panel, fungal, and viral culture from bronch  o solumedrol 60mg q8h today  o Ween HFNC as tolerated with spO2 > 92%    Diagnosis: chronic sinusitis  o Plan: continue claratin, azelastine       GI:    Diagnosis: Diarrhea - resolved  o Plan: continue probiotics, hold bowel regimen      :    Diagnosis: no acute issues    F/E/N:    Fluids: no indication for fluids at this time   Electrolytes: replenish K > 4, PO4 > 3, Mg > 2   Hyponatremia: resolved, continue to monitor   Nutrition: full diet      Heme/Onc:    Diagnosis: Leukocytosis 28, likely due to solumedrol and stress response from bronch  o Plan: continue to monitor   Diagnosis: DVT prophylaxis  o Plan: Lovenox SC       Endo:    Diagnosis: no active issues  o Plan: monitor blood sugar with 140-180 goal  o Continue with sliding scale as necessary      ID:    Diagnosis: Abnormal chest ct  -Concern for pneumonia vs  vs COVID (among others)  o Plan: trend temp/WBC, see above      Disposition: Continue Critical Care   Code Status: Level 1 - Full Code  ---------------------------------------------------------------------------------------  ICU CORE MEASURES    Prophylaxis   VTE Pharmacologic Prophylaxis: Enoxaparin (Lovenox)  VTE Mechanical Prophylaxis: sequential compression device  Stress Ulcer Prophylaxis: Prophylaxis Not Indicated     ABCDE Protocol (if indicated)  Plan to perform spontaneous awakening trial today? Not applicable  Plan to perform spontaneous breathing trial today? Not applicable  Obvious barriers to extubation? Not applicable  CAM-ICU: Negative    Invasive Devices Review  Invasive Devices     Peripheral Intravenous Line            Peripheral IV 21 Distal;Left;Ventral (anterior) Forearm 3 days    Peripheral IV 21 Left;Proximal;Ventral (anterior) Forearm less than 1 day              Can any invasive devices be discontinued today? Not applicable  ---------------------------------------------------------------------------------------  OBJECTIVE    Vitals   Vitals:    21 0410 21 0510 21 0600 21 0610   BP: 118/65 119/62  107/58   BP Location: Right arm      Pulse: (!) 54 56  56   Resp: 18 15  (!) 34   Temp:       TempSrc:       SpO2: 95% 97%  94%   Weight:   93 4 kg (205 lb 14 6 oz)    Height:         Temp (24hrs), Av 9 °F (36 6 °C), Min:97 5 °F (36 4 °C), Max:98 5 °F (36 9 °C)  Current: Temperature: 97 8 °F (36 6 °C)  HR: 78    BP: 128/52  RR: 33  SpO2: 93% 50L 100% HFNC    Respiratory:  SpO2: SpO2: 94 %  Nasal Cannula O2 Flow Rate (L/min): 15 L/min    Invasive/non-invasive ventilation settings   Respiratory    Lab Data (Last 4 hours)    None         O2/Vent Data (Last 4 hours)       0301          Non-Invasive Ventilation Mode HFNC (High flow)                   Physical Exam  Constitutional:       Appearance: Normal appearance  HENT:      Head: Normocephalic and atraumatic  Nose: Congestion present  Eyes:      Pupils: Pupils are equal, round, and reactive to light  Neck:      Musculoskeletal: Normal range of motion and neck supple     Cardiovascular:      Rate and Rhythm: Normal rate and regular rhythm  Pulses: Normal pulses  Heart sounds: No murmur  No friction rub  No gallop  Pulmonary:      Effort: Pulmonary effort is normal       Breath sounds: Rhonchi present  Abdominal:      General: Abdomen is flat  Bowel sounds are normal       Palpations: Abdomen is soft  Musculoskeletal: Normal range of motion  Skin:     General: Skin is warm and dry  Neurological:      General: No focal deficit present  Mental Status: She is alert and oriented to person, place, and time           Laboratory and Diagnostics:  Results from last 7 days   Lab Units 04/09/21  0605 04/08/21  0554 04/07/21  0518 04/06/21  0525 04/05/21  0624 04/04/21  0517 04/03/21  0232   WBC Thousand/uL 27 90* 34 61* 32 33* 25 93* 12 99* 12 03* 15 56*   HEMOGLOBIN g/dL 11 0* 11 2* 11 0* 11 6 11 6 11 4* 11 8   HEMATOCRIT % 33 9* 34 2* 32 9* 34 2* 35 1 34 5* 35 5   PLATELETS Thousands/uL 348 312 293 236 188 159 144*   BANDS PCT %  --   --   --  7  --   --  3   MONO PCT %  --   --   --  1*  --   --  2*     Results from last 7 days   Lab Units 04/08/21  0554 04/07/21  0518 04/06/21  0525 04/05/21  0624 04/04/21  0517 04/03/21  2034 04/03/21  1301 04/03/21  0232   SODIUM mmol/L 137 133* 134* 131* 132* 130* 128* 127*   POTASSIUM mmol/L 4 4 3 7 3 2* 3 2* 3 5 3 6 4 1 3 5   CHLORIDE mmol/L 105 102 102 101 102 98* 96* 95*   CO2 mmol/L 24 22 24 24 24 24 25 22   ANION GAP mmol/L 8 9 8 6 6 8 7 10   BUN mg/dL 18 14 10 6 9 11 12 11   CREATININE mg/dL 0 50* 0 48* 0 53* 0 55* 0 61 0 69 0 67 0 64   CALCIUM mg/dL 8 4 8 6 8 7 7 9* 8 2* 8 7 8 8 8 9   GLUCOSE RANDOM mg/dL 150* 152* 133 112 98 109 120 130   ALT U/L  --   --   --   --   --   --   --  28   AST U/L  --   --   --   --   --   --   --  26   ALK PHOS U/L  --   --   --   --   --   --   --  95   ALBUMIN g/dL  --   --   --   --   --   --   --  2 7*   TOTAL BILIRUBIN mg/dL  --   --   --   --   --   --   --  0 50     Results from last 7 days   Lab Units 04/08/21  0554 04/07/21  0518 04/06/21  0525   MAGNESIUM mg/dL 2 7* 2 4 2 5   PHOSPHORUS mg/dL 4 0 3 2 2 6*      Results from last 7 days   Lab Units 04/03/21  0232   INR  1 12   PTT seconds 27          Results from last 7 days   Lab Units 04/03/21  0232   LACTIC ACID mmol/L 1 4     ABG:    VBG:    Results from last 7 days   Lab Units 04/05/21  0624 04/04/21  0518 04/03/21  0834 04/03/21  0232   PROCALCITONIN ng/ml 0 44* 0 63* 0 86* 1 08*       Micro  Results from last 7 days   Lab Units 04/07/21  1645 04/05/21  1555 04/03/21  0539 04/03/21  0237 04/03/21  0232   BLOOD CULTURE   --   --   --  No Growth After 5 Days  No Growth After 5 Days  GRAM STAIN RESULT  1+ Yeast*  1+ Gram positive rods*  1+ Epithelial Cells*  --   --   --   --    MRSA CULTURE ONLY   --  No Methicillin Resistant Staphlyococcus aureus (MRSA) isolated  --   --   --    LEGIONELLA URINARY ANTIGEN   --   --  Negative  --   --    STREP PNEUMONIAE ANTIGEN, URINE   --   --  Negative  --   --        EKG: N/A  Imaging: CXR I have personally reviewed pertinent reports  Intake and Output  I/O       04/04 0701 - 04/05 0700 04/05 0701 - 04/06 0700 04/06 0701 - 04/07 0700    P  O  1190 1425     I V  (mL/kg) 1045 8 (12 5)      IV Piggyback  900     Total Intake(mL/kg) 2235 8 (26 6) 2325 (25 2)     Urine (mL/kg/hr) 1450 (0 7) 2375 (1 1)     Stool  0     Total Output 1450 2375     Net +785 8 -50            Unmeasured Urine Occurrence  2 x     Unmeasured Stool Occurrence  0 x         UOP: 1 1 ml/hr     Height and Weights   Height: 5' 5" (165 1 cm)  IBW: 57 kg  Body mass index is 34 27 kg/m²  Weight (last 2 days)     Date/Time   Weight    04/09/21 0600   93 4 (205 91)    04/08/21 0556   92 8 (204 59)                Nutrition       Diet Orders   (From admission, onward)             Start     Ordered    04/07/21 1830  Diet Regular; Regular House  Diet effective now     Question Answer Comment   Diet Type Regular    Regular Regular House    RD to adjust diet per protocol?  Yes 04/07/21 1829              TF currently running at n/a ml/hr with a goal of n/a ml/hr   Formula: n/a      Active Medications  Scheduled Meds:  Current Facility-Administered Medications   Medication Dose Route Frequency Provider Last Rate    acetaminophen  650 mg Oral Q6H PRN Quinten Nelson, DO      albuterol  2 puff Inhalation Q4H PRN Lovely Vargas MD      atorvastatin  10 mg Oral Daily Quinten Nelson, DO      azelastine  1 spray Each Nare BID Quinten Nelson,       busPIRone  10 mg Oral QPM Casimiro Jean PA-C      cefepime  2,000 mg Intravenous Q8H Александр Stark PA-C 2,000 mg (04/09/21 0308)    diphenhydrAMINE  25 mg Oral Q6H PRN Rosmery Campos PA-C      enoxaparin  40 mg Subcutaneous Daily Quinten Nelson, DO      FLUoxetine  80 mg Oral Daily Rosmery Campos PA-C      glycerin-hypromellose-  1 drop Both Eyes Q3H PRN Rosmery Campos PA-C      insulin lispro  1-5 Units Subcutaneous HS WILLY Garcia      insulin lispro  1-6 Units Subcutaneous TID AC WILLY Garcia      loratadine  10 mg Oral Daily Rosmery Campos PA-C      LORazepam  0 5 mg Oral Q12H PRN Александр Stark PA-C      methylPREDNISolone sodium succinate  60 mg Intravenous Q6H [address] Clarence Moss MD      ondansetron  4 mg Intravenous Q6H PRN Quinten Nelson DO      saccharomyces boulardii  250 mg Oral BID Rosmery Campos PA-C      sodium chloride  1 spray Each Nare Q1H PRN Jodi Casiano PA-C       Continuous Infusions:     PRN Meds:   acetaminophen, 650 mg, Q6H PRN  albuterol, 2 puff, Q4H PRN  diphenhydrAMINE, 25 mg, Q6H PRN  glycerin-hypromellose-, 1 drop, Q3H PRN  LORazepam, 0 5 mg, Q12H PRN  ondansetron, 4 mg, Q6H PRN  sodium chloride, 1 spray, Q1H PRN        Allergies   Allergies   Allergen Reactions    Other      Most all antibiotics- hives, hypotensive    "no problem with clindamycin "    Augmentin Es-600  [Amoxicillin-Pot Clavulanate]     Cefuroxime     Erythromycin     Levofloxacin     Morphine     Morphine And Related Hives    Oxycodone-Acetaminophen     Penicillins     Percocet [Oxycodone-Acetaminophen] Hives    Sulfa Antibiotics     Tetracyclines & Related      ---------------------------------------------------------------------------------------  Advance Directive and Living Will:      Power of :    POLST:    ---------------------------------------------------------------------------------------  Care Time Delivered:   No Critical Care time spent     Crys [place]      Portions of the record may have been created with voice recognition software  Occasional wrong word or "sound a like" substitutions may have occurred due to the inherent limitations of voice recognition software    Read the chart carefully and recognize, using context, where substitutions have occurred

## 2021-04-10 PROBLEM — R73.9 HYPERGLYCEMIA: Status: ACTIVE | Noted: 2021-04-10

## 2021-04-10 LAB
ANION GAP SERPL CALCULATED.3IONS-SCNC: 7 MMOL/L (ref 4–13)
BUN SERPL-MCNC: 17 MG/DL (ref 5–25)
CALCIUM SERPL-MCNC: 8.1 MG/DL (ref 8.3–10.1)
CHLORIDE SERPL-SCNC: 103 MMOL/L (ref 100–108)
CO2 SERPL-SCNC: 27 MMOL/L (ref 21–32)
CREAT SERPL-MCNC: 0.5 MG/DL (ref 0.6–1.3)
ERYTHROCYTE [DISTWIDTH] IN BLOOD BY AUTOMATED COUNT: 13.8 % (ref 11.6–15.1)
GFR SERPL CREATININE-BSD FRML MDRD: 108 ML/MIN/1.73SQ M
GLUCOSE SERPL-MCNC: 150 MG/DL (ref 65–140)
GLUCOSE SERPL-MCNC: 151 MG/DL (ref 65–140)
GLUCOSE SERPL-MCNC: 209 MG/DL (ref 65–140)
GLUCOSE SERPL-MCNC: 224 MG/DL (ref 65–140)
GLUCOSE SERPL-MCNC: 224 MG/DL (ref 65–140)
GLUCOSE SERPL-MCNC: 226 MG/DL (ref 65–140)
HCT VFR BLD AUTO: 34.9 % (ref 34.8–46.1)
HGB BLD-MCNC: 11.3 G/DL (ref 11.5–15.4)
MAGNESIUM SERPL-MCNC: 2.5 MG/DL (ref 1.6–2.6)
MCH RBC QN AUTO: 29.4 PG (ref 26.8–34.3)
MCHC RBC AUTO-ENTMCNC: 32.4 G/DL (ref 31.4–37.4)
MCV RBC AUTO: 91 FL (ref 82–98)
PHOSPHATE SERPL-MCNC: 3.7 MG/DL (ref 2.7–4.5)
PLATELET # BLD AUTO: 352 THOUSANDS/UL (ref 149–390)
PMV BLD AUTO: 11.5 FL (ref 8.9–12.7)
POTASSIUM SERPL-SCNC: 4.2 MMOL/L (ref 3.5–5.3)
RBC # BLD AUTO: 3.84 MILLION/UL (ref 3.81–5.12)
SODIUM SERPL-SCNC: 137 MMOL/L (ref 136–145)
WBC # BLD AUTO: 23.81 THOUSAND/UL (ref 4.31–10.16)

## 2021-04-10 PROCEDURE — 82948 REAGENT STRIP/BLOOD GLUCOSE: CPT

## 2021-04-10 PROCEDURE — 94760 N-INVAS EAR/PLS OXIMETRY 1: CPT

## 2021-04-10 PROCEDURE — 99233 SBSQ HOSP IP/OBS HIGH 50: CPT | Performed by: INTERNAL MEDICINE

## 2021-04-10 PROCEDURE — 83735 ASSAY OF MAGNESIUM: CPT | Performed by: STUDENT IN AN ORGANIZED HEALTH CARE EDUCATION/TRAINING PROGRAM

## 2021-04-10 PROCEDURE — NC001 PR NO CHARGE: Performed by: INTERNAL MEDICINE

## 2021-04-10 PROCEDURE — 84100 ASSAY OF PHOSPHORUS: CPT | Performed by: STUDENT IN AN ORGANIZED HEALTH CARE EDUCATION/TRAINING PROGRAM

## 2021-04-10 PROCEDURE — 80048 BASIC METABOLIC PNL TOTAL CA: CPT | Performed by: STUDENT IN AN ORGANIZED HEALTH CARE EDUCATION/TRAINING PROGRAM

## 2021-04-10 PROCEDURE — 85027 COMPLETE CBC AUTOMATED: CPT | Performed by: STUDENT IN AN ORGANIZED HEALTH CARE EDUCATION/TRAINING PROGRAM

## 2021-04-10 RX ADMIN — INSULIN LISPRO 2 UNITS: 100 INJECTION, SOLUTION INTRAVENOUS; SUBCUTANEOUS at 17:10

## 2021-04-10 RX ADMIN — ENOXAPARIN SODIUM 40 MG: 40 INJECTION SUBCUTANEOUS at 08:57

## 2021-04-10 RX ADMIN — AZELASTINE HYDROCHLORIDE 1 SPRAY: 137 SPRAY, METERED NASAL at 08:59

## 2021-04-10 RX ADMIN — ATORVASTATIN CALCIUM 10 MG: 10 TABLET, FILM COATED ORAL at 22:10

## 2021-04-10 RX ADMIN — LORAZEPAM 0.5 MG: 0.5 TABLET ORAL at 14:28

## 2021-04-10 RX ADMIN — LORATADINE 10 MG: 10 TABLET ORAL at 08:57

## 2021-04-10 RX ADMIN — ACETAMINOPHEN 650 MG: 325 TABLET, FILM COATED ORAL at 01:07

## 2021-04-10 RX ADMIN — METHYLPREDNISOLONE SODIUM SUCCINATE 60 MG: 125 INJECTION, POWDER, FOR SOLUTION INTRAMUSCULAR; INTRAVENOUS at 22:10

## 2021-04-10 RX ADMIN — BUSPIRONE HYDROCHLORIDE 10 MG: 10 TABLET ORAL at 22:10

## 2021-04-10 RX ADMIN — Medication 250 MG: at 08:57

## 2021-04-10 RX ADMIN — FLUOXETINE 80 MG: 20 CAPSULE ORAL at 08:57

## 2021-04-10 RX ADMIN — INSULIN LISPRO 2 UNITS: 100 INJECTION, SOLUTION INTRAVENOUS; SUBCUTANEOUS at 22:09

## 2021-04-10 RX ADMIN — Medication 250 MG: at 22:09

## 2021-04-10 RX ADMIN — METHYLPREDNISOLONE SODIUM SUCCINATE 60 MG: 125 INJECTION, POWDER, FOR SOLUTION INTRAMUSCULAR; INTRAVENOUS at 08:57

## 2021-04-10 RX ADMIN — ACETAMINOPHEN 650 MG: 325 TABLET, FILM COATED ORAL at 23:48

## 2021-04-10 RX ADMIN — DIPHENHYDRAMINE HCL 25 MG: 25 TABLET ORAL at 12:46

## 2021-04-10 RX ADMIN — INSULIN LISPRO 2 UNITS: 100 INJECTION, SOLUTION INTRAVENOUS; SUBCUTANEOUS at 10:33

## 2021-04-10 NOTE — ASSESSMENT & PLAN NOTE
Diagnosis: hypertension/hyperlipidemia  ? Plan: hold verapamil, continue atorvastatin 10mg QID  ?  Plan: continue telemetry monitorin

## 2021-04-10 NOTE — RESPIRATORY THERAPY NOTE
resp care      04/10/21 0840   Respiratory Assessment   Resp Comments Pt  taken off HFNC and placed on 8L midflow NC   Additional Assessments   SpO2 96 %

## 2021-04-10 NOTE — ASSESSMENT & PLAN NOTE
Diagnosis: anxiety/depression  Plan: continue Buspar 10mg, PRN ativan 0 5 mg, continue Prozac 80mg QID (outpatient regimen)

## 2021-04-10 NOTE — ASSESSMENT & PLAN NOTE
Etiology unknown, differential includes hypersensitivity pneumonitis versus possibly secondary to underlying  infectious etiology  · RP2 negative  · COVID Ab +, RF + (minimally) , CCP -  · Ab + due to covid vaccine 3 weeks prior  · ESR, CRP positive  · Gram stain from bronch showed normal respiratory nirmala  · Monitor off abxs, completed 7 day course  · Awaiting pneumonitis panel, fungal  · Solumedrol 60mg q12h - continue steroid taper, pulmonology following  · Continue to wean oxygen as today as tolerated with spO2 > 92%

## 2021-04-10 NOTE — PROGRESS NOTES
CRITICAL CARE TRANSFER NOTE     Name: Alyssa Mcconnell   Age & Sex: 61 y o  female   MRN: [id number]  Unit/Bed#: [facility] 811-01   Encounter: [id number]  Hospital Stay Days: 7    Reason for ICU Admission:  Acute hypoxic respiratory failure  Code Status: Level 1 - Full Code  Condition: good  Disposition: Patient requires Med/Surg    Accepting Physician: Dr Jossie Wynne    ASSESSMENT/PLAN     Principal Problem:    Acute respiratory failure with hypoxia [facility])  Active Problems:    Depression with anxiety    HTN (hypertension)    Leukocytosis    Tobacco use    Community acquired pneumonia of right lower lobe of lung    Hyponatremia    Abnormal thyroid function test    Allergy to multiple antibiotics    Chronic sinusitis    Hyperglycemia    Hyperglycemia  Assessment & Plan  Likely in setting of steroid use  ? Plan: monitor blood sugar with 140-180 goal  ? Continue with sliding scale insulin    Chronic sinusitis  Assessment & Plan  Diagnosis: chronic sinusitis  ? Plan: continue claratin, azelastine    Community acquired pneumonia of right lower lobe of lung  Assessment & Plan   multifocal pneumonia vs  (among others)  ? Plan: monitor off abxs now, status post 7 day treatment with cefepime  ? Steroids as above  ? Viral culture negative, fungal pending    Tobacco use  Assessment & Plan  Smokes about 5 cigarettes a day  Smoking cessation education provided    Leukocytosis  Assessment & Plan  Likely in setting of IV steroid use  · Monitor fever curve and WBC count      HTN (hypertension)  Assessment & Plan  Diagnosis: hypertension/hyperlipidemia  ? Plan: hold verapamil, continue atorvastatin 10mg QID  ?  Plan: continue telemetry monitorin    Depression with anxiety  Assessment & Plan  Diagnosis: anxiety/depression  Plan: continue Buspar 10mg, PRN ativan 0 5 mg, continue Prozac 80mg QID (outpatient regimen)    * Acute respiratory failure with hypoxia (HCC)  Assessment & Plan  Etiology unknown, differential includes hypersensitivity pneumonitis versus possibly secondary to underlying  infectious etiology  · RP2 negative  · COVID Ab +, RF + (minimally) , CCP -  · Ab + due to covid vaccine 3 weeks prior  · ESR, CRP positive  · Gram stain from bronch showed normal respiratory nirmala  · Monitor off abxs, completed 7 day course  · Awaiting pneumonitis panel, fungal  · Solumedrol 60mg q12h - continue steroid taper, pulmonology following  · Continue to wean oxygen as today as tolerated with spO2 > 92%        VTE Pharmacologic Prophylaxis: Sequential compression device (Venodyne)   VTE Mechanical Prophylaxis: sequential compression device    [address] Jannette Lopez is a 61 y o  female with a PMHx of recurrent sinusitis s/p bilateral antrectomy and HTN who presents with a 6 day history of flu-like symptoms sinus congestion, post-nasal drip, and difficulty breathing  She was initially treated by PCP as acute sinusitis with clindamycin and received negative COVID test on 4/1  Presented to the ED on 4/3 with malasie, fatigue, myalgias, and increased WOB with spO2 of 88% on RA which improved with 3L of O2  CXR and chest Ct was suspicious for multifocal pneumonia, repeat COVID test was negative  She was also found to be hyponatremia to 127, WBC of 15, and elevated procalcitonin  Since admission, patient has had a tmax of 102  2  However, has had increasing work of breathing beginning overnight on 4/4 and has been titrated to 50L 100% of high flow nasal cannula abx broadened to cefepime, vancomycin flagyl  Patient was started on high dose solumedrol (60mg Q6)  Tx to MICU  Patient received 7 days of cefepime, id following, not thought to be infectious source  Respiratory status improving, now on 6 L  Unclear etiology, concern for hypersensitivity pneumonitis, panel pending  Continue with Solu-Medrol 60 b i d , wean oxygen as tolerated  Patient remains hemodynamically stable          OBJECTIVE     Vitals:    04/10/21 0810 04/10/21 0840 04/10/21 0900 04/10/21 1432   BP: 130/78   95/55   Pulse: 56  62 65   Resp: 17  (!) 25 16   Temp: 98 5 °F (36 9 °C)  98 5 °F (36 9 °C) 97 7 °F (36 5 °C)   TempSrc: Oral  Oral    SpO2: 95% 96% 95% 94%   Weight:       Height:         I/O last 24 hours: In: 560 [P O :480; I V :30; IV Piggyback:50]  Out: 4866 [Urine:1375]    LABORATORY DATA     Labs: I have personally reviewed pertinent reports  Results from last 7 days   Lab Units 04/10/21  0628 04/09/21  0605 04/08/21  0554  04/06/21  0525   WBC Thousand/uL 23 81* 27 90* 34 61*   < > 25 93*   HEMOGLOBIN g/dL 11 3* 11 0* 11 2*   < > 11 6   HEMATOCRIT % 34 9 33 9* 34 2*   < > 34 2*   PLATELETS Thousands/uL 352 348 312   < > 236   MONO PCT %  --   --   --   --  1*    < > = values in this interval not displayed  Results from last 7 days   Lab Units 04/10/21  0628 04/09/21  0605 04/08/21  0554   POTASSIUM mmol/L 4 2 4 2 4 4   CHLORIDE mmol/L 103 102 105   CO2 mmol/L 27 25 24   BUN mg/dL 17 19 18   CREATININE mg/dL 0 50* 0 54* 0 50*   CALCIUM mg/dL 8 1* 8 2* 8 4     Results from last 7 days   Lab Units 04/10/21  0628 04/09/21  0605 04/08/21  0554   MAGNESIUM mg/dL 2 5 2 6 2 7*     Results from last 7 days   Lab Units 04/10/21  0628 04/09/21  0605 04/08/21  0554   PHOSPHORUS mg/dL 3 7 4 2 4 0                  Micro:  Lab Results   Component Value Date    BLOODCX No Growth After 5 Days  04/03/2021    BLOODCX No Growth After 5 Days  04/03/2021    BLOODCX No Growth After 5 Days  03/14/2019     IMAGING & DIAGNOSTIC TESTING     Imaging: I have personally reviewed pertinent reports  Xr Chest 1 View Portable    Result Date: 4/3/2021  Impression: Bilateral multilobar alveolar infiltrates   Workstation performed: [id number]     Ct Chest W Contrast    Result Date: 4/3/2021  Impression: Multifocal mostly consolidative airspace disease with a perihilar predominance most likely to represent pneumonia Workstation performed: [id number]     Xr Chest Portable Icu    Result Date: 4/7/2021  Impression: Bilateral infiltrates are mildly improved  Workstation performed: [id number]     Xr Chest Portable Icu    Result Date: 4/6/2021  Impression: 1  Increased diffuse airspace disease with upper lobe predominance  Findings may represent worsening pulmonary edema versus multifocal pneumonia  The study was marked in [facility] for immediate notification  Workstation performed: [id number]     EKG, Pathology, and Other Studies: I have personally reviewed pertinent reports       ALLERGIES     Allergies   Allergen Reactions    Other      Most all antibiotics- hives, hypotensive    "no problem with clindamycin "    Augmentin Es-600  [Amoxicillin-Pot Clavulanate]     Cefuroxime     Erythromycin     Levofloxacin     Morphine     Morphine And Related Hives    Oxycodone-Acetaminophen     Penicillins     Percocet [Oxycodone-Acetaminophen] Hives    Sulfa Antibiotics     Tetracyclines & Related        MEDICATIONS     Current Facility-Administered Medications   Medication Dose Route Frequency Provider Last Rate    acetaminophen  650 mg Oral Q6H PRN Katieja Saraiya, DO      albuterol  2 puff Inhalation Q4H PRN Pooja Saraiya, DO      atorvastatin  10 mg Oral Daily Pooja Saraiya, DO      azelastine  1 spray Each Nare BID Pooja Saraiya, DO      busPIRone  10 mg Oral QPM Pooja Saraiya, DO      diphenhydrAMINE  25 mg Oral Q6H PRN Katieoja Saraiya, DO      enoxaparin  40 mg Subcutaneous Daily Katieoja Saraiya, DO      FLUoxetine  80 mg Oral Daily Pooja Saraiya, DO      glycerin-hypromellose-  1 drop Both Eyes Q3H PRN Pooja Saraiya, DO      insulin lispro  1-5 Units Subcutaneous HS Pooja Saraiya, DO      insulin lispro  1-6 Units Subcutaneous TID AC Pooja Saraiya, DO      loratadine  10 mg Oral Daily Pooja Saraiya, DO      LORazepam  0 5 mg Oral Q12H PRN Pooja Saraiya, DO      methylPREDNISolone sodium succinate  60 mg Intravenous Q12H [address] Pooja Saraiya, DO      ondansetron  4 mg Intravenous Q6H PRN Alexi Enriquez, DO      saccharomyces boulardii  250 mg Oral BID Katie Saraiya, DO      sodium chloride  1 spray Each Nare Q1H PRN Katie Saraiya, DO          acetaminophen, 650 mg, Q6H PRN  albuterol, 2 puff, Q4H PRN  diphenhydrAMINE, 25 mg, Q6H PRN  glycerin-hypromellose-, 1 drop, Q3H PRN  LORazepam, 0 5 mg, Q12H PRN  ondansetron, 4 mg, Q6H PRN  sodium chloride, 1 spray, Q1H PRN      ==      Alexi Enriquez, DO  Internal Medicine- PGY2

## 2021-04-10 NOTE — ASSESSMENT & PLAN NOTE
multifocal pneumonia vs  (among others)  ? Plan: monitor off abxs now, status post 7 day treatment with cefepime  ? Steroids as above  ?  Viral culture negative, fungal pending

## 2021-04-10 NOTE — ASSESSMENT & PLAN NOTE
Likely in setting of steroid use  ?  Plan: monitor blood sugar with 140-180 goal  ? Continue with sliding scale insulin

## 2021-04-10 NOTE — PROGRESS NOTES
Progress Note - Critical Care   Lindy Underwooder 61 y o  female MRN: [id number]  [facility]: [id number]    Attending Physician: Kip Krabbe, DO    ______________________________________________________________________  Assessment and Plan:   Principal Problem:    Community acquired pneumonia of right lower lobe of lung  Active Problems:    Hyponatremia    Depression with anxiety    HTN (hypertension)    Tobacco use    Abnormal thyroid function test    Allergy to multiple antibiotics    Acute respiratory failure with hypoxia (HCC)    Chronic sinusitis  Resolved Problems:    * No resolved hospital problems  *      Neuro:   · Diagnosis: anxiety/depression  ? Plan: continue Buspar 10mg, PRN ativan 0 5 mg, continue Prozac 80mg QID (outpatient regimen)  ? Plan: monitor fo CAM-ICU, delirium precautions, monitor sleep/wake cycle      CV:   · Diagnosis: hypertension/hyperlipidemia  ? Plan: hold verapamil, continue atorvastatin 10mg QID  ? Plan: continue telemetry monitoring     Pulm:  · Diagnosis: acute hypoxic respiratory failure  § RP2 negative  § COVID Ab +, RF + (minimally) , CCP -  § Ab + due to covid vaccine 3 weeks prior  § ESR, CRP positive  § Gram stain from bronch showed normal respiratory nirmala  § Monitor off abxs  ? Awaiting pneumonitis panel, fungal, and viral culture from bronch  ? Solumedrol 60mg q12h - continue steroid taper  ? Wean HFNC to [address] [facility] today as tolerated with spO2 > 92%   · Diagnosis: chronic sinusitis  ? Plan: continue claratin, azelastine        GI:   · Diagnosis: diarrhea - resolved  ? Plan: continue probiotics, hold bowel regimen        :   · Diagnosis: no active issues       F/E/N:   · Fluids: PO  · Electrolytes: replenish K > 4, PO4 > 3, Mg > 2  · Nutrition: regular diet        Heme/Onc:   · Diagnosis: leukocytosis likely due to solumedrol and stress response from bronch  ? Plan: continue to monitor  · Diagnosis: DVT prophylaxis  ?  Plan: Lovenox SC         Endo: · Diagnosis: no active issues  ? Plan: monitor blood sugar with 140-180 goal  ? Continue with sliding scale insulin        ID:   · Diagnosis: multifocal pneumonia vs  (among others)  ? Plan: monitor off abxs now  ? Steroids as above  ? Follow-up pending virus and fungal cultures       Disposition: Downgrade     Code Status: Level 1 - Full Code    ______________________________________________________________________    24 Hour Events: No events overnight  Remained on HFNC 40 L/min and 40%  Review of Systems   Constitutional: Negative for chills and fever  HENT: Positive for congestion  Respiratory: Positive for cough and shortness of breath  Cardiovascular: Negative for chest pain  Gastrointestinal: Negative for abdominal pain, constipation, diarrhea, nausea and vomiting  Psychiatric/Behavioral: Negative for agitation and confusion  All other systems reviewed and are negative     ______________________________________________________________________    Physical Exam:   Physical Exam  Vitals signs and nursing note reviewed  Constitutional:       General: She is not in acute distress  Appearance: She is not diaphoretic  HENT:      Head: Normocephalic and atraumatic  Eyes:      General: No scleral icterus  Right eye: No discharge  Left eye: No discharge  Pupils: Pupils are equal, round, and reactive to light  Neck:      Vascular: No JVD  Trachea: No tracheal deviation  Cardiovascular:      Rate and Rhythm: Normal rate and regular rhythm  Heart sounds: No murmur  Pulmonary:      Effort: Pulmonary effort is normal  No respiratory distress  Breath sounds: No stridor  No wheezing or rales  Comments: HFNC 40 L / 40%  Abdominal:      General: There is no distension  Tenderness: There is no abdominal tenderness  There is no guarding or rebound  Musculoskeletal:         General: No tenderness  Skin:     Findings: No erythema or rash  Neurological:      Mental Status: She is alert and oriented to person, place, and time  ______________________________________________________________________  Vitals:    21 2305 04/10/21 0005 04/10/21 0105 04/10/21 0341   BP: 123/67 133/74 122/76    BP Location:       Pulse: 56 (!) 54 (!) 54    Resp: 21 18 (!) 23    Temp:       TempSrc:       SpO2: 95% 95% 93% 95%   Weight:       Height:           Temperature:   Temp (24hrs), Av 3 °F (36 8 °C), Min:97 8 °F (36 6 °C), Max:98 7 °F (37 1 °C)  Current: Temperature: 98 7 °F (37 1 °C)    Weights:   IBW: 57 kg    Body mass index is 34 27 kg/m²  Weight (last 2 days)     Date/Time   Weight    21 0600   93 4 (205 91)    21 0556   92 8 (204 59)              Non-Invasive/Invasive Ventilation Settings:  Respiratory    Lab Data (Last 4 hours)    None         O2/Vent Data (Last 4 hours)      04/10 0341          Non-Invasive Ventilation Mode HFNC (High flow)                   Intake and Outputs:  I/O last 24 hours: In: 130 [I V :30; IV Piggyback:100]  Out: 1500 [Urine:1500]    UOP: 0 45 ml/kg/hr     Nutrition:        Diet Orders   (From admission, onward)             Start     Ordered    21 183  Diet Regular; Regular House  Diet effective now     Question Answer Comment   Diet Type Regular    Regular Regular House    RD to adjust diet per protocol?  Yes        21 182                Labs:   Results from last 7 days   Lab Units 21  0605 21  0554 21  0518 21  0525 21  0624 21  0517   WBC Thousand/uL 27 90* 34 61* 32 33* 25 93* 12 99* 12 03*   HEMOGLOBIN g/dL 11 0* 11 2* 11 0* 11 6 11 6 11 4*   HEMATOCRIT % 33 9* 34 2* 32 9* 34 2* 35 1 34 5*   PLATELETS Thousands/uL 348 312 293 236 188 159   BANDS PCT %  --   --   --  7  --   --    MONO PCT %  --   --   --  1*  --   --      Results from last 7 days   Lab Units 21  1442 21  0554 21  0518 21  0525 21  7971 21  8498 04/03/21 2034   SODIUM mmol/L 135* 137 133* 134* 131* 132* 130*   POTASSIUM mmol/L 4 2 4 4 3 7 3 2* 3 2* 3 5 3 6   CHLORIDE mmol/L 102 105 102 102 101 102 98*   CO2 mmol/L 25 24 22 24 24 24 24   ANION GAP mmol/L 8 8 9 8 6 6 8   BUN mg/dL 19 18 14 10 6 9 11   CREATININE mg/dL 0 54* 0 50* 0 48* 0 53* 0 55* 0 61 0 69   CALCIUM mg/dL 8 2* 8 4 8 6 8 7 7 9* 8 2* 8 7     Results from last 7 days   Lab Units 04/09/21  0605 04/08/21  0554 04/07/21  0518 04/06/21  0525   MAGNESIUM mg/dL 2 6 2 7* 2 4 2 5   PHOSPHORUS mg/dL 4 2 4 0 3 2 2 6*                  ABG:No results found for: PHART, HCO1NTO, PO2ART, VFL3OBF, Y6DVJFEG, BEART, SOURCE  VBG:    Results from last 7 days   Lab Units 04/05/21  0624 04/04/21  0518 04/03/21  0834   PROCALCITONIN ng/ml 0 44* 0 63* 0 86*     Vancomycin Tr   Date Value Ref Range Status   04/07/2021 12 5 10 0 - 20 0 ug/mL Final        Imaging: I have personally reviewed pertinent films in PACS CXR 4/09  EKG: N/A  Micro:  Results from last 7 days   Lab Units 04/07/21  1645 04/05/21  1555 04/03/21  0539   GRAM STAIN RESULT  1+ Yeast*  1+ Gram positive rods*  1+ Epithelial Cells*  --   --    MRSA CULTURE ONLY   --  No Methicillin Resistant Staphlyococcus aureus (MRSA) isolated  --    LEGIONELLA URINARY ANTIGEN   --   --  Negative   STREP PNEUMONIAE ANTIGEN, URINE   --   --  Negative     Allergies:    Allergies   Allergen Reactions    Other      Most all antibiotics- hives, hypotensive    "no problem with clindamycin "    Augmentin Es-600  [Amoxicillin-Pot Clavulanate]     Cefuroxime     Erythromycin     Levofloxacin     Morphine     Morphine And Related Hives    Oxycodone-Acetaminophen     Penicillins     Percocet [Oxycodone-Acetaminophen] Hives    Sulfa Antibiotics     Tetracyclines & Related      Medications:   Scheduled Meds:  Current Facility-Administered Medications   Medication Dose Route Frequency Provider Last Rate    acetaminophen  650 mg Oral Q6H PRN Toula Slight, DO     Lanny Chowdhury albuterol  2 puff Inhalation Q4H PRN Judy Yusuf MD      atorvastatin  10 mg Oral Daily Daylene Falling, DO      azelastine  1 spray Each Nare BID Daylene Falling, DO      busPIRone  10 mg Oral QPM Genelobo De Souza PA-C      diphenhydrAMINE  25 mg Oral Q6H PRN Sheri Ulrich PA-C      enoxaparin  40 mg Subcutaneous Daily Daylene Falling, DO      FLUoxetine  80 mg Oral Daily Sheri Ulrich, PA-ABDOULAYE      glycerin-hypromellose-  1 drop Both Eyes Q3H PRN Sheri Ulrich PA-C      insulin lispro  1-5 Units Subcutaneous HS WILLY Vásquez      insulin lispro  1-6 Units Subcutaneous TID AC WILLY Vásquez      loratadine  10 mg Oral Daily Sheri Mojgan, PA-ABDOULAYE      LORazepam  0 5 mg Oral Q12H PRN Lottie Wade PA-C      methylPREDNISolone sodium succinate  60 mg Intravenous Q12H [address] Katie Saraiya, DO      ondansetron  4 mg Intravenous Q6H PRN Daylene Falling, DO      saccharomyces boulardii  250 mg Oral BID Sheri Ulrich PA-C      sodium chloride  1 spray Each Nare Q1H PRN Jodi Casiano PA-C       Continuous Infusions:   PRN Meds:  acetaminophen, 650 mg, Q6H PRN  albuterol, 2 puff, Q4H PRN  diphenhydrAMINE, 25 mg, Q6H PRN  glycerin-hypromellose-, 1 drop, Q3H PRN  LORazepam, 0 5 mg, Q12H PRN  ondansetron, 4 mg, Q6H PRN  sodium chloride, 1 spray, Q1H PRN      VTE Pharmacologic Prophylaxis: Enoxaparin (Lovenox)  VTE Mechanical Prophylaxis: sequential compression device    Invasive lines and devices: Invasive Devices     Peripheral Intravenous Line            Peripheral IV 04/06/21 Distal;Left;Ventral (anterior) Forearm 3 days    Peripheral IV 04/09/21 Left;Proximal;Ventral (anterior) Forearm less than 1 day    Peripheral IV 04/09/21 Right Forearm less than 1 day                     Portions of the record may have been created with voice recognition software    Occasional wrong word or "sound a like" substitutions may have occurred due to the inherent limitations of voice recognition software  Read the chart carefully and recognize, using context, where substitutions have occurred      Torsten Nino MD

## 2021-04-11 LAB
GLUCOSE SERPL-MCNC: 133 MG/DL (ref 65–140)
GLUCOSE SERPL-MCNC: 227 MG/DL (ref 65–140)
GLUCOSE SERPL-MCNC: 247 MG/DL (ref 65–140)
GLUCOSE SERPL-MCNC: 303 MG/DL (ref 65–140)

## 2021-04-11 PROCEDURE — 99233 SBSQ HOSP IP/OBS HIGH 50: CPT | Performed by: INTERNAL MEDICINE

## 2021-04-11 PROCEDURE — 82948 REAGENT STRIP/BLOOD GLUCOSE: CPT

## 2021-04-11 PROCEDURE — 99232 SBSQ HOSP IP/OBS MODERATE 35: CPT | Performed by: INTERNAL MEDICINE

## 2021-04-11 RX ORDER — METHYLPREDNISOLONE SODIUM SUCCINATE 40 MG/ML
40 INJECTION, POWDER, LYOPHILIZED, FOR SOLUTION INTRAMUSCULAR; INTRAVENOUS EVERY 12 HOURS SCHEDULED
Status: COMPLETED | OUTPATIENT
Start: 2021-04-11 | End: 2021-04-11

## 2021-04-11 RX ORDER — METHYLPREDNISOLONE SODIUM SUCCINATE 40 MG/ML
40 INJECTION, POWDER, LYOPHILIZED, FOR SOLUTION INTRAMUSCULAR; INTRAVENOUS DAILY
Status: DISCONTINUED | OUTPATIENT
Start: 2021-04-12 | End: 2021-04-12

## 2021-04-11 RX ADMIN — AZELASTINE HYDROCHLORIDE 1 SPRAY: 137 SPRAY, METERED NASAL at 08:54

## 2021-04-11 RX ADMIN — INSULIN LISPRO 4 UNITS: 100 INJECTION, SOLUTION INTRAVENOUS; SUBCUTANEOUS at 11:20

## 2021-04-11 RX ADMIN — BUSPIRONE HYDROCHLORIDE 10 MG: 10 TABLET ORAL at 21:46

## 2021-04-11 RX ADMIN — AZELASTINE HYDROCHLORIDE 1 SPRAY: 137 SPRAY, METERED NASAL at 17:16

## 2021-04-11 RX ADMIN — INSULIN LISPRO 2 UNITS: 100 INJECTION, SOLUTION INTRAVENOUS; SUBCUTANEOUS at 21:47

## 2021-04-11 RX ADMIN — Medication 250 MG: at 17:16

## 2021-04-11 RX ADMIN — METHYLPREDNISOLONE SODIUM SUCCINATE 40 MG: 40 INJECTION, POWDER, FOR SOLUTION INTRAMUSCULAR; INTRAVENOUS at 21:46

## 2021-04-11 RX ADMIN — ATORVASTATIN CALCIUM 10 MG: 10 TABLET, FILM COATED ORAL at 21:46

## 2021-04-11 RX ADMIN — METHYLPREDNISOLONE SODIUM SUCCINATE 60 MG: 125 INJECTION, POWDER, FOR SOLUTION INTRAMUSCULAR; INTRAVENOUS at 08:54

## 2021-04-11 RX ADMIN — LORAZEPAM 0.5 MG: 0.5 TABLET ORAL at 16:29

## 2021-04-11 RX ADMIN — Medication 250 MG: at 08:54

## 2021-04-11 RX ADMIN — ENOXAPARIN SODIUM 40 MG: 40 INJECTION SUBCUTANEOUS at 08:54

## 2021-04-11 RX ADMIN — FLUOXETINE 80 MG: 20 CAPSULE ORAL at 08:53

## 2021-04-11 RX ADMIN — ACETAMINOPHEN 650 MG: 325 TABLET, FILM COATED ORAL at 16:29

## 2021-04-11 RX ADMIN — DIPHENHYDRAMINE HCL 25 MG: 25 TABLET ORAL at 16:29

## 2021-04-11 RX ADMIN — LORATADINE 10 MG: 10 TABLET ORAL at 08:54

## 2021-04-11 NOTE — PLAN OF CARE
Problem: Potential for Falls  Goal: Patient will remain free of falls  Description: INTERVENTIONS:  - Assess patient frequently for physical needs  -  Identify cognitive and physical deficits and behaviors that affect risk of falls    -  [place] fall precautions as indicated by assessment   - Educate patient/family on patient safety including physical limitations  - Instruct patient to call for assistance with activity based on assessment  - Modify environment to reduce risk of injury  - Consider OT/PT consult to assist with strengthening/mobility  Outcome: Progressing     Problem: PAIN - ADULT  Goal: Verbalizes/displays adequate comfort level or baseline comfort level  Description: Interventions:  - Encourage patient to monitor pain and request assistance  - Assess pain using appropriate pain scale  - Administer analgesics based on type and severity of pain and evaluate response  - Implement non-pharmacological measures as appropriate and evaluate response  - Consider cultural and social influences on pain and pain management  - Notify physician/advanced practitioner if interventions unsuccessful or patient reports new pain  Outcome: Progressing     Problem: INFECTION - ADULT  Goal: Absence or prevention of progression during hospitalization  Description: INTERVENTIONS:  - Assess and monitor for signs and symptoms of infection  - Monitor lab/diagnostic results  - Monitor all insertion sites, i e  indwelling lines, tubes, and drains  - Monitor endotracheal if appropriate and nasal secretions for changes in amount and color  - [place] appropriate cooling/warming therapies per order  - Administer medications as ordered  - Instruct and encourage patient and family to use good hand hygiene technique  - Identify and instruct in appropriate isolation precautions for identified infection/condition  Outcome: Progressing  Goal: Absence of fever/infection during neutropenic period  Description: INTERVENTIONS:  - Monitor WBC    Outcome: Progressing     Problem: SAFETY ADULT  Goal: Patient will remain free of falls  Description: INTERVENTIONS:  - Assess patient frequently for physical needs  -  Identify cognitive and physical deficits and behaviors that affect risk of falls    -  [place] fall precautions as indicated by assessment   - Educate patient/family on patient safety including physical limitations  - Instruct patient to call for assistance with activity based on assessment  - Modify environment to reduce risk of injury  - Consider OT/PT consult to assist with strengthening/mobility  Outcome: Progressing  Goal: Maintain or return to baseline ADL function  Description: INTERVENTIONS:  -  Assess patient's ability to carry out ADLs; assess patient's baseline for ADL function and identify physical deficits which impact ability to perform ADLs (bathing, care of mouth/teeth, toileting, grooming, dressing, etc )  - Assess/evaluate cause of self-care deficits   - Assess range of motion  - Assess patient's mobility; develop plan if impaired  - Assess patient's need for assistive devices and provide as appropriate  - Encourage maximum independence but intervene and supervise when necessary  - Involve family in performance of ADLs  - Assess for home care needs following discharge   - Consider OT consult to assist with ADL evaluation and planning for discharge  - Provide patient education as appropriate  Outcome: Progressing  Goal: Maintain or return mobility status to optimal level  Description: INTERVENTIONS:  - Assess patient's baseline mobility status (ambulation, transfers, stairs, etc )    - Identify cognitive and physical deficits and behaviors that affect mobility  - Identify mobility aids required to assist with transfers and/or ambulation (gait belt, sit-to-stand, lift, walker, cane, etc )  - [place] fall precautions as indicated by assessment  - Record patient progress and toleration of activity level on Mobility SBAR; progress patient to next Phase/Stage  - Instruct patient to call for assistance with activity based on assessment  - Consider rehabilitation consult to assist with strengthening/weightbearing, etc   Outcome: Progressing     Problem: DISCHARGE PLANNING  Goal: Discharge to home or other facility with appropriate resources  Description: INTERVENTIONS:  - Identify barriers to discharge w/patient and caregiver  - Arrange for needed discharge resources and transportation as appropriate  - Identify discharge learning needs (meds, wound care, etc )  - Arrange for interpretive services to assist at discharge as needed  - Refer to Case Management Department for coordinating discharge planning if the patient needs post-hospital services based on physician/advanced practitioner order or complex needs related to functional status, cognitive ability, or social support system  Outcome: Progressing     Problem: Knowledge Deficit  Goal: Patient/family/caregiver demonstrates understanding of disease process, treatment plan, medications, and discharge instructions  Description: Complete learning assessment and assess knowledge base  Interventions:  - Provide teaching at level of understanding  - Provide teaching via preferred learning methods  Outcome: Progressing     Problem: Nutrition/Hydration-ADULT  Goal: Nutrient/Hydration intake appropriate for improving, restoring or maintaining nutritional needs  Description: Monitor and assess patient's nutrition/hydration status for malnutrition  Collaborate with interdisciplinary team and initiate plan and interventions as ordered  Monitor patient's weight and dietary intake as ordered or per policy  Utilize nutrition screening tool and intervene as necessary  Determine patient's food preferences and provide high-protein, high-caloric foods as appropriate       INTERVENTIONS:  - Monitor oral intake, urinary output, labs, and treatment plans  - Assess nutrition and hydration status and recommend course of action  - Evaluate amount of meals eaten  - Assist patient with eating if necessary   - Allow adequate time for meals  - Recommend/ encourage appropriate diets, oral nutritional supplements, and vitamin/mineral supplements  - Order, calculate, and assess calorie counts as needed  - Recommend, monitor, and adjust tube feedings and TPN/PPN based on assessed needs  - Assess need for intravenous fluids  - Provide specific nutrition/hydration education as appropriate  - Include patient/family/caregiver in decisions related to nutrition  Outcome: Progressing     Problem: Prexisting or High Potential for Compromised Skin Integrity  Goal: Skin integrity is maintained or improved  Description: INTERVENTIONS:  - Identify patients at risk for skin breakdown  - Assess and monitor skin integrity  - Assess and monitor nutrition and hydration status  - Monitor labs   - Assess for incontinence   - Turn and reposition patient  - Assist with mobility/ambulation  - Relieve pressure over bony prominences  - Avoid friction and shearing  - Provide appropriate hygiene as needed including keeping skin clean and dry  - Evaluate need for skin moisturizer/barrier cream  - Collaborate with interdisciplinary team   - Patient/family teaching  - Consider wound care consult   Outcome: Progressing

## 2021-04-11 NOTE — PROGRESS NOTES
Progress Note - Pulmonary   Dipika Clements 61 y o  female MRN: [id number]  Unit/Bed#: [facility] 811-01 Encounter: [id number]      Assessment  1  Acute hypoxic respiratory failure  2  Abnormal CT Chest - broad differential - current evaluation remains negative, may be  vs HP vs viral inflammatory state - bacterial infection/vasculitis and IPAF unlikely given serology results  3  Sepsis - POA  4  Prior Eosinophilia  5  Tobacco abuse  6  Chronic sinusitis  7  Leukocytosis - suspected secondary to steroids  8  COVID-19 Ab (+) post single vaccine     PLAN  · Titrate O2 to keep SpO2 >88%  · IS, OOB-Chair, ambulation as able  · Will need RA SpO2 at rest and ambulation prior to discharge  · Reduce solumedrol 40mg q12hrs today and then 40mg daily tomorrow  · Hopeful to transition to prednisone in next 24-48hrs  · Will need repeat imaging as outpatient to ensure resolution    Subjective:   Continues to have fatigue but overall feeling improved, no sputum production, no fevers, no chills, no hemoptysis    Objective:       Vitals: Blood pressure 103/55, pulse 62, temperature 98 1 °F (36 7 °C), resp  rate 18, height 5' 5" (1 651 m), weight 95 7 kg (210 lb 15 7 oz), SpO2 94 %, not currently breastfeeding  , 93% 3LNC during my exam, Body mass index is 35 11 kg/m²  Intake/Output Summary (Last 24 hours) at 4/11/2021 1425  Last data filed at 4/10/2021 1700  Gross per 24 hour   Intake 360 ml   Output --   Net 360 ml         Physical Exam  Gen: Awake, alert, oriented x 3, no acute distress  HEENT: Mucous membranes moist, no oral lesions, no thrush  NECK: No accessory muscle use, JVP not elevated  Cardiac: Regular, single S1, single S2, no murmurs, no rubs, no gallops  Lungs: overall clear, slightly diminished at bases, no wheeze, no rales  Abdomen: normoactive bowel sounds, soft nontender, nondistended, no rebound or rigidity, no guarding  Extremities: no cyanosis, no clubbing, no edema    Labs:  I have personally reviewed pertinent lab results    Laboratory and Diagnostics  Results from last 7 days   Lab Units 04/10/21  0628 04/09/21  0156 04/08/21  0554 04/07/21  0518 04/06/21  0525 04/05/21  0624   WBC Thousand/uL 23 81* 27 90* 34 61* 32 33* 25 93* 12 99*   HEMOGLOBIN g/dL 11 3* 11 0* 11 2* 11 0* 11 6 11 6   HEMATOCRIT % 34 9 33 9* 34 2* 32 9* 34 2* 35 1   PLATELETS Thousands/uL 352 348 312 293 236 188   BANDS PCT %  --   --   --   --  7  --    MONO PCT %  --   --   --   --  1*  --      Results from last 7 days   Lab Units 04/10/21  0628 04/09/21  0605 04/08/21  0554 04/07/21  0518 04/06/21  0525 04/05/21  0624   SODIUM mmol/L 137 135* 137 133* 134* 131*   POTASSIUM mmol/L 4 2 4 2 4 4 3 7 3 2* 3 2*   CHLORIDE mmol/L 103 102 105 102 102 101   CO2 mmol/L 27 25 24 22 24 24   ANION GAP mmol/L 7 8 8 9 8 6   BUN mg/dL 17 19 18 14 10 6   CREATININE mg/dL 0 50* 0 54* 0 50* 0 48* 0 53* 0 55*   CALCIUM mg/dL 8 1* 8 2* 8 4 8 6 8 7 7 9*   GLUCOSE RANDOM mg/dL 150* 164* 150* 152* 133 112     Results from last 7 days   Lab Units 04/10/21  0628 04/09/21  0605 04/08/21  0554 04/07/21  0518 04/06/21  0525   MAGNESIUM mg/dL 2 5 2 6 2 7* 2 4 2 5   PHOSPHORUS mg/dL 3 7 4 2 4 0 3 2 2 6*                   Results from last 7 days   Lab Units 04/08/21  0554   CRP mg/L 132 0*   SED RATE mm/hour 103*                 Results from last 7 days   Lab Units 04/05/21  0624   PROCALCITONIN ng/ml 0 44*     CCP/CLINT/ANCA neg, RF (+) 1:10  HP panel Pending     MICRO  FLU/RSV neg  COVID-19 neg x 2  Bld Cx 4/3 NGTD  Strep/legionella ag neg  UA neg nit/LE, 4-10 RBCs  MRSA neg  RP2 panel negative  Mycoplasma negative  COVID-19 ab - IgG (+)  Bronchoscopy - BAL Lingula (4/7) - 1+yeast, 1+ GPR (few MRF), fungal cx neg viral culture neg AFB neg, cytology neg malignancy and neutrophils 95%     RADIOGRAPHS - no new images  CXR 4/9 - significantly improved bilateral upper lobe infiltrates, no PTX     CXR 4/7 - persistent bilateral upper lobe alveolar infiltrates, slight improvement on left     CXR 4/5 - increased bilateral upper lobe alveolar infiltrates, some ABG in retrocardiac region     CT Chest 4/3 - mixed central and peripheral scattered consolidative infiltrates with air bronchograms, trace effusions, no PTX      Yared Reza DO, [facility]

## 2021-04-11 NOTE — PROGRESS NOTES
[facility]  Progress Note - Porsche Salvador 1965, 61 y o  female MRN: [id number]  Unit/Bed#: [facility] 811-01 Encounter: [id number]  Primary Care Provider: Bhargavi Williamson MD   Date and time admitted to hospital: 4/3/2021  1:47 AM    Hyperglycemia  Assessment & Plan  Keep glucose 140-180, monitor closely with steriods    Chronic sinusitis  Assessment & Plan  Known to ENT as outpatient with chronic sinus issues  · ENT input noted, continue abx  · Could consider CT sinus but even if +, no inpatient recs  · Recommend outpatient f/u and supportive care    Allergy to multiple antibiotics  Assessment & Plan  Consulted ID as above  · Was switched from cefepime to ceftriaxone by ID  · Completed cefepime 7 days    Abnormal thyroid function test  Assessment & Plan  TSH low, FT4 normal  · Would likely repeat as outpatient in 4 weeks when not acutely ill    Hyponatremia  Assessment & Plan  Sodium 127 on admission, possible hypovolemia as patient admits to decreased oral intake x several days  · Urine Na < 5, UOsm 489  · Started IV fluids with some improvement however now worsening again  · Hold further IVF  · AM BMP  · Consider FR/salt tabs if continues to worsen     Community acquired pneumonia of right lower lobe of lung  Assessment & Plan  Presented with 4 day history of reported flu-like symptoms and sinus congestion, initially treated by PCP as acute sinusitis with clindamycin  Presented to ER with worsening symptoms and increased WOB  · X-ray and CT chest suspicious for multifocal pneumonia; notably COVID-19/influenza/RSV PCR negative and patient with recent prior negative COVID-19 test     · Procalcitonin is additionally noted as elevated though improving  · Patient with reported multiple allergies to antibiotics  Was initially started on cefepime, switched to IV Ceftriaxone per ID, however will switch back to cefepime continue Legionella/strep urinary antigens negative   Blood cultures thus far negative  · Trend WBC, temperature curve, hemodynamics  · Continue supplemental oxygen and titrate as needed to maintain SaO2 greater than 88%  · Overnight 4/4-4/5 developed worsening hypoxia and WOB requiring midflow  Currently on 15L  · Suspicion for multifocal pneumonia vs  cryptogenic organizing pneumonia  Suspect steroids are needed however will first require a bronchoscopy to r/o fungal infection, viral negative  · Completed 7 days of cefepime  · Monitoring off antibiotics        Tobacco use  Assessment & Plan  Nicotine patch offered however patient declines  · Counseled on smoking cessation    Leukocytosis  Assessment & Plan  Probably related to pneumonia and steroids  Monitor closely     HTN (hypertension)  Assessment & Plan  Continue home verapamil with hold parameters  · Monitor blood pressure per protocol    Depression with anxiety  Assessment & Plan  Mood stable presently  · Continue home medications     * Acute respiratory failure with hypoxia (HCC)  Assessment & Plan  Likely 2/2 PNA  Given lack of response to abx, suspecting organizing PNA  · Encourage IS/OOB as able  · Now on mid-flow NC, COVID negative  Pulmonary does not feel need to repeat  · Treating as above  · Will require bronchoscopy        VTE Pharmacologic Prophylaxis:   Pharmacologic: Enoxaparin (Lovenox)  Mechanical VTE Prophylaxis in Place: Yes    Patient Centered Rounds: I have performed bedside rounds with nursing staff today  Discussions with Specialists or Other Care Team Provider: pulmonary     Education and Discussions with Family / Patient: patient     Time Spent for Care: 45 minutes  More than 50% of total time spent on counseling and coordination of care as described above      Current Length of Stay: 8 day(s)    Current Patient Status: Inpatient   Certification Statement: The patient will continue to require additional inpatient hospital stay due to waiting fungal cultures, she is still on steroids, needs oxygen evaluation by PT/OT     Discharge Plan: pending     Code Status: Level 1 - Full Code      Subjective:   Patient is doing well  Objective:     Vitals:   Temp (24hrs), Av 2 °F (36 8 °C), Min:97 9 °F (36 6 °C), Max:98 5 °F (36 9 °C)    Temp:  [97 9 °F (36 6 °C)-98 5 °F (36 9 °C)] 97 9 °F (36 6 °C)  HR:  [55-68] 55  Resp:  [18] 18  BP: (100-125)/(55-68) 125/68  SpO2:  [88 %-96 %] 96 %  Body mass index is 35 11 kg/m²  Input and Output Summary (last 24 hours):     No intake or output data in the 24 hours ending 21    Physical Exam:     Physical Exam  Vitals signs and nursing note reviewed  Constitutional:       Appearance: Normal appearance  HENT:      Head: Normocephalic and atraumatic  Nose: Nose normal  No congestion or rhinorrhea  Neck:      Musculoskeletal: Normal range of motion and neck supple  Cardiovascular:      Rate and Rhythm: Normal rate and regular rhythm  Heart sounds: No murmur  No friction rub  No gallop  Pulmonary:      Effort: Respiratory distress present  Breath sounds: No stridor  No wheezing, rhonchi or rales  Abdominal:      General: Abdomen is flat  Bowel sounds are normal  There is no distension  Palpations: Abdomen is soft  Tenderness: There is no abdominal tenderness  Musculoskeletal: Normal range of motion  Skin:     General: Skin is warm and dry  Neurological:      General: No focal deficit present  Mental Status: She is alert and oriented to person, place, and time  Additional Data:     Labs:    Results from last 7 days   Lab Units 04/10/21  0628  21  0525   WBC Thousand/uL 23 81*   < > 25 93*   HEMOGLOBIN g/dL 11 3*   < > 11 6   HEMATOCRIT % 34 9   < > 34 2*   PLATELETS Thousands/uL 352   < > 236   LYMPHO PCT %  --   --  4*   MONO PCT %  --   --  1*   EOS PCT %  --   --  0    < > = values in this interval not displayed       Results from last 7 days   Lab Units 04/10/21  0628   POTASSIUM mmol/L 4 2 CHLORIDE mmol/L 103   CO2 mmol/L 27   BUN mg/dL 17   CREATININE mg/dL 0 50*   CALCIUM mg/dL 8 1*           * I Have Reviewed All Lab Data Listed Above  * Additional Pertinent Lab Tests Reviewed:  Gerald 65 Admission Reviewed    Imaging:    Imaging Reports Reviewed Today Include:    Imaging Personally Reviewed by Myself Includes:       Recent Cultures (last 7 days):     Results from last 7 days   Lab Units 04/07/21  1645   GRAM STAIN RESULT  1+ Yeast*  1+ Gram positive rods*  1+ Epithelial Cells*       Last 24 Hours Medication List:   Current Facility-Administered Medications   Medication Dose Route Frequency Provider Last Rate    acetaminophen  650 mg Oral Q6H PRN Katieja Saraiya, DO      albuterol  2 puff Inhalation Q4H PRN Katie Saraiya, DO      atorvastatin  10 mg Oral Daily Katieja Saraiya, DO      azelastine  1 spray Each Nare BID Katieja Saraiya, DO      busPIRone  10 mg Oral QPM Katieja Saraiya, DO      diphenhydrAMINE  25 mg Oral Q6H PRN Katieja Saraiya, DO      enoxaparin  40 mg Subcutaneous Daily Katieja Saraiya, DO      FLUoxetine  80 mg Oral Daily Katieja Saraiya, DO      glycerin-hypromellose-  1 drop Both Eyes Q3H PRN Katieja Saraiya, DO      insulin lispro  1-5 Units Subcutaneous HS Katieja Saraiya, DO      insulin lispro  1-6 Units Subcutaneous TID AC Katieja Saraiya, DO      loratadine  10 mg Oral Daily Katieja Saraiya, DO      LORazepam  0 5 mg Oral Q12H PRN Katieja Saraiya, DO      methylPREDNISolone sodium succinate  40 mg Intravenous Q12H [address] Yared Reza DO      [START ON 4/12/2021] methylPREDNISolone sodium succinate  40 mg Intravenous Daily Shaniqua Roman,       ondansetron  4 mg Intravenous Q6H PRN Katieja Saraiya, DO      saccharomyces boulardii  250 mg Oral BID Katieja Saraiya, DO      sodium chloride  1 spray Each Nare Q1H PRN Marrion Phlegm, DO          Today, Patient Was Seen By: Leyla Laureano MD    ** Please Note: Dictation voice to text software may have been used in the creation of this document   **

## 2021-04-11 NOTE — ASSESSMENT & PLAN NOTE
Consulted ID as above  · Was switched from cefepime to ceftriaxone by ID  · Completed cefepime 7 days

## 2021-04-11 NOTE — ASSESSMENT & PLAN NOTE
Presented with 4 day history of reported flu-like symptoms and sinus congestion, initially treated by PCP as acute sinusitis with clindamycin  Presented to ER with worsening symptoms and increased WOB  · X-ray and CT chest suspicious for multifocal pneumonia; notably COVID-19/influenza/RSV PCR negative and patient with recent prior negative COVID-19 test     · Procalcitonin is additionally noted as elevated though improving  · Patient with reported multiple allergies to antibiotics  Was initially started on cefepime, switched to IV Ceftriaxone per ID, however will switch back to cefepime continue Legionella/strep urinary antigens negative  Blood cultures thus far negative  · Trend WBC, temperature curve, hemodynamics  · Continue supplemental oxygen and titrate as needed to maintain SaO2 greater than 88%  · Overnight 4/4-4/5 developed worsening hypoxia and WOB requiring midflow  Currently on 15L  · Suspicion for multifocal pneumonia vs  cryptogenic organizing pneumonia   Suspect steroids are needed however will first require a bronchoscopy to r/o fungal infection, viral negative  · Completed 7 days of cefepime  · Monitoring off antibiotics

## 2021-04-12 LAB
A FUMIGATUS1 AB SER QL ID: NEGATIVE
A PULLULANS AB SER QL: NEGATIVE
ANION GAP SERPL CALCULATED.3IONS-SCNC: 5 MMOL/L (ref 4–13)
BASOPHILS # BLD MANUAL: 0 THOUSAND/UL (ref 0–0.1)
BASOPHILS NFR MAR MANUAL: 0 % (ref 0–1)
BUN SERPL-MCNC: 15 MG/DL (ref 5–25)
CALCIUM SERPL-MCNC: 8.2 MG/DL (ref 8.3–10.1)
CHLORIDE SERPL-SCNC: 102 MMOL/L (ref 100–108)
CO2 SERPL-SCNC: 30 MMOL/L (ref 21–32)
CREAT SERPL-MCNC: 0.55 MG/DL (ref 0.6–1.3)
EOSINOPHIL # BLD MANUAL: 0 THOUSAND/UL (ref 0–0.4)
EOSINOPHIL NFR BLD MANUAL: 0 % (ref 0–6)
ERYTHROCYTE [DISTWIDTH] IN BLOOD BY AUTOMATED COUNT: 13.2 % (ref 11.6–15.1)
GFR SERPL CREATININE-BSD FRML MDRD: 104 ML/MIN/1.73SQ M
GLUCOSE SERPL-MCNC: 122 MG/DL (ref 65–140)
GLUCOSE SERPL-MCNC: 152 MG/DL (ref 65–140)
GLUCOSE SERPL-MCNC: 163 MG/DL (ref 65–140)
GLUCOSE SERPL-MCNC: 170 MG/DL (ref 65–140)
GLUCOSE SERPL-MCNC: 223 MG/DL (ref 65–140)
HCT VFR BLD AUTO: 37.2 % (ref 34.8–46.1)
HGB BLD-MCNC: 11.7 G/DL (ref 11.5–15.4)
LACEYELLA SACCHARI AB SER QL: NEGATIVE
LYMPHOCYTES # BLD AUTO: 0.21 THOUSAND/UL (ref 0.6–4.47)
LYMPHOCYTES # BLD AUTO: 1 % (ref 14–44)
MCH RBC QN AUTO: 29.1 PG (ref 26.8–34.3)
MCHC RBC AUTO-ENTMCNC: 31.5 G/DL (ref 31.4–37.4)
MCV RBC AUTO: 93 FL (ref 82–98)
METAMYELOCYTES NFR BLD MANUAL: 2 % (ref 0–1)
MONOCYTES # BLD AUTO: 0.64 THOUSAND/UL (ref 0–1.22)
MONOCYTES NFR BLD: 3 % (ref 4–12)
MYELOCYTES NFR BLD MANUAL: 4 % (ref 0–1)
NEUTROPHILS # BLD MANUAL: 19.22 THOUSAND/UL (ref 1.85–7.62)
NEUTS BAND NFR BLD MANUAL: 1 % (ref 0–8)
NEUTS SEG NFR BLD AUTO: 89 % (ref 43–75)
NRBC BLD AUTO-RTO: 0 /100 WBCS
PIGEON SERUM AB QL ID: NEGATIVE
PLATELET # BLD AUTO: 391 THOUSANDS/UL (ref 149–390)
PLATELET BLD QL SMEAR: ADEQUATE
PMV BLD AUTO: 11.5 FL (ref 8.9–12.7)
POLYCHROMASIA BLD QL SMEAR: PRESENT
POTASSIUM SERPL-SCNC: 4.4 MMOL/L (ref 3.5–5.3)
RBC # BLD AUTO: 4.02 MILLION/UL (ref 3.81–5.12)
RBC MORPH BLD: PRESENT
S RECTIVIRGULA AB SER QL ID: NEGATIVE
SODIUM SERPL-SCNC: 137 MMOL/L (ref 136–145)
T VULGARIS AB SER QL ID: NEGATIVE
WBC # BLD AUTO: 21.35 THOUSAND/UL (ref 4.31–10.16)

## 2021-04-12 PROCEDURE — 99232 SBSQ HOSP IP/OBS MODERATE 35: CPT | Performed by: INTERNAL MEDICINE

## 2021-04-12 PROCEDURE — 82948 REAGENT STRIP/BLOOD GLUCOSE: CPT

## 2021-04-12 PROCEDURE — 85027 COMPLETE CBC AUTOMATED: CPT | Performed by: INTERNAL MEDICINE

## 2021-04-12 PROCEDURE — 99233 SBSQ HOSP IP/OBS HIGH 50: CPT | Performed by: INTERNAL MEDICINE

## 2021-04-12 PROCEDURE — 85007 BL SMEAR W/DIFF WBC COUNT: CPT | Performed by: INTERNAL MEDICINE

## 2021-04-12 PROCEDURE — 80048 BASIC METABOLIC PNL TOTAL CA: CPT | Performed by: INTERNAL MEDICINE

## 2021-04-12 RX ORDER — PREDNISONE 20 MG/1
40 TABLET ORAL DAILY
Status: DISCONTINUED | OUTPATIENT
Start: 2021-04-13 | End: 2021-04-13

## 2021-04-12 RX ADMIN — AZELASTINE HYDROCHLORIDE 1 SPRAY: 137 SPRAY, METERED NASAL at 18:33

## 2021-04-12 RX ADMIN — BUSPIRONE HYDROCHLORIDE 10 MG: 10 TABLET ORAL at 21:21

## 2021-04-12 RX ADMIN — INSULIN LISPRO 2 UNITS: 100 INJECTION, SOLUTION INTRAVENOUS; SUBCUTANEOUS at 12:14

## 2021-04-12 RX ADMIN — INSULIN LISPRO 1 UNITS: 100 INJECTION, SOLUTION INTRAVENOUS; SUBCUTANEOUS at 21:21

## 2021-04-12 RX ADMIN — ENOXAPARIN SODIUM 40 MG: 40 INJECTION SUBCUTANEOUS at 08:05

## 2021-04-12 RX ADMIN — LORAZEPAM 0.5 MG: 0.5 TABLET ORAL at 15:16

## 2021-04-12 RX ADMIN — LORATADINE 10 MG: 10 TABLET ORAL at 08:05

## 2021-04-12 RX ADMIN — METHYLPREDNISOLONE SODIUM SUCCINATE 40 MG: 40 INJECTION, POWDER, FOR SOLUTION INTRAMUSCULAR; INTRAVENOUS at 08:05

## 2021-04-12 RX ADMIN — AZELASTINE HYDROCHLORIDE 1 SPRAY: 137 SPRAY, METERED NASAL at 08:05

## 2021-04-12 RX ADMIN — FLUOXETINE 80 MG: 20 CAPSULE ORAL at 08:05

## 2021-04-12 RX ADMIN — DIPHENHYDRAMINE HCL 25 MG: 25 TABLET ORAL at 15:16

## 2021-04-12 RX ADMIN — ATORVASTATIN CALCIUM 10 MG: 10 TABLET, FILM COATED ORAL at 21:21

## 2021-04-12 RX ADMIN — Medication 250 MG: at 08:05

## 2021-04-12 RX ADMIN — INSULIN LISPRO 1 UNITS: 100 INJECTION, SOLUTION INTRAVENOUS; SUBCUTANEOUS at 08:07

## 2021-04-12 RX ADMIN — Medication 250 MG: at 18:33

## 2021-04-12 NOTE — UTILIZATION REVIEW
Continued Stay Review    Date: 4/11/21                          Current Patient Class: inpatient  Current Level of Care: med surg   Transfer patient Once     Question: Level of Care Answer: Med Surg    04/10/21 1226    Update level of care Once     Question: Level of Care Answer: Level 1 Stepdown    04/10/21 0401    Update level of care Once     Question: Level of Care Answer: Critical Care    04/05/21 1346    Update level of care Once     Question: Level of Care Answer: Level 2 Stepdown / HOT    04/05/21 0920       HPI:56 y o  female w/hx htn, depression, chronic sinusitis, CRISTINA initially admitted on 4/3/21 as inpatient due to sepsis from multifocal pneumonia, acute hypoxic resp failure in 80's requiring HFNC  ID and pulm consulted  Bronchoscopy done, IV antbx in progress  Assessment/Plan:   4/8:remains on HFNC, very slow improvement, plan to intubate if continues to decline  IV steroids, IV antbx in progress  Remains in ICU  Per ID, pna is likely viral but covid and flu are negative  covid antibody is positive from prior exposure  Gm stain positive, but suspect colonizers  Bronch yesterday showed normal anatomy  No significant secretions  Wbc>30      4/9:lungs with scattered rales, remains on HFNC 40 liters, 94% sat  wbc improved slightly  Mycoplasma, blood cultures, strep/legionella and rp2 panel negative  Colonizer yeast grew from [employer]  Suspect wbc elevation from steroids  Differential includes bacterial /viral/atypical/fungal pneumonia, hp, , aep, resistent pathogen  Finished 7 days of IV cefepime, keeping off antbx now  Na worsening again  Holding further IVF, recheck bmp in am     4/10: cause remains unclear  weaned to [facility] o2  IV antbx continue  Hyperglycemic, likely from steroid  Viral and fungal cultures sent  Transferred to med surg  4/11: fatigued but improved  Afebrile, weaning o2, was 4li in am, down to 3 li by 11pm   diminished breath sounds at bases  Viral culture negative   Await fungal culture  Wbc slowing improving, now >21      Pertinent Labs/Diagnostic Results:     4/9 PCXR Improving airspace opacities bilaterally particularly with improvement in the bilateral upper lobes right greater than left      Results from last 7 days   Lab Units 04/12/21  0533 04/10/21  0628 04/09/21  0605 04/08/21  0554 04/07/21  0518 04/06/21  0525   WBC Thousand/uL 21 35* 23 81* 27 90* 34 61* 32 33* 25 93*   HEMOGLOBIN g/dL 11 7 11 3* 11 0* 11 2* 11 0* 11 6   HEMATOCRIT % 37 2 34 9 33 9* 34 2* 32 9* 34 2*   PLATELETS Thousands/uL 391* 352 348 312 293 236   BANDS PCT % 1  --   --   --   --  7         Results from last 7 days   Lab Units 04/12/21  0533 04/10/21  0628 04/09/21  0605 04/08/21  0554 04/07/21  0518 04/06/21  0525   SODIUM mmol/L 137 137 135* 137 133* 134*   POTASSIUM mmol/L 4 4 4 2 4 2 4 4 3 7 3 2*   CHLORIDE mmol/L 102 103 102 105 102 102   CO2 mmol/L 30 27 25 24 22 24   ANION GAP mmol/L 5 7 8 8 9 8   BUN mg/dL 15 17 19 18 14 10   CREATININE mg/dL 0 55* 0 50* 0 54* 0 50* 0 48* 0 53*   EGFR ml/min/1 73sq m 104 108 105 108 109 106   CALCIUM mg/dL 8 2* 8 1* 8 2* 8 4 8 6 8 7   MAGNESIUM mg/dL  --  2 5 2 6 2 7* 2 4 2 5   PHOSPHORUS mg/dL  --  3 7 4 2 4 0 3 2 2 6*         Results from last 7 days   Lab Units 04/12/21  1211 04/12/21  0740 04/11/21  2109 04/11/21  1625 04/11/21  1119 04/11/21  0819 04/10/21  2159 04/10/21  2113 04/10/21  1554 04/10/21  1032 04/10/21  0738 04/09/21  2217   POC GLUCOSE mg/dl 223* 170* 227* 133 303* 247* 224* 209* 226* 224* 151* 233*     Results from last 7 days   Lab Units 04/12/21  0533 04/10/21  0628 04/09/21  0605 04/08/21  0554 04/07/21  0518 04/06/21  0525   GLUCOSE RANDOM mg/dL 152* 150* 164* 150* 152* 133     Results from last 7 days   Lab Units 04/08/21  0554   CRP mg/L 132 0*   SED RATE mm/hour 103*             Results from last 7 days   Lab Units 04/05/21  1555   RESPIRATORY SYNCYTIAL VIRUS  Not Detected     Results from last 7 days   Lab Units 04/05/21  1555 ADENOVIRUS  Not Detected   BORDETELLA PARAPERTUSSIS  Not Detected   BORDETELLA PERTUSSIS  Not Detected   CHLAMYDIA PNEUMONIAE  Not Detected   (NOT NOVEL COVID-19) CORONAVIRUS  Not Detected   METAPNEUMOVIRUS  Not Detected   RHINOVIRUS  Not Detected   MYCOPLASMA PNEUMONIAE  Not Detected   PARAINFLUENZA VIRUS  Not Detected       Results from last 7 days   Lab Units 04/07/21  1645   GRAM STAIN RESULT  1+ Yeast*  1+ Gram positive rods*  1+ Epithelial Cells*         Vital Signs:   04/11/21 2300  --  --  --  --  --  95 % 32  3 L/min  Nasal cannula    04/11/21 22:46:28  98 3 °F (36 8 °C)  53Abnormal   17  129/71  90  97 % --  --  --    04/11/21 16:04:25  97 9 °F (36 6 °C)  55  --  125/68  87  96 % --  --  --    04/11/21 0822  --  --  --  --  --  94 % 36  4 L/min  Nasal cannula    04/11/21 08:12:12  98 1 °F (36 7 °C)  62  --  103/55  71  93 % --  --  --    04/10/21 2348  --  --  --  --  --  94 % 44  6 L/min  Nasal cannula    04/10/21 22:25:42  98 5 °F (36 9 °C)  68  18  100/57  71  88 %Abnormal  --  --  --    04/10/21 14:32:15  97 7 °F (36 5 °C)  65  16  95/55  68  94 % --  --  --    04/10/21 1011  --  --  --  --  --  -- 44  6 L/min  Mid flow nasal cannula    04/10/21 0900  98 5 °F (36 9 °C)  62  25Abnormal   --  --  95 % --  --  --    04/10/21 0840  --  --  --  --  --  96 % 52  8 L/min  Mid flow nasal cannula    04/10/21 0810  98 5 °F (36 9 °C)  56  17  130/78  95  95 % --  --  --    04/10/21 0705  --  54Abnormal   20  135/73  100  93 % --  --  --    04/10/21 0605  --  52Abnormal   16  123/69  84  95 % --  --  --    04/10/21 0505  --  58  16  141/80  96  94 % --  --  --    04/10/21 0405  98 8 °F (37 1 °C)  52Abnormal   18  139/65  87  96 % --  --  --    04/10/21 0341  --  --  --  --  --  95 % --  --  --    04/10/21 0105  --  54Abnormal   23Abnormal   122/76  96  93 % --  --  --    04/10/21 0005  --  54Abnormal   18  133/74  94  95 % --  --  --    04/10/21 0000  98 6 °F (37 °C)  56  18  --  --  95 % --  --  -- Medications:   Scheduled Medications:  atorvastatin, 10 mg, Oral, Daily  azelastine, 1 spray, Each Nare, BID  busPIRone, 10 mg, Oral, QPM  enoxaparin, 40 mg, Subcutaneous, Daily  FLUoxetine, 80 mg, Oral, Daily  insulin lispro, 1-5 Units, Subcutaneous, HS  insulin lispro, 1-6 Units, Subcutaneous, TID AC  loratadine, 10 mg, Oral, Daily  [START ON 4/13/2021] predniSONE, 40 mg, Oral, Daily  saccharomyces boulardii, 250 mg, Oral, BID  methylPREDNISolone sodium succinate (Solu-MEDROL) injection 60 mg   Dose: 60 mg  Freq: Every 6 hours scheduled Route: IV  Start: 04/05/21 1800 End: 04/09/21 1252    methylPREDNISolone sodium succinate (Solu-MEDROL) injection 60 mg   Dose: 60 mg  Freq: Every 12 hours scheduled Route: IV  Start: 04/09/21 1700 End: 04/11/21 1551    cefepime (MAXIPIME) 2,000 mg in dextrose 5 % 50 mL IVPB   Dose: 2,000 mg  Freq: Every 8 hours Route: IV  Last Dose: Stopped (04/09/21 1249)  Start: 04/05/21 1855 End: 04/09/21 1320    Continuous IV Infusions:none     PRN Meds:  acetaminophen, 650 mg, Oral, Q6H PRN using daily doses  albuterol, 2 puff, Inhalation, Q4H PRN  diphenhydrAMINE, 25 mg, Oral, Q6H PRN used daily 4/8-4/11  glycerin-hypromellose-, 1 drop, Both Eyes, Q3H PRN  LORazepam, 0 5 mg, Oral, Q12H PRN x 1 4/8, x 2 4/9, x 1 4/10, x 1 4/11  ondansetron, 4 mg, Intravenous, Q6H PRN  sodium chloride, 1 spray, Each Nare, Q1H PRN      Discharge Plan: D    Network Utilization Review Department  ATTENTION: Please call with any questions or concerns to [phone] and carefully listen to the prompts so that you are directed to the right person  All voicemails are confidential   Candia Siemens all requests for admission clinical reviews, approved or denied determinations and any other requests to dedicated fax number below belonging to the campus where the patient is receiving treatment   List of dedicated fax numbers for the Facilities:  FACILITY NAME UR FAX NUMBER   ADMISSION DENIALS (Administrative/Medical Necessity) [phone]   [address] (Maternity/NICU/Pediatrics) [address] [facility] [address] [place] [address]  [phone]   Priyanka Dimas [address] (Courtney Conklin "Danielle" 103) [address] [address] [place] [address] [phone]   [place] [address] [phone]

## 2021-04-12 NOTE — ASSESSMENT & PLAN NOTE
Likely 2/2 PNA  Given lack of response to abx, Cryptogenic organizing PNA  · Encourage IS/OOB as able  · Now on mid-flow NC, COVID negative  Pulmonary does not feel need to repeat  · Treating as above  · Has bronchoscopy  · Started with steroids iv solumedrol, start on prednisone tomorrow taper per week  · Monitor leukocytosis and hyperglycemia  · Possible discharge wed

## 2021-04-12 NOTE — PROGRESS NOTES
Progress Note - Infectious Disease   Guevara Beach 61 y o  female MRN: [id number]  Unit/Bed#: [facility] 811-01 Encounter: [id number]         IMPRESSION & RECOMMENDATIONS:   Impression/Recommendations:  1  Sepsis, POA   Tachypnea, leukocytosis   Secondary to #2   No other clear explanation   Negative UA  Blood cultures are negative  WBC count continues to trend down, likely secondary to IV steroid  Fevers remain improved       -antibiotic plan as below  -monitor temperatures and hemodynamics  -monitor CBC  -supportive care     2  Multifocal pneumonia   More likely viral etiology given constellation of symptoms, positive sick contacts   Negative COVID-19 PCR x2   Negative influenza, RSV PCR, RP2   Consider early bacterial pneumonia based on CT findings, elevated procalcitonin   Low suspicion for MDRO  Status post bronchoscopy with normal anatomy, no significant secretions  Culture showed Candida albicans, which is a colonizer  Patient received over 7 days of IV antibiotic  Procalcitonin level improved  Clinically much improved      -continue to observe off anymore antibiotics  -steroid management per pulmonology service  -monitor symptoms  -outpatient CT chest in about 6 weeks to ensure resolution     3  Acute hypoxic respiratory failure   Secondary to #2 likely viral in etiology   Doubt fungal process as patient is not immunocompromised   Also consider other process such as cryptogenic organizing pneumonia   O2 requirements much improved after initiation of steroid, now down to 2 L      -no further antibiotics indicated  -steroid management per pulmonology service  -monitor O2 requirements  -volume management     4  Multiple antibiotic allergies   Patient has extensive antibiotic allergy history, which significantly limits treatment options   She has previously tolerated vancomycin and clindamycin   Patient tolerated IV ceftriaxone and cefepime without difficulty      5   Chronic ethmoid sinusitis   With prior bilateral antrectomy   Follows with ENT last seen on 2021      6  Tobacco use   Risk factor for pulmonary infection   Recommend smoking cessation      Antibiotics:  Off antibiotic 3    No ongoing acute ID issues  We will sign off  Please call us with any new questions  Subjective:  Transferred out of ICU  Feeling much better  Denies shortness of breath  Mild dry cough  No fevers  Tolerating oral intake  Still feels generally fatigued  Objective:  Vitals:  Temp:  [97 8 °F (36 6 °C)-98 3 °F (36 8 °C)] 97 8 °F (36 6 °C)  HR:  [53-64] 56  Resp:  [16-17] 16  BP: (120-129)/(58-71) 120/58  SpO2:  [91 %-97 %] 94 %  Temp (24hrs), Av °F (36 7 °C), Min:97 8 °F (36 6 °C), Max:98 3 °F (36 8 °C)  Current: Temperature: 97 8 °F (36 6 °C)    Physical Exam:   General:  No acute distress, nontoxic, resting comfortably in bed  HEENT:  Atraumatic normocephalic  Psychiatric:  Awake and alert  Pulmonary:  Normal respiratory excursion without accessory muscle use  Abdomen:  Soft, nontender  Extremities:  No edema  Skin:  No rashes  Neuro: Moves all extremities spontaneously    Lab Results:  I have personally reviewed pertinent labs    Results from last 7 days   Lab Units 21  0533 04/10/21  0628 21  0605   POTASSIUM mmol/L 4 4 4 2 4 2   CHLORIDE mmol/L 102 103 102   CO2 mmol/L 30 27 25   BUN mg/dL 15 17 19   CREATININE mg/dL 0 55* 0 50* 0 54*   EGFR ml/min/1 73sq m 104 108 105   CALCIUM mg/dL 8 2* 8 1* 8 2*     Results from last 7 days   Lab Units 21  0533 04/10/21  0628 21  0605   WBC Thousand/uL 21 35* 23 81* 27 90*   HEMOGLOBIN g/dL 11 7 11 3* 11 0*   PLATELETS Thousands/uL 391* 352 348     Results from last 7 days   Lab Units 21  1645 21  1555   GRAM STAIN RESULT  1+ Yeast*  1+ Gram positive rods*  1+ Epithelial Cells*  --    MRSA CULTURE ONLY   --  No Methicillin Resistant Staphlyococcus aureus (MRSA) isolated       Imaging Studies:   I have personally reviewed pertinent imaging study reports and images in PACS  EKG, Pathology, and Other Studies:   I have personally reviewed pertinent reports

## 2021-04-12 NOTE — PROGRESS NOTES
[address] [facility]  Progress Note - Alba Mora 1965, 61 y o  female MRN: [id number]  Unit/Bed#: [facility] 811-01 Encounter: [id number]  Primary Care Provider: Emanuel Sanderson MD   Date and time admitted to hospital: 4/3/2021  1:47 AM    Hyperglycemia  Assessment & Plan  Keep glucose 140-180, monitor closely with steriods    Chronic sinusitis  Assessment & Plan  Known to ENT as outpatient with chronic sinus issues  · ENT input noted, continue abx  · Could consider CT sinus but even if +, no inpatient recs  · Recommend outpatient f/u and supportive care    Allergy to multiple antibiotics  Assessment & Plan  Consulted ID as above  · Was switched from cefepime to ceftriaxone by ID  · Completed cefepime 7 days    Abnormal thyroid function test  Assessment & Plan  TSH low, FT4 normal  · Would likely repeat as outpatient in 4 weeks when not acutely ill    Hyponatremia  Assessment & Plan  Sodium 127 on admission, possible hypovolemia as patient admits to decreased oral intake x several days  · Urine Na < 5, UOsm 489  · Started IV fluids with some improvement however now worsening again  · Hold further IVF  · AM BMP  · Consider FR/salt tabs if continues to worsen     Community acquired pneumonia of right lower lobe of lung  Assessment & Plan  Presented with 4 day history of reported flu-like symptoms and sinus congestion, initially treated by PCP as acute sinusitis with clindamycin  Presented to ER with worsening symptoms and increased WOB  · X-ray and CT chest suspicious for multifocal pneumonia; notably COVID-19/influenza/RSV PCR negative and patient with recent prior negative COVID-19 test     · Procalcitonin is additionally noted as elevated though improving  · Patient with reported multiple allergies to antibiotics  Was initially started on cefepime, switched to IV Ceftriaxone per ID, however will switch back to cefepime continue Legionella/strep urinary antigens negative   Blood cultures thus far negative  · Trend WBC, temperature curve, hemodynamics  · Continue supplemental oxygen and titrate as needed to maintain SaO2 greater than 88%  · Overnight 4/4-4/5 developed worsening hypoxia and WOB requiring midflow  Currently on 15L  · Suspicion for multifocal pneumonia vs  cryptogenic organizing pneumonia  Suspect steroids are needed however will first require a bronchoscopy to r/o fungal infection, viral negative  · Completed 7 days of cefepime  · Monitoring off antibiotics        Tobacco use  Assessment & Plan  Nicotine patch offered however patient declines  · Counseled on smoking cessation    Leukocytosis  Assessment & Plan  Probably related to pneumonia and steroids  Monitor closely     HTN (hypertension)  Assessment & Plan  Continue home verapamil with hold parameters  · Monitor blood pressure per protocol    Depression with anxiety  Assessment & Plan  Mood stable presently  · Continue home medications     * Acute respiratory failure with hypoxia (HCC)  Assessment & Plan  Likely 2/2 PNA  Given lack of response to abx, Cryptogenic organizing PNA  · Encourage IS/OOB as able  · Now on mid-flow NC, COVID negative  Pulmonary does not feel need to repeat  · Treating as above  · Has bronchoscopy  · Started with steroids iv solumedrol, start on prednisone tomorrow taper per week  · Monitor leukocytosis and hyperglycemia  · Possible discharge wed  VTE Pharmacologic Prophylaxis:   Pharmacologic: Enoxaparin (Lovenox)  Mechanical VTE Prophylaxis in Place: Yes    Patient Centered Rounds: I have performed bedside rounds with nursing staff today  Discussions with Specialists or Other Care Team Provider: pulmonary regarding candida in bronchial lavage  Education and Discussions with Family / Patient: patient is doing well  Time Spent for Care: 45 minutes  More than 50% of total time spent on counseling and coordination of care as described above      Current Length of Stay: 9 day(s)    Current Patient Status: Inpatient   Certification Statement: The patient will continue to require additional inpatient hospital stay due to , requiring steriods switching to oral tomorrow, may be d/c wed    Discharge Plan: pending oral steroids and d/c possible wed    Code Status: Level 1 - Full Code      Subjective:   Patient has improved to 2 liters on nasal canula oxygen  Objective:     Vitals:   Temp (24hrs), Av 1 °F (36 7 °C), Min:97 8 °F (36 6 °C), Max:98 3 °F (36 8 °C)    Temp:  [97 8 °F (36 6 °C)-98 3 °F (36 8 °C)] 98 2 °F (36 8 °C)  HR:  [53-64] 58  Resp:  [16-17] 16  BP: (120-129)/(58-71) 123/68  SpO2:  [91 %-97 %] 93 %  Body mass index is 35 11 kg/m²  Input and Output Summary (last 24 hours): Intake/Output Summary (Last 24 hours) at 2021 1519  Last data filed at 2021 1230  Gross per 24 hour   Intake 420 ml   Output --   Net 420 ml       Physical Exam:     Physical Exam  Vitals signs and nursing note reviewed  Constitutional:       General: She is not in acute distress  Appearance: Normal appearance  She is not ill-appearing, toxic-appearing or diaphoretic  HENT:      Head: Normocephalic and atraumatic  Right Ear: There is no impacted cerumen  Nose: No congestion or rhinorrhea  Mouth/Throat:      Pharynx: No oropharyngeal exudate or posterior oropharyngeal erythema  Eyes:      General:         Right eye: No discharge  Left eye: No discharge  Extraocular Movements: Extraocular movements intact  Conjunctiva/sclera: Conjunctivae normal       Pupils: Pupils are equal, round, and reactive to light  Neck:      Musculoskeletal: Normal range of motion and neck supple  No neck rigidity or muscular tenderness  Vascular: No carotid bruit  Cardiovascular:      Rate and Rhythm: Normal rate  Pulmonary:      Effort: Pulmonary effort is normal  No respiratory distress  Breath sounds: Normal breath sounds  No stridor   No wheezing, rhonchi or rales  Chest:      Chest wall: No tenderness  Abdominal:      General: Abdomen is flat  Bowel sounds are normal  There is no distension  Palpations: Abdomen is soft  There is no mass  Tenderness: There is no abdominal tenderness  There is no right CVA tenderness, left CVA tenderness, guarding or rebound  Hernia: No hernia is present  Musculoskeletal: Normal range of motion  General: No swelling, tenderness, deformity or signs of injury  Lymphadenopathy:      Cervical: No cervical adenopathy  Skin:     General: Skin is warm and dry  Coloration: Skin is not jaundiced or pale  Findings: No bruising or erythema  Neurological:      General: No focal deficit present  Mental Status: She is alert and oriented to person, place, and time  Mental status is at baseline  Cranial Nerves: No cranial nerve deficit  Sensory: No sensory deficit  Motor: No weakness  Coordination: Coordination normal    Psychiatric:         Mood and Affect: Mood normal          Behavior: Behavior normal          Thought Content: Thought content normal          Judgment: Judgment normal          Additional Data:     Labs:    Results from last 7 days   Lab Units 04/12/21  0533   WBC Thousand/uL 21 35*   HEMOGLOBIN g/dL 11 7   HEMATOCRIT % 37 2   PLATELETS Thousands/uL 391*   LYMPHO PCT % 1*   MONO PCT % 3*   EOS PCT % 0     Results from last 7 days   Lab Units 04/12/21  0533   POTASSIUM mmol/L 4 4   CHLORIDE mmol/L 102   CO2 mmol/L 30   BUN mg/dL 15   CREATININE mg/dL 0 55*   CALCIUM mg/dL 8 2*           * I Have Reviewed All Lab Data Listed Above  * Additional Pertinent Lab Tests Reviewed:  G[facility] 65 Admission Reviewed    Imaging:    Imaging Reports Reviewed Today Include:    Imaging Personally Reviewed by Myself Includes:      Recent Cultures (last 7 days):     Results from last 7 days   Lab Units 04/07/21  1645   GRAM STAIN RESULT  1+ Yeast* 1+ Gram positive rods*  1+ Epithelial Cells*       Last 24 Hours Medication List:   Current Facility-Administered Medications   Medication Dose Route Frequency Provider Last Rate    acetaminophen  650 mg Oral Q6H PRN Katieja Saraiya, DO      albuterol  2 puff Inhalation Q4H PRN Katie Saraiya, DO      atorvastatin  10 mg Oral Daily Katieja Saraiya, DO      azelastine  1 spray Each Nare BID Pooja Saraiya, DO      busPIRone  10 mg Oral QPM Katieja Saraiya, DO      diphenhydrAMINE  25 mg Oral Q6H PRN Katieja Saraiya, DO      enoxaparin  40 mg Subcutaneous Daily Katieja Saraiya, DO      FLUoxetine  80 mg Oral Daily Katieja Saraiya, DO      glycerin-hypromellose-  1 drop Both Eyes Q3H PRN Katieja Saraiya, DO      insulin lispro  1-5 Units Subcutaneous HS Katieja Saraiya, DO      insulin lispro  1-6 Units Subcutaneous TID AC Katieja Saraiya, DO      loratadine  10 mg Oral Daily Katieoja Saraiya, DO      LORazepam  0 5 mg Oral Q12H PRN Katieja Saraiya, DO      ondansetron  4 mg Intravenous Q6H PRN Katie Saraiya, DO      [START ON 4/13/2021] predniSONE  40 mg Oral Daily Ruma Hall MD      saccharomyces boulardii  250 mg Oral BID Willam Simpson,       sodium chloride  1 spray Each Nare Q1H PRN Willam Simpson DO          Today, Patient Was Seen By: Mason Venegas MD    ** Please Note: Dictation voice to text software may have been used in the creation of this document   **

## 2021-04-12 NOTE — PROGRESS NOTES
Progress Note - Pulmonary   Reda Reddy 61 y o  female MRN: [id number]  Unit/Bed#: [facility] 811-01 Encounter: [id number]      Assessment:  1   acute hypoxic respiratory failure likely secondary to cryptogenic organizing pneumonia, less likely hypersensitivity pneumonitis,  Less likely infectious etiology, less likely vasculitis or autoimmune condition given negative serologies  1   HP panel remains pending  2   serology for autoimmune / vasculitis unremarkable  3   bronchoscopy without evidence of infectious etiology and normal cell count, although patient had been on corticosteroids -  Did complete 7 day course of cefepime   2   abnormal CT chest with bilateral consolidative airspace opacities  3   sepsis, present on admission  4   tobacco abuse  5   chronic sinusitis  6   leukocytosis, likely steroid induced  7  COVID-19 antibody IgG positive, status post single vaccine    Plan:  ·  continue to titrate supplemental oxygen as tolerated, turned down to 2 L via nasal cannula, saturating mid 90s, out of bed to chair, incentive spirometry, pulmonary toilet, goal SpO2 greater than 88%  ·  check ambulatory pulse ox  ·  will switch Solu-Medrol to prednisone 40 mg daily starting tomorrow and will plan on 40 mg for 2 weeks followed by 20 mg for 2 weeks with outpatient follow-up at 2 week [facility] -  Suspect continued improvement and likely secondary to cryptogenic organizing pneumonia  ·  will need repeat imaging as outpatient as well as pulmonary function testing    Subjective:    patient seen examined at bedside  She is currently saturating mid 90s on 3 L nasal cannula  Intermittent mild shortness of breath and intermittent nonproductive cough, but feeling better  Denies fever, chills, nausea, vomiting, chest pain  Review of Systems   Constitutional: Positive for fatigue  Negative for chills, fever and unexpected weight change  HENT: Negative for congestion, rhinorrhea, sneezing and sore throat      Respiratory: Positive for cough and shortness of breath  Negative for wheezing  Cardiovascular: Negative for chest pain, palpitations and leg swelling  Gastrointestinal: Negative for abdominal pain, constipation, diarrhea, nausea and vomiting  Genitourinary: Negative for dysuria  Musculoskeletal: Negative for arthralgias  Neurological: Negative for dizziness and numbness  Objective:     Vitals:    04/11/21 1604 04/11/21 2246 04/11/21 2300 04/12/21 0751   BP: 125/68 129/71  120/58   Pulse: 55 (!) 53  64   Resp:  17  16   Temp: 97 9 °F (36 6 °C) 98 3 °F (36 8 °C)  97 8 °F (36 6 °C)   TempSrc:       SpO2: 96% 97% 95% 91%   Weight:       Height:             No intake or output data in the 24 hours ending 04/12/21 7840      Physical Exam  Constitutional:       General: She is not in acute distress  Appearance: She is well-developed  She is not diaphoretic  Comments: On 3 L NC   HENT:      Head: Normocephalic and atraumatic  Mouth/Throat:      Mouth: Mucous membranes are moist       Pharynx: Oropharynx is clear  No oropharyngeal exudate  Eyes:      General: No scleral icterus  Cardiovascular:      Rate and Rhythm: Normal rate and regular rhythm  Heart sounds: Normal heart sounds  No murmur  Pulmonary:      Effort: Pulmonary effort is normal  No respiratory distress  Breath sounds: Normal breath sounds  No wheezing  Abdominal:      General: Bowel sounds are normal  There is no distension  Palpations: Abdomen is soft  Tenderness: There is no abdominal tenderness  Musculoskeletal:      Right lower leg: No edema  Left lower leg: No edema  Neurological:      Mental Status: She is alert and oriented to person, place, and time  Labs: I have personally reviewed pertinent lab results    Results from last 7 days   Lab Units 04/12/21  0533 04/10/21  0628 04/09/21  0605  04/06/21  0525   WBC Thousand/uL 21 35* 23 81* 27 90*   < > 25 93*   HEMOGLOBIN g/dL 11 7 11 3* 11 0* < > 11 6   HEMATOCRIT % 37 2 34 9 33 9*   < > 34 2*   PLATELETS Thousands/uL 391* 352 348   < > 236   MONO PCT %  --   --   --   --  1*    < > = values in this interval not displayed  Results from last 7 days   Lab Units 04/12/21  0533 04/10/21  0628 04/09/21  0605   POTASSIUM mmol/L 4 4 4 2 4 2   CHLORIDE mmol/L 102 103 102   CO2 mmol/L 30 27 25   BUN mg/dL 15 17 19   CREATININE mg/dL 0 55* 0 50* 0 54*   CALCIUM mg/dL 8 2* 8 1* 8 2*     Results from last 7 days   Lab Units 04/10/21  0628 04/09/21  0605 04/08/21  0554   MAGNESIUM mg/dL 2 5 2 6 2 7*     Results from last 7 days   Lab Units 04/10/21  0628 04/09/21  0605 04/08/21  0554   PHOSPHORUS mg/dL 3 7 4 2 4 0              No results found for: TROPONINI    Microbiology:    Bronchial culture Candida   MRSA negative   strep/ Legionella/RP2 panel negative  Blood cultures neg  COVID negative x 2    Imaging and other studies: I have personally reviewed pertinent reports     and I have personally reviewed pertinent films in PACS   chest x-ray 04/09/2021   improving bilateral airspace opacities     CT chest 04/03/2020   multifocal consolidated airspace opacities     bronchoscopy   cytology negative, cell count 90% macrophage 10% neutrophils      Tim Rivera MD  Pulmonary & Critical Care Fellow, [facility]

## 2021-04-13 VITALS
DIASTOLIC BLOOD PRESSURE: 58 MMHG | TEMPERATURE: 98 F | HEIGHT: 65 IN | OXYGEN SATURATION: 93 % | SYSTOLIC BLOOD PRESSURE: 102 MMHG | RESPIRATION RATE: 16 BRPM | WEIGHT: 210.98 LBS | BODY MASS INDEX: 35.15 KG/M2 | HEART RATE: 65 BPM

## 2021-04-13 LAB
ANION GAP SERPL CALCULATED.3IONS-SCNC: 3 MMOL/L (ref 4–13)
BUN SERPL-MCNC: 14 MG/DL (ref 5–25)
CALCIUM SERPL-MCNC: 8.4 MG/DL (ref 8.3–10.1)
CHLORIDE SERPL-SCNC: 102 MMOL/L (ref 100–108)
CO2 SERPL-SCNC: 31 MMOL/L (ref 21–32)
CREAT SERPL-MCNC: 0.48 MG/DL (ref 0.6–1.3)
ERYTHROCYTE [DISTWIDTH] IN BLOOD BY AUTOMATED COUNT: 13.3 % (ref 11.6–15.1)
GFR SERPL CREATININE-BSD FRML MDRD: 109 ML/MIN/1.73SQ M
GLUCOSE SERPL-MCNC: 75 MG/DL (ref 65–140)
GLUCOSE SERPL-MCNC: 88 MG/DL (ref 65–140)
HCT VFR BLD AUTO: 36.8 % (ref 34.8–46.1)
HGB BLD-MCNC: 11.6 G/DL (ref 11.5–15.4)
MCH RBC QN AUTO: 29.5 PG (ref 26.8–34.3)
MCHC RBC AUTO-ENTMCNC: 31.5 G/DL (ref 31.4–37.4)
MCV RBC AUTO: 94 FL (ref 82–98)
NRBC BLD AUTO-RTO: 0 /100 WBCS
PLATELET # BLD AUTO: 358 THOUSANDS/UL (ref 149–390)
PMV BLD AUTO: 10.9 FL (ref 8.9–12.7)
POTASSIUM SERPL-SCNC: 4 MMOL/L (ref 3.5–5.3)
RBC # BLD AUTO: 3.93 MILLION/UL (ref 3.81–5.12)
SODIUM SERPL-SCNC: 136 MMOL/L (ref 136–145)
WBC # BLD AUTO: 15.29 THOUSAND/UL (ref 4.31–10.16)

## 2021-04-13 PROCEDURE — 80048 BASIC METABOLIC PNL TOTAL CA: CPT | Performed by: INTERNAL MEDICINE

## 2021-04-13 PROCEDURE — 99232 SBSQ HOSP IP/OBS MODERATE 35: CPT | Performed by: INTERNAL MEDICINE

## 2021-04-13 PROCEDURE — 99239 HOSP IP/OBS DSCHRG MGMT >30: CPT | Performed by: INTERNAL MEDICINE

## 2021-04-13 PROCEDURE — 94761 N-INVAS EAR/PLS OXIMETRY MLT: CPT

## 2021-04-13 PROCEDURE — 82948 REAGENT STRIP/BLOOD GLUCOSE: CPT

## 2021-04-13 PROCEDURE — 85027 COMPLETE CBC AUTOMATED: CPT | Performed by: INTERNAL MEDICINE

## 2021-04-13 RX ORDER — PREDNISONE 20 MG/1
40 TABLET ORAL DAILY
Status: DISCONTINUED | OUTPATIENT
Start: 2021-04-13 | End: 2021-04-13 | Stop reason: HOSPADM

## 2021-04-13 RX ORDER — PREDNISONE 20 MG/1
20 TABLET ORAL DAILY
Status: DISCONTINUED | OUTPATIENT
Start: 2021-04-27 | End: 2021-04-13 | Stop reason: HOSPADM

## 2021-04-13 RX ORDER — PREDNISONE 20 MG/1
20 TABLET ORAL DAILY
Qty: 14 TABLET | Refills: 0 | Status: SHIPPED | OUTPATIENT
Start: 2021-04-27 | End: 2021-05-11

## 2021-04-13 RX ORDER — SACCHAROMYCES BOULARDII 250 MG
250 CAPSULE ORAL 2 TIMES DAILY
Qty: 28 CAPSULE | Refills: 0 | Status: SHIPPED | OUTPATIENT
Start: 2021-04-13 | End: 2021-04-27

## 2021-04-13 RX ORDER — PREDNISONE 20 MG/1
40 TABLET ORAL DAILY
Qty: 26 TABLET | Refills: 0 | Status: SHIPPED | OUTPATIENT
Start: 2021-04-14 | End: 2021-04-27

## 2021-04-13 RX ADMIN — AZELASTINE HYDROCHLORIDE 1 SPRAY: 137 SPRAY, METERED NASAL at 08:44

## 2021-04-13 RX ADMIN — ENOXAPARIN SODIUM 40 MG: 40 INJECTION SUBCUTANEOUS at 08:45

## 2021-04-13 RX ADMIN — PREDNISONE 40 MG: 20 TABLET ORAL at 08:45

## 2021-04-13 RX ADMIN — FLUOXETINE 80 MG: 20 CAPSULE ORAL at 08:45

## 2021-04-13 RX ADMIN — LORATADINE 10 MG: 10 TABLET ORAL at 08:45

## 2021-04-13 RX ADMIN — Medication 250 MG: at 08:45

## 2021-04-13 NOTE — CASE MANAGEMENT
Pt will be DC home today with no needs  CM will continue following and support Pt until they are DC  CM is following closely

## 2021-04-13 NOTE — PROGRESS NOTES
Progress Note - Pulmonary   Tom Souza 61 y o  female MRN: [id number]  Unit/Bed#: [facility] 811-01 Encounter: [id number]      Assessment:  1   acute hypoxic respiratory failure likely secondary to cryptogenic organizing pneumonia, less likely hypersensitivity pneumonitis,  Less likely infectious etiology, less likely vasculitis or autoimmune condition given negative serologies  1   HP panel negative   2   serology for autoimmune / vasculitis unremarkable  3   bronchoscopy without evidence of infectious etiology and normal cell count, although patient had been on corticosteroids -  Did complete 7 day course of cefepime   2   abnormal CT chest with bilateral consolidative airspace opacities  3   sepsis, present on admission  4   tobacco abuse  5   chronic sinusitis  6   leukocytosis, likely steroid induced  7  COVID-19 antibody IgG positive, status post single vaccine     Plan:  ·  continue to titrate supplemental oxygen as tolerated, turned down to 1 L via nasal cannula, saturating low 90s, out of bed to chair, incentive spirometry, pulmonary toilet, goal SpO2 greater than 88%  ·  check Home O2 eval, will likely need to be discharged with 1-2 L with exertion   ·  will switch Solu-Medrol to prednisone 40 mg and will plan on 40 mg for 2 weeks followed by 20 mg for 2 weeks with outpatient follow-up at 2 week [facility] -  Suspect continued improvement and likely secondary to cryptogenic organizing pneumonia  Will need outpatient 6MWT in the office   ·  will need repeat imaging as outpatient in about 6 weeks as well as pulmonary function testing    *OK to discharge from pulmonary standpoint with above recs - can dc with oxygen if required, will arrange outpatient follow up with Dr Nicho Lawson in about 2 weeks, continue prednisone taper as outlined  Will sign off,  Please call with questions / re-consult as needed    Subjective:    patient seen examined at bedside    She states that she slept well, denies significant shortness of breath at rest, did not ambulate yesterday  Denies fever, chills, nausea, vomiting, chest pain  Review of Systems   Constitutional: Positive for fatigue  Negative for chills, fever and unexpected weight change  HENT: Negative for congestion, rhinorrhea, sneezing and sore throat  Respiratory: Positive for cough  Negative for shortness of breath and wheezing  Cardiovascular: Negative for chest pain, palpitations and leg swelling  Gastrointestinal: Negative for abdominal pain, constipation, diarrhea, nausea and vomiting  Genitourinary: Negative for dysuria  Musculoskeletal: Negative for arthralgias  Neurological: Negative for dizziness and numbness  Objective:     Vitals:    04/12/21 0936 04/12/21 1519 04/12/21 2220 04/13/21 0746   BP:  123/68 118/64 102/58   Pulse: 56 58 61 65   Resp:  16 16 16   Temp:  98 2 °F (36 8 °C) 98 °F (36 7 °C)    TempSrc:       SpO2: 94% 93% (!) 89% (!) 88%   Weight:       Height:               Intake/Output Summary (Last 24 hours) at 4/13/2021 0820  Last data filed at 4/12/2021 1701  Gross per 24 hour   Intake 480 ml   Output --   Net 480 ml         Physical Exam  Constitutional:       General: She is not in acute distress  Appearance: She is well-developed  She is not diaphoretic  Comments: On 1 L NC    HENT:      Head: Normocephalic and atraumatic  Mouth/Throat:      Mouth: Mucous membranes are moist       Pharynx: Oropharynx is clear  No oropharyngeal exudate  Eyes:      General: No scleral icterus  Cardiovascular:      Rate and Rhythm: Normal rate and regular rhythm  Heart sounds: Normal heart sounds  No murmur  Pulmonary:      Effort: Pulmonary effort is normal  No respiratory distress  Breath sounds: No wheezing  Comments: Diminished breath sounds at the bases  Abdominal:      General: Bowel sounds are normal  There is no distension  Palpations: Abdomen is soft  Tenderness: There is no abdominal tenderness  Musculoskeletal:      Right lower leg: No edema  Left lower leg: No edema  Neurological:      Mental Status: She is alert and oriented to person, place, and time  Labs: I have personally reviewed pertinent lab results  Results from last 7 days   Lab Units 04/13/21  0535 04/12/21  0533 04/10/21  0628   WBC Thousand/uL 15 29* 21 35* 23 81*   HEMOGLOBIN g/dL 11 6 11 7 11 3*   HEMATOCRIT % 36 8 37 2 34 9   PLATELETS Thousands/uL 358 391* 352   MONO PCT %  --  3*  --       Results from last 7 days   Lab Units 04/13/21  0535 04/12/21  0533 04/10/21  0628   POTASSIUM mmol/L 4 0 4 4 4 2   CHLORIDE mmol/L 102 102 103   CO2 mmol/L 31 30 27   BUN mg/dL 14 15 17   CREATININE mg/dL 0 48* 0 55* 0 50*   CALCIUM mg/dL 8 4 8 2* 8 1*     Results from last 7 days   Lab Units 04/10/21  0628 04/09/21  0605 04/08/21  0554   MAGNESIUM mg/dL 2 5 2 6 2 7*     Results from last 7 days   Lab Units 04/10/21  0628 04/09/21  0605 04/08/21  0554   PHOSPHORUS mg/dL 3 7 4 2 4 0              No results found for: TROPONINI    Microbiology:    Bronchial culture Candida   MRSA negative   strep/ Legionella/RP2 panel negative  Blood cultures neg  COVID negative x 2     Imaging and other studies: I have personally reviewed pertinent reports     and I have personally reviewed pertinent films in PACS   chest x-ray 04/09/2021   improving bilateral airspace opacities      CT chest 04/03/2020   multifocal consolidated airspace opacities      bronchoscopy   cytology negative, cell count 90% macrophage 10% neutrophils      Thee Lawson MD  Pulmonary & Critical Care Fellow, [facility]

## 2021-04-13 NOTE — RESPIRATORY THERAPY NOTE
Home Oxygen Qualifying Test       Patient name: Ami Juárez        : 1965   Date of Test:  2021  Diagnosis:      Home Oxygen Test:    **Medicare Guidelines require item(s) 1-5 on all ambulatory patients or 1 and 2 on non-ambulatory patients  1   Baseline SPO2 on Room Air at rest 93 %  2   SPO2 during exercise on Room Air 94 %  During exercise monitor SpO2  If SPO2 increases >=89% with ambulation do not add supplemental             oxygen  If <= 88% on room air add O2 via NC and titrate patient  Patient must be ambulated with O2 and titrated to > 88% with exertion  3   SPO2 on Oxygen at rest n/a % n/a lpm     4   SPO2 during exercise on Oxygen  n/a% a liter flow of n/a lpm     5   Exercise performed:          walking, duration 6 (min), distance 500 (feet)          []  Supplemental Home Oxygen is indicated  [x]  Client does not qualify for home oxygen        Respiratory Additional Notes-     Dennis Hua, RT

## 2021-04-14 ENCOUNTER — TRANSITIONAL CARE MANAGEMENT (OUTPATIENT)
Dept: INTERNAL MEDICINE CLINIC | Facility: CLINIC | Age: 56
End: 2021-04-14

## 2021-04-14 LAB — FUNGUS SPEC CULT: ABNORMAL

## 2021-04-14 NOTE — DISCHARGE SUMMARY
Discharge Summary - Nimo Noriega 61 y o  female MRN: [id number]    [facility] En[facility]: [id number]    Admission Date:   Admission Orders (From admission, onward)     Ordered        04/03/21 0511  Inpatient Admission  Once                     Admitting Diagnosis: Pneumonia [J18 9]  Fever [R50 9]    HPI: Nimo Noriega is a 61 y o  female who presented with worsening symptoms of apparent sinus infection and increased work of breathing  Has past medical history significant for recurrent sinusitis s/p bilateral antrectomy, anxiety/depression, hypertension, and reported history multiple antibiotic allergies with apparent development of urticaria with some antibiotics  She reported onset of symptoms similar to prior apparent infectious sinusitis episodes including fevers/chills, nasal congestion, rhinorrhea, and headache approximately 03/31/2021, additionally associated with decreased appetite and subsequently diminished oral intake  She was seen by her PCP via telemedicine visit 04/01/2021 and empirically treated with clindamycin; additionally COVID-19 PCR was ordered and obtained and found to be negative  Unfortunately patient had worsening of symptoms and presented this evening for further evaluation; additionally was noted by family with increased work of breathing however patient denies any shortness of breath herself  Was found with hypoxia to 88% on ED arrival improved with 3 L NC  Subsequent workup with CXR and CT chest suspicious for multifocal pneumonia; a repeat COVID-19/influenza/RSV PCR was found to be negative  Additionally patient was found hyponatremic to 127 which urine studies were obtained    She was started on cefepime in light of multiple allergies and is admitted for further treatment of apparent community-acquired pneumonia        Procedures Performed:   Bronchoscopy 04/06/2021  IMPRESSION:  Normal anatomy and no significant mucosal abnormalities  No purulent secretions  BAL NOT consistent with alveolar hemorrhage       Summary of Hospital Course:  Patient presented with acute hypoxic respiratory failure, imaging showed multifocal consolidative airspace disease  Patient was started on antibiotics for pneumonia  Underwent bronchoscopy 4/7 which showed normal anatomy and no significant mucosal abnormality, no purulence secretions, BAL not consistent with alveolar hemorrhage  Workup for vasculitis/autoimmune disease negative  Suspect cryptogenic organizing pneumonia versus infectious  She finished 7 days of antibiotics  Initially she was started on IV Solu-Medrol  Her condition improved  Patient will be discharged on prednisone 40 mg for 2 weeks and then 20 mg for 2 weeks  Follow up with pulmonology in 2 weeks  Patient did not require oxygen on discharge  Patient had abnormal thyroid function test, low TSH, normal free T4  Recommend repeating thyroid function panel in 4 weeks  Patient has been off verapamil since admission, for 11 days due to borderline low blood pressure  Recommend to check blood pressure twice a day, keep a blood pressure log and follow-up with primary care doctor  Significant Findings, Care, Treatment and Services Provided:   Chest x-ray 04/03/2021  IMPRESSION:  Bilateral multilobar alveolar infiltrates  CT chest with contrast 04/03/2020  IMPRESSION:  Multifocal mostly consolidative airspace disease with a perihilar predominance most likely to represent pneumonia    Chest x-ray 04/05/2021  Osseous structures appear within normal limits for patient age  IMPRESSION:  1  Increased diffuse airspace disease with upper lobe predominance  Findings may represent worsening pulmonary edema versus multifocal pneumonia      Chest x-ray 04/07/2021  IMPRESSION:  Bilateral infiltrates are mildly improved      Chest x-ray 04/09/2021  IMPRESSION:  Bilateral infiltrates are mildly improved      Complications: none    Discharge Diagnosis:   Acute hypoxic respiratory failure  Cryptogenic organizing pneumonia  Sepsis secondary to pneumonia  Tobacco abuse  Chronic sinusitis  Hyponatremia secondary to dehydration  Hypertension  Depression with anxiety        Resolved Problems  Date Reviewed: 4/6/2021    None          Condition at Discharge: good         Discharge instructions/Information to patient and family:   See after visit summary for information provided to patient and family  Provisions for Follow-Up Care:  See after visit summary for information related to follow-up care and any pertinent home health orders  PCP: Bhargavi Williamson MD    Disposition: Home    Planned Readmission: No      Discharge Statement   I spent 45 minutes discharging the patient  This time was spent on the day of discharge  I had direct contact with the patient on the day of discharge  Additional documentation is required if more than 30 minutes were spent on discharge  Discharge Medications:  See after visit summary for reconciled discharge medications provided to patient and family

## 2021-04-15 ENCOUNTER — IMMUNIZATIONS (OUTPATIENT)
Dept: FAMILY MEDICINE CLINIC | Facility: HOSPITAL | Age: 56
End: 2021-04-15

## 2021-04-15 DIAGNOSIS — Z23 ENCOUNTER FOR IMMUNIZATION: Primary | ICD-10-CM

## 2021-04-15 PROCEDURE — 0002A SARS-COV-2 / COVID-19 MRNA VACCINE (PFIZER-BIONTECH) 30 MCG: CPT

## 2021-04-15 PROCEDURE — 91300 SARS-COV-2 / COVID-19 MRNA VACCINE (PFIZER-BIONTECH) 30 MCG: CPT

## 2021-04-20 ENCOUNTER — OFFICE VISIT (OUTPATIENT)
Dept: INTERNAL MEDICINE CLINIC | Facility: CLINIC | Age: 56
End: 2021-04-20
Payer: COMMERCIAL

## 2021-04-20 VITALS
SYSTOLIC BLOOD PRESSURE: 118 MMHG | WEIGHT: 186.2 LBS | TEMPERATURE: 97 F | HEIGHT: 65 IN | RESPIRATION RATE: 14 BRPM | DIASTOLIC BLOOD PRESSURE: 76 MMHG | HEART RATE: 72 BPM | BODY MASS INDEX: 31.02 KG/M2

## 2021-04-20 DIAGNOSIS — J96.01 ACUTE RESPIRATORY FAILURE WITH HYPOXIA (HCC): ICD-10-CM

## 2021-04-20 DIAGNOSIS — J18.9 COMMUNITY ACQUIRED PNEUMONIA OF RIGHT LOWER LOBE OF LUNG: ICD-10-CM

## 2021-04-20 DIAGNOSIS — I10 ESSENTIAL HYPERTENSION: Primary | Chronic | ICD-10-CM

## 2021-04-20 DIAGNOSIS — E03.9 HYPOTHYROIDISM, UNSPECIFIED TYPE: ICD-10-CM

## 2021-04-20 DIAGNOSIS — J18.9 PNEUMONIA DUE TO INFECTIOUS ORGANISM, UNSPECIFIED LATERALITY, UNSPECIFIED PART OF LUNG: ICD-10-CM

## 2021-04-20 DIAGNOSIS — R79.89 ABNORMAL TSH: ICD-10-CM

## 2021-04-20 PROCEDURE — 99496 TRANSJ CARE MGMT HIGH F2F 7D: CPT | Performed by: INTERNAL MEDICINE

## 2021-04-20 PROCEDURE — 1111F DSCHRG MED/CURRENT MED MERGE: CPT | Performed by: INTERNAL MEDICINE

## 2021-04-20 NOTE — PROGRESS NOTES
Assessment/Plan:    1  Acute respiratory failure with hypoxia -etiology? -possible missed viral syndrome, rule out cryptogenic organizing pneumonia  As noted  had milder symptoms and recovered and grandchildren had been ill  Presented with fever  COVID by PCR negative x2  COVID antibody IgG positive but she had received the vaccine  Underwent bronchoscopy which showed normal anatomy no significant mucosal abnormality no purulent secretions  Bronchoalveolar lavage not consistent with alveolar hemorrhage  Urine for Legionella negative  Urine for strep negative  Rheumatoid factorp borderline at 1-10  but anti CCP negative-p  A and Ca level normal   Antinuclear antibody negative  AFB stain negative  Cytology negative  Viral culture normal   Hypersensitivity profile negative  CT scan of the chest read as multifocal most consolidative airspace disease with  A a perihilar predominance  This point she is recovering  She will complete tapering course of steroids  She is scheduled to meet with Pulmonary over the next couple weeks and will have follow-up CT scan of the chest scheduled by them  2  Suppressed TSH -likely related to acute illness- she will have repeat thyroid function prior to next visit -this needs no treatment at present  3  History of multiple antibiotic allergies -had been referred to allergist in the past   She listed her as having allergies to Zithromax, Bactrim, Levaquin and doxycycline  She had never use  PENICILLIN although her father had a severe reaction to this  She was listed as having an allergy to cephalosporin but is noted tolerated cefepime in the hospital   Allergist feel she can use metronidazole, clindamycin, aztreonam, linezolid  Checking old records to see if patient ever had  A more advanced testing for penicillin allergy -I reviewed all that with the patient today    4  Hypertension-patient previously been on verapamil but off that of present as lower BP was noted in the hospital   She had also been on beta-blocker in the past   This point on no therapy and monitoring  5 thumb pain -suspect DJD of the 1st carpometacarpal joint-she wants to use an over-the-counter brace  If unsuccessful she can colon be referred for formal brace with physical therapy   6 Pre diabetes -A1c 5 7-trying to follow appropriate diet watch for exacerbation with steroid use  7  obesity-predisposed by lack of activity as well as her meds -specifically Abilify  Aggravated potentially by inadequately treated sleep apnea  Prior screen for Cushing's including dexamethasone suppression test as well as 24 urine for free cortisol normal   8  Hyperlipidemia-aggravated by her meds -Abilify -strong family history CAD  I increased her atorvastatin 20 milligrams daily at prior viait  9  Sleep apnea-mild -encouraged her to resume therapy with [facility]  10  Longstanding psychiatric disease with significant anxiety depression -stable on Prozac, Abilify, BuSpar and lorazepam -watch for exacerbation with steroid use  11  Rhinitis- she has allergy management, saline nasal rinse, Flonase nasal spray,Azelastine nasal spray and patient recently underwent the Clarifix procedure which she feels helped  12  History of abnormal mammogram March 2019 showing area the left breast -then underwent ultrasound showing it corresponded with simple cyst -she is long overdue for follow-up mammogram and says she will be going  13  Right foot pain -1st MTP -felt to represent DJD  14  Facial swelling with right paraseptal cellulitis triggered by underlying dental disease with associated acute maxillary sinusitis   Underwent removal of impacted 3rd molar in the hospital and had outpatient surgery with removal of another 2   Completing therapy with clindamycin and prednisone   CT scan of soft tissue of the neck in the hospital showed bilateral maxillary sinus disease with evidence of  Prior bilateral antrectomy is but no air-fluid levels    15  History of sinus disease-as noted had prior sinus surgery  14  Hoarseness-ENT told her she had significant LPR   Was on a PPI and H2 blocker   She is now off both of these and stable     16  Lumbar radiculopathy-neurosurgery felt on review of her MRI of her L-spine she definite disc herniation on the right medial L5-S1 foramina which displaced L5 and S1 nerve roots   Had prior weakness of the foot that resolved   She responded to steroids and did not require an epidural  17  Hyper reflexia-MRI of cervical spine shows bulging disc with spinal canal adequate   MRI the brain shows no obvious abnormalities   REVIEW NEXT VISIT  18 Episodes of feeling poorly with palpitation electrical feeling   Dexamethasone suppression test normal   24 urine for free cortisol normal   24 urine for 5 HIAA as well as 24 urine for fractionated metanephrines and catecholamines all normal   Symptoms had resolved on a beta-blocker   Now off the beta-blocker and stable  19  Abdominal bloating with weight gain-CT scan of abdomen pelvis showed questionable thickening of the colon   Stool studies negative   GI told her her exam was benign  21  Dyspepsia-had a benign EGD done by Dr Mcbride  21   Allergies-allergy blood work benign but had abnormal skin testing on hypo sensitization therapy via Dr Joseph  22 General care-patient had hysterectomy with unilateral oophorectomy approximately age -DEXA scan  Done in February 2019 normal     All other problems as per note of April 2015        MEDICAL REGIMEN:  Lorazepam 1 milligram b i d  p r n , Florastor 250 milligrams daily, Prozac 80 milligrams daily using 40 milligram dosing, BuSpar 30 milligrams b i d , Abilify 5 milligrams daily, atorvastatin 20 milligrams daily, as the last and nasal spray, Flonase nasal spray, prednisone 40 milligrams daily for 2 weeks and 20 milligrams daily for 2 weeks, intermittent use of Benadryl -as noted ENT was consideringXhance  Trial to try and replace Flonase, Astelin nasal spray  , CPAP    Appointment in 1 month with prior CBC random SMA TSH    ADDENDUM- REVIEWED RECORDS AGAIN FROM WHEN PATIENT SAW ALLERGIST REGARDING ANTIBIOTIC ISSUES- WHEN SHE HAD ZITHROMAX SHE PASSED OUT AND REQUIRED AMBULANCE TRANSPORT, BACTRIM LED TO HIVES, LEVAQUIN LED TO HIVES, DOXYCYCLINE LED TO HIVES  SHE NEVER USE PENICILLIN THIS HER FATHER HAD A SEVERE REACTION AND ALLERGIST COMMENTED THAT IF NECESSARY COULD TEST FOR PENICILLIN AND/OR PERFORM  A DESENSITIZATION PROCEDURE THE ICU IF SHE NEEDS IT   WAS ALSO COMMENTED THAT CEPHALOSPORINS HAD LED THE HIVES -CECLOR AND CEPHALEXIN -AS NOTED IN THE HOSPITAL SHE RECENTLY TOLERATED A CEPHALOSPORIN-CEFEPIME  ALLERGIST HAD COMMENTED PREVIOUSLY THAT SHE COULD USE CLINDAMYCIN, FLAGYL, AZTREONAM, LINEZOLID -AS NOTED IN THE HOSPITAL SHE RECEIVED VANCOMYCIN AND TOLERATED THAT - WILL CONSIDER REPEAT REFERRAL TO ALLERGIST AT NEXT VISIT  No problem-specific Assessment & Plan notes found for this encounter  Diagnoses and all orders for this visit:    Essential hypertension  -     CBC and differential; Future  -     Comprehensive metabolic panel; Future  -     TSH, 3rd generation; Future  -     T4, free; Future    Hypothyroidism, unspecified type  -     CBC and differential; Future  -     Comprehensive metabolic panel; Future  -     TSH, 3rd generation; Future  -     T4, free; Future    Pneumonia due to infectious organism, unspecified laterality, unspecified part of lung  -     CBC and differential; Future  -     Comprehensive metabolic panel; Future  -     TSH, 3rd generation; Future  -     T4, free; Future    Acute respiratory failure with hypoxia ([facility] [address] )    Community acquired pneumonia of right lower lobe of lung    Abnormal TSH         Subjective:     Patient ID: Romaine Nava is a 61 y o  female  She is seen today after a lengthy hospitalization  She had called the office that she had fever with facial pain, diffuse aches, chills and cough    Date of onset of symptoms was March 31st   She tested COVID negative and there was concerned that she had COVID despite a negative test   Because of the possibility of sinusitis which was an issue in the past she was started on therapy with clindamycin  She had ongoing fever developed some shortness of breath because of this went to the ER  She had increased work of breathing  She was found to be hypoxic in the ER with an O2 sat of 88% on 3 liters  She was admitted to the hospital and underwent extensive evaluation  She had a bronchoscopy done showing normal anatomy no mucosal abnormality no purulence secretions  Bronchoalveolar lavage was not consistent with alveolar hemorrhage  Pulmonary wondered about cryptogenic organizing pneumonia and she was treated with corticosteroids  She was seen by infectious disease  AFB stain was negative  Initial culture showed 1+ Candida but repeat was 4+ this was felt to be a colonizer  Viral culture was negative  Cytology was negative  Chest x-ray showed bilateral infiltrate  Hypersensitivity panel was normal   COVID IgG was positive but the patient had been vaccinated  Anti CCP was negative and rheumatoid factor was borderline at 1/10  panca  Was normal   Urine for Legionella was negative  Urine for strep was negative  Blood cultures were negative  Discharge BUN was 14 with a creatinine of 0 8  Discharge hemoglobin was 11 6 she was sent home on a course of oral steroids with a diagnosis of cryptogenic organizing pneumonia and scheduled see Pulmonary over the next couple weeks follow-up CT scan of the chest to be done in the future  Sed rate was 103  As noted cytology was negative  In the hospital she received vancomycin and cefepime  MRSA culture was negative  Antinuclear antibody was negative  Procalcitonin was elevated at because of this she was as potential bacterial pneumonia    Eventually Infectious Disease felt was likely viral but is noted she completed antibiotic therapy  CT scan of the chest showed multifocal mostly consolidative airspace disease with perihilar prominence felt to most likely represent pneumonia  It should be noted that her  had mild a COVID negative  She had not had any recent travel  She did not have any new animals in the household  Her grandchildren had respiratory illnesses of undetermined etiology unknown were not severely ill but 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 of her illness  She was concerned about thyroid status  We reviewed that is not at all uncommon to have suppressed TSH associated with acute illness with follow-up TSH being normal-this will be repeated prior to her appointment in a month  She has had expected insomnia and some degree of agitation associated with the higher dose oral corticosteroids  She was concerned about the 4+ growth of Candida on bronchoscopy and we reviewed that this was likely a colonizer      Review of Systems   Constitutional: Positive for fatigue  HENT: Negative  Respiratory: Positive for cough and shortness of breath  Cardiovascular: Negative  Gastrointestinal: Negative  Endocrine: Negative  Genitourinary: Negative  Musculoskeletal: Negative  Skin: Negative  Neurological: Negative  Hematological: Negative  Psychiatric/Behavioral: Negative  Objective:     Physical Exam  Vitals signs reviewed  Constitutional:       General: She is not in acute distress  Appearance: Normal appearance  She is not ill-appearing, toxic-appearing or diaphoretic  HENT:      Head: Normocephalic and atraumatic  Right Ear: Tympanic membrane, ear canal and external ear normal       Left Ear: Tympanic membrane, ear canal and external ear normal       Nose: Nose normal  No congestion or rhinorrhea  Mouth/Throat:      Mouth: Mucous membranes are moist       Pharynx: Oropharynx is clear   No oropharyngeal exudate or posterior oropharyngeal erythema  Comments:  No evidence of thrush  Eyes:      General: No scleral icterus  Right eye: No discharge  Left eye: No discharge  Extraocular Movements: Extraocular movements intact  Pupils: Pupils are equal, round, and reactive to light  Neck:      Musculoskeletal: Normal range of motion and neck supple  No neck rigidity or muscular tenderness  Vascular: No carotid bruit  Cardiovascular:      Rate and Rhythm: Normal rate and regular rhythm  Pulses: Normal pulses  Heart sounds: Normal heart sounds  No murmur  No friction rub  No gallop  Pulmonary:      Effort: Pulmonary effort is normal  No respiratory distress  Breath sounds: Normal breath sounds  No stridor  No wheezing, rhonchi or rales  Comments:  No adventitious breath sounds heard  Chest:      Chest wall: No tenderness  Abdominal:      General: Abdomen is flat  Bowel sounds are normal  There is no distension  Palpations: Abdomen is soft  There is no mass  Tenderness: There is no abdominal tenderness  There is no right CVA tenderness, left CVA tenderness, guarding or rebound  Hernia: No hernia is present  Musculoskeletal: Normal range of motion  General: No swelling, tenderness, deformity or signs of injury  Right lower leg: No edema  Left lower leg: No edema  Lymphadenopathy:      Cervical: No cervical adenopathy  Skin:     General: Skin is warm and dry  Coloration: Skin is not jaundiced or pale  Findings: No bruising, erythema, lesion or rash  Neurological:      General: No focal deficit present  Mental Status: She is alert and oriented to person, place, and time  Mental status is at baseline  Cranial Nerves: No cranial nerve deficit  Sensory: No sensory deficit  Motor: No weakness        Coordination: Coordination normal       Gait: Gait normal       Deep Tendon Reflexes: Reflexes normal    Psychiatric: Mood and Affect: Mood normal          Behavior: Behavior normal          Thought Content: Thought content normal          Judgment: Judgment normal            Vitals:    04/20/21 0659   BP: 118/76   Pulse: 72   Resp: 14   Temp: (!) 97 °F (36 1 °C)   TempSrc: Tympanic   Weight: 84 5 kg (186 lb 3 2 oz)   Height: 5' 5" (1 651 m)       Transitional Care Management Review:  Guilherme Price is a 61 y o  female here for TCM follow up  During the TCM phone call patient stated:    TCM Call (since 3/20/2021)     Date and time call was made  4/14/2021 12:36 PM    Hospital care reviewed  Records reviewed    Patient was hospitialized at  [facility]        Date of Admission  04/03/21    Date of discharge  04/13/21    Diagnosis  ACUTE RESPIRATORY FAILURE WITH HYPOXIA     Disposition  Home      TCM Call (since 3/20/2021)     Scheduled for follow up?   Yes    I have advised the patient to call PCP with any new or worsening symptoms  Nataliya Nolasco MA     Living Arrangements  Family members    Counseling  Patient          Uriel Villar MD

## 2021-04-23 ENCOUNTER — TELEPHONE (OUTPATIENT)
Dept: PULMONOLOGY | Facility: CLINIC | Age: 56
End: 2021-04-23

## 2021-04-23 NOTE — TELEPHONE ENCOUNTER
Late AFB grown on BAL    No suspicion for MTB, will await final speciation, suspect contaminant     Nitin Moser DO

## 2021-04-27 PROBLEM — J84.116 CRYPTOGENIC ORGANIZING PNEUMONIA (HCC): Status: ACTIVE | Noted: 2021-04-27

## 2021-04-27 PROBLEM — R93.89 ABNORMAL CT OF THE CHEST: Status: ACTIVE | Noted: 2021-04-27

## 2021-04-27 PROBLEM — R89.9 ACID-FAST BACTERIA PRESENT: Status: ACTIVE | Noted: 2021-04-27

## 2021-04-27 LAB
M AVIUM CMPLX RRNA SPEC QL PROBE: POSITIVE
M GORDONAE RRNA SPEC QL PROBE: ABNORMAL
M KANSASII RRNA SPEC QL PROBE: ABNORMAL
M TB CMPLX RRNA SPEC QL PROBE: NEGATIVE
OTHER: ABNORMAL

## 2021-04-27 NOTE — PROGRESS NOTES
Pulmonary Follow Up Note   Bernardino Bernheim 61 y o  female MRN: [id number]  4/28/2021      Assessment:  1   Acute hypoxic respiratory failure likely secondary to cryptogenic organizing pneumonia, less likely hypersensitivity pneumonitis,  Less likely infectious etiology, less likely vasculitis or autoimmune condition given negative serologies  1   HP panel negative   2   serology for autoimmune / vasculitis unremarkable  3   bronchoscopy without evidence of infectious etiology and normal cell count, although patient had been on corticosteroids -  Did complete 7 day course of cefepime   2   abnormal CT chest with bilateral consolidative airspace opacities  3  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, MAC, suspect contaminant vs asymptomatic carrier   4   tobacco abuse  5   chronic sinusitis     Plan:  ·  Weaned off O2 at hospital discharge  · C/w prednisone taper for treatment of cryptogenic organizing pneumonia  20 mg for 2 weeks, followed by 10 mg for 2 weeks, then stop  · Repeat CT chest 6 weeks from prior, ordered  For June  ·  check full PFTs given significant smoking history after completion of steroid taper, ordered for June  ·  suspect Candida and MAC both contaminant  Even if she is asymptomatic carrier of MAC, no further treatment is indicated at this time and CT chest does not fit with mycobacterium disease  ·  patient congratulated on smoking cessation and will continue to remain tobacco free  ·  will follow-up in 3 months      Diagnoses and all orders for this visit:    Cryptogenic organizing pneumonia ([facility] [address] )  -     CT chest wo contrast; Future  -     Complete PFT with post bronchodilator; Future  -     predniSONE 10 mg tablet; Take 1 tablet (10 mg total) by mouth daily for 14 days    Abnormal CT of the chest  -     CT chest wo contrast; Future  -     Complete PFT with post bronchodilator; Future  -     predniSONE 10 mg tablet;  Take 1 tablet (10 mg total) by mouth daily for 14 days    Acute respiratory failure with hypoxia ([address] )    Acid-fast bacteria present    Tobacco use    Chronic sinusitis, unspecified location        Return in about 3 months (around 7/28/2021) for Next scheduled follow up  History of Present Illness      HPI:  Siri Gatica is a 61 y o  female   With history of chronic sinusitis, anxiety/depression, hypertension, nicotine dependence, who  Presents for hospital follow-up  Patient recently hospitalized from 04/03-4/13 for acute hypoxic respiratory failure  Patient was initially placed on antibiotics and CT chest showed multifocal consolidative airspace opacities  She was eventually transferred to ICU for high-flow nasal cannula and given no improvement with antibiotics, she was started on empiric corticosteroids, underwent bronchoscopy with BAL with negative cultures, cytology negative, cell count 90% macrophage 10% neutrophils  Patient did complete 7 day course of empiric cefepime  HP panel negative, serology for autoimmune / vasculitis unremarkable  Patient treated for cryptogenic organizing pneumonia with corticosteroids and was eventually weaned off oxygen  Upon discharge, she did not require oxygen and plan was to taper steroids to prednisone 40 mg for 2 weeks followed by 20 mg for 2 weeks  Patient now presents for follow-up  Of note, BAL with  Late growth of AFB, +MAC, as well as candida  Since discharge,Patient has been slowly feeling better  She does still complain of fatigue, but shortness of breath is improving  She stopped smoking  She denies fever, chills, nausea, vomiting, chest pain  She is currently taking prednisone 20 mg daily  She is gaining more energy every day  In terms of pulmonary history, smoked for 25-30 years up to 1/2 ppd, quit about 1 month ago since recent hospitalization  Has never been diagnosed formally with COPD / emphysema or asthma, although she was given a rescue inhaler years ago of, which she was not using prior to hospitalization    Denies significant reflux or postnasal drip  Review of Systems   Constitutional: Positive for fatigue  Negative for chills, fever and unexpected weight change  HENT: Negative for congestion, rhinorrhea, sneezing and sore throat  Respiratory: Negative for cough, shortness of breath and wheezing  Cardiovascular: Negative for chest pain, palpitations and leg swelling  Gastrointestinal: Negative for abdominal pain, constipation, diarrhea, nausea and vomiting  Endocrine: Negative for cold intolerance and heat intolerance  Genitourinary: Negative for dysuria  Musculoskeletal: Negative for arthralgias  Allergic/Immunologic: Negative for immunocompromised state  Neurological: Negative for dizziness and numbness  Historical Information   Past Medical History:   Diagnosis Date    Allergies     Anesthesia complication     V TACH after her reduction mammoplasty, done at [address] Chronic rhinitis 2/18/2020    Depression     Ethmoid sinusitis     GERD (gastroesophageal reflux disease)     Maxillary sinusitis     Multiple allergies     Myofascial pain syndrome     Nasal turbinate hypertrophy     Wears glasses      Past Surgical History:   Procedure Laterality Date    LAPAROSCOPY      with vaginal hysterectomy; 11/3/2003 rso    OOPHORECTOMY Left 2004    PARTIAL HYSTERECTOMY  2004    FL NASAL/SINUS ENDOSCOPY,[facility] SINUS N/A 9/30/2016    Procedure: IMAGE GUIDED FUNCTIONAL ENDOSCOPIC SINUS SURGERY;  Surgeon: Jh Damon MD;  Location: [facility];  Service: ENT    REDUCTION MAMMAPLASTY Bilateral 02/27/2015    TOOTH EXTRACTION Right 3/16/2019    Procedure: EXTRACTION TOOTH #1 (Impacted Third Molar Tooth);   Surgeon: Latoya El DDS;  Location: [facility];  Service: Maxillofacial    TUBAL LIGATION      WISDOM TOOTH EXTRACTION       Family History   Problem Relation Age of Onset    CLEM disease Mother     Atrial fibrillation Mother     Atrial fibrillation Father     Heart disease Father     Diabetes Maternal Grandmother     Hypertension Maternal Grandmother     Colon cancer Maternal Grandfather     Diabetes Maternal Grandfather     Cancer Paternal Grandfather     Endometrial cancer Cousin     Breast cancer Cousin     Multiple sclerosis Sister     No Known Problems Son     No Known Problems Son      Meds/Allergies     Current Outpatient Medications:     atorvastatin (LIPITOR) 10 mg tablet, Take 1 tablet (10 mg total) by mouth daily, Disp: 90 tablet, Rfl: 3    azelastine (ASTELIN) 0 1 % nasal spray, 1 spray into each nostril 2 (two) times a day Use in each nostril as directed, Disp: 1 Bottle, Rfl: 6    busPIRone (BUSPAR) 30 MG tablet, Take 10 mg by mouth , Disp: , Rfl:     cholecalciferol (VITAMIN D3) 1,000 units tablet, Take 2,000 Units by mouth daily, Disp: , Rfl:     FLUoxetine (PROZAC) 40 MG capsule, Take 80 mg by mouth daily , Disp: , Rfl:     LORazepam (ATIVAN) 0 5 mg tablet, Take 0 5 mg by mouth 2 (two) times a day, Disp: , Rfl:     polyethylene glycol (MIRALAX) 17 g packet, Take 17 g by mouth daily, Disp: , Rfl:     predniSONE 20 mg tablet, Take 1 tablet (20 mg total) by mouth daily for 14 doses, Disp: 14 tablet, Rfl: 0    sodium chloride (OCEAN) 0 65 % nasal spray, 2 sprays into each nostril 2 (two) times a day, Disp: 15 mL, Rfl: 0    predniSONE 10 mg tablet, Take 1 tablet (10 mg total) by mouth daily for 14 days, Disp: 14 tablet, Rfl: 0  Allergies   Allergen Reactions    Other      Most all antibiotics- hives, hypotensive    "no problem with clindamycin "    Augmentin Es-600  [Amoxicillin-Pot Clavulanate]     Cefuroxime     Erythromycin     Levofloxacin     Morphine     Morphine And Related Hives    Oxycodone-Acetaminophen     Penicillins     Percocet [Oxycodone-Acetaminophen] Hives    Sulfa Antibiotics     Tetracyclines & Related        Vitals: Blood pressure 120/78, pulse 87, temperature 97 6 °F (36 4 °C), temperature source Tympanic, resp  rate 16, height 5' 5 25" (1 657 m), weight 84 3 kg (185 lb 12 8 oz), SpO2 95 %, not currently breastfeeding  Body mass index is 30 68 kg/m²  Oxygen Therapy  SpO2: 95 %  Oxygen Therapy: None (Room air)    Physical Exam  Constitutional:       General: She is not in acute distress  Appearance: She is well-developed  She is not diaphoretic  HENT:      Head: Normocephalic and atraumatic  Mouth/Throat:      Mouth: Mucous membranes are moist       Pharynx: Oropharynx is clear  No oropharyngeal exudate  Eyes:      General: No scleral icterus  Cardiovascular:      Rate and Rhythm: Normal rate and regular rhythm  Heart sounds: Normal heart sounds  No murmur  Pulmonary:      Effort: Pulmonary effort is normal  No respiratory distress  Breath sounds: Normal breath sounds  No wheezing  Abdominal:      General: Bowel sounds are normal  There is no distension  Palpations: Abdomen is soft  Tenderness: There is no abdominal tenderness  Musculoskeletal:      Right lower leg: No edema  Left lower leg: No edema  Neurological:      Mental Status: She is alert and oriented to person, place, and time  Labs: I have personally reviewed pertinent lab results    Lab Results   Component Value Date    WBC 15 29 (H) 04/13/2021    HGB 11 6 04/13/2021    HCT 36 8 04/13/2021    MCV 94 04/13/2021     04/13/2021     Lab Results   Component Value Date    GLUCOSE 100 04/20/2015    CALCIUM 8 4 04/13/2021     04/20/2015    K 4 0 04/13/2021    CO2 31 04/13/2021     04/13/2021    BUN 14 04/13/2021    CREATININE 0 48 (L) 04/13/2021     No results found for: IGE  Lab Results   Component Value Date    ALT 28 04/03/2021    AST 26 04/03/2021    ALKPHOS 95 04/03/2021     Microbiology:    Bronchial culture Candida   MRSA negative   strep/ Legionella/RP2 panel negative  Blood cultures neg  COVID negative x 2     Imaging and other studies: I have personally reviewed pertinent reports  Guilherme Tejada I have personally reviewed pertinent films in PACS   chest x-ray 04/09/2021   improving bilateral airspace opacities      CT chest 04/03/2020   multifocal consolidated airspace opacities     Bronchoscopy   cytology negative, cell count 90% macrophage 10% neutrophils      Bashir Davenport MD  Pulmonary & Critical Care Fellow, [facility]

## 2021-04-28 ENCOUNTER — OFFICE VISIT (OUTPATIENT)
Dept: PULMONOLOGY | Facility: CLINIC | Age: 56
End: 2021-04-28
Payer: COMMERCIAL

## 2021-04-28 VITALS
BODY MASS INDEX: 30.96 KG/M2 | WEIGHT: 185.8 LBS | TEMPERATURE: 97.6 F | HEART RATE: 87 BPM | DIASTOLIC BLOOD PRESSURE: 78 MMHG | RESPIRATION RATE: 16 BRPM | HEIGHT: 65 IN | OXYGEN SATURATION: 95 % | SYSTOLIC BLOOD PRESSURE: 120 MMHG

## 2021-04-28 DIAGNOSIS — J32.9 CHRONIC SINUSITIS, UNSPECIFIED LOCATION: ICD-10-CM

## 2021-04-28 DIAGNOSIS — Z72.0 TOBACCO USE: ICD-10-CM

## 2021-04-28 DIAGNOSIS — R89.9 ACID-FAST BACTERIA PRESENT: ICD-10-CM

## 2021-04-28 DIAGNOSIS — J84.116 CRYPTOGENIC ORGANIZING PNEUMONIA (HCC): Primary | ICD-10-CM

## 2021-04-28 DIAGNOSIS — R93.89 ABNORMAL CT OF THE CHEST: ICD-10-CM

## 2021-04-28 DIAGNOSIS — J96.01 ACUTE RESPIRATORY FAILURE WITH HYPOXIA (HCC): ICD-10-CM

## 2021-04-28 LAB
MYCOBACTERIUM SPEC CULT: ABNORMAL
MYCOBACTERIUM SPEC CULT: ABNORMAL
RHODAMINE-AURAMINE STN SPEC: ABNORMAL

## 2021-04-28 PROCEDURE — 1036F TOBACCO NON-USER: CPT | Performed by: INTERNAL MEDICINE

## 2021-04-28 PROCEDURE — 3074F SYST BP LT 130 MM HG: CPT | Performed by: INTERNAL MEDICINE

## 2021-04-28 PROCEDURE — 3008F BODY MASS INDEX DOCD: CPT | Performed by: INTERNAL MEDICINE

## 2021-04-28 PROCEDURE — 99214 OFFICE O/P EST MOD 30 MIN: CPT | Performed by: INTERNAL MEDICINE

## 2021-04-28 PROCEDURE — 3078F DIAST BP <80 MM HG: CPT | Performed by: INTERNAL MEDICINE

## 2021-04-28 PROCEDURE — 1111F DSCHRG MED/CURRENT MED MERGE: CPT | Performed by: INTERNAL MEDICINE

## 2021-04-28 RX ORDER — PREDNISONE 10 MG/1
10 TABLET ORAL DAILY
Qty: 14 TABLET | Refills: 0 | Status: SHIPPED | OUTPATIENT
Start: 2021-04-28 | End: 2021-05-12

## 2021-05-15 ENCOUNTER — APPOINTMENT (OUTPATIENT)
Dept: LAB | Facility: CLINIC | Age: 56
End: 2021-05-15
Payer: COMMERCIAL

## 2021-05-15 DIAGNOSIS — J18.9 PNEUMONIA DUE TO INFECTIOUS ORGANISM, UNSPECIFIED LATERALITY, UNSPECIFIED PART OF LUNG: ICD-10-CM

## 2021-05-15 DIAGNOSIS — E03.9 HYPOTHYROIDISM, UNSPECIFIED TYPE: ICD-10-CM

## 2021-05-15 DIAGNOSIS — I10 ESSENTIAL HYPERTENSION: Chronic | ICD-10-CM

## 2021-05-15 LAB
ALBUMIN SERPL BCP-MCNC: 3.9 G/DL (ref 3.5–5)
ALP SERPL-CCNC: 71 U/L (ref 46–116)
ALT SERPL W P-5'-P-CCNC: 34 U/L (ref 12–78)
ANION GAP SERPL CALCULATED.3IONS-SCNC: 6 MMOL/L (ref 4–13)
AST SERPL W P-5'-P-CCNC: 15 U/L (ref 5–45)
BASOPHILS # BLD AUTO: 0.02 THOUSANDS/ΜL (ref 0–0.1)
BASOPHILS NFR BLD AUTO: 0 % (ref 0–1)
BILIRUB SERPL-MCNC: 0.52 MG/DL (ref 0.2–1)
BUN SERPL-MCNC: 10 MG/DL (ref 5–25)
CALCIUM SERPL-MCNC: 9.3 MG/DL (ref 8.3–10.1)
CHLORIDE SERPL-SCNC: 107 MMOL/L (ref 100–108)
CO2 SERPL-SCNC: 25 MMOL/L (ref 21–32)
CREAT SERPL-MCNC: 0.8 MG/DL (ref 0.6–1.3)
EOSINOPHIL # BLD AUTO: 0.12 THOUSAND/ΜL (ref 0–0.61)
EOSINOPHIL NFR BLD AUTO: 2 % (ref 0–6)
ERYTHROCYTE [DISTWIDTH] IN BLOOD BY AUTOMATED COUNT: 13.5 % (ref 11.6–15.1)
GFR SERPL CREATININE-BSD FRML MDRD: 82 ML/MIN/1.73SQ M
GLUCOSE P FAST SERPL-MCNC: 116 MG/DL (ref 65–99)
HCT VFR BLD AUTO: 44.4 % (ref 34.8–46.1)
HGB BLD-MCNC: 14.3 G/DL (ref 11.5–15.4)
IMM GRANULOCYTES # BLD AUTO: 0.04 THOUSAND/UL (ref 0–0.2)
IMM GRANULOCYTES NFR BLD AUTO: 1 % (ref 0–2)
LYMPHOCYTES # BLD AUTO: 1.58 THOUSANDS/ΜL (ref 0.6–4.47)
LYMPHOCYTES NFR BLD AUTO: 22 % (ref 14–44)
MCH RBC QN AUTO: 29.9 PG (ref 26.8–34.3)
MCHC RBC AUTO-ENTMCNC: 32.2 G/DL (ref 31.4–37.4)
MCV RBC AUTO: 93 FL (ref 82–98)
MONOCYTES # BLD AUTO: 0.49 THOUSAND/ΜL (ref 0.17–1.22)
MONOCYTES NFR BLD AUTO: 7 % (ref 4–12)
NEUTROPHILS # BLD AUTO: 5.11 THOUSANDS/ΜL (ref 1.85–7.62)
NEUTS SEG NFR BLD AUTO: 68 % (ref 43–75)
NRBC BLD AUTO-RTO: 0 /100 WBCS
PLATELET # BLD AUTO: 207 THOUSANDS/UL (ref 149–390)
PMV BLD AUTO: 11.7 FL (ref 8.9–12.7)
POTASSIUM SERPL-SCNC: 4.3 MMOL/L (ref 3.5–5.3)
PROT SERPL-MCNC: 6.8 G/DL (ref 6.4–8.2)
RBC # BLD AUTO: 4.79 MILLION/UL (ref 3.81–5.12)
SODIUM SERPL-SCNC: 138 MMOL/L (ref 136–145)
T4 FREE SERPL-MCNC: 0.95 NG/DL (ref 0.76–1.46)
TSH SERPL DL<=0.05 MIU/L-ACNC: 0.74 UIU/ML (ref 0.36–3.74)
WBC # BLD AUTO: 7.36 THOUSAND/UL (ref 4.31–10.16)

## 2021-05-15 PROCEDURE — 84443 ASSAY THYROID STIM HORMONE: CPT

## 2021-05-15 PROCEDURE — 85025 COMPLETE CBC W/AUTO DIFF WBC: CPT

## 2021-05-15 PROCEDURE — 80053 COMPREHEN METABOLIC PANEL: CPT

## 2021-05-15 PROCEDURE — 84439 ASSAY OF FREE THYROXINE: CPT

## 2021-05-15 PROCEDURE — 36415 COLL VENOUS BLD VENIPUNCTURE: CPT

## 2021-05-19 ENCOUNTER — OFFICE VISIT (OUTPATIENT)
Dept: INTERNAL MEDICINE CLINIC | Facility: CLINIC | Age: 56
End: 2021-05-19
Payer: COMMERCIAL

## 2021-05-19 VITALS
DIASTOLIC BLOOD PRESSURE: 80 MMHG | TEMPERATURE: 97.4 F | SYSTOLIC BLOOD PRESSURE: 130 MMHG | RESPIRATION RATE: 14 BRPM | HEART RATE: 72 BPM

## 2021-05-19 DIAGNOSIS — E78.01 FAMILIAL HYPERCHOLESTEROLEMIA: Primary | Chronic | ICD-10-CM

## 2021-05-19 DIAGNOSIS — J84.116 CRYPTOGENIC ORGANIZING PNEUMONIA (HCC): ICD-10-CM

## 2021-05-19 DIAGNOSIS — Z88.1: ICD-10-CM

## 2021-05-19 DIAGNOSIS — Z23 ENCOUNTER FOR IMMUNIZATION: ICD-10-CM

## 2021-05-19 DIAGNOSIS — Z88.1 ALLERGY TO CEPHALOSPORIN: ICD-10-CM

## 2021-05-19 DIAGNOSIS — Z88.1 ALLERGY TO MULTIPLE ANTIBIOTICS: ICD-10-CM

## 2021-05-19 DIAGNOSIS — I10 ESSENTIAL HYPERTENSION: Chronic | ICD-10-CM

## 2021-05-19 PROCEDURE — 3725F SCREEN DEPRESSION PERFORMED: CPT | Performed by: INTERNAL MEDICINE

## 2021-05-19 PROCEDURE — 3075F SYST BP GE 130 - 139MM HG: CPT | Performed by: INTERNAL MEDICINE

## 2021-05-19 PROCEDURE — 90732 PPSV23 VACC 2 YRS+ SUBQ/IM: CPT

## 2021-05-19 PROCEDURE — 99213 OFFICE O/P EST LOW 20 MIN: CPT | Performed by: INTERNAL MEDICINE

## 2021-05-19 PROCEDURE — 3079F DIAST BP 80-89 MM HG: CPT | Performed by: INTERNAL MEDICINE

## 2021-05-19 PROCEDURE — G0009 ADMIN PNEUMOCOCCAL VACCINE: HCPCS

## 2021-05-19 PROCEDURE — G0439 PPPS, SUBSEQ VISIT: HCPCS | Performed by: INTERNAL MEDICINE

## 2021-05-19 PROCEDURE — 1036F TOBACCO NON-USER: CPT | Performed by: INTERNAL MEDICINE

## 2021-05-19 NOTE — PROGRESS NOTES
Assessment/Plan:  1  Health maintenance-patient given pneumococcal 23 vaccine today  2  Suppressed TSH noted previously-felt related to acute illness-repeat normal  3  Acute respiratory failure with hypoxia-etiology?-pulmonary feels most likely cryptogenic organizing pneumonia   had milder symptoms and recovered  Grandchildren had been ill  COVID by PCR negative x2  COVID antibody IgG positive but she had received the vaccine and we do not know what type of antibody was measured  Bronchoscopy showed normal anatomy but no significant mucosal abnormality no purulent secretion  Bronchoalveolar lavage not consistent with alveolar hemorrhage  Urine for Legionella negative  Urine for strep negative  Rheumatoid factor borderline at 1-10 but anti CCP antibody negative  ANCA level normal   Antinuclear antibody negative  AFB stain negative  Cytology negative  Viral culture normal   Hypersensitivity profile negative  CT scan of chest read as multifocal consolidative airspace disease with perihilar predominance  She completed a prednisone taper which will be done over the next couple days  Pulmonary ordered follow-up CT scan of PFTs over the next few weeks  4  Multiple antibiotic allergies-as noted records reviewed-when she had Zithromax she passed out required ambulance transport, Bactrim led to hives, Levaquin led to hives, doxycycline led to hives  She never use penicillin because of father had a history of severe reaction  She stated in the past at cephalosporin let the hives-Ceclor and cephalexin but tolerated cefepime in the hospital   Previously allergist commented she could use clindamycin, Flagyl, aztreonam, linezolid  Tolerated vancomycin in the hospital   This point I recommended she go back allergist to determine if she needs penicillin testing get allergist's opinion on her reaction to cephalosporin  Referral letter will be dictated  5   Hypertension-previously on verapamil but off that since she was in the hospital   BP remains stable at this point  6  Pre diabetes-on appropriate diet-for repeat prior to next visit    7  obesity-predisposed by lack of activity as well as her meds -specifically Abilify   Aggravated potentially by inadequately treated sleep apnea   Prior screen for Cushing's including dexamethasone suppression test as well as 24 urine for free cortisol normal   8  Hyperlipidemia-aggravated by her meds -Abilify -strong family history CAD   I increased her atorvastatin 20 milligrams daily at prior viait  9  Sleep apnea-mild -encouraged her to resume therapy with CPA  10  Longstanding psychiatric disease with significant anxiety depression -stable on Prozac, Abilify, BuSpar and lorazepam -watch for exacerbation with steroid use  11  Rhinitis- she has allergy management, saline nasal rinse, Flonase nasal spray,Azelastine nasal spray and patient recently underwent the Clarifix procedure which she feels helped  12  History of abnormal mammogram March 2019 showing area the left breast -then underwent ultrasound showing it corresponded with simple cyst -she is long overdue for follow-up mammogram and says she will be going [facility]  13  Right foot pain -1st MTP -felt to represent DJD  14  Facial swelling with right paraseptal cellulitis triggered by underlying dental disease with associated acute maxillary sinusitis   Underwent removal of impacted 3rd molar in the hospital and had outpatient surgery with removal of another 2   Completing therapy with clindamycin and prednisone   CT scan of soft tissue of the neck in the hospital showed bilateral maxillary sinus disease with evidence of  Prior bilateral antrectomy is but no air-fluid levels  15  History of sinus disease-as noted had prior sinus surgery  14  Hoarseness-ENT told her she had significant LPR   Was on a PPI and H2 blocker   She is now off both of these and stable     16   Lumbar radiculopathy-neurosurgery felt on review of her MRI of her L-spine she definite disc herniation on the right medial L5-S1 foramina which displaced L5 and S1 nerve roots   Had prior weakness of the foot that resolved   She responded to steroids and did not require an epidural  17  Hyper reflexia-MRI of cervical spine shows bulging disc with spinal canal adequate   MRI the brain shows no obvious abnormalities   REVIEW NEXT VISIT  18 Episodes of feeling poorly with palpitation electrical feeling   Dexamethasone suppression test normal   24 urine for free cortisol normal   24 urine for 5 HIAA as well as 24 urine for fractionated metanephrines and catecholamines all normal   Symptoms had resolved on a beta-blocker   Now off the beta-blocker and stable  19  Abdominal bloating with weight gain-CT scan of abdomen pelvis showed questionable thickening of the colon   Stool studies negative   GI told her her exam was benign  21  Dyspepsia-had a benign EGD done by Dr Mcbride  21  Allergies-allergy blood work benign but had abnormal skin testing on hypo sensitization therapy via Dr Joseph  22 General care-patient had hysterectomy with unilateral oophorectomy approximately age -DEXA scan  Done in February 2019 normal  23 thumb pain-suspect DJD of 1st carpometacarpal joint does using an over-the-counter brace     All other problems as per note of April 2015        MEDICAL REGIMEN:  Lorazepam 1 milligram b i d  p r n , Florastor 250 milligrams daily, Prozac 80 milligrams daily using 40 milligram dosing, BuSpar 30 milligrams b i d , Abilify 5 milligrams daily, atorvastatin 20 milligrams daily, as the last and nasal spray, Flonase nasal spray,  intermittent use of Benadryl -as noted ENT was consideringXhance  Trial to try and replace Flonase, Astelin nasal spray  , CPAP    Scheduled several months prior chemistry profile cholesterol profile hemoglobin A1c    Await opinion of allergist   Trying to track down what COVID antibody was measured    Addendum after multiple phone calls we confirmed that the antibody testing done when this patient COVID-19 antibody testing done while in the hospital is of the spike protein- this can be elevated with the vaccine    Addendum- patient seen by [facility]  On August 30th he felt she should continue allergy management with saline nasal rinse at least daily, Flonase, Astelin, oral antihistamine  She should be on a reflux diet  She should resume CPAP  He ordered immunoglobulin levels  Considering in the future CT sinus -septoplasty/inferior turbinate reduction, repeat choir affects, Singulair again -she is seeing her again in 6 months-on exam then she had mild laryngeal edema, mild kiara's  No problem-specific Assessment & Plan notes found for this encounter  Diagnoses and all orders for this visit:    Familial hypercholesterolemia  -     CBC and differential; Future  -     Comprehensive metabolic panel; Future  -     Cholesterol, total; Future  -     HDL cholesterol; Future  -     LDL cholesterol, direct; Future  -     Triglycerides; Future    Essential hypertension  -     CBC and differential; Future  -     Comprehensive metabolic panel; Future  -     Cholesterol, total; Future  -     HDL cholesterol; Future  -     LDL cholesterol, direct; Future  -     Triglycerides; Future    Allergy to multiple antibiotics  -     Cancel: Ambulatory referral to Allergy; Future  -     Ambulatory referral to Allergy; Future    Encounter for immunization  -     PNEUMOCOCCAL POLYSACCHARIDE VACCINE 23-VALENT =>3YO SQ IM    Cryptogenic organizing pneumonia ([address] )    Allergy to cephalosporin    Allergy to tetracycline    Allergy to 4-quinolone agent          Subjective:      Patient ID: Ivana Duque is a 61 y o  female  She was here for Medicare wellness wanted to review other issues  As noted she to recover from a prolonged hospitalization    She was seen by the pulmonary group who feel at this point it is likely she had cryptogenic organizing pneumonia with less likely infectious etiology  They ordered follow-up PFTs and a CT scan  She is completing prednisone taper  She feels her shortness of breath is significantly improved  She comments if she is carrying her grandchild up the steps she may have slight shortness of breath but overall is much better  She noticed mild hyperglycemia on her labs wanted to know about relationship to steroids  This point she remains off antihypertensive therapy and BP today remains stable      Recent labs reviewed in detail  Results for orders placed or performed in visit on 05/15/21  -CBC and differential       Result                      Value             Ref Range           WBC                         7 36              4 31 - 10 16*       RBC                         4 79              3 81 - 5 12 *       Hemoglobin                  14 3              11 5 - 15 4 *       Hematocrit                  44 4              34 8 - 46 1 %       MCV                         93                82 - 98 fL          MCH                         29 9              26 8 - 34 3 *       MCHC                        32 2              31 4 - 37 4 *       RDW                         13 5              11 6 - 15 1 %       MPV                         11 7              8 9 - 12 7 fL       Platelets                   207               149 - 390 Th*       nRBC                        0                 /100 WBCs           Neutrophils Relative        68                43 - 75 %           Immat GRANS %               1                 0 - 2 %             Lymphocytes Relative        22                14 - 44 %           Monocytes Relative          7                 4 - 12 %            Eosinophils Relative        2                 0 - 6 %             Basophils Relative          0                 0 - 1 %             Neutrophils Absolute        5 11              1 85 - 7 62 *       Immature Grans Absolute     0 04 0 00 - 0 20 *       Lymphocytes Absolute        1 58              0 60 - 4 47 *       Monocytes Absolute          0 49              0 17 - 1 22 *       Eosinophils Absolute        0 12              0 00 - 0 61 *       Basophils Absolute          0 02              0 00 - 0 10 *  -Comprehensive metabolic panel       Result                      Value             Ref Range           Sodium                      138               136 - 145 mm*       Potassium                   4 3               3 5 - 5 3 mm*       Chloride                    107               100 - 108 mm*       CO2                         25                21 - 32 mmol*       ANION GAP                   6                 4 - 13 mmol/L       BUN                         10                5 - 25 mg/dL        Creatinine                  0 80              0 60 - 1 30 *       Glucose, Fasting            116 (H)           65 - 99 mg/dL       Calcium                     9 3               8 3 - 10 1 m*       AST                         15                5 - 45 U/L          ALT                         34                12 - 78 U/L         Alkaline Phosphatase        71                46 - 116 U/L        Total Protein               6 8               6 4 - 8 2 g/*       Albumin                     3 9               3 5 - 5 0 g/*       Total Bilirubin             0 52              0 20 - 1 00 *       eGFR                        82                ml/min/1 73s*  -TSH, 3rd generation       Result                      Value             Ref Range           TSH 3RD GENERATON           0 740             0 358 - 3 74*  -T4, free       Result                      Value             Ref Range           Free T4                     0 95              0 76 - 1 46 *    A a we reviewed her history of multiple antibiotic allergies  I went over these in detail after her last visit    - REVIEWED RECORDS AGAIN FROM WHEN PATIENT SAW ALLERGIST REGARDING ANTIBIOTIC ISSUES- WHEN SHE HAD ZITHROMAX SHE PASSED OUT AND REQUIRED AMBULANCE TRANSPORT, BACTRIM LED TO HIVES, LEVAQUIN LED TO HIVES, DOXYCYCLINE LED TO HIVES  SHE NEVER USE PENICILLIN THIS HER FATHER HAD A SEVERE REACTION AND ALLERGIST COMMENTED THAT IF NECESSARY COULD TEST FOR PENICILLIN AND/OR PERFORM  A DESENSITIZATION PROCEDURE THE ICU IF SHE NEEDS IT   WAS ALSO COMMENTED THAT CEPHALOSPORINS HAD LED THE HIVES -CECLOR AND CEPHALEXIN -AS NOTED IN THE HOSPITAL SHE RECENTLY TOLERATED A CEPHALOSPORIN-CEFEPIME  ALLERGIST HAD COMMENTED PREVIOUSLY THAT SHE COULD USE CLINDAMYCIN, FLAGYL, AZTREONAM, LINEZOLID -AS NOTED IN THE HOSPITAL SHE RECEIVED VANCOMYCIN AND TOLERATED THAT -   We talked today repeat allergist referral is warranted  Specifically to consider penicillin testing and have allergist comment on her use of cephalosporins  As noted when she was in the hospital recently she also used to vancomycin      The following portions of the patient's history were reviewed and updated as appropriate: allergies, current medications, past family history, past medical history, past social history, past surgical history and problem list     Review of Systems   Constitutional: Negative  HENT: Negative  Respiratory: Positive for shortness of breath  Cardiovascular: Negative  Gastrointestinal: Negative  Endocrine: Negative  Genitourinary: Negative  Musculoskeletal: Negative  Skin: Negative  Neurological: Negative  Hematological: Negative  Psychiatric/Behavioral: Negative  Objective:      Temp (!) 97 4 °F (36 3 °C) (Tympanic)   LMP  (LMP Unknown)          Physical Exam  Vitals signs reviewed  Constitutional:       General: She is not in acute distress  Appearance: Normal appearance  She is obese  She is not ill-appearing, toxic-appearing or diaphoretic  HENT:      Head: Normocephalic and atraumatic        Right Ear: Tympanic membrane, ear canal and external ear normal       Left Ear: Tympanic membrane, ear canal and external ear normal       Nose: Nose normal  No congestion or rhinorrhea  Mouth/Throat:      Mouth: Mucous membranes are moist       Pharynx: Oropharynx is clear  No oropharyngeal exudate or posterior oropharyngeal erythema  Eyes:      General: No scleral icterus  Right eye: No discharge  Left eye: No discharge  Extraocular Movements: Extraocular movements intact  Pupils: Pupils are equal, round, and reactive to light  Neck:      Musculoskeletal: Normal range of motion and neck supple  No neck rigidity or muscular tenderness  Vascular: No carotid bruit  Cardiovascular:      Rate and Rhythm: Normal rate and regular rhythm  Pulses: Normal pulses  Heart sounds: Normal heart sounds  No murmur  No friction rub  No gallop  Pulmonary:      Effort: Pulmonary effort is normal  No respiratory distress  Breath sounds: Normal breath sounds  No stridor  No wheezing, rhonchi or rales  Chest:      Chest wall: No tenderness  Abdominal:      General: Abdomen is flat  Bowel sounds are normal  There is no distension  Palpations: Abdomen is soft  There is no mass  Tenderness: There is no abdominal tenderness  There is no right CVA tenderness, left CVA tenderness, guarding or rebound  Hernia: No hernia is present  Musculoskeletal: Normal range of motion  General: No swelling, tenderness, deformity or signs of injury  Right lower leg: No edema  Left lower leg: No edema  Lymphadenopathy:      Cervical: No cervical adenopathy  Skin:     General: Skin is warm and dry  Coloration: Skin is not jaundiced or pale  Findings: No bruising, erythema, lesion or rash  Comments: Benign nevi   Neurological:      General: No focal deficit present  Mental Status: She is alert and oriented to person, place, and time  Mental status is at baseline  Cranial Nerves: No cranial nerve deficit        Sensory: No sensory deficit  Motor: No weakness  Coordination: Coordination normal       Gait: Gait normal       Deep Tendon Reflexes: Reflexes normal    Psychiatric:         Mood and Affect: Mood normal          Behavior: Behavior normal          Thought Content:  Thought content normal          Judgment: Judgment normal

## 2021-05-19 NOTE — PATIENT INSTRUCTIONS
Medicare Preventive Visit Patient Instructions  Thank you for completing your Welcome to Medicare Visit or Medicare Annual Wellness Visit today  Your next wellness visit will be due in one year (5/20/2022)  The screening/preventive services that you may require over the next 5-10 years are detailed below  Some tests may not apply to you based off risk factors and/or age  Screening tests ordered at today's visit but not completed yet may show as past due  Also, please note that scanned in results may not display below  Preventive Screenings:  Service Recommendations Previous Testing/Comments   Colorectal Cancer Screening  * Colonoscopy    * Fecal Occult Blood Test (FOBT)/Fecal Immunochemical Test (FIT)  * Fecal DNA/Cologuard Test  * Flexible Sigmoidoscopy Age: 54-65 years old   Colonoscopy: every 10 years (may be performed more frequently if at higher risk)  OR  FOBT/FIT: every 1 year  OR  Cologuard: every 3 years  OR  Sigmoidoscopy: every 5 years  Screening may be recommended earlier than age 48 if at higher risk for colorectal cancer  Also, an individualized decision between you and your healthcare provider will decide whether screening between the ages of 74-80 would be appropriate  Colonoscopy: 02/28/2017  FOBT/FIT: Not on file  Cologuard: Not on file  Sigmoidoscopy: Not on file    Screening Current     Breast Cancer Screening Age: 35 years old  Frequency: every 1-2 years  Not required if history of left and right mastectomy Mammogram: 03/05/2019    Screening Current  Risks and Benefits Discussed   Cervical Cancer Screening Between the ages of 21-29, pap smear recommended once every 3 years  Between the ages of 33-67, can perform pap smear with HPV co-testing every 5 years     Recommendations may differ for women with a history of total hysterectomy, cervical cancer, or abnormal pap smears in past  Pap Smear: Not on file    Risks and Benefits Discussed  Screening Current   Hepatitis C Screening Once for adults born between [birth year] and 1965  More frequently in patients at high risk for Hepatitis C Hep C Antibody: 01/02/2021    Screening Current   Diabetes Screening 1-2 times per year if you're at risk for diabetes or have pre-diabetes Fasting glucose: 116 mg/dL   A1C: 5 7 %    Screening Current   Cholesterol Screening Once every 5 years if you don't have a lipid disorder  May order more often based on risk factors  Lipid panel: 08/22/2020    Screening Not Indicated  History Lipid Disorder     Other Preventive Screenings Covered by Medicare:  1  Abdominal Aortic Aneurysm (AAA) Screening: covered once if your at risk  You're considered to be at risk if you have a family history of AAA  2  Lung Cancer Screening: covers low dose CT scan once per year if you meet all of the following conditions: (1) Age 50-69; (2) No signs or symptoms of lung cancer; (3) Current smoker or have quit smoking within the last 15 years; (4) You have a tobacco smoking history of at least 30 pack years (packs per day multiplied by number of years you smoked); (5) You get a written order from a healthcare provider  3  Glaucoma Screening: covered annually if you're considered high risk: (1) You have diabetes OR (2) Family history of glaucoma OR (3)  aged 48 and older OR (3)  American aged 72 and older  3  Osteoporosis Screening: covered every 2 years if you meet one of the following conditions: (1) You're estrogen deficient and at risk for osteoporosis based off medical history and other findings; (2) Have a vertebral abnormality; (3) On glucocorticoid therapy for more than 3 months; (4) Have primary hyperparathyroidism; (5) On osteoporosis medications and need to assess response to drug therapy  · Last bone density test (DXA Scan): 02/25/2019   5  HIV Screening: covered annually if you're between the age of 15-65  Also covered annually if you are younger than 13 and older than 72 with risk factors for HIV infection   For pregnant patients, it is covered up to 3 times per pregnancy  Immunizations:  Immunization Recommendations   Influenza Vaccine Annual influenza vaccination during flu season is recommended for all persons aged >= 6 months who do not have contraindications   Pneumococcal Vaccine (Prevnar and Pneumovax)  * Prevnar = PCV13  * Pneumovax = PPSV23   Adults 25-60 years old: 1-3 doses may be recommended based on certain risk factors  Adults 72 years old: Prevnar (PCV13) vaccine recommended followed by Pneumovax (PPSV23) vaccine  If already received PPSV23 since turning 65, then PCV13 recommended at least one year after PPSV23 dose  Hepatitis B Vaccine 3 dose series if at intermediate or high risk (ex: diabetes, end stage renal disease, liver disease)   Tetanus (Td) Vaccine - COST NOT COVERED BY MEDICARE PART B Following completion of primary series, a booster dose should be given every 10 years to maintain immunity against tetanus  Td may also be given as tetanus wound prophylaxis  Tdap Vaccine - COST NOT COVERED BY MEDICARE PART B Recommended at least once for all adults  For pregnant patients, recommended with each pregnancy  Shingles Vaccine (Shingrix) - COST NOT COVERED BY MEDICARE PART B  2 shot series recommended in those aged 48 and above     Health Maintenance Due:      Topic Date Due    HIV Screening  Never done    Cervical Cancer Screening  Never done    MAMMOGRAM  03/05/2021    Colonoscopy Surveillance  02/28/2022    Colorectal Cancer Screening  02/28/2027    Hepatitis C Screening  Completed     Immunizations Due:      Topic Date Due    Pneumococcal Vaccine: Pediatrics (0 to 5 Years) and At-Risk Patients (6 to 59 Years) (1 of 1 - PPSV23) Never done     Advance Directives   What are advance directives? Advance directives are legal documents that state your wishes and plans for medical care  These plans are made ahead of time in case you lose your ability to make decisions for yourself   Advance directives can apply to any medical decision, such as the treatments you want, and if you want to donate organs  What are the types of advance directives? There are many types of advance directives, and each state has rules about how to use them  You may choose a combination of any of the following:  · Living will: This is a written record of the treatment you want  You can also choose which treatments you do not want, which to limit, and which to stop at a certain time  This includes surgery, medicine, IV fluid, and tube feedings  · Durable power of  for healthcare [place] [facility]): This is a written record that states who you want to make healthcare choices for you when you are unable to make them for yourself  This person, called a proxy, is usually a family member or a friend  You may choose more than 1 proxy  · Do not resuscitate (DNR) order:  A DNR order is used in case your heart stops beating or you stop breathing  It is a request not to have certain forms of treatment, such as CPR  A DNR order may be included in other types of advance directives  · Medical directive: This covers the care that you want if you are in a coma, near death, or unable to make decisions for yourself  You can list the treatments you want for each condition  Treatment may include pain medicine, surgery, blood transfusions, dialysis, IV or tube feedings, and a ventilator (breathing machine)  · Values history: This document has questions about your views, beliefs, and how you feel and think about life  This information can help others choose the care that you would choose  Why are advance directives important? An advance directive helps you control your care  Although spoken wishes may be used, it is better to have your wishes written down  Spoken wishes can be misunderstood, or not followed  Treatments may be given even if you do not want them   An advance directive may make it easier for your family to make difficult choices about your care  Weight Management   Why it is important to manage your weight:  Being overweight increases your risk of health conditions such as heart disease, high blood pressure, type 2 diabetes, and certain types of cancer  It can also increase your risk for osteoarthritis, sleep apnea, and other respiratory problems  Aim for a slow, steady weight loss  Even a small amount of weight loss can lower your risk of health problems  How to lose weight safely:  A safe and healthy way to lose weight is to eat fewer calories and get regular exercise  You can lose up about 1 pound a week by decreasing the number of calories you eat by 500 calories each day  Healthy meal plan for weight management:  A healthy meal plan includes a variety of foods, contains fewer calories, and helps you stay healthy  A healthy meal plan includes the following:  · Eat whole-grain foods more often  A healthy meal plan should contain fiber  Fiber is the part of grains, fruits, and vegetables that is not broken down by your body  Whole-grain foods are healthy and provide extra fiber in your diet  Some examples of whole-grain foods are whole-wheat breads and pastas, oatmeal, brown rice, and bulgur  · Eat a variety of vegetables every day  Include dark, leafy greens such as spinach, kale, ariana greens, and mustard greens  Eat yellow and orange vegetables such as carrots, sweet potatoes, and winter squash  · Eat a variety of fruits every day  Choose fresh or canned fruit (canned in its own juice or light syrup) instead of juice  Fruit juice has very little or no fiber  · Eat low-fat dairy foods  Drink fat-free (skim) milk or 1% milk  Eat fat-free yogurt and low-fat cottage cheese  Try low-fat cheeses such as mozzarella and other reduced-fat cheeses  · Choose meat and other protein foods that are low in fat  Choose beans or other legumes such as split peas or lentils   Choose fish, skinless poultry (chicken or turkey), or lean cuts of red meat (beef or pork)  Before you cook meat or poultry, cut off any visible fat  · Use less fat and oil  Try baking foods instead of frying them  Add less fat, such as margarine, sour cream, regular salad dressing and mayonnaise to foods  Eat fewer high-fat foods  Some examples of high-fat foods include french fries, doughnuts, ice cream, and cakes  · Eat fewer sweets  Limit foods and drinks that are high in sugar  This includes candy, cookies, regular soda, and sweetened drinks  Exercise:  Exercise at least 30 minutes per day on most days of the week  Some examples of exercise include walking, biking, dancing, and swimming  You can also fit in more physical activity by taking the stairs instead of the elevator or parking farther away from stores  Ask your healthcare provider about the best exercise plan for you  © Copyright [employer] 2018 Information is for End User's use only and may not be sold, redistributed or otherwise used for commercial purposes  All illustrations and images included in CareNotes® are the copyrighted property of [employer]  or [facility] Preventive Visit Patient Instructions  Thank you for completing your Welcome to Medicare Visit or Medicare Annual Wellness Visit today  Your next wellness visit will be due in one year (5/20/2022)  The screening/preventive services that you may require over the next 5-10 years are detailed below  Some tests may not apply to you based off risk factors and/or age  Screening tests ordered at today's visit but not completed yet may show as past due  Also, please note that scanned in results may not display below    Preventive Screenings:  Service Recommendations Previous Testing/Comments   Colorectal Cancer Screening  * Colonoscopy    * Fecal Occult Blood Test (FOBT)/Fecal Immunochemical Test (FIT)  * Fecal DNA/Cologuard Test  * Flexible Sigmoidoscopy Age: 54-65 years old   Colonoscopy: every 10 years (may be performed more frequently if at higher risk)  OR  FOBT/FIT: every 1 year  OR  Cologuard: every 3 years  OR  Sigmoidoscopy: every 5 years  Screening may be recommended earlier than age 48 if at higher risk for colorectal cancer  Also, an individualized decision between you and your healthcare provider will decide whether screening between the ages of 74-80 would be appropriate  Colonoscopy: 02/28/2017  FOBT/FIT: Not on file  Cologuard: Not on file  Sigmoidoscopy: Not on file    Screening Current     Breast Cancer Screening Age: 35 years old  Frequency: every 1-2 years  Not required if history of left and right mastectomy Mammogram: 03/05/2019    Screening Current  Risks and Benefits Discussed   Cervical Cancer Screening Between the ages of 21-29, pap smear recommended once every 3 years  Between the ages of 33-67, can perform pap smear with HPV co-testing every 5 years  Recommendations may differ for women with a history of total hysterectomy, cervical cancer, or abnormal pap smears in past  Pap Smear: Not on file    Risks and Benefits Discussed  Screening Current   Hepatitis C Screening Once for adults born between 1945 and 1965  More frequently in patients at high risk for Hepatitis C Hep C Antibody: 01/02/2021    Screening Current   Diabetes Screening 1-2 times per year if you're at risk for diabetes or have pre-diabetes Fasting glucose: 116 mg/dL   A1C: 5 7 %    Screening Current   Cholesterol Screening Once every 5 years if you don't have a lipid disorder  May order more often based on risk factors  Lipid panel: 08/22/2020    Screening Not Indicated  History Lipid Disorder     Other Preventive Screenings Covered by Medicare:  6  Abdominal Aortic Aneurysm (AAA) Screening: covered once if your at risk  You're considered to be at risk if you have a family history of AAA    7  Lung Cancer Screening: covers low dose CT scan once per year if you meet all of the following conditions: (1) Age 50-69; (2) No signs or symptoms of lung cancer; (3) Current smoker or have quit smoking within the last 15 years; (4) You have a tobacco smoking history of at least 30 pack years (packs per day multiplied by number of years you smoked); (5) You get a written order from a healthcare provider  8  Glaucoma Screening: covered annually if you're considered high risk: (1) You have diabetes OR (2) Family history of glaucoma OR (3)  aged 48 and older OR (3)  American aged 72 and older  5  Osteoporosis Screening: covered every 2 years if you meet one of the following conditions: (1) You're estrogen deficient and at risk for osteoporosis based off medical history and other findings; (2) Have a vertebral abnormality; (3) On glucocorticoid therapy for more than 3 months; (4) Have primary hyperparathyroidism; (5) On osteoporosis medications and need to assess response to drug therapy  · Last bone density test (DXA Scan): 02/25/2019   10  HIV Screening: covered annually if you're between the age of 15-65  Also covered annually if you are younger than 13 and older than 72 with risk factors for HIV infection  For pregnant patients, it is covered up to 3 times per pregnancy  Immunizations:  Immunization Recommendations   Influenza Vaccine Annual influenza vaccination during flu season is recommended for all persons aged >= 6 months who do not have contraindications   Pneumococcal Vaccine (Prevnar and Pneumovax)  * Prevnar = PCV13  * Pneumovax = PPSV23   Adults 25-60 years old: 1-3 doses may be recommended based on certain risk factors  Adults 72 years old: Prevnar (PCV13) vaccine recommended followed by Pneumovax (PPSV23) vaccine  If already received PPSV23 since turning 65, then PCV13 recommended at least one year after PPSV23 dose     Hepatitis B Vaccine 3 dose series if at intermediate or high risk (ex: diabetes, end stage renal disease, liver disease)   Tetanus (Td) Vaccine - COST NOT COVERED BY MEDICARE PART B Following completion of primary series, a booster dose should be given every 10 years to maintain immunity against tetanus  Td may also be given as tetanus wound prophylaxis  Tdap Vaccine - COST NOT COVERED BY MEDICARE PART B Recommended at least once for all adults  For pregnant patients, recommended with each pregnancy  Shingles Vaccine (Shingrix) - COST NOT COVERED BY MEDICARE PART B  2 shot series recommended in those aged 48 and above     Health Maintenance Due:      Topic Date Due    HIV Screening  Never done    Cervical Cancer Screening  Never done    MAMMOGRAM  03/05/2021    Colonoscopy Surveillance  02/28/2022    Colorectal Cancer Screening  02/28/2027    Hepatitis C Screening  Completed     Immunizations Due:  There are no preventive care reminders to display for this patient  Advance Directives   What are advance directives? Advance directives are legal documents that state your wishes and plans for medical care  These plans are made ahead of time in case you lose your ability to make decisions for yourself  Advance directives can apply to any medical decision, such as the treatments you want, and if you want to donate organs  What are the types of advance directives? There are many types of advance directives, and each state has rules about how to use them  You may choose a combination of any of the following:  · Living will: This is a written record of the treatment you want  You can also choose which treatments you do not want, which to limit, and which to stop at a certain time  This includes surgery, medicine, IV fluid, and tube feedings  · Durable power of  for healthcare [place] [facility]): This is a written record that states who you want to make healthcare choices for you when you are unable to make them for yourself  This person, called a proxy, is usually a family member or a friend  You may choose more than 1 proxy    · Do not resuscitate (DNR) order:  A DNR order is used in case your heart stops beating or you stop breathing  It is a request not to have certain forms of treatment, such as CPR  A DNR order may be included in other types of advance directives  · Medical directive: This covers the care that you want if you are in a coma, near death, or unable to make decisions for yourself  You can list the treatments you want for each condition  Treatment may include pain medicine, surgery, blood transfusions, dialysis, IV or tube feedings, and a ventilator (breathing machine)  · Values history: This document has questions about your views, beliefs, and how you feel and think about life  This information can help others choose the care that you would choose  Why are advance directives important? An advance directive helps you control your care  Although spoken wishes may be used, it is better to have your wishes written down  Spoken wishes can be misunderstood, or not followed  Treatments may be given even if you do not want them  An advance directive may make it easier for your family to make difficult choices about your care  Weight Management   Why it is important to manage your weight:  Being overweight increases your risk of health conditions such as heart disease, high blood pressure, type 2 diabetes, and certain types of cancer  It can also increase your risk for osteoarthritis, sleep apnea, and other respiratory problems  Aim for a slow, steady weight loss  Even a small amount of weight loss can lower your risk of health problems  How to lose weight safely:  A safe and healthy way to lose weight is to eat fewer calories and get regular exercise  You can lose up about 1 pound a week by decreasing the number of calories you eat by 500 calories each day  Healthy meal plan for weight management:  A healthy meal plan includes a variety of foods, contains fewer calories, and helps you stay healthy  A healthy meal plan includes the following:  · Eat whole-grain foods more often    A healthy meal plan should contain fiber  Fiber is the part of grains, fruits, and vegetables that is not broken down by your body  Whole-grain foods are healthy and provide extra fiber in your diet  Some examples of whole-grain foods are whole-wheat breads and pastas, oatmeal, brown rice, and bulgur  · Eat a variety of vegetables every day  Include dark, leafy greens such as spinach, kale, ariana greens, and mustard greens  Eat yellow and orange vegetables such as carrots, sweet potatoes, and winter squash  · Eat a variety of fruits every day  Choose fresh or canned fruit (canned in its own juice or light syrup) instead of juice  Fruit juice has very little or no fiber  · Eat low-fat dairy foods  Drink fat-free (skim) milk or 1% milk  Eat fat-free yogurt and low-fat cottage cheese  Try low-fat cheeses such as mozzarella and other reduced-fat cheeses  · Choose meat and other protein foods that are low in fat  Choose beans or other legumes such as split peas or lentils  Choose fish, skinless poultry (chicken or turkey), or lean cuts of red meat (beef or pork)  Before you cook meat or poultry, cut off any visible fat  · Use less fat and oil  Try baking foods instead of frying them  Add less fat, such as margarine, sour cream, regular salad dressing and mayonnaise to foods  Eat fewer high-fat foods  Some examples of high-fat foods include french fries, doughnuts, ice cream, and cakes  · Eat fewer sweets  Limit foods and drinks that are high in sugar  This includes candy, cookies, regular soda, and sweetened drinks  Exercise:  Exercise at least 30 minutes per day on most days of the week  Some examples of exercise include walking, biking, dancing, and swimming  You can also fit in more physical activity by taking the stairs instead of the elevator or parking farther away from stores  Ask your healthcare provider about the best exercise plan for you        © Copyright [employer] 2018 Information is for End User's use only and may not be sold, redistributed or otherwise used for commercial purposes   All illustrations and images included in CareNotes® are the copyrighted property of [employer] Inc  or [employer]

## 2021-05-19 NOTE — PROGRESS NOTES
Assessment and Plan:     Problem List Items Addressed This Visit     None           Preventive health issues were discussed with patient, and age appropriate screening tests were ordered as noted in patient's After Visit Summary  Personalized health advice and appropriate referrals for health education or preventive services given if needed, as noted in patient's After Visit Summary       History of Present Illness:     Patient presents for Medicare Annual Wellness visit    Patient Care Team:  Darek Gonzales MD as PCP - General  Darek Gonzales MD as PCP - PCP-[facility] ([facility])  MD Darek George MD Eliezer Carpenter, MD Jalene Einstein, MD     Problem List:     Patient Active Problem List   Diagnosis    Asthma    Atrophic vaginitis    Depression with anxiety    Fibromyalgia    HTN (hypertension)    Hyperlipidemia    Laryngopharyngeal reflux    Lumbar radiculopathy    Obstructive sleep apnea syndrome    Leukocytosis    Tobacco use    Chronic seasonal allergic rhinitis due to pollen    Allergic rhinitis due to house dust mite    Allergic conjunctivitis of both eyes    Nasal septal deviation    Gastroesophageal reflux disease    Laryngeal edema    Gluten intolerance    Allergic rhinitis due to animal (cat) (dog) hair and dander    Intrinsic atopic dermatitis    Allergy to cephalosporin    Allergy to tetracycline    Allergy to 4-quinolone agent    Allergy to mold    Ataxia    S/P bilateral breast reduction    Transient right leg weakness    Community acquired pneumonia of right lower lobe of lung    Hyponatremia    Abnormal thyroid function test    Allergy to multiple antibiotics    Acute respiratory failure with hypoxia (HCC)    Chronic sinusitis    Hyperglycemia    Abnormal TSH    Cryptogenic organizing pneumonia ([address] )    Abnormal CT of the chest    Acid-fast bacteria present      Past Medical and Surgical History:     Past Medical History: Diagnosis Date    Allergies     Anesthesia complication     V TACH after her reduction mammoplasty, done [address] Chronic rhinitis 2/18/2020    Depression     Ethmoid sinusitis     GERD (gastroesophageal reflux disease)     Maxillary sinusitis     Multiple allergies     Myofascial pain syndrome     Nasal turbinate hypertrophy     Wears glasses      Past Surgical History:   Procedure Laterality Date    LAPAROSCOPY      with vaginal hysterectomy; 11/3/2003 rso    OOPHORECTOMY Left 2004    PARTIAL HYSTERECTOMY  2004    CA NASAL/SINUS ENDOSCOPY,RMV TISS MAXILL SINUS N/A 9/30/2016    Procedure: IMAGE GUIDED FUNCTIONAL ENDOSCOPIC SINUS SURGERY;  Surgeon: Nilsa Helton MD;  [facility];  Service: ENT    REDUCTION MAMMAPLASTY Bilateral 02/27/2015    TOOTH EXTRACTION Right 3/16/2019    Procedure: EXTRACTION TOOTH #1 (Impacted Third Molar Tooth);   Surgeon: Nahid Irving DDS;  [facility];  Service: Maxillofacial    TUBAL LIGATION      WISDOM TOOTH EXTRACTION        Family History:     Family History   Problem Relation Age of Onset    CLEM disease Mother     Atrial fibrillation Mother     Atrial fibrillation Father     Heart disease Father     Diabetes Maternal Grandmother     Hypertension Maternal Grandmother     Colon cancer Maternal Grandfather     Diabetes Maternal Grandfather     Cancer Paternal Grandfather     Endometrial cancer Cousin     Breast cancer Cousin     Multiple sclerosis Sister     No Known Problems Son     No Known Problems Son       Social History:     E-Cigarette/Vaping    E-Cigarette Use Never User      E-Cigarette/Vaping Substances    Nicotine No     THC No     CBD No     Flavoring No     Other No     Unknown No      Social History     Socioeconomic History    Marital status: /Civil Union     Spouse name: Bettye Yao Number of children: 2    Years of education: Not on file    Highest education level: Not on file   Occupational History    Not on file   Social Needs    Financial resource strain: Not on file    Food insecurity     Worry: Not on file     Inability: Not on file    Transportation needs     Medical: Not on file     Non-medical: Not on file   Tobacco Use    Smoking status: Former Smoker     Packs/day: 0 50     Types: Cigarettes     Start date: 17     Quit date: 3/1/2021     Years since quittin 2    Smokeless tobacco: Never Used    Tobacco comment: patient states havent smoked cigarettes since end of 2021   Substance and Sexual Activity    Alcohol use: Not Currently     Comment: social    Drug use: No    Sexual activity: Not on file   Lifestyle    Physical activity     Days per week: 5 days     Minutes per session: 30 min    Stress: Not on file   Relationships    Social connections     Talks on phone: Not on file     Gets together: Not on file     Attends [employer] service: Not on file     Active member of club or organization: Not on file     Attends meetings of clubs or organizations: Not on file     Relationship status: Not on file    Intimate partner violence     Fear of current or ex partner: Not on file     Emotionally abused: Not on file     Physically abused: Not on file     Forced sexual activity: Not on file   Other Topics Concern    Not on file   Social History Narrative    Caffeine use    Daily coffee consumption ( 1 cup/day)    Daily cola consumption (3 cans/day)        Patient lives in a home that was built in [address]in in the bedroom     Unfinished basement-dry-damp-no mold-musty smell     Dehumidifier in the basement     No air  or purifiers     No humidifier-has c-pap     Home is smoke free     Window air conditioning     Patient [place] the [place] area and [place]         Dog(Matiase) he is allowed in the bedroom         Caffeine: 2 cups of coffee daily                     Occasionally drinks ice tea and diet soda Hot tea rarely     Chocolate consumed occasionally         Patient lives with spouse           Medications and Allergies:     Current Outpatient Medications   Medication Sig Dispense Refill    atorvastatin (LIPITOR) 10 mg tablet Take 1 tablet (10 mg total) by mouth daily 90 tablet 3    azelastine (ASTELIN) 0 1 % nasal spray 1 spray into each nostril 2 (two) times a day Use in each nostril as directed 1 Bottle 6    busPIRone (BUSPAR) 30 MG tablet Take 10 mg by mouth       cholecalciferol (VITAMIN D3) 1,000 units tablet Take 2,000 Units by mouth daily      FLUoxetine (PROZAC) 40 MG capsule Take 80 mg by mouth daily       LORazepam (ATIVAN) 0 5 mg tablet Take 0 5 mg by mouth 2 (two) times a day      polyethylene glycol (MIRALAX) 17 g packet Take 17 g by mouth daily      sodium chloride (OCEAN) 0 65 % nasal spray 2 sprays into each nostril 2 (two) times a day 15 mL 0     No current facility-administered medications for this visit        Allergies   Allergen Reactions    Other      Most all antibiotics- hives, hypotensive    "no problem with clindamycin "    Augmentin Es-600  [Amoxicillin-Pot Clavulanate]     Cefuroxime     Erythromycin     Levofloxacin     Morphine     Morphine And Related Hives    Oxycodone-Acetaminophen     Penicillins     Percocet [Oxycodone-Acetaminophen] Hives    Sulfa Antibiotics     Tetracyclines & Related       Immunizations:     Immunization History   Administered Date(s) Administered    Fluzone Split Quad 0 25 mL 11/21/2017    Fluzone Split Quad 0 5 mL 10/31/2014    INFLUENZA 11/05/2015, 11/10/2016, 11/21/2017, 10/26/2018    Influenza Quadrivalent Preservative Free 3 years and older IM 10/31/2014    Influenza Quadrivalent, 6-35 Months IM 11/21/2017    Influenza, injectable, quadrivalent, preservative free 0 5 mL 10/26/2018, 10/18/2020    Influenza, recombinant, quadrivalent,injectable, preservative free 10/15/2019    Influenza, seasonal, injectable 11/17/2012, 10/30/2013, 11/05/2015, 11/10/2016    SARS-CoV-2 / COVID-19 mRNA IM (Pfizer-BioNTech) 03/17/2021, 04/15/2021    Tdap 01/23/2014      Health Maintenance:         Topic Date Due    HIV Screening  Never done    Cervical Cancer Screening  Never done    MAMMOGRAM  03/05/2021    Colonoscopy Surveillance  02/28/2022    Colorectal Cancer Screening  02/28/2027    Hepatitis C Screening  Completed         Topic Date Due    Pneumococcal Vaccine: Pediatrics (0 to 5 Years) and At-Risk Patients (6 to 59 Years) (1 of 1 - PPSV23) Never done      Medicare Health Risk Assessment:     Temp (!) 97 4 °F (36 3 °C) (Tympanic)   LMP  (LMP Unknown)      Jayden King is here for her Subsequent Wellness visit  Last Medicare Wellness visit information reviewed, patient interviewed and updates made to the record today  Health Risk Assessment:   Patient rates overall health as very good  Patient feels that their physical health rating is same  Patient is satisfied with their life  Eyesight was rated as same  Hearing was rated as same  Patient feels that their emotional and mental health rating is same  Patients states they are never, rarely angry  Patient states they are sometimes unusually tired/fatigued  Pain experienced in the last 7 days has been none  Patient states that she has experienced no weight loss or gain in last 6 months  Depression Screening:   PHQ-2 Score: 0  PHQ-9 Score: 0      Fall Risk Screening: In the past year, patient has experienced: no history of falling in past year      Urinary Incontinence Screening:   Patient has not leaked urine accidently in the last six months  Home Safety:  Patient does not have trouble with stairs inside or outside of their home  Patient has working smoke alarms and has working carbon monoxide detector  Home safety hazards include: none  Nutrition:   Current diet is Regular  Medications:   Patient is not currently taking any over-the-counter supplements  Patient is able to manage medications  Activities of Daily Living (ADLs)/Instrumental Activities of Daily Living (IADLs):   Walk and transfer into and out of bed and chair?: Yes  Dress and groom yourself?: Yes    Bathe or shower yourself?: Yes    Feed yourself? Yes  Do your laundry/housekeeping?: Yes  Manage your money, pay your bills and track your expenses?: Yes  Make your own meals?: Yes    Do your own shopping?: Yes    Previous Hospitalizations:   Any hospitalizations or ED visits within the last 12 months?: Yes    How many hospitalizations have you had in the last year?: 1-2    Hospitalization Comments: 3/14/2019 - 3/18/2019 (4 days)  [address]  Facial cellulitis   Principal problem     Advance Care Planning:   Living will: No    Durable POA for healthcare: No    Advanced directive: No      Cognitive Screening:   Provider or family/friend/caregiver concerned regarding cognition?: No    PREVENTIVE SCREENINGS      Cardiovascular Screening:    General: History Lipid Disorder and Screening Current      Diabetes Screening:     General: Screening Current      Colorectal Cancer Screening:     General: Screening Current      Breast Cancer Screening:     General: Screening Current and Risks and Benefits Discussed      Cervical Cancer Screening:    General: Risks and Benefits Discussed and Screening Current      Osteoporosis Screening:    General: Risks and Benefits Discussed and Screening Current      Abdominal Aortic Aneurysm (AAA) Screening:        General: Screening Not Indicated      Lung Cancer Screening:     General: Screening Not Indicated      Hepatitis C Screening:    General: Screening Current    Screening, Brief Intervention, and Referral to Treatment (SBIRT)    Screening  Typical number of drinks in a day: 0  Typical number of drinks in a week: 0  Interpretation: Low risk drinking behavior      Single Item Drug Screening:  How often have you used an illegal drug (including marijuana) or a prescription medication for non-medical reasons in the past year? never    Single Item Drug Screen Score: 0  Interpretation: Negative screen for possible drug use disorder      Niraj Abdul MD

## 2021-05-25 ENCOUNTER — TELEPHONE (OUTPATIENT)
Dept: LAB | Facility: HOSPITAL | Age: 56
End: 2021-05-25

## 2021-06-10 ENCOUNTER — HOSPITAL ENCOUNTER (OUTPATIENT)
Dept: CT IMAGING | Facility: HOSPITAL | Age: 56
Discharge: HOME | End: 2021-06-10
Attending: INTERNAL MEDICINE
Payer: COMMERCIAL

## 2021-06-10 ENCOUNTER — HOSPITAL ENCOUNTER (OUTPATIENT)
Dept: PULMONOLOGY | Facility: HOSPITAL | Age: 56
Discharge: HOME | End: 2021-06-10
Attending: INTERNAL MEDICINE
Payer: COMMERCIAL

## 2021-06-10 DIAGNOSIS — J84.116 CRYPTOGENIC ORGANIZING PNEUMONIA (HCC): ICD-10-CM

## 2021-06-10 DIAGNOSIS — R93.89 ABNORMAL CT OF THE CHEST: ICD-10-CM

## 2021-06-10 PROCEDURE — 94010 BREATHING CAPACITY TEST: CPT

## 2021-06-10 PROCEDURE — 94760 N-INVAS EAR/PLS OXIMETRY 1: CPT

## 2021-06-10 PROCEDURE — 94729 DIFFUSING CAPACITY: CPT | Performed by: INTERNAL MEDICINE

## 2021-06-10 PROCEDURE — 94729 DIFFUSING CAPACITY: CPT

## 2021-06-10 PROCEDURE — 94010 BREATHING CAPACITY TEST: CPT | Performed by: INTERNAL MEDICINE

## 2021-06-10 PROCEDURE — 71250 CT THORAX DX C-: CPT

## 2021-06-10 PROCEDURE — 94726 PLETHYSMOGRAPHY LUNG VOLUMES: CPT

## 2021-06-10 PROCEDURE — G1004 CDSM NDSC: HCPCS

## 2021-06-10 PROCEDURE — 94726 PLETHYSMOGRAPHY LUNG VOLUMES: CPT | Performed by: INTERNAL MEDICINE

## 2021-06-11 DIAGNOSIS — R06.02 SHORTNESS OF BREATH: Primary | ICD-10-CM

## 2021-06-11 DIAGNOSIS — R09.89 PULMONARY AIR TRAPPING: ICD-10-CM

## 2021-06-11 RX ORDER — ALBUTEROL SULFATE 90 UG/1
2 AEROSOL, METERED RESPIRATORY (INHALATION) EVERY 6 HOURS PRN
Qty: 18 G | Refills: 3 | Status: SHIPPED | OUTPATIENT
Start: 2021-06-11

## 2021-06-11 NOTE — RESULT ENCOUNTER NOTE
Discussed results of PFTs with patient, showing air trapping, but otherwise unremarkable  Also discussed results of CT chest, pending official read, with marked improvement in airspace opacity/consolidations 2/2   Patient is now off prednisone and has been feeling much better after treatment for cryptogenic organizing pneumonia  She does have a few episodes of intermittent shortness of breath, especially when her allergies act up  Given this, I have prescribed albuterol to use as needed  Patient will track of how often she uses this and if she remains uncontrolled with hypertensive shortness of breath, she may benefit from maintenance therapy for reactive airways  Patient will be seen again in the office early August   All questions answered  Patient voiced understanding and appreciation

## 2021-07-27 ENCOUNTER — VBI (OUTPATIENT)
Dept: ADMINISTRATIVE | Facility: OTHER | Age: 56
End: 2021-07-27

## 2021-07-28 NOTE — PROGRESS NOTES
Pulmonary Follow Up Note   Ken Lane 61 y o  female MRN: [id number]  8/4/2021    Assessment:  1  Resolved hypoxic respiratory failure likely secondary to cryptogenic organizing pneumonia, less likely hypersensitivity pneumonitis,  Less likely infectious etiology, less likely vasculitis or autoimmune condition given negative serologies  1   HP panel negative   2   serology for autoimmune / vasculitis unremarkable  3   bronchoscopy without evidence of infectious etiology and normal cell count, although patient had been on corticosteroids -  Did complete 7 day course of cefepime   2   abnormal CT chest with bilateral consolidative airspace opacities  3  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, MAC, suspect contaminant vs asymptomatic carrier   4   tobacco abuse, in remission      Plan:  ·  Weaned off O2 at hospital discharge  And completed prednisone taper for   ·  repeat CT chest with resolution of prior consolidations  ·  PFTs with air trapping, otherwise unremarkable, can continue with as needed rescue inhaler  Patient rarely using this  Discussed that if she requires it more often and has more significant symptoms of shortness of breath or wheezing, can consider maintenance therapy, likely ics or ics -Laba given lack of fixed obstruction on PFTs  ·  suspect Candida and MAC both contaminant  Even if she is asymptomatic carrier of MAC, no further treatment is indicated at this time and CT chest does not fit with mycobacterium disease  ·   continues to remain smoke-free  ·  up-to-date with immunizations and does not qualify for lung cancer screening CT given less than 20 pack year smoking history  ·  will follow-up in 1 year     Diagnoses and all orders for this visit:    Cryptogenic organizing pneumonia ([address] )    Abnormal CT of the chest    Tobacco abuse, in remission    Other orders  -     ARIPiprazole (ABILIFY) 10 mg tablet;  Take 10 mg by mouth daily  -     chlorhexidine (PERIDEX) 0 12 % solution; RINSE WITH 30 MILLILITERS BY MOUTH FOR 30 SECONDS THEN SPIT OUT  use at bedtime (Patient not taking: Reported on 8/4/2021)        Return in about 1 year (around 8/4/2022) for Next scheduled follow up  History of Present Illness   HPI:  Andrés Granado is a 61 y o  female With history of chronic sinusitis, anxiety/depression, hypertension, nicotine dependence,   Cryptogenic organizing pneumonia, who  Presents for hospital follow-up       Patient recently hospitalized from 04/03-4/13 for acute hypoxic respiratory failure  Patient was initially placed on antibiotics and CT chest showed multifocal consolidative airspace opacities  She was eventually transferred to ICU for high-flow nasal cannula and given no improvement with antibiotics, she was started on empiric corticosteroids, underwent bronchoscopy with BAL with negative cultures, cytology negative, cell count 90% macrophage 10% neutrophils  Patient did complete 7 day course of empiric cefepime  HP panel negative, serology for autoimmune / vasculitis unremarkable  Patient treated for cryptogenic organizing pneumonia with corticosteroids and was eventually weaned off oxygen  Upon discharge, she did not require oxygen and  Was discharged on prednisone taper  Of note, BAL with  Late growth of AFB, +MAC, as well as candida  Patient followed up in April 2021 and was feeling much  Better as she was completing her prednisone taper  She underwent repeat CT chest which showed marked improvement in bilateral airspace disease  She also had PFTs which showed air trapping, but was otherwise unremarkable  She was given a rescue inhaler to use as needed      In terms of pulmonary history, smoked for 25-30 years up to 1/2 ppd, quit about 4 month ago since recent hospitalization  Denies significant reflux or postnasal drip  Since last visit,  Patient has continued to feel well    She does have mild intermittent shortness of breath, but has only used her rescue inhaler a handful of times  She went on vacation last week to [facility]  and is able to spend time with her family  She denies significant shortness of breath, wheezing, cough, congestion, fever, chills  She is up-to-date with her immunizations including covid vaccine  Review of Systems   Constitutional: Negative for chills, fever and unexpected weight change  HENT: Positive for congestion  Negative for rhinorrhea, sneezing and sore throat  Respiratory: Negative for cough, shortness of breath and wheezing  Cardiovascular: Negative for chest pain, palpitations and leg swelling  Gastrointestinal: Negative for abdominal pain, constipation, diarrhea, nausea and vomiting  Endocrine: Negative for cold intolerance and heat intolerance  Genitourinary: Negative for dysuria  Musculoskeletal: Negative for arthralgias  Allergic/Immunologic: Negative for immunocompromised state  Neurological: Negative for dizziness and numbness  Historical Information   Past Medical History:   Diagnosis Date    Allergies     Anesthesia complication     V TACH after her reduction mammoplasty, done at [address] Chronic rhinitis 2/18/2020    Depression     Ethmoid sinusitis     GERD (gastroesophageal reflux disease)     Maxillary sinusitis     Multiple allergies     Myofascial pain syndrome     Nasal turbinate hypertrophy     Wears glasses      Past Surgical History:   Procedure Laterality Date    LAPAROSCOPY      with vaginal hysterectomy; 11/3/2003 rso    OOPHORECTOMY Left 2004    PARTIAL HYSTERECTOMY  2004    HI NASAL/SINUS ENDOSCOPY,RMV TISS MAXILL SINUS N/A 9/30/2016    Procedure: IMAGE GUIDED FUNCTIONAL ENDOSCOPIC SINUS SURGERY;  Surgeon: Dennis Rees MD;  [facility];  Service: ENT    REDUCTION MAMMAPLASTY Bilateral 02/27/2015    TOOTH EXTRACTION Right 3/16/2019    Procedure: EXTRACTION TOOTH #1 (Impacted Third Molar Tooth);   Surgeon: Gunner Pierson DDS;  [facility];  Service: Maxillofacial    TUBAL LIGATION      WISDOM TOOTH EXTRACTION       Family History   Problem Relation Age of Onset    CLEM disease Mother     Atrial fibrillation Mother     Atrial fibrillation Father     Heart disease Father     Diabetes Maternal Grandmother     Hypertension Maternal Grandmother     Colon cancer Maternal Grandfather     Diabetes Maternal Grandfather     Cancer Paternal Grandfather     Endometrial cancer Cousin     Breast cancer Cousin     Multiple sclerosis Sister     No Known Problems Son     No Known Problems Son          Meds/Allergies     Current Outpatient Medications:     albuterol (Ventolin HFA) 90 mcg/act inhaler, Inhale 2 puffs every 6 (six) hours as needed for wheezing, Disp: 18 g, Rfl: 3    ARIPiprazole (ABILIFY) 10 mg tablet, Take 10 mg by mouth daily, Disp: , Rfl:     atorvastatin (LIPITOR) 10 mg tablet, Take 1 tablet (10 mg total) by mouth daily, Disp: 90 tablet, Rfl: 3    azelastine (ASTELIN) 0 1 % nasal spray, 1 spray into each nostril 2 (two) times a day Use in each nostril as directed, Disp: 1 Bottle, Rfl: 6    busPIRone (BUSPAR) 30 MG tablet, Take 10 mg by mouth , Disp: , Rfl:     cholecalciferol (VITAMIN D3) 1,000 units tablet, Take 2,000 Units by mouth daily, Disp: , Rfl:     FLUoxetine (PROZAC) 40 MG capsule, Take 80 mg by mouth daily , Disp: , Rfl:     LORazepam (ATIVAN) 0 5 mg tablet, Take 0 5 mg by mouth 2 (two) times a day, Disp: , Rfl:     polyethylene glycol (MIRALAX) 17 g packet, Take 17 g by mouth daily, Disp: , Rfl:     sodium chloride (OCEAN) 0 65 % nasal spray, 2 sprays into each nostril 2 (two) times a day, Disp: 15 mL, Rfl: 0    chlorhexidine (PERIDEX) 0 12 % solution, RINSE WITH 30 MILLILITERS BY MOUTH FOR 30 SECONDS THEN SPIT OUT  use at bedtime (Patient not taking: Reported on 8/4/2021), Disp: , Rfl:   Allergies   Allergen Reactions    Other      Most all antibiotics- hives, hypotensive    "no problem with clindamycin "  Augmentin Es-600  [Amoxicillin-Pot Clavulanate]     Cefuroxime     Erythromycin     Levofloxacin     Morphine     Morphine And Related Hives    Oxycodone-Acetaminophen     Penicillins     Percocet [Oxycodone-Acetaminophen] Hives    Sulfa Antibiotics     Tetracyclines & Related        Vitals: Blood pressure 144/82, pulse 85, temperature 99 2 °F (37 3 °C), SpO2 95 %, not currently breastfeeding  There is no height or weight on file to calculate BMI  Oxygen Therapy  SpO2: 95 %  Oxygen Therapy: None (Room air)    Physical Exam  Constitutional:       General: She is not in acute distress  Appearance: She is well-developed  She is not diaphoretic  HENT:      Head: Normocephalic and atraumatic  Mouth/Throat:      Mouth: Mucous membranes are moist       Pharynx: Oropharynx is clear  No oropharyngeal exudate  Eyes:      General: No scleral icterus  Cardiovascular:      Rate and Rhythm: Normal rate and regular rhythm  Heart sounds: Normal heart sounds  No murmur heard  Pulmonary:      Effort: Pulmonary effort is normal  No respiratory distress  Breath sounds: Normal breath sounds  No wheezing  Abdominal:      General: Bowel sounds are normal  There is no distension  Palpations: Abdomen is soft  Tenderness: There is no abdominal tenderness  Musculoskeletal:      Right lower leg: No edema  Left lower leg: No edema  Neurological:      Mental Status: She is alert and oriented to person, place, and time  Labs: I have personally reviewed pertinent lab results    Lab Results   Component Value Date    WBC 7 36 05/15/2021    HGB 14 3 05/15/2021    HCT 44 4 05/15/2021    MCV 93 05/15/2021     05/15/2021     Lab Results   Component Value Date    GLUCOSE 100 04/20/2015    CALCIUM 9 3 05/15/2021     04/20/2015    K 4 3 05/15/2021    CO2 25 05/15/2021     05/15/2021    BUN 10 05/15/2021    CREATININE 0 80 05/15/2021     No results found for: IGE  Lab Results   Component Value Date    ALT 34 05/15/2021    AST 15 05/15/2021    ALKPHOS 71 05/15/2021     Microbiology:    Bronchial culture Candida   MRSA negative   strep/ Legionella/RP2 panel negative  Blood cultures neg  COVID negative x 2     Imaging and other studies: I have personally reviewed pertinent reports    and I have personally reviewed pertinent films in PACS   CT chest 06/10/2021   complete resolution of previous consolidative airspace disease      CT chest 04/03/2020   multifocal consolidated airspace opacities     Bronchoscopy   cytology negative, cell count 90% macrophage 10% neutrophils      Yodit Bryan MD  Pulmonary & Critical Care Fellow, [facility]

## 2021-08-04 ENCOUNTER — OFFICE VISIT (OUTPATIENT)
Dept: PULMONOLOGY | Facility: CLINIC | Age: 56
End: 2021-08-04
Payer: COMMERCIAL

## 2021-08-04 VITALS
HEART RATE: 85 BPM | SYSTOLIC BLOOD PRESSURE: 144 MMHG | OXYGEN SATURATION: 95 % | DIASTOLIC BLOOD PRESSURE: 82 MMHG | TEMPERATURE: 99.2 F

## 2021-08-04 DIAGNOSIS — R93.89 ABNORMAL CT OF THE CHEST: ICD-10-CM

## 2021-08-04 DIAGNOSIS — F17.201 TOBACCO ABUSE, IN REMISSION: ICD-10-CM

## 2021-08-04 DIAGNOSIS — J84.116 CRYPTOGENIC ORGANIZING PNEUMONIA (HCC): Primary | ICD-10-CM

## 2021-08-04 PROCEDURE — 3079F DIAST BP 80-89 MM HG: CPT | Performed by: INTERNAL MEDICINE

## 2021-08-04 PROCEDURE — 99214 OFFICE O/P EST MOD 30 MIN: CPT | Performed by: INTERNAL MEDICINE

## 2021-08-04 PROCEDURE — 3077F SYST BP >= 140 MM HG: CPT | Performed by: INTERNAL MEDICINE

## 2021-08-04 PROCEDURE — 1036F TOBACCO NON-USER: CPT | Performed by: INTERNAL MEDICINE

## 2021-08-04 RX ORDER — CHLORHEXIDINE GLUCONATE 0.12 MG/ML
RINSE ORAL
COMMUNITY
Start: 2021-05-10 | End: 2022-03-11

## 2021-08-04 RX ORDER — ARIPIPRAZOLE 10 MG/1
10 TABLET ORAL DAILY
COMMUNITY
Start: 2021-05-12

## 2021-10-28 ENCOUNTER — APPOINTMENT (OUTPATIENT)
Dept: LAB | Facility: CLINIC | Age: 56
End: 2021-10-28
Payer: COMMERCIAL

## 2021-10-28 DIAGNOSIS — I10 ESSENTIAL HYPERTENSION: Chronic | ICD-10-CM

## 2021-10-28 DIAGNOSIS — B59 PNEUMONIA OF BOTH LUNGS DUE TO PNEUMOCYSTIS JIROVECII, UNSPECIFIED PART OF LUNG (HCC): ICD-10-CM

## 2021-10-28 DIAGNOSIS — E55.9 VITAMIN D DEFICIENCY: ICD-10-CM

## 2021-10-28 DIAGNOSIS — E78.01 FAMILIAL HYPERCHOLESTEROLEMIA: Chronic | ICD-10-CM

## 2021-10-28 DIAGNOSIS — R73.03 PRE-DIABETES: Chronic | ICD-10-CM

## 2021-10-28 DIAGNOSIS — J32.2 CHRONIC ETHMOIDAL SINUSITIS: ICD-10-CM

## 2021-10-28 LAB
25(OH)D3 SERPL-MCNC: 22.6 NG/ML (ref 30–100)
ALBUMIN SERPL BCP-MCNC: 3.8 G/DL (ref 3.5–5)
ALP SERPL-CCNC: 102 U/L (ref 46–116)
ALT SERPL W P-5'-P-CCNC: 30 U/L (ref 12–78)
ANION GAP SERPL CALCULATED.3IONS-SCNC: 3 MMOL/L (ref 4–13)
AST SERPL W P-5'-P-CCNC: 19 U/L (ref 5–45)
BASOPHILS # BLD AUTO: 0.04 THOUSANDS/ΜL (ref 0–0.1)
BASOPHILS NFR BLD AUTO: 0 % (ref 0–1)
BILIRUB SERPL-MCNC: 0.41 MG/DL (ref 0.2–1)
BUN SERPL-MCNC: 13 MG/DL (ref 5–25)
CALCIUM SERPL-MCNC: 9.6 MG/DL (ref 8.3–10.1)
CHLORIDE SERPL-SCNC: 106 MMOL/L (ref 100–108)
CHOLEST SERPL-MCNC: 194 MG/DL (ref 50–200)
CO2 SERPL-SCNC: 29 MMOL/L (ref 21–32)
CREAT SERPL-MCNC: 0.87 MG/DL (ref 0.6–1.3)
EOSINOPHIL # BLD AUTO: 0.92 THOUSAND/ΜL (ref 0–0.61)
EOSINOPHIL NFR BLD AUTO: 8 % (ref 0–6)
ERYTHROCYTE [DISTWIDTH] IN BLOOD BY AUTOMATED COUNT: 12.4 % (ref 11.6–15.1)
EST. AVERAGE GLUCOSE BLD GHB EST-MCNC: 114 MG/DL
GFR SERPL CREATININE-BSD FRML MDRD: 74 ML/MIN/1.73SQ M
GLUCOSE P FAST SERPL-MCNC: 89 MG/DL (ref 65–99)
HBA1C MFR BLD: 5.6 %
HCT VFR BLD AUTO: 44.2 % (ref 34.8–46.1)
HDLC SERPL-MCNC: 44 MG/DL
HGB BLD-MCNC: 14.1 G/DL (ref 11.5–15.4)
IGA SERPL-MCNC: 119 MG/DL (ref 70–400)
IGG SERPL-MCNC: 810 MG/DL (ref 700–1600)
IGM SERPL-MCNC: 119 MG/DL (ref 40–230)
IMM GRANULOCYTES # BLD AUTO: 0.03 THOUSAND/UL (ref 0–0.2)
IMM GRANULOCYTES NFR BLD AUTO: 0 % (ref 0–2)
LDLC SERPL DIRECT ASSAY-MCNC: 109 MG/DL (ref 0–100)
LYMPHOCYTES # BLD AUTO: 3.22 THOUSANDS/ΜL (ref 0.6–4.47)
LYMPHOCYTES NFR BLD AUTO: 28 % (ref 14–44)
MCH RBC QN AUTO: 29.5 PG (ref 26.8–34.3)
MCHC RBC AUTO-ENTMCNC: 31.9 G/DL (ref 31.4–37.4)
MCV RBC AUTO: 93 FL (ref 82–98)
MONOCYTES # BLD AUTO: 0.85 THOUSAND/ΜL (ref 0.17–1.22)
MONOCYTES NFR BLD AUTO: 8 % (ref 4–12)
NEUTROPHILS # BLD AUTO: 6.34 THOUSANDS/ΜL (ref 1.85–7.62)
NEUTS SEG NFR BLD AUTO: 56 % (ref 43–75)
NRBC BLD AUTO-RTO: 0 /100 WBCS
PLATELET # BLD AUTO: 246 THOUSANDS/UL (ref 149–390)
PMV BLD AUTO: 12.6 FL (ref 8.9–12.7)
POTASSIUM SERPL-SCNC: 4.1 MMOL/L (ref 3.5–5.3)
PROT SERPL-MCNC: 7.4 G/DL (ref 6.4–8.2)
RBC # BLD AUTO: 4.78 MILLION/UL (ref 3.81–5.12)
SODIUM SERPL-SCNC: 138 MMOL/L (ref 136–145)
TRIGL SERPL-MCNC: 283 MG/DL
WBC # BLD AUTO: 11.4 THOUSAND/UL (ref 4.31–10.16)

## 2021-10-28 PROCEDURE — 80061 LIPID PANEL: CPT

## 2021-10-28 PROCEDURE — 83036 HEMOGLOBIN GLYCOSYLATED A1C: CPT

## 2021-10-28 PROCEDURE — 85025 COMPLETE CBC W/AUTO DIFF WBC: CPT

## 2021-10-28 PROCEDURE — 36415 COLL VENOUS BLD VENIPUNCTURE: CPT

## 2021-10-28 PROCEDURE — 83721 ASSAY OF BLOOD LIPOPROTEIN: CPT

## 2021-10-28 PROCEDURE — 80053 COMPREHEN METABOLIC PANEL: CPT

## 2021-10-28 PROCEDURE — 82784 ASSAY IGA/IGD/IGG/IGM EACH: CPT

## 2021-10-28 PROCEDURE — 82306 VITAMIN D 25 HYDROXY: CPT

## 2021-10-29 ENCOUNTER — OFFICE VISIT (OUTPATIENT)
Dept: INTERNAL MEDICINE CLINIC | Facility: CLINIC | Age: 56
End: 2021-10-29
Payer: COMMERCIAL

## 2021-10-29 DIAGNOSIS — J32.9 CHRONIC SINUSITIS, UNSPECIFIED LOCATION: ICD-10-CM

## 2021-10-29 DIAGNOSIS — I10 PRIMARY HYPERTENSION: Primary | ICD-10-CM

## 2021-10-29 DIAGNOSIS — R73.03 PRE-DIABETES: ICD-10-CM

## 2021-10-29 DIAGNOSIS — K21.9 GASTROESOPHAGEAL REFLUX DISEASE, UNSPECIFIED WHETHER ESOPHAGITIS PRESENT: ICD-10-CM

## 2021-10-29 DIAGNOSIS — J84.116 CRYPTOGENIC ORGANIZING PNEUMONIA (HCC): ICD-10-CM

## 2021-10-29 DIAGNOSIS — F41.8 DEPRESSION WITH ANXIETY: Chronic | ICD-10-CM

## 2021-10-29 DIAGNOSIS — Z23 ENCOUNTER FOR IMMUNIZATION: ICD-10-CM

## 2021-10-29 DIAGNOSIS — E78.01 FAMILIAL HYPERCHOLESTEROLEMIA: ICD-10-CM

## 2021-10-29 PROCEDURE — 99215 OFFICE O/P EST HI 40 MIN: CPT | Performed by: INTERNAL MEDICINE

## 2021-10-29 PROCEDURE — G0008 ADMIN INFLUENZA VIRUS VAC: HCPCS

## 2021-10-29 PROCEDURE — 90682 RIV4 VACC RECOMBINANT DNA IM: CPT

## 2021-10-30 VITALS — HEART RATE: 72 BPM | RESPIRATION RATE: 12 BRPM | SYSTOLIC BLOOD PRESSURE: 120 MMHG | DIASTOLIC BLOOD PRESSURE: 78 MMHG

## 2021-12-15 ENCOUNTER — HOSPITAL ENCOUNTER (OUTPATIENT)
Dept: MAMMOGRAPHY | Facility: CLINIC | Age: 56
Discharge: HOME | End: 2021-12-15
Payer: COMMERCIAL

## 2021-12-15 VITALS — WEIGHT: 185 LBS | HEIGHT: 65 IN | BODY MASS INDEX: 30.82 KG/M2

## 2021-12-15 DIAGNOSIS — Z12.31 VISIT FOR SCREENING MAMMOGRAM: ICD-10-CM

## 2021-12-15 PROCEDURE — 77063 BREAST TOMOSYNTHESIS BI: CPT

## 2021-12-15 PROCEDURE — 77067 SCR MAMMO BI INCL CAD: CPT

## 2021-12-17 ENCOUNTER — IMMUNIZATIONS (OUTPATIENT)
Dept: FAMILY MEDICINE CLINIC | Facility: HOSPITAL | Age: 56
End: 2021-12-17

## 2021-12-17 DIAGNOSIS — Z23 ENCOUNTER FOR IMMUNIZATION: Primary | ICD-10-CM

## 2021-12-17 PROCEDURE — 91300 COVID-19 PFIZER VACC 0.3 ML: CPT

## 2021-12-17 PROCEDURE — 0001A COVID-19 PFIZER VACC 0.3 ML: CPT

## 2022-03-09 ENCOUNTER — APPOINTMENT (OUTPATIENT)
Dept: LAB | Facility: CLINIC | Age: 57
End: 2022-03-09
Payer: COMMERCIAL

## 2022-03-09 DIAGNOSIS — I10 ESSENTIAL HYPERTENSION: Chronic | ICD-10-CM

## 2022-03-09 DIAGNOSIS — Z79.899 ENCOUNTER FOR LONG-TERM (CURRENT) USE OF OTHER MEDICATIONS: ICD-10-CM

## 2022-03-09 DIAGNOSIS — I10 PRIMARY HYPERTENSION: ICD-10-CM

## 2022-03-09 DIAGNOSIS — F33.1 MAJOR DEPRESSIVE DISORDER, RECURRENT EPISODE, MODERATE (HCC): ICD-10-CM

## 2022-03-09 DIAGNOSIS — F41.1 GENERALIZED ANXIETY DISORDER: ICD-10-CM

## 2022-03-09 DIAGNOSIS — R73.03 PRE-DIABETES: ICD-10-CM

## 2022-03-09 DIAGNOSIS — E78.01 FAMILIAL HYPERCHOLESTEROLEMIA: ICD-10-CM

## 2022-03-09 LAB
25(OH)D3 SERPL-MCNC: 45.6 NG/ML (ref 30–100)
ALBUMIN SERPL BCP-MCNC: 4.3 G/DL (ref 3.5–5)
ALP SERPL-CCNC: 78 U/L (ref 46–116)
ALT SERPL W P-5'-P-CCNC: 27 U/L (ref 12–78)
ANION GAP SERPL CALCULATED.3IONS-SCNC: 6 MMOL/L (ref 4–13)
AST SERPL W P-5'-P-CCNC: 21 U/L (ref 5–45)
BASOPHILS # BLD AUTO: 0.02 THOUSANDS/ΜL (ref 0–0.1)
BASOPHILS NFR BLD AUTO: 0 % (ref 0–1)
BILIRUB SERPL-MCNC: 0.46 MG/DL (ref 0.2–1)
BUN SERPL-MCNC: 10 MG/DL (ref 5–25)
CALCIUM SERPL-MCNC: 10 MG/DL (ref 8.3–10.1)
CHLORIDE SERPL-SCNC: 108 MMOL/L (ref 100–108)
CHOLEST SERPL-MCNC: 173 MG/DL
CO2 SERPL-SCNC: 26 MMOL/L (ref 21–32)
CREAT SERPL-MCNC: 0.9 MG/DL (ref 0.6–1.3)
EOSINOPHIL # BLD AUTO: 0.93 THOUSAND/ΜL (ref 0–0.61)
EOSINOPHIL NFR BLD AUTO: 11 % (ref 0–6)
ERYTHROCYTE [DISTWIDTH] IN BLOOD BY AUTOMATED COUNT: 12.7 % (ref 11.6–15.1)
EST. AVERAGE GLUCOSE BLD GHB EST-MCNC: 108 MG/DL
GFR SERPL CREATININE-BSD FRML MDRD: 70 ML/MIN/1.73SQ M
GLUCOSE P FAST SERPL-MCNC: 96 MG/DL (ref 65–99)
HBA1C MFR BLD: 5.4 %
HCT VFR BLD AUTO: 42.7 % (ref 34.8–46.1)
HDLC SERPL-MCNC: 39 MG/DL
HGB BLD-MCNC: 14.4 G/DL (ref 11.5–15.4)
IMM GRANULOCYTES # BLD AUTO: 0.03 THOUSAND/UL (ref 0–0.2)
IMM GRANULOCYTES NFR BLD AUTO: 0 % (ref 0–2)
LDLC SERPL CALC-MCNC: 106 MG/DL (ref 0–100)
LDLC SERPL DIRECT ASSAY-MCNC: 101 MG/DL (ref 0–100)
LYMPHOCYTES # BLD AUTO: 2.17 THOUSANDS/ΜL (ref 0.6–4.47)
LYMPHOCYTES NFR BLD AUTO: 26 % (ref 14–44)
MCH RBC QN AUTO: 29.6 PG (ref 26.8–34.3)
MCHC RBC AUTO-ENTMCNC: 33.7 G/DL (ref 31.4–37.4)
MCV RBC AUTO: 88 FL (ref 82–98)
MONOCYTES # BLD AUTO: 0.52 THOUSAND/ΜL (ref 0.17–1.22)
MONOCYTES NFR BLD AUTO: 6 % (ref 4–12)
NEUTROPHILS # BLD AUTO: 4.75 THOUSANDS/ΜL (ref 1.85–7.62)
NEUTS SEG NFR BLD AUTO: 57 % (ref 43–75)
NONHDLC SERPL-MCNC: 134 MG/DL
NRBC BLD AUTO-RTO: 0 /100 WBCS
PLATELET # BLD AUTO: 173 THOUSANDS/UL (ref 149–390)
PMV BLD AUTO: 14.2 FL (ref 8.9–12.7)
POTASSIUM SERPL-SCNC: 3.9 MMOL/L (ref 3.5–5.3)
PROT SERPL-MCNC: 6.8 G/DL (ref 6.4–8.2)
RBC # BLD AUTO: 4.86 MILLION/UL (ref 3.81–5.12)
SODIUM SERPL-SCNC: 140 MMOL/L (ref 136–145)
TRIGL SERPL-MCNC: 138 MG/DL
WBC # BLD AUTO: 8.42 THOUSAND/UL (ref 4.31–10.16)

## 2022-03-09 PROCEDURE — 80061 LIPID PANEL: CPT

## 2022-03-09 PROCEDURE — 82306 VITAMIN D 25 HYDROXY: CPT

## 2022-03-09 PROCEDURE — 85025 COMPLETE CBC W/AUTO DIFF WBC: CPT

## 2022-03-09 PROCEDURE — 83721 ASSAY OF BLOOD LIPOPROTEIN: CPT

## 2022-03-09 PROCEDURE — 83036 HEMOGLOBIN GLYCOSYLATED A1C: CPT

## 2022-03-09 PROCEDURE — 36415 COLL VENOUS BLD VENIPUNCTURE: CPT

## 2022-03-09 PROCEDURE — 80053 COMPREHEN METABOLIC PANEL: CPT

## 2022-03-11 ENCOUNTER — OFFICE VISIT (OUTPATIENT)
Dept: INTERNAL MEDICINE CLINIC | Facility: CLINIC | Age: 57
End: 2022-03-11
Payer: COMMERCIAL

## 2022-03-11 VITALS
RESPIRATION RATE: 15 BRPM | HEART RATE: 67 BPM | SYSTOLIC BLOOD PRESSURE: 116 MMHG | DIASTOLIC BLOOD PRESSURE: 70 MMHG | BODY MASS INDEX: 29.16 KG/M2 | WEIGHT: 175 LBS | HEIGHT: 65 IN | OXYGEN SATURATION: 98 %

## 2022-03-11 DIAGNOSIS — J84.116 CRYPTOGENIC ORGANIZING PNEUMONIA (HCC): Primary | ICD-10-CM

## 2022-03-11 DIAGNOSIS — Z23 ENCOUNTER FOR IMMUNIZATION: ICD-10-CM

## 2022-03-11 DIAGNOSIS — E78.2 MIXED HYPERLIPIDEMIA: ICD-10-CM

## 2022-03-11 DIAGNOSIS — Z13.820 SCREENING FOR OSTEOPOROSIS: ICD-10-CM

## 2022-03-11 DIAGNOSIS — Z12.11 SCREENING FOR COLON CANCER: ICD-10-CM

## 2022-03-11 PROCEDURE — 3078F DIAST BP <80 MM HG: CPT | Performed by: INTERNAL MEDICINE

## 2022-03-11 PROCEDURE — 99214 OFFICE O/P EST MOD 30 MIN: CPT | Performed by: INTERNAL MEDICINE

## 2022-03-11 PROCEDURE — 1036F TOBACCO NON-USER: CPT | Performed by: INTERNAL MEDICINE

## 2022-03-11 PROCEDURE — 3074F SYST BP LT 130 MM HG: CPT | Performed by: INTERNAL MEDICINE

## 2022-03-11 PROCEDURE — 3008F BODY MASS INDEX DOCD: CPT | Performed by: INTERNAL MEDICINE

## 2022-03-11 RX ORDER — ATORVASTATIN CALCIUM 10 MG/1
10 TABLET, FILM COATED ORAL DAILY
Qty: 90 TABLET | Refills: 3 | Status: SHIPPED | OUTPATIENT
Start: 2022-03-11 | End: 2023-03-11

## 2022-03-11 NOTE — PROGRESS NOTES
Assessment/Plan:    #HLD  - HDL 39  -continue atorvastatin  -cotninue to diet and exercise    #Rhinitis  -chronic  -possibly allergy related  -previously on allergy shots without much relief  -seeing ENT and will consider redoing claifix procedure  -remains on astelin    #Anxiety  -remains on abilify, buspar, lorazepam, prozac  -has periodic breakthrough  -seeing psychiatry    #Cryptogenic Organizing Pneumonia  -now recovered  -seeing pulmonology yearly  -underwent previous bronchoscopy and bronchoalveolar lavage  -treated with steroids    #MALU  -now back on CPAP    #LPR  -previously treated with PPI and H2 blocker    #Previous Palpitations  -dexamethasone suppression test, 24 hour urine cortisol, 5HIAA, metanephrine and catecholamine all normal  -was on betablocker for symptomatic relief    #Surgery  -previous unilateral oophorectomy and hysterectomy  -secondary to uterine bleeding    #Health Maintenance  -routine labs and followup 6 months  -TDAP, colonoscopy, DEXA pending  -mammogram up to date  -covid and flu vaccine up to date  -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  -expecting 5th grandchild soon    BMI Counseling: Body mass index is 29 12 kg/m²  The BMI is above normal  Nutrition recommendations include reducing fast food intake, moderation in carbohydrate intake and increasing intake of lean protein  Exercise recommendations include moderate aerobic physical activity for 150 minutes/week and vigorous aerobic physical activity for 75 minutes/week  No problem-specific Assessment & Plan notes found for this encounter  Diagnoses and all orders for this visit:    Cryptogenic organizing pneumonia ([address] )  -     CBC and differential; Future  -     Comprehensive metabolic panel; Future    Mixed hyperlipidemia  -     atorvastatin (LIPITOR) 10 mg tablet;  Take 1 tablet (10 mg total) by mouth daily  -     CBC and differential; Future  -     Comprehensive metabolic panel; Future  -     Lipid Panel With Direct LDL; Future    Screening for colon cancer  -     Ambulatory referral for colonoscopy; Future  -     CBC and differential; Future  -     Comprehensive metabolic panel; Future    Screening for osteoporosis  -     DXA bone density spine hip and pelvis; Future    Encounter for immunization  -     tetanus-diphtheria-acellular pertussis (ADACEL) 5-2-15 5 LF-mcg/0 5 injection; Inject 0 5 mL into a muscle once for 1 dose            Current Outpatient Medications:     albuterol (Ventolin HFA) 90 mcg/act inhaler, Inhale 2 puffs every 6 (six) hours as needed for wheezing, Disp: 18 g, Rfl: 3    ARIPiprazole (ABILIFY) 10 mg tablet, Take 10 mg by mouth daily, Disp: , Rfl:     atorvastatin (LIPITOR) 10 mg tablet, Take 1 tablet (10 mg total) by mouth daily, Disp: 90 tablet, Rfl: 3    azelastine (ASTELIN) 0 1 % nasal spray, instill 1 spray into each nostril twice a day, Disp: 30 mL, Rfl: 11    busPIRone (BUSPAR) 30 MG tablet, Take 10 mg by mouth , Disp: , Rfl:     cholecalciferol (VITAMIN D3) 1,000 units tablet, Take 2,000 Units by mouth daily, Disp: , Rfl:     escitalopram (LEXAPRO) 20 mg tablet, Take 20 mg by mouth daily, Disp: , Rfl:     FLUoxetine (PROzac) 20 mg capsule, Take 20 mg by mouth every morning, Disp: , Rfl:     ipratropium (ATROVENT) 0 03 % nasal spray, 2 sprays into each nostril 3 (three) times a day as needed for rhinitis (nasal drip, congestion), Disp: 30 mL, Rfl: 11    LORazepam (ATIVAN) 0 5 mg tablet, Take 0 5 mg by mouth 2 (two) times a day, Disp: , Rfl:     sodium chloride (OCEAN) 0 65 % nasal spray, 2 sprays into each nostril 2 (two) times a day, Disp: 15 mL, Rfl: 0    tetanus-diphtheria-acellular pertussis (ADACEL) 5-2-15 5 LF-mcg/0 5 injection, Inject 0 5 mL into a muscle once for 1 dose, Disp: 0 5 mL, Rfl: 0    Subjective:      Patient ID: Alyssa Mcconnell is a 61 y o  female  HPI  Patient presents for routine checkup    Denies any recent hospitalizations or surgeries  She did a previous episode of cryptogenic organizing pneumonia that has since resolved  She will follow-up with pulmonary later this summer  She is currently breathing well without any problems  Her most recent set of labs reveal vitamin-D 45  and HDL 39 currently on atorvastatin and we will continue with this  Her A1c was 5 4  She does have a history of sinus infections and seasonal allergies  Her eosinophil count was 11  She continues to follow-up with ENT and will consider doing clarafix in the future  She has tried allergy shots in the past but the not provide her with any relief  She continues to have periodic bouts of depression  She is currently seeing Psychiatry once a month  She remains on Abilify as well as Prozac and Lexapro  She states that the current regimen is somewhat working  It is she is due for repeat DEXA scan  She has had history of hysterectomy with oophorectomy  She is also due for a colonoscopy and we did provide her with a referral   She will be having her 5th grandchild later this summer and will need the Tdap vaccine and weak provide her with the script  She will return to care in 6 months  The following portions of the patient's history were reviewed and updated as appropriate: allergies, current medications, past family history, past medical history, past social history, past surgical history and problem list     Review of Systems   Constitutional: Negative for activity change, appetite change, chills, fatigue and fever  HENT: Negative for congestion, ear pain, facial swelling, hearing loss, sore throat, tinnitus and trouble swallowing  Eyes: Negative for photophobia and visual disturbance  Respiratory: Negative for cough, shortness of breath and wheezing  Cardiovascular: Negative for chest pain and leg swelling  Gastrointestinal: Negative for abdominal distention, abdominal pain, blood in stool, nausea and vomiting     Genitourinary: Negative for difficulty urinating, dysuria and pelvic pain  Musculoskeletal: Negative for arthralgias, back pain, gait problem, joint swelling, myalgias, neck pain and neck stiffness  Skin: Negative for rash and wound  Neurological: Negative for dizziness, tremors, light-headedness and headaches  Psychiatric/Behavioral: Positive for dysphoric mood  Negative for self-injury and sleep disturbance  The patient is nervous/anxious  Objective:      /70   Pulse 67   Resp 15   Ht 5' 5" (1 651 m)   Wt 79 4 kg (175 lb)   LMP  (LMP Unknown)   SpO2 98%   BMI 29 12 kg/m²          Physical Exam  Vitals reviewed  Constitutional:       Appearance: Normal appearance  She is well-developed  She is obese  HENT:      Head: Normocephalic and atraumatic  Right Ear: Tympanic membrane, ear canal and external ear normal  There is no impacted cerumen  Left Ear: Tympanic membrane, ear canal and external ear normal  There is no impacted cerumen  Eyes:      Conjunctiva/sclera: Conjunctivae normal       Pupils: Pupils are equal, round, and reactive to light  Neck:      Thyroid: No thyromegaly  Vascular: No JVD  Cardiovascular:      Rate and Rhythm: Normal rate and regular rhythm  Heart sounds: Normal heart sounds  No murmur heard  Pulmonary:      Effort: Pulmonary effort is normal  No respiratory distress  Breath sounds: Normal breath sounds  No stridor  No wheezing, rhonchi or rales  Abdominal:      General: Bowel sounds are normal  There is no distension  Palpations: Abdomen is soft  There is no mass  Tenderness: There is no abdominal tenderness  There is no guarding or rebound  Musculoskeletal:         General: No tenderness  Normal range of motion  Cervical back: Normal range of motion and neck supple  Right lower leg: No edema  Left lower leg: No edema  Lymphadenopathy:      Cervical: No cervical adenopathy     Skin:     General: Skin is warm       Findings: No erythema or rash  Neurological:      Mental Status: She is alert and oriented to person, place, and time  Deep Tendon Reflexes: Reflexes are normal and symmetric  Psychiatric:         Mood and Affect: Mood normal          Behavior: Behavior normal          Thought Content: Thought content normal          Judgment: Judgment normal            This note was completed in part utilizing [employer] direct voice recognition software  Grammatical errors, random word insertion, spelling mistakes, and incomplete sentences may be an occasional consequence of the system secondary to software limitations, ambient noise and hardware issues  At the time of dictation, efforts were made to edit, clarify and /or correct errors  Please read the chart carefully and recognize, using context, where substitutions have occurred  If you have any questions or concerns about the context, text or information contained within the body of this dictation, please contact myself, the provider, for further clarification

## 2022-05-23 ENCOUNTER — TELEPHONE (OUTPATIENT)
Dept: INTERNAL MEDICINE CLINIC | Facility: CLINIC | Age: 57
End: 2022-05-23

## 2022-05-23 DIAGNOSIS — R19.7 DIARRHEA OF PRESUMED INFECTIOUS ORIGIN: Primary | ICD-10-CM

## 2022-05-23 NOTE — TELEPHONE ENCOUNTER
PT CALLED, SAID THAT LAST Thursday SHE HAD DIARRHEA AFTER SHE ATE BREAKFAST    SAID THAT IT'S HAPPENED DAILY SINCE BUT IT'S ONLY AFTER SHE EATS BREAKFAST    NO OTHER SYMPTOMS     PT SAID THAT SHE GOT A RECALL ON HER JIFF PEANUT BUTTER AND NOW SHE'S WORRIED ABOUT SALMONELLA    PLEASE ADVISE

## 2022-05-25 ENCOUNTER — APPOINTMENT (OUTPATIENT)
Dept: LAB | Facility: CLINIC | Age: 57
End: 2022-05-25
Payer: COMMERCIAL

## 2022-05-25 DIAGNOSIS — R19.7 DIARRHEA OF PRESUMED INFECTIOUS ORIGIN: ICD-10-CM

## 2022-05-25 PROCEDURE — 87505 NFCT AGENT DETECTION GI: CPT

## 2022-05-26 PROBLEM — J31.0 CHRONIC RHINITIS: Status: ACTIVE | Noted: 2022-05-26

## 2022-05-26 LAB
CAMPYLOBACTER DNA SPEC NAA+PROBE: NORMAL
SALMONELLA DNA SPEC QL NAA+PROBE: NORMAL
SHIGA TOXIN STX GENE SPEC NAA+PROBE: NORMAL
SHIGELLA DNA SPEC QL NAA+PROBE: NORMAL

## 2022-05-26 PROCEDURE — 87205 SMEAR GRAM STAIN: CPT | Performed by: OTOLARYNGOLOGY

## 2022-05-26 PROCEDURE — 87070 CULTURE OTHR SPECIMN AEROBIC: CPT | Performed by: OTOLARYNGOLOGY

## 2022-06-20 PROBLEM — J96.01 ACUTE RESPIRATORY FAILURE WITH HYPOXIA (HCC): Status: RESOLVED | Noted: 2021-04-04 | Resolved: 2022-06-20

## 2022-07-27 ENCOUNTER — OFFICE VISIT (OUTPATIENT)
Dept: URGENT CARE | Facility: CLINIC | Age: 57
End: 2022-07-27
Payer: COMMERCIAL

## 2022-07-27 ENCOUNTER — APPOINTMENT (OUTPATIENT)
Dept: RADIOLOGY | Facility: CLINIC | Age: 57
End: 2022-07-27
Payer: COMMERCIAL

## 2022-07-27 VITALS
BODY MASS INDEX: 27.99 KG/M2 | HEART RATE: 95 BPM | HEIGHT: 65 IN | SYSTOLIC BLOOD PRESSURE: 148 MMHG | WEIGHT: 168 LBS | RESPIRATION RATE: 18 BRPM | OXYGEN SATURATION: 98 % | TEMPERATURE: 98 F | DIASTOLIC BLOOD PRESSURE: 70 MMHG

## 2022-07-27 DIAGNOSIS — M25.531 RIGHT WRIST PAIN: ICD-10-CM

## 2022-07-27 DIAGNOSIS — M25.511 ACUTE PAIN OF RIGHT SHOULDER: ICD-10-CM

## 2022-07-27 DIAGNOSIS — M25.521 RIGHT ELBOW PAIN: ICD-10-CM

## 2022-07-27 DIAGNOSIS — M25.511 ACUTE PAIN OF RIGHT SHOULDER: Primary | ICD-10-CM

## 2022-07-27 PROCEDURE — 99213 OFFICE O/P EST LOW 20 MIN: CPT | Performed by: PHYSICIAN ASSISTANT

## 2022-07-27 PROCEDURE — 73030 X-RAY EXAM OF SHOULDER: CPT

## 2022-07-27 PROCEDURE — S9088 SERVICES PROVIDED IN URGENT: HCPCS | Performed by: PHYSICIAN ASSISTANT

## 2022-07-27 PROCEDURE — 73110 X-RAY EXAM OF WRIST: CPT

## 2022-07-27 PROCEDURE — 73080 X-RAY EXAM OF ELBOW: CPT

## 2022-07-27 NOTE — PROGRESS NOTES
[address]      NAME: Guevara Beach is a 61 y o  female  : 1965    MRN: [id number]  DATE: 2022  TIME: 5:45 PM    Assessment and Plan   Acute pain of right shoulder [M25 511]  1  Acute pain of right shoulder  XR shoulder 2+ vw right   2  Right wrist pain  CANCELED: XR wrist 2 vw right   3  Right elbow pain  XR elbow 3+ vw right       Patient Instructions   Xray appears negative for any fracture  Will follow up with radiologist report when available  Recommend elevating body part, icing the area every 2 hours for 20-30 minutes, take Ibuprofen every 6-8 hours to reduce inflammation  If not improving over the next week, follow up with PCP or orthopedics  Patient agreeable to plan  To present to the ER if symptoms worsen  Chief Complaint     Chief Complaint   Patient presents with    Arm Pain     Fell on right arm on Monday  The pain is getting worse since Monday  Has a hx of broken elbow 5 years ago  History of Present Illness   Guevara Beach presents to the clinic c/o    Hx of broken r elbow in the past      Arm Pain   Incident onset:   The injury mechanism was a fall  Pain location: right arm, shoulder, elbow and wrist  The quality of the pain is described as aching  The pain does not radiate  The pain is moderate  The pain has been constant since the incident  Pertinent negatives include no chest pain, numbness or tingling  She has tried NSAIDs for the symptoms  The treatment provided mild relief  Review of Systems   Review of Systems   Constitutional: Negative for chills, diaphoresis, fatigue and fever  HENT: Negative for congestion, ear discharge, ear pain and facial swelling  Respiratory: Negative for apnea, cough, chest tightness, shortness of breath and wheezing  Cardiovascular: Negative for chest pain and palpitations  Gastrointestinal: Negative for abdominal pain  Musculoskeletal: Positive for arthralgias     Skin: Negative for color change, rash and wound  Neurological: Negative for dizziness, tingling, numbness and headaches  Hematological: Negative for adenopathy           Current Medications     Long-Term Medications   Medication Sig Dispense Refill    atorvastatin (LIPITOR) 10 mg tablet Take 1 tablet (10 mg total) by mouth daily 90 tablet 3    azelastine (ASTELIN) 0 1 % nasal spray instill 1 spray into each nostril twice a day 30 mL 11    busPIRone (BUSPAR) 30 MG tablet Take 10 mg by mouth       cholecalciferol (VITAMIN D3) 1,000 units tablet Take 2,000 Units by mouth daily      escitalopram (LEXAPRO) 10 mg tablet       escitalopram (LEXAPRO) 20 mg tablet Take 20 mg by mouth daily      ipratropium (ATROVENT) 0 03 % nasal spray 2 sprays into each nostril 3 (three) times a day as needed for rhinitis (nasal drip, congestion) 30 mL 11    LORazepam (ATIVAN) 0 5 mg tablet Take 0 5 mg by mouth 2 (two) times a day      sodium chloride (OCEAN) 0 65 % nasal spray 2 sprays into each nostril 2 (two) times a day 15 mL 0       Current Allergies     Allergies as of 07/27/2022 - Reviewed 07/27/2022   Allergen Reaction Noted    Other  09/29/2016    Augmentin es-600  [amoxicillin-pot clavulanate]  04/20/2013    Cefuroxime  04/20/2013    Erythromycin  12/12/2016    Levofloxacin  04/20/2013    Morphine  12/12/2016    Morphine and related Hives 09/29/2016    Oxycodone-acetaminophen  12/12/2016    Penicillins  12/12/2016    Percocet [oxycodone-acetaminophen] Hives 09/29/2016    Sulfa antibiotics  12/12/2016    Tetracyclines & related  04/20/2013            The following portions of the patient's history were reviewed and updated as appropriate: allergies, current medications, past family history, past medical history, past social history, past surgical history and problem list   Past Medical History:   Diagnosis Date    Allergies     Anesthesia complication     V TACH after her reduction mammoplasty, [facility] Anxiety     Chronic rhinitis 2020    Depression     Ethmoid sinusitis     GERD (gastroesophageal reflux disease)     Maxillary sinusitis     Multiple allergies     Myofascial pain syndrome     Nasal turbinate hypertrophy     Wears glasses      Past Surgical History:   Procedure Laterality Date    LAPAROSCOPY      with vaginal hysterectomy; 11/3/2003 rso    OOPHORECTOMY Left 2004    PARTIAL HYSTERECTOMY  2004    CT NASAL/SINUS ENDOSCOPY,RMV TISS MAXILL SINUS N/A 2016    Procedure: IMAGE GUIDED FUNCTIONAL ENDOSCOPIC SINUS SURGERY;  Surgeon: Nils Watson MD;  Location: [facility];  Service: ENT    REDUCTION MAMMAPLASTY Bilateral 2015    SINUS SURGERY      TOOTH EXTRACTION Right 3/16/2019    Procedure: EXTRACTION TOOTH #1 (Impacted Third Molar Tooth);   Surgeon: Dixie Holter, DDS;  Location: [facility];  Service: Maxillofacial    TUBAL LIGATION      WISDOM TOOTH EXTRACTION       Social History     Socioeconomic History    Marital status: /Civil Union     Spouse name: Eduardo Toscano Number of children: 2    Years of education: Not on file    Highest education level: Not on file   Occupational History    Not on file   Tobacco Use    Smoking status: Former Smoker     Packs/day: 0 50     Types: Cigarettes     Start date: 17     Quit date: 3/1/2021     Years since quittin 4    Smokeless tobacco: Never Used    Tobacco comment: patient states havent smoked cigarettes since end of 2021   Vaping Use    Vaping Use: Never used   Substance and Sexual Activity    Alcohol use: Not Currently     Comment: social    Drug use: No    Sexual activity: Not on file   Other Topics Concern    Not on file   Social History Narrative    Caffeine use    Daily coffee consumption ( 1 cup/day)    Daily cola consumption (3 cans/day)        Patient lives in a home that was built in [address] in the bedroom     Unfinished basement-dry-damp-no [facility] [employer] in the basement     No air  or purifiers     No humidifier-has c-pap     Home is smoke free     Window air conditioning     Patient [place]         Dog(Jose A) he is allowed in the bedroom         Caffeine: 2 cups of coffee daily                     Occasionally drinks ice tea and diet soda                     Hot tea rarely     Chocolate consumed occasionally         Patient lives with spouse          Social Determinants of Health     Financial Resource Strain: Not on file   Food Insecurity: Not on file   Transportation Needs: Not on file   Physical Activity: Not on file   Stress: Not on file   Social Connections: Not on file   Intimate Partner Violence: Not on file   Housing Stability: Not on file       Objective   /70   Pulse 95   Temp 98 °F (36 7 °C)   Resp 18   Ht 5' 5" (1 651 m)   Wt 76 2 kg (168 lb)   LMP  (LMP Unknown)   SpO2 98%   BMI 27 96 kg/m²      Physical Exam     Physical Exam  Vitals and nursing note reviewed  Constitutional:       General: She is not in acute distress  Appearance: She is well-developed  She is not diaphoretic  HENT:      Head: Normocephalic and atraumatic  Right Ear: External ear normal       Left Ear: External ear normal       Nose: Nose normal    Eyes:      General: No scleral icterus  Right eye: No discharge  Left eye: No discharge  Conjunctiva/sclera: Conjunctivae normal    Cardiovascular:      Rate and Rhythm: Normal rate and regular rhythm  Heart sounds: Normal heart sounds  No murmur heard  No friction rub  No gallop  Pulmonary:      Effort: Pulmonary effort is normal  No respiratory distress  Breath sounds: Normal breath sounds  No decreased breath sounds, wheezing, rhonchi or rales  Musculoskeletal:      Right shoulder: Bony tenderness (ac joint, proximal humerus) present  Normal range of motion  Right elbow: Swelling (mild) present   Tenderness present in lateral epicondyle  Right wrist: Swelling (mild), tenderness and bony tenderness (distal ulna) present  Normal pulse (radial pulse 2+)  Skin:     General: Skin is warm and dry  Coloration: Skin is not pale  Findings: No erythema or rash  Neurological:      Mental Status: She is alert and oriented to person, place, and time  Psychiatric:         Behavior: Behavior normal          Thought Content:  Thought content normal          Judgment: Judgment normal          Simon Lowery PA-C

## 2022-08-18 NOTE — H&P (VIEW-ONLY)
Otolaryngology - Head and Neck Surgery  Follow-up    Porsche Salvador is a 61 y o  who returned for follow up evaluation of sinusitis, reflux, gluten sensitivity, MALU  HPI:    S/p Clarifix early April    She completed oral prednisone  Now using mometasone saline rinse  Still with rough days     10/28/22 - normal IgA/G/M    Prior hx:  CT chest 6/10/21 - resolved multifocal pneumonia    Saline rinse  Flonase - tried Xhance without much benefit/noticeable change  Azelastine  Stopped Singulair- minimal change, may restart    CPAP - hasn't been using    GF diet    S/p clarifix 1/22/20  S/p clarifix 4/5/22    Old CT sinus 2016, 2019  Right maxillary sinusitis  Right 3rd molar extraction since the CTs were completed    Historical Information   Past Medical History:   Diagnosis Date    Allergies     Anesthesia complication     V TACH after her reduction mammoplasty, done at [address] Chronic rhinitis 2/18/2020    Depression     Ethmoid sinusitis     GERD (gastroesophageal reflux disease)     Maxillary sinusitis     Multiple allergies     Myofascial pain syndrome     Nasal turbinate hypertrophy     Wears glasses      Past Surgical History:   Procedure Laterality Date    LAPAROSCOPY      with vaginal hysterectomy; 11/3/2003 rso    OOPHORECTOMY Left 2004    PARTIAL HYSTERECTOMY  2004    NC NASAL/SINUS ENDOSCOPY,RMV TISS MAXILL SINUS N/A 9/30/2016    Procedure: IMAGE GUIDED FUNCTIONAL ENDOSCOPIC SINUS SURGERY;  Surgeon: Rosina Anaya MD;  Location: [facility];  Service: ENT    REDUCTION MAMMAPLASTY Bilateral 02/27/2015    SINUS SURGERY      TOOTH EXTRACTION Right 3/16/2019    Procedure: EXTRACTION TOOTH #1 (Impacted Third Molar Tooth);   Surgeon: John Saunders DDS;  Location: [facility];  Service: Maxillofacial    TUBAL LIGATION      WISDOM TOOTH EXTRACTION       Social History   Social History     Substance and Sexual Activity   Alcohol Use Not Currently    Comment: social     Social History     Substance and Sexual Activity   Drug Use No     Social History     Tobacco Use   Smoking Status Former Smoker    Packs/day: 0 50    Types: Cigarettes    Start date: 17    Quit date: 3/1/2021    Years since quittin 4   Smokeless Tobacco Never Used   Tobacco Comment    patient states havent smoked cigarettes since end of 2021     Family History   Problem Relation Age of Onset    CLEM disease Mother     Atrial fibrillation Mother     Atrial fibrillation Father     Heart disease Father     Diabetes Maternal Grandmother     Hypertension Maternal Grandmother     Colon cancer Maternal Grandfather     Diabetes Maternal Grandfather     Cancer Paternal Grandfather     Endometrial cancer Cousin     Breast cancer Cousin     Multiple sclerosis Sister     No Known Problems Son     No Known Problems Son        Meds/Allergies     Current Outpatient Medications on File Prior to Visit   Medication Sig Dispense Refill    albuterol (Ventolin HFA) 90 mcg/act inhaler Inhale 2 puffs every 6 (six) hours as needed for wheezing 18 g 3    ARIPiprazole (ABILIFY) 10 mg tablet Take 10 mg by mouth daily      atorvastatin (LIPITOR) 10 mg tablet Take 1 tablet (10 mg total) by mouth daily 90 tablet 3    azelastine (ASTELIN) 0 1 % nasal spray instill 1 spray into each nostril twice a day 30 mL 11    busPIRone (BUSPAR) 30 MG tablet Take 10 mg by mouth       chlorhexidine (PERIDEX) 0 12 % solution RINSE WITH 30 MILLILTERS FOR 30 SECONDS THEN SPIT OUT AT BEDTIME      cholecalciferol (VITAMIN D3) 1,000 units tablet Take 2,000 Units by mouth daily      escitalopram (LEXAPRO) 10 mg tablet       escitalopram (LEXAPRO) 20 mg tablet Take 20 mg by mouth daily      FLUoxetine (PROzac) 20 mg capsule Take 20 mg by mouth every morning      ipratropium (ATROVENT) 0 03 % nasal spray 2 sprays into each nostril 3 (three) times a day as needed for rhinitis (nasal drip, congestion) 30 mL 11    LORazepam (ATIVAN) 0 5 mg tablet Take 0 5 mg by mouth 2 (two) times a day      sodium chloride (OCEAN) 0 65 % nasal spray 2 sprays into each nostril 2 (two) times a day 15 mL 0     No current facility-administered medications on file prior to visit  Allergies   Allergen Reactions    Other      Most all antibiotics- hives, hypotensive    "no problem with clindamycin "    Augmentin Es-600  [Amoxicillin-Pot Clavulanate]     Cefuroxime     Erythromycin     Levofloxacin     Morphine     Morphine And Related Hives    Oxycodone-Acetaminophen     Penicillins     Percocet [Oxycodone-Acetaminophen] Hives    Sulfa Antibiotics     Tetracyclines & Related          Physical exam:     Ht 5' 5" (1 651 m)   Wt 74 8 kg (165 lb)   LMP  (LMP Unknown)   BMI 27 46 kg/m²     Gen: NAD, cooperative, well nourished  Voice: mild raspy, altered pitch  Head: normocephalic, atraumatic  Face: no facial asymmetry  Ears:     Right: Pinna nontender, nonerythematous  EAC patent without lesions  TM intact, translucent, mobile  No OME  Left:   Pinna nontender, nonerythematous  EAC patent without lesions  TM intact, translucent, mobile  No OME  Hearing assessment: Normal hearing with finger rub  Balance assessment: Gait steady  Nose: External nose without lesions  Nares congested L>R  No pus  No polyps  Nasal septum deviated  Inferior turbinates pink and with hypertrophy  OMC visible R>L, dry/red, no mucous   Sinuses: No tenderness to palpation of maxillary and frontal sinuses  Oral cavity: No lesions/masses  Tongue mobile and soft  No abnormality of parotid ducts  Oropharynx: No lesions/masses  mild cobblestoning  Tonsils without ulceration or exudate  Neck: No LAD  No masses  Normal crepitus of thyroid cartilage  Nontender  Salivary glands: Parotids nontender, non-enlarged  Submandibular glands nontender, non-enlarged  Thyroid: WIthout hypertrophy  No palpable nodules  Nontender  Neuro: Alert    CN III-IX, XI-XII grossly intact  Pulm: CTAB nonlabored, no stridor  CV: RRR extremities warm/perfused      PROCEDURE:   None   PRIOR nasal endoscopy  Indication:  Evaluate extent of sinus disease  Verbal consent obtained  Surgeon: Shelbie Flor MD  Anesthesia: 2% lidocaine, oxymetazoline  Scope passed through nasal cavities   Nose: clear secretions, inferior turbinate hypertrophy, R>L narrow OMC without tissue edema/inflammation, copious clear mucous  Nasopharynx: unremarkable  Scope was removed  Patient tolerated procedure well without complications      Assessment:    Diagnosis ICD-10-CM Associated Orders   1  Chronic sinusitis, unspecified location  J32 9 CT sinus wo contrast   2  Gastroesophageal reflux disease, unspecified whether esophagitis present  K21 9    3  Obstructive sleep apnea syndrome  G47 33    4  Chronic seasonal allergic rhinitis due to pollen  J30 1    5  Chronic rhinitis  J31 0    6  Allergic rhinitis due to house dust mite  J30 89    7  Allergic rhinitis due to animal (cat) (dog) hair and dander  J30 81    8  Gluten intolerance  K90 41    9  Hypertrophy of both inferior nasal turbinates  J34 3        Orders  No orders of the defined types were placed in this encounter          Plan:    Saline nasal rinse at least daily  Mometasone/saline nasal rinse daily  Flonase  Azelastine  Oral antihistamine - allegra  +/-ipratropium nasal spray    GF diet  Reflux diet/lifestyle modifications    CPAP    Surgery discussed incl RBA of partial middle turbinectomy (bilateral), inferior turbinate reduction, possible revision FESS with maxillary antrostomy (if needed)    CT sinuses ordered    Future consideration:  Singulair    She will return to my office 1 week postop

## 2022-08-23 ENCOUNTER — APPOINTMENT (OUTPATIENT)
Dept: LAB | Facility: CLINIC | Age: 57
End: 2022-08-23
Payer: COMMERCIAL

## 2022-08-23 ENCOUNTER — CLINICAL SUPPORT (OUTPATIENT)
Dept: URGENT CARE | Facility: CLINIC | Age: 57
End: 2022-08-23
Payer: COMMERCIAL

## 2022-08-23 DIAGNOSIS — R94.31 ABNORMAL EKG: Primary | ICD-10-CM

## 2022-08-23 DIAGNOSIS — J01.91 ACUTE RECURRENT SINUSITIS, UNSPECIFIED LOCATION: ICD-10-CM

## 2022-08-23 LAB
ANION GAP SERPL CALCULATED.3IONS-SCNC: 1 MMOL/L (ref 4–13)
ATRIAL RATE: 77 BPM
BASOPHILS # BLD AUTO: 0.01 THOUSANDS/ΜL (ref 0–0.1)
BASOPHILS NFR BLD AUTO: 0 % (ref 0–1)
BUN SERPL-MCNC: 16 MG/DL (ref 5–25)
CALCIUM SERPL-MCNC: 9.3 MG/DL (ref 8.3–10.1)
CHLORIDE SERPL-SCNC: 107 MMOL/L (ref 96–108)
CO2 SERPL-SCNC: 30 MMOL/L (ref 21–32)
CREAT SERPL-MCNC: 0.88 MG/DL (ref 0.6–1.3)
EOSINOPHIL # BLD AUTO: 0.61 THOUSAND/ΜL (ref 0–0.61)
EOSINOPHIL NFR BLD AUTO: 9 % (ref 0–6)
ERYTHROCYTE [DISTWIDTH] IN BLOOD BY AUTOMATED COUNT: 12.4 % (ref 11.6–15.1)
GFR SERPL CREATININE-BSD FRML MDRD: 72 ML/MIN/1.73SQ M
GLUCOSE P FAST SERPL-MCNC: 97 MG/DL (ref 65–99)
HCT VFR BLD AUTO: 42.9 % (ref 34.8–46.1)
HGB BLD-MCNC: 13.8 G/DL (ref 11.5–15.4)
IMM GRANULOCYTES # BLD AUTO: 0.02 THOUSAND/UL (ref 0–0.2)
IMM GRANULOCYTES NFR BLD AUTO: 0 % (ref 0–2)
LYMPHOCYTES # BLD AUTO: 2.23 THOUSANDS/ΜL (ref 0.6–4.47)
LYMPHOCYTES NFR BLD AUTO: 32 % (ref 14–44)
MCH RBC QN AUTO: 29.7 PG (ref 26.8–34.3)
MCHC RBC AUTO-ENTMCNC: 32.2 G/DL (ref 31.4–37.4)
MCV RBC AUTO: 92 FL (ref 82–98)
MONOCYTES # BLD AUTO: 0.55 THOUSAND/ΜL (ref 0.17–1.22)
MONOCYTES NFR BLD AUTO: 8 % (ref 4–12)
NEUTROPHILS # BLD AUTO: 3.62 THOUSANDS/ΜL (ref 1.85–7.62)
NEUTS SEG NFR BLD AUTO: 51 % (ref 43–75)
NRBC BLD AUTO-RTO: 0 /100 WBCS
P AXIS: 34 DEGREES
PLATELET # BLD AUTO: 188 THOUSANDS/UL (ref 149–390)
PMV BLD AUTO: 12 FL (ref 8.9–12.7)
POTASSIUM SERPL-SCNC: 4.3 MMOL/L (ref 3.5–5.3)
PR INTERVAL: 158 MS
QRS AXIS: -21 DEGREES
QRSD INTERVAL: 80 MS
QT INTERVAL: 402 MS
QTC INTERVAL: 454 MS
RBC # BLD AUTO: 4.65 MILLION/UL (ref 3.81–5.12)
SODIUM SERPL-SCNC: 138 MMOL/L (ref 135–147)
T WAVE AXIS: 40 DEGREES
VENTRICULAR RATE: 77 BPM
WBC # BLD AUTO: 7.04 THOUSAND/UL (ref 4.31–10.16)

## 2022-08-23 PROCEDURE — 85025 COMPLETE CBC W/AUTO DIFF WBC: CPT

## 2022-08-23 PROCEDURE — 93005 ELECTROCARDIOGRAM TRACING: CPT

## 2022-08-23 PROCEDURE — 80048 BASIC METABOLIC PNL TOTAL CA: CPT

## 2022-08-23 PROCEDURE — 93010 ELECTROCARDIOGRAM REPORT: CPT | Performed by: INTERNAL MEDICINE

## 2022-08-23 PROCEDURE — 36415 COLL VENOUS BLD VENIPUNCTURE: CPT

## 2022-08-26 ENCOUNTER — HOSPITAL ENCOUNTER (OUTPATIENT)
Dept: RADIOLOGY | Facility: MEDICAL CENTER | Age: 57
Discharge: HOME | End: 2022-08-26
Payer: COMMERCIAL

## 2022-08-26 DIAGNOSIS — J32.9 CHRONIC SINUSITIS, UNSPECIFIED LOCATION: ICD-10-CM

## 2022-08-26 PROCEDURE — 70486 CT MAXILLOFACIAL W/O DYE: CPT

## 2022-08-26 PROCEDURE — G1004 CDSM NDSC: HCPCS

## 2022-08-29 RX ORDER — MOMETASONE FUROATE 50 UG/1
2 SPRAY, METERED NASAL DAILY
COMMUNITY

## 2022-08-29 RX ORDER — DIPHENHYDRAMINE HCL 25 MG
25 TABLET ORAL EVERY 6 HOURS PRN
COMMUNITY

## 2022-08-29 NOTE — PRE-PROCEDURE INSTRUCTIONS
Pre-Surgery Instructions:   Medication Instructions    albuterol (Ventolin HFA) 90 mcg/act inhaler Uses PRN- OK to take day of surgery    ARIPiprazole (ABILIFY) 10 mg tablet Take night before surgery    atorvastatin (LIPITOR) 10 mg tablet Take night before surgery    azelastine (ASTELIN) 0 1 % nasal spray Intends to hold DOS    busPIRone (BUSPAR) 30 MG tablet Take day of surgery   chlorhexidine (PERIDEX) 0 12 % solution Take night before surgery    cholecalciferol (VITAMIN D3) 1,000 units tablet inst to hold until after sx    diphenhydrAMINE (BENADRYL) 25 mg tablet Uses PRN- OK to take day of surgery    escitalopram (LEXAPRO) 10 mg tablet Take day of surgery   escitalopram (LEXAPRO) 20 mg tablet Take day of surgery   ipratropium (ATROVENT) 0 03 % nasal spray Pt intends to hold DOS    LORazepam (ATIVAN) 0 5 mg tablet Take day of surgery   mometasone (NASONEX) 50 mcg/act nasal spray Pt intends to hold day of sx    sodium chloride (OCEAN) 0 65 % nasal spray Pt intends to hold DOS    All pre-op instructions reviewed as per [facility] w/ pt verb understanding  Inst NPO post MN night before sx except sips water w/ meds am of sx  Instructed to avoid all ASA/NSAIDs and OTC Vit/Supp from now until after surgery per anesthesia guidelines  Tylenol ok prn  Received pre-op showering instructions from surgeon office, [facility] to use antibacterial soap, reviewed process at time of call  Current hospital visitor & masking policy reviewed  Inst will receive phone call w/ time to report on DOS btwn 2-8 pm afternoon/evening before surgery from hospital nursing staff  Pt  verbalized an understanding of all instructions reviewed and offers no concerns at this time

## 2022-09-01 ENCOUNTER — ANESTHESIA EVENT (OUTPATIENT)
Dept: PERIOP | Facility: HOSPITAL | Age: 57
End: 2022-09-01
Payer: COMMERCIAL

## 2022-09-01 PROBLEM — J34.3 HYPERTROPHY OF BOTH INFERIOR NASAL TURBINATES: Status: ACTIVE | Noted: 2022-09-01

## 2022-09-01 NOTE — ANESTHESIA PREPROCEDURE EVALUATION
Procedure:  middle turbinectomy/medialization, possible revision functional endoscopic sinus surgery, inferior turbinate reduction (Bilateral Nose)  TURBINECTOMY (N/A Nose)  TURBINOPLASTY (N/A Nose)    Relevant Problems   ANESTHESIA (within normal limits)      CARDIO   (+) HTN (hypertension)   (+) Hyperlipidemia      ENDO (within normal limits)      GI/HEPATIC   (+) Gastroesophageal reflux disease      /RENAL (within normal limits)      HEMATOLOGY (within normal limits)      MUSCULOSKELETAL   (+) Fibromyalgia      NEURO/PSYCH   (+) Depression with anxiety   (+) Fibromyalgia      PULMONARY   (+) Asthma   (+) Obstructive sleep apnea syndrome      Respiratory   (+) Hypertrophy of both inferior nasal turbinates      Other   (+) Abnormal TSH   (+) Tobacco use      Albuterol inhaler this AM    PFTs 4/2021:  ·  Normal spirometry  ·  Lung volumes with mild degree of air trapping  ·  Normal diffusion capacity      EKG 8/23/2022:  Normal sinus rhythm  Normal ECG  When compared with ECG of 03-APR-2021 02:44,  No significant change was found      Lab Results   Component Value Date    WBC 7 04 08/23/2022    HGB 13 8 08/23/2022    HCT 42 9 08/23/2022    MCV 92 08/23/2022     08/23/2022     Lab Results   Component Value Date    SODIUM 138 08/23/2022    K 4 3 08/23/2022     08/23/2022    CO2 30 08/23/2022    BUN 16 08/23/2022    CREATININE 0 88 08/23/2022    GLUC 88 04/13/2021    CALCIUM 9 3 08/23/2022     Lab Results   Component Value Date    INR 1 12 04/03/2021    INR 0 97 09/29/2016    PROTIME 14 4 04/03/2021    PROTIME 13 0 09/29/2016     Lab Results   Component Value Date    HGBA1C 5 4 03/09/2022          Physical Exam    Airway    Mallampati score: II  TM Distance: >3 FB  Neck ROM: full     Dental   No notable dental hx     Cardiovascular  Cardiovascular exam normal    Pulmonary  Pulmonary exam normal     Other Findings        Anesthesia Plan  ASA Score- 2     Anesthesia Type- general with ASA Monitors  request for multivitamin to mail in   Received fax from [facility] mail in for  rx for multivitamin.      Does  Pt want Rx sent to mail in or does pt buy over the counter ?     rx request in phone 2 tray Additional Monitors:   Airway Plan: ETT  Plan Factors-Exercise tolerance (METS): >4 METS  Chart reviewed  EKG reviewed  Existing labs reviewed  Patient summary reviewed  Induction- intravenous  Postoperative Plan- Plan for postoperative opioid use  Planned trial extubation    Informed Consent- Anesthetic plan and risks discussed with patient  I personally reviewed this patient with the CRNA  Discussed and agreed on the Anesthesia Plan with the CRNA  Darrick Clark

## 2022-09-02 ENCOUNTER — HOSPITAL ENCOUNTER (OUTPATIENT)
Facility: HOSPITAL | Age: 57
Setting detail: OUTPATIENT SURGERY
Discharge: HOME | End: 2022-09-02
Attending: OTOLARYNGOLOGY | Admitting: OTOLARYNGOLOGY
Payer: COMMERCIAL

## 2022-09-02 ENCOUNTER — ANESTHESIA (OUTPATIENT)
Dept: PERIOP | Facility: HOSPITAL | Age: 57
End: 2022-09-02
Payer: COMMERCIAL

## 2022-09-02 VITALS
DIASTOLIC BLOOD PRESSURE: 55 MMHG | WEIGHT: 165 LBS | RESPIRATION RATE: 16 BRPM | TEMPERATURE: 97.4 F | HEIGHT: 65 IN | SYSTOLIC BLOOD PRESSURE: 128 MMHG | BODY MASS INDEX: 27.49 KG/M2 | HEART RATE: 75 BPM | OXYGEN SATURATION: 95 %

## 2022-09-02 PROCEDURE — 30999 UNLISTED PROCEDURE NOSE: CPT | Performed by: OTOLARYNGOLOGY

## 2022-09-02 PROCEDURE — 30802 ABLATE INF TURBINATE SUBMUC: CPT | Performed by: OTOLARYNGOLOGY

## 2022-09-02 RX ORDER — DEXAMETHASONE SODIUM PHOSPHATE 10 MG/ML
INJECTION, SOLUTION INTRAMUSCULAR; INTRAVENOUS AS NEEDED
Status: DISCONTINUED | OUTPATIENT
Start: 2022-09-02 | End: 2022-09-02

## 2022-09-02 RX ORDER — ROCURONIUM BROMIDE 10 MG/ML
INJECTION, SOLUTION INTRAVENOUS AS NEEDED
Status: DISCONTINUED | OUTPATIENT
Start: 2022-09-02 | End: 2022-09-02

## 2022-09-02 RX ORDER — FENTANYL CITRATE/PF 50 MCG/ML
25 SYRINGE (ML) INJECTION
Status: DISCONTINUED | OUTPATIENT
Start: 2022-09-02 | End: 2022-09-02 | Stop reason: HOSPADM

## 2022-09-02 RX ORDER — LIDOCAINE HYDROCHLORIDE 10 MG/ML
0.5 INJECTION, SOLUTION EPIDURAL; INFILTRATION; INTRACAUDAL; PERINEURAL ONCE AS NEEDED
Status: COMPLETED | OUTPATIENT
Start: 2022-09-02 | End: 2022-09-02

## 2022-09-02 RX ORDER — LIDOCAINE HYDROCHLORIDE 10 MG/ML
INJECTION, SOLUTION EPIDURAL; INFILTRATION; INTRACAUDAL; PERINEURAL AS NEEDED
Status: DISCONTINUED | OUTPATIENT
Start: 2022-09-02 | End: 2022-09-02

## 2022-09-02 RX ORDER — SODIUM CHLORIDE, SODIUM LACTATE, POTASSIUM CHLORIDE, CALCIUM CHLORIDE 600; 310; 30; 20 MG/100ML; MG/100ML; MG/100ML; MG/100ML
INJECTION, SOLUTION INTRAVENOUS CONTINUOUS PRN
Status: DISCONTINUED | OUTPATIENT
Start: 2022-09-02 | End: 2022-09-02

## 2022-09-02 RX ORDER — EPHEDRINE SULFATE 50 MG/ML
INJECTION INTRAVENOUS AS NEEDED
Status: DISCONTINUED | OUTPATIENT
Start: 2022-09-02 | End: 2022-09-02

## 2022-09-02 RX ORDER — FENTANYL CITRATE 50 UG/ML
INJECTION, SOLUTION INTRAMUSCULAR; INTRAVENOUS AS NEEDED
Status: DISCONTINUED | OUTPATIENT
Start: 2022-09-02 | End: 2022-09-02

## 2022-09-02 RX ORDER — ACETAMINOPHEN 325 MG/1
650 TABLET ORAL EVERY 6 HOURS PRN
Status: DISCONTINUED | OUTPATIENT
Start: 2022-09-02 | End: 2022-09-02 | Stop reason: HOSPADM

## 2022-09-02 RX ORDER — MIDAZOLAM HYDROCHLORIDE 2 MG/2ML
INJECTION, SOLUTION INTRAMUSCULAR; INTRAVENOUS AS NEEDED
Status: DISCONTINUED | OUTPATIENT
Start: 2022-09-02 | End: 2022-09-02

## 2022-09-02 RX ORDER — ONDANSETRON 2 MG/ML
4 INJECTION INTRAMUSCULAR; INTRAVENOUS ONCE AS NEEDED
Status: COMPLETED | OUTPATIENT
Start: 2022-09-02 | End: 2022-09-02

## 2022-09-02 RX ORDER — SODIUM CHLORIDE, SODIUM LACTATE, POTASSIUM CHLORIDE, CALCIUM CHLORIDE 600; 310; 30; 20 MG/100ML; MG/100ML; MG/100ML; MG/100ML
125 INJECTION, SOLUTION INTRAVENOUS CONTINUOUS
Status: DISCONTINUED | OUTPATIENT
Start: 2022-09-02 | End: 2022-09-02 | Stop reason: HOSPADM

## 2022-09-02 RX ORDER — OXYMETAZOLINE HYDROCHLORIDE 0.05 G/100ML
SPRAY NASAL AS NEEDED
Status: DISCONTINUED | OUTPATIENT
Start: 2022-09-02 | End: 2022-09-02 | Stop reason: HOSPADM

## 2022-09-02 RX ORDER — ONDANSETRON 2 MG/ML
INJECTION INTRAMUSCULAR; INTRAVENOUS AS NEEDED
Status: DISCONTINUED | OUTPATIENT
Start: 2022-09-02 | End: 2022-09-02

## 2022-09-02 RX ORDER — LIDOCAINE HYDROCHLORIDE AND EPINEPHRINE 10; 10 MG/ML; UG/ML
INJECTION, SOLUTION INFILTRATION; PERINEURAL AS NEEDED
Status: DISCONTINUED | OUTPATIENT
Start: 2022-09-02 | End: 2022-09-02 | Stop reason: HOSPADM

## 2022-09-02 RX ORDER — PROPOFOL 10 MG/ML
INJECTION, EMULSION INTRAVENOUS AS NEEDED
Status: DISCONTINUED | OUTPATIENT
Start: 2022-09-02 | End: 2022-09-02

## 2022-09-02 RX ORDER — FENTANYL CITRATE/PF 50 MCG/ML
50 SYRINGE (ML) INJECTION
Status: DISCONTINUED | OUTPATIENT
Start: 2022-09-02 | End: 2022-09-02 | Stop reason: HOSPADM

## 2022-09-02 RX ORDER — DIPHENHYDRAMINE HYDROCHLORIDE 50 MG/ML
12.5 INJECTION INTRAMUSCULAR; INTRAVENOUS ONCE AS NEEDED
Status: DISCONTINUED | OUTPATIENT
Start: 2022-09-02 | End: 2022-09-02 | Stop reason: HOSPADM

## 2022-09-02 RX ADMIN — DEXAMETHASONE SODIUM PHOSPHATE 10 MG: 10 INJECTION, SOLUTION INTRAMUSCULAR; INTRAVENOUS at 16:17

## 2022-09-02 RX ADMIN — ONDANSETRON HYDROCHLORIDE 4 MG: 2 INJECTION, SOLUTION INTRAMUSCULAR; INTRAVENOUS at 16:17

## 2022-09-02 RX ADMIN — SODIUM CHLORIDE, SODIUM LACTATE, POTASSIUM CHLORIDE, AND CALCIUM CHLORIDE 1000 ML: .6; .31; .03; .02 INJECTION, SOLUTION INTRAVENOUS at 17:42

## 2022-09-02 RX ADMIN — FENTANYL CITRATE 50 MCG: 50 INJECTION INTRAMUSCULAR; INTRAVENOUS at 16:11

## 2022-09-02 RX ADMIN — FENTANYL CITRATE 50 MCG: 50 INJECTION INTRAMUSCULAR; INTRAVENOUS at 17:32

## 2022-09-02 RX ADMIN — SODIUM CHLORIDE, SODIUM LACTATE, POTASSIUM CHLORIDE, AND CALCIUM CHLORIDE: .6; .31; .03; .02 INJECTION, SOLUTION INTRAVENOUS at 17:36

## 2022-09-02 RX ADMIN — SODIUM CHLORIDE, SODIUM LACTATE, POTASSIUM CHLORIDE, AND CALCIUM CHLORIDE 125 ML/HR: .6; .31; .03; .02 INJECTION, SOLUTION INTRAVENOUS at 12:55

## 2022-09-02 RX ADMIN — ACETAMINOPHEN 650 MG: 325 TABLET ORAL at 18:31

## 2022-09-02 RX ADMIN — LIDOCAINE HYDROCHLORIDE 0.5 ML: 10 INJECTION, SOLUTION EPIDURAL; INFILTRATION; INTRACAUDAL; PERINEURAL at 12:55

## 2022-09-02 RX ADMIN — MIDAZOLAM 2 MG: 1 INJECTION INTRAMUSCULAR; INTRAVENOUS at 16:05

## 2022-09-02 RX ADMIN — LIDOCAINE HYDROCHLORIDE 50 MG: 10 INJECTION, SOLUTION EPIDURAL; INFILTRATION; INTRACAUDAL; PERINEURAL at 16:11

## 2022-09-02 RX ADMIN — PHENYLEPHRINE HYDROCHLORIDE 40 MCG/MIN: 10 INJECTION INTRAVENOUS at 16:29

## 2022-09-02 RX ADMIN — EPHEDRINE SULFATE 10 MG: 50 INJECTION INTRAVENOUS at 16:31

## 2022-09-02 RX ADMIN — PROPOFOL 150 MG: 10 INJECTION, EMULSION INTRAVENOUS at 16:11

## 2022-09-02 RX ADMIN — ONDANSETRON 4 MG: 2 INJECTION INTRAMUSCULAR; INTRAVENOUS at 17:44

## 2022-09-02 RX ADMIN — SODIUM CHLORIDE, SODIUM LACTATE, POTASSIUM CHLORIDE, AND CALCIUM CHLORIDE: .6; .31; .03; .02 INJECTION, SOLUTION INTRAVENOUS at 16:05

## 2022-09-02 RX ADMIN — ROCURONIUM BROMIDE 50 MG: 50 INJECTION, SOLUTION INTRAVENOUS at 16:11

## 2022-09-02 NOTE — INTERVAL H&P NOTE
H&P reviewed  After examining the patient I find no changes in the patients condition since the H&P had been written      CT reviewed - min paranasal sinus disease, right frontal sinus osteoma    Proceed as planned without revision FESS    Vitals:    09/02/22 1212   BP: 101/62   Pulse: 77   Resp: 16   Temp: (!) 97 1 °F (36 2 °C)   SpO2: 96%

## 2022-09-02 NOTE — ANESTHESIA POSTPROCEDURE EVALUATION
Post-Op Assessment Note    CV Status:  Stable  Pain Score: 0    Pain management: adequate     Mental Status:  Awake   Hydration Status:  Stable   PONV Controlled:  None   Airway Patency:  Patent      Post Op Vitals Reviewed: Yes      Staff: Anesthesiologist, CRNA         No complications documented      BP   128/59   Temp  97 8   Pulse  82   Resp   12   SpO2   99

## 2022-09-02 NOTE — INTERIM OP NOTE
middle turbinectomy/medialization, possible revision functional endoscopic sinus surgery, inferior turbinate reduction  Postoperative Note  PATIENT NAME: Ivana Duque  : 1965  MRN: [id number]  [facility]    Surgery Date: 2022    Preop Diagnosis:  Recurrent acute sinusitis, unspecified location [J01 91]  Inferior turbinate hypertrophy    Post-Op Diagnosis Codes:  SAME    Procedure(s) (LRB):  Bilateral nasal endoscopy with partial middle turbinectomy (bilateral)  Inferior turbinate reduction (bilateral)  Inferior turbinate outfracture (bilateral)    Surgeon(s) and Role:     * Ana Miguel MD - Primary     * Malgorzata Vargas MD - Assisting    Specimens:  * No specimens in log *    Estimated Blood Loss:   Minimal    Anesthesia Type:   General     Findings:    ***  Complications:   {Post Op Complications:93832}    SIGNATURE: Ana Miguel MD   DATE: 2022   TIME: 5:23 PM

## 2022-09-02 NOTE — OP NOTE
OPERATIVE REPORT  PATIENT NAME: Casper Conway    :  1965  MRN: [id number]  Pt Location: [facility]    SURGERY DATE: 2022    Surgeon(s) and Role:     * Thiago Anthony MD - Primary     * Thomas Pantoja MD - Assisting    Preop Diagnosis:  Acute recurrent sinusitis, unspecified location [J01 91]    Post-Op Diagnosis Codes:     * Acute recurrent sinusitis, unspecified location [J01 91]    Procedure(s) (LRB):  middle turbinectomy/medialization, possible revision functional endoscopic sinus surgery, inferior turbinate reduction (Bilateral)    Specimen(s):  * No specimens in log *    Estimated Blood Loss:   Minimal    Drains:  * No LDAs found *    Anesthesia Type:   General    Operative Indications:  Acute recurrent sinusitis, unspecified location [J01 91]  ***    Operative Findings:  ***    Complications:   {Post Op Complications:74095}    Procedure and Technique:  ***   {Op note attestation:18812}    Patient Disposition:  {op note disposition:15912}      SIGNATURE: Thiago Anthony MD  DATE: 2022  TIME: 5:29 PM anteriorly each time  A superior and inferior pass was made and the turbinate reduced well  A similar procedure was performed on the left side  The insertion point was cauterized for hemostasis  Afrin soaked pledgets were placed in both nares and removed at the end of the case  Inferior turbinate outfracture was then completed using Boies elevator lateralizing the inferior turbinate bone from posterior to anterior on both sides  The middle turbinate on the right was trimmed using scissors and through cut forceps angling more posteriorly inferiorly in order to leave a remnant of middle turbinate  Hemostasis was achieved with oxymetazoline pledgets and suction bovie cautery  A similar procedure was completed on the left side  Again, hemostasis was achieved  The nares were irrigated with normal saline  Nannette was placed on the raw surface of the middle turbinate for hemostasis  The oropharynx was suctioned  Care of patient was returned to anesthesia for extubation     I was present for the entire procedure    Patient Disposition:  PACU       SIGNATURE: Shelbie Flor MD  DATE: September 2, 2022  TIME: 5:29 PM

## 2022-09-02 NOTE — DISCHARGE INSTRUCTIONS
Nosebleeds - Everything You Need to Know        Start nasal saline sprays/rinses tomorrow (Saturday)      Nosebleed precautions:  Avoid any activities that will increase blood flow to the head  No straining, bearing down, heavy lifting, or bending over  Do not blow your nose  Avoid picking your nose  Sneeze with your mouth OPEN    Prevention strategies  Keep nasal passages healthy - prevent infections, crusting, and inflammation  Treat colds and allergies adequately  Keep blood pressure controlled   Apply saline nasal sprays, rinses, gels often  If using blood thinner medications, monitor very closely as this will increase your risk of a nosebleed    Home humidifiers   Humidified oxygen if using a nasal cannula      Active nosebleed  Blow the nose to remove any blood clots  DO NOT tip your head back  Afrin (oxymetazoline) 2 sprays to both nares - DO NOT USE FOR MORE THAN 3 DAYS  Pinch the soft part of your nose x 10 minutes and recheck  Repeat these steps once if still bleeding  If unable to stop the bleeding, call your ENT office or call 9-1-1 and go to nearest emergency room

## 2022-10-04 ENCOUNTER — OFFICE VISIT (OUTPATIENT)
Dept: PULMONOLOGY | Facility: CLINIC | Age: 57
End: 2022-10-04
Payer: COMMERCIAL

## 2022-10-04 VITALS
BODY MASS INDEX: 27.49 KG/M2 | DIASTOLIC BLOOD PRESSURE: 76 MMHG | SYSTOLIC BLOOD PRESSURE: 120 MMHG | HEIGHT: 65 IN | OXYGEN SATURATION: 96 % | TEMPERATURE: 97.7 F | WEIGHT: 165 LBS | HEART RATE: 86 BPM

## 2022-10-04 DIAGNOSIS — R06.02 SHORTNESS OF BREATH: ICD-10-CM

## 2022-10-04 DIAGNOSIS — R09.89 PULMONARY AIR TRAPPING: ICD-10-CM

## 2022-10-04 DIAGNOSIS — J45.20 MILD INTERMITTENT ASTHMA, UNSPECIFIED WHETHER COMPLICATED: ICD-10-CM

## 2022-10-04 DIAGNOSIS — J84.116 CRYPTOGENIC ORGANIZING PNEUMONIA (HCC): ICD-10-CM

## 2022-10-04 DIAGNOSIS — J32.9 CHRONIC SINUSITIS, UNSPECIFIED LOCATION: ICD-10-CM

## 2022-10-04 DIAGNOSIS — D72.10 EOSINOPHILIA, UNSPECIFIED TYPE: Primary | ICD-10-CM

## 2022-10-04 PROCEDURE — 99215 OFFICE O/P EST HI 40 MIN: CPT | Performed by: INTERNAL MEDICINE

## 2022-10-04 PROCEDURE — 94010 BREATHING CAPACITY TEST: CPT | Performed by: INTERNAL MEDICINE

## 2022-10-04 RX ORDER — FLUTICASONE FUROATE 100 UG/1
1 POWDER RESPIRATORY (INHALATION) DAILY
Qty: 30 BLISTER | Refills: 2 | Status: SHIPPED | OUTPATIENT
Start: 2022-10-04 | End: 2022-11-03

## 2022-10-04 RX ORDER — ALBUTEROL SULFATE 90 UG/1
2 AEROSOL, METERED RESPIRATORY (INHALATION) EVERY 6 HOURS PRN
Qty: 18 G | Refills: 3 | Status: SHIPPED | OUTPATIENT
Start: 2022-10-04

## 2022-10-04 NOTE — PROGRESS NOTES
Pulmonary Follow Up Note   Tom Souza 61 y o  female MRN: [id number]  10/4/2022      Assessment and Plan:  1  Cryptogenic Organizing Pneumonia  · Diagnosed in April 2021  Treated with steroids and resolved on follow up imaging from June 2021  · HP, autoimmune and vasculitis panel negative during hospital admission    2  Eosinophilia  · CBC with eosinophilia at least since 10/2021  · Patient reports a history of environmental allergies which could play a role in eosinophilia, however she also probably has reactive airway disease and history of   Because of this, will repeat lab workup including NE panel, IgE level, ANCA, MPO, PR3      3  Reactive airway disease  · POCT spirometry from today with FEV1/FVC ratio 79%, FVC 97%, FEV1 99%, consistent with normal spirometry  · Patient reports needing the albuterol inhaler almost every day for the past couple of weeks  · Will start Arnuity Ellipta 1 puff daily for 1 month  She will discontinue treatment in 1 month and restart it if symptoms recur  · Follow up appointment in 2 months  4  Nicotine dependence on remission  · Less than 20 pack/year smoking history  · She does not need yearly LDCT for lung cancer screening  Return in about 2 months (around 12/4/2022)     History of Present Illness   HPI:  Tom Souza is a 61 y o  female with a PMHx of chronic sinusitis, anxiety/depression, HTN, nicotine dependence, who comes to the office for a follow up visit regarding cryptogenic organizing pneumonia  Patient was admitted at [facility] from 04/03-04/13/2021 with acute respiratory failure with hypoxia  Patient was treated with Cefepime for 7 days  She required ICU level of care due to HFNC and no improvement on antibiotic therapy  Patient underwent bronchoscopy with BAL on 04/07/2021 with negative cultures, gram stain, and cytology  She was started on empiric steroid treatment  Autoimmune panel and HP negative   CT chest wutg multifocal peribronchial consolidation  She was treated for cryptogenic organizing pneumonia and improved on steroids and was eventually weaned off O2 prior to discharge  Steroids were tapered slowly  BAL eventually reported AFB positive for MAC and Candida which were thought to be contaminants  Repeat CT chest after completing steroid treatment with resolution of previous imaging findings  Today, patient reports she is not feeling significantly short of breath, however she has been requiring daily albuterol inhaler for the past couple of weeks  She recently had ENT surgery and she has been attributing the postnasal drainage and cough to that surgical procedure  She denies any recent fevers  Review of Systems   Constitutional: Negative for appetite change and fever  HENT: Positive for postnasal drip  Negative for ear pain, rhinorrhea, sneezing, sore throat and trouble swallowing  Respiratory: Positive for cough  Cardiovascular: Negative for chest pain  Musculoskeletal: Negative for myalgias  Neurological: Negative for headaches         Historical Information   Past Medical History:   Diagnosis Date    Allergies     Anesthesia complication     V TACH after her reduction mammoplasty, done at [facility]),, states had a little vent arrhythmia after sinus sx also    Anxiety     Chronic rhinitis 02/18/2020    Depression     Ethmoid sinusitis     GERD (gastroesophageal reflux disease)     no issues since 2021    Maxillary sinusitis     Multiple allergies     Myofascial pain syndrome     Nasal turbinate hypertrophy     Pneumonia     Sleep apnea     not currently using CPAP    Wears glasses      Past Surgical History:   Procedure Laterality Date    LAPAROSCOPY      with vaginal hysterectomy; 11/3/2003 rso    OOPHORECTOMY Left 2004    PARTIAL HYSTERECTOMY  2004    HI NASAL/SINUS ENDOSCOPY,RMV TISS MAXILL SINUS N/A 9/30/2016    Procedure: IMAGE GUIDED FUNCTIONAL ENDOSCOPIC SINUS SURGERY;  Surgeon: Rosalio Coronel MD; [facility];  Service: ENT    [place] NASAL/SINUS ENDOSCOPY,RMV TISS MAXILL SINUS Bilateral 9/2/2022    Procedure: middle turbinectomy/medialization, possible revision functional endoscopic sinus surgery, inferior turbinate reduction;  Surgeon: Naomi Campoverde MD;  [facility];  Service: ENT    REDUCTION MAMMAPLASTY Bilateral 02/27/2015    SINUS SURGERY      TOOTH EXTRACTION Right 3/16/2019    Procedure: EXTRACTION TOOTH #1 (Impacted Third Molar Tooth);   Surgeon: Kendal Gonzalez DDS;  [facility];  Service: Maxillofacial    TUBAL LIGATION      WISDOM TOOTH EXTRACTION       Family History   Problem Relation Age of Onset    CLEM disease Mother     Atrial fibrillation Mother     Atrial fibrillation Father     Heart disease Father     Diabetes Maternal Grandmother     Hypertension Maternal Grandmother     Colon cancer Maternal Grandfather     Diabetes Maternal Grandfather     Cancer Paternal Grandfather     Endometrial cancer Cousin     Breast cancer Cousin     Multiple sclerosis Sister     No Known Problems Son     No Known Problems Son          Meds/Allergies     Current Outpatient Medications:     albuterol (Ventolin HFA) 90 mcg/act inhaler, Inhale 2 puffs every 6 (six) hours as needed for wheezing, Disp: 18 g, Rfl: 3    ARIPiprazole (ABILIFY) 10 mg tablet, Take 10 mg by mouth daily States takes 5 mg ( 1/2 tablet ) daily, Disp: , Rfl:     atorvastatin (LIPITOR) 10 mg tablet, Take 1 tablet (10 mg total) by mouth daily, Disp: 90 tablet, Rfl: 3    azelastine (ASTELIN) 0 1 % nasal spray, instill 1 spray into each nostril twice a day (Patient not taking: Reported on 9/8/2022), Disp: 30 mL, Rfl: 11    busPIRone (BUSPAR) 30 MG tablet, Take 10 mg by mouth States taking only 10 mg ( breaks the tablets into thirds ), Disp: , Rfl:     chlorhexidine (PERIDEX) 0 12 % solution, RINSE WITH 30 MILLILTERS FOR 30 SECONDS THEN SPIT OUT AT BEDTIME, Disp: , Rfl:     cholecalciferol (VITAMIN D3) 1,000 units tablet, Take 2,000 Units by mouth daily, Disp: , Rfl:     diphenhydrAMINE (BENADRYL) 25 mg tablet, Take 25 mg by mouth every 6 (six) hours as needed for itching, Disp: , Rfl:     escitalopram (LEXAPRO) 10 mg tablet, (Takes w/ 20 mg dose for total 30 mg ), Disp: , Rfl:     escitalopram (LEXAPRO) 20 mg tablet, Take 20 mg by mouth daily, Disp: , Rfl:     ipratropium (ATROVENT) 0 03 % nasal spray, 2 sprays into each nostril 3 (three) times a day as needed for rhinitis (nasal drip, congestion) (Patient not taking: Reported on 9/8/2022), Disp: 30 mL, Rfl: 11    LORazepam (ATIVAN) 0 5 mg tablet, Take 0 5 mg by mouth 2 (two) times a day, Disp: , Rfl:     mometasone (NASONEX) 50 mcg/act nasal spray, 2 sprays into each nostril daily States nasal irrigation from a compounding pharmacy, prescribed by Dr Mary Ellen Hernandez (Patient not taking: Reported on 9/8/2022), Disp: , Rfl:     sodium chloride (OCEAN) 0 65 % nasal spray, 2 sprays into each nostril 2 (two) times a day, Disp: 15 mL, Rfl: 0  Allergies   Allergen Reactions    Other      Most all antibiotics- hives, hypotensive    "no problem with clindamycin "    Augmentin Es-600  [Amoxicillin-Pot Clavulanate]     Cefuroxime     Erythromycin     Levofloxacin     Morphine     Morphine And Related Hives    Oxycodone-Acetaminophen     Penicillins     Percocet [Oxycodone-Acetaminophen] Hives    Sulfa Antibiotics     Tetracyclines & Related        Vitals: not currently breastfeeding  There is no height or weight on file to calculate BMI  Physical Exam  Physical Exam  Vitals reviewed  Constitutional:       General: She is not in acute distress  Appearance: She is not ill-appearing or toxic-appearing  HENT:      Head: Normocephalic  Eyes:      Pupils: Pupils are equal, round, and reactive to light  Cardiovascular:      Rate and Rhythm: Normal rate and regular rhythm  Heart sounds: No murmur heard  No gallop     Pulmonary:      Effort: Pulmonary effort is normal  No respiratory distress  Breath sounds: No wheezing, rhonchi or rales  Abdominal:      General: There is no distension  Palpations: Abdomen is soft  Tenderness: There is no abdominal tenderness  Musculoskeletal:      Right lower leg: No edema  Left lower leg: No edema  Skin:     General: Skin is warm  Capillary Refill: Capillary refill takes less than 2 seconds  Neurological:      Mental Status: She is alert and oriented to person, place, and time  Cranial Nerves: No cranial nerve deficit  Psychiatric:         Mood and Affect: Mood normal              Labs: I have personally reviewed pertinent lab results  Lab Results   Component Value Date    WBC 7 04 08/23/2022    HGB 13 8 08/23/2022    HCT 42 9 08/23/2022    MCV 92 08/23/2022     08/23/2022     Lab Results   Component Value Date    GLUCOSE 100 04/20/2015    CALCIUM 9 3 08/23/2022     04/20/2015    K 4 3 08/23/2022    CO2 30 08/23/2022     08/23/2022    BUN 16 08/23/2022    CREATININE 0 88 08/23/2022     No results found for: IGE  Lab Results   Component Value Date    ALT 27 03/09/2022    AST 21 03/09/2022    ALKPHOS 78 03/09/2022       Imaging and other studies: I have personally reviewed pertinent reports  CT chest from 04/03/2021: Evidence of multifocal peribronchial consolidation  CT Chest from 06/10/2021: Almost complete resolution of previous finding  Pulmonary function testing:   Spirometry 10/04/2022:  FEV1/FVC ratio 79%, FVC 97%, FEV1 99%, consistent with normal spirometry  PFT from 06/10/2021: FEV1/FVC Ratio: 81 %  FEV1: 2 88 L     107 % predicted  Forced Vital Capacity: 3 53 L    105 % predicted  Lung volumes: Total Lung Capacity 122 % predicted Residual volume 161 % predicted  DLCO corrected for patients hemoglobin level: 75 % predicted     EKG, Pathology, and Other Studies: I have personally reviewed pertinent reports          Latosha Duran MD  Pulmonary and Critical Care Fellowship, PGY4  [facility]    Answers for HPI/ROS submitted by the patient on 10/3/2022  Chronicity: recurrent  When did you first notice your symptoms?: 1 to 4 weeks ago  How often do your symptoms occur?: daily  Since you first noticed this problem, how has it changed?: gradually improving  Do you have shortness of breath that occurs with effort or exertion?: No  Do you have ear congestion?: No  Do you have heartburn?: No  Do you have fatigue?: No  Do you have nasal congestion?: Yes  Do you have shortness of breath when lying flat?: No  Do you have shortness of breath when you wake up?: No  Do you have sweats?: No  Have you experienced weight loss?: No  Which of the following makes your symptoms worse?: change in weather, pollen

## 2022-10-12 PROBLEM — J18.9 COMMUNITY ACQUIRED PNEUMONIA OF RIGHT LOWER LOBE OF LUNG: Status: RESOLVED | Noted: 2021-04-03 | Resolved: 2022-10-12

## 2022-10-12 PROBLEM — J84.116 CRYPTOGENIC ORGANIZING PNEUMONIA (HCC): Status: RESOLVED | Noted: 2021-04-27 | Resolved: 2022-10-12

## 2022-10-23 ENCOUNTER — OFFICE VISIT (OUTPATIENT)
Dept: URGENT CARE | Facility: CLINIC | Age: 57
End: 2022-10-23
Payer: COMMERCIAL

## 2022-10-23 VITALS
WEIGHT: 165 LBS | BODY MASS INDEX: 27.49 KG/M2 | OXYGEN SATURATION: 97 % | RESPIRATION RATE: 18 BRPM | HEIGHT: 65 IN | HEART RATE: 88 BPM | DIASTOLIC BLOOD PRESSURE: 64 MMHG | TEMPERATURE: 98.2 F | SYSTOLIC BLOOD PRESSURE: 127 MMHG

## 2022-10-23 DIAGNOSIS — R05.1 ACUTE COUGH: Primary | ICD-10-CM

## 2022-10-23 PROCEDURE — S9088 SERVICES PROVIDED IN URGENT: HCPCS | Performed by: NURSE PRACTITIONER

## 2022-10-23 PROCEDURE — 99213 OFFICE O/P EST LOW 20 MIN: CPT | Performed by: NURSE PRACTITIONER

## 2022-10-23 RX ORDER — BENZONATATE 100 MG/1
100 CAPSULE ORAL 3 TIMES DAILY PRN
Qty: 20 CAPSULE | Refills: 0 | Status: SHIPPED | OUTPATIENT
Start: 2022-10-23

## 2022-10-23 NOTE — PROGRESS NOTES
[facility]        NAME: Swati Wright is a 61 y o  female  : 1965    MRN: [id number]  DATE: 2022  TIME: 3:42 PM    Assessment and Plan   Acute cough [R05 1]  1  Acute cough  benzonatate (TESSALON PERLES) 100 mg capsule         Patient Instructions     Patient Instructions   Take medication as directed  Rest and drink plenty of fluids  A cool mist humidifier can be helpful  If you develop a worsening cough, chest pain, shortness of breath, palpitations, coughing up blood, prolonged high fever, any new or concerning symptoms please return or proceed ER  Recommend following up with PCP in 3-5 days        Follow up with PCP in 3-5 days  Proceed to  ER if symptoms worsen  Chief Complaint     Chief Complaint   Patient presents with   • Cold Like Symptoms     Patient c/o cough, sinus congestion, sore throat, and post nasal drip that started a week ago  Patient reports starting steroids 2 days ago for same  Patient reports little improvement  History of Present Illness       Cough  This is a new problem  The current episode started in the past 7 days  The problem has been unchanged  The problem occurs every few minutes  The cough is non-productive  Associated symptoms include nasal congestion, postnasal drip, rhinorrhea and a sore throat  Pertinent negatives include no chest pain, chills, ear pain, fever, headaches, hemoptysis, myalgias, rash, shortness of breath, sweats or wheezing  Nothing aggravates the symptoms  She has tried oral steroids (just started prednisone 2 days ago) for the symptoms  The treatment provided mild relief  Her past medical history is significant for asthma  Review of Systems   Review of Systems   Constitutional: Negative for chills, diaphoresis, fatigue and fever  HENT: Positive for congestion, postnasal drip, rhinorrhea, sinus pressure and sore throat  Negative for ear pain, facial swelling, sinus pain, tinnitus and trouble swallowing  Eyes: Negative  Respiratory: Positive for cough  Negative for hemoptysis, chest tightness, shortness of breath, wheezing and stridor  Cardiovascular: Negative for chest pain and palpitations  Gastrointestinal: Negative  Musculoskeletal: Negative for arthralgias, back pain, joint swelling, myalgias, neck pain and neck stiffness  Skin: Negative for rash  Neurological: Negative for dizziness, facial asymmetry, weakness, light-headedness, numbness and headaches           Current Medications       Current Outpatient Medications:   •  albuterol (Ventolin HFA) 90 mcg/act inhaler, Inhale 2 puffs every 6 (six) hours as needed for wheezing, Disp: 18 g, Rfl: 3  •  ARIPiprazole (ABILIFY) 10 mg tablet, Take 10 mg by mouth daily States takes 5 mg ( 1/2 tablet ) daily, Disp: , Rfl:   •  atorvastatin (LIPITOR) 10 mg tablet, Take 1 tablet (10 mg total) by mouth daily, Disp: 90 tablet, Rfl: 3  •  azelastine (ASTELIN) 0 1 % nasal spray, instill 1 spray into each nostril twice a day, Disp: 30 mL, Rfl: 11  •  benzonatate (TESSALON PERLES) 100 mg capsule, Take 1 capsule (100 mg total) by mouth 3 (three) times a day as needed for cough, Disp: 20 capsule, Rfl: 0  •  busPIRone (BUSPAR) 30 MG tablet, Take 10 mg by mouth States taking only 10 mg ( breaks the tablets into thirds ), Disp: , Rfl:   •  chlorhexidine (PERIDEX) 0 12 % solution, RINSE WITH 30 MILLILTERS FOR 30 SECONDS THEN SPIT OUT AT BEDTIME, Disp: , Rfl:   •  cholecalciferol (VITAMIN D3) 1,000 units tablet, Take 2,000 Units by mouth daily, Disp: , Rfl:   •  diphenhydrAMINE (BENADRYL) 25 mg tablet, Take 25 mg by mouth every 6 (six) hours as needed for itching, Disp: , Rfl:   •  escitalopram (LEXAPRO) 10 mg tablet, (Takes w/ 20 mg dose for total 30 mg ), Disp: , Rfl:   •  escitalopram (LEXAPRO) 20 mg tablet, Take 20 mg by mouth daily, Disp: , Rfl:   •  fluticasone (Arnuity Ellipta) 100 MCG/ACT AEPB inhaler, Inhale 1 puff daily Rinse mouth after use , Disp: 30 blister, Rfl: 2  •  ipratropium (ATROVENT) 0 03 % nasal spray, 2 sprays into each nostril 3 (three) times a day as needed for rhinitis (nasal drip, congestion), Disp: 30 mL, Rfl: 11  •  LORazepam (ATIVAN) 0 5 mg tablet, Take 0 5 mg by mouth 2 (two) times a day, Disp: , Rfl:   •  mometasone (NASONEX) 50 mcg/act nasal spray, 2 sprays into each nostril daily States nasal irrigation from a compounding pharmacy, prescribed by Dr Liz Cuenca, Disp: , Rfl:   •  sodium chloride (OCEAN) 0 65 % nasal spray, 2 sprays into each nostril 2 (two) times a day, Disp: 15 mL, Rfl: 0    Current Allergies     Allergies as of 10/23/2022 - Reviewed 10/23/2022   Allergen Reaction Noted   • Other  09/29/2016   • Augmentin es-600  [amoxicillin-pot clavulanate]  04/20/2013   • Cefuroxime  04/20/2013   • Erythromycin  12/12/2016   • Levofloxacin  04/20/2013   • Morphine  12/12/2016   • Morphine and related Hives 09/29/2016   • Oxycodone-acetaminophen  12/12/2016   • Penicillins  12/12/2016   • Percocet [oxycodone-acetaminophen] Hives 09/29/2016   • Sulfa antibiotics  12/12/2016   • Tetracyclines & related  04/20/2013            The following portions of the patient's history were reviewed and updated as appropriate: allergies, current medications, past family history, past medical history, past social history, past surgical history and problem list      Past Medical History:   Diagnosis Date   • Allergies    • Anesthesia complication     V TACH after her reduction mammoplasty, done at [address] [place] [address],, states had a little vent arrhythmia after sinus sx also   • Anxiety    • Chronic rhinitis 02/18/2020   • Depression    • Ethmoid sinusitis    • GERD (gastroesophageal reflux disease)     no issues since 2021   • Maxillary sinusitis    • Multiple allergies    • Myofascial pain syndrome    • Nasal turbinate hypertrophy    • Pneumonia    • Sleep apnea     not currently using CPAP   • Wears glasses        Past Surgical History:   Procedure Laterality Date   • LAPAROSCOPY      with vaginal hysterectomy; 11/3/2003 rso   • OOPHORECTOMY Left 2004   • PARTIAL HYSTERECTOMY  2004   • ME NASAL/SINUS ENDOSCOPY,RMV TISS MAXILL SINUS N/A 9/30/2016    Procedure: IMAGE GUIDED FUNCTIONAL ENDOSCOPIC SINUS SURGERY;  Surgeon: Amelia Mitchell MD;  [facility];  Service: ENT   • ME NASAL/SINUS ENDOSCOPY,RMV TISS MAXILL SINUS Bilateral 9/2/2022    Procedure: middle turbinectomy/medialization, possible revision functional endoscopic sinus surgery, inferior turbinate reduction;  Surgeon: Amelia Mitchell MD;  [facility];  Service: ENT   • REDUCTION MAMMAPLASTY Bilateral 02/27/2015   • SINUS SURGERY     • TOOTH EXTRACTION Right 3/16/2019    Procedure: EXTRACTION TOOTH #1 (Impacted Third Molar Tooth); Surgeon: Asad Sloan DDS;  [facility];  Service: Maxillofacial   • TUBAL LIGATION     • WISDOM TOOTH EXTRACTION         Family History   Problem Relation Age of Onset   • CLEM disease Mother    • Atrial fibrillation Mother    • Atrial fibrillation Father    • Heart disease Father    • Diabetes Maternal Grandmother    • Hypertension Maternal Grandmother    • Colon cancer Maternal Grandfather    • Diabetes Maternal Grandfather    • Cancer Paternal Grandfather    • Endometrial cancer Cousin    • Breast cancer Cousin    • Multiple sclerosis Sister    • No Known Problems Son    • No Known Problems Son          Medications have been verified  Objective   /64   Pulse 88   Temp 98 2 °F (36 8 °C) (Temporal)   Resp 18   Ht 5' 5" (1 651 m)   Wt 74 8 kg (165 lb)   LMP  (LMP Unknown)   SpO2 97%   BMI 27 46 kg/m²   No LMP recorded (lmp unknown)  Patient has had a hysterectomy  Physical Exam     Physical Exam  Constitutional:       General: She is not in acute distress  Appearance: She is well-developed  She is not diaphoretic  HENT:      Head: Normocephalic and atraumatic        Right Ear: Hearing, tympanic membrane, ear canal and external ear normal       Left Ear: Hearing, tympanic membrane, ear canal and external ear normal       Nose: Congestion and rhinorrhea present  No mucosal edema  Right Sinus: No maxillary sinus tenderness or frontal sinus tenderness  Left Sinus: No maxillary sinus tenderness or frontal sinus tenderness  Mouth/Throat:      Mouth: Mucous membranes are moist       Pharynx: Oropharynx is clear  Uvula midline  Cardiovascular:      Rate and Rhythm: Normal rate and regular rhythm  Heart sounds: Normal heart sounds, S1 normal and S2 normal    Pulmonary:      Effort: Pulmonary effort is normal       Breath sounds: Normal breath sounds and air entry  Lymphadenopathy:      Cervical: No cervical adenopathy  Skin:     General: Skin is warm and dry  Capillary Refill: Capillary refill takes less than 2 seconds  Neurological:      Mental Status: She is alert and oriented to person, place, and time

## 2022-10-27 PROCEDURE — 87205 SMEAR GRAM STAIN: CPT | Performed by: OTOLARYNGOLOGY

## 2022-10-27 PROCEDURE — 87070 CULTURE OTHR SPECIMN AEROBIC: CPT | Performed by: OTOLARYNGOLOGY

## 2022-10-27 PROCEDURE — 87077 CULTURE AEROBIC IDENTIFY: CPT | Performed by: OTOLARYNGOLOGY

## 2022-10-29 ENCOUNTER — APPOINTMENT (OUTPATIENT)
Dept: LAB | Facility: CLINIC | Age: 57
End: 2022-10-29

## 2022-10-29 DIAGNOSIS — J84.116 CRYPTOGENIC ORGANIZING PNEUMONIA (HCC): ICD-10-CM

## 2022-10-29 DIAGNOSIS — J32.9 CHRONIC SINUSITIS, UNSPECIFIED LOCATION: ICD-10-CM

## 2022-10-29 DIAGNOSIS — E78.2 MIXED HYPERLIPIDEMIA: ICD-10-CM

## 2022-10-29 DIAGNOSIS — Z12.11 SCREENING FOR COLON CANCER: ICD-10-CM

## 2022-10-29 DIAGNOSIS — D72.10 EOSINOPHILIA, UNSPECIFIED TYPE: ICD-10-CM

## 2022-10-29 LAB
ALBUMIN SERPL BCP-MCNC: 3.6 G/DL (ref 3.5–5)
ALP SERPL-CCNC: 97 U/L (ref 46–116)
ALT SERPL W P-5'-P-CCNC: 22 U/L (ref 12–78)
ANION GAP SERPL CALCULATED.3IONS-SCNC: 7 MMOL/L (ref 4–13)
AST SERPL W P-5'-P-CCNC: 12 U/L (ref 5–45)
BASOPHILS # BLD AUTO: 0.05 THOUSANDS/ÂΜL (ref 0–0.1)
BASOPHILS NFR BLD AUTO: 0 % (ref 0–1)
BILIRUB SERPL-MCNC: 0.5 MG/DL (ref 0.2–1)
BUN SERPL-MCNC: 10 MG/DL (ref 5–25)
CALCIUM SERPL-MCNC: 9.8 MG/DL (ref 8.3–10.1)
CHLORIDE SERPL-SCNC: 105 MMOL/L (ref 96–108)
CHOLEST SERPL-MCNC: 145 MG/DL
CO2 SERPL-SCNC: 26 MMOL/L (ref 21–32)
CREAT SERPL-MCNC: 0.87 MG/DL (ref 0.6–1.3)
EOSINOPHIL # BLD AUTO: 0.7 THOUSAND/ÂΜL (ref 0–0.61)
EOSINOPHIL NFR BLD AUTO: 4 % (ref 0–6)
ERYTHROCYTE [DISTWIDTH] IN BLOOD BY AUTOMATED COUNT: 12.3 % (ref 11.6–15.1)
GFR SERPL CREATININE-BSD FRML MDRD: 73 ML/MIN/1.73SQ M
GLUCOSE P FAST SERPL-MCNC: 100 MG/DL (ref 65–99)
HCT VFR BLD AUTO: 44.1 % (ref 34.8–46.1)
HDLC SERPL-MCNC: 40 MG/DL
HGB BLD-MCNC: 14.1 G/DL (ref 11.5–15.4)
IMM GRANULOCYTES # BLD AUTO: 0.13 THOUSAND/UL (ref 0–0.2)
IMM GRANULOCYTES NFR BLD AUTO: 1 % (ref 0–2)
LDLC SERPL CALC-MCNC: 62 MG/DL (ref 0–100)
LDLC SERPL DIRECT ASSAY-MCNC: 82 MG/DL (ref 0–100)
LYMPHOCYTES # BLD AUTO: 3.27 THOUSANDS/ÂΜL (ref 0.6–4.47)
LYMPHOCYTES NFR BLD AUTO: 19 % (ref 14–44)
MCH RBC QN AUTO: 29.1 PG (ref 26.8–34.3)
MCHC RBC AUTO-ENTMCNC: 32 G/DL (ref 31.4–37.4)
MCV RBC AUTO: 91 FL (ref 82–98)
MONOCYTES # BLD AUTO: 0.91 THOUSAND/ÂΜL (ref 0.17–1.22)
MONOCYTES NFR BLD AUTO: 5 % (ref 4–12)
NEUTROPHILS # BLD AUTO: 12.26 THOUSANDS/ÂΜL (ref 1.85–7.62)
NEUTS SEG NFR BLD AUTO: 71 % (ref 43–75)
NONHDLC SERPL-MCNC: 105 MG/DL
NRBC BLD AUTO-RTO: 0 /100 WBCS
PLATELET # BLD AUTO: 301 THOUSANDS/UL (ref 149–390)
PMV BLD AUTO: 11.4 FL (ref 8.9–12.7)
POTASSIUM SERPL-SCNC: 4 MMOL/L (ref 3.5–5.3)
PROT SERPL-MCNC: 7.1 G/DL (ref 6.4–8.4)
RBC # BLD AUTO: 4.84 MILLION/UL (ref 3.81–5.12)
SODIUM SERPL-SCNC: 138 MMOL/L (ref 135–147)
TRIGL SERPL-MCNC: 214 MG/DL
WBC # BLD AUTO: 17.32 THOUSAND/UL (ref 4.31–10.16)

## 2022-10-31 ENCOUNTER — HOSPITAL ENCOUNTER (EMERGENCY)
Facility: HOSPITAL | Age: 57
Discharge: HOME | End: 2022-10-31
Attending: EMERGENCY MEDICINE

## 2022-10-31 ENCOUNTER — APPOINTMENT (EMERGENCY)
Dept: RADIOLOGY | Facility: HOSPITAL | Age: 57
End: 2022-10-31

## 2022-10-31 VITALS
OXYGEN SATURATION: 96 % | TEMPERATURE: 97.8 F | HEART RATE: 81 BPM | SYSTOLIC BLOOD PRESSURE: 120 MMHG | DIASTOLIC BLOOD PRESSURE: 73 MMHG | RESPIRATION RATE: 18 BRPM

## 2022-10-31 DIAGNOSIS — J32.9 SINUSITIS: Primary | ICD-10-CM

## 2022-10-31 LAB

## 2022-10-31 RX ORDER — ONDANSETRON 4 MG/1
4 TABLET, ORALLY DISINTEGRATING ORAL ONCE
Status: COMPLETED | OUTPATIENT
Start: 2022-10-31 | End: 2022-10-31

## 2022-10-31 RX ORDER — KETOROLAC TROMETHAMINE 30 MG/ML
15 INJECTION, SOLUTION INTRAMUSCULAR; INTRAVENOUS ONCE
Status: COMPLETED | OUTPATIENT
Start: 2022-10-31 | End: 2022-10-31

## 2022-10-31 RX ORDER — CEFDINIR 300 MG/1
300 CAPSULE ORAL EVERY 12 HOURS SCHEDULED
Qty: 20 CAPSULE | Refills: 0 | Status: SHIPPED | OUTPATIENT
Start: 2022-10-31 | End: 2022-11-10

## 2022-10-31 RX ORDER — CEFDINIR 300 MG/1
300 CAPSULE ORAL ONCE
Status: COMPLETED | OUTPATIENT
Start: 2022-10-31 | End: 2022-10-31

## 2022-10-31 RX ADMIN — ONDANSETRON 4 MG: 4 TABLET, ORALLY DISINTEGRATING ORAL at 20:09

## 2022-10-31 RX ADMIN — KETOROLAC TROMETHAMINE 15 MG: 30 INJECTION, SOLUTION INTRAMUSCULAR; INTRAVENOUS at 20:09

## 2022-10-31 RX ADMIN — CEFDINIR 300 MG: 300 CAPSULE ORAL at 20:49

## 2022-10-31 NOTE — ED ATTENDING ATTESTATION
10/31/2022   I, Mary Clinton MD, saw and evaluated the patient  I have discussed the patient with the resident/non-physician practitioner and agree with the resident's/non-physician practitioner's findings, Plan of Care, and MDM as documented in the resident's/non-physician practitioner's note, except where noted  All available labs and Radiology studies were reviewed  I was present for key portions of any procedure(s) performed by the resident/non-physician practitioner and I was immediately available to provide assistance  At this point I agree with the current assessment done in the Emergency Department  I have conducted an independent evaluation of this patient a history and physical is as follows:    Chief Complaint   Patient presents with   • Abnormal Lab     Pt hasnt been feeling well for 3 weeks, she had labs down on 10/29 & her WBC was high and cultures were abnormal  Pt complains of not being able to eat much and she had pneumonia last year and feels as its the same thing             Physical Exam  /76 (BP Location: Right arm)   Pulse 97   Temp 97 8 °F (36 6 °C) (Oral)   Resp 18   LMP  (LMP Unknown)   SpO2 99%      Vital signs and nursing notes reviewed    ** IF YOU ARE READING THIS, THE EXAM TEMPLATE BELOW HAS NOT BEEN UPDATED**    CONSTITUTIONAL: female appearing stated age resting in bed, in no acute distress  HEENT: atraumatic, normocephalic  Sclera anicteric, conjunctiva are not injected  Moist oral mucosa  CARDIOVASCULAR/CHEST: RRR, no M/R/G  2+ radial pulses  PULMONARY: Breathing comfortably on RA  Breath sounds are equal and clear to auscultation  ABDOMEN: non-distended  BS present, normoactive  Non-tender  MSK: moves all extremities, no deformities, no peripheral edema, no calf asymmetry  NEURO: Awake, alert, and oriented x 3  Face symmetric  Moves all extremities spontaneously   No focal neurologic deficits  SKIN: Warm, appears well-perfused  MENTAL STATUS: Normal affect      Labs and Imaging  Labs Reviewed - No data to display    No orders to display         Procedures  Procedures        ED Course  Medications - No data to display

## 2022-10-31 NOTE — ED PROVIDER NOTES
History  Chief Complaint   Patient presents with   • Abnormal Lab     Pt hasnt been feeling well for 3 weeks, she had labs down on 10/29 & her WBC was high and cultures were abnormal  Pt complains of not being able to eat much and she had pneumonia last year and feels as its the same thing     HPI  Patient is a 41-year-old female presenting with 3 weeks of congestion, sinus pain, headache, lack of appetite, generalized myalgia  Patient has been evaluated for the above symptoms by seeing ENT  Initially was seen by [address] [facility] and was prescribed Tessalon Perles for her ongoing cough  Seen by ENT 4 days ago and nasal debridement was done  Wound culture was collected and showed growth of Haemophilus influenzae a and Staph aureus  Patient states that due to ongoing sinus pain as well as congestion and feeling ill decided come to the emergency department because she was not able to secure a sooner ENT appointment  Patient denies any fever, chills  Currently experiencing mild nausea  No episode of vomiting  Denies any diarrhea  No abdominal tenderness  Prior to Admission Medications   Prescriptions Last Dose Informant Patient Reported? Taking?    ARIPiprazole (ABILIFY) 10 mg tablet  Self Yes No   Sig: Take 10 mg by mouth daily States takes 5 mg ( 1/2 tablet ) daily   LORazepam (ATIVAN) 0 5 mg tablet  Self Yes No   Sig: Take 0 5 mg by mouth 2 (two) times a day   albuterol (Ventolin HFA) 90 mcg/act inhaler   No No   Sig: Inhale 2 puffs every 6 (six) hours as needed for wheezing   atorvastatin (LIPITOR) 10 mg tablet  Self No No   Sig: Take 1 tablet (10 mg total) by mouth daily   azelastine (ASTELIN) 0 1 % nasal spray  Self No No   Sig: instill 1 spray into each nostril twice a day   benzonatate (TESSALON PERLES) 100 mg capsule   No No   Sig: Take 1 capsule (100 mg total) by mouth 3 (three) times a day as needed for cough   busPIRone (BUSPAR) 30 MG tablet  Self Yes No   Sig: Take 10 mg by mouth States taking only 10 mg ( breaks the tablets into thirds )   chlorhexidine (PERIDEX) 0 12 % solution  Self Yes No   Sig: RINSE WITH 30 MILLILTERS FOR 30 SECONDS THEN SPIT OUT AT BEDTIME   cholecalciferol (VITAMIN D3) 1,000 units tablet  Self Yes No   Sig: Take 2,000 Units by mouth daily   diphenhydrAMINE (BENADRYL) 25 mg tablet  Self Yes No   Sig: Take 25 mg by mouth every 6 (six) hours as needed for itching   escitalopram (LEXAPRO) 10 mg tablet  Self Yes No   Sig: (Takes w/ 20 mg dose for total 30 mg )   escitalopram (LEXAPRO) 20 mg tablet  Self Yes No   Sig: Take 20 mg by mouth daily   fluticasone (Arnuity Ellipta) 100 MCG/ACT AEPB inhaler   No No   Sig: Inhale 1 puff daily Rinse mouth after use     ipratropium (ATROVENT) 0 03 % nasal spray  Self No No   Si sprays into each nostril 3 (three) times a day as needed for rhinitis (nasal drip, congestion)   mometasone (NASONEX) 50 mcg/act nasal spray  Self Yes No   Si sprays into each nostril daily States nasal irrigation from a compounding pharmacy, prescribed by Dr Manolo Montiel   sodium chloride (OCEAN) 0 65 % nasal spray  Self No No   Si sprays into each nostril 2 (two) times a day      Facility-Administered Medications: None       Past Medical History:   Diagnosis Date   • Allergies    • Anesthesia complication     V TACH after her reduction mammoplasty, done at [address] [place] [address],, states had a little vent arrhythmia after sinus sx also   • Anxiety    • Chronic rhinitis 2020   • Depression    • Ethmoid sinusitis    • GERD (gastroesophageal reflux disease)     no issues since    • Maxillary sinusitis    • Multiple allergies    • Myofascial pain syndrome    • Nasal turbinate hypertrophy    • Pneumonia    • Sleep apnea     not currently using CPAP   • Wears glasses        Past Surgical History:   Procedure Laterality Date   • LAPAROSCOPY      with vaginal hysterectomy; 11/3/2003 rso   • OOPHORECTOMY Left    • PARTIAL HYSTERECTOMY     • [place] NASAL/SINUS ENDOSCOPY,[facility] TISS MAXILL SINUS N/A 2016    Procedure: IMAGE GUIDED FUNCTIONAL ENDOSCOPIC SINUS SURGERY;  Surgeon: Nasrin Lipscomb MD;  Location: [facility];  Service: ENT   • [place] NASAL/SINUS ENDOSCOPY,[facility] TISS MAXILL SINUS Bilateral 2022    Procedure: middle turbinectomy/medialization, possible revision functional endoscopic sinus surgery, inferior turbinate reduction;  Surgeon: Nasrin Lipscomb MD;  Location: [facility];  Service: ENT   • REDUCTION MAMMAPLASTY Bilateral 2015   • SINUS SURGERY     • TOOTH EXTRACTION Right 3/16/2019    Procedure: EXTRACTION TOOTH #1 (Impacted Third Molar Tooth); Surgeon: Alexander Miller DDS;  Location: [facility];  Service: Maxillofacial   • TUBAL LIGATION     • WISDOM TOOTH EXTRACTION         Family History   Problem Relation Age of Onset   • CLEM disease Mother    • Atrial fibrillation Mother    • Atrial fibrillation Father    • Heart disease Father    • Diabetes Maternal Grandmother    • Hypertension Maternal Grandmother    • Colon cancer Maternal Grandfather    • Diabetes Maternal Grandfather    • Cancer Paternal Grandfather    • Endometrial cancer Cousin    • Breast cancer Cousin    • Multiple sclerosis Sister    • No Known Problems Son    • No Known Problems Son      I have reviewed and agree with the history as documented  E-Cigarette/Vaping   • E-Cigarette Use Never User      E-Cigarette/Vaping Substances   • Nicotine No    • THC No    • CBD No    • Flavoring No    • Other No    • Unknown No      Social History     Tobacco Use   • Smoking status: Former Smoker     Packs/day: 0 50     Types: Cigarettes     Start date:      Quit date: 3/1/2021     Years since quittin 6   • Smokeless tobacco: Never Used   • Tobacco comment: patient states havent smoked cigarettes since end of 2021 (states occasional "slips" here or there,inst no smoking before sx   Vaping Use   • Vaping Use: Never used   Substance Use Topics   • Alcohol use:  Yes     Alcohol/week: 1 0 standard drink     Types: 1 Glasses of wine per week     Comment: social   • Drug use: No        Review of Systems   Constitutional: Negative for chills, diaphoresis, fever and unexpected weight change  HENT: Positive for congestion, sinus pressure and sinus pain  Negative for ear pain and sore throat  Eyes: Negative for visual disturbance  Respiratory: Positive for cough  Negative for chest tightness and shortness of breath  Cardiovascular: Negative for chest pain and leg swelling  Gastrointestinal: Negative for abdominal distention, abdominal pain, constipation, diarrhea, nausea and vomiting  Endocrine: Negative  Genitourinary: Negative for difficulty urinating and dysuria  Musculoskeletal: Negative  Skin: Negative  Allergic/Immunologic: Negative  Neurological: Negative  Hematological: Negative  Psychiatric/Behavioral: Negative  All other systems reviewed and are negative  Physical Exam  ED Triage Vitals   Temperature Pulse Respirations Blood Pressure SpO2   10/31/22 1719 10/31/22 1713 10/31/22 1713 10/31/22 1713 10/31/22 1713   97 8 °F (36 6 °C) 97 18 130/76 99 %      Temp Source Heart Rate Source Patient Position - Orthostatic VS BP Location FiO2 (%)   10/31/22 1719 10/31/22 1713 10/31/22 1713 10/31/22 1713 --   Oral Monitor Lying Right arm       Pain Score       10/31/22 2009       5             Orthostatic Vital Signs  Vitals:    10/31/22 1713 10/31/22 1959 10/31/22 2000 10/31/22 2045   BP: 130/76 120/73 120/73    Pulse: 97 80 82 81   Patient Position - Orthostatic VS: Lying          Physical Exam  Vitals and nursing note reviewed  Constitutional:       General: She is not in acute distress  Appearance: Normal appearance  She is not ill-appearing  HENT:      Head: Normocephalic and atraumatic  Right Ear: External ear normal       Left Ear: External ear normal       Nose: Congestion present        Mouth/Throat:      Mouth: Mucous membranes are moist       Pharynx: Oropharynx is clear  Eyes:      General: No scleral icterus  Right eye: No discharge  Left eye: No discharge  Extraocular Movements: Extraocular movements intact  Conjunctiva/sclera: Conjunctivae normal       Pupils: Pupils are equal, round, and reactive to light  Cardiovascular:      Rate and Rhythm: Normal rate and regular rhythm  Pulses: Normal pulses  Heart sounds: Normal heart sounds  Pulmonary:      Effort: Pulmonary effort is normal       Breath sounds: Normal breath sounds  Abdominal:      General: Abdomen is flat  Bowel sounds are normal  There is no distension  Palpations: Abdomen is soft  Tenderness: There is no abdominal tenderness  There is no guarding or rebound  Musculoskeletal:         General: Normal range of motion  Cervical back: Normal range of motion and neck supple  Skin:     General: Skin is warm and dry  Capillary Refill: Capillary refill takes less than 2 seconds  Neurological:      General: No focal deficit present  Mental Status: She is alert and oriented to person, place, and time  Mental status is at baseline  Psychiatric:         Mood and Affect: Mood normal          Behavior: Behavior normal          Thought Content: Thought content normal          Judgment: Judgment normal          ED Medications  Medications   ketorolac (TORADOL) injection 15 mg (15 mg Intramuscular Given 10/31/22 2009)   ondansetron (ZOFRAN-ODT) dispersible tablet 4 mg (4 mg Oral Given 10/31/22 2009)   cefdinir (OMNICEF) capsule 300 mg (300 mg Oral Given 10/31/22 2049)       Diagnostic Studies  Results Reviewed     None                 XR chest 1 view portable   ED Interpretation by Livia Goodpasture, MD (10/31 1958)   No infiltrates to suggest pneumonia  Formal read pending  Final Result by Jacob Orozco MD (11/01 1412)      No acute cardiopulmonary disease                    Workstation performed: [id number] Procedures  Procedures      ED Course                                       MDM  Number of Diagnoses or Management Options  Sinusitis  Diagnosis management comments:    Patient is a 54-year-old female presenting with sinus pain, congestion, persistent cough  Recent culture shows infection of Haemophilus influenza a as well as Staph aureus   Due to patient's extensive allergies to several antibiotics consulted ENT based on patient's history of tolerating IV ceftriaxone  Cefdinir was recommended by on-call ENT physician  Toradol and Zofran given with relief of patient's symptoms  One dose of cefdinir given with no notable reaction  Patient discharged with prescriptions for cefdinir and instruction follow-up with ENT  Disposition  Final diagnoses:   Sinusitis     Time reflects when diagnosis was documented in both MDM as applicable and the Disposition within this note     Time User Action Codes Description Comment    10/31/2022  9:09 PM Swati Thomas Add [J32 9] Sinusitis       ED Disposition     ED Disposition   Discharge    Condition   Stable    Date/Time   Mon Oct 31, 2022  9:11 PM    [facility] discharge to home/self care                 Follow-up Information     Follow up With Specialties Details Why Contact Info Additional [address] [facility] ENT Otolaryngology Schedule an appointment as soon as possible for a visit   [address] [facility] [id number]  [place] [address] [facility], [facility] [place], [place], [address]   [facility] Emergency Department Emergency Medicine Go to  If symptoms worsen [address] [id number]  [address] [place] [address] Emergency Department, [address], [address]          Discharge Medication List as of 10/31/2022  9:26 PM      START taking these medications    Details   cefdinir (OMNICEF) 300 mg capsule Take 1 capsule (300 mg total) by mouth every 12 (twelve) hours for 10 days, Starting Mon 10/31/2022, Until Thu 11/10/2022, Normal         CONTINUE these medications which have NOT CHANGED    Details   albuterol (Ventolin HFA) 90 mcg/act inhaler Inhale 2 puffs every 6 (six) hours as needed for wheezing, Starting Tue 10/4/2022, Normal      ARIPiprazole (ABILIFY) 10 mg tablet Take 10 mg by mouth daily States takes 5 mg ( 1/2 tablet ) daily, Starting Wed 5/12/2021, Historical Med      atorvastatin (LIPITOR) 10 mg tablet Take 1 tablet (10 mg total) by mouth daily, Starting Fri 3/11/2022, Until Sat 3/11/2023, Normal      azelastine (ASTELIN) 0 1 % nasal spray instill 1 spray into each nostril twice a day, Normal      benzonatate (TESSALON PERLES) 100 mg capsule Take 1 capsule (100 mg total) by mouth 3 (three) times a day as needed for cough, Starting Sun 10/23/2022, Normal      busPIRone (BUSPAR) 30 MG tablet Take 10 mg by mouth States taking only 10 mg ( breaks the tablets into thirds ), Starting Sun 2/25/2018, Historical Med      chlorhexidine (PERIDEX) 0 12 % solution RINSE WITH 30 MILLILTERS FOR 30 SECONDS THEN SPIT OUT AT BEDTIME, Historical Med      cholecalciferol (VITAMIN D3) 1,000 units tablet Take 2,000 Units by mouth daily, Historical Med      diphenhydrAMINE (BENADRYL) 25 mg tablet Take 25 mg by mouth every 6 (six) hours as needed for itching, Historical Med      !! escitalopram (LEXAPRO) 10 mg tablet (Takes w/ 20 mg dose for total 30 mg ), Starting Tue 5/24/2022, Historical Med      !! escitalopram (LEXAPRO) 20 mg tablet Take 20 mg by mouth daily, Starting Wed 1/19/2022, Historical Med      fluticasone (Arnuity Ellipta) 100 MCG/ACT AEPB inhaler Inhale 1 puff daily Rinse mouth after use , Starting Tue 10/4/2022, Until Thu 11/3/2022, Normal      ipratropium (ATROVENT) 0 03 % nasal spray 2 sprays into each nostril 3 (three) times a day as needed for rhinitis (nasal drip, congestion), Starting Mon 2/28/2022, Normal      LORazepam (ATIVAN) 0 5 mg tablet Take 0 5 mg by mouth 2 (two) times a day, Starting u 3/19/2020, Historical Med      mometasone (NASONEX) 50 mcg/act nasal spray 2 sprays into each nostril daily States nasal irrigation from a compounding pharmacy, prescribed by Dr Tanmay Guadarrama, Historical Med      sodium chloride (OCEAN) 0 65 % nasal spray 2 sprays into each nostril 2 (two) times a day, Starting Mon 3/18/2019, Print       !! - Potential duplicate medications found  Please discuss with provider  PDMP Review       Value Time User    PDMP Reviewed  Yes 4/3/2021  5:43 AM Dwayne Mark,            ED Provider  Attending physically available and evaluated Howard Angle  I managed the patient along with the ED Attending      Electronically Signed by         Domingo Wright MD  11/02/22 3663

## 2022-11-01 LAB
C-ANCA TITR SER IF: NORMAL TITER
MYELOPEROXIDASE AB SER IA-ACNC: <0.2 UNITS (ref 0–0.9)
MYELOPEROXIDASE AB SER IA-ACNC: <0.2 UNITS (ref 0–0.9)
P-ANCA ATYPICAL TITR SER IF: NORMAL TITER
P-ANCA TITR SER IF: NORMAL TITER
PROTEINASE3 AB SER IA-ACNC: <0.2 UNITS (ref 0–0.9)
PROTEINASE3 AB SER IA-ACNC: <0.2 UNITS (ref 0–0.9)

## 2022-11-03 ENCOUNTER — OFFICE VISIT (OUTPATIENT)
Dept: INTERNAL MEDICINE CLINIC | Facility: CLINIC | Age: 57
End: 2022-11-03

## 2022-11-03 VITALS
BODY MASS INDEX: 28.66 KG/M2 | WEIGHT: 172 LBS | HEART RATE: 79 BPM | SYSTOLIC BLOOD PRESSURE: 122 MMHG | DIASTOLIC BLOOD PRESSURE: 76 MMHG | OXYGEN SATURATION: 97 % | RESPIRATION RATE: 16 BRPM | HEIGHT: 65 IN

## 2022-11-03 DIAGNOSIS — E55.9 VITAMIN D DEFICIENCY: ICD-10-CM

## 2022-11-03 DIAGNOSIS — F41.8 DEPRESSION WITH ANXIETY: ICD-10-CM

## 2022-11-03 DIAGNOSIS — Z12.11 SCREENING FOR COLON CANCER: ICD-10-CM

## 2022-11-03 DIAGNOSIS — D72.829 LEUKOCYTOSIS, UNSPECIFIED TYPE: Primary | ICD-10-CM

## 2022-11-03 DIAGNOSIS — J84.116 CRYPTOGENIC ORGANIZING PNEUMONIA (HCC): ICD-10-CM

## 2022-11-03 DIAGNOSIS — Z23 ENCOUNTER FOR IMMUNIZATION: ICD-10-CM

## 2022-11-03 DIAGNOSIS — R10.11 RUQ PAIN: ICD-10-CM

## 2022-11-03 DIAGNOSIS — Z12.31 ENCOUNTER FOR SCREENING MAMMOGRAM FOR MALIGNANT NEOPLASM OF BREAST: ICD-10-CM

## 2022-11-03 DIAGNOSIS — E78.2 MIXED HYPERLIPIDEMIA: ICD-10-CM

## 2022-11-03 DIAGNOSIS — Z78.0 ASYMPTOMATIC MENOPAUSAL STATE: ICD-10-CM

## 2022-11-03 DIAGNOSIS — E03.9 HYPOTHYROIDISM, UNSPECIFIED TYPE: ICD-10-CM

## 2022-11-03 RX ORDER — ZOSTER VACCINE RECOMBINANT, ADJUVANTED 50 MCG/0.5
0.5 KIT INTRAMUSCULAR ONCE
Qty: 1 EACH | Refills: 1 | Status: SHIPPED | OUTPATIENT
Start: 2022-11-03 | End: 2022-11-03

## 2022-11-03 NOTE — PROGRESS NOTES
Assessment/Plan:    #RUQ Pain  -secondary to coughing but will obtain RUQ if symptoms persist    #HLD  -LDL 82 HDL 40  -continue atorvastatin  -cotninue to diet and exercise    #Leukocytosis  -WBC at 17 32 and elevated  -reports using steroids previously up until October 19 prior to labs  -repeat CBC    #Rhinitis  -chronic  -possibly allergy related  -allergy panel negative and skin testing not positive either but has allergy symptoms  -previously on allergy shots without much relief  -seeing ENT  -underwent clarifax prodcedure but still has symptoms  -currently has left maxillary sinus congestion  -now on cefdinir after going to ER 10/31/22  -remains on astelin     #Anxiety  -remains on abilify, buspar, lorazepam, prozac  -has periodic breakthrough  -seeing psychiatry and will continue     #Cryptogenic Organizing Pneumonia  -now recovered  -seeing pulmonology yearly  -underwent previous bronchoscopy and bronchoalveolar lavage  -treated with steroids  -pulmonary trying to workup for autoimmune issues  -AR-3 ab and MPO < 2     #MALU  -remains on CPAP     #LPR  -previously treated with PPI and H2 blocker     #Previous Palpitations  -dexamethasone suppression test, 24 hour urine cortisol, 5HIAA, metanephrine and catecholamine all normal  -was on betablocker for symptomatic relief    #Surgery  -previous unilateral oophorectomy and hysterectomy  -secondary to uterine bleeding     #Health Maintenance  -routine labs and followup 6 months  -TDAP, colonoscopy, DEXA pending  -shingrix pending  -mammogram due  -flu vaccine up to date 2022  -covid bivalent pending  -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  -is caring for 5 grandchildren    No problem-specific Assessment & Plan notes found for this encounter  Diagnoses and all orders for this visit:    Leukocytosis, unspecified type  -     CBC and differential; Future  -     Comprehensive metabolic panel;  Future  -     CBC and differential; Future    Cryptogenic organizing pneumonia ([facility] [address] )  -     Comprehensive metabolic panel; Future  -     CBC and differential; Future    Mixed hyperlipidemia  -     Comprehensive metabolic panel; Future  -     Lipid Panel with Direct LDL reflex; Future  -     CBC and differential; Future    Depression with anxiety  -     Comprehensive metabolic panel; Future  -     CBC and differential; Future    Hypothyroidism, unspecified type  -     Comprehensive metabolic panel; Future  -     CBC and differential; Future  -     TSH, 3rd generation with Free T4 reflex; Future    Vitamin D deficiency  -     Comprehensive metabolic panel; Future  -     CBC and differential; Future  -     Vitamin D 25 hydroxy; Future    Screening for colon cancer  -     Ambulatory referral for colonoscopy; Future    Asymptomatic menopausal state  -     DXA bone density spine hip and pelvis; Future    RUQ pain  -     US right upper quadrant; Future    Encounter for screening mammogram for malignant neoplasm of breast  -     Mammo screening bilateral w 3d & cad; Future    Encounter for immunization  -     Zoster Vac Recomb Adjuvanted (Shingrix) 50 MCG/0 5ML SUSR;  Inject 0 5 mL into a muscle once for 1 dose Repeat dose in 2 to 6 months            Current Outpatient Medications:   •  Zoster Vac Recomb Adjuvanted (Shingrix) 50 MCG/0 5ML SUSR, Inject 0 5 mL into a muscle once for 1 dose Repeat dose in 2 to 6 months, Disp: 1 each, Rfl: 1  •  albuterol (Ventolin HFA) 90 mcg/act inhaler, Inhale 2 puffs every 6 (six) hours as needed for wheezing, Disp: 18 g, Rfl: 3  •  ARIPiprazole (ABILIFY) 10 mg tablet, Take 10 mg by mouth daily States takes 5 mg ( 1/2 tablet ) daily, Disp: , Rfl:   •  atorvastatin (LIPITOR) 10 mg tablet, Take 1 tablet (10 mg total) by mouth daily, Disp: 90 tablet, Rfl: 3  •  azelastine (ASTELIN) 0 1 % nasal spray, instill 1 spray into each nostril twice a day, Disp: 30 mL, Rfl: 11  •  benzonatate (TESSALON PERLES) 100 mg capsule, Take 1 capsule (100 mg total) by mouth 3 (three) times a day as needed for cough, Disp: 20 capsule, Rfl: 0  •  busPIRone (BUSPAR) 30 MG tablet, Take 10 mg by mouth States taking only 10 mg ( breaks the tablets into thirds ), Disp: , Rfl:   •  cefdinir (OMNICEF) 300 mg capsule, Take 1 capsule (300 mg total) by mouth every 12 (twelve) hours for 10 days, Disp: 20 capsule, Rfl: 0  •  chlorhexidine (PERIDEX) 0 12 % solution, RINSE WITH 30 MILLILTERS FOR 30 SECONDS THEN SPIT OUT AT BEDTIME, Disp: , Rfl:   •  cholecalciferol (VITAMIN D3) 1,000 units tablet, Take 2,000 Units by mouth daily, Disp: , Rfl:   •  diphenhydrAMINE (BENADRYL) 25 mg tablet, Take 25 mg by mouth every 6 (six) hours as needed for itching, Disp: , Rfl:   •  escitalopram (LEXAPRO) 10 mg tablet, (Takes w/ 20 mg dose for total 30 mg ), Disp: , Rfl:   •  escitalopram (LEXAPRO) 20 mg tablet, Take 20 mg by mouth daily, Disp: , Rfl:   •  fluticasone (Arnuity Ellipta) 100 MCG/ACT AEPB inhaler, Inhale 1 puff daily Rinse mouth after use , Disp: 30 blister, Rfl: 2  •  ipratropium (ATROVENT) 0 03 % nasal spray, 2 sprays into each nostril 3 (three) times a day as needed for rhinitis (nasal drip, congestion), Disp: 30 mL, Rfl: 11  •  LORazepam (ATIVAN) 0 5 mg tablet, Take 0 5 mg by mouth 2 (two) times a day, Disp: , Rfl:   •  mometasone (NASONEX) 50 mcg/act nasal spray, 2 sprays into each nostril daily States nasal irrigation from a compounding pharmacy, prescribed by Dr Filipe Scherer, Disp: , Rfl:   •  sodium chloride (OCEAN) 0 65 % nasal spray, 2 sprays into each nostril 2 (two) times a day, Disp: 15 mL, Rfl: 0    Subjective:      Patient ID: Jhonny San is a 61 y o  female  HPI     Patient presents for routine checkup  She is been having sinus pressure and discomfort for the past 3 weeks  She followed up with her ENT  Her chest x-ray was clear  She is now on cefdinir and will continue with this    She states that prior to that she was on steroids for approximately 3-4 days and is now off that as well  Her white blood cell count was elevated at 17 32  We will repeat a CBC  She is due for mammogram as well as a DEXA scan and colonoscopy and we gave her the referrals for that  Because she has been coughing due to her upper respiratory symptoms  She developed right upper quadrant pain  We will obtain an ultrasound if her symptoms continue to persist to rule out any biliary pathology  Patient continues to follow-up with pulmonary  She has eosinophilia  Her LA 3 antibody and MPO antibodies were negative  Her LDL is 82 and HDL 40 currently controlled with atorvastatin she will continue with this  She continues to have anxiety and follows up with Psychiatry  She is on Abilify and BuSpar lorazepam and Prozac  She remains stable on this  She has obstructive sleep apnea and remains on her CPAP  She will return to care in 6 months with labs  The following portions of the patient's history were reviewed and updated as appropriate: allergies, current medications, past family history, past medical history, past social history, past surgical history and problem list     Review of Systems   Constitutional: Negative for activity change, appetite change, chills, fatigue and fever  HENT: Positive for facial swelling (left sided with tenderness from sinus issue), sinus pressure and sinus pain  Negative for congestion, ear pain, hearing loss, postnasal drip, sore throat, tinnitus and trouble swallowing  Eyes: Negative for photophobia and visual disturbance  Respiratory: Negative for cough, shortness of breath and wheezing  Cardiovascular: Negative for chest pain and leg swelling  Gastrointestinal: Negative for abdominal distention, abdominal pain, blood in stool, nausea and vomiting  Genitourinary: Negative for difficulty urinating, dysuria and pelvic pain     Musculoskeletal: Negative for arthralgias, back pain, gait problem, joint swelling, myalgias, neck pain and neck stiffness  Skin: Negative for rash and wound  Neurological: Negative for dizziness, tremors, light-headedness and headaches  Objective:      /76   Pulse 79   Resp 16   Ht 5' 5" (1 651 m)   Wt 78 kg (172 lb)   LMP  (LMP Unknown)   SpO2 97%   BMI 28 62 kg/m²          Physical Exam  Vitals reviewed  Constitutional:       Appearance: Normal appearance  She is well-developed  HENT:      Head: Normocephalic and atraumatic  Right Ear: Tympanic membrane, ear canal and external ear normal  There is no impacted cerumen  Left Ear: Tympanic membrane, ear canal and external ear normal  There is no impacted cerumen  Nose: Congestion present  Mouth/Throat:      Pharynx: Oropharynx is clear  No oropharyngeal exudate or posterior oropharyngeal erythema  Eyes:      Conjunctiva/sclera: Conjunctivae normal       Pupils: Pupils are equal, round, and reactive to light  Neck:      Thyroid: No thyromegaly  Vascular: No JVD  Cardiovascular:      Rate and Rhythm: Normal rate and regular rhythm  Pulses: Normal pulses  Heart sounds: Normal heart sounds  No murmur heard  Pulmonary:      Effort: Pulmonary effort is normal  No respiratory distress  Breath sounds: Normal breath sounds  No stridor  No wheezing, rhonchi or rales  Abdominal:      General: Bowel sounds are normal  There is no distension  Palpations: Abdomen is soft  There is no mass  Tenderness: There is no abdominal tenderness  There is no guarding or rebound  Musculoskeletal:         General: No tenderness  Normal range of motion  Cervical back: Normal range of motion and neck supple  Right lower leg: No edema  Left lower leg: No edema  Lymphadenopathy:      Cervical: No cervical adenopathy  Skin:     General: Skin is warm  Findings: No erythema or rash  Neurological:      Mental Status: She is alert and oriented to person, place, and time        Deep Tendon Reflexes: Reflexes are normal and symmetric  This note was completed in part utilizing m-modal fluency direct voice recognition software  Grammatical errors, random word insertion, spelling mistakes, and incomplete sentences may be an occasional consequence of the system secondary to software limitations, ambient noise and hardware issues  At the time of dictation, efforts were made to edit, clarify and /or correct errors  Please read the chart carefully and recognize, using context, where substitutions have occurred  If you have any questions or concerns about the context, text or information contained within the body of this dictation, please contact myself, the provider, for further clarification

## 2022-11-04 ENCOUNTER — TELEPHONE (OUTPATIENT)
Dept: PULMONOLOGY | Facility: CLINIC | Age: 57
End: 2022-11-04

## 2022-11-04 NOTE — TELEPHONE ENCOUNTER
I attempted to contact patient via phone call to update her on lab results  I left a voice message with instructions to call back  Will try again tomorrow      Leila Gross MD  Pulmonary and Critical Care Fellow, PGY-4  [facility]

## 2022-11-05 ENCOUNTER — APPOINTMENT (OUTPATIENT)
Dept: LAB | Facility: CLINIC | Age: 57
End: 2022-11-05

## 2022-11-05 DIAGNOSIS — D72.829 LEUKOCYTOSIS, UNSPECIFIED TYPE: ICD-10-CM

## 2022-11-05 LAB
BASOPHILS # BLD AUTO: 0.03 THOUSANDS/ÂΜL (ref 0–0.1)
BASOPHILS NFR BLD AUTO: 0 % (ref 0–1)
EOSINOPHIL # BLD AUTO: 0.69 THOUSAND/ÂΜL (ref 0–0.61)
EOSINOPHIL NFR BLD AUTO: 7 % (ref 0–6)
ERYTHROCYTE [DISTWIDTH] IN BLOOD BY AUTOMATED COUNT: 12.4 % (ref 11.6–15.1)
HCT VFR BLD AUTO: 45.7 % (ref 34.8–46.1)
HGB BLD-MCNC: 14.4 G/DL (ref 11.5–15.4)
IMM GRANULOCYTES # BLD AUTO: 0.02 THOUSAND/UL (ref 0–0.2)
IMM GRANULOCYTES NFR BLD AUTO: 0 % (ref 0–2)
LYMPHOCYTES # BLD AUTO: 2.23 THOUSANDS/ÂΜL (ref 0.6–4.47)
LYMPHOCYTES NFR BLD AUTO: 22 % (ref 14–44)
MCH RBC QN AUTO: 29.4 PG (ref 26.8–34.3)
MCHC RBC AUTO-ENTMCNC: 31.5 G/DL (ref 31.4–37.4)
MCV RBC AUTO: 93 FL (ref 82–98)
MONOCYTES # BLD AUTO: 0.86 THOUSAND/ÂΜL (ref 0.17–1.22)
MONOCYTES NFR BLD AUTO: 9 % (ref 4–12)
NEUTROPHILS # BLD AUTO: 6.12 THOUSANDS/ÂΜL (ref 1.85–7.62)
NEUTS SEG NFR BLD AUTO: 62 % (ref 43–75)
NRBC BLD AUTO-RTO: 0 /100 WBCS
PLATELET # BLD AUTO: 239 THOUSANDS/UL (ref 149–390)
PMV BLD AUTO: 12.6 FL (ref 8.9–12.7)
RBC # BLD AUTO: 4.9 MILLION/UL (ref 3.81–5.12)
WBC # BLD AUTO: 9.95 THOUSAND/UL (ref 4.31–10.16)

## 2022-11-08 ENCOUNTER — TELEPHONE (OUTPATIENT)
Dept: PULMONOLOGY | Facility: CLINIC | Age: 57
End: 2022-11-08

## 2022-11-10 NOTE — TELEPHONE ENCOUNTER
Patientl francescot VM returning Dr Hodan Weiss call regarding her blood work results   Please call back at [phone]

## 2022-11-11 ENCOUNTER — TELEPHONE (OUTPATIENT)
Dept: PULMONOLOGY | Facility: CLINIC | Age: 57
End: 2022-11-11

## 2022-11-11 NOTE — TELEPHONE ENCOUNTER
Attempted to call patient again to update her regarding her blood work with no answer  I left a voice mail with instructions to call back      Whit Lundy MD  Pulmonary and Critical Care Fellow, PGY-4  [facility]

## 2023-01-08 PROBLEM — R49.0 DYSPHONIA: Status: ACTIVE | Noted: 2023-01-08

## 2023-02-12 PROBLEM — J31.0 RHINOSINUSITIS: Status: ACTIVE | Noted: 2023-02-12

## 2023-02-12 PROBLEM — J32.9 RHINOSINUSITIS: Status: ACTIVE | Noted: 2023-02-12

## 2023-02-27 ENCOUNTER — OFFICE VISIT (OUTPATIENT)
Dept: PULMONOLOGY | Facility: CLINIC | Age: 58
End: 2023-02-27

## 2023-02-27 VITALS
TEMPERATURE: 98.7 F | HEIGHT: 65 IN | SYSTOLIC BLOOD PRESSURE: 116 MMHG | BODY MASS INDEX: 28.46 KG/M2 | HEART RATE: 88 BPM | OXYGEN SATURATION: 98 % | RESPIRATION RATE: 16 BRPM | DIASTOLIC BLOOD PRESSURE: 70 MMHG

## 2023-02-27 DIAGNOSIS — J30.1 CHRONIC SEASONAL ALLERGIC RHINITIS DUE TO POLLEN: ICD-10-CM

## 2023-02-27 DIAGNOSIS — D72.10 EOSINOPHILIA, UNSPECIFIED TYPE: ICD-10-CM

## 2023-02-27 DIAGNOSIS — J45.40 MODERATE PERSISTENT ASTHMA WITHOUT COMPLICATION: Primary | Chronic | ICD-10-CM

## 2023-02-27 RX ORDER — FLUTICASONE FUROATE 100 UG/1
1 POWDER RESPIRATORY (INHALATION) DAILY
Qty: 90 BLISTER | Refills: 0 | Status: SHIPPED | OUTPATIENT
Start: 2023-02-27 | End: 2023-05-28

## 2023-02-27 RX ORDER — FLUTICASONE FUROATE AND VILANTEROL 100; 25 UG/1; UG/1
1 POWDER RESPIRATORY (INHALATION) DAILY
Qty: 180 BLISTER | Refills: 0 | Status: SHIPPED | OUTPATIENT
Start: 2023-02-27 | End: 2023-05-28

## 2023-02-27 NOTE — PATIENT INSTRUCTIONS
Monitor peak flow readings and albuterol use  Consider if using albuterol more than twice weekly or peak flows <300 to change to [employer] for now

## 2023-02-27 NOTE — PROGRESS NOTES
Pulmonary Follow Up Note   Ranjith Leija 60 y o  female MRN: [id number]  2/27/2023      Assessment:  1  Dyspnea  · Secondary to asthma now improved, will continue to monitor symptoms and YISSEL needs    2  Asthma w/o AE  · Currently improved with Arnuity and will continue  · She was provided peak flow meter - 400's today and will utilize to assess control  · May consider increasing to Breo 100mcg during spring based upon symptoms, peak flow readings and YISSEL usage - Paper Rx provided today  · May consider addition of singulair  · Flu vaccine yearly, COVID x 3, Pneumovax 2021 - consider Prevnar 20 at age 71  · Follow up 3-4 months or sooner as needed    3  Recurrent sinusitis and allergic rhinitis   · Continue upper airway regimen per Dr Annabel Solitario    4  Eosinophilia - continue to monitor, negative ANCA panels, neg NE RAST and normal IgE    5  History of presumed , resolved and resolved hypoxic respiratory failure    Plan:    Diagnoses and all orders for this visit:    Moderate persistent asthma without complication  -     Fluticasone Furoate-Vilanterol (Breo Ellipta) 100-25 mcg/actuation inhaler; Inhale 1 puff daily Rinse mouth after use  -     fluticasone (Arnuity Ellipta) 100 MCG/ACT AEPB inhaler; Inhale 1 puff daily Rinse mouth after use  Chronic seasonal allergic rhinitis due to pollen    Eosinophilia, unspecified type        Return in about 3 months (around 5/27/2023) for Recheck  History of Present Illness   HPI:  Ranjith Leija is a 60 y o  female who has chronic sinusitis, anxiety/depression, HTN, nicotine dependence in remission, and prior cryptogenic organizing pneumonia  Patient was admitted at [facility] from 04/03-04/13/2021 with acute respiratory failure with hypoxia  Patient was treated with Cefepime for 7 days  She required ICU level of care due to HFNC and no improvement on antibiotic therapy  Patient underwent bronchoscopy with BAL on 04/07/2021 with negative cultures, gram stain, and cytology   She was started on empiric steroid treatment  Autoimmune panel and HP negative  CT chest wutg multifocal peribronchial consolidation  She was treated for cryptogenic organizing pneumonia and improved on steroids and was eventually weaned off O2 prior to discharge  Steroids were tapered slowly  BAL eventually reported AFB positive for MAC and Candida which were thought to be contaminants  Repeat CT chest after completing steroid treatment with resolution of previous imaging findings  She was last seen in Oct 2022 and reported increased YISSEL inhaler use  Plans were for repeat serology testing and trial of Arnuity  She presents today for follow up  She has been maintained on Arnuity  She has noted improved cough and dyspnea  She uses her rescue inhaler less than once a week  She does continue to have nasal congestion and post nasal drainage symptoms, these are improving  She has intermittent hoarseness  She denied purulent sputum production  She has been adherent to upper airway regimen per Dr Rhys Galaviz  Review of Systems   Constitutional: Negative for activity change, appetite change and fever  HENT: Positive for postnasal drip and voice change  Negative for ear pain, mouth sores, rhinorrhea, sneezing, sore throat and trouble swallowing  Respiratory: Positive for cough and shortness of breath  Negative for chest tightness and wheezing  Cardiovascular: Negative for chest pain and leg swelling  Gastrointestinal: Negative for abdominal pain, nausea and vomiting  Musculoskeletal: Negative for arthralgias and myalgias  Allergic/Immunologic: Positive for environmental allergies  Negative for immunocompromised state  Neurological: Negative for headaches  Hematological: Negative for adenopathy  Psychiatric/Behavioral: Negative for sleep disturbance  The patient is not nervous/anxious          Historical Information   Past Medical History:   Diagnosis Date   • Allergic rhinitis 1987   • Allergies    • Anesthesia complication     V TACH after her reduction mammoplasty, done at [facility],, states had a little vent arrhythmia after sinus sx also   • Anxiety    • Asthma 2021   • Chronic rhinitis 02/18/2020   • Depression    • Ethmoid sinusitis    • GERD (gastroesophageal reflux disease)     no issues since 2021   • Maxillary sinusitis    • Multiple allergies    • Myofascial pain syndrome    • Nasal turbinate hypertrophy    • Pneumonia    • Sleep apnea     not currently using CPAP   • Sleep apnea, obstructive    • Wears glasses      Past Surgical History:   Procedure Laterality Date   • LAPAROSCOPY      with vaginal hysterectomy; 11/3/2003 rso   • OOPHORECTOMY Left 2004   • PARTIAL HYSTERECTOMY  2004   • KS NSL/SINUS NDSC MAX ANTROST W/RMVL TISS MAX SINUS N/A 9/30/2016    Procedure: IMAGE GUIDED FUNCTIONAL ENDOSCOPIC SINUS SURGERY;  Surgeon: Brayan Purcell MD;  [facility];  Service: ENT   • KS NSL/SINUS NDSC MAX ANTROST W/RMVL TISS MAX SINUS Bilateral 9/2/2022    Procedure: middle turbinectomy/medialization, possible revision functional endoscopic sinus surgery, inferior turbinate reduction;  Surgeon: Brayan Purcell MD;  [facility];  Service: ENT   • REDUCTION MAMMAPLASTY Bilateral 02/27/2015   • SINUS SURGERY     • TOOTH EXTRACTION Right 3/16/2019    Procedure: EXTRACTION TOOTH #1 (Impacted Third Molar Tooth);   Surgeon: Carlos Hess DDS;  Location: [facility];  Service: Maxillofacial   • TUBAL LIGATION     • WISDOM TOOTH EXTRACTION       Family History   Problem Relation Age of Onset   • CLEM disease Mother    • Atrial fibrillation Mother    • Atrial fibrillation Father    • Heart disease Father    • Diabetes Maternal Grandmother    • Hypertension Maternal Grandmother    • Heart failure Maternal Grandmother    • Colon cancer Maternal Grandfather    • Diabetes Maternal Grandfather    • Cancer Paternal Grandfather    • Endometrial cancer Cousin    • Breast cancer Cousin    • Multiple sclerosis Sister • No Known Problems Son    • No Known Problems Son          Meds/Allergies     Current Outpatient Medications:   •  albuterol (Ventolin HFA) 90 mcg/act inhaler, Inhale 2 puffs every 6 (six) hours as needed for wheezing, Disp: 18 g, Rfl: 3  •  ARIPiprazole (ABILIFY) 10 mg tablet, Take 10 mg by mouth daily States takes 5 mg ( 1/2 tablet ) daily, Disp: , Rfl:   •  atorvastatin (LIPITOR) 10 mg tablet, Take 1 tablet (10 mg total) by mouth daily, Disp: 90 tablet, Rfl: 3  •  Azelastine-Fluticasone 137-50 MCG/ACT SUSP, 1-2 sprays into each nostril in the morning, Disp: 23 g, Rfl: 11  •  busPIRone (BUSPAR) 30 MG tablet, Take 10 mg by mouth States taking only 10 mg ( breaks the tablets into thirds ), Disp: , Rfl:   •  chlorhexidine (PERIDEX) 0 12 % solution, RINSE WITH 30 MILLILTERS FOR 30 SECONDS THEN SPIT OUT AT BEDTIME, Disp: , Rfl:   •  cholecalciferol (VITAMIN D3) 1,000 units tablet, Take 2,000 Units by mouth daily, Disp: , Rfl:   •  diphenhydrAMINE (BENADRYL) 25 mg tablet, Take 25 mg by mouth every 6 (six) hours as needed for itching, Disp: , Rfl:   •  escitalopram (LEXAPRO) 10 mg tablet, (Takes w/ 20 mg dose for total 30 mg ), Disp: , Rfl:   •  escitalopram (LEXAPRO) 20 mg tablet, Take 20 mg by mouth daily, Disp: , Rfl:   •  fluticasone (Arnuity Ellipta) 100 MCG/ACT AEPB inhaler, Inhale 1 puff daily Rinse mouth after use , Disp: 30 blister, Rfl: 2  •  fluticasone (Arnuity Ellipta) 100 MCG/ACT AEPB inhaler, Inhale 1 puff daily Rinse mouth after use , Disp: 90 blister, Rfl: 0  •  Fluticasone Furoate-Vilanterol (Breo Ellipta) 100-25 mcg/actuation inhaler, Inhale 1 puff daily Rinse mouth after use , Disp: 180 blister, Rfl: 0  •  ipratropium (ATROVENT) 0 03 % nasal spray, 2 sprays into each nostril 3 (three) times a day as needed for rhinitis (nasal drip, congestion), Disp: 30 mL, Rfl: 11  •  LORazepam (ATIVAN) 0 5 mg tablet, Take 0 5 mg by mouth 2 (two) times a day, Disp: , Rfl:   •  mometasone (NASONEX) 50 mcg/act nasal spray, 2 sprays into each nostril daily States nasal irrigation from a compounding pharmacy, prescribed by Dr Tahmina Duckworth, Disp: , Rfl:   •  azelastine (ASTELIN) 0 1 % nasal spray, instill 1 spray into each nostril twice a day, Disp: 30 mL, Rfl: 11  •  benzonatate (TESSALON PERLES) 100 mg capsule, Take 1 capsule (100 mg total) by mouth 3 (three) times a day as needed for cough, Disp: 20 capsule, Rfl: 0  •  sodium chloride (OCEAN) 0 65 % nasal spray, 2 sprays into each nostril 2 (two) times a day (Patient not taking: Reported on 2/27/2023), Disp: 15 mL, Rfl: 0  Allergies   Allergen Reactions   • Other      Most all antibiotics- hives, hypotensive    "no problem with clindamycin "   • Augmentin Es-600  [Amoxicillin-Pot Clavulanate]    • Cefuroxime    • Erythromycin    • Levofloxacin    • Morphine    • Morphine And Related Hives   • Oxycodone-Acetaminophen    • Penicillins    • Percocet [Oxycodone-Acetaminophen] Hives   • Sulfa Antibiotics    • Tetracyclines & Related        Vitals: Blood pressure 116/70, pulse 88, temperature 98 7 °F (37 1 °C), temperature source Tympanic, resp  rate 16, height 5' 5" (1 651 m), SpO2 98 %, not currently breastfeeding  Body mass index is 28 46 kg/m²  Oxygen Therapy  SpO2: 98 %  Oxygen Therapy: None (Room air)      Physical Exam  Physical Exam  Vitals reviewed  Constitutional:       General: She is not in acute distress  Appearance: Normal appearance  She is well-developed and normal weight  She is not ill-appearing, toxic-appearing or diaphoretic  HENT:      Head: Normocephalic and atraumatic  Right Ear: External ear normal       Left Ear: External ear normal       Nose: Congestion and rhinorrhea present  Comments: Mild-mod boggy erythematous nasal mucosa     Mouth/Throat:      Mouth: Mucous membranes are moist       Pharynx: Oropharynx is clear  No oropharyngeal exudate  Comments: No thrush  Eyes:      General: No scleral icterus  Right eye: No discharge  Left eye: No discharge  Conjunctiva/sclera: Conjunctivae normal       Pupils: Pupils are equal, round, and reactive to light  Neck:      Vascular: No JVD  Trachea: No tracheal deviation  Cardiovascular:      Rate and Rhythm: Normal rate and regular rhythm  Heart sounds: Normal heart sounds  No murmur heard  No gallop  Pulmonary:      Effort: Pulmonary effort is normal  No respiratory distress  Breath sounds: Normal breath sounds  No stridor  No wheezing, rhonchi or rales  Abdominal:      General: Bowel sounds are normal  There is no distension  Palpations: Abdomen is soft  Tenderness: There is no abdominal tenderness  There is no guarding or rebound  Musculoskeletal:         General: No deformity  Right lower leg: No edema  Left lower leg: No edema  Lymphadenopathy:      Cervical: No cervical adenopathy  Skin:     General: Skin is warm and dry  Coloration: Skin is not jaundiced  Findings: No erythema or rash  Neurological:      General: No focal deficit present  Mental Status: She is alert and oriented to person, place, and time  Mental status is at baseline  Psychiatric:         Mood and Affect: Mood normal          Behavior: Behavior normal          Thought Content: Thought content normal          Labs: I have personally reviewed pertinent lab results    Lab Results   Component Value Date    WBC 9 95 11/05/2022    HGB 14 4 11/05/2022    HCT 45 7 11/05/2022    MCV 93 11/05/2022     11/05/2022     Lab Results   Component Value Date    GLUCOSE 100 04/20/2015    CALCIUM 9 8 10/29/2022     04/20/2015    K 4 0 10/29/2022    CO2 26 10/29/2022     10/29/2022    BUN 10 10/29/2022    CREATININE 0 87 10/29/2022     Lab Results   Component Value Date    IGE 2 91 10/29/2022     Lab Results   Component Value Date    ALT 22 10/29/2022    AST 12 10/29/2022    ALKPHOS 97 10/29/2022       Abs Eos [id number]  10/29/2022  - ANCA neg, MPO/PR3 neg, NE RAST neg, IgE 291    Imaging and other studies: I have personally reviewed pertinent reports  and I have personally reviewed pertinent films in PACS  CXR 10/31/2022 - no focal infiltrates, no effusions, no PTX    Pulmonary function testing:   10/4/2022 - Ratio 79%, FVC 97%, FEV1 99% - normal spirometry      Yared Reza, DO, FACP  [facility]    Answers for HPI/ROS submitted by the patient on 2/26/2023  Chronicity: recurrent  When did you first notice your symptoms?: more than 1 year ago  How often do your symptoms occur?: intermittently  Do you have shortness of breath that occurs with effort or exertion?: No  Do you have ear congestion?: No  Do you have heartburn?: No  Do you have fatigue?: Yes  Do you have nasal congestion?: Yes  Do you have shortness of breath when lying flat?: No  Do you have shortness of breath when you wake up?: No  Do you have sweats?: No  Have you experienced weight loss?: No  Which of the following makes your symptoms worse?: change in weather, exposure to fumes, pollen, URI

## 2023-03-13 DIAGNOSIS — E78.2 MIXED HYPERLIPIDEMIA: ICD-10-CM

## 2023-03-13 RX ORDER — ATORVASTATIN CALCIUM 10 MG/1
TABLET, FILM COATED ORAL
Qty: 90 TABLET | Refills: 3 | Status: SHIPPED | OUTPATIENT
Start: 2023-03-13

## 2023-03-22 ENCOUNTER — HOSPITAL ENCOUNTER (INPATIENT)
Facility: HOSPITAL | Age: 58
LOS: 2 days | Discharge: HOME | End: 2023-03-26
Attending: EMERGENCY MEDICINE | Admitting: STUDENT IN AN ORGANIZED HEALTH CARE EDUCATION/TRAINING PROGRAM

## 2023-03-22 ENCOUNTER — OFFICE VISIT (OUTPATIENT)
Dept: URGENT CARE | Facility: CLINIC | Age: 58
End: 2023-03-22

## 2023-03-22 ENCOUNTER — APPOINTMENT (EMERGENCY)
Dept: RADIOLOGY | Facility: HOSPITAL | Age: 58
End: 2023-03-22

## 2023-03-22 VITALS
RESPIRATION RATE: 16 BRPM | TEMPERATURE: 98.2 F | BODY MASS INDEX: 28.32 KG/M2 | SYSTOLIC BLOOD PRESSURE: 140 MMHG | OXYGEN SATURATION: 98 % | DIASTOLIC BLOOD PRESSURE: 80 MMHG | HEIGHT: 65 IN | WEIGHT: 170 LBS | HEART RATE: 80 BPM

## 2023-03-22 DIAGNOSIS — K04.7 DENTAL INFECTION: ICD-10-CM

## 2023-03-22 DIAGNOSIS — L03.211 FACIAL CELLULITIS: Primary | ICD-10-CM

## 2023-03-22 DIAGNOSIS — K08.89 PAIN, DENTAL: Primary | ICD-10-CM

## 2023-03-22 LAB
ALBUMIN SERPL BCP-MCNC: 4.1 G/DL (ref 3.5–5)
ALP SERPL-CCNC: 118 U/L (ref 46–116)
ALT SERPL W P-5'-P-CCNC: 22 U/L (ref 12–78)
ANION GAP SERPL CALCULATED.3IONS-SCNC: 0 MMOL/L (ref 4–13)
AST SERPL W P-5'-P-CCNC: 19 U/L (ref 5–45)
BASOPHILS # BLD AUTO: 0.01 THOUSANDS/ÂΜL (ref 0–0.1)
BASOPHILS NFR BLD AUTO: 0 % (ref 0–1)
BILIRUB SERPL-MCNC: 0.29 MG/DL (ref 0.2–1)
BUN SERPL-MCNC: 7 MG/DL (ref 5–25)
CALCIUM SERPL-MCNC: 9.3 MG/DL (ref 8.3–10.1)
CHLORIDE SERPL-SCNC: 108 MMOL/L (ref 96–108)
CO2 SERPL-SCNC: 27 MMOL/L (ref 21–32)
CREAT SERPL-MCNC: 0.71 MG/DL (ref 0.6–1.3)
EOSINOPHIL # BLD AUTO: 0.56 THOUSAND/ÂΜL (ref 0–0.61)
EOSINOPHIL NFR BLD AUTO: 5 % (ref 0–6)
ERYTHROCYTE [DISTWIDTH] IN BLOOD BY AUTOMATED COUNT: 12.5 % (ref 11.6–15.1)
GFR SERPL CREATININE-BSD FRML MDRD: 93 ML/MIN/1.73SQ M
GLUCOSE SERPL-MCNC: 116 MG/DL (ref 65–140)
HCT VFR BLD AUTO: 43 % (ref 34.8–46.1)
HGB BLD-MCNC: 14.3 G/DL (ref 11.5–15.4)
IMM GRANULOCYTES # BLD AUTO: 0.05 THOUSAND/UL (ref 0–0.2)
IMM GRANULOCYTES NFR BLD AUTO: 0 % (ref 0–2)
LYMPHOCYTES # BLD AUTO: 1.63 THOUSANDS/ÂΜL (ref 0.6–4.47)
LYMPHOCYTES NFR BLD AUTO: 13 % (ref 14–44)
MCH RBC QN AUTO: 29.6 PG (ref 26.8–34.3)
MCHC RBC AUTO-ENTMCNC: 33.3 G/DL (ref 31.4–37.4)
MCV RBC AUTO: 89 FL (ref 82–98)
MONOCYTES # BLD AUTO: 0.85 THOUSAND/ÂΜL (ref 0.17–1.22)
MONOCYTES NFR BLD AUTO: 7 % (ref 4–12)
NEUTROPHILS # BLD AUTO: 9.04 THOUSANDS/ÂΜL (ref 1.85–7.62)
NEUTS SEG NFR BLD AUTO: 75 % (ref 43–75)
NRBC BLD AUTO-RTO: 0 /100 WBCS
PLATELET # BLD AUTO: 175 THOUSANDS/UL (ref 149–390)
PMV BLD AUTO: 12.3 FL (ref 8.9–12.7)
POTASSIUM SERPL-SCNC: 3.7 MMOL/L (ref 3.5–5.3)
PROT SERPL-MCNC: 7.2 G/DL (ref 6.4–8.4)
RBC # BLD AUTO: 4.83 MILLION/UL (ref 3.81–5.12)
SODIUM SERPL-SCNC: 135 MMOL/L (ref 135–147)
WBC # BLD AUTO: 12.14 THOUSAND/UL (ref 4.31–10.16)

## 2023-03-22 RX ORDER — ATORVASTATIN CALCIUM 10 MG/1
10 TABLET, FILM COATED ORAL DAILY
Status: DISCONTINUED | OUTPATIENT
Start: 2023-03-23 | End: 2023-03-26 | Stop reason: HOSPADM

## 2023-03-22 RX ORDER — LORAZEPAM 0.5 MG/1
0.5 TABLET ORAL 2 TIMES DAILY
Status: DISCONTINUED | OUTPATIENT
Start: 2023-03-23 | End: 2023-03-26 | Stop reason: HOSPADM

## 2023-03-22 RX ORDER — ACETAMINOPHEN 325 MG/1
650 TABLET ORAL EVERY 6 HOURS PRN
Status: DISCONTINUED | OUTPATIENT
Start: 2023-03-22 | End: 2023-03-26 | Stop reason: HOSPADM

## 2023-03-22 RX ORDER — ONDANSETRON 2 MG/ML
4 INJECTION INTRAMUSCULAR; INTRAVENOUS EVERY 6 HOURS PRN
Status: DISCONTINUED | OUTPATIENT
Start: 2023-03-22 | End: 2023-03-26 | Stop reason: HOSPADM

## 2023-03-22 RX ORDER — FLUTICASONE PROPIONATE 110 UG/1
1 AEROSOL, METERED RESPIRATORY (INHALATION)
Status: DISCONTINUED | OUTPATIENT
Start: 2023-03-22 | End: 2023-03-26 | Stop reason: HOSPADM

## 2023-03-22 RX ORDER — HYDROMORPHONE HCL/PF 1 MG/ML
0.5 SYRINGE (ML) INJECTION ONCE
Status: COMPLETED | OUTPATIENT
Start: 2023-03-22 | End: 2023-03-22

## 2023-03-22 RX ORDER — CLINDAMYCIN PHOSPHATE 600 MG/50ML
600 INJECTION INTRAVENOUS ONCE
Status: COMPLETED | OUTPATIENT
Start: 2023-03-22 | End: 2023-03-22

## 2023-03-22 RX ORDER — HYDROMORPHONE HCL/PF 1 MG/ML
1 SYRINGE (ML) INJECTION ONCE
Status: COMPLETED | OUTPATIENT
Start: 2023-03-22 | End: 2023-03-22

## 2023-03-22 RX ORDER — ALBUTEROL SULFATE 90 UG/1
2 AEROSOL, METERED RESPIRATORY (INHALATION) EVERY 6 HOURS PRN
Status: DISCONTINUED | OUTPATIENT
Start: 2023-03-22 | End: 2023-03-26 | Stop reason: HOSPADM

## 2023-03-22 RX ORDER — NICOTINE 21 MG/24HR
14 PATCH, TRANSDERMAL 24 HOURS TRANSDERMAL DAILY
Status: DISCONTINUED | OUTPATIENT
Start: 2023-03-23 | End: 2023-03-26 | Stop reason: HOSPADM

## 2023-03-22 RX ORDER — ARIPIPRAZOLE 10 MG/1
10 TABLET ORAL DAILY
Status: DISCONTINUED | OUTPATIENT
Start: 2023-03-23 | End: 2023-03-26 | Stop reason: HOSPADM

## 2023-03-22 RX ORDER — IPRATROPIUM BROMIDE 21 UG/1
2 SPRAY, METERED NASAL 3 TIMES DAILY PRN
Status: DISCONTINUED | OUTPATIENT
Start: 2023-03-22 | End: 2023-03-22

## 2023-03-22 RX ORDER — BUSPIRONE HYDROCHLORIDE 5 MG/1
5 TABLET ORAL 2 TIMES DAILY
Status: DISCONTINUED | OUTPATIENT
Start: 2023-03-23 | End: 2023-03-26 | Stop reason: HOSPADM

## 2023-03-22 RX ORDER — KETOROLAC TROMETHAMINE 30 MG/ML
15 INJECTION, SOLUTION INTRAMUSCULAR; INTRAVENOUS EVERY 6 HOURS PRN
Status: DISCONTINUED | OUTPATIENT
Start: 2023-03-22 | End: 2023-03-26 | Stop reason: HOSPADM

## 2023-03-22 RX ORDER — AZELASTINE 1 MG/ML
1 SPRAY, METERED NASAL 2 TIMES DAILY
Status: DISCONTINUED | OUTPATIENT
Start: 2023-03-23 | End: 2023-03-26 | Stop reason: HOSPADM

## 2023-03-22 RX ORDER — CLINDAMYCIN HYDROCHLORIDE 300 MG/1
300 CAPSULE ORAL 3 TIMES DAILY
Qty: 21 CAPSULE | Refills: 0 | Status: SHIPPED | OUTPATIENT
Start: 2023-03-22 | End: 2023-03-26

## 2023-03-22 RX ORDER — ONDANSETRON 2 MG/ML
4 INJECTION INTRAMUSCULAR; INTRAVENOUS ONCE
Status: COMPLETED | OUTPATIENT
Start: 2023-03-22 | End: 2023-03-22

## 2023-03-22 RX ORDER — ENOXAPARIN SODIUM 100 MG/ML
40 INJECTION SUBCUTANEOUS DAILY
Status: DISCONTINUED | OUTPATIENT
Start: 2023-03-23 | End: 2023-03-26 | Stop reason: HOSPADM

## 2023-03-22 RX ORDER — SODIUM CHLORIDE, SODIUM GLUCONATE, SODIUM ACETATE, POTASSIUM CHLORIDE, MAGNESIUM CHLORIDE, SODIUM PHOSPHATE, DIBASIC, AND POTASSIUM PHOSPHATE .53; .5; .37; .037; .03; .012; .00082 G/100ML; G/100ML; G/100ML; G/100ML; G/100ML; G/100ML; G/100ML
1000 INJECTION, SOLUTION INTRAVENOUS ONCE
Status: COMPLETED | OUTPATIENT
Start: 2023-03-23 | End: 2023-03-23

## 2023-03-22 RX ORDER — KETOROLAC TROMETHAMINE 30 MG/ML
15 INJECTION, SOLUTION INTRAMUSCULAR; INTRAVENOUS ONCE
Status: COMPLETED | OUTPATIENT
Start: 2023-03-22 | End: 2023-03-22

## 2023-03-22 RX ADMIN — CLINDAMYCIN PHOSPHATE 600 MG: 600 INJECTION, SOLUTION INTRAVENOUS at 18:29

## 2023-03-22 RX ADMIN — FLUTICASONE PROPIONATE 1 PUFF: 110 AEROSOL, METERED RESPIRATORY (INHALATION) at 23:43

## 2023-03-22 RX ADMIN — ONDANSETRON 4 MG: 2 INJECTION INTRAMUSCULAR; INTRAVENOUS at 21:33

## 2023-03-22 RX ADMIN — HYDROMORPHONE HYDROCHLORIDE 1 MG: 1 INJECTION, SOLUTION INTRAMUSCULAR; INTRAVENOUS; SUBCUTANEOUS at 21:33

## 2023-03-22 RX ADMIN — KETOROLAC TROMETHAMINE 15 MG: 30 INJECTION, SOLUTION INTRAMUSCULAR; INTRAVENOUS at 19:59

## 2023-03-22 RX ADMIN — IOHEXOL 85 ML: 350 INJECTION, SOLUTION INTRAVENOUS at 19:23

## 2023-03-22 RX ADMIN — HYDROMORPHONE HYDROCHLORIDE 0.5 MG: 1 INJECTION, SOLUTION INTRAMUSCULAR; INTRAVENOUS; SUBCUTANEOUS at 18:30

## 2023-03-22 NOTE — PROGRESS NOTES
[facility]        NAME: Saman Blakely is a 60 y o  female  : 1965    MRN: [id number]  DATE: 2023  TIME: 11:07 AM    Assessment and Plan   Pain, dental [K08 89]  1  Pain, dental  clindamycin (CLEOCIN) 300 MG capsule        States hx of tooth infections, notes she tolerates clindamycin well  Has an appt with dentist tomorrow    Patient Instructions     Take all antibiotics as prescribed  Soft foods  Warm water gargles/rinses  Call dentist and PCP today to schedule an appointment in the next 1-2 days for further evaluation and treatment  Go to the emergency department if any facial swelling or redness, trouble swallowing or breathing, worsening pain, fevers, or other new worsening or concerning symptoms  Chief Complaint     Chief Complaint   Patient presents with   • Facial Pain     Patient c/o left facial pain and discomfort that started last night  History of Present Illness       76-year-old female presents for evaluation of lower tooth pain since yesterday  States she also noticed some slight swelling to the left side of her face  He has a history of dental problems  States she was supposed to get dental work done on her lower back teeth but has not had it done yet  States she did call her dentist and her family doctor today but they were unable to get her in  Denies any injury to the area  States since yesterday she tried Tylenol, Motrin and warm compress to the area with improvement  States the swelling has overall improved  Notes it feels similar to previous dental infections  Notes she does have multiple drug allergies, states the clindamycin always works for her and she tolerates it well  She denies any fevers, chills, cough or cold-like symptoms, pain or difficulty swallowing, chest pain, chest tightness, shortness of breath, GI/ symptoms or other complaints        Review of Systems   Review of Systems   Constitutional: Negative for chills, fatigue and fever    HENT: Positive for dental problem and facial swelling  Negative for congestion, drooling, ear pain, hearing loss, mouth sores, rhinorrhea, sinus pressure, sinus pain, sore throat, trouble swallowing and voice change  Eyes: Negative for pain and visual disturbance  Respiratory: Negative for cough and shortness of breath  Cardiovascular: Negative for chest pain  Gastrointestinal: Negative for abdominal pain, diarrhea, nausea and vomiting  Genitourinary: Negative for decreased urine volume  Musculoskeletal: Negative for back pain, neck pain and neck stiffness  Skin: Negative for rash  Neurological: Negative for dizziness, weakness, numbness and headaches  All other systems reviewed and are negative          Current Medications       Current Outpatient Medications:   •  albuterol (Ventolin HFA) 90 mcg/act inhaler, Inhale 2 puffs every 6 (six) hours as needed for wheezing, Disp: 18 g, Rfl: 3  •  ARIPiprazole (ABILIFY) 10 mg tablet, Take 10 mg by mouth daily States takes 5 mg ( 1/2 tablet ) daily, Disp: , Rfl:   •  atorvastatin (LIPITOR) 10 mg tablet, take 1 tablet by mouth once daily, Disp: 90 tablet, Rfl: 3  •  azelastine (ASTELIN) 0 1 % nasal spray, instill 1 spray into each nostril twice a day, Disp: 30 mL, Rfl: 11  •  Azelastine-Fluticasone 137-50 MCG/ACT SUSP, 1-2 sprays into each nostril in the morning, Disp: 23 g, Rfl: 11  •  busPIRone (BUSPAR) 30 MG tablet, Take 10 mg by mouth States taking only 10 mg ( breaks the tablets into thirds ), Disp: , Rfl:   •  chlorhexidine (PERIDEX) 0 12 % solution, RINSE WITH 30 MILLILTERS FOR 30 SECONDS THEN SPIT OUT AT BEDTIME, Disp: , Rfl:   •  cholecalciferol (VITAMIN D3) 1,000 units tablet, Take 2,000 Units by mouth daily, Disp: , Rfl:   •  clindamycin (CLEOCIN) 300 MG capsule, Take 1 capsule (300 mg total) by mouth 3 (three) times a day for 7 days, Disp: 21 capsule, Rfl: 0  •  diphenhydrAMINE (BENADRYL) 25 mg tablet, Take 25 mg by mouth every 6 (six) hours as needed for itching, Disp: , Rfl:   •  escitalopram (LEXAPRO) 10 mg tablet, (Takes w/ 20 mg dose for total 30 mg ), Disp: , Rfl:   •  escitalopram (LEXAPRO) 20 mg tablet, Take 20 mg by mouth daily, Disp: , Rfl:   •  fluticasone (Arnuity Ellipta) 100 MCG/ACT AEPB inhaler, Inhale 1 puff daily Rinse mouth after use , Disp: 90 blister, Rfl: 0  •  Fluticasone Furoate-Vilanterol (Breo Ellipta) 100-25 mcg/actuation inhaler, Inhale 1 puff daily Rinse mouth after use , Disp: 180 blister, Rfl: 0  •  ipratropium (ATROVENT) 0 03 % nasal spray, 2 sprays into each nostril 3 (three) times a day as needed for rhinitis (nasal drip, congestion), Disp: 30 mL, Rfl: 11  •  LORazepam (ATIVAN) 0 5 mg tablet, Take 0 5 mg by mouth 2 (two) times a day, Disp: , Rfl:   •  mometasone (NASONEX) 50 mcg/act nasal spray, 2 sprays into each nostril daily States nasal irrigation from a compounding pharmacy, prescribed by Dr Caty Márquez, Disp: , Rfl:   •  benzonatate (TESSALON PERLES) 100 mg capsule, Take 1 capsule (100 mg total) by mouth 3 (three) times a day as needed for cough, Disp: 20 capsule, Rfl: 0  •  fluticasone (Arnuity Ellipta) 100 MCG/ACT AEPB inhaler, Inhale 1 puff daily Rinse mouth after use , Disp: 30 blister, Rfl: 2  •  sodium chloride (OCEAN) 0 65 % nasal spray, 2 sprays into each nostril 2 (two) times a day (Patient not taking: Reported on 2/27/2023), Disp: 15 mL, Rfl: 0    Current Allergies     Allergies as of 03/22/2023 - Reviewed 03/22/2023   Allergen Reaction Noted   • Other  09/29/2016   • Augmentin es-600  [amoxicillin-pot clavulanate]  04/20/2013   • Cefuroxime  04/20/2013   • Erythromycin  12/12/2016   • Levofloxacin  04/20/2013   • Morphine  12/12/2016   • Morphine and related Hives 09/29/2016   • Oxycodone-acetaminophen  12/12/2016   • Penicillins  12/12/2016   • Percocet [oxycodone-acetaminophen] Hives 09/29/2016   • Sulfa antibiotics  12/12/2016   • Tetracyclines & related  04/20/2013            The following portions of the patient's history were reviewed and updated as appropriate: allergies, current medications, past family history, past medical history, past social history, past surgical history and problem list      Past Medical History:   Diagnosis Date   • Allergic rhinitis 1987   • Allergies    • Anesthesia complication     V TACH after her reduction mammoplasty, done at [address] [place] [address],, states had a little vent arrhythmia after sinus sx also   • Anxiety    • Asthma 2021   • Chronic rhinitis 02/18/2020   • Depression    • Ethmoid sinusitis    • GERD (gastroesophageal reflux disease)     no issues since 2021   • Maxillary sinusitis    • Multiple allergies    • Myofascial pain syndrome    • Nasal turbinate hypertrophy    • Pneumonia    • Sleep apnea     not currently using CPAP   • Sleep apnea, obstructive    • Wears glasses        Past Surgical History:   Procedure Laterality Date   • LAPAROSCOPY      with vaginal hysterectomy; 11/3/2003 rso   • OOPHORECTOMY Left 2004   • PARTIAL HYSTERECTOMY  2004   • ME NSL/SINUS NDSC MAX ANTROST W/RMVL TISS MAX SINUS N/A 9/30/2016    Procedure: IMAGE GUIDED FUNCTIONAL ENDOSCOPIC SINUS SURGERY;  Surgeon: Aaliyah Garcia MD;  [facility];  Service: ENT   • ME NSL/SINUS NDSC MAX ANTROST W/RMVL TISS MAX SINUS Bilateral 9/2/2022    Procedure: middle turbinectomy/medialization, possible revision functional endoscopic sinus surgery, inferior turbinate reduction;  Surgeon: Aaliyah Garcia MD;  [facility];  Service: ENT   • REDUCTION MAMMAPLASTY Bilateral 02/27/2015   • SINUS SURGERY     • TOOTH EXTRACTION Right 3/16/2019    Procedure: EXTRACTION TOOTH #1 (Impacted Third Molar Tooth);   Surgeon: Reuben Camp DDS;  Location[facility];  Service: Maxillofacial   • TUBAL LIGATION     • WISDOM TOOTH EXTRACTION         Family History   Problem Relation Age of Onset   • CLEM disease Mother    • Atrial fibrillation Mother    • Atrial fibrillation Father    • Heart disease Father    • Diabetes Maternal Grandmother    • Hypertension Maternal Grandmother    • Heart failure Maternal Grandmother    • Colon cancer Maternal Grandfather    • Diabetes Maternal Grandfather    • Cancer Paternal Grandfather    • Endometrial cancer Cousin    • Breast cancer Cousin    • Multiple sclerosis Sister    • No Known Problems Son    • No Known Problems Son          Medications have been verified  Objective   /80   Pulse 80   Temp 98 2 °F (36 8 °C) (Temporal)   Resp 16   Ht 5' 5" (1 651 m)   Wt 77 1 kg (170 lb)   LMP  (LMP Unknown)   SpO2 98%   BMI 28 29 kg/m²        Physical Exam     Physical Exam  Vitals and nursing note reviewed  Constitutional:       General: She is not in acute distress  Appearance: Normal appearance  She is not ill-appearing or toxic-appearing  HENT:      Head:        Right Ear: Tympanic membrane normal       Left Ear: Tympanic membrane normal       Mouth/Throat:      Mouth: Mucous membranes are moist       Dentition: Abnormal dentition  Pharynx: Uvula midline  No pharyngeal swelling, oropharyngeal exudate, posterior oropharyngeal erythema or uvula swelling  Eyes:      Pupils: Pupils are equal, round, and reactive to light  Cardiovascular:      Rate and Rhythm: Normal rate and regular rhythm  Heart sounds: Normal heart sounds  Pulmonary:      Effort: Pulmonary effort is normal       Breath sounds: Normal breath sounds  Skin:     Capillary Refill: Capillary refill takes less than 2 seconds  Neurological:      Mental Status: She is alert and oriented to person, place, and time     Psychiatric:         Mood and Affect: Mood normal          Behavior: Behavior normal

## 2023-03-22 NOTE — PATIENT INSTRUCTIONS
Take all antibiotics as prescribed  Soft foods  Warm water gargles/rinses  Call dentist and PCP today to schedule an appointment in the next 1-2 days for further evaluation and treatment  Go to the emergency department if any facial swelling or redness, trouble swallowing or breathing, worsening pain, fevers, or other new worsening or concerning symptoms

## 2023-03-22 NOTE — ED ATTENDING ATTESTATION
3/22/2023  ITrae MD, saw and evaluated the patient  I have discussed the patient with the resident/non-physician practitioner and agree with the resident's/non-physician practitioner's findings, Plan of Care, and MDM as documented in the resident's/non-physician practitioner's note, except where noted  All available labs and Radiology studies were reviewed  I was present for key portions of any procedure(s) performed by the resident/non-physician practitioner and I was immediately available to provide assistance  At this point I agree with the current assessment done in the Emergency Department  I have conducted an independent evaluation of this patient a history and physical is as follows:    ED Course         Critical Care Time  Procedures    61 yo female with one day of pain and left sided facial swelling, went to urgent care and started on clindamycin but now increased swelling  Pt with hx of parotid gland inflammation after trauma few years ago  Pt with no tooth pain currently  No fever, no trouble swallowing  Vss, afebrile, lungs cta, rrr, abdomen soft nontender, left facial swelling and tenderness, limited opening of mouth, poor dentition, no abscess noted  Cervical lymphadenopathy  Labs, ct soft tissue neck, pain meds, abx
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
## 2023-03-23 ENCOUNTER — ANESTHESIA EVENT (OUTPATIENT)
Dept: PERIOP | Facility: HOSPITAL | Age: 58
End: 2023-03-23

## 2023-03-23 ENCOUNTER — ANESTHESIA (OUTPATIENT)
Dept: PERIOP | Facility: HOSPITAL | Age: 58
End: 2023-03-23

## 2023-03-23 LAB
ANION GAP SERPL CALCULATED.3IONS-SCNC: 3 MMOL/L (ref 4–13)
BASOPHILS # BLD AUTO: 0.01 THOUSANDS/ÂΜL (ref 0–0.1)
BASOPHILS NFR BLD AUTO: 0 % (ref 0–1)
BUN SERPL-MCNC: 6 MG/DL (ref 5–25)
CALCIUM SERPL-MCNC: 9 MG/DL (ref 8.3–10.1)
CHLORIDE SERPL-SCNC: 107 MMOL/L (ref 96–108)
CO2 SERPL-SCNC: 29 MMOL/L (ref 21–32)
CREAT SERPL-MCNC: 0.63 MG/DL (ref 0.6–1.3)
EOSINOPHIL # BLD AUTO: 0.54 THOUSAND/ÂΜL (ref 0–0.61)
EOSINOPHIL NFR BLD AUTO: 6 % (ref 0–6)
ERYTHROCYTE [DISTWIDTH] IN BLOOD BY AUTOMATED COUNT: 12.7 % (ref 11.6–15.1)
GFR SERPL CREATININE-BSD FRML MDRD: 98 ML/MIN/1.73SQ M
GLUCOSE P FAST SERPL-MCNC: 104 MG/DL (ref 65–99)
GLUCOSE SERPL-MCNC: 104 MG/DL (ref 65–140)
HCT VFR BLD AUTO: 40.2 % (ref 34.8–46.1)
HGB BLD-MCNC: 12.8 G/DL (ref 11.5–15.4)
IMM GRANULOCYTES # BLD AUTO: 0.02 THOUSAND/UL (ref 0–0.2)
IMM GRANULOCYTES NFR BLD AUTO: 0 % (ref 0–2)
LYMPHOCYTES # BLD AUTO: 2.06 THOUSANDS/ÂΜL (ref 0.6–4.47)
LYMPHOCYTES NFR BLD AUTO: 21 % (ref 14–44)
MAGNESIUM SERPL-MCNC: 2.4 MG/DL (ref 1.6–2.6)
MCH RBC QN AUTO: 29 PG (ref 26.8–34.3)
MCHC RBC AUTO-ENTMCNC: 31.8 G/DL (ref 31.4–37.4)
MCV RBC AUTO: 91 FL (ref 82–98)
MONOCYTES # BLD AUTO: 1.01 THOUSAND/ÂΜL (ref 0.17–1.22)
MONOCYTES NFR BLD AUTO: 10 % (ref 4–12)
NEUTROPHILS # BLD AUTO: 6.06 THOUSANDS/ÂΜL (ref 1.85–7.62)
NEUTS SEG NFR BLD AUTO: 63 % (ref 43–75)
NRBC BLD AUTO-RTO: 0 /100 WBCS
PLATELET # BLD AUTO: 147 THOUSANDS/UL (ref 149–390)
PMV BLD AUTO: 12.3 FL (ref 8.9–12.7)
POTASSIUM SERPL-SCNC: 3.9 MMOL/L (ref 3.5–5.3)
PROCALCITONIN SERPL-MCNC: <0.05 NG/ML
RBC # BLD AUTO: 4.42 MILLION/UL (ref 3.81–5.12)
SODIUM SERPL-SCNC: 139 MMOL/L (ref 135–147)
WBC # BLD AUTO: 9.7 THOUSAND/UL (ref 4.31–10.16)

## 2023-03-23 PROCEDURE — 0CDXXZ0 EXTRACTION OF LOWER TOOTH, SINGLE, EXTERNAL APPROACH: ICD-10-PCS | Performed by: DENTIST

## 2023-03-23 PROCEDURE — 0W950ZZ DRAINAGE OF LOWER JAW, OPEN APPROACH: ICD-10-PCS | Performed by: DENTIST

## 2023-03-23 RX ORDER — PREDNISONE 20 MG/1
40 TABLET ORAL DAILY
Status: DISCONTINUED | OUTPATIENT
Start: 2023-03-23 | End: 2023-03-23

## 2023-03-23 RX ORDER — CLINDAMYCIN PHOSPHATE 600 MG/50ML
600 INJECTION INTRAVENOUS EVERY 8 HOURS
Status: DISCONTINUED | OUTPATIENT
Start: 2023-03-23 | End: 2023-03-23

## 2023-03-23 RX ORDER — PREDNISONE 20 MG/1
40 TABLET ORAL DAILY
Status: DISCONTINUED | OUTPATIENT
Start: 2023-03-23 | End: 2023-03-24

## 2023-03-23 RX ORDER — FENTANYL CITRATE/PF 50 MCG/ML
25 SYRINGE (ML) INJECTION
Status: DISCONTINUED | OUTPATIENT
Start: 2023-03-23 | End: 2023-03-23 | Stop reason: HOSPADM

## 2023-03-23 RX ORDER — CLINDAMYCIN PHOSPHATE 600 MG/50ML
600 INJECTION INTRAVENOUS EVERY 8 HOURS
Status: DISCONTINUED | OUTPATIENT
Start: 2023-03-23 | End: 2023-03-25

## 2023-03-23 RX ORDER — SACCHAROMYCES BOULARDII 250 MG
250 CAPSULE ORAL 2 TIMES DAILY
Status: DISCONTINUED | OUTPATIENT
Start: 2023-03-23 | End: 2023-03-26 | Stop reason: HOSPADM

## 2023-03-23 RX ORDER — ONDANSETRON 2 MG/ML
4 INJECTION INTRAMUSCULAR; INTRAVENOUS ONCE AS NEEDED
Status: DISCONTINUED | OUTPATIENT
Start: 2023-03-23 | End: 2023-03-23 | Stop reason: HOSPADM

## 2023-03-23 RX ORDER — EPHEDRINE SULFATE 50 MG/ML
INJECTION INTRAVENOUS AS NEEDED
Status: DISCONTINUED | OUTPATIENT
Start: 2023-03-23 | End: 2023-03-23

## 2023-03-23 RX ORDER — LIDOCAINE HYDROCHLORIDE 10 MG/ML
INJECTION, SOLUTION EPIDURAL; INFILTRATION; INTRACAUDAL; PERINEURAL AS NEEDED
Status: DISCONTINUED | OUTPATIENT
Start: 2023-03-23 | End: 2023-03-23

## 2023-03-23 RX ORDER — FENTANYL CITRATE 50 UG/ML
INJECTION, SOLUTION INTRAMUSCULAR; INTRAVENOUS AS NEEDED
Status: DISCONTINUED | OUTPATIENT
Start: 2023-03-23 | End: 2023-03-23

## 2023-03-23 RX ORDER — ALBUTEROL SULFATE 2.5 MG/3ML
2.5 SOLUTION RESPIRATORY (INHALATION) ONCE AS NEEDED
Status: DISCONTINUED | OUTPATIENT
Start: 2023-03-23 | End: 2023-03-23 | Stop reason: HOSPADM

## 2023-03-23 RX ORDER — CLINDAMYCIN HYDROCHLORIDE 150 MG/1
300 CAPSULE ORAL EVERY 6 HOURS SCHEDULED
Status: DISCONTINUED | OUTPATIENT
Start: 2023-03-23 | End: 2023-03-23

## 2023-03-23 RX ORDER — HYDROMORPHONE HCL IN WATER/PF 6 MG/30 ML
0.2 PATIENT CONTROLLED ANALGESIA SYRINGE INTRAVENOUS
Status: DISCONTINUED | OUTPATIENT
Start: 2023-03-23 | End: 2023-03-23 | Stop reason: HOSPADM

## 2023-03-23 RX ORDER — SODIUM CHLORIDE, SODIUM LACTATE, POTASSIUM CHLORIDE, CALCIUM CHLORIDE 600; 310; 30; 20 MG/100ML; MG/100ML; MG/100ML; MG/100ML
INJECTION, SOLUTION INTRAVENOUS CONTINUOUS PRN
Status: DISCONTINUED | OUTPATIENT
Start: 2023-03-23 | End: 2023-03-23

## 2023-03-23 RX ORDER — MIDAZOLAM HYDROCHLORIDE 2 MG/2ML
INJECTION, SOLUTION INTRAMUSCULAR; INTRAVENOUS AS NEEDED
Status: DISCONTINUED | OUTPATIENT
Start: 2023-03-23 | End: 2023-03-23

## 2023-03-23 RX ORDER — SUCCINYLCHOLINE/SOD CL,ISO/PF 100 MG/5ML
SYRINGE (ML) INTRAVENOUS AS NEEDED
Status: DISCONTINUED | OUTPATIENT
Start: 2023-03-23 | End: 2023-03-23

## 2023-03-23 RX ORDER — LIDOCAINE HYDROCHLORIDE AND EPINEPHRINE 10; 10 MG/ML; UG/ML
INJECTION, SOLUTION INFILTRATION; PERINEURAL AS NEEDED
Status: DISCONTINUED | OUTPATIENT
Start: 2023-03-23 | End: 2023-03-23 | Stop reason: HOSPADM

## 2023-03-23 RX ORDER — PROPOFOL 10 MG/ML
INJECTION, EMULSION INTRAVENOUS AS NEEDED
Status: DISCONTINUED | OUTPATIENT
Start: 2023-03-23 | End: 2023-03-23

## 2023-03-23 RX ORDER — ONDANSETRON 2 MG/ML
INJECTION INTRAMUSCULAR; INTRAVENOUS AS NEEDED
Status: DISCONTINUED | OUTPATIENT
Start: 2023-03-23 | End: 2023-03-23

## 2023-03-23 RX ADMIN — PROPOFOL 200 MG: 10 INJECTION, EMULSION INTRAVENOUS at 15:12

## 2023-03-23 RX ADMIN — ARIPIPRAZOLE 10 MG: 10 TABLET ORAL at 20:46

## 2023-03-23 RX ADMIN — BUSPIRONE HYDROCHLORIDE 5 MG: 5 TABLET ORAL at 20:46

## 2023-03-23 RX ADMIN — FENTANYL CITRATE 50 MCG: 50 INJECTION INTRAMUSCULAR; INTRAVENOUS at 15:12

## 2023-03-23 RX ADMIN — ONDANSETRON 4 MG: 2 INJECTION INTRAMUSCULAR; INTRAVENOUS at 15:12

## 2023-03-23 RX ADMIN — FENTANYL CITRATE 50 MCG: 50 INJECTION INTRAMUSCULAR; INTRAVENOUS at 15:18

## 2023-03-23 RX ADMIN — ATORVASTATIN CALCIUM 10 MG: 10 TABLET, FILM COATED ORAL at 08:04

## 2023-03-23 RX ADMIN — PREDNISONE 40 MG: 20 TABLET ORAL at 08:03

## 2023-03-23 RX ADMIN — EPHEDRINE SULFATE 5 MG: 50 INJECTION INTRAVENOUS at 15:27

## 2023-03-23 RX ADMIN — CLINDAMYCIN PHOSPHATE 600 MG: 600 INJECTION, SOLUTION INTRAVENOUS at 18:23

## 2023-03-23 RX ADMIN — CLINDAMYCIN PHOSPHATE 600 MG: 600 INJECTION, SOLUTION INTRAVENOUS at 10:01

## 2023-03-23 RX ADMIN — BUSPIRONE HYDROCHLORIDE 5 MG: 5 TABLET ORAL at 08:04

## 2023-03-23 RX ADMIN — ACETAMINOPHEN 650 MG: 325 TABLET ORAL at 00:27

## 2023-03-23 RX ADMIN — FLUTICASONE PROPIONATE 1 PUFF: 110 AEROSOL, METERED RESPIRATORY (INHALATION) at 08:04

## 2023-03-23 RX ADMIN — AZELASTINE 1 SPRAY: 1 SPRAY, METERED NASAL at 18:23

## 2023-03-23 RX ADMIN — LIDOCAINE HYDROCHLORIDE 50 MG: 10 INJECTION, SOLUTION EPIDURAL; INFILTRATION; INTRACAUDAL; PERINEURAL at 15:12

## 2023-03-23 RX ADMIN — LORAZEPAM 0.5 MG: 0.5 TABLET ORAL at 08:04

## 2023-03-23 RX ADMIN — ACETAMINOPHEN 650 MG: 325 TABLET ORAL at 06:13

## 2023-03-23 RX ADMIN — PROPOFOL 60 MG: 10 INJECTION, EMULSION INTRAVENOUS at 15:21

## 2023-03-23 RX ADMIN — Medication 250 MG: at 18:28

## 2023-03-23 RX ADMIN — SODIUM CHLORIDE, SODIUM LACTATE, POTASSIUM CHLORIDE, AND CALCIUM CHLORIDE: .6; .31; .03; .02 INJECTION, SOLUTION INTRAVENOUS at 15:08

## 2023-03-23 RX ADMIN — KETOROLAC TROMETHAMINE 15 MG: 30 INJECTION, SOLUTION INTRAMUSCULAR; INTRAVENOUS at 01:43

## 2023-03-23 RX ADMIN — KETOROLAC TROMETHAMINE 15 MG: 30 INJECTION, SOLUTION INTRAMUSCULAR; INTRAVENOUS at 16:39

## 2023-03-23 RX ADMIN — FLUTICASONE PROPIONATE 1 PUFF: 110 AEROSOL, METERED RESPIRATORY (INHALATION) at 20:46

## 2023-03-23 RX ADMIN — CLINDAMYCIN PHOSPHATE 600 MG: 600 INJECTION, SOLUTION INTRAVENOUS at 02:26

## 2023-03-23 RX ADMIN — MIDAZOLAM 2 MG: 1 INJECTION INTRAMUSCULAR; INTRAVENOUS at 15:05

## 2023-03-23 RX ADMIN — LORAZEPAM 0.5 MG: 0.5 TABLET ORAL at 18:28

## 2023-03-23 RX ADMIN — Medication 100 MG: at 15:12

## 2023-03-23 RX ADMIN — SODIUM CHLORIDE, SODIUM GLUCONATE, SODIUM ACETATE, POTASSIUM CHLORIDE, MAGNESIUM CHLORIDE, SODIUM PHOSPHATE, DIBASIC, AND POTASSIUM PHOSPHATE 1000 ML: .53; .5; .37; .037; .03; .012; .00082 INJECTION, SOLUTION INTRAVENOUS at 00:20

## 2023-03-23 RX ADMIN — KETOROLAC TROMETHAMINE 15 MG: 30 INJECTION, SOLUTION INTRAMUSCULAR; INTRAVENOUS at 08:15

## 2023-03-23 RX ADMIN — ESCITALOPRAM OXALATE 30 MG: 20 TABLET, FILM COATED ORAL at 08:04

## 2023-03-23 NOTE — CONSULTS
Consultation - Infectious Disease   [facility] 60 [facility]  [facility] MRN: [id number]  Unit/Bed#: [facility] 912-01 Encounter: [id number]    IMPRESSION & RECOMMENDATIONS:   1  L sided facial cellulitis  Likely dental source as a crown on tooth 15 appears with periapical lucency on CT imaging  She does have chronic parotid dysfunction but gland appears stable and there is no evidence of soft tissue abscess  Additionally the bony structures show no destructive lesions  OMFS has assessed and are planning on taking patient to OR later today for dental extraction  Given her extensive allergy list she has been started on IV Clindamycin which she is so far tolerating without difficulty  Would ideally like to have her on PO administration but she is now NPO for surgery so I will continue her Clindamycin IV for now  Recommend obtaining OR tissue culture  Will follow along with operative findings and adjust antibiotic treatment as needed   -continue IV Clindamycin for now, anticipate changing to PO dosing after tooth extraction  -check CBCD and BMP tomorrow  -monitor vitals  -recommend operative tissue culture be sent  -follow up OR findings  -serial facial exams  -follow up OMFS consult    2  Leukocytosis  Likely secondary to L sided facial cellulitis above  No other clear source appreciated  She has no respiratory, GI, or  complaints  WBC trending down this morning   -antibiotic as above  -check CBCD tomorrow  -monitor vitals    3  Chronic parotid duct dysfunction  4  Tobacco abuse  This is risk factor for complications with healing  Recommend full tobacco cessation  -NRT per primary service     5  Obesity  BMI = 30 05  This could effect antibiotic dosing   -monitor BMI  -dose adjust antibiotics for patient weight as needed    6  Antibiotic allergies  Patient reports hives and hypotension with use of augmentin, cefuroxime, erythromycin, levofloxacin, penicillins, sulfa antibiotics, and tetracyclines   We will avoid these antibiotics/classes for now  Patient did tolerate Cefdinir in 10/2022 as well as Cefepime/Vancomycin/Flagyl during a previous hospitalization  -monitor patient for adverse medication reactions    Thank you for the consult  We will continue to follow along  Above plan was discussed in detail with patient at the bedside  Above plan was discussed in detail with SLIM  I have spent 60 minutes with patient, other providers, and in review of records today in completing this consultation  Total time spent included review of testing, ordering medications and tests, communicating with other providers, documentation time, and counseling/providing education to patient as detailed in my note  HISTORY OF PRESENT ILLNESS:  Reason for Consult: facial cellulitis   HPI: Guilherme Price is a 60y o  year old female who [facility] 3/22/2023 with left sided facial swelling that had been progressing x2 days  Patient reported recurring facial swelling related to a parotid duct dysfunction, was thinking she might have a stone again  She did seek treatment at an urgent care center who prescribed her Clindamycin, but felt the swelling was getting worse and she then developed numbness  She came to the ED for further assessment  Upon arrival to the ED the patient's temperature was 97 5  HR was 74  RR was 18  O2 saturation was 99% on room air  BP was 141/70  Patient's WBC count = 12 14  Creatinine = 0 71 with GFR = 93  LFTs normal  She was taken for CT of the soft tissue neck which showed L facial cellulitis without abscess, parotid unremarkable  Tooth 15 with crown and periapical lucency  The patient was given IV Clindamycin  She was admitted for additional medical management  After her admission she was continued on IV Clindamycin as her antibiotic regimen  OMFS has been consulted  We have been asked for formal consult for facial cellulitis      The patient has asthma, sleep apnea, nasal turbinate hypertrophy, GERD, myofascial pain syndrome, depression, chronic rhinitis, obesity, and anxiety  She wears glasses  She has a history of anesthesia complication, pneumonia, sinusitis, oophorectomy, vaginal hysterectomy, laparoscopy, multiple sinus surgeries/procedures, tubal ligation, dental extractions, and wisdom tooth extraction with 1 impacted tooth  She is a smoker  She reports allergies to augmentin, cefuroxime, erythromycin, levofloxacin, morphine, oxycodone, penicillins, percocet, sulfa antibiotics, and tetracyclines  REVIEW OF SYSTEMS:  Patient reports she's doing a bit better today than yesterday  Feels her face is still very swollen but notes the swelling has shifted a bit since starting antibiotics  Is intermittently using ice packs to the L cheek  She has not experienced any drainage or bleeding inside the mouth along the gums or inner cheeks  Does not currently have any dental pain  She denies fevers, chills, sweats, shakes  Has had some throbbing pain over L lower scalp extending into face but not the rest of the head  She has no cough, difficulty breathing, chest pain, or SOB  She denies nausea, vomiting, diarrhea, and abdominal pain  Patient has not noticed any rashes or felt itchy since starting the IV antibiotic  A complete 12 point system-based review of systems is otherwise negative      PAST MEDICAL HISTORY:  Past Medical History:   Diagnosis Date   • Allergic rhinitis 1987   • Allergies    • Anesthesia complication     V TACH after her reduction mammoplasty, done at [facility]),, states had a little vent arrhythmia after sinus sx also   • Anxiety    • Asthma 2021   • Chronic rhinitis 02/18/2020   • Depression    • Ethmoid sinusitis    • GERD (gastroesophageal reflux disease)     no issues since 2021   • Maxillary sinusitis    • Multiple allergies    • Myofascial pain syndrome    • Nasal turbinate hypertrophy    • Pneumonia    • Sleep apnea     not currently using CPAP   • Sleep apnea, obstructive    • Wears glasses      Past Surgical History:   Procedure Laterality Date   • LAPAROSCOPY      with vaginal hysterectomy; 11/3/2003 rso   • OOPHORECTOMY Left    • PARTIAL HYSTERECTOMY     • PA NSL/SINUS NDSC MAX ANTROST W/RMVL TISS MAX SINUS N/A 2016    Procedure: IMAGE GUIDED FUNCTIONAL ENDOSCOPIC SINUS SURGERY;  Surgeon: Christina Aleman MD;  [facility];  Service: ENT   • PA NSL/SINUS NDSC MAX ANTROST W/RMVL TISS MAX SINUS Bilateral 2022    Procedure: middle turbinectomy/medialization, possible revision functional endoscopic sinus surgery, inferior turbinate reduction;  Surgeon: Christina Aleman MD;  [facility];  Service: ENT   • REDUCTION MAMMAPLASTY Bilateral 2015   • SINUS SURGERY     • TOOTH EXTRACTION Right 3/16/2019    Procedure: EXTRACTION TOOTH #1 (Impacted Third Molar Tooth);   Surgeon: Campbell Muhammad DDS;  [facility];  Service: Maxillofacial   • TUBAL LIGATION     • WISDOM TOOTH EXTRACTION       FAMILY HISTORY:  Non-contributory    SOCIAL HISTORY:  Social History   Social History     Substance and Sexual Activity   Alcohol Use Not Currently    Comment: Social one or two a month     Social History     Substance and Sexual Activity   Drug Use No     Social History     Tobacco Use   Smoking Status Every Day   • Packs/day: 0 50   • Years: 26 00   • Pack years: 13 00   • Types: Cigarettes   • Start date: 1995   • Last attempt to quit: 3/1/2021   • Years since quittin 0   Smokeless Tobacco Never   Tobacco Comments    patient states havent smoked cigarettes since end of 2021 (states occasional "slips" here or there,inst no smoking before sx)     ALLERGIES:  Allergies   Allergen Reactions   • Other      Most all antibiotics- hives, hypotensive    "no problem with clindamycin "   • Augmentin Es-600  [Amoxicillin-Pot Clavulanate]    • Cefuroxime    • Erythromycin    • Levofloxacin    • Morphine    • Morphine And Related Hives   • Oxycodone-Acetaminophen    • Penicillins    • Percocet [Oxycodone-Acetaminophen] Hives   • Sulfa Antibiotics    • Tetracyclines & Related      MEDICATIONS:  All current active medications have been reviewed  ANTIBIOTICS:  Clindamycin     PHYSICAL EXAM:  Temp:  [97 5 °F (36 4 °C)-99 3 °F (37 4 °C)] 97 7 °F (36 5 °C)  HR:  [74-94] 79  Resp:  [16-18] 16  BP: (100-186)/(54-86) 129/77  SpO2:  [93 %-99 %] 94 %  Temp (24hrs), Av 2 °F (36 8 °C), Min:97 5 °F (36 4 °C), Max:99 3 °F (37 4 °C)  Current: Temperature: 97 7 °F (36 5 °C)    Intake/Output Summary (Last 24 hours) at 3/23/2023 0905  Last data filed at 3/23/2023 0601  Gross per 24 hour   Intake 1140 ml   Output 750 ml   Net 390 ml     General Appearance:  Appearing well, nontoxic, and in no distress  She appears comfortable supine in bed, moved around and sat up easily during my exam    Head:  Normocephalic, without obvious abnormality, atraumatic  Face: L lateral and lower cheek swelling, no overlying erythema, somewhat tender  Eyes:  Conjunctiva pink and sclera anicteric, both eyes  Nose: Nares normal, mucosa normal, no drainage  Mouth/Throat: Oropharynx moist without lesions  L inner cheek tissue swollen, pressing up along L lateral teeth, no ulcerations noted along mucosa, no active bleeding/drainage/purulence  Back:   Symmetric, ROM normal    Lungs:   Clear to auscultation bilaterally, respirations unlabored on room air  Chest Wall:  No tenderness or deformity  Heart:  RRR; no murmur, rub or gallop  Abdomen:   Soft, non-tender, non-distended, positive bowel sounds throughout  Extremities: No cyanosis or clubbing, no edema  Skin: No rashes or lesions noted on exposed skin  Lymph nodes: Cervical, supraclavicular nodes normal    Neurologic: Alert and oriented times 3, follows commands, speech is organized and appropriate  LABS, IMAGING, & OTHER STUDIES:  Lab Results:  I have personally reviewed pertinent labs    Results from last 7 days   Lab Units 03/23/23  0614 03/22/23  1829   WBC Thousand/uL 9 70 12 14*   HEMOGLOBIN g/dL 12 8 14 3   PLATELETS Thousands/uL 147* 175     Results from last 7 days   Lab Units 03/23/23  0614 03/22/23  1829   POTASSIUM mmol/L 3 9 3 7   CHLORIDE mmol/L 107 108   CO2 mmol/L 29 27   BUN mg/dL 6 7   CREATININE mg/dL 0 63 0 71   EGFR ml/min/1 73sq m 98 93   CALCIUM mg/dL 9 0 9 3   AST U/L  --  19   ALT U/L  --  22   ALK PHOS U/L  --  118*     Imaging Studies:   I have personally reviewed pertinent imaging study reports and images in PACS  CT SOFT TISSUE NECK WITH CONTRAST 3/22/2023 - I personally reviewed this study which showed stable L parotid gland  No enlarged lymphadenopathy  Normal oral cavity and tongue base  L facial soft tissue swelling/edema adjacent to the L mandible, no abscess noted  Periapical lucency surrounding tooth 19  Patient with multiple dental crowns

## 2023-03-23 NOTE — CONSULTS
Patient Name: Nickolas Mathias YOB: 1965    Medical Record No : [id number]     Admit/Registration Date: 3/22/2023  5:32 PM  Date of Consult: 03/23/23      Oral and Maxillofacial Surgery Consult Note    Assessment:  60 y o  female with left buccal vestibular swelling a/w carious left lower molar  Periapical lesions on teeth #15 and #30  Plan/Recs:  - OR this afternoon for extraction of teeth and incision and drainage with Dr Kayleigh Ferreira  - npo/IVF   - Warm compress to swelling prn   - pain control per primary team    - HOB elevated  -----------------------------------------    Chief Complaint:   Left facial swelling and dental pain  HPI:  60 y o  female who presents with one day of left facial pain and swelling a w the left posterior mandibular tooth  Pt reports swelling that started yesterday, she tried scheduling and appt  With her dentist but was unable to, she then went to a local urgent care who Rx her Clindamycin, then after swelling worsened she presented to the ED  PT denies odynophagia, and dysphagia, F/C/N/V        Pre-admission records reviewed  PMH/PSH/Meds/Allergies Reviewed      Past Medical History:   Diagnosis Date   • Allergic rhinitis 1987   • Allergies    • Anesthesia complication     V TACH after her reduction mammoplasty, done at [address] [place] [address],, states had a little vent arrhythmia after sinus sx also   • Anxiety    • Asthma 2021   • Chronic rhinitis 02/18/2020   • Depression    • Ethmoid sinusitis    • GERD (gastroesophageal reflux disease)     no issues since 2021   • Maxillary sinusitis    • Multiple allergies    • Myofascial pain syndrome    • Nasal turbinate hypertrophy    • Pneumonia    • Sleep apnea     not currently using CPAP   • Sleep apnea, obstructive    • Wears glasses      Past Surgical History:   Procedure Laterality Date   • LAPAROSCOPY      with vaginal hysterectomy; 11/3/2003 rso   • OOPHORECTOMY Left 2004   • PARTIAL HYSTERECTOMY  2004   • ME NSL/SINUS NDSC MAX ANTROST W/RMVL TISS MAX SINUS N/A 2016    Procedure: IMAGE GUIDED FUNCTIONAL ENDOSCOPIC SINUS SURGERY;  Surgeon: Naomi Campoverde MD;  [facility];  Service: ENT   • VA NSL/SINUS NDSC MAX ANTROST W/RMVL TISS MAX SINUS Bilateral 2022    Procedure: middle turbinectomy/medialization, possible revision functional endoscopic sinus surgery, inferior turbinate reduction;  Surgeon: Naomi Campoverde MD;  [facility];  Service: ENT   • REDUCTION MAMMAPLASTY Bilateral 2015   • SINUS SURGERY     • TOOTH EXTRACTION Right 3/16/2019    Procedure: EXTRACTION TOOTH #1 (Impacted Third Molar Tooth);   Surgeon: Kendal Gonzalez DDS;  [facility];  Service: Maxillofacial   • TUBAL LIGATION     • WISDOM TOOTH EXTRACTION         Allergies   Allergen Reactions   • Other      Most all antibiotics- hives, hypotensive    "no problem with clindamycin "   • Augmentin Es-600  [Amoxicillin-Pot Clavulanate]    • Cefuroxime    • Erythromycin    • Levofloxacin    • Morphine    • Morphine And Related Hives   • Oxycodone-Acetaminophen    • Penicillins    • Percocet [Oxycodone-Acetaminophen] Hives   • Sulfa Antibiotics    • Tetracyclines & Related        Social History     Socioeconomic History   • Marital status: /Civil Union     Spouse name: Hamlet Primrose    • Number of children: 2   • Years of education: Not on file   • Highest education level: Not on file   Occupational History   • Not on file   Tobacco Use   • Smoking status: Every Day     Packs/day: 0 50     Years: 26 00     Pack years: 13 00     Types: Cigarettes     Start date: 1995     Last attempt to quit: 3/1/2021     Years since quittin 0   • Smokeless tobacco: Never   • Tobacco comments:     patient states havent smoked cigarettes since end of 2021 (states occasional "slips" here or there,inst no smoking before sx)   Vaping Use   • Vaping Use: Never used   Substance and Sexual Activity   • Alcohol use: Not Currently     Comment: Social one or two a month   • Drug use: No   • Sexual activity: Yes     Partners: Male     Birth control/protection: Post-menopausal   Other Topics Concern   • Not on file   Social History Narrative    Caffeine use    Daily coffee consumption ( 1 cup/day)    Daily cola consumption (3 cans/day)        Patient lives in a home that was built in [address] in the bedroom     Unfinished basement-dry-damp-no mold-musty smell     Dehumidifier in the basement     No air  or purifiers     No humidifier-has c-pap     Home is smoke free     Window air conditioning     Patient [place] the [place] area and [place]         Dog(Jose A) he is allowed in the bedroom         Caffeine: 2 cups of coffee daily                     Occasionally drinks ice tea and diet soda                     Hot tea rarely     Chocolate consumed occasionally         Patient lives with spouse          Social Determinants of Health     Financial Resource Strain: Not on file   Food Insecurity: Not on file   Transportation Needs: Not on file   Physical Activity: Not on file   Stress: Not on file   Social Connections: Not on file   Intimate Partner Violence: Not on file   Housing Stability: Not on file       Scheduled Medications  Current Facility-Administered Medications   Medication Dose Route Frequency Provider Last Rate   • acetaminophen  650 mg Oral Q6H PRN aJilene Chahal MD     • albuterol  2 puff Inhalation Q6H PRN Jailene Chahal MD     • ARIPiprazole  10 mg Oral Daily Jailene Chahal MD     • atorvastatin  10 mg Oral Daily Jailene Chahal MD     • azelastine  1 spray Each Nare BID Jailene Chahal MD     • busPIRone  5 mg Oral BID Jailene Chahal MD     • clindamycin  600 mg Intravenous [address], PA-C Stopped (03/23/23 0256)   • enoxaparin  40 mg Subcutaneous Daily Jailene Chahal MD     • escitalopram  30 mg Oral Daily Jailene Chahal MD     • fluticasone  1 puff Inhalation BID Kulwinder Horton MD     • ketorolac  15 mg Intravenous Q6H PRN Madan Liu PA-C     • LORazepam  0 5 mg Oral BID Kulwinder Horton MD     • nicotine  14 mg Transdermal Daily Kulwinder Horton MD     • ondansetron  4 mg Intravenous Q6H PRN Kulwinder Horton MD     • predniSONE  40 mg Oral Daily Madan Liu PA-C         PRN Medications  •  acetaminophen  •  albuterol  •  ketorolac  •  ondansetron    Medication Infusions       Review of Systems    Vitals:    Temp:  [97 5 °F (36 4 °C)-99 3 °F (37 4 °C)] 97 7 °F (36 5 °C)  HR:  [74-94] 79  Resp:  [16-18] 16  BP: (100-186)/(54-86) 129/77  Wt Readings from Last 1 Encounters:   03/22/23 81 9 kg (180 lb 8 9 oz)     Ht Readings from Last 1 Encounters:   03/22/23 5' 5" (1 651 m)     Body mass index is 30 05 kg/m²  Respiratory    Lab Data (Last 4 hours)    None         O2/Vent Data (Last 4 hours)    None              Patient Lines/Drains/Airways Status     Active Airway     None              I/O:  Current Diet Order:        Diet Orders   (From admission, onward)             Start     Ordered    03/23/23 0659  Diet NPO  Diet effective now        Comments: On call to OR   References:    Nutrtion Support Algorithm Enteral vs  Parenteral   Question Answer Comment   Diet Type NPO    RD to adjust diet per protocol?  Yes        03/23/23 0659                 Intake/Output Summary (Last 24 hours) at 3/23/2023 0910  Last data filed at 3/23/2023 0601  Gross per 24 hour   Intake 1140 ml   Output 750 ml   Net 390 ml       Labs:  Results from last 7 days   Lab Units 03/23/23  0614 03/22/23  1829   WBC Thousand/uL 9 70 12 14*   HEMOGLOBIN g/dL 12 8 14 3   HEMATOCRIT % 40 2 43 0   PLATELETS Thousands/uL 147* 175     Results from last 7 days   Lab Units 03/23/23  0614 03/22/23  1829   POTASSIUM mmol/L 3 9 3 7   CHLORIDE mmol/L 107 108   CO2 mmol/L 29 27   BUN mg/dL 6 7   CREATININE mg/dL 0 63 0 71   CALCIUM mg/dL 9 0 9 3             Pain Management Panel    There is no flowsheet data to display  Imaging:   CT: Ct neck with contrast   1  Left facial cellulitis as described  No definite evidence for abscess  This is most likely odontogenic in nature as described above and should be correlated with dental examination  Parotid and submandibular glands appear unremarkable      The study was marked in EPIC for immediate notification      Physical Exam:   General: Integment: skin warm and dry, patient is WD/WN, Voice quality: normal, in NAD  Neuro Exam: AAO x 3, CN V,VII grossly intact  Head: Normocephalic, no scalp lacerations or hematoma   Face: moderate left lower facial swelling that is Tender, and soft, non erythematous  Ears: Pinna wnl bilaterally, no otorrhea, hearing grossly intact   Eyes/Periorbital: Pupils equal, round, reactive to light and Extraocular movements intact, intercanthal distance wnl,   Nose: External nose symmetrical/no gross deformity, no nasal crepitus, no nasal septal hematoma, no rhinorrhea, no epistaxis, bilateral nares paten   Oral Exam:Lips and mucosal surfaces wnl, floor of mouth is soft with no palpable masses, tongue protrusion is midline and has full range of motion, no pharyngeal edema or exudate   Dentalalveolar Exam: fully dentate with heavily restored dentition in the left lower jaw  + vestibular fluctuant swelling a/w premolar/region  that is tender thoug hno active purulence of fistulat noted, Normal dentition, atraumatic and occlusion stable, CHARLES 15mm, lateral excursive movements wnl,    Lymph/Neck Exam: Neck is soft, trachea is midline, cervical lymphadenopathy noted on the left neck    ,      Rohit Acosta, DMD

## 2023-03-23 NOTE — ANESTHESIA POSTPROCEDURE EVALUATION
Post-Op Assessment Note    CV Status:  Stable  Pain Score: 0    Pain management: adequate     Mental Status:  Awake   Hydration Status:  Stable   PONV Controlled:  None   Airway Patency:  Patent      Post Op Vitals Reviewed: Yes      Staff: CRNA         No notable events documented      BP   111/45   Temp 97 5F   Pulse 95   Resp 14   SpO2 98% 6L FM

## 2023-03-23 NOTE — APP STUDENT NOTE
[place] STUDENT  Inpatient H&P Exam for TRAINING ONLY  Not Part of Legal Medical Record       H&P Exam - Nickolas Mathias 60 y o  female MRN: [id number]  Unit/Bed#: ED 09 Encounter: [id number]    Left sided facial swelling  · Patient presented to ED for increased facial swelling today  Patient was seen at urgent care today and put on clindamycin  Patient reports a similar episode on right side of face in 2019 which was attributed to an infected wisdom tooth on right side  · CT soft tissue neck 03/22:  Normal oral cavity, tongue base, tonsillar fossa and epiglottis  There is moderate amount of left facial soft tissue swelling/edema adjacent to the left mandibular body with thickening of the platysma consistent with facial cellulitis  No abscess noted     This is most likely odontogenic in nature, with evidence for dental restorations and periapical lucency surrounding tooth 19  Correlate with dental exam   · Monitor airway patency and swelling  · Give IV clindamycin  · Trend procalcitonin  · Consult oral facialmaxillary surgery  · NPO after midnight pending consult input    Asthma  · Monitor oxygen saturation  · Use Arnuity inhaler daily and albuterol as needed for wheezing    Tobacco use  · Current smoker, 7 cigarettes daily  · Offered nicotine patch and will over resources for smoking cessation support  Hyperlipidemia  · Continue taking Lipitor    Depression with anxiety  · Continue taking abilify, buspar, lexapro, and ativan as prescribed  · Encourage good family support throughout stay  VTE Prophylaxis: Enoxaparin (Lovenox)  / sequential compression device   Code Status: Full code  POLST: There is no POLST form on file for this patient (pre-hospital)  Discussion with family: Discussed plan of care with patient and  at bedside and they verbalized understanding  Anticipated Length of Stay:  Patient will be admitted on an Observation basis with an anticipated length of stay of  < 2 midnights  Justification for Hospital Stay: IV antibiotics, monitoring airway patency, and oral facial-maxillary surgery consult  Total Time for Visit, including Counseling / Coordination of Care: 20 minutes  Greater than 50% of this total time spent on direct patient counseling and coordination of care  Chief Complaint:   Left sided facial swelling    History of Present Illness:  Alyssa Mcconnell is a 60 y o  female who presents with left sided facial swelling x 1 day  Patient began with dry mouth and pain on the left side of her face and jaw yesterday which has progressed in swelling throughout the day today  Patient was seen at urgent care today and put on clindamycin  Patient took one dose of clindamycin today and has taken advil for the pain  Patient had a similar episode of pain and swelling in 2019 on the right side of her face which was the result of an infected wisdom tooth  Patient reports difficulty opening mouth, tenderness to palpation, and mild chills  Patient denies fever, chest pain, headache, SOB, numbness or tingling  Patient smokes about 7 cigarettes daily, social ETOH use, and denies drug use  Review of Systems:    Review of Systems   Constitutional: Positive for appetite change and chills (mild)  Negative for fever  HENT: Positive for dental problem  Negative for trouble swallowing  Dry mouth and difficulty opening mouth  Eyes: Negative for visual disturbance  Respiratory: Negative for shortness of breath  Cardiovascular: Negative for chest pain and palpitations  Gastrointestinal: Negative for nausea and vomiting  Genitourinary: Negative for dysuria  Neurological: Negative for dizziness and headaches       Past Medical and Surgical History:   Past Medical History:   Diagnosis Date   • Allergic rhinitis 1987   • Allergies    • Anesthesia complication     V TACH after her reduction mammoplasty, done at [address] [place] [address], states had a little vent arrhythmia after sinus sx also   • Anxiety    • Asthma 2021   • Chronic rhinitis 02/18/2020   • Depression    • Ethmoid sinusitis    • GERD (gastroesophageal reflux disease)     no issues since 2021   • Maxillary sinusitis    • Multiple allergies    • Myofascial pain syndrome    • Nasal turbinate hypertrophy    • Pneumonia    • Sleep apnea     not currently using CPAP   • Sleep apnea, obstructive    • Wears glasses        Past Surgical History:   Procedure Laterality Date   • LAPAROSCOPY      with vaginal hysterectomy; 11/3/2003 rso   • OOPHORECTOMY Left 2004   • PARTIAL HYSTERECTOMY  2004   • CO NSL/SINUS NDSC MAX ANTROST W/RMVL TISS MAX SINUS N/A 9/30/2016    Procedure: IMAGE GUIDED FUNCTIONAL ENDOSCOPIC SINUS SURGERY;  Surgeon: James Miranda MD;  Location: [facility];  Service: ENT   • CO NSL/SINUS NDSC MAX ANTROST W/RMVL TISS MAX SINUS Bilateral 9/2/2022    Procedure: middle turbinectomy/medialization, possible revision functional endoscopic sinus surgery, inferior turbinate reduction;  Surgeon: James Miranda MD;  Location[facility];  Service: ENT   • REDUCTION MAMMAPLASTY Bilateral 02/27/2015   • SINUS SURGERY     • TOOTH EXTRACTION Right 3/16/2019    Procedure: EXTRACTION TOOTH #1 (Impacted Third Molar Tooth); Surgeon: Ruby Phillips DDS;  Location: [facility];  Service: Maxillofacial   • TUBAL LIGATION     • WISDOM TOOTH EXTRACTION         Meds/Allergies:    Prior to Admission medications    Medication Sig Start Date End Date Taking?  Authorizing Provider   ARIPiprazole (ABILIFY) 10 mg tablet Take 10 mg by mouth daily States takes 5 mg ( 1/2 tablet ) daily 5/12/21  Yes Historical Provider, MD   atorvastatin (LIPITOR) 10 mg tablet take 1 tablet by mouth once daily 3/13/23  Yes Latoya Bradford DO   busPIRone (BUSPAR) 30 MG tablet Take 10 mg by mouth States taking only 10 mg ( breaks the tablets into thirds ) 2/25/18  Yes Historical Provider, MD   escitalopram (LEXAPRO) 10 mg tablet (Takes w/ 20 mg dose for total 30 mg ) 5/24/22  Yes Historical Provider, MD   escitalopram (LEXAPRO) 20 mg tablet Take 20 mg by mouth daily 1/19/22  Yes Historical Provider, MD   fluticasone (Arnuity Ellipta) 100 MCG/ACT AEPB inhaler Inhale 1 puff daily Rinse mouth after use  2/27/23 5/28/23 Yes Lyn Reza DO   LORazepam (ATIVAN) 0 5 mg tablet Take 0 5 mg by mouth 2 (two) times a day 3/19/20  Yes Historical Provider, MD   albuterol (Ventolin HFA) 90 mcg/act inhaler Inhale 2 puffs every 6 (six) hours as needed for wheezing 10/4/22   Coralee Habermann, MD   azelastine (ASTELIN) 0 1 % nasal spray instill 1 spray into each nostril twice a day 9/16/21   Macy Cboian MD   Azelastine-Fluticasone 065-54 MCG/ACT SUSP 1-2 sprays into each nostril in the morning 1/5/23   Macy Cobian MD   benzonatate (TESSALON PERLES) 100 mg capsule Take 1 capsule (100 mg total) by mouth 3 (three) times a day as needed for cough 10/23/22   WILLY Maher   chlorhexidine (PERIDEX) 0 12 % solution RINSE WITH 30 MILLILTERS FOR 30 SECONDS THEN SPIT OUT AT BEDTIME 5/18/22   Historical Provider, MD   cholecalciferol (VITAMIN D3) 1,000 units tablet Take 2,000 Units by mouth daily    Historical Provider, MD   clindamycin (CLEOCIN) 300 MG capsule Take 1 capsule (300 mg total) by mouth 3 (three) times a day for 7 days 3/22/23 3/29/23  Gerald Dow PA-C   diphenhydrAMINE (BENADRYL) 25 mg tablet Take 25 mg by mouth every 6 (six) hours as needed for itching    Historical Provider, MD   fluticasone (Arnuity Ellipta) 100 MCG/ACT AEPB inhaler Inhale 1 puff daily Rinse mouth after use   10/4/22 2/27/23  Coralee Habermann, MD   Fluticasone Furoate-Vilanterol (Breo Ellipta) 100-25 mcg/actuation inhaler Inhale 1 puff daily Rinse mouth after use  2/27/23 5/28/23  Lyn Reza DO   ipratropium (ATROVENT) 0 03 % nasal spray 2 sprays into each nostril 3 (three) times a day as needed for rhinitis (nasal drip, congestion) 2/28/22   Macy Cobian MD   mometasone (NASONEX) 50 mcg/act nasal spray 2 sprays into each nostril daily States nasal irrigation from a compounding pharmacy, prescribed by Dr Jose Cole Provider, MD   sodium chloride (OCEAN) 0 65 % nasal spray 2 sprays into each nostril 2 (two) times a day  Patient not taking: Reported on 2023 3/18/19   Elder Torres PA-C     I have reviewed home medications with patient personally  Allergies:    Allergies   Allergen Reactions   • Other      Most all antibiotics- hives, hypotensive    "no problem with clindamycin "   • Augmentin Es-600  [Amoxicillin-Pot Clavulanate]    • Cefuroxime    • Erythromycin    • Levofloxacin    • Morphine    • Morphine And Related Hives   • Oxycodone-Acetaminophen    • Penicillins    • Percocet [Oxycodone-Acetaminophen] Hives   • Sulfa Antibiotics    • Tetracyclines & Related        Social History:  Marital Status: /Civil Union   Occupation:   Patient Pre-hospital Living Situation:   Patient Pre-hospital Level of Mobility: independent  Patient Pre-hospital Diet Restrictions:   Substance Use History:   Social History     Substance and Sexual Activity   Alcohol Use Not Currently    Comment: Social one or two a month     Social History     Tobacco Use   Smoking Status Former   • Packs/day: 0 50   • Years: 26 00   • Pack years: 13 00   • Types: Cigarettes   • Start date: 1995   • Quit date: 3/1/2021   • Years since quittin 0   Smokeless Tobacco Never   Tobacco Comments    patient states havent smoked cigarettes since end of 2021 (states occasional "slips" here or there,inst no smoking before sx     Social History     Substance and Sexual Activity   Drug Use No       Family History:    Family History   Problem Relation Age of Onset   • CLEM disease Mother    • Atrial fibrillation Mother    • Atrial fibrillation Father    • Heart disease Father    • Diabetes Maternal Grandmother    • Hypertension Maternal Grandmother    • Heart failure Maternal Grandmother    • Colon cancer Maternal Grandfather • Diabetes Maternal Grandfather    • Cancer Paternal Grandfather    • Endometrial cancer Cousin    • Breast cancer Cousin    • Multiple sclerosis Sister    • No Known Problems Son    • No Known Problems Son        Physical Exam:   Vitals:   Blood Pressure: 141/70 (03/22/23 2045)  Pulse: 74 (03/22/23 2045)  Temperature: 99 3 °F (37 4 °C) (03/22/23 1729)  Temp Source: Oral (03/22/23 1729)  Respirations: 18 (03/22/23 2045)  SpO2: 99 % (03/22/23 2045)    Physical Exam  Constitutional:       Appearance: Normal appearance  HENT:      Head:      Comments: Significant left sided facial swelling from mandibular process down jaw line  Patient has significant difficulty opening mouth  No erythema or ecchymosis  Right Ear: External ear normal       Left Ear: External ear normal    Eyes:      Extraocular Movements: Extraocular movements intact  Pupils: Pupils are equal, round, and reactive to light  Cardiovascular:      Rate and Rhythm: Normal rate and regular rhythm  Pulmonary:      Effort: Pulmonary effort is normal       Breath sounds: Normal breath sounds  Abdominal:      General: Bowel sounds are normal       Palpations: Abdomen is soft  Musculoskeletal:      Cervical back: Neck supple  Right lower leg: No edema  Left lower leg: No edema  Skin:     General: Skin is warm  Neurological:      General: No focal deficit present  Mental Status: She is alert  Psychiatric:         Mood and Affect: Mood normal          Behavior: Behavior normal        Additional Data:     Lab Results: I have personally reviewed pertinent reports        Results from last 7 days   Lab Units 03/22/23  1829   WBC Thousand/uL 12 14*   HEMOGLOBIN g/dL 14 3   HEMATOCRIT % 43 0   PLATELETS Thousands/uL 175   NEUTROS PCT % 75   LYMPHS PCT % 13*   MONOS PCT % 7   EOS PCT % 5     Results from last 7 days   Lab Units 03/22/23  1829   SODIUM mmol/L 135   POTASSIUM mmol/L 3 7   CHLORIDE mmol/L 108   CO2 mmol/L 27   BUN mg/dL 7   CREATININE mg/dL 0 71   ANION GAP mmol/L 0*   CALCIUM mg/dL 9 3   ALBUMIN g/dL 4 1   TOTAL BILIRUBIN mg/dL 0 29   ALK PHOS U/L 118*   ALT U/L 22   AST U/L 19   GLUCOSE RANDOM mg/dL 116                       Imaging: I have personally reviewed pertinent reports  CT soft tissue neck with contrast   Final Result by Keyshawn Enriquez MD (03/22 2055)         1  Left facial cellulitis as described  No definite evidence for abscess  This is most likely odontogenic in nature as described above and should be correlated with dental examination  Parotid and submandibular glands appear unremarkable  The study was marked in [facility] for immediate notification  Workstation performed: [id number]             EKG, Pathology, and Other Studies Reviewed on Admission:   · EKG:     Allscripts / Epic Records Reviewed: Yes     ** Please Note: This note has been constructed using a voice recognition system   **

## 2023-03-23 NOTE — QUICK NOTE
Patient complaining of worsening swelling in her left jaw  Per nursing staff, patient's swelling continuing to worsen despite Toradol being given overnight w/ Abx  Patient does report good pain control  Denies any shortness of breath, throat swelling  No stridor on exam  Due to worsening swelling, will initiate patient on prednisone 40 mg daily with first dose to be given now

## 2023-03-23 NOTE — PLAN OF CARE
Problem: SKIN/TISSUE INTEGRITY - ADULT  Goal: Incision(s), wounds(s) or drain site(s) healing without S/S of infection  Description: INTERVENTIONS  - Assess and document dressing, incision, wound bed, drain sites and surrounding tissue  - Provide patient and family education  Outcome: Progressing     Problem: PAIN - ADULT  Goal: Verbalizes/displays adequate comfort level or baseline comfort level  Description: Interventions:  - Encourage patient to monitor pain and request assistance  - Assess pain using appropriate pain scale (e g  0-10)  - Administer analgesics based on type and severity of pain and evaluate response  - Implement non-pharmacological measures as appropriate and evaluate response  - Notify physician/advanced practitioner if interventions unsuccessful or patient reports new pain  Outcome: Progressing     Problem: INFECTION - ADULT  Goal: Absence or prevention of progression during hospitalization  Description: INTERVENTIONS:  - Assess and monitor for signs and symptoms of infection  - Monitor lab/diagnostic results  - Monitor all insertion sites, i e  IV site  - [place] appropriate cooling/warming therapies per order  - Administer medications as ordered  - Instruct and encourage patient and family to use good hand hygiene technique  Outcome: Progressing

## 2023-03-23 NOTE — PLAN OF CARE
Problem: SKIN/TISSUE INTEGRITY - ADULT  Goal: Incision(s), wounds(s) or drain site(s) healing without S/S of infection  Description: INTERVENTIONS  - Assess and document dressing, incision, wound bed, drain sites and surrounding tissue  - Provide patient and family educaton  Outcome: Progressing     Problem: PAIN - ADULT  Goal: Verbalizes/displays adequate comfort level or baseline comfort level  Description: Interventions:  - Encourage patient to monitor pain and request assistance  - Assess pain using appropriate pain scale (e g  0-10)  - Administer analgesics based on type and severity of pain and evaluate response  - Implement non-pharmacological measures as appropriate and evaluate response  - Notify physician/advanced practitioner if interventions unsuccessful or patient reports new pain  Outcome: Progressing     Problem: INFECTION - ADULT  Goal: Absence or prevention of progression during hospitalization  Description: INTERVENTIONS:  - Assess and monitor for signs and symptoms of infection  - Monitor lab/diagnostic results  - Monitor all insertion sites, i e  IV site  - [place] appropriate cooling/warming therapies per order  - Administer medications as ordered  - Instruct and encourage patient and family to use good hand hygiene technique  Outcome: Progressing

## 2023-03-23 NOTE — ANESTHESIA PREPROCEDURE EVALUATION
Procedure:  INCISION AND DRAINAGE (I&D) HEAD/FACE (Left: Face)    Relevant Problems   CARDIO   (+) HTN (hypertension)   (+) Hyperlipidemia      GI/HEPATIC   (+) Gastroesophageal reflux disease      MUSCULOSKELETAL   (+) Fibromyalgia      NEURO/PSYCH   (+) Depression with anxiety   (+) Fibromyalgia      PULMONARY   (+) Asthma   (+) Obstructive sleep apnea syndrome      Other   (+) Facial cellulitis   (+) Tobacco use        Physical Exam    Airway  Comment: Limited oral opening of mouth due to swelling & pain       Neck ROM: full     Dental   Comment: None loose per patient ,     Cardiovascular      Pulmonary      Other Findings        Anesthesia Plan  ASA Score- 2     Anesthesia Type- general with ASA Monitors  Additional Monitors:   Airway Plan: ETT  Comment: Surgeon requests oral calvin tube   Plan Factors-Exercise tolerance (METS): >4 METS  Chart reviewed  Existing labs reviewed  Patient summary reviewed  Patient is a current smoker  Patient did not smoke on day of surgery  Obstructive sleep apnea risk education given perioperatively  Induction- intravenous  Postoperative Plan-   Planned trial extubation    Informed Consent- Anesthetic plan and risks discussed with patient  I personally reviewed this patient with the CRNA  Discussed and agreed on the Anesthesia Plan with the CHAD Chung

## 2023-03-23 NOTE — PROGRESS NOTES
[facility]  Progress Note  Name: Robby Cardona  MRN: [id number]  Unit/Bed#: OR POOL I Date of Admission: 3/22/2023   Date of Service: 3/23/2023 I Hospital Day: 0    Assessment/Plan   * Facial cellulitis  Assessment & Plan  Presents with left sided facial swelling, +trismus  CT Face with evidence of facial celulitis, no abscess, but concern for dental involvement  Hx impacted wisdom tooth with infection requiring OMFS intervention in 2019  · Continue Clindamycin  · Appreciate infectious disease input  · OMFS consulted, patient going to OR this afternoon    Tobacco use  Assessment & Plan  · Current smoker, 7 cigarettes/day  · Nicotine patch offered, cessation encouraged    Depression with anxiety  Assessment & Plan  · Continue home Abilify, Buspar, Lexapro, ativan    Asthma  Assessment & Plan  · Does not appear to be in acute exacerbation at this time  · Continue albuterol, fluticasone           VTE Pharmacologic Prophylaxis: VTE Score: 3 Moderate Risk (Score 3-4) - Pharmacological DVT Prophylaxis Ordered: enoxaparin (Lovenox)  Patient Centered Rounds: Discussed with RN  Discussions with Specialists or Other Care Team Provider: ID, OMFS    Education and Discussions with Family / Patient: Updated  (son) via phone  Total Time Spent on Date of Encounter in care of patient: 35 minutes This time was spent on one or more of the following: performing physical exam; counseling and coordination of care; obtaining or reviewing history; documenting in the medical record; reviewing/ordering tests, medications or procedures; communicating with other healthcare professionals and discussing with patient's family/caregivers      Current Length of Stay: 0 day(s)  Current Patient Status: Observation   Certification Statement: The patient will continue to require additional inpatient hospital stay due to As above  Discharge Plan: Anticipate discharge in 24-48 hrs to home     Code Status: Level 1 - Full Code    Subjective:   Pain currently controlled well  Will let us know if this changes  No difficulty breathing or handling secretions  Does note facial swelling is progressing  Objective:     Vitals:   Temp (24hrs), Av 2 °F (36 8 °C), Min:97 5 °F (36 4 °C), Max:99 3 °F (37 4 °C)    Temp:  [97 5 °F (36 4 °C)-99 3 °F (37 4 °C)] 97 7 °F (36 5 °C)  HR:  [74-94] 79  Resp:  [16-18] 16  BP: (100-186)/(54-86) 129/77  SpO2:  [93 %-99 %] 94 %  Body mass index is 30 05 kg/m²  Input and Output Summary (last 24 hours): Intake/Output Summary (Last 24 hours) at 3/23/2023 1527  Last data filed at 3/23/2023 1300  Gross per 24 hour   Intake 1140 ml   Output 750 ml   Net 390 ml       Physical Exam:   Physical Exam  Vitals reviewed  Constitutional:       General: She is not in acute distress  Appearance: She is not ill-appearing or toxic-appearing  HENT:      Head:      Comments: Left-sided facial swelling notable     Mouth/Throat:      Mouth: Mucous membranes are moist    Eyes:      General: No scleral icterus  Cardiovascular:      Rate and Rhythm: Normal rate and regular rhythm  Pulmonary:      Effort: Pulmonary effort is normal  No respiratory distress  Abdominal:      General: Bowel sounds are normal       Palpations: Abdomen is soft  Tenderness: There is no abdominal tenderness  Musculoskeletal:      Right lower leg: No edema  Left lower leg: No edema  Skin:     General: Skin is warm and dry  Neurological:      Mental Status: She is alert and oriented to person, place, and time     Psychiatric:         Mood and Affect: Mood normal          Behavior: Behavior normal             Additional Data:     Labs:  Results from last 7 days   Lab Units 23  0614   WBC Thousand/uL 9 70   HEMOGLOBIN g/dL 12 8   HEMATOCRIT % 40 2   PLATELETS Thousands/uL 147*   NEUTROS PCT % 63   LYMPHS PCT % 21   MONOS PCT % 10   EOS PCT % 6     Results from last 7 days Lab Units 03/23/23  0614 03/22/23  1829   SODIUM mmol/L 139 135   POTASSIUM mmol/L 3 9 3 7   CHLORIDE mmol/L 107 108   CO2 mmol/L 29 27   BUN mg/dL 6 7   CREATININE mg/dL 0 63 0 71   ANION GAP mmol/L 3* 0*   CALCIUM mg/dL 9 0 9 3   ALBUMIN g/dL  --  4 1   TOTAL BILIRUBIN mg/dL  --  0 29   ALK PHOS U/L  --  118*   ALT U/L  --  22   AST U/L  --  19   GLUCOSE RANDOM mg/dL 104 116                 Results from last 7 days   Lab Units 03/23/23  0614   PROCALCITONIN ng/ml <0 05       Lines/Drains:  Invasive Devices     Peripheral Intravenous Line  Duration           Peripheral IV 03/22/23 Right Antecubital <1 day          Airway  Duration           ETT  Cuffed;Oral;MIGUEL A;Inflated 7 mm <1 day                      Imaging: Reviewed radiology reports from this admission including: CT facial    Recent Cultures (last 7 days):         Last 24 Hours Medication List:   Current Facility-Administered Medications   Medication Dose Route Frequency Provider Last Rate   • acetaminophen  650 mg Oral Q6H PRN Julio Khalil MD     • albuterol  2 puff Inhalation Q6H PRN Julio Khalil MD     • ARIPiprazole  10 mg Oral Daily Julio Khalil MD     • atorvastatin  10 mg Oral Daily Julio Khalil MD     • azelastine  1 spray Each Nare BID Julio Khalil MD     • busPIRone  5 mg Oral BID Julio Khalil MD     • [facility] Hold] clindamycin  600 mg Intravenous [facility], CRNP 600 mg (03/23/23 1001)   • enoxaparin  40 mg Subcutaneous Daily Julio Khalil MD     • escitalopram  30 mg Oral Daily Julio Khalil MD     • fluticasone  1 puff Inhalation BID Julio Khalil MD     • [facility] Hold] ketorolac  15 mg Intravenous Q6H PRN Alejandra Spears PA-C     • LORazepam  0 5 mg Oral BID Julio Khalil MD     • nicotine  14 mg Transdermal Daily Julio Khalil MD     • ondansetron  4 mg Intravenous Q6H PRN Julio Khalil MD     • [facility] Hold] predniSONE  40 mg Oral Daily Madan Liu PA-C       Facility-Administered Medications Ordered in Other Encounters   Medication Dose Route Frequency Provider Last Rate   • fentanyl citrate (PF)   Intravenous PRN Onelia Parrish, CRNA     • lactated ringers   Intravenous Continuous PRN Onleia Parrish, CRNA     • lidocaine (PF)   Intravenous PRN Onelia Parrish, CRNA     • midazolam   Intravenous PRN Deborha Francois, CRNA     • ondansetron   Intravenous PRN Deborha Francois, CRNA     • phenylephrine   Intravenous PRN Onelia Parrish, CRNA     • propofol   Intravenous PRN Onelia Parrish, CRNA     • Succinylcholine Chloride   Intravenous PRN Onelia Parrish, CRNA          Today, Patient Was Seen By: Frida Moffett MD    **Please Note: This note may have been constructed using a voice recognition system  **

## 2023-03-23 NOTE — PERIOPERATIVE NURSING NOTE
Report received from Demetri, [facility]  Assessment [facility]
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
## 2023-03-23 NOTE — ASSESSMENT & PLAN NOTE
Presents with left sided facial swelling, +trismus  CT Face with evidence of facial celulitis, no abscess, but concern for dental involvement  Hx impacted wisdom tooth with infection requiring OMFS intervention in 2019     · Continue Clindamycin  · Appreciate infectious disease input  · OMFS consulted, patient going to OR this afternoon

## 2023-03-23 NOTE — OP NOTE
OPERATIVE REPORT  PATIENT NAME: Casper Conway    :  1965  MRN: [id number]  Pt Location: [facility] OR ROOM 03    SURGERY DATE: 3/23/2023    Surgeon(s) and Role:     Britni Rhoades DMD - Primary    Preop Diagnosis:  Dental infection [K04 7]    Post-Op Diagnosis Codes:     * Dental infection [K04 7]    Procedure(s):  Surgical extraction tooth #19  Intraoral incision and drainage dentoalveolar structure  Intraoral incision and drainage left submandibular space  Intraoral incision and drainage left  space  Intraoral incision and drainage left sublingual space    Specimen(s):  ID Type Source Tests Collected by Time Destination   1 : Left oral abscess Tissue Abscess ANAEROBIC CULTURE AND GRAM STAIN, FUNGAL CULTURE, CULTURE, TISSUE AND GRAM STAIN, TISSUE EXAM, AFB CULTURE WITH STAIN Patrick Orosco DMD 3/23/2023 1523        Estimated Blood Loss:   Minimal    Drains:  No drains    Anesthesia Type:   General    Operative Indications:  Dental infection [K04 7]      Operative Findings:  Purulent drainage from extraction site tooth #28    Complications:   None    Procedure and Technique:  The patient was greeted in the preoperative area  All the risks and benefits of the procedure were once again explained and the risks of sinus communication as well as lower chin and lip numbness were explained in detail all questions were answered  Consent had already been signed  Care was then handed back to the anesthesia team     The patient was brought into the operating room by the anesthesia team and the patient was placed in a supine position where the patient remained for the rest of the case  Anesthesia was able to establish an orotracheal intubation without any complications  Care was then handed back to the OMFS team      Patient was draped in sterile manner timeout was performed in which the patient was correctly identified by name medical record number as well as a site of the procedure be performed       Once a timeout was completed oral cavity was thoroughly suctioned with the Yankauer suction the moist vaginal packing was used it as a throat pack  Patient was given local anesthesia with 1% lidocaine with 1-100,000 epinephrine as local anesthesia extraorally then intraorally via local blocks and infiltration for OMFS procedures  15 blade used to make incision around tooth #19  This tooth was sectioned and extracted  15 blade was then used to make a vestibular incision  Blunt dissection to the submandibular and  spaces  Purulent drainage was obtained aerobic and anaerobic cultures taken  Max the bone was  from the [place] in the extraction site blunt dissection was performed in the space  A similar dissection was done the lingual surface entering the sublingual space  All surgical sites were reevaluated found to be hemostatic  Next the oral cavity was thoroughly irrigated with sterile saline and suctioned with the Yankauer suction  The moist vaginal packing was removed and the oropharynx was suctioned  Care was then handed back to anesthesia team where the patient was extubated in the operating room without any complications and then transferred to the postanesthesia care unit       I was present for the entire procedure    Patient Disposition:  PACU , APU and extubated and stable        SIGNATURE: Marifer Saha DMD  DATE: March 23, 2023  TIME: 3:42 PM

## 2023-03-23 NOTE — ASSESSMENT & PLAN NOTE
Presents with left sided facial swelling, +trismus  CT Face with evidence of facial celulitis, no abscess, but concern for dental involvement  Hx impacted wisdom tooth with infection requiring OMFS intervention in 2019     · Continue Clindamycin  · Trend procalcitonin  · NPO at [facility]  · OMFS consult

## 2023-03-23 NOTE — NURSING NOTE
Pt subjectively c/o increase in swelling of left lower jaw  Jaw appears slightly more swollen than when assessed upon arrival at 2300  Pt denies SOB or any impinging on ability to breath/swallow  Hot pack provided to pt per her request   RUDDY Foreman notified  Instructed to give prn toradol now and continue to monitor

## 2023-03-23 NOTE — UTILIZATION REVIEW
Initial Clinical Review    OBSERVATION WRITTEN 3/22/23 @ 2145 CONVERTED TO INPATIENT ADMISSION 3/24/23 @ 0757 DUE TO FURTHER DIAGNOSTIC WORKUP REQUIRED FOR FACIAL CELLULITIS, REQUIRING AT LEAST A 2 MIDNIGHT STAY  Admission: Date/Time/Statement:   Admission Orders (From admission, onward)     Ordered        03/24/23 0757  Inpatient Admission  Once            03/22/23 2145  Place in Observation  Once                      Orders Placed This Encounter   Procedures   • Inpatient Admission     Standing Status:   Standing     Number of Occurrences:   1     Order Specific Question:   Level of Care     Answer:   Med Surg [16]     Order Specific Question:   Estimated length of stay     Answer:   More than 2 Midnights     Order Specific Question:   Certification     Answer:   I certify that inpatient services are medically necessary for this patient for a duration of greater than two midnights  See H&P and MD Progress Notes for additional information about the patient's course of treatment  ED Arrival Information     Expected   -    Arrival   3/22/2023 17:26    Acuity   Urgent            Means of arrival   Walk-In    Escorted by   Self    Service   Hospitalist    Admission type   Emergency            Arrival complaint   facial swelling             Chief Complaint   Patient presents with   • Facial Swelling     Pt presents with left sided facial swelling  Pt given clindamycin for possible dental infection  Pt states swelling has increased and now c/o numbness       Initial Presentation: 60 y o  female who presented to [facility] ED  Initially admitted Observation status then changed to Inpatient to med surg dt facial cellulitis requiring OR procedure  PMHx: depression, anxiety, asthma, prior infected wisdom tooth impaction on right s/p OMFS intervention 2019  Presented with left sided facial swelling for one day, dry mouth, jaw pain and trismus  Also states of poor appetite and pain with mouth movement   She was seen at urgent care and started on 1 dose of clindamycin, however facial swelling continued to worsen  In ED, CT Face showing facial cellulitis, no abscess, but did have concern for tooth involvement  Plan: OMFS and ID consults, continue IV ABX, trend procal, NPO after MN     3/23 Per OMFS: Patient has left facial swelling  There is soft tissue swelling adjacent to tooth #19  CT scan reviewed, that although I think tooth #19 is the source of this infection I cannot guarantee this  She was understanding and accepting  Patient on-call to the OR for extraction of Seri teeth and incision and drainage left facial infection  OR this afternoon for extraction of teeth and incision and drainage, keep NPO, give IVF, warm compress prn for swelling, pain control, HOB elevated  3/23 OP Note:  Procedure(s):  Surgical extraction tooth #19  Intraoral incision and drainage dentoalveolar structure  Intraoral incision and drainage left submandibular space  Intraoral incision and drainage left  space  Intraoral incision and drainage left sublingual space     Specimen(s):  ID Type Source Tests Collected by Time Destination   1 : Left oral abscess Tissue Abscess ANAEROBIC CULTURE AND GRAM STAIN, FUNGAL CULTURE, CULTURE, TISSUE AND GRAM STAIN, TISSUE EXAM, AFB CULTURE WITH STAIN Hannah Shelton DMD 3/23/2023 1523        Anesthesia Type: General  Operative Findings: Purulent drainage from extraction site tooth #19    3/24 Per ID:  Operative culture pending but Gram stain showing GPC and GNR  We will add ceftriaxone  We will follow-up on final op cultures and adjust antibiotic accordingly  Continue clindamycin  Follow-up on operative cultures and adjust antibiotic accordingly  Transition to p o  antibiotic when cultures are available and patient is further clinically improved      ED Triage Vitals [03/22/23 1729]   Temperature Pulse Respirations Blood Pressure SpO2   99 3 °F (37 4 °C) 94 17 (!) 186/86 97 %      Temp Source Heart Rate Source Patient Position - Orthostatic VS BP Location FiO2 (%)   Oral Monitor Lying Right arm --      Pain Score       6          Wt Readings from Last 1 Encounters:   03/22/23 81 9 kg (180 lb 8 9 oz)     Additional Vital Signs:   Date/Time Temp Pulse Resp BP MAP (mmHg) SpO2 Calculated FIO2 (%) - Nasal Cannula Nasal Cannula O2 Flow Rate (L/min) O2 Device Cardiac (WDL) Patient Position - Orthostatic VS   03/24/23 07:49:11 97 2 °F (36 2 °C) Abnormal  80 17 105/66 79 91 % -- -- -- -- --   03/23/23 22:48:39 97 3 °F (36 3 °C) Abnormal  77 18 104/66 79 94 % -- -- -- -- --   03/23/23 18:07:23 -- 76 15 102/65 77 93 % -- -- -- -- --   03/23/23 1700 -- 70 18 104/57 72 97 % 28 2 L/min Nasal cannula WDL --   03/23/23 1645 -- 72 18 101/51 67 96 % 28 2 L/min Nasal cannula -- --   03/23/23 1630 -- 74 20 105/55 71 96 % 28 2 L/min Nasal cannula WDL --   03/23/23 1615 -- 78 20 101/51 69 96 % -- -- -- -- --   03/23/23 1600 -- 78 13 111/43 Abnormal  64 Abnormal  98 % -- -- -- -- --   03/23/23 1548 -- 94 19 -- -- 93 % -- -- -- -- --   03/23/23 1545 -- 88 15 107/48 Abnormal  67 99 % -- -- -- -- --   03/23/23 1542 97 5 °F (36 4 °C) 98 15 111/45 Abnormal  65 99 % -- -- Simple mask WDL --   03/23/23 07:10:48 97 7 °F (36 5 °C) 79 16 129/77 94 94 % -- -- -- -- --   03/23/23 01:49:43 -- 75 -- 108/66 80 93 % -- -- None (Room air) -- Lying   03/22/23 2315 -- -- -- 100/54 -- -- -- -- -- -- Lying   03/22/23 23:02:52 97 5 °F (36 4 °C) 81 16 106/69 81 93 % -- -- None (Room air) -- --   03/22/23 2045 -- 74 18 141/70 99 99 % -- -- None (Room air) -- Sitting   03/22/23 1851 -- -- -- -- -- -- -- -- None (Room air) -- --   03/22/23 1729 99 3 °F (37 4 °C) 94 17 186/86 Abnormal  -- 97 % -- -- None (Room air) -- Lying     Pertinent Labs/Diagnostic Test Results:   CT soft tissue neck with contrast   Final Result by Jayson Rivera MD (03/22 2055)         1  Left facial cellulitis as described  No definite evidence for abscess    This is most likely odontogenic in nature as described above and should be correlated with dental examination  Parotid and submandibular glands appear unremarkable  The study was marked in [facility] for immediate notification              Workstation performed: [id number]               Results from last 7 days   Lab Units 03/24/23  0205 03/23/23  0614 03/22/23  1829   WBC Thousand/uL 13 73* 9 70 12 14*   HEMOGLOBIN g/dL 12 4 12 8 14 3   HEMATOCRIT % 37 8 40 2 43 0   PLATELETS Thousands/uL 155 147* 175   NEUTROS ABS Thousands/µL 10 27* 6 06 9 04*         Results from last 7 days   Lab Units 03/24/23  0205 03/23/23  0614 03/22/23  1829   SODIUM mmol/L 138 139 135   POTASSIUM mmol/L 4 0 3 9 3 7   CHLORIDE mmol/L 106 107 108   CO2 mmol/L 31 29 27   ANION GAP mmol/L 1* 3* 0*   BUN mg/dL 9 6 7   CREATININE mg/dL 0 60 0 63 0 71   EGFR ml/min/1 73sq m 100 98 93   CALCIUM mg/dL 9 0 9 0 9 3   MAGNESIUM mg/dL  --  2 4  --      Results from last 7 days   Lab Units 03/22/23  1829   AST U/L 19   ALT U/L 22   ALK PHOS U/L 118*   TOTAL PROTEIN g/dL 7 2   ALBUMIN g/dL 4 1   TOTAL BILIRUBIN mg/dL 0 29         Results from last 7 days   Lab Units 03/24/23  0205 03/23/23  0614 03/22/23  1829   GLUCOSE RANDOM mg/dL 100 104 116     Results from last 7 days   Lab Units 03/23/23  0614   PROCALCITONIN ng/ml <0 05       Results from last 7 days   Lab Units 03/23/23  1523   GRAM STAIN RESULT  No Polys*  3+ Gram negative rods*  2+ Gram positive cocci in pairs and chains*     ED Treatment:   Medication Administration from 03/22/2023 1726 to 03/22/2023 2231       Date/Time Order Dose Route Action     03/22/2023 1830 EDT HYDROmorphone (DILAUDID) injection 0 5 mg 0 5 mg Intravenous Given     03/22/2023 1829 EDT clindamycin (CLEOCIN) IVPB (premix in dextrose) 600 mg 50 mL 600 mg Intravenous New Bag     03/22/2023 1959 EDT ketorolac (TORADOL) injection 15 mg 15 mg Intravenous Given     03/22/2023 1923 EDT iohexol (OMNIPAQUE) 350 MG/ML injection (SINGLE-DOSE) 85 mL 85 mL Intravenous Given     03/22/2023 2133 EDT HYDROmorphone (DILAUDID) injection 1 mg 1 mg Intravenous Given     03/22/2023 2133 EDT ondansetron (ZOFRAN) injection 4 mg 4 mg Intravenous Given        Past Medical History:   Diagnosis Date   • Allergic rhinitis 1987   • Allergies    • Anesthesia complication     V TACH after her reduction mammoplasty, done at [facility],, states had a little vent arrhythmia after sinus sx also   • Anxiety    • Asthma 2021   • Chronic rhinitis 02/18/2020   • Depression    • Ethmoid sinusitis    • GERD (gastroesophageal reflux disease)     no issues since 2021   • Maxillary sinusitis    • Multiple allergies    • Myofascial pain syndrome    • Nasal turbinate hypertrophy    • Pneumonia    • Sleep apnea     not currently using CPAP   • Sleep apnea, obstructive    • Wears glasses      Present on Admission:  • Tobacco use  • Depression with anxiety  • Asthma      Admitting Diagnosis: Facial swelling [R22 0]  Dental infection [K04 7]  Facial cellulitis [L03 211]  Age/Sex: 60 y o  female  Admission Orders:  Scheduled Medications:  ARIPiprazole, 10 mg, Oral, Daily  atorvastatin, 10 mg, Oral, Daily  azelastine, 1 spray, Each Nare, BID  busPIRone, 5 mg, Oral, BID  clindamycin, 600 mg, Intravenous, Q8H  enoxaparin, 40 mg, Subcutaneous, Daily  escitalopram, 30 mg, Oral, Daily  fluticasone, 1 puff, Inhalation, BID  LORazepam, 0 5 mg, Oral, BID  nicotine, 14 mg, Transdermal, Daily  predniSONE, 40 mg, Oral, Daily  saccharomyces boulardii, 250 mg, Oral, BID      Continuous IV Infusions:    lactated ringers infusion  Freq: Continuous PRN Route: IV  Last Dose: Stopped (03/23/23 1543)  Start: 03/23/23 1508 End: 03/23/23 1540    PRN Meds:  acetaminophen, 650 mg, Oral, Q6H PRN x2 thus far  albuterol, 2 puff, Inhalation, Q6H PRN  ketorolac, 15 mg, Intravenous, Q6H PRN x5 thus far  ondansetron, 4 mg, Intravenous, Q6H PRN    scd    IP CONSULT TO ORAL AND MAXILLOFACIAL SURGERY  IP CONSULT TO INFECTIOUS DISEASES    Network Utilization Review Department  ATTENTION: Please call with any questions or concerns to [phone] and carefully listen to the prompts so that you are directed to the right person  All voicemails are confidential   Darryl Santiago all requests for admission clinical reviews, approved or denied determinations and any other requests to dedicated fax number below belonging to the campus where the patient is receiving treatment   List of dedicated fax numbers for the Facilities:  [address] DENIALS (Administrative/Medical Necessity) [phone]   [address] (Maternity/NICU/Pediatrics) [phone]   [facility] [phone]   Sanjuanita Knapp [address] [place] [address] [phone]   [address] [place] [address] Jillian Amaya 28 [phone]   [facility] [facility] [address] [facility] [phone]

## 2023-03-24 LAB
ANION GAP SERPL CALCULATED.3IONS-SCNC: 1 MMOL/L (ref 4–13)
BASOPHILS # BLD AUTO: 0.01 THOUSANDS/ÂΜL (ref 0–0.1)
BASOPHILS NFR BLD AUTO: 0 % (ref 0–1)
BUN SERPL-MCNC: 9 MG/DL (ref 5–25)
CALCIUM SERPL-MCNC: 9 MG/DL (ref 8.3–10.1)
CHLORIDE SERPL-SCNC: 106 MMOL/L (ref 96–108)
CO2 SERPL-SCNC: 31 MMOL/L (ref 21–32)
CREAT SERPL-MCNC: 0.6 MG/DL (ref 0.6–1.3)
EOSINOPHIL # BLD AUTO: 0.11 THOUSAND/ÂΜL (ref 0–0.61)
EOSINOPHIL NFR BLD AUTO: 1 % (ref 0–6)
ERYTHROCYTE [DISTWIDTH] IN BLOOD BY AUTOMATED COUNT: 12.7 % (ref 11.6–15.1)
GFR SERPL CREATININE-BSD FRML MDRD: 100 ML/MIN/1.73SQ M
GLUCOSE P FAST SERPL-MCNC: 100 MG/DL (ref 65–99)
GLUCOSE SERPL-MCNC: 100 MG/DL (ref 65–140)
HCT VFR BLD AUTO: 37.8 % (ref 34.8–46.1)
HGB BLD-MCNC: 12.4 G/DL (ref 11.5–15.4)
IMM GRANULOCYTES # BLD AUTO: 0.04 THOUSAND/UL (ref 0–0.2)
IMM GRANULOCYTES NFR BLD AUTO: 0 % (ref 0–2)
LYMPHOCYTES # BLD AUTO: 2.15 THOUSANDS/ÂΜL (ref 0.6–4.47)
LYMPHOCYTES NFR BLD AUTO: 16 % (ref 14–44)
MCH RBC QN AUTO: 29.8 PG (ref 26.8–34.3)
MCHC RBC AUTO-ENTMCNC: 32.8 G/DL (ref 31.4–37.4)
MCV RBC AUTO: 91 FL (ref 82–98)
MONOCYTES # BLD AUTO: 1.15 THOUSAND/ÂΜL (ref 0.17–1.22)
MONOCYTES NFR BLD AUTO: 8 % (ref 4–12)
NEUTROPHILS # BLD AUTO: 10.27 THOUSANDS/ÂΜL (ref 1.85–7.62)
NEUTS SEG NFR BLD AUTO: 75 % (ref 43–75)
NRBC BLD AUTO-RTO: 0 /100 WBCS
PLATELET # BLD AUTO: 155 THOUSANDS/UL (ref 149–390)
PMV BLD AUTO: 12.6 FL (ref 8.9–12.7)
POTASSIUM SERPL-SCNC: 4 MMOL/L (ref 3.5–5.3)
RBC # BLD AUTO: 4.16 MILLION/UL (ref 3.81–5.12)
RHODAMINE-AURAMINE STN SPEC: NORMAL
SODIUM SERPL-SCNC: 138 MMOL/L (ref 135–147)
WBC # BLD AUTO: 13.73 THOUSAND/UL (ref 4.31–10.16)

## 2023-03-24 RX ADMIN — CEFTRIAXONE SODIUM 1000 MG: 10 INJECTION, POWDER, FOR SOLUTION INTRAVENOUS at 10:15

## 2023-03-24 RX ADMIN — ENOXAPARIN SODIUM 40 MG: 40 INJECTION SUBCUTANEOUS at 09:19

## 2023-03-24 RX ADMIN — Medication 250 MG: at 09:19

## 2023-03-24 RX ADMIN — FLUTICASONE PROPIONATE 1 PUFF: 110 AEROSOL, METERED RESPIRATORY (INHALATION) at 09:22

## 2023-03-24 RX ADMIN — CLINDAMYCIN PHOSPHATE 600 MG: 600 INJECTION, SOLUTION INTRAVENOUS at 09:13

## 2023-03-24 RX ADMIN — CLINDAMYCIN PHOSPHATE 600 MG: 600 INJECTION, SOLUTION INTRAVENOUS at 17:33

## 2023-03-24 RX ADMIN — AZELASTINE 1 SPRAY: 1 SPRAY, METERED NASAL at 17:33

## 2023-03-24 RX ADMIN — LORAZEPAM 0.5 MG: 0.5 TABLET ORAL at 09:19

## 2023-03-24 RX ADMIN — ARIPIPRAZOLE 10 MG: 10 TABLET ORAL at 20:45

## 2023-03-24 RX ADMIN — ESCITALOPRAM OXALATE 30 MG: 20 TABLET, FILM COATED ORAL at 09:19

## 2023-03-24 RX ADMIN — KETOROLAC TROMETHAMINE 15 MG: 30 INJECTION, SOLUTION INTRAMUSCULAR; INTRAVENOUS at 09:19

## 2023-03-24 RX ADMIN — KETOROLAC TROMETHAMINE 15 MG: 30 INJECTION, SOLUTION INTRAMUSCULAR; INTRAVENOUS at 15:48

## 2023-03-24 RX ADMIN — FLUTICASONE PROPIONATE 1 PUFF: 110 AEROSOL, METERED RESPIRATORY (INHALATION) at 20:44

## 2023-03-24 RX ADMIN — KETOROLAC TROMETHAMINE 15 MG: 30 INJECTION, SOLUTION INTRAMUSCULAR; INTRAVENOUS at 01:38

## 2023-03-24 RX ADMIN — AZELASTINE 1 SPRAY: 1 SPRAY, METERED NASAL at 09:22

## 2023-03-24 RX ADMIN — PREDNISONE 40 MG: 20 TABLET ORAL at 09:19

## 2023-03-24 RX ADMIN — Medication 250 MG: at 17:33

## 2023-03-24 RX ADMIN — LORAZEPAM 0.5 MG: 0.5 TABLET ORAL at 17:33

## 2023-03-24 RX ADMIN — ATORVASTATIN CALCIUM 10 MG: 10 TABLET, FILM COATED ORAL at 17:33

## 2023-03-24 RX ADMIN — CLINDAMYCIN PHOSPHATE 600 MG: 600 INJECTION, SOLUTION INTRAVENOUS at 01:43

## 2023-03-24 RX ADMIN — BUSPIRONE HYDROCHLORIDE 5 MG: 5 TABLET ORAL at 20:45

## 2023-03-24 NOTE — PROGRESS NOTES
Progress Note - Infectious Disease   Alba Mora 60 y o  female MRN: [id number]  Unit/Bed#: [facility] 912-01 Encounter: [id number]      Impression/Recommendations:  1  Left facial cellulitis, likely odontogenic in etiology per CT findings  Patient is status post tooth extraction  Operative culture pending but Gram stain showing GPC and GNR  We will add ceftriaxone  We will follow-up on final op cultures and adjust antibiotic accordingly  Continue clindamycin  Add ceftriaxone  Follow-up on operative cultures and adjust antibiotic accordingly  Transition to p o  antibiotic when cultures are available and patient is further clinically improved  2  Tooth abscess, status post tooth extraction  OMFS follow-up  3  Chronic parotic duct dysfunction  4  Multiple antibiotic allergies  Patient is tolerating clindamycin well  She tolerated both cefepime and cefdinir in the past   Monitor for cross allergic reaction to ceftriaxone  Monitor      Discussed with patient in detail regarding the above plan  Discussed with [facility] service      Antibiotics:  Clindamycin  POD # 1    Subjective:  Patient is status post tooth extraction  She has expected left face pain and swelling  Temperature stays down  No chills  She is tolerating antibiotic well  No nausea, vomiting or diarrhea  Objective:  Vitals:  Temp:  [97 2 °F (36 2 °C)-97 5 °F (36 4 °C)] 97 2 °F (36 2 °C)  HR:  [70-98] 80  Resp:  [13-20] 17  BP: (101-111)/(43-66) 105/66  SpO2:  [91 %-99 %] 91 %  Temp (24hrs), Av 3 °F (36 3 °C), Min:97 2 °F (36 2 °C), Max:97 5 °F (36 4 °C)  Current: Temperature: (!) 97 2 °F (36 2 °C)    Physical Exam:     General: Awake, alert, cooperative, no distress  Face:  Worsened left face edema, with warmth  No erythema  Stable moderate tenderness  Neck:  Supple  No mass  No lymphadenopathy  Lungs: Expansion symmetric, no rales, no wheezing, respirations unlabored     Heart:  Regular rate and rhythm, S1 and S2 normal, no murmur  Abdomen: Soft, nondistended, non-tender, bowel sounds active all four quadrants, no masses, no organomegaly  Extremities: No edema  No erythema/warmth  No ulcer  Nontender to palpation  Skin:  No rash  Neuro: Moves all extremities  Invasive Devices     Peripheral Intravenous Line  Duration           Peripheral IV 03/24/23 Distal;Dorsal (posterior); Right Forearm <1 day                Labs studies:   I have personally reviewed pertinent labs  Results from last 7 days   Lab Units 03/24/23  0205 03/23/23  0614 03/22/23  1829   POTASSIUM mmol/L 4 0 3 9 3 7   CHLORIDE mmol/L 106 107 108   CO2 mmol/L 31 29 27   BUN mg/dL 9 6 7   CREATININE mg/dL 0 60 0 63 0 71   EGFR ml/min/1 73sq m 100 98 93   CALCIUM mg/dL 9 0 9 0 9 3   AST U/L  --   --  19   ALT U/L  --   --  22   ALK PHOS U/L  --   --  118*     Results from last 7 days   Lab Units 03/24/23  0205 03/23/23  0614 03/22/23  1829   WBC Thousand/uL 13 73* 9 70 12 14*   HEMOGLOBIN g/dL 12 4 12 8 14 3   PLATELETS Thousands/uL 155 147* 175     Results from last 7 days   Lab Units 03/23/23  1523   GRAM STAIN RESULT  No Polys*  3+ Gram negative rods*  2+ Gram positive cocci in pairs and chains*       Imaging Studies:   I have personally reviewed pertinent imaging study reports and images in PACS  EKG, Pathology, and Other Studies:   I have personally reviewed pertinent reports

## 2023-03-24 NOTE — ASSESSMENT & PLAN NOTE
Presents with left sided facial swelling, +trismus  CT Face with evidence of facial celulitis, no abscess, but concern for dental involvement  Hx impacted wisdom tooth with infection requiring OMFS intervention in 2019  OR with OMFS on 3/23 for intraoral I&D, tooth extraction  Initially started on steroids prior to OR given worsening swelling, per OMFS okay to discontinue these on 3/24    · Continue Clindamycin, ceftriaxone added 3/24 given Gram stain results  · Gram stain with GPC's and GNR's, follow-up final micro and ID will decide antibiotics from there, especially given multiple antibiotic allergies (of note patient has tolerated some cephalosporins in the past, will monitor for cross-reactivity but so far tolerating ceftriaxone today)  · Appreciate infectious disease input  · Pain control: Patient prefers to avoid opioids, will continue PRN APAP and ketorolac

## 2023-03-24 NOTE — PROGRESS NOTES
[facility]  Progress Note  Name: Daniela Rm  MRN: [id number]  Unit/Bed#: [facility] 485-47 I Date of Admission: 3/22/2023   Date of Service: 3/24/2023 I Hospital Day: 0    Assessment/Plan   * Facial cellulitis  Assessment & Plan  Presents with left sided facial swelling, +trismus  CT Face with evidence of facial celulitis, no abscess, but concern for dental involvement  Hx impacted wisdom tooth with infection requiring OMFS intervention in 2019  OR with OMFS on 3/23 for intraoral I&D, tooth extraction  Initially started on steroids prior to OR given worsening swelling, per OMFS okay to discontinue these on 3/24  · Continue Clindamycin, ceftriaxone added 3/24 given Gram stain results  · Gram stain with GPC's and GNR's, follow-up final micro and ID will decide antibiotics from there, especially given multiple antibiotic allergies (of note patient has tolerated some cephalosporins in the past, will monitor for cross-reactivity but so far tolerating ceftriaxone today)  · Appreciate infectious disease input  · Pain control: Patient prefers to avoid opioids, will continue PRN APAP and ketorolac    Tobacco use  Assessment & Plan  · Current smoker, 7 cigarettes/day  · Nicotine patch offered, cessation encouraged    Depression with anxiety  Assessment & Plan  · Continue home Abilify, Buspar, Lexapro, ativan    Asthma  Assessment & Plan  · Does not appear to be in acute exacerbation at this time  · Continue albuterol, fluticasone           VTE Pharmacologic Prophylaxis: VTE Score: 3 Moderate Risk (Score 3-4) - Pharmacological DVT Prophylaxis Ordered: enoxaparin (Lovenox)  Patient Centered Rounds: Discussed with RN  Discussions with Specialists or Other Care Team Provider: ID, OMFS    Education and Discussions with Family / Patient: Patient will update son later today, will reach out if further questions       Total Time Spent on Date of Encounter in care of patient: 28 minutes This time was spent on one or more of the following: performing physical exam; counseling and coordination of care; obtaining or reviewing history; documenting in the medical record; reviewing/ordering tests, medications or procedures; communicating with other healthcare professionals and discussing with patient's family/caregivers  Current Length of Stay: 0 day(s)  Current Patient Status: Inpatient   Certification Statement: The patient will continue to require additional inpatient hospital stay due to As above  Discharge Plan: Anticipate discharge in 24-48 hrs to home  Code Status: Level 1 - Full Code    Subjective:   Notes ongoing swelling, has not worsened but has not improved  Location seems to be shifting  No shortness of breath, pruritus, sensation of lip or tongue swelling after receiving ceftriaxone  No other acute concerns  Objective:     Vitals:   Temp (24hrs), Av 3 °F (36 3 °C), Min:97 2 °F (36 2 °C), Max:97 5 °F (36 4 °C)    Temp:  [97 2 °F (36 2 °C)-97 5 °F (36 4 °C)] 97 5 °F (36 4 °C)  HR:  [69-80] 69  Resp:  [15-18] 17  BP: (102-105)/(64-66) 105/64  SpO2:  [90 %-94 %] 90 %  Body mass index is 30 05 kg/m²  Input and Output Summary (last 24 hours): Intake/Output Summary (Last 24 hours) at 3/24/2023 1719  Last data filed at 3/24/2023 1501  Gross per 24 hour   Intake 660 ml   Output 1400 ml   Net -740 ml       Physical Exam:   Physical Exam  Vitals reviewed  Constitutional:       General: She is not in acute distress  Appearance: She is not ill-appearing or toxic-appearing  HENT:      Head:      Comments: Left-sided facial swelling notable, relatively stable from yesterday     Mouth/Throat:      Mouth: Mucous membranes are moist    Eyes:      General: No scleral icterus  Pulmonary:      Effort: Pulmonary effort is normal  No respiratory distress  Skin:     General: Skin is warm and dry     Neurological:      Mental Status: She is alert and oriented to person, place, and time  Psychiatric:         Mood and Affect: Mood normal          Behavior: Behavior normal             Additional Data:     Labs:  Results from last 7 days   Lab Units 03/24/23  0205   WBC Thousand/uL 13 73*   HEMOGLOBIN g/dL 12 4   HEMATOCRIT % 37 8   PLATELETS Thousands/uL 155   NEUTROS PCT % 75   LYMPHS PCT % 16   MONOS PCT % 8   EOS PCT % 1     Results from last 7 days   Lab Units 03/24/23  0205 03/23/23  0614 03/22/23  1829   SODIUM mmol/L 138   < > 135   POTASSIUM mmol/L 4 0   < > 3 7   CHLORIDE mmol/L 106   < > 108   CO2 mmol/L 31   < > 27   BUN mg/dL 9   < > 7   CREATININE mg/dL 0 60   < > 0 71   ANION GAP mmol/L 1*   < > 0*   CALCIUM mg/dL 9 0   < > 9 3   ALBUMIN g/dL  --   --  4 1   TOTAL BILIRUBIN mg/dL  --   --  0 29   ALK PHOS U/L  --   --  118*   ALT U/L  --   --  22   AST U/L  --   --  19   GLUCOSE RANDOM mg/dL 100   < > 116    < > = values in this interval not displayed  Results from last 7 days   Lab Units 03/23/23  0614   PROCALCITONIN ng/ml <0 05       Lines/Drains:  Invasive Devices     Peripheral Intravenous Line  Duration           Peripheral IV 03/24/23 Distal;Dorsal (posterior); Right Forearm <1 day                      Imaging: No pertinent imaging reviewed      Recent Cultures (last 7 days):   Results from last 7 days   Lab Units 03/23/23  1523   GRAM STAIN RESULT  No Polys*  3+ Gram negative rods*  2+ Gram positive cocci in pairs and chains*       Last 24 Hours Medication List:   Current Facility-Administered Medications   Medication Dose Route Frequency Provider Last Rate   • acetaminophen  650 mg Oral Q6H PRN Campbell Balm, DMD     • albuterol  2 puff Inhalation Q6H PRN Campbell Balm, DMD     • ARIPiprazole  10 mg Oral Daily Campbell Balm, DMD     • atorvastatin  10 mg Oral Daily Campbell [place], DMD     • azelastine  1 spray Each Nare BID Campbell [place], DMD     • busPIRone  5 mg Oral BID Campbell Balm, DMD     • cefTRIAXone  1,000 mg Intravenous Q24H Pastor Elodia MD 1,000 mg (03/24/23 1015)   • clindamycin  600 mg Intravenous Q8H Wanetta Canavan,  mg (03/24/23 0913)   • enoxaparin  40 mg Subcutaneous Daily Wanetta Canavan, DMD     • escitalopram  30 mg Oral Daily Wanetta Canavan, DMD     • fluticasone  1 puff Inhalation BID Wanetta Canavan, DMD     • ketorolac  15 mg Intravenous Q6H PRN Miquel Flynn MD     • LORazepam  0 5 mg Oral BID Wanetta Canavan, DMD     • nicotine  14 mg Transdermal Daily Wanetta Canavan, DMD     • ondansetron  4 mg Intravenous Q6H PRN Wanetta Canavan, DMD     • saccharomyces boulardii  250 mg Oral BID Wanetta Canavan, DMD          Today, Patient Was Seen By: Miquel Flynn MD    **Please Note: This note may have been constructed using a voice recognition system  **

## 2023-03-24 NOTE — NURSING NOTE
3P-7P Shift Note:     Patient received from previous shift  Alert and oriented x3  Sitting up in bed  Mild swelling to L face  Speaking in full sentences  Swallowing without issue  Good secretion control  Will give prn toradol for pain  Tolerating a soft diet  IV intact for IV abx  No GI symptoms  No edema  Continent and ambulatory as needed

## 2023-03-24 NOTE — PLAN OF CARE
Problem: SKIN/TISSUE INTEGRITY - ADULT  Goal: Incision(s), wounds(s) or drain site(s) healing without S/S of infection  Description: INTERVENTIONS  - Assess and document dressing, incision, wound bed, drain sites and surrounding tissue  - Provide patient and family education  Outcome: Progressing     Problem: PAIN - ADULT  Goal: Verbalizes/displays adequate comfort level or baseline comfort level  Description: Interventions:  - Encourage patient to monitor pain and request assistance  - Assess pain using appropriate pain scale (e g  0-10)  - Administer analgesics based on type and severity of pain and evaluate response  - Implement non-pharmacological measures as appropriate and evaluate response  - Notify physician/advanced practitioner if interventions unsuccessful or patient reports new pain  Outcome: Progressing     Problem: INFECTION - ADULT  Goal: Absence or prevention of progression during hospitalization  Description: INTERVENTIONS:  - Assess and monitor for signs and symptoms of infection  - Monitor lab/diagnostic results  - Monitor all insertion sites, i e  IV site  - [place] appropriate cooling/warming therapies per order  - Administer medications as ordered  - Instruct and encourage patient and family to use good hand hygiene technique  Outcome: Progressing

## 2023-03-25 LAB
BACTERIA TISS AEROBE CULT: ABNORMAL
GRAM STN SPEC: ABNORMAL

## 2023-03-25 RX ORDER — CLINDAMYCIN HYDROCHLORIDE 150 MG/1
300 CAPSULE ORAL EVERY 6 HOURS SCHEDULED
Status: DISCONTINUED | OUTPATIENT
Start: 2023-03-25 | End: 2023-03-26 | Stop reason: HOSPADM

## 2023-03-25 RX ORDER — CEFDINIR 300 MG/1
300 CAPSULE ORAL EVERY 12 HOURS SCHEDULED
Status: DISCONTINUED | OUTPATIENT
Start: 2023-03-25 | End: 2023-03-26 | Stop reason: HOSPADM

## 2023-03-25 RX ADMIN — AZELASTINE 1 SPRAY: 1 SPRAY, METERED NASAL at 17:46

## 2023-03-25 RX ADMIN — FLUTICASONE PROPIONATE 1 PUFF: 110 AEROSOL, METERED RESPIRATORY (INHALATION) at 20:53

## 2023-03-25 RX ADMIN — AZELASTINE 1 SPRAY: 1 SPRAY, METERED NASAL at 09:03

## 2023-03-25 RX ADMIN — CLINDAMYCIN PHOSPHATE 600 MG: 600 INJECTION, SOLUTION INTRAVENOUS at 01:28

## 2023-03-25 RX ADMIN — CEFTRIAXONE SODIUM 1000 MG: 10 INJECTION, POWDER, FOR SOLUTION INTRAVENOUS at 09:18

## 2023-03-25 RX ADMIN — LORAZEPAM 0.5 MG: 0.5 TABLET ORAL at 09:02

## 2023-03-25 RX ADMIN — Medication 250 MG: at 09:02

## 2023-03-25 RX ADMIN — Medication 250 MG: at 17:46

## 2023-03-25 RX ADMIN — ESCITALOPRAM OXALATE 30 MG: 20 TABLET, FILM COATED ORAL at 09:02

## 2023-03-25 RX ADMIN — ENOXAPARIN SODIUM 40 MG: 40 INJECTION SUBCUTANEOUS at 09:01

## 2023-03-25 RX ADMIN — BUSPIRONE HYDROCHLORIDE 5 MG: 5 TABLET ORAL at 21:27

## 2023-03-25 RX ADMIN — CLINDAMYCIN HYDROCHLORIDE 300 MG: 150 CAPSULE ORAL at 12:30

## 2023-03-25 RX ADMIN — ARIPIPRAZOLE 10 MG: 10 TABLET ORAL at 21:27

## 2023-03-25 RX ADMIN — KETOROLAC TROMETHAMINE 15 MG: 30 INJECTION, SOLUTION INTRAMUSCULAR; INTRAVENOUS at 09:18

## 2023-03-25 RX ADMIN — CEFDINIR 300 MG: 300 CAPSULE ORAL at 21:27

## 2023-03-25 RX ADMIN — ATORVASTATIN CALCIUM 10 MG: 10 TABLET, FILM COATED ORAL at 09:01

## 2023-03-25 RX ADMIN — ACETAMINOPHEN 650 MG: 325 TABLET ORAL at 14:44

## 2023-03-25 RX ADMIN — KETOROLAC TROMETHAMINE 15 MG: 30 INJECTION, SOLUTION INTRAMUSCULAR; INTRAVENOUS at 21:27

## 2023-03-25 RX ADMIN — CEFDINIR 300 MG: 300 CAPSULE ORAL at 12:30

## 2023-03-25 RX ADMIN — FLUTICASONE PROPIONATE 1 PUFF: 110 AEROSOL, METERED RESPIRATORY (INHALATION) at 09:03

## 2023-03-25 RX ADMIN — CLINDAMYCIN HYDROCHLORIDE 300 MG: 150 CAPSULE ORAL at 17:46

## 2023-03-25 RX ADMIN — LORAZEPAM 0.5 MG: 0.5 TABLET ORAL at 17:46

## 2023-03-25 RX ADMIN — KETOROLAC TROMETHAMINE 15 MG: 30 INJECTION, SOLUTION INTRAMUSCULAR; INTRAVENOUS at 01:33

## 2023-03-25 NOTE — PLAN OF CARE
Problem: SKIN/TISSUE INTEGRITY - ADULT  Goal: Incision(s), wounds(s) or drain site(s) healing without S/S of infection  Description: INTERVENTIONS  - Assess and document dressing, incision, wound bed, drain sites and surrounding tissue  - Provide patient and family education  Outcome: Progressing     Problem: PAIN - ADULT  Goal: Verbalizes/displays adequate comfort level or baseline comfort level  Description: Interventions:  - Encourage patient to monitor pain and request assistance  - Assess pain using appropriate pain scale (e g  0-10)  - Administer analgesics based on type and severity of pain and evaluate response  - Implement non-pharmacological measures as appropriate and evaluate response  - Notify physician/advanced practitioner if interventions unsuccessful or patient reports new pain  Outcome: Progressing     Problem: INFECTION - ADULT  Goal: Absence or prevention of progression during hospitalization  Description: INTERVENTIONS:  - Assess and monitor for signs and symptoms of infection  - Monitor lab/diagnostic results  - Monitor all insertion sites, i e  IV site  - [place] appropriate cooling/warming therapies per order  - Administer medications as ordered  - Instruct and encourage patient and family to use good hand hygiene technique  Outcome: Progressing

## 2023-03-25 NOTE — PROGRESS NOTES
Progress Note - Infectious Disease   Alba Mora 60 y o  female MRN: [id number]  Unit/Bed#: [facility] 912-01 Encounter: [id number]      Impression/Recommendations:  1  Left facial cellulitis, likely odontogenic in etiology per CT findings     Patient is status post tooth extraction  Operative culture pending but Gram stain showing GPC and GNR  Pathogen slow growing, will likely take a few days to finalize  Patient is clinically much improved  At this point, antibiotic can be transitioned to p o  Change antibiotic to p o  clindamycin/cefdinir  Follow-up on final operative cultures and adjust antibiotic accordingly  Treat x10 days postop      2  Tooth abscess, status post tooth extraction  OMFS follow-up      3  Chronic parotic duct dysfunction      4  Multiple antibiotic allergies   Patient is tolerating clindamycin well   She tolerated both cefepime and cefdinir in the past   Monitor for cross allergic reaction to ceftriaxone  Monitor      Discussed with patient in detail regarding the above plan  Discussed with  Dr Magaly Mcknight from [facility] service  Okay for discharge from ID viewpoint  I will follow-up on final micro results after discharge and adjust antibiotic as needed as outpatient      Antibiotics:  Clindamycin/ceftriaxone  POD # 2     Subjective:  Patient's left jaw pain and swelling improved  Temperature stays down  No chills  She is tolerating antibiotic well  No nausea, vomiting or diarrhea      Objective:  Vitals:  Temp:  [97 3 °F (36 3 °C)-97 5 °F (36 4 °C)] 97 3 °F (36 3 °C)  HR:  [65-69] 69  Resp:  [18] 18  BP: (105-106)/(61-64) 105/61  SpO2:  [90 %-96 %] 96 %  Temp (24hrs), Av 4 °F (36 3 °C), Min:97 3 °F (36 3 °C), Max:97 5 °F (36 4 °C)  Current: Temperature: (!) 97 3 °F (36 3 °C)    Physical Exam:     General: Awake, alert, cooperative, no distress  Face:  Improved left jaw edema, erythema, warmth, tenderness  Neck:  Supple  No mass  No lymphadenopathy     Lungs: Expansion symmetric, no rales, no wheezing, respirations unlabored  Heart:  Regular rate and rhythm, S1 and S2 normal, no murmur  Abdomen: Soft, nondistended, non-tender, bowel sounds active all four quadrants, no masses, no organomegaly  Extremities: No edema  No erythema/warmth  No ulcer  Nontender to palpation  Skin:  No rash  Neuro: Moves all extremities  Invasive Devices     Peripheral Intravenous Line  Duration           Peripheral IV 03/24/23 Distal;Dorsal (posterior); Right Forearm 1 day                Labs studies:   I have personally reviewed pertinent labs  Results from last 7 days   Lab Units 03/24/23  0205 03/23/23  0614 03/22/23  1829   POTASSIUM mmol/L 4 0 3 9 3 7   CHLORIDE mmol/L 106 107 108   CO2 mmol/L 31 29 27   BUN mg/dL 9 6 7   CREATININE mg/dL 0 60 0 63 0 71   EGFR ml/min/1 73sq m 100 98 93   CALCIUM mg/dL 9 0 9 0 9 3   AST U/L  --   --  19   ALT U/L  --   --  22   ALK PHOS U/L  --   --  118*     Results from last 7 days   Lab Units 03/24/23  0205 03/23/23  0614 03/22/23  1829   WBC Thousand/uL 13 73* 9 70 12 14*   HEMOGLOBIN g/dL 12 4 12 8 14 3   PLATELETS Thousands/uL 155 147* 175     Results from last 7 days   Lab Units 03/23/23  1523   GRAM STAIN RESULT  No Polys*  3+ Gram negative rods*  2+ Gram positive cocci in pairs and chains*       Imaging Studies:   I have personally reviewed pertinent imaging study reports and images in PACS  EKG, Pathology, and Other Studies:   I have personally reviewed pertinent reports

## 2023-03-25 NOTE — QUICK NOTE
OMS progress note  HPI:  60 y o  female who presents with  left facial pain and swelling a w the left posterior mandibular tooth now POD s/p extraction and incision and drainage  Pt reports improvement in swelling and discomfort  Pt is ambulating  Voiding, tolerating  Afebrile, VSS         Vitals:    03/25/23 1520   BP: 104/61   Pulse: 68   Resp: 18   Temp: (!) 97 °F (36 1 °C)   SpO2: 96%     PE:   Gen: NAD, well appearing, Sating well on RA and speaking in full sentences   Neuro: CN exam grossly intact with mild hypoesthesia of left CN V3  Face: mild left lower facial swelling, that is soft and minimally tender  No gross cervical LAD  Oral: Extraction site #19 Hemostatic with sutures intact  Minimally tender with no fluctuant swelling or purulence notes  FOM soft , non-tender, non-elevated uvula midline         Assessment:  60 y o  female with left buccal vestibular swelling a/w carious left lower molar  Periapical lesions on teeth #15 and #30         Plan/Recs:  - cont Clinda, ceftriaxone per ID  - diet as tolerated   - meticulous oral hygiene with saline rinses after meals   - Warm compress to swelling prn   - HOB elevated     - Pt clear for DC from OMS perspective

## 2023-03-25 NOTE — ASSESSMENT & PLAN NOTE
Presents with left sided facial swelling, +trismus  CT Face with evidence of facial celulitis, no abscess, but concern for dental involvement  Hx impacted wisdom tooth with infection requiring OMFS intervention in 2019  OR with OMFS on 3/23 for intraoral I&D, tooth extraction  Initially started on steroids prior to OR given worsening swelling, per OMFS okay to discontinue these on 3/24    · Continue Clindamycin, ceftriaxone added 3/24 given Gram stain results (GPCs and GNRs)  · Switch to PO clindamycine and cefdinir 3/25 afternoon, d/w patient and given hx of allergic rxns, will monitor for cross-reactivity on abx change until tomorrow then plan for discharge if stable   · Appreciate infectious disease input  · Pain control: Patient prefers to avoid opioids, will continue PRN APAP and ketorolac

## 2023-03-25 NOTE — CASE MANAGEMENT
Case Management Assessment & Discharge Planning Note    Patient name Julieta Shirley  Location [address] 912/[address]HP 460-61 MRN [id number]  : 1965 Date 3/25/2023       Current Admission Date: 3/22/2023  Current Admission Diagnosis:Facial cellulitis   Patient Active Problem List    Diagnosis Date Noted   • Rhinosinusitis 2023   • Dysphonia 2023   • Hypertrophy of both inferior nasal turbinates 2022   • Chronic rhinitis 2022   • Abnormal CT of the chest 2021   • Acid-fast bacteria present 2021   • Abnormal TSH 2021   • Hyperglycemia 04/10/2021   • Chronic sinusitis 2021   • Hyponatremia 2021   • Abnormal thyroid function test 2021   • Allergy to multiple antibiotics 2021   • Allergic rhinitis due to animal (cat) (dog) hair and dander 2019   • Intrinsic atopic dermatitis 2019   • Allergy to cephalosporin 2019   • Allergy to tetracycline 2019   • Allergy to 4-quinolone agent 2019   • Allergy to mold 2019   • Ataxia 2019   • S/P bilateral breast reduction 2019   • Transient right leg weakness 2019   • Nasal septal deviation 2019   • Gastroesophageal reflux disease 2019   • Laryngeal edema 2019   • Gluten intolerance 2019   • Chronic seasonal allergic rhinitis due to pollen 2019   • Allergic rhinitis due to house dust mite 2019   • Allergic conjunctivitis of both eyes 2019   • Eosinophilia 2019   • Tobacco use 2019   • Facial cellulitis 2019   • Obstructive sleep apnea syndrome 2019   • Lumbar radiculopathy 2017   • HTN (hypertension) 2017   • Laryngopharyngeal reflux 2016   • Hyperlipidemia 10/31/2014   • Fibromyalgia 2013   • Asthma 2013   • Depression with anxiety 2013   • Atrophic vaginitis 12/10/2012      LOS (days): 1  Geometric Mean LOS (GMLOS) (days): 3 40  Days to GMLOS:2 3 OBJECTIVE:    Risk of Unplanned Readmission Score: 9 05         Current admission status: Inpatient       Preferred Pharmacy:   [address] [facility] [address] , [address]  [address] [facility] [address]  Phone: [phone] Fax: [phone]    Alecia Martínez [address] [place] [address]  Phone: [phone] Fax: [phone]    Primary Care Provider: Radha Lazo DO    Primary Insurance: Monet Dimas [facility]  Secondary Insurance:     ASSESSMENT:  David 26 Proxies    There are no active Health Care Proxies on file  Patient Information  Admitted from[de-identified] Home  Mental Status: Alert  During Assessment patient was accompanied by: Not accompanied during assessment  Assessment information provided by[de-identified] Patient  Primary Caregiver: Self  Support Systems: Self, Spouse/significant other, Son, Children, Family members, Courtney Noriega 60 of Residence: [place] [address] do you live in?: [address] entry access options   Select all that apply : Stairs  Number of steps to enter home : 2  Type of Current Residence: [facility]  In the last 12 months, was there a time when you were not able to pay the mortgage or rent on time?: No  In the last 12 months, how many places have you lived?: 1  In the last 12 months, was there a time when you did not have a steady place to sleep or slept in a shelter (including now)?: No  Homeless/housing insecurity resource given?: N/A  Living Arrangements: Lives w/ Spouse/significant other  Is patient a ?: No    Activities of Daily Living Prior to Admission  Functional Status: Independent  Completes ADLs independently?: Yes  Ambulates independently?: Yes  Does patient use assisted devices?: No  Does patient currently own DME?: Yes  What DME does the patient currently own?: CPAP (Per pt she does not use)  Does patient have a history of Outpatient Therapy (PT/OT)?: No  Does the patient have a history of Short-Term Rehab?: No  Does patient have a history of HHC?: No  Does patient currently have [address]?: No         Patient Information Continued  Income Source: SSI/SSD  Does patient have prescription coverage?: Yes  Within the past 12 months, you worried that your food would run out before you got the money to buy more : Never true  Within the past 12 months, the food you bought just didn't last and you didn't have money to get more : Never true  Food insecurity resource given?: N/A  Does patient receive dialysis treatments?: No  Does patient have a history of substance abuse?: No  Does patient have a history of Mental Health Diagnosis?: Yes (Depression with anxiety)  Has patient received inpatient treatment related to mental health in the last 2 years?: No (Was in partial 10/11 yrs ago)         Means of Transportation  Means of Transport to [facility][de-identified] Drives Self  In the past 12 months, has lack of transportation kept you from medical appointments or from getting medications?: No  In the past 12 months, has lack of transportation kept you from meetings, work, or from getting things needed for daily living?: No  Was application for public transport provided?: N/A        DISCHARGE DETAILS:    Discharge planning discussed with[de-identified] Pt  Freedom of Choice: Yes                   Contacts  Patient Contacts:  Elvira Khalil-sister  Relationship to Patient[de-identified] Family  Contact Method: Phone  Phone Number: [phone]  Reason/Outcome: Emergency Contact, Discharge [address]         Is the patient interested in [address] at discharge?: No    DME Referral Provided  Referral made for DME?: No      Treatment Team Recommendation: Home  Discharge Destination Plan[de-identified] Home  Transport at Discharge : Family

## 2023-03-25 NOTE — PROGRESS NOTES
[facility]  Progress Note  Name: Oliverio Sood  MRN: [id number]  [facility] I Date of Admission: 3/22/2023   Date of Service: 3/25/2023 I Hospital Day: 1    Assessment/Plan   * Facial cellulitis  Assessment & Plan  Presents with left sided facial swelling, +trismus  CT Face with evidence of facial celulitis, no abscess, but concern for dental involvement  Hx impacted wisdom tooth with infection requiring OMFS intervention in 2019  OR with OMFS on 3/23 for intraoral I&D, tooth extraction  Initially started on steroids prior to OR given worsening swelling, per OMFS okay to discontinue these on 3/24  · Continue Clindamycin, ceftriaxone added 3/24 given Gram stain results (GPCs and GNRs)  · Switch to PO clindamycine and cefdinir 3/25 afternoon, d/w patient and given hx of allergic rxns, will monitor for cross-reactivity on abx change until tomorrow then plan for discharge if stable   · Appreciate infectious disease input  · Pain control: Patient prefers to avoid opioids, will continue PRN APAP and ketorolac    Tobacco use  Assessment & Plan  · Current smoker, 7 cigarettes/day  · Nicotine patch offered, cessation encouraged    Depression with anxiety  Assessment & Plan  · Continue home Abilify, Buspar, Lexapro, ativan    Asthma  Assessment & Plan  · Does not appear to be in acute exacerbation at this time  · Continue albuterol, fluticasone             VTE Pharmacologic Prophylaxis: VTE Score: 3 Moderate Risk (Score 3-4) - Pharmacological DVT Prophylaxis Ordered: enoxaparin (Lovenox)  Patient Centered Rounds: Discussed with RN  Discussions with Specialists or Other Care Team Provider: ID    Education and Discussions with Family / Patient: Updated  (son) at bedside      Total Time Spent on Date of Encounter in care of patient: 25 minutes This time was spent on one or more of the following: performing physical exam; counseling and coordination of care; obtaining or reviewing history; documenting in the medical record; reviewing/ordering tests, medications or procedures; communicating with other healthcare professionals and discussing with patient's family/caregivers  Current Length of Stay: 1 day(s)  Current Patient Status: Inpatient   Certification Statement: The patient will continue to require additional inpatient hospital stay due to As above  Discharge Plan: Anticipate discharge tomorrow to home  Code Status: Level 1 - Full Code    Subjective:   Swelling still present, improved from yesterday  Pain manageable with ketorolac, discussed having to transition to p o  soon  Nervous about changing antibiotics, given change to diet discussed reasonable for monitoring another day and discharge tomorrow as long as she is stable and does not have any reactions  Objective:     Vitals:   Temp (24hrs), Av 3 °F (36 3 °C), Min:97 °F (36 1 °C), Max:97 5 °F (36 4 °C)    Temp:  [97 °F (36 1 °C)-97 5 °F (36 4 °C)] 97 °F (36 1 °C)  HR:  [65-69] 68  Resp:  [18] 18  BP: (104-106)/(61-63) 104/61  SpO2:  [95 %-96 %] 96 %  Body mass index is 30 05 kg/m²  Input and Output Summary (last 24 hours): Intake/Output Summary (Last 24 hours) at 3/25/2023 1654  Last data filed at 3/25/2023 1520  Gross per 24 hour   Intake 420 ml   Output 2600 ml   Net -2180 ml       Physical Exam:   Physical Exam  Vitals reviewed  Constitutional:       General: She is not in acute distress  Appearance: She is not ill-appearing or toxic-appearing  HENT:      Head:      Comments: Left-sided facial swelling still noticeable, but improved from yesterday     Mouth/Throat:      Mouth: Mucous membranes are moist    Eyes:      General: No scleral icterus  Pulmonary:      Effort: Pulmonary effort is normal  No respiratory distress  Skin:     General: Skin is warm and dry  Neurological:      Mental Status: She is alert and oriented to person, place, and time     Psychiatric: Mood and Affect: Mood normal          Behavior: Behavior normal             Additional Data:     Labs:  Results from last 7 days   Lab Units 03/24/23  0205   WBC Thousand/uL 13 73*   HEMOGLOBIN g/dL 12 4   HEMATOCRIT % 37 8   PLATELETS Thousands/uL 155   NEUTROS PCT % 75   LYMPHS PCT % 16   MONOS PCT % 8   EOS PCT % 1     Results from last 7 days   Lab Units 03/24/23  0205 03/23/23  0614 03/22/23  1829   SODIUM mmol/L 138   < > 135   POTASSIUM mmol/L 4 0   < > 3 7   CHLORIDE mmol/L 106   < > 108   CO2 mmol/L 31   < > 27   BUN mg/dL 9   < > 7   CREATININE mg/dL 0 60   < > 0 71   ANION GAP mmol/L 1*   < > 0*   CALCIUM mg/dL 9 0   < > 9 3   ALBUMIN g/dL  --   --  4 1   TOTAL BILIRUBIN mg/dL  --   --  0 29   ALK PHOS U/L  --   --  118*   ALT U/L  --   --  22   AST U/L  --   --  19   GLUCOSE RANDOM mg/dL 100   < > 116    < > = values in this interval not displayed  Results from last 7 days   Lab Units 03/23/23  0614   PROCALCITONIN ng/ml <0 05       Lines/Drains:  Invasive Devices     Peripheral Intravenous Line  Duration           Peripheral IV 03/24/23 Distal;Dorsal (posterior); Right Forearm 1 day                      Imaging: No pertinent imaging reviewed      Recent Cultures (last 7 days):   Results from last 7 days   Lab Units 03/23/23  1523   GRAM STAIN RESULT  No Polys*  3+ Gram negative rods*  2+ Gram positive cocci in pairs and chains*       Last 24 Hours Medication List:   Current Facility-Administered Medications   Medication Dose Route Frequency Provider Last Rate   • acetaminophen  650 mg Oral Q6H PRN Eric Candle, DMD     • albuterol  2 puff Inhalation Q6H PRN Eric Candle, DMD     • ARIPiprazole  10 mg Oral Daily Eric Candle, DMD     • atorvastatin  10 mg Oral Daily Eric Candle, DMD     • azelastine  1 spray Each Nare BID Eric Candle, DMD     • busPIRone  5 mg Oral BID Eric Candle, DMD     • cefdinir  300 mg Oral Q12H [address], MD     • clindamycin  300 mg Oral [place] [address] Edith Mckeon MD     • enoxaparin  40 mg Subcutaneous Daily Eugune Ravin, DMD     • escitalopram  30 mg Oral Daily Eugune Ravin, DMD     • fluticasone  1 puff Inhalation BID Eugune Ravin, DMD     • ketorolac  15 mg Intravenous Q6H PRN Joceline Lanza MD     • LORazepam  0 5 mg Oral BID Eugune Ravin, DMD     • nicotine  14 mg Transdermal Daily Eugune Ravin, DMD     • ondansetron  4 mg Intravenous Q6H PRN Eugune Ravin, DMD     • saccharomyces boulardii  250 mg Oral BID Eugune Ravin, DMD          Today, Patient Was Seen By: Joceline Lanza MD    **Please Note: This note may have been constructed using a voice recognition system  **

## 2023-03-25 NOTE — DISCHARGE INSTR - AVS FIRST PAGE
- warm compress to face as needed for the first three days after procedure     POST OPERATIVE INSTRUCTIONS FOLLOWING ORAL SURGERY    Swelling:  Swelling after surgery is expected  If any new or worsening swelling occurs you should follow up with [facility] sooner than one week  Avoid ice to face, warm compress to swollen area for the first 3 days after surgery  Swelling is common and need not cause alarm  Rinsing: DO NOT RINSE for the first 12 hours after surgery  After 24 hours it is important to rinse, using warm salt water (not over the counter mouthwash) 3 to 4 times a day, particularly after eating  Brush areas of mouth not affected by the surgery starting tomorrow  Spitting: DO NOT SPIT OUT frequent spitting will cause bleeding to continue  Exercise Jaw: In some cases following oral surgery, it becomes difficult to open your mouth  Exercise your jaw frequently by attempting to open your mouth wide  You may experience discomfort at first, however, with continued exercise the discomfort is reduced  Diet: After having oral surgery it is recommended the patient maintain a semi-liquid diet for 24 hours  A regular diet should be resumed as soon as possible, avoiding peanuts, pretzels, and foods with seeds  Vomiting: Occasionally, patients will have nausea after surgery  Tea, ginger ale, and soup broth will help this complication  Pain: A prescription for pain relieving drugs is given when surgery is extensive  For lesser surgical procedures, it is recommended the patient use Motrin or Advil  If you are in pain and the drug you are taking does not help, please contact us and we will try to remedy the situation  Bleeding: Bite on gauze for 30 minutes  If the bleeding continues, bite on new gauze for an additional 30 minutes  If bleeding continues, place a damp tea bag over the socket and continue to bite down for an additional 30 minutes   Frequent gauze changes allow bleeding to continue  Smoking: It is important you DO NOT SMOKE after surgery  Smoking caused dry socket, which is very painful  Concerns: May call [facility] at [phone]  Emergency: Go to the [address] at [facility] and ask for the Oral Surgeon on call  DO NOT WAIT until your post-operative appointment to consult [facility] [phone]

## 2023-03-26 VITALS
BODY MASS INDEX: 30.08 KG/M2 | HEIGHT: 65 IN | HEART RATE: 63 BPM | RESPIRATION RATE: 18 BRPM | DIASTOLIC BLOOD PRESSURE: 68 MMHG | TEMPERATURE: 96.8 F | OXYGEN SATURATION: 95 % | WEIGHT: 180.56 LBS | SYSTOLIC BLOOD PRESSURE: 120 MMHG

## 2023-03-26 LAB — BACTERIA SPEC ANAEROBE CULT: NORMAL

## 2023-03-26 RX ORDER — CEFDINIR 300 MG/1
300 CAPSULE ORAL EVERY 12 HOURS SCHEDULED
Qty: 15 CAPSULE | Refills: 0 | Status: SHIPPED | OUTPATIENT
Start: 2023-03-26 | End: 2023-04-04 | Stop reason: SDUPTHER

## 2023-03-26 RX ORDER — CEFDINIR 300 MG/1
300 CAPSULE ORAL EVERY 12 HOURS SCHEDULED
Refills: 0 | Status: CANCELLED | OUTPATIENT
Start: 2023-03-26

## 2023-03-26 RX ORDER — CLINDAMYCIN HYDROCHLORIDE 300 MG/1
300 CAPSULE ORAL EVERY 6 HOURS SCHEDULED
Qty: 30 CAPSULE | Refills: 0 | Status: SHIPPED | OUTPATIENT
Start: 2023-03-26 | End: 2023-04-03

## 2023-03-26 RX ORDER — SACCHAROMYCES BOULARDII 250 MG
250 CAPSULE ORAL 2 TIMES DAILY
Qty: 60 CAPSULE | Refills: 0 | Status: SHIPPED | OUTPATIENT
Start: 2023-03-26 | End: 2023-04-04

## 2023-03-26 RX ADMIN — ENOXAPARIN SODIUM 40 MG: 40 INJECTION SUBCUTANEOUS at 09:27

## 2023-03-26 RX ADMIN — AZELASTINE 1 SPRAY: 1 SPRAY, METERED NASAL at 09:29

## 2023-03-26 RX ADMIN — CLINDAMYCIN HYDROCHLORIDE 300 MG: 150 CAPSULE ORAL at 13:06

## 2023-03-26 RX ADMIN — FLUTICASONE PROPIONATE 1 PUFF: 110 AEROSOL, METERED RESPIRATORY (INHALATION) at 09:29

## 2023-03-26 RX ADMIN — CLINDAMYCIN HYDROCHLORIDE 300 MG: 150 CAPSULE ORAL at 00:11

## 2023-03-26 RX ADMIN — Medication 250 MG: at 09:25

## 2023-03-26 RX ADMIN — CEFDINIR 300 MG: 300 CAPSULE ORAL at 09:27

## 2023-03-26 RX ADMIN — ACETAMINOPHEN 650 MG: 325 TABLET ORAL at 05:35

## 2023-03-26 RX ADMIN — ESCITALOPRAM OXALATE 30 MG: 20 TABLET, FILM COATED ORAL at 09:25

## 2023-03-26 RX ADMIN — CLINDAMYCIN HYDROCHLORIDE 300 MG: 150 CAPSULE ORAL at 05:32

## 2023-03-26 RX ADMIN — LORAZEPAM 0.5 MG: 0.5 TABLET ORAL at 09:26

## 2023-03-26 NOTE — ASSESSMENT & PLAN NOTE
Presents with left sided facial swelling, +trismus  CT Face with evidence of facial celulitis, no abscess, but concern for dental involvement  Hx impacted wisdom tooth with infection requiring OMFS intervention in 2019  OR with OMFS on 3/23 for intraoral I&D, tooth extraction  Initially started on steroids prior to OR given worsening swelling, per OMFS okay to discontinue these on 3/24  · Started on clindamycin, ceftriaxone added 3/24 given Gram stain results (GPCs and GNRs)  · Switch to PO clindamycin and cefdinir 3/25 afternoon, monitored for antibiotic reaction without any issues, discharged 3/29 to complete a total of 10-day postop course of clindamycin and cefdinir (antibiotics and 4/2)  · Infectious disease will follow-up final cultures and adjust antibiotics if needed outpatient  · Pain control: Patient prefers to avoid opioids, will continue PRN APAP and NSAIDs    Advised patient not to take prolonged NSAIDs, and to contact her doctor if she needs further assistance with pain management  · OMFS will follow up with patient next week in the office to assess healing

## 2023-03-26 NOTE — ASSESSMENT & PLAN NOTE
· Does not appear to be in acute exacerbation at this time    · Continue albuterol, fluticasone Treated Area: small area Pulse Duration: 10 ms Cryogen Time (Ms): 30 Fluence In J/Cm2 (Optional): 10.5 Delay Time (Ms): 20 Detail Level: Simple Pulse Count (Optional): 9 Immediate Endpoint: purpura Delay Time (Ms): 20 Spot Size: 7 mm Consent: Written consent obtained, risks reviewed including but not limited to crusting, scabbing, blistering, scarring, darker or lighter pigmentary change, incidental hair removal, bruising, and/or incomplete removal. Post-Care Instructions: I reviewed with the patient in detail post-care instructions. Patient should stay away from the sun and wear sun protection until treated areas are fully healed. Cryogen Time (Ms): 30 Laser Type: Vbeam 595nm Post-Procedure Care: Vaseline and ice applied. Post care reviewed with patient. Pulse Duration: 1.5 ms Location (Required For Details To Render In Note But Body Touch Will Also Count For First Location): forehead Detail Level: Zone Cryogen Time (Ms): 30 Pulse Count (Optional): 6 Fluence In J/Cm2 (Optional): 10

## 2023-03-26 NOTE — DISCHARGE SUMMARY
[address] [facility]  Discharge- Romaine Elijah 1965, 60 y o  female MRN: [id number]  Unit/Bed#: [facility] 175-33 Encounter: [id number]  Primary Care Provider: Whitney Morejon DO   Date and time admitted to hospital: 3/22/2023  5:32 PM    * Facial cellulitis  Assessment & Plan  Presents with left sided facial swelling, +trismus  CT Face with evidence of facial celulitis, no abscess, but concern for dental involvement  Hx impacted wisdom tooth with infection requiring OMFS intervention in 2019  OR with OMFS on 3/23 for intraoral I&D, tooth extraction  Initially started on steroids prior to OR given worsening swelling, per OMFS okay to discontinue these on 3/24  · Started on clindamycin, ceftriaxone added 3/24 given Gram stain results (GPCs and GNRs)  · Switch to PO clindamycin and cefdinir 3/25 afternoon, monitored for antibiotic reaction without any issues, discharged 3/29 to complete a total of 10-day postop course of clindamycin and cefdinir (antibiotics and 4/2)  · Infectious disease will follow-up final cultures and adjust antibiotics if needed outpatient  · Pain control: Patient prefers to avoid opioids, will continue PRN APAP and NSAIDs  Advised patient not to take prolonged NSAIDs, and to contact her doctor if she needs further assistance with pain management  · OMFS will follow up with patient next week in the office to assess healing    Tobacco use  Assessment & Plan  · Current smoker, 7 cigarettes/day  · Nicotine patch offered, cessation encouraged    Depression with anxiety  Assessment & Plan  · Continue home Abilify, Buspar, Lexapro, ativan    Asthma  Assessment & Plan  · Does not appear to be in acute exacerbation at this time    · Continue albuterol, fluticasone      Medical Problems     Resolved Problems  Date Reviewed: 2/27/2023   None       Discharging Physician / Practitioner: Frida Moffett MD  PCP: Whitney Morejon DO  Admission Date:   Admission Orders (From admission, onward)     Ordered        03/24/23 0757  Inpatient Admission  Once            03/22/23 2145  Place in Observation  Once                      Discharge Date: 03/26/23    Consultations During Hospital Stay:  · OMFS  · Infectious disease    Procedures Performed:   · 3/23 intraoral I&D, tooth extraction    Significant Findings / Test Results:   · CT soft tissue neck and face 3/22/2023     1  Left facial cellulitis as described  No definite evidence for abscess  This is most likely odontogenic in nature as described above and should be correlated with dental examination  Parotid and submandibular glands appear unremarkable  Incidental Findings:   · None    Test Results Pending at Discharge (will require follow up): · Final culture data from mouth swab     Outpatient Tests Requested:  · None    Complications: None    Reason for Admission: Infection    Hospital Course:   Rosario Bailey is a 60 y o  female patient who originally presented to the hospital on 3/22/2023 due to facial swelling  She was found to have left facial cellulitis likely odontogenic in nature  She was taken to the OR by OMFS for I&D and tooth extraction on 3/23, and seen by infectious disease for antibiotic management given her multiple antibiotic allergies  She was monitored for any cross-reactivity to antibiotics and none was seen therefore she is being discharged on cefdinir and clindamycin  She will follow-up outpatient with OMFS  Please see above list of diagnoses and related plan for additional information  Condition at Discharge: good    Discharge Day Visit / Exam:   Subjective: No new concerns  Pain controlled  Swelling gradually decreasing although still present  Return precautions reviewed, discharge plan reviewed    Vitals: Blood Pressure: 120/68 (03/26/23 0733)  Pulse: 63 (03/26/23 0733)  Temperature: (!) 96 8 °F (36 °C) (03/26/23 0733)  Temp Source: Temporal (03/23/23 1542)  Respirations: 18 (03/26/23 4974)  Height: 5' 5" (165 1 cm) (03/22/23 2342)  Weight - Scale: 81 9 kg (180 lb 8 9 oz) (03/22/23 2342)  SpO2: 95 % (03/26/23 0733)  Exam:   Physical Exam  Vitals reviewed  Constitutional:       General: She is not in acute distress  Appearance: She is not ill-appearing or toxic-appearing  HENT:      Head:      Comments: Left-sided facial swelling still noticeable, continues to improve     Mouth/Throat:      Mouth: Mucous membranes are moist    Eyes:      General: No scleral icterus  Pulmonary:      Effort: Pulmonary effort is normal  No respiratory distress  Skin:     General: Skin is warm and dry  Neurological:      Mental Status: She is alert and oriented to person, place, and time  Psychiatric:         Mood and Affect: Mood normal          Behavior: Behavior normal             Discussion with Family: Message sent to patient's son  Discharge instructions/Information to patient and family:   See after visit summary for information provided to patient and family  Provisions for Follow-Up Care:  See after visit summary for information related to follow-up care and any pertinent home health orders  Disposition:   Home    Planned Readmission: None     Discharge Statement:  I spent 35 minutes discharging the patient  This time was spent on the day of discharge  I had direct contact with the patient on the day of discharge  Greater than 50% of the total time was spent examining patient, answering all patient questions, arranging and discussing plan of care with patient as well as directly providing post-discharge instructions  Additional time then spent on discharge activities  Discharge Medications:  See after visit summary for reconciled discharge medications provided to patient and/or family        **Please Note: This note may have been constructed using a voice recognition system**

## 2023-03-26 NOTE — PLAN OF CARE
Problem: SKIN/TISSUE INTEGRITY - ADULT  Goal: Incision(s), wounds(s) or drain site(s) healing without S/S of infection  Description: INTERVENTIONS  - Assess and document dressing, incision, wound bed, drain sites and surrounding tissue  - Provide patient and family education  Outcome: Progressing     Problem: PAIN - ADULT  Goal: Verbalizes/displays adequate comfort level or baseline comfort level  Description: Interventions:  - Encourage patient to monitor pain and request assistance  - Assess pain using appropriate pain scale (e g  0-10)  - Administer analgesics based on type and severity of pain and evaluate response  - Implement non-pharmacological measures as appropriate and evaluate response  - Notify physician/advanced practitioner if interventions unsuccessful or patient reports new pain  Outcome: Progressing     Problem: INFECTION - ADULT  Goal: Absence or prevention of progression during hospitalization  Description: INTERVENTIONS:  - Assess and monitor for signs and symptoms of infection  - Monitor lab/diagnostic results  - Monitor all insertion sites, i e  IV site  - [place] appropriate cooling/warming therapies per order  - Administer medications as ordered  - Instruct and encourage patient and family to use good hand hygiene technique  Outcome: Progressing

## 2023-03-27 ENCOUNTER — TRANSITIONAL CARE MANAGEMENT (OUTPATIENT)
Dept: INTERNAL MEDICINE CLINIC | Facility: CLINIC | Age: 58
End: 2023-03-27

## 2023-03-27 LAB
BACTERIA SPEC ANAEROBE CULT: ABNORMAL
FUNGUS SPEC CULT: NORMAL

## 2023-03-27 NOTE — UTILIZATION REVIEW
Continued Stay Review    Date: 3/25/23    Day 2                       Current Patient Class: inpatient  Current Level of Care: med surg    HPI:58 y o  female initially admitted on 3/22/23     Assessment/Plan:  Swelling continues, pain controlled with IV pain meds will transition to po, will need to observe patient for abx change and monitor for reaction dt hx of allergic reactions, need to monitor for cross-reactivity      Vital Signs:   Date/Time Temp Pulse Resp BP MAP (mmHg) SpO2 O2 Device   03/26/23 0904 -- -- -- -- -- -- None (Room air)   03/26/23 07:33:05 96 8 °F (36 °C) Abnormal  63 18 120/68 85 95 % --   03/25/23 22:20:13 97 3 °F (36 3 °C) Abnormal  67 20 120/81 94 97 % --   03/25/23 22:19:48 97 3 °F (36 3 °C) Abnormal  72 18 120/81 94 98 % --   03/25/23 15:20:26 97 °F (36 1 °C) Abnormal  68 18 104/61 75 96 % --   03/25/23 09:03:22 97 3 °F (36 3 °C) Abnormal  69 18 105/61 76 96 % --   03/24/23 22:58:02 97 5 °F (36 4 °C) 65 18 106/63 77 95 % --   03/24/23 1930 -- -- 18 -- -- -- --   03/24/23 15:23:51 97 5 °F (36 4 °C) 69 -- 105/64 78 90 % --   03/24/23 07:49:11 97 2 °F (36 2 °C) Abnormal  80 17 105/66 79 91 % --       Pertinent Labs/Diagnostic Results:       Results from last 7 days   Lab Units 03/24/23  0205 03/23/23  0614 03/22/23  1829   WBC Thousand/uL 13 73* 9 70 12 14*   HEMOGLOBIN g/dL 12 4 12 8 14 3   HEMATOCRIT % 37 8 40 2 43 0   PLATELETS Thousands/uL 155 147* 175   NEUTROS ABS Thousands/µL 10 27* 6 06 9 04*         Results from last 7 days   Lab Units 03/24/23  0205 03/23/23  0614 03/22/23  1829   SODIUM mmol/L 138 139 135   POTASSIUM mmol/L 4 0 3 9 3 7   CHLORIDE mmol/L 106 107 108   CO2 mmol/L 31 29 27   ANION GAP mmol/L 1* 3* 0*   BUN mg/dL 9 6 7   CREATININE mg/dL 0 60 0 63 0 71   EGFR ml/min/1 73sq m 100 98 93   CALCIUM mg/dL 9 0 9 0 9 3   MAGNESIUM mg/dL  --  2 4  --      Results from last 7 days   Lab Units 03/22/23  1829   AST U/L 19   ALT U/L 22   ALK PHOS U/L 118*   TOTAL PROTEIN g/dL 7 2 ALBUMIN g/dL 4 1   TOTAL BILIRUBIN mg/dL 0 29         Results from last 7 days   Lab Units 03/24/23  0205 03/23/23  0614 03/22/23  1829   GLUCOSE RANDOM mg/dL 100 104 116     Results from last 7 days   Lab Units 03/23/23  0614   PROCALCITONIN ng/ml <0 05     Results from last 7 days   Lab Units 03/23/23  1523   GRAM STAIN RESULT  No Polys*  3+ Gram negative rods*  2+ Gram positive cocci in pairs and chains*     Medications:   Medication Dose Route Frequency   • acetaminophen  650 mg Oral Q6H PRN   • albuterol  2 puff Inhalation Q6H PRN   • ARIPiprazole  10 mg Oral Daily   • atorvastatin  10 mg Oral Daily   • azelastine  1 spray Each Nare BID   • busPIRone  5 mg Oral BID   • cefdinir  300 mg Oral Q12H [address]   • clindamycin  300 mg Oral Q6H [address]   • enoxaparin  40 mg Subcutaneous Daily   • escitalopram  30 mg Oral Daily   • fluticasone  1 puff Inhalation BID   • ketorolac  15 mg Intravenous Q6H PRN   • LORazepam  0 5 mg Oral BID   • nicotine  14 mg Transdermal Daily   • ondansetron  4 mg Intravenous Q6H PRN   • saccharomyces boulardii  250 mg Oral BID       Discharge Plan: pt was discharged on 3/26 to home    Network Utilization Review Department  ATTENTION: Please call with any questions or concerns to [phone] and carefully listen to the prompts so that you are directed to the right person  All voicemails are confidential   Natalia Lobato all requests for admission clinical reviews, approved or denied determinations and any other requests to dedicated fax number below belonging to the campus where the patient is receiving treatment   List of dedicated fax numbers for the Facilities:  [address] DENIALS (Administrative/Medical Necessity) [phone]   [address] (Maternity/NICU/Pediatrics) [phone]   [address] 7320 [facility] [address] [place] [address] Jillian Amaya [address] [facility] [place] [address] [facility] [phone]

## 2023-03-27 NOTE — UTILIZATION REVIEW
NOTIFICATION OF ADMISSION DISCHARGE   This is a Notification of Discharge from [facility]  Please be advised that this patient has been discharge from our facility  Below you will find the admission and discharge date and time including the patient’s disposition  UTILIZATION REVIEW CONTACT:  Mary Cramer  Utilization   Network Utilization Review Department  Phone: [phone] x carefully listen to the prompts  All voicemails are confidential   Email: [email or web address]     ADMISSION INFORMATION  PRESENTATION DATE: 3/22/2023  5:32 PM  OBERVATION ADMISSION DATE:   INPATIENT ADMISSION DATE: 3/24/23  7:57 AM   DISCHARGE DATE: 3/26/2023  2:42 PM   DISPOSITION:Home/Self Care    IMPORTANT INFORMATION:  Send all requests for admission clinical reviews, approved or denied determinations and any other requests to dedicated fax number below belonging to the campus where the patient is receiving treatment   List of dedicated fax numbers:  [address] DENIALS (Administrative/Medical Necessity) [phone]   [address] (Maternity/NICU/Pediatrics) [phone]   [facility] [phone]   [facility] [phone]   [facility] 134 [phone]   220 [facility] [phone]   90 [facility] [phone]   [address] [place] [address] [phone]   [facility]  [phone]   4057 [facility] [phone]   412 [facility] 850 [address] [facility] [phone]

## 2023-03-27 NOTE — UTILIZATION REVIEW
NOTIFICATION OF INPATIENT ADMISSION   AUTHORIZATION REQUEST   SERVICING FACILITY:   [facility]  Address: [address] [place] [address], [address] [place] [address]  Tax ID: [id number]  NPI: [id number] ATTENDING PROVIDER:  Attending Name and NPI#: Chris Kenney [[id number]]  Address: [address] [place] [address]  Phone: [phone]   ADMISSION INFORMATION:  Place of Service: Inpatient 4[address] [facility]  60[address]  Place of Service Code: 21  Inpatient Admission Date/Time: 3/24/23  7:57 AM  Discharge Date/Time: 3/26/2023  2:42 PM  Admitting Diagnosis Code/Description:  Facial swelling [R22 0]  Dental infection [K04 7]  Facial cellulitis [F43 426]     UTILIZATION REVIEW CONTACT:  Matilde Lewis Utilization   Network Utilization Review Department  Phone: [phone]  Fax: [phone]  Email: [email or web address]  Contact for approvals/pending authorizations, clinical reviews, and discharge  PHYSICIAN ADVISORY SERVICES:  Medical Necessity Denial & [facility] Review  Phone: [phone]  Fax: [phone]  Email: [email or web address]

## 2023-03-28 LAB
MYCOBACTERIUM SPEC CULT: NORMAL
RHODAMINE-AURAMINE STN SPEC: NORMAL

## 2023-04-03 LAB — FUNGUS SPEC CULT: NORMAL

## 2023-04-04 ENCOUNTER — OFFICE VISIT (OUTPATIENT)
Dept: INTERNAL MEDICINE CLINIC | Facility: CLINIC | Age: 58
End: 2023-04-04

## 2023-04-04 VITALS
HEART RATE: 73 BPM | WEIGHT: 180 LBS | HEIGHT: 65 IN | BODY MASS INDEX: 29.99 KG/M2 | OXYGEN SATURATION: 97 % | RESPIRATION RATE: 16 BRPM

## 2023-04-04 DIAGNOSIS — R55 SYNCOPE AND COLLAPSE: ICD-10-CM

## 2023-04-04 DIAGNOSIS — L03.211 FACIAL CELLULITIS: ICD-10-CM

## 2023-04-04 DIAGNOSIS — D72.829 LEUKOCYTOSIS, UNSPECIFIED TYPE: Primary | ICD-10-CM

## 2023-04-04 DIAGNOSIS — J84.116 CRYPTOGENIC ORGANIZING PNEUMONIA (HCC): ICD-10-CM

## 2023-04-04 LAB
MYCOBACTERIUM SPEC CULT: NORMAL
RHODAMINE-AURAMINE STN SPEC: NORMAL

## 2023-04-04 RX ORDER — CEFDINIR 300 MG/1
300 CAPSULE ORAL EVERY 12 HOURS SCHEDULED
Qty: 10 CAPSULE | Refills: 0 | Status: SHIPPED | OUTPATIENT
Start: 2023-04-04 | End: 2023-04-09

## 2023-04-04 NOTE — PROGRESS NOTES
Assessment/Plan:    TCM Call     Date and time call was made  3/27/2023  3:02 PM    Hospital care reviewed  Records reviewed    Patient was hospitialized at  [facility]    Date of Admission  03/22/23    Date of discharge  03/26/23    Diagnosis  FACIAL CELLULITIS    Disposition  Home      TCM Call     Scheduled for follow up? Yes    I have advised the patient to call PCP with any new or worsening symptoms  Dariana Mobley MA    Living Arrangements  Family members    Counseling  Patient        #Leukocytosis  -is a smoker  -WBC remains elevated  -will check flow cytometry and repeat CBC    #Facial Cellulitis  -previous infection over right side and had wisdom teeth removal  -now infection over left side  -woke up with vertigo and left cheek swelling that worsened  -went to urgent care and started on antibiotics but still got worse  -went to ER and started on steroids, clindamycin, ceftriaxone and had I&D and tooth extraction with OMS  -now on cefdinir and clindamycin and slightly better  -will see OMS Friday  -will continue with clindamycin and cefdinir until then  -cultures grew prevotella and peptoniphilus  -suspicious for immune deficiency  -will check CBC, CMP, ESR, CRP, IgM, IgA, IgG, IgE, CH50, flow cytometry  -refer to allergist/immunologist    #Syncope  -2 episodes at home  -will obtain ECHO, ZIO, carotid US    I have spent a total time of 40 minutes on 04/04/23 in caring for this patient including Instructions for management, Patient and family education, Importance of tx compliance, Risk factor reductions, Impressions, Counseling / Coordination of care, Documenting in the medical record, Reviewing / ordering tests, medicine, procedures   and Obtaining or reviewing history    No problem-specific Assessment & Plan notes found for this encounter  Diagnoses and all orders for this visit:    Leukocytosis, unspecified type  -     Ambulatory Referral to Allergy;  Future  -     Sedimentation rate, automated  -     C-reactive protein  -     IgG, IgA, IgM  -     IgE  -     C3, C4, Complement CH50  -     : HIV 1/2 AB/AG w Reflex [facility] for 2 yr old and above  -     Leukemia/Lymphoma flow cytometry    Facial cellulitis  -     cefdinir (OMNICEF) 300 mg capsule; Take 1 capsule (300 mg total) by mouth every 12 (twelve) hours for 5 days  -     Ambulatory Referral to Allergy; Future    Cryptogenic organizing pneumonia [facility])  -     Ambulatory Referral to Allergy; Future    Syncope and collapse  -     Echo complete w/ contrast if indicated; Future  -     VAS carotid complete study; Future  -     AMB extended holter monitor;  Future            Current Outpatient Medications:   •  cefdinir (OMNICEF) 300 mg capsule, Take 1 capsule (300 mg total) by mouth every 12 (twelve) hours for 5 days, Disp: 10 capsule, Rfl: 0  •  albuterol (Ventolin HFA) 90 mcg/act inhaler, Inhale 2 puffs every 6 (six) hours as needed for wheezing, Disp: 18 g, Rfl: 3  •  ARIPiprazole (ABILIFY) 10 mg tablet, Take 5 mg by mouth daily, Disp: , Rfl:   •  atorvastatin (LIPITOR) 10 mg tablet, take 1 tablet by mouth once daily, Disp: 90 tablet, Rfl: 3  •  azelastine (ASTELIN) 0 1 % nasal spray, instill 1 spray into each nostril twice a day, Disp: 30 mL, Rfl: 11  •  Azelastine-Fluticasone 137-50 MCG/ACT SUSP, 1-2 sprays into each nostril in the morning, Disp: 23 g, Rfl: 11  •  busPIRone (BUSPAR) 30 MG tablet, Take 10 mg by mouth States taking only 10 mg ( breaks the tablets into thirds ), Disp: , Rfl:   •  chlorhexidine (PERIDEX) 0 12 % solution, RINSE WITH 30 MILLILTERS FOR 30 SECONDS THEN SPIT OUT AT BEDTIME, Disp: , Rfl:   •  cholecalciferol (VITAMIN D3) 1,000 units tablet, Take 2,000 Units by mouth daily, Disp: , Rfl:   •  diphenhydrAMINE (BENADRYL) 25 mg tablet, Take 25 mg by mouth every 6 (six) hours as needed for itching, Disp: , Rfl:   •  escitalopram (LEXAPRO) 10 mg tablet, (Takes w/ 20 mg dose for total 30 mg ), Disp: , Rfl:   •  escitalopram (LEXAPRO) 20 mg tablet, Take 20 mg by mouth daily, Disp: , Rfl:   •  fluticasone (Arnuity Ellipta) 100 MCG/ACT AEPB inhaler, Inhale 1 puff daily Rinse mouth after use , Disp: 30 blister, Rfl: 2  •  fluticasone (Arnuity Ellipta) 100 MCG/ACT AEPB inhaler, Inhale 1 puff daily Rinse mouth after use , Disp: 90 blister, Rfl: 0  •  Fluticasone Furoate-Vilanterol (Breo Ellipta) 100-25 mcg/actuation inhaler, Inhale 1 puff daily Rinse mouth after use  (Patient taking differently: Inhale 1 puff daily Rinse mouth after use  Takes as needed for flares ), Disp: 180 blister, Rfl: 0  •  ipratropium (ATROVENT) 0 03 % nasal spray, 2 sprays into each nostril 3 (three) times a day as needed for rhinitis (nasal drip, congestion), Disp: 30 mL, Rfl: 11  •  LORazepam (ATIVAN) 0 5 mg tablet, Take 0 5 mg by mouth 2 (two) times a day as needed, Disp: , Rfl:   •  mometasone (NASONEX) 50 mcg/act nasal spray, 2 sprays into each nostril daily States nasal irrigation from a compounding pharmacy, prescribed by Dr Sharyle Clark, Disp: , Rfl:     Subjective:      Patient ID: Demetrius Louis is a 60 y o  female  HPI     Patient presents for TCM appointment  She was admitted to the hospital on 3/22/2023 through 3/26/2023  She reports that she had an acute episode of vertigo with left parotid gland swelling  She went to urgent care and was diagnosed with facial cellulitis  She was prescribed antibiotics however her symptoms continue to persist and it was too painful to eat  She did not followed up and went to the ER  She had significant throbbing  They started her on steroids and took her to the OR to do tooth debidement  She had significant left facial swelling and underwent an I&D  She was seen by oral maxillary facial surgery  She had similar symptoms back in 2019 when she had an impacted wisdom teeth  That was removed  She was started on clindamycin as well as ceftriaxone  She has been switched over to clindamycin and cefdinir    There is "some mild swelling over her parotid gland at this time  We will encourage her to resume her clindamycin as well as cefdinir until she can see oral surgery on Friday  Her symptoms are suspicious for possible autoimmune disorder  We will obtain a CBC and a CMP as well as an ESR and a CRP and immunoglobulin levels and a CH 50 level as well as flow to cytometry  We will refer her to an allergist and immunologist for further evaluation  Her abscess cultures revealed Prevotella as well as Peptoniphilus  The following portions of the patient's history were reviewed and updated as appropriate: allergies, current medications, past family history, past medical history, past social history, past surgical history and problem list     Review of Systems   Constitutional: Negative for activity change, appetite change, chills, diaphoresis, fatigue and fever  HENT: Positive for facial swelling (left face)  Negative for congestion, sore throat and trouble swallowing  Respiratory: Negative for cough and shortness of breath  Cardiovascular: Negative for chest pain and palpitations  Gastrointestinal: Negative for abdominal pain, constipation, diarrhea, nausea and vomiting  Musculoskeletal: Negative for back pain and myalgias  Neurological: Negative for dizziness and headaches  Objective:      Pulse 73   Resp 16   Ht 5' 5\" (1 651 m)   Wt 81 6 kg (180 lb)   LMP  (LMP Unknown)   SpO2 97%   BMI 29 95 kg/m²          Physical Exam  Constitutional:       General: She is not in acute distress  Appearance: She is well-developed  She is not diaphoretic  HENT:      Head: Normocephalic and atraumatic  Nose: Nose normal  No congestion or rhinorrhea  Mouth/Throat:      Pharynx: Oropharynx is clear  No oropharyngeal exudate or posterior oropharyngeal erythema        Comments: Left parotid swelling  Eyes:      Conjunctiva/sclera: Conjunctivae normal       Pupils: Pupils are equal, round, and reactive " to light  Neck:      Vascular: No JVD  Trachea: No tracheal deviation  Cardiovascular:      Rate and Rhythm: Normal rate and regular rhythm  Pulses: Normal pulses  Heart sounds: Normal heart sounds  No murmur heard  Pulmonary:      Effort: Pulmonary effort is normal  No respiratory distress  Breath sounds: Normal breath sounds  No stridor  No wheezing, rhonchi or rales  Musculoskeletal:         General: No tenderness or deformity  Normal range of motion  Cervical back: Normal range of motion and neck supple  Right lower leg: No edema  Left lower leg: No edema  Skin:     General: Skin is warm and dry  Findings: No erythema  Neurological:      Mental Status: She is alert and oriented to person, place, and time  Mental status is at baseline  This note was completed in part utilizing [employer] direct voice recognition software  Grammatical errors, random word insertion, spelling mistakes, and incomplete sentences may be an occasional consequence of the system secondary to software limitations, ambient noise and hardware issues  At the time of dictation, efforts were made to edit, clarify and /or correct errors  Please read the chart carefully and recognize, using context, where substitutions have occurred  If you have any questions or concerns about the context, text or information contained within the body of this dictation, please contact myself, the provider, for further clarification

## 2023-04-08 LAB
CRP SERPL QL: 3.9 MG/L
ERYTHROCYTE [SEDIMENTATION RATE] IN BLOOD: 20 MM/HOUR (ref 0–29)
IGA SERPL-MCNC: 150 MG/DL (ref 70–400)
IGG SERPL-MCNC: 774 MG/DL (ref 700–1600)
IGM SERPL-MCNC: 130 MG/DL (ref 40–230)

## 2023-04-13 PROBLEM — J32.9 RHINOSINUSITIS: Status: RESOLVED | Noted: 2023-02-12 | Resolved: 2023-04-13

## 2023-04-13 PROBLEM — J31.0 RHINOSINUSITIS: Status: RESOLVED | Noted: 2023-02-12 | Resolved: 2023-04-13

## 2023-04-24 LAB — FUNGUS SPEC CULT: NORMAL

## 2023-04-25 ENCOUNTER — CLINICAL SUPPORT (OUTPATIENT)
Dept: INTERNAL MEDICINE CLINIC | Facility: CLINIC | Age: 58
End: 2023-04-25

## 2023-04-25 DIAGNOSIS — R55 SYNCOPE, UNSPECIFIED SYNCOPE TYPE: Primary | ICD-10-CM

## 2023-05-02 LAB
MYCOBACTERIUM SPEC CULT: NORMAL
RHODAMINE-AURAMINE STN SPEC: NORMAL

## 2023-05-04 ENCOUNTER — HOSPITAL ENCOUNTER (OUTPATIENT)
Dept: NON INVASIVE DIAGNOSTICS | Facility: CLINIC | Age: 58
Discharge: HOME | End: 2023-05-04

## 2023-05-04 VITALS
BODY MASS INDEX: 29.99 KG/M2 | HEIGHT: 65 IN | SYSTOLIC BLOOD PRESSURE: 120 MMHG | DIASTOLIC BLOOD PRESSURE: 68 MMHG | WEIGHT: 180 LBS | HEART RATE: 80 BPM

## 2023-05-04 DIAGNOSIS — R55 SYNCOPE AND COLLAPSE: ICD-10-CM

## 2023-05-04 LAB
AORTIC ROOT: 3.1 CM
APICAL FOUR CHAMBER EJECTION FRACTION: 58 %
ASCENDING AORTA: 3 CM
E WAVE DECELERATION TIME: 398 MS
FRACTIONAL SHORTENING: 33 % (ref 28–44)
INTERVENTRICULAR SEPTUM IN DIASTOLE (PARASTERNAL SHORT AXIS VIEW): 0.8 CM
INTERVENTRICULAR SEPTUM: 0.8 CM (ref 0.6–1.1)
LAAS-AP2: 14.1 CM2
LAAS-AP4: 13.2 CM2
LEFT ATRIUM SIZE: 3.2 CM
LEFT INTERNAL DIMENSION IN SYSTOLE: 2.8 CM (ref 2.1–4)
LEFT VENTRICULAR INTERNAL DIMENSION IN DIASTOLE: 4.2 CM (ref 3.5–6)
LEFT VENTRICULAR POSTERIOR WALL IN END DIASTOLE: 1 CM
LEFT VENTRICULAR STROKE VOLUME: 49 ML
LVSV (TEICH): 49 ML
MV E'TISSUE VEL-SEP: 8 CM/S
MV PEAK A VEL: 0.77 M/S
MV PEAK E VEL: 48 CM/S
MV STENOSIS PRESSURE HALF TIME: 116 MS
MV VALVE AREA P 1/2 METHOD: 1.9 CM2
RIGHT ATRIUM AREA SYSTOLE A4C: 13.1 CM2
RIGHT VENTRICLE ID DIMENSION: 2.8 CM
SL CV LEFT ATRIUM LENGTH A2C: 4.3 CM
SL CV LV EF: 55
SL CV PED ECHO LEFT VENTRICLE DIASTOLIC VOLUME (MOD BIPLANE) 2D: 80 ML
SL CV PED ECHO LEFT VENTRICLE SYSTOLIC VOLUME (MOD BIPLANE) 2D: 31 ML
TR MAX PG: 19 MMHG
TR PEAK VELOCITY: 2.2 M/S
TRICUSPID ANNULAR PLANE SYSTOLIC EXCURSION: 1.8 CM
TRICUSPID VALVE PEAK REGURGITATION VELOCITY: 2.16 M/S

## 2023-05-06 ENCOUNTER — RA CDI HCC (OUTPATIENT)
Dept: OTHER | Facility: HOSPITAL | Age: 58
End: 2023-05-06

## 2023-05-06 NOTE — PROGRESS NOTES
[facility] 75  coding opportunities     F32 1 [facility]     Chart Reviewed number of suggestions sent to Provider: 1   GR    Patients Insurance     Medicare Insurance: [address] [place] [address]

## 2023-05-08 ENCOUNTER — TELEPHONE (OUTPATIENT)
Dept: INTERNAL MEDICINE CLINIC | Facility: CLINIC | Age: 58
End: 2023-05-08

## 2023-05-08 NOTE — TELEPHONE ENCOUNTER
----- Message from [employer], DO sent at 4/25/2023 10:01 AM EDT -----  Please let patient know her ZIO patch came back stable   She had several fast heart beats known as SVT but it is nothing problematic, over 90% of people have this rhythm

## 2023-05-09 ENCOUNTER — OFFICE VISIT (OUTPATIENT)
Dept: INTERNAL MEDICINE CLINIC | Facility: CLINIC | Age: 58
End: 2023-05-09

## 2023-05-09 VITALS
WEIGHT: 179 LBS | OXYGEN SATURATION: 99 % | BODY MASS INDEX: 29.82 KG/M2 | RESPIRATION RATE: 17 BRPM | HEIGHT: 65 IN | HEART RATE: 85 BPM | SYSTOLIC BLOOD PRESSURE: 132 MMHG | DIASTOLIC BLOOD PRESSURE: 66 MMHG

## 2023-05-09 DIAGNOSIS — F32.1 MAJOR DEPRESSIVE DISORDER, SINGLE EPISODE, MODERATE (HCC): ICD-10-CM

## 2023-05-09 DIAGNOSIS — F41.8 DEPRESSION WITH ANXIETY: ICD-10-CM

## 2023-05-09 DIAGNOSIS — Z00.00 MEDICARE ANNUAL WELLNESS VISIT, SUBSEQUENT: ICD-10-CM

## 2023-05-09 DIAGNOSIS — Z12.11 SCREENING FOR COLON CANCER: ICD-10-CM

## 2023-05-09 DIAGNOSIS — E03.9 HYPOTHYROIDISM, UNSPECIFIED TYPE: ICD-10-CM

## 2023-05-09 DIAGNOSIS — E78.2 MIXED HYPERLIPIDEMIA: ICD-10-CM

## 2023-05-09 DIAGNOSIS — E04.1 THYROID NODULE: Primary | ICD-10-CM

## 2023-05-09 DIAGNOSIS — J84.116 CRYPTOGENIC ORGANIZING PNEUMONIA (HCC): ICD-10-CM

## 2023-05-09 LAB
MYCOBACTERIUM SPEC CULT: NORMAL
RHODAMINE-AURAMINE STN SPEC: NORMAL

## 2023-05-09 RX ORDER — ATORVASTATIN CALCIUM 20 MG/1
20 TABLET, FILM COATED ORAL DAILY
Qty: 90 TABLET | Refills: 1 | Status: SHIPPED | OUTPATIENT
Start: 2023-05-09

## 2023-05-09 RX ORDER — FLUCONAZOLE 150 MG/1
TABLET ORAL
COMMUNITY
Start: 2023-04-08

## 2023-05-09 RX ORDER — TOBRAMYCIN 3 MG/ML
SOLUTION/ DROPS OPHTHALMIC
COMMUNITY
Start: 2023-04-05

## 2023-05-09 RX ORDER — CHOLECALCIFEROL (VITAMIN D3) 50 MCG
2000 TABLET ORAL DAILY
COMMUNITY

## 2023-05-09 NOTE — PATIENT INSTRUCTIONS
Medicare Preventive Visit Patient Instructions  Thank you for completing your Welcome to Medicare Visit or Medicare Annual Wellness Visit today  Your next wellness visit will be due in one year (5/9/2024)  The screening/preventive services that you may require over the next 5-10 years are detailed below  Some tests may not apply to you based off risk factors and/or age  Screening tests ordered at today's visit but not completed yet may show as past due  Also, please note that scanned in results may not display below  Preventive Screenings:  Service Recommendations Previous Testing/Comments   Colorectal Cancer Screening  * Colonoscopy    * Fecal Occult Blood Test (FOBT)/Fecal Immunochemical Test (FIT)  * Fecal DNA/Cologuard Test  * Flexible Sigmoidoscopy Age: 39-70 years old   Colonoscopy: every 10 years (may be performed more frequently if at higher risk)  OR  FOBT/FIT: every 1 year  OR  Cologuard: every 3 years  OR  Sigmoidoscopy: every 5 years  Screening may be recommended earlier than age 39 if at higher risk for colorectal cancer  Also, an individualized decision between you and your healthcare provider will decide whether screening between the ages of 74-80 would be appropriate  Colonoscopy: 02/28/2017  FOBT/FIT: Not on file  Cologuard: Not on file  Sigmoidoscopy: Not on file    Screening Current     Breast Cancer Screening Age: 36 years old  Frequency: every 1-2 years  Not required if history of left and right mastectomy Mammogram: 04/20/2023    Screening Current   Cervical Cancer Screening Between the ages of 21-29, pap smear recommended once every 3 years  Between the ages of 33-67, can perform pap smear with HPV co-testing every 5 years     Recommendations may differ for women with a history of total hysterectomy, cervical cancer, or abnormal pap smears in past  Pap Smear: Not on file        Hepatitis C Screening Once for adults born between 1945 and 1965  More frequently in patients at high risk for Hepatitis C Hep C Antibody: 01/02/2021    Screening Current   Diabetes Screening 1-2 times per year if you're at risk for diabetes or have pre-diabetes Fasting glucose: 107 mg/dL (4/8/2023)  A1C: 5 4 % (3/9/2022)  Screening Current   Cholesterol Screening Once every 5 years if you don't have a lipid disorder  May order more often based on risk factors  Lipid panel: 04/08/2023    Screening Not Indicated  History Lipid Disorder     Other Preventive Screenings Covered by Medicare:  1  Abdominal Aortic Aneurysm (AAA) Screening: covered once if your at risk  You're considered to be at risk if you have a family history of AAA  2  Lung Cancer Screening: covers low dose CT scan once per year if you meet all of the following conditions: (1) Age 50-69; (2) No signs or symptoms of lung cancer; (3) Current smoker or have quit smoking within the last 15 years; (4) You have a tobacco smoking history of at least 20 pack years (packs per day multiplied by number of years you smoked); (5) You get a written order from a healthcare provider  3  Glaucoma Screening: covered annually if you're considered high risk: (1) You have diabetes OR (2) Family history of glaucoma OR (3)  aged 48 and older OR (3)  American aged 72 and older  3  Osteoporosis Screening: covered every 2 years if you meet one of the following conditions: (1) You're estrogen deficient and at risk for osteoporosis based off medical history and other findings; (2) Have a vertebral abnormality; (3) On glucocorticoid therapy for more than 3 months; (4) Have primary hyperparathyroidism; (5) On osteoporosis medications and need to assess response to drug therapy  · Last bone density test (DXA Scan): 02/25/2019   5  HIV Screening: covered annually if you're between the age of 15-65  Also covered annually if you are younger than 13 and older than 72 with risk factors for HIV infection   For pregnant patients, it is covered up to 3 times per pregnancy  Immunizations:  Immunization Recommendations   Influenza Vaccine Annual influenza vaccination during flu season is recommended for all persons aged >= 6 months who do not have contraindications   Pneumococcal Vaccine   * Pneumococcal conjugate vaccine = PCV13 (Prevnar 13), PCV15 (Vaxneuvance), PCV20 (Prevnar 20)  * Pneumococcal polysaccharide vaccine = PPSV23 (Pneumovax) Adults 25-60 years old: 1-3 doses may be recommended based on certain risk factors  Adults 72 years old: 1-2 doses may be recommended based off what pneumonia vaccine you previously received   Hepatitis B Vaccine 3 dose series if at intermediate or high risk (ex: diabetes, end stage renal disease, liver disease)   Tetanus (Td) Vaccine - COST NOT COVERED BY MEDICARE PART B Following completion of primary series, a booster dose should be given every 10 years to maintain immunity against tetanus  Td may also be given as tetanus wound prophylaxis  Tdap Vaccine - COST NOT COVERED BY MEDICARE PART B Recommended at least once for all adults  For pregnant patients, recommended with each pregnancy  Shingles Vaccine (Shingrix) - COST NOT COVERED BY MEDICARE PART B  2 shot series recommended in those aged 48 and above     Health Maintenance Due:      Topic Date Due   • Cervical Cancer Screening  Never done   • Colorectal Cancer Screening  02/28/2022   • Breast Cancer Screening: Mammogram  04/20/2025   • HIV Screening  Completed   • Hepatitis C Screening  Completed     Immunizations Due:      Topic Date Due   • COVID-19 Vaccine (4 - Booster for Pfizer series) 02/11/2022   • Pneumococcal Vaccine: Pediatrics (0 to 5 Years) and At-Risk Patients (6 to 59 Years) (2 - PCV) 05/19/2022     Advance Directives   What are advance directives? Advance directives are legal documents that state your wishes and plans for medical care  These plans are made ahead of time in case you lose your ability to make decisions for yourself   Advance directives can apply to any medical decision, such as the treatments you want, and if you want to donate organs  What are the types of advance directives? There are many types of advance directives, and each state has rules about how to use them  You may choose a combination of any of the following:  · Living will: This is a written record of the treatment you want  You can also choose which treatments you do not want, which to limit, and which to stop at a certain time  This includes surgery, medicine, IV fluid, and tube feedings  · Durable power of  for healthcare [facility]): This is a written record that states who you want to make healthcare choices for you when you are unable to make them for yourself  This person, called a proxy, is usually a family member or a friend  You may choose more than 1 proxy  · Do not resuscitate (DNR) order:  A DNR order is used in case your heart stops beating or you stop breathing  It is a request not to have certain forms of treatment, such as CPR  A DNR order may be included in other types of advance directives  · Medical directive: This covers the care that you want if you are in a coma, near death, or unable to make decisions for yourself  You can list the treatments you want for each condition  Treatment may include pain medicine, surgery, blood transfusions, dialysis, IV or tube feedings, and a ventilator (breathing machine)  · Values history: This document has questions about your views, beliefs, and how you feel and think about life  This information can help others choose the care that you would choose  Why are advance directives important? An advance directive helps you control your care  Although spoken wishes may be used, it is better to have your wishes written down  Spoken wishes can be misunderstood, or not followed  Treatments may be given even if you do not want them   An advance directive may make it easier for your family to make difficult choices about your care    Weight Management   Why it is important to manage your weight:  Being overweight increases your risk of health conditions such as heart disease, high blood pressure, type 2 diabetes, and certain types of cancer  It can also increase your risk for osteoarthritis, sleep apnea, and other respiratory problems  Aim for a slow, steady weight loss  Even a small amount of weight loss can lower your risk of health problems  How to lose weight safely:  A safe and healthy way to lose weight is to eat fewer calories and get regular exercise  You can lose up about 1 pound a week by decreasing the number of calories you eat by 500 calories each day  Healthy meal plan for weight management:  A healthy meal plan includes a variety of foods, contains fewer calories, and helps you stay healthy  A healthy meal plan includes the following:  · Eat whole-grain foods more often  A healthy meal plan should contain fiber  Fiber is the part of grains, fruits, and vegetables that is not broken down by your body  Whole-grain foods are healthy and provide extra fiber in your diet  Some examples of whole-grain foods are whole-wheat breads and pastas, oatmeal, brown rice, and bulgur  · Eat a variety of vegetables every day  Include dark, leafy greens such as spinach, kale, ariana greens, and mustard greens  Eat yellow and orange vegetables such as carrots, sweet potatoes, and winter squash  · Eat a variety of fruits every day  Choose fresh or canned fruit (canned in its own juice or light syrup) instead of juice  Fruit juice has very little or no fiber  · Eat low-fat dairy foods  Drink fat-free (skim) milk or 1% milk  Eat fat-free yogurt and low-fat cottage cheese  Try low-fat cheeses such as mozzarella and other reduced-fat cheeses  · Choose meat and other protein foods that are low in fat  Choose beans or other legumes such as split peas or lentils   Choose fish, skinless poultry (chicken or turkey), or lean cuts of red meat (beef or pork)  Before you cook meat or poultry, cut off any visible fat  · Use less fat and oil  Try baking foods instead of frying them  Add less fat, such as margarine, sour cream, regular salad dressing and mayonnaise to foods  Eat fewer high-fat foods  Some examples of high-fat foods include french fries, doughnuts, ice cream, and cakes  · Eat fewer sweets  Limit foods and drinks that are high in sugar  This includes candy, cookies, regular soda, and sweetened drinks  Exercise:  Exercise at least 30 minutes per day on most days of the week  Some examples of exercise include walking, biking, dancing, and swimming  You can also fit in more physical activity by taking the stairs instead of the elevator or parking farther away from stores  Ask your healthcare provider about the best exercise plan for you  © Copyright [employer] 2018 Information is for End User's use only and may not be sold, redistributed or otherwise used for commercial purposes   All illustrations and images included in CareNotes® are the copyrighted property of [employer]  or [address] [place] [address]

## 2023-05-09 NOTE — PROGRESS NOTES
Assessment/Plan:    #Leukocytosis  -is a smoker  -WBC previously elevated but now down to normal again  -flow cytometry benign 2023    #Thyroid Nodule  -noted on carotid US  -will check US thyroid     #Facial Cellulitis  -previous infection over right side and had wisdom teeth removal  -had infection over left side  -woke up with vertigo and left cheek swelling that worsened  -went to urgent care and started on antibiotics but still got worse  -went to ER and started on steroids, clindamycin, ceftriaxone and had I&D and tooth extraction with OMS  -now on cefdinir and clindamycin and slightly better  -saw OMS and stable, will see dentist  -completed clindamycin and cefdinir  -cultures grew prevotella and peptoniphilus  -possible immune deficiency but CBC, CMP, ESR, CRP, IgM, IgA, IgG, IgE, CH50, flow cytometry all normal  -will see allergist/immunologist    #Syncope  -2 episodes at home  -ECHO 2023 with EF 55% trace MVR  -ZIO with 5 beats SVT, fastest 6 beats average rate 145bpm and max 218bpm  -carotid US with <50% stenosis b/l    #Hypothyroid  -TSH   759 and stable on levothyroxine     #HLD  - HDL 46 and elevated  -will increase atorvastatin to 20mg daily  -cotninue to diet and exercise    #Rhinitis  -chronic  -possibly allergy related  -allergy panel negative and skin testing not positive either but has allergy symptoms  -previously on allergy shots without much relief  -seeing ENT  -underwent clarifax prodcedure but still has symptoms  -currently has left maxillary sinus congestion  -treated with  cefdinir after going to ER 10/31/22  -remains on astelin as needed     #Anxiety  -remains on abilify, buspar, lorazepam, prozac  -has periodic breakthrough episodes  -seeing psychiatry monthly    #Cryptogenic Organizing Pneumonia  -resolved  -seeing pulmonology yearly  -underwent previous bronchoscopy and bronchoalveolar lavage in the past  -treated with steroids  -pulmonary worked up for autoimmune disease but negative  -AK-3 ab and MPO < 2     #MALU  -remains on CPAP     #LPR  -previously treated with PPI and H2 blocker     #Previous Palpitations  -dexamethasone suppression test, 24 hour urine cortisol, 5HIAA, metanephrine and catecholamine all normal  -was on betablocker for symptomatic relief    #Surgery  -previous unilateral oophorectomy and hysterectomy  -secondary to uterine bleeding     #Health Maintenance  -routine labs and followup 6 months  -TDAP, colonoscopy, DEXA pending  -shingrix pending  -mammogram 2023 up to date  -flu vaccine up to date 2022  -covid bivalent pending  -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  -is caring for 5 grandchildren    No problem-specific Assessment & Plan notes found for this encounter  Diagnoses and all orders for this visit:    Thyroid nodule  -     CBC and differential; Future  -     Comprehensive metabolic panel; Future  -     US thyroid; Future    Mixed hyperlipidemia  -     atorvastatin (LIPITOR) 20 mg tablet; Take 1 tablet (20 mg total) by mouth daily  -     CBC and differential; Future  -     Comprehensive metabolic panel; Future  -     Lipid Panel with Direct LDL reflex; Future    Hypothyroidism, unspecified type  -     CBC and differential; Future  -     Comprehensive metabolic panel; Future  -     TSH, 3rd generation with Free T4 reflex; Future    Cryptogenic organizing pneumonia ([facility] [address] )  -     CBC and differential; Future  -     Comprehensive metabolic panel; Future    Depression with anxiety    Major depressive disorder, single episode, moderate ([facility] [address] )    Medicare annual wellness visit, subsequent    Screening for colon cancer  -     Ambulatory referral for colonoscopy;  Future    Other orders  -     tobramycin (TOBREX) 0 3 % SOLN; instill 1 drop INTO AFFECTED EYE(S) every 4 hours while awake  -     fluconazole (DIFLUCAN) 150 mg tablet; take 1 tablet by mouth to START then take 1 tablet by mouth every 72 hours until finished  - Cholecalciferol (Vitamin D) 50 MCG (2000 UT) tablet; Take 2,000 Units by mouth daily            Current Outpatient Medications:   •  atorvastatin (LIPITOR) 20 mg tablet, Take 1 tablet (20 mg total) by mouth daily, Disp: 90 tablet, Rfl: 1  •  Cholecalciferol (Vitamin D) 50 MCG (2000 UT) tablet, Take 2,000 Units by mouth daily, Disp: , Rfl:   •  albuterol (Ventolin HFA) 90 mcg/act inhaler, Inhale 2 puffs every 6 (six) hours as needed for wheezing, Disp: 18 g, Rfl: 3  •  ARIPiprazole (ABILIFY) 10 mg tablet, Take 5 mg by mouth daily, Disp: , Rfl:   •  azelastine (ASTELIN) 0 1 % nasal spray, instill 1 spray into each nostril twice a day, Disp: 30 mL, Rfl: 11  •  Azelastine-Fluticasone 137-50 MCG/ACT SUSP, 1-2 sprays into each nostril in the morning, Disp: 23 g, Rfl: 11  •  busPIRone (BUSPAR) 30 MG tablet, Take 10 mg by mouth States taking only 10 mg ( breaks the tablets into thirds ), Disp: , Rfl:   •  chlorhexidine (PERIDEX) 0 12 % solution, RINSE WITH 30 MILLILTERS FOR 30 SECONDS THEN SPIT OUT AT BEDTIME, Disp: , Rfl:   •  diphenhydrAMINE (BENADRYL) 25 mg tablet, Take 25 mg by mouth every 6 (six) hours as needed for itching, Disp: , Rfl:   •  escitalopram (LEXAPRO) 10 mg tablet, (Takes w/ 20 mg dose for total 30 mg ), Disp: , Rfl:   •  escitalopram (LEXAPRO) 20 mg tablet, Take 20 mg by mouth daily, Disp: , Rfl:   •  fluconazole (DIFLUCAN) 150 mg tablet, take 1 tablet by mouth to START then take 1 tablet by mouth every 72 hours until finished, Disp: , Rfl:   •  fluticasone (Arnuity Ellipta) 100 MCG/ACT AEPB inhaler, Inhale 1 puff daily Rinse mouth after use , Disp: 90 blister, Rfl: 0  •  Fluticasone Furoate-Vilanterol (Breo Ellipta) 100-25 mcg/actuation inhaler, Inhale 1 puff daily Rinse mouth after use  (Patient taking differently: Inhale 1 puff daily Rinse mouth after use   Takes as needed for flares ), Disp: 180 blister, Rfl: 0  •  ipratropium (ATROVENT) 0 03 % nasal spray, 2 sprays into each nostril 3 (three) times a day as needed for rhinitis (nasal drip, congestion), Disp: 30 mL, Rfl: 11  •  LORazepam (ATIVAN) 0 5 mg tablet, Take 0 5 mg by mouth 2 (two) times a day as needed, Disp: , Rfl:   •  mometasone (NASONEX) 50 mcg/act nasal spray, 2 sprays into each nostril daily States nasal irrigation from a compounding pharmacy, prescribed by Dr Silvestre Velasco, Disp: , Rfl:   •  tobramycin (TOBREX) 0 3 % SOLN, instill 1 drop INTO AFFECTED EYE(S) every 4 hours while awake, Disp: , Rfl:     Subjective:      Patient ID: Sharyle Cable is a 60 y o  female  HPI   Patient presents for routine checkup  She did have facial cellulitis which has since resolved  She will follow-up with her dentist for further surveillance  She reports that her seasonal allergies have been flaring up  She has been having a lot of nasal congestion  She will follow-up with ENT  Her TSH is stable at 0 759 and she will continue with levothyroxine  Her vitamin D is low at 27 6 and she will increase her vitamin D to 2000 units daily  LDL was 113 and HDL 46 currently elevated on atorvastatin and she will double up on next to 20 mg daily  Her autoimmune work-up was negative with complements which were normal   HIV was negative  IgE was normal   CRP and sed rate were unremarkable  Flow cytometry was also normal as well  She underwent a carotid Doppler which revealed less than 50% stenosis bilaterally  We will continue to periodically monitor this  There was an incidental thyroid nodule noted and we will obtain an ultrasound of her thyroid  Echo reveals an EF of 55% with trace mitral valve regurgitation  Her Zio patch revealed 5 beats of SVT  This is currently stable  She has anxiety and depression and remains on Abilify and BuSpar and lorazepam and Prozac  She follows up with psychiatry  She follows up with pulmonary for her cryptogenic organizing pneumonia which has since recovered and not causing any issues    She remains on CPAP for her obstructive "sleep apnea  She will return to care in 6 months with labs  She is due for colonoscopy as well as a DEXA scan and we will await this  Her mammogram was unremarkable  The following portions of the patient's history were reviewed and updated as appropriate: allergies, current medications, past family history, past medical history, past social history, past surgical history and problem list     Review of Systems   Constitutional: Negative for activity change, appetite change, chills, fatigue and fever  HENT: Negative for congestion, ear pain, facial swelling, hearing loss, sore throat, tinnitus and trouble swallowing  Eyes: Negative for photophobia and visual disturbance  Respiratory: Negative for cough, shortness of breath and wheezing  Cardiovascular: Negative for chest pain, palpitations and leg swelling  Gastrointestinal: Negative for abdominal distention, abdominal pain, blood in stool, constipation, diarrhea, nausea and vomiting  Genitourinary: Negative for difficulty urinating, dysuria and pelvic pain  Musculoskeletal: Negative for arthralgias, back pain, gait problem, joint swelling, myalgias, neck pain and neck stiffness  Skin: Negative for rash and wound  Neurological: Negative for dizziness, tremors, light-headedness and headaches  Objective:      /66   Pulse 85   Resp 17   Ht 5' 5\" (1 651 m)   Wt 81 2 kg (179 lb)   LMP  (LMP Unknown)   SpO2 99%   BMI 29 79 kg/m²          Physical Exam  Vitals reviewed  Constitutional:       Appearance: Normal appearance  She is well-developed  HENT:      Head: Normocephalic and atraumatic  Right Ear: Tympanic membrane, ear canal and external ear normal  There is no impacted cerumen  Left Ear: Tympanic membrane, ear canal and external ear normal  There is no impacted cerumen  Nose: Nose normal       Mouth/Throat:      Pharynx: Oropharynx is clear  No oropharyngeal exudate or posterior oropharyngeal erythema   " Eyes:      Conjunctiva/sclera: Conjunctivae normal       Pupils: Pupils are equal, round, and reactive to light  Neck:      Thyroid: No thyromegaly  Vascular: No JVD  Cardiovascular:      Rate and Rhythm: Normal rate and regular rhythm  Pulses: Normal pulses  Heart sounds: Normal heart sounds  No murmur heard  Pulmonary:      Effort: Pulmonary effort is normal  No respiratory distress  Breath sounds: Normal breath sounds  No stridor  No wheezing, rhonchi or rales  Abdominal:      General: Bowel sounds are normal  There is no distension  Palpations: Abdomen is soft  There is no mass  Tenderness: There is no abdominal tenderness  There is no right CVA tenderness, left CVA tenderness, guarding or rebound  Hernia: No hernia is present  Musculoskeletal:         General: No tenderness  Normal range of motion  Cervical back: Normal range of motion and neck supple  Right lower leg: No edema  Left lower leg: No edema  Lymphadenopathy:      Cervical: No cervical adenopathy  Skin:     General: Skin is warm  Findings: No erythema or rash  Neurological:      Mental Status: She is alert and oriented to person, place, and time  Deep Tendon Reflexes: Reflexes are normal and symmetric  Psychiatric:         Mood and Affect: Mood normal          Behavior: Behavior normal          Thought Content: Thought content normal          Judgment: Judgment normal            This note was completed in part utilizing [employer] direct voice recognition software  Grammatical errors, random word insertion, spelling mistakes, and incomplete sentences may be an occasional consequence of the system secondary to software limitations, ambient noise and hardware issues  At the time of dictation, efforts were made to edit, clarify and /or correct errors  Please read the chart carefully and recognize, using context, where substitutions have occurred    If you have any questions or concerns about the context, text or information contained within the body of this dictation, please contact myself, the provider, for further clarification

## 2023-05-09 NOTE — PROGRESS NOTES
Assessment and Plan:     Problem List Items Addressed This Visit        Other    Depression with anxiety (Chronic)    Hyperlipidemia (Chronic)    Relevant Medications    atorvastatin (LIPITOR) 20 mg tablet    Other Relevant Orders    CBC and differential    Comprehensive metabolic panel    Lipid Panel with Direct LDL reflex    Major depressive disorder, single episode, moderate (HCC)   Other Visit Diagnoses     Thyroid nodule    -  Primary    Relevant Orders    CBC and differential    Comprehensive metabolic panel     thyroid    Hypothyroidism, unspecified type        Relevant Orders    CBC and differential    Comprehensive metabolic panel    TSH, 3rd generation with Free T4 reflex    Cryptogenic organizing pneumonia ([address] )        Relevant Orders    CBC and differential    Comprehensive metabolic panel    Medicare annual wellness visit, subsequent        Screening for colon cancer        Relevant Orders    Ambulatory referral for colonoscopy           Preventive health issues were discussed with patient, and age appropriate screening tests were ordered as noted in patient's After Visit Summary  Personalized health advice and appropriate referrals for health education or preventive services given if needed, as noted in patient's After Visit Summary       History of Present Illness:     Patient presents for a Medicare Wellness Visit    HPI   Patient Care Team:  Bhupinder Mattson DO as PCP - General (Internal Medicine)  Eunice Cuba MD as PCP - [address] [place] [address] (RTE)  Eunice Cuba MD as PCP - [facility] ([facility])  MD Eunice Diaz MD Ulis Form, MD Fabiene Albino, MD     Review of Systems:     Review of Systems     Problem List:     Patient Active Problem List   Diagnosis   • Asthma   • Atrophic vaginitis   • Depression with anxiety   • Fibromyalgia   • HTN (hypertension)   • Hyperlipidemia   • Laryngopharyngeal reflux   • Lumbar radiculopathy   • Obstructive sleep apnea syndrome   • Facial cellulitis   • Eosinophilia   • Tobacco use   • Chronic seasonal allergic rhinitis due to pollen   • Allergic rhinitis due to house dust mite   • Allergic conjunctivitis of both eyes   • Nasal septal deviation   • Gastroesophageal reflux disease   • Laryngeal edema   • Gluten intolerance   • Allergic rhinitis due to animal (cat) (dog) hair and dander   • Intrinsic atopic dermatitis   • Allergy to cephalosporin   • Allergy to tetracycline   • Allergy to 4-quinolone agent   • Allergy to mold   • Ataxia   • S/P bilateral breast reduction   • Transient right leg weakness   • Hyponatremia   • Abnormal thyroid function test   • Allergy to multiple antibiotics   • Chronic sinusitis   • Hyperglycemia   • Abnormal TSH   • Abnormal CT of the chest   • Acid-fast bacteria present   • Chronic rhinitis   • Hypertrophy of both inferior nasal turbinates   • Dysphonia   • Major depressive disorder, single episode, moderate (HCC)      Past Medical and Surgical History:     Past Medical History:   Diagnosis Date   • Allergic    • Allergic rhinitis 1987   • Allergies    • Anesthesia complication     V TACH after her reduction mammoplasty, done at [address] [place] [address],, states had a little vent arrhythmia after sinus sx also   • Anxiety    • Asthma 2021   • Chronic rhinitis 02/18/2020   • Depression    • Ethmoid sinusitis    • GERD (gastroesophageal reflux disease)     no issues since 2021   • Maxillary sinusitis    • Multiple allergies    • Myofascial pain syndrome    • Nasal turbinate hypertrophy    • Pneumonia    • Sleep apnea     not currently using CPAP   • Sleep apnea, obstructive    • Wears glasses      Past Surgical History:   Procedure Laterality Date   • INCISION AND DRAINAGE OF WOUND Left 3/23/2023    Procedure: INCISION AND DRAINAGE (I&D) LEFT ORAL ABSCESS, EXTRACTION OF TOOTH #19;  Surgeon: Justino Page DMD;  Location: [facility];  Service: Maxillofacial   • LAPAROSCOPY      with vaginal hysterectomy; 11/3/2003 rso   • OOPHORECTOMY Left    • PARTIAL HYSTERECTOMY     • RI NSL/SINUS NDSC MAX ANTROST W/RMVL TISS MAX SINUS N/A 2016    Procedure: IMAGE GUIDED FUNCTIONAL ENDOSCOPIC SINUS SURGERY;  Surgeon: Chava Diamond MD;  Location[facility];  Service: ENT   • RI NSL/SINUS NDSC MAX ANTROST W/RMVL TISS MAX SINUS Bilateral 2022    Procedure: middle turbinectomy/medialization, possible revision functional endoscopic sinus surgery, inferior turbinate reduction;  Surgeon: Chava Diamond MD;  Location: [facility];  Service: ENT   • REDUCTION MAMMAPLASTY Bilateral 2015   • SINUS SURGERY     • TOOTH EXTRACTION Right 3/16/2019    Procedure: EXTRACTION TOOTH #1 (Impacted Third Molar Tooth);   Surgeon: Wade Rodriguez DDS;  Location: [facility];  Service: Maxillofacial   • TUBAL LIGATION     • WISDOM TOOTH EXTRACTION        Family History:     Family History   Problem Relation Age of Onset   • CLEM disease Mother    • Atrial fibrillation Mother    • Atrial fibrillation Father    • Heart disease Father    • Diabetes Maternal Grandmother    • Hypertension Maternal Grandmother    • Heart failure Maternal Grandmother    • Colon cancer Maternal Grandfather    • Diabetes Maternal Grandfather    • Stroke Maternal Grandfather    • Cancer Paternal Grandfather    • Endometrial cancer Cousin    • Breast cancer Cousin    • Multiple sclerosis Sister    • Hyperlipidemia Sister    • Thyroid disease Sister    • No Known Problems Son    • No Known Problems Son       Social History:     Social History     Socioeconomic History   • Marital status: /Civil Union     Spouse name: Julio C Jimenez    • Number of children: 2   • Years of education: None   • Highest education level: None   Occupational History   • None   Tobacco Use   • Smoking status: Former     Packs/day: 0 50     Years: 25 00     Pack years: 12 50     Types: Cigarettes     Start date: 1995     Quit date: 2021     Years since quittin 1   • Smokeless tobacco: Never   • "Tobacco comments:     patient states havent smoked cigarettes since end of march 2021 (states occasional \"slips\" here or there,inst no smoking before sx)   Vaping Use   • Vaping Use: Never used   Substance and Sexual Activity   • Alcohol use: Not Currently     Comment: Social one or two a month   • Drug use: No   • Sexual activity: Yes     Partners: Male     Birth control/protection: Post-menopausal   Other Topics Concern   • None   Social History Narrative    Caffeine use    Daily coffee consumption ( 1 cup/day)    Daily cola consumption (3 cans/day)        Patient lives in a home that was built in [address] in the bedroom     Unfinished basement-dry-damp-no mold-musty smell     Dehumidifier in the basement     No air  or purifiers     No humidifier-has c-pap     Home is smoke free     Window air conditioning     Patient [place]         Dog(Jose A) he is allowed in the bedroom         Caffeine: 2 cups of coffee daily                     Occasionally drinks ice tea and diet soda                     Hot tea rarely     Chocolate consumed occasionally         Patient lives with spouse          Social Determinants of Health     Financial Resource Strain: Low Risk    • Difficulty of Paying Living Expenses: Not hard at all   Food Insecurity: No Food Insecurity   • Worried About Running Out of Food in the Last Year: Never true   • Ran Out of Food in the Last Year: Never true   Transportation Needs: No Transportation Needs   • Lack of Transportation (Medical): No   • Lack of Transportation (Non-Medical):  No   Physical Activity: Not on file   Stress: Not on file   Social Connections: Not on file   Intimate Partner Violence: Not on file   Housing Stability: Low Risk    • Unable to Pay for Housing in the Last Year: No   • Number of Places Lived in the Last Year: 1   • Unstable Housing in the Last Year: No      Medications and Allergies:     Current " Outpatient Medications   Medication Sig Dispense Refill   • atorvastatin (LIPITOR) 20 mg tablet Take 1 tablet (20 mg total) by mouth daily 90 tablet 1   • Cholecalciferol (Vitamin D) 50 MCG (2000 UT) tablet Take 2,000 Units by mouth daily     • albuterol (Ventolin HFA) 90 mcg/act inhaler Inhale 2 puffs every 6 (six) hours as needed for wheezing 18 g 3   • ARIPiprazole (ABILIFY) 10 mg tablet Take 5 mg by mouth daily     • azelastine (ASTELIN) 0 1 % nasal spray instill 1 spray into each nostril twice a day 30 mL 11   • Azelastine-Fluticasone 137-50 MCG/ACT SUSP 1-2 sprays into each nostril in the morning 23 g 11   • busPIRone (BUSPAR) 30 MG tablet Take 10 mg by mouth States taking only 10 mg ( breaks the tablets into thirds )     • chlorhexidine (PERIDEX) 0 12 % solution RINSE WITH 30 MILLILTERS FOR 30 SECONDS THEN SPIT OUT AT BEDTIME     • diphenhydrAMINE (BENADRYL) 25 mg tablet Take 25 mg by mouth every 6 (six) hours as needed for itching     • escitalopram (LEXAPRO) 10 mg tablet (Takes w/ 20 mg dose for total 30 mg )     • escitalopram (LEXAPRO) 20 mg tablet Take 20 mg by mouth daily     • fluconazole (DIFLUCAN) 150 mg tablet take 1 tablet by mouth to START then take 1 tablet by mouth every 72 hours until finished     • fluticasone (Arnuity Ellipta) 100 MCG/ACT AEPB inhaler Inhale 1 puff daily Rinse mouth after use  90 blister 0   • Fluticasone Furoate-Vilanterol (Breo Ellipta) 100-25 mcg/actuation inhaler Inhale 1 puff daily Rinse mouth after use  (Patient taking differently: Inhale 1 puff daily Rinse mouth after use  Takes as needed for flares  ) 180 blister 0   • ipratropium (ATROVENT) 0 03 % nasal spray 2 sprays into each nostril 3 (three) times a day as needed for rhinitis (nasal drip, congestion) 30 mL 11   • LORazepam (ATIVAN) 0 5 mg tablet Take 0 5 mg by mouth 2 (two) times a day as needed     • mometasone (NASONEX) 50 mcg/act nasal spray 2 sprays into each nostril daily States nasal irrigation from a "compounding pharmacy, prescribed by Dr Kike Barrios     • tobramycin (TOBREX) 0 3 % SOLN instill 1 drop INTO AFFECTED EYE(S) every 4 hours while awake       No current facility-administered medications for this visit  Allergies   Allergen Reactions   • Other      Most all antibiotics- hives, hypotensive    \"no problem with clindamycin  \"   • Augmentin Es-600  [Amoxicillin-Pot Clavulanate]    • Cefuroxime    • Erythromycin    • Levofloxacin    • Morphine    • Morphine And Related Hives   • Oxycodone-Acetaminophen    • Penicillins    • Percocet [Oxycodone-Acetaminophen] Hives   • Sulfa Antibiotics    • Tetracyclines & Related       Immunizations:     Immunization History   Administered Date(s) Administered   • COVID-19 PFIZER VACCINE 0 3 ML IM 03/17/2021, 04/15/2021, 12/17/2021   • Fluzone Split Quad 0 25 mL 11/21/2017   • Fluzone Split Quad 0 5 mL 10/31/2014   • INFLUENZA 11/05/2015, 11/10/2016, 11/21/2017, 10/26/2018, 10/05/2022   • Influenza Quadrivalent Preservative Free 3 years and older IM 10/31/2014   • Influenza Quadrivalent, 6-35 Months IM 11/21/2017   • Influenza, injectable, quadrivalent, preservative free 0 5 mL 10/26/2018, 10/18/2020   • Influenza, recombinant, quadrivalent,injectable, preservative free 10/15/2019, 10/29/2021   • Influenza, seasonal, injectable 11/17/2012, 10/30/2013, 11/05/2015, 11/10/2016   • Pneumococcal Polysaccharide PPV23 05/19/2021   • Tdap 01/23/2014, 05/05/2022      Health Maintenance:         Topic Date Due   • Cervical Cancer Screening  Never done   • Colorectal Cancer Screening  02/28/2022   • Breast Cancer Screening: Mammogram  04/20/2025   • HIV Screening  Completed   • Hepatitis C Screening  Completed         Topic Date Due   • COVID-19 Vaccine (4 - Booster for Pfizer series) 02/11/2022   • Pneumococcal Vaccine: Pediatrics (0 to 5 Years) and At-Risk Patients (6 to 59 Years) (2 - PCV) 05/19/2022      Medicare Screening Tests and Risk Assessments:     Wing Max is here for her " Subsequent Wellness visit  Last Medicare Wellness visit information reviewed, patient interviewed and updates made to the record today  Health Risk Assessment:   Patient rates overall health as very good  Patient feels that their physical health rating is same  Patient is satisfied with their life  Eyesight was rated as same  Hearing was rated as same  Patient feels that their emotional and mental health rating is same  Patients states they are never, rarely angry  Patient states they are sometimes unusually tired/fatigued  Pain experienced in the last 7 days has been none  Patient states that she has experienced no weight loss or gain in last 6 months  Fall Risk Screening: In the past year, patient has experienced: no history of falling in past year      Urinary Incontinence Screening:   Patient has not leaked urine accidently in the last six months  Home Safety:  Patient does not have trouble with stairs inside or outside of their home  Patient has working smoke alarms and has working carbon monoxide detector  Home safety hazards include: none  Nutrition:   Current diet is Regular and Limited junk food  Medications:   Patient is currently taking over-the-counter supplements  OTC medications include: see medication list  Patient is able to manage medications  Activities of Daily Living (ADLs)/Instrumental Activities of Daily Living (IADLs):   Walk and transfer into and out of bed and chair?: Yes  Dress and groom yourself?: Yes    Bathe or shower yourself?: Yes    Feed yourself? Yes  Do your laundry/housekeeping?: Yes  Manage your money, pay your bills and track your expenses?: Yes  Make your own meals?: Yes    Do your own shopping?: Yes    Previous Hospitalizations:   Any hospitalizations or ED visits within the last 12 months?: Yes    How many hospitalizations have you had in the last year?: 1-2    Advance Care Planning:   Living will: Yes    Durable POA for healthcare:  Yes    Advanced "directive: Yes      PREVENTIVE SCREENINGS      Cardiovascular Screening:    General: Screening Not Indicated and History Lipid Disorder      Diabetes Screening:     General: Screening Current      Colorectal Cancer Screening:     General: Screening Current      Breast Cancer Screening:     General: Screening Current      Lung Cancer Screening:     General: Screening Not Indicated      Hepatitis C Screening:    General: Screening Current    Screening, Brief Intervention, and Referral to Treatment (SBIRT)    Screening  Typical number of drinks in a day: 0    AUDIT-C Screenin) How often did you have a drink containing alcohol in the past year? never  2) How many drinks did you have on a typical day when you were drinking in the past year? 0  3) How often did you have 6 or more drinks on one occasion in the past year? never    AUDIT-C Score: 0  Interpretation: Score 0-2 (female): Negative screen for alcohol misuse    No results found       Physical Exam:     /66   Pulse 85   Resp 17   Ht 5' 5\" (1 651 m)   Wt 81 2 kg (179 lb)   LMP  (LMP Unknown)   SpO2 99%   BMI 29 79 kg/m²     Physical Exam     Matilde Jimenez DO  "

## 2023-05-21 DIAGNOSIS — J45.40 MODERATE PERSISTENT ASTHMA WITHOUT COMPLICATION: Chronic | ICD-10-CM

## 2023-05-22 RX ORDER — FLUTICASONE FUROATE 100 UG/1
1 POWDER RESPIRATORY (INHALATION) DAILY
Qty: 90 BLISTER | Refills: 0 | Status: SHIPPED | OUTPATIENT
Start: 2023-05-22 | End: 2023-08-20

## 2023-05-23 ENCOUNTER — HOSPITAL ENCOUNTER (OUTPATIENT)
Dept: ULTRASOUND IMAGING | Facility: HOSPITAL | Age: 58
Discharge: HOME | End: 2023-05-23

## 2023-05-23 DIAGNOSIS — E04.1 THYROID NODULE: ICD-10-CM

## 2023-08-08 RX ORDER — FLUOXETINE HYDROCHLORIDE 20 MG/1
CAPSULE ORAL
COMMUNITY
Start: 2023-07-08

## 2023-08-09 ENCOUNTER — OFFICE VISIT (OUTPATIENT)
Dept: PULMONOLOGY | Facility: CLINIC | Age: 58
End: 2023-08-09
Payer: COMMERCIAL

## 2023-08-09 VITALS
OXYGEN SATURATION: 94 % | BODY MASS INDEX: 29.49 KG/M2 | DIASTOLIC BLOOD PRESSURE: 82 MMHG | WEIGHT: 177 LBS | SYSTOLIC BLOOD PRESSURE: 124 MMHG | TEMPERATURE: 97.4 F | HEART RATE: 84 BPM | HEIGHT: 65 IN

## 2023-08-09 DIAGNOSIS — J30.89 ALLERGIC RHINITIS DUE TO HOUSE DUST MITE: ICD-10-CM

## 2023-08-09 DIAGNOSIS — J45.909 UNCOMPLICATED ASTHMA, UNSPECIFIED ASTHMA SEVERITY, UNSPECIFIED WHETHER PERSISTENT: Primary | Chronic | ICD-10-CM

## 2023-08-09 DIAGNOSIS — D72.10 EOSINOPHILIA, UNSPECIFIED TYPE: ICD-10-CM

## 2023-08-09 PROCEDURE — 99214 OFFICE O/P EST MOD 30 MIN: CPT | Performed by: INTERNAL MEDICINE

## 2023-08-09 RX ORDER — MONTELUKAST SODIUM 10 MG/1
10 TABLET ORAL
Qty: 90 TABLET | Refills: 0 | Status: SHIPPED | OUTPATIENT
Start: 2023-08-09 | End: 2023-11-07

## 2023-08-09 NOTE — PROGRESS NOTES
Pulmonary Follow Up Note   Jimmie King 60 y.o. female MRN: [id number]  8/9/2023      Assessment:  1. Dyspnea  • Secondary to asthma now improved, will continue to monitor symptoms and YISSEL needs     2. Asthma w/o AE  • Currently improved with Arnuity and will continue  • Trend peak flows  • Start trial of Singulair 10mg daily given increased allergy symptoms  • If worsened symptoms consider Breo or higher dosed Arnuity  • Flu vaccine yearly, COVID x 3, Pneumovax 2021 - consider Prevnar 20 at age 71  • Follow up 6months or sooner as needed     3. Recurrent sinusitis and allergic rhinitis   • Continue upper airway regimen per Dr. Chiki Kaur  • Add singulair     4. Eosinophilia - continue to monitor, negative ANCA panels, neg NE RAST and normal IgE     5. History of presumed , resolved and resolved hypoxic respiratory failure  Plan:    Diagnoses and all orders for this visit:    Uncomplicated asthma, unspecified asthma severity, unspecified whether persistent  -     montelukast (SINGULAIR) 10 mg tablet; Take 1 tablet (10 mg total) by mouth daily at bedtime    Allergic rhinitis due to house dust mite  -     montelukast (SINGULAIR) 10 mg tablet; Take 1 tablet (10 mg total) by mouth daily at bedtime    Eosinophilia, unspecified type    Other orders  -     FLUoxetine (PROzac) 20 mg capsule        Return in about 6 months (around 2/9/2024) for Recheck. History of Present Illness   HPI:  Jimmie King is a 60 y.o. female who has chronic sinusitis, anxiety/depression, HTN, nicotine dependence in remission, and prior cryptogenic organizing pneumonia. Patient was admitted at [facility] from 04/03-04/13/2021 with acute respiratory failure with hypoxia. Patient was treated with Cefepime for 7 days. She required ICU level of care due to HFNC and no improvement on antibiotic therapy. Patient underwent bronchoscopy with BAL on 04/07/2021 with negative cultures, gram stain, and cytology. She was started on empiric steroid treatment. Autoimmune panel and HP negative. CT chest wutg multifocal peribronchial consolidation. She was treated for cryptogenic organizing pneumonia and improved on steroids and was eventually weaned off O2 prior to discharge. Steroids were tapered slowly. BAL eventually reported AFB positive for MAC and Candida which were thought to be contaminants. Repeat CT chest after completing steroid treatment with resolution of previous imaging findings. She was last seen in Feb 2023 and was to continue Arnuity and trend peak flow readings. She presents today for follow up. Since last visit she had facial cellulitis secondary to dental infection and was admitted in March 2023. Noted increased symptoms in Spring and with wildfires, was using YISSEL very frequently over that time with persistent and worsened nocturnal cough. Now no YISSEL use for the past 3 weeks. Intermittently checking peak flows and was around 250 with worsened symptoms. Denied sputum production, no chest pain, no thrush or oral lesions. Notes worsened with Ragweed season. Review of Systems   Constitutional: Negative for activity change, appetite change, fatigue and fever. HENT: Positive for postnasal drip. Negative for ear pain, rhinorrhea, sneezing, sore throat and trouble swallowing. Respiratory: Negative for cough, chest tightness, shortness of breath and wheezing. Cardiovascular: Negative for chest pain and leg swelling. Gastrointestinal: Negative for abdominal pain, nausea and vomiting. Endocrine: Positive for heat intolerance. Musculoskeletal: Negative for arthralgias and myalgias. Allergic/Immunologic: Positive for environmental allergies. Negative for immunocompromised state. Neurological: Negative for headaches. Hematological: Negative for adenopathy. Psychiatric/Behavioral: Negative for sleep disturbance.        Historical Information   Past Medical History:   Diagnosis Date   • Allergic    • Allergic rhinitis 1987   • Allergies    • Anesthesia complication     V TACH after her reduction mammoplasty, done at [address],, states had a little vent arrhythmia after sinus sx also   • Anxiety    • Asthma 2021   • Chronic rhinitis 02/18/2020   • Depression    • Ethmoid sinusitis    • GERD (gastroesophageal reflux disease)     no issues since 2021   • Maxillary sinusitis    • Multiple allergies    • Myofascial pain syndrome    • Nasal turbinate hypertrophy    • Pneumonia    • Sleep apnea     not currently using CPAP   • Sleep apnea, obstructive    • Wears glasses      Past Surgical History:   Procedure Laterality Date   • INCISION AND DRAINAGE OF WOUND Left 3/23/2023    Procedure: INCISION AND DRAINAGE (I&D) LEFT ORAL ABSCESS, EXTRACTION OF TOOTH #19;  Surgeon: Wes Rodriguez DMD;  Location: [facility];  Service: Maxillofacial   • LAPAROSCOPY      with vaginal hysterectomy; 11/3/2003 rso   • OOPHORECTOMY Left 2004   • PARTIAL HYSTERECTOMY  2004   • AL NSL/SINUS [address] MAX ANTROST W/RMVL TISS MAX SINUS N/A 9/30/2016    Procedure: IMAGE GUIDED FUNCTIONAL ENDOSCOPIC SINUS SURGERY;  Surgeon: Dennie Phalen, MD;  Location: [facility];  Service: ENT   • AL NSL/SINUS [address] MAX ANTROST W/RMVL TISS MAX SINUS Bilateral 9/2/2022    Procedure: middle turbinectomy/medialization, possible revision functional endoscopic sinus surgery, inferior turbinate reduction;  Surgeon: Dennie Phalen, MD;  Location: [facility];  Service: ENT   • REDUCTION MAMMAPLASTY Bilateral 02/27/2015   • SINUS SURGERY     • TOOTH EXTRACTION Right 3/16/2019    Procedure: EXTRACTION TOOTH #1 (Impacted Third Molar Tooth);   Surgeon: Luz Blackwell DDS;  Location: [facility];  Service: Maxillofacial   • TUBAL LIGATION     • WISDOM TOOTH EXTRACTION       Family History   Problem Relation Age of Onset   • CLEM disease Mother    • Atrial fibrillation Mother    • Atrial fibrillation Father    • Heart disease Father    • Diabetes Maternal Grandmother    • Hypertension Maternal Grandmother    • Heart failure Maternal Grandmother    • Colon cancer Maternal Grandfather    • Diabetes Maternal Grandfather    • Stroke Maternal Grandfather    • Cancer Paternal Grandfather    • Endometrial cancer Cousin    • Breast cancer Cousin    • Multiple sclerosis Sister    • Hyperlipidemia Sister    • Thyroid disease Sister    • No Known Problems Son    • No Known Problems Son          Meds/Allergies     Current Outpatient Medications:   •  albuterol (Ventolin HFA) 90 mcg/act inhaler, Inhale 2 puffs every 6 (six) hours as needed for wheezing, Disp: 18 g, Rfl: 3  •  ARIPiprazole (ABILIFY) 10 mg tablet, Take 5 mg by mouth daily, Disp: , Rfl:   •  ARIPiprazole (ABILIFY) 2 mg tablet, take 1 tablet by mouth IN THE MORNING WATCH FOR SEDATION, Disp: , Rfl:   •  atorvastatin (LIPITOR) 20 mg tablet, Take 1 tablet (20 mg total) by mouth daily, Disp: 90 tablet, Rfl: 1  •  azelastine (ASTELIN) 0.1 % nasal spray, instill 1 spray into each nostril twice a day, Disp: 30 mL, Rfl: 11  •  Azelastine-Fluticasone 137-50 MCG/ACT SUSP, 1-2 sprays into each nostril in the morning, Disp: 23 g, Rfl: 11  •  busPIRone (BUSPAR) 30 MG tablet, Take 10 mg by mouth, Disp: , Rfl:   •  chlorhexidine (PERIDEX) 0.12 % solution, RINSE WITH 30 MILLILTERS FOR 30 SECONDS THEN SPIT OUT AT BEDTIME, Disp: , Rfl:   •  Cholecalciferol (Vitamin D) 50 MCG (2000 UT) tablet, Take 2,000 Units by mouth daily, Disp: , Rfl:   •  diphenhydrAMINE (BENADRYL) 25 mg tablet, Take 25 mg by mouth every 6 (six) hours as needed for itching, Disp: , Rfl:   •  escitalopram (LEXAPRO) 10 mg tablet, 10 mg, Disp: , Rfl:   •  FLUoxetine (PROzac) 20 mg capsule, , Disp: , Rfl:   •  fluticasone (Arnuity Ellipta) 100 MCG/ACT AEPB inhaler, Inhale 1 puff daily, Disp: 90 blister, Rfl: 0  •  ipratropium (ATROVENT) 0.03 % nasal spray, 2 sprays into each nostril 3 (three) times a day as needed for rhinitis (nasal drip, congestion), Disp: 30 mL, Rfl: 11  •  LORazepam (ATIVAN) 0.5 mg tablet, Take 0.5 mg by mouth 2 (two) times a day as needed, Disp: , Rfl:   •  mometasone (NASONEX) 50 mcg/act nasal spray, 2 sprays into each nostril daily States nasal irrigation from a compounding pharmacy, prescribed by Dr Maximilian Hernandez, Disp: , Rfl:   •  montelukast (SINGULAIR) 10 mg tablet, Take 1 tablet (10 mg total) by mouth daily at bedtime, Disp: 90 tablet, Rfl: 0  •  escitalopram (LEXAPRO) 20 mg tablet, Take 20 mg by mouth daily (Patient not taking: Reported on 8/9/2023), Disp: , Rfl:   •  fluconazole (DIFLUCAN) 150 mg tablet, take 1 tablet by mouth to START then take 1 tablet by mouth every 72 hours until finished (Patient not taking: Reported on 8/9/2023), Disp: , Rfl:   •  FLUoxetine (PROzac) 10 mg capsule, Take 10 mg by mouth every morning (Patient not taking: Reported on 8/9/2023), Disp: , Rfl:   •  Fluticasone Furoate-Vilanterol (Breo Ellipta) 100-25 mcg/actuation inhaler, Inhale 1 puff daily Rinse mouth after use. (Patient taking differently: Inhale 1 puff daily Rinse mouth after use. Takes as needed for flares.), Disp: 180 blister, Rfl: 0  •  tobramycin (TOBREX) 0.3 % SOLN, instill 1 drop INTO AFFECTED EYE(S) every 4 hours while awake (Patient not taking: Reported on 8/9/2023), Disp: , Rfl:   Allergies   Allergen Reactions   • Other      Most all antibiotics- hives, hypotensive    "no problem with clindamycin."   • Augmentin Es-600  [Amoxicillin-Pot Clavulanate]    • Cefuroxime    • Erythromycin    • Levofloxacin    • Morphine    • Morphine And Related Hives   • Oxycodone-Acetaminophen    • Penicillins    • Percocet [Oxycodone-Acetaminophen] Hives   • Sulfa Antibiotics    • Tetracyclines & Related        Vitals: Blood pressure 124/82, pulse 84, temperature (!) 97.4 °F (36.3 °C), height 5' 5" (1.651 m), weight 80.3 kg (177 lb), SpO2 94 %, not currently breastfeeding. Body mass index is 29.45 kg/m². Oxygen Therapy  SpO2: 94 %  Oxygen Therapy: None (Room air)      Physical Exam  Physical Exam  Vitals reviewed.    Constitutional: General: She is not in acute distress. Appearance: Normal appearance. She is well-developed and normal weight. She is not ill-appearing, toxic-appearing or diaphoretic. HENT:      Head: Normocephalic and atraumatic. Right Ear: External ear normal.      Left Ear: External ear normal.      Nose: Nose normal.      Mouth/Throat:      Mouth: Mucous membranes are moist.      Pharynx: Oropharynx is clear. No oropharyngeal exudate. Eyes:      General: No scleral icterus. Right eye: No discharge. Left eye: No discharge. Conjunctiva/sclera: Conjunctivae normal.      Pupils: Pupils are equal, round, and reactive to light. Neck:      Vascular: No JVD. Trachea: No tracheal deviation. Cardiovascular:      Rate and Rhythm: Normal rate and regular rhythm. Heart sounds: Normal heart sounds. No murmur heard. No gallop. Pulmonary:      Effort: Pulmonary effort is normal. No respiratory distress. Breath sounds: Normal breath sounds. No stridor. No wheezing, rhonchi or rales. Abdominal:      General: Bowel sounds are normal. There is no distension. Palpations: Abdomen is soft. Tenderness: There is no abdominal tenderness. There is no guarding or rebound. Musculoskeletal:         General: No deformity. Right lower leg: No edema. Left lower leg: No edema. Lymphadenopathy:      Cervical: No cervical adenopathy. Skin:     General: Skin is warm and dry. Coloration: Skin is not jaundiced. Findings: No erythema or rash. Neurological:      General: No focal deficit present. Mental Status: She is alert and oriented to person, place, and time. Mental status is at baseline. Psychiatric:         Mood and Affect: Mood normal.         Behavior: Behavior normal.         Thought Content: Thought content normal.         Labs: I have personally reviewed pertinent lab results.   Lab Results   Component Value Date    WBC 8.65 04/08/2023    HGB 14.3 04/08/2023    HCT 44.0 04/08/2023    MCV 92 04/08/2023     04/08/2023     Lab Results   Component Value Date    GLUCOSE 100 04/20/2015    CALCIUM 9.2 04/08/2023     04/20/2015    K 4.2 04/08/2023    CO2 28 04/08/2023     04/08/2023    BUN 15 04/08/2023    CREATININE 0.75 04/08/2023     Lab Results   Component Value Date    IGE 5.53 04/08/2023     Lab Results   Component Value Date    ALT 25 04/08/2023    AST 15 04/08/2023    ALKPHOS 93 04/08/2023       Abs Eos [id number]  4/8/2023 - Abs Eos 660  10/29/2022  - ANCA neg, MPO/PR3 neg, NE RAST neg, IgE 291     Imaging and other studies: new images and reports personally reviewed  CXR 10/31/2022 - no focal infiltrates, no effusions, no PTX     Pulmonary function testing:   10/4/2022 - Ratio 79%, FVC 97%, FEV1 99% - normal spirometry    Yared Reza DO, FACP  [facility]    Answers for HPI/ROS submitted by the patient on 8/8/2023  Chronicity: recurrent  Do you have shortness of breath that occurs with effort or exertion?: No  Do you have ear congestion?: No  Do you have heartburn?: No  Do you have fatigue?: No  Do you have nasal congestion?: Yes  Do you have shortness of breath when lying flat?: No  Do you have shortness of breath when you wake up?: No  Do you have sweats?: No  Have you experienced weight loss?: No  Which of the following makes your symptoms worse?: change in weather, exposure to fumes, exposure to smoke, pollen, URI  Which of the following makes your symptoms better?: steroid inhaler

## 2023-08-09 NOTE — PATIENT INSTRUCTIONS
Check peak flows regularly  Continue Arnuity daily  Start singulair 10mg daily with increased allergy symptoms  As needed albuterol use

## 2023-09-29 ENCOUNTER — TELEPHONE (OUTPATIENT)
Dept: INTERNAL MEDICINE CLINIC | Facility: CLINIC | Age: 58
End: 2023-09-29

## 2023-09-29 ENCOUNTER — APPOINTMENT (INPATIENT)
Dept: CT IMAGING | Facility: HOSPITAL | Age: 58
End: 2023-09-29
Payer: COMMERCIAL

## 2023-09-29 ENCOUNTER — HOSPITAL ENCOUNTER (INPATIENT)
Facility: HOSPITAL | Age: 58
LOS: 4 days | Discharge: HOME | End: 2023-10-03
Attending: EMERGENCY MEDICINE | Admitting: HOSPITALIST
Payer: COMMERCIAL

## 2023-09-29 ENCOUNTER — APPOINTMENT (EMERGENCY)
Dept: RADIOLOGY | Facility: HOSPITAL | Age: 58
End: 2023-09-29
Payer: COMMERCIAL

## 2023-09-29 DIAGNOSIS — A41.9 SEPSIS (HCC): ICD-10-CM

## 2023-09-29 DIAGNOSIS — J01.00 ACUTE NON-RECURRENT MAXILLARY SINUSITIS: Primary | ICD-10-CM

## 2023-09-29 DIAGNOSIS — J40 BRONCHITIS: Primary | ICD-10-CM

## 2023-09-29 LAB
2HR DELTA HS TROPONIN: 1 NG/L
4HR DELTA HS TROPONIN: 1 NG/L
ALBUMIN SERPL BCP-MCNC: 4.3 G/DL (ref 3.5–5)
ALP SERPL-CCNC: 86 U/L (ref 34–104)
ALT SERPL W P-5'-P-CCNC: 13 U/L (ref 7–52)
ANION GAP SERPL CALCULATED.3IONS-SCNC: 11 MMOL/L
AST SERPL W P-5'-P-CCNC: 14 U/L (ref 13–39)
ATRIAL RATE: 92 BPM
BASOPHILS # BLD AUTO: 0.02 THOUSANDS/ÂΜL (ref 0–0.1)
BASOPHILS NFR BLD AUTO: 0 % (ref 0–1)
BILIRUB SERPL-MCNC: 0.59 MG/DL (ref 0.2–1)
BNP SERPL-MCNC: 32 PG/ML (ref 0–100)
BUN SERPL-MCNC: 11 MG/DL (ref 5–25)
CALCIUM SERPL-MCNC: 9.5 MG/DL (ref 8.4–10.2)
CARDIAC TROPONIN I PNL SERPL HS: 2 NG/L
CARDIAC TROPONIN I PNL SERPL HS: 3 NG/L
CARDIAC TROPONIN I PNL SERPL HS: 3 NG/L
CHLORIDE SERPL-SCNC: 103 MMOL/L (ref 96–108)
CO2 SERPL-SCNC: 23 MMOL/L (ref 21–32)
CREAT SERPL-MCNC: 0.68 MG/DL (ref 0.6–1.3)
EOSINOPHIL # BLD AUTO: 0.14 THOUSAND/ÂΜL (ref 0–0.61)
EOSINOPHIL NFR BLD AUTO: 1 % (ref 0–6)
ERYTHROCYTE [DISTWIDTH] IN BLOOD BY AUTOMATED COUNT: 12.9 % (ref 11.6–15.1)
FLUAV RNA RESP QL NAA+PROBE: NEGATIVE
FLUBV RNA RESP QL NAA+PROBE: NEGATIVE
GFR SERPL CREATININE-BSD FRML MDRD: 96 ML/MIN/1.73SQ M
GLUCOSE SERPL-MCNC: 117 MG/DL (ref 65–140)
HCT VFR BLD AUTO: 40.2 % (ref 34.8–46.1)
HGB BLD-MCNC: 13.3 G/DL (ref 11.5–15.4)
IMM GRANULOCYTES # BLD AUTO: 0.13 THOUSAND/UL (ref 0–0.2)
IMM GRANULOCYTES NFR BLD AUTO: 1 % (ref 0–2)
INR PPP: 0.97 (ref 0.84–1.19)
LACTATE SERPL-SCNC: 1.4 MMOL/L (ref 0.5–2)
LYMPHOCYTES # BLD AUTO: 1.72 THOUSANDS/ÂΜL (ref 0.6–4.47)
LYMPHOCYTES NFR BLD AUTO: 10 % (ref 14–44)
MCH RBC QN AUTO: 29.4 PG (ref 26.8–34.3)
MCHC RBC AUTO-ENTMCNC: 33.1 G/DL (ref 31.4–37.4)
MCV RBC AUTO: 89 FL (ref 82–98)
MONOCYTES # BLD AUTO: 0.91 THOUSAND/ÂΜL (ref 0.17–1.22)
MONOCYTES NFR BLD AUTO: 5 % (ref 4–12)
NEUTROPHILS # BLD AUTO: 13.83 THOUSANDS/ÂΜL (ref 1.85–7.62)
NEUTS SEG NFR BLD AUTO: 83 % (ref 43–75)
NRBC BLD AUTO-RTO: 0 /100 WBCS
P AXIS: 35 DEGREES
PLATELET # BLD AUTO: 193 THOUSANDS/UL (ref 149–390)
PMV BLD AUTO: 12.1 FL (ref 8.9–12.7)
POTASSIUM SERPL-SCNC: 3.8 MMOL/L (ref 3.5–5.3)
PR INTERVAL: 144 MS
PROCALCITONIN SERPL-MCNC: <0.05 NG/ML
PROT SERPL-MCNC: 7.6 G/DL (ref 6.4–8.4)
PROTHROMBIN TIME: 13.5 SECONDS (ref 11.6–14.5)
QRS AXIS: 13 DEGREES
QRSD INTERVAL: 80 MS
QT INTERVAL: 360 MS
QTC INTERVAL: 445 MS
RBC # BLD AUTO: 4.52 MILLION/UL (ref 3.81–5.12)
RSV RNA RESP QL NAA+PROBE: NEGATIVE
SARS-COV-2 RNA RESP QL NAA+PROBE: NEGATIVE
SODIUM SERPL-SCNC: 137 MMOL/L (ref 135–147)
T WAVE AXIS: 46 DEGREES
VENTRICULAR RATE: 92 BPM
WBC # BLD AUTO: 16.75 THOUSAND/UL (ref 4.31–10.16)

## 2023-09-29 PROCEDURE — 85610 PROTHROMBIN TIME: CPT | Performed by: EMERGENCY MEDICINE

## 2023-09-29 PROCEDURE — 96361 HYDRATE IV INFUSION ADD-ON: CPT

## 2023-09-29 PROCEDURE — 83605 ASSAY OF LACTIC ACID: CPT | Performed by: EMERGENCY MEDICINE

## 2023-09-29 PROCEDURE — 85025 COMPLETE CBC W/AUTO DIFF WBC: CPT

## 2023-09-29 PROCEDURE — 71250 CT THORAX DX C-: CPT

## 2023-09-29 PROCEDURE — 99223 1ST HOSP IP/OBS HIGH 75: CPT | Performed by: HOSPITALIST

## 2023-09-29 PROCEDURE — 36415 COLL VENOUS BLD VENIPUNCTURE: CPT

## 2023-09-29 PROCEDURE — 84484 ASSAY OF TROPONIN QUANT: CPT | Performed by: EMERGENCY MEDICINE

## 2023-09-29 PROCEDURE — 80053 COMPREHEN METABOLIC PANEL: CPT

## 2023-09-29 PROCEDURE — 96365 THER/PROPH/DIAG IV INF INIT: CPT

## 2023-09-29 PROCEDURE — 87040 BLOOD CULTURE FOR BACTERIA: CPT | Performed by: EMERGENCY MEDICINE

## 2023-09-29 PROCEDURE — 93005 ELECTROCARDIOGRAM TRACING: CPT

## 2023-09-29 PROCEDURE — G1004 CDSM NDSC: HCPCS

## 2023-09-29 PROCEDURE — 0241U HB NFCT DS VIR RESP RNA 4 TRGT: CPT

## 2023-09-29 PROCEDURE — 71046 X-RAY EXAM CHEST 2 VIEWS: CPT

## 2023-09-29 PROCEDURE — 99285 EMERGENCY DEPT VISIT HI MDM: CPT | Performed by: EMERGENCY MEDICINE

## 2023-09-29 PROCEDURE — 84145 PROCALCITONIN (PCT): CPT | Performed by: EMERGENCY MEDICINE

## 2023-09-29 PROCEDURE — 99285 EMERGENCY DEPT VISIT HI MDM: CPT

## 2023-09-29 PROCEDURE — 83880 ASSAY OF NATRIURETIC PEPTIDE: CPT | Performed by: EMERGENCY MEDICINE

## 2023-09-29 PROCEDURE — 96375 TX/PRO/DX INJ NEW DRUG ADDON: CPT

## 2023-09-29 RX ORDER — CHOLECALCIFEROL (VITAMIN D3) 50 MCG
2000 TABLET ORAL DAILY
Status: CANCELLED | OUTPATIENT
Start: 2023-09-30

## 2023-09-29 RX ORDER — ARIPIPRAZOLE 5 MG/1
10 TABLET ORAL
Status: DISCONTINUED | OUTPATIENT
Start: 2023-09-29 | End: 2023-10-03 | Stop reason: HOSPADM

## 2023-09-29 RX ORDER — LORAZEPAM 0.5 MG/1
0.5 TABLET ORAL 2 TIMES DAILY PRN
Status: DISCONTINUED | OUTPATIENT
Start: 2023-09-29 | End: 2023-10-03 | Stop reason: HOSPADM

## 2023-09-29 RX ORDER — FLUTICASONE PROPIONATE 110 UG/1
1 AEROSOL, METERED RESPIRATORY (INHALATION) DAILY
Status: DISCONTINUED | OUTPATIENT
Start: 2023-09-29 | End: 2023-10-01

## 2023-09-29 RX ORDER — ARIPIPRAZOLE 5 MG/1
5 TABLET ORAL DAILY
Status: CANCELLED | OUTPATIENT
Start: 2023-09-30

## 2023-09-29 RX ORDER — ENOXAPARIN SODIUM 100 MG/ML
40 INJECTION SUBCUTANEOUS DAILY
Status: CANCELLED | OUTPATIENT
Start: 2023-09-30

## 2023-09-29 RX ORDER — ALBUTEROL SULFATE 90 UG/1
2 AEROSOL, METERED RESPIRATORY (INHALATION) EVERY 6 HOURS PRN
Status: CANCELLED | OUTPATIENT
Start: 2023-09-29

## 2023-09-29 RX ORDER — ATORVASTATIN CALCIUM 10 MG/1
10 TABLET, FILM COATED ORAL DAILY
Status: CANCELLED | OUTPATIENT
Start: 2023-09-30

## 2023-09-29 RX ORDER — FLUTICASONE PROPIONATE 50 MCG
1 SPRAY, SUSPENSION (ML) NASAL 2 TIMES DAILY
Status: CANCELLED | OUTPATIENT
Start: 2023-09-29

## 2023-09-29 RX ORDER — MONTELUKAST SODIUM 10 MG/1
10 TABLET ORAL
Status: DISCONTINUED | OUTPATIENT
Start: 2023-09-29 | End: 2023-10-03 | Stop reason: HOSPADM

## 2023-09-29 RX ORDER — DIPHENHYDRAMINE HCL 25 MG
25 TABLET ORAL EVERY 6 HOURS PRN
Status: CANCELLED | OUTPATIENT
Start: 2023-09-29

## 2023-09-29 RX ORDER — ACETAMINOPHEN 325 MG/1
650 TABLET ORAL EVERY 6 HOURS PRN
Status: CANCELLED | OUTPATIENT
Start: 2023-09-29

## 2023-09-29 RX ORDER — FLUOXETINE 10 MG/1
40 CAPSULE ORAL DAILY
Status: CANCELLED | OUTPATIENT
Start: 2023-09-30

## 2023-09-29 RX ORDER — MONTELUKAST SODIUM 10 MG/1
10 TABLET ORAL
Status: CANCELLED | OUTPATIENT
Start: 2023-09-29

## 2023-09-29 RX ORDER — CHLORHEXIDINE GLUCONATE ORAL RINSE 1.2 MG/ML
15 SOLUTION DENTAL
Status: CANCELLED | OUTPATIENT
Start: 2023-09-29

## 2023-09-29 RX ORDER — CEFDINIR 300 MG/1
300 CAPSULE ORAL EVERY 12 HOURS SCHEDULED
Qty: 10 CAPSULE | Refills: 0 | Status: ON HOLD | OUTPATIENT
Start: 2023-09-29 | End: 2023-10-03 | Stop reason: ALTCHOICE

## 2023-09-29 RX ORDER — ONDANSETRON 2 MG/ML
4 INJECTION INTRAMUSCULAR; INTRAVENOUS ONCE
Status: COMPLETED | OUTPATIENT
Start: 2023-09-29 | End: 2023-09-29

## 2023-09-29 RX ORDER — BUSPIRONE HYDROCHLORIDE 10 MG/1
30 TABLET ORAL DAILY
Status: DISCONTINUED | OUTPATIENT
Start: 2023-09-29 | End: 2023-10-03 | Stop reason: HOSPADM

## 2023-09-29 RX ORDER — ALBUTEROL SULFATE 90 UG/1
2 AEROSOL, METERED RESPIRATORY (INHALATION) EVERY 6 HOURS PRN
Status: DISCONTINUED | OUTPATIENT
Start: 2023-09-29 | End: 2023-10-03 | Stop reason: HOSPADM

## 2023-09-29 RX ORDER — GUAIFENESIN 600 MG/1
600 TABLET, EXTENDED RELEASE ORAL EVERY 12 HOURS SCHEDULED
Status: DISCONTINUED | OUTPATIENT
Start: 2023-09-29 | End: 2023-10-03 | Stop reason: HOSPADM

## 2023-09-29 RX ORDER — IPRATROPIUM BROMIDE 21 UG/1
2 SPRAY, METERED NASAL 3 TIMES DAILY PRN
Status: DISCONTINUED | OUTPATIENT
Start: 2023-09-29 | End: 2023-09-29

## 2023-09-29 RX ORDER — BUSPIRONE HYDROCHLORIDE 10 MG/1
30 TABLET ORAL ONCE
Status: CANCELLED | OUTPATIENT
Start: 2023-09-29 | End: 2023-09-29

## 2023-09-29 RX ORDER — METHYLPREDNISOLONE SODIUM SUCCINATE 125 MG/2ML
125 INJECTION, POWDER, LYOPHILIZED, FOR SOLUTION INTRAMUSCULAR; INTRAVENOUS ONCE
Status: COMPLETED | OUTPATIENT
Start: 2023-09-29 | End: 2023-09-29

## 2023-09-29 RX ORDER — FLUOXETINE 10 MG/1
10 CAPSULE ORAL EVERY MORNING
Status: DISCONTINUED | OUTPATIENT
Start: 2023-09-30 | End: 2023-10-03 | Stop reason: HOSPADM

## 2023-09-29 RX ORDER — ACETAMINOPHEN 325 MG/1
650 TABLET ORAL EVERY 6 HOURS PRN
Status: DISCONTINUED | OUTPATIENT
Start: 2023-09-29 | End: 2023-10-03 | Stop reason: HOSPADM

## 2023-09-29 RX ORDER — ONDANSETRON 2 MG/ML
4 INJECTION INTRAMUSCULAR; INTRAVENOUS EVERY 6 HOURS PRN
Status: CANCELLED | OUTPATIENT
Start: 2023-09-29

## 2023-09-29 RX ORDER — ARIPIPRAZOLE 5 MG/1
10 TABLET ORAL DAILY
Status: DISCONTINUED | OUTPATIENT
Start: 2023-09-30 | End: 2023-09-29

## 2023-09-29 RX ORDER — IPRATROPIUM BROMIDE AND ALBUTEROL SULFATE 2.5; .5 MG/3ML; MG/3ML
3 SOLUTION RESPIRATORY (INHALATION)
Status: DISCONTINUED | OUTPATIENT
Start: 2023-09-29 | End: 2023-09-29

## 2023-09-29 RX ORDER — FLUTICASONE PROPIONATE 50 MCG
1 SPRAY, SUSPENSION (ML) NASAL 2 TIMES DAILY
Status: DISCONTINUED | OUTPATIENT
Start: 2023-09-29 | End: 2023-10-03 | Stop reason: HOSPADM

## 2023-09-29 RX ORDER — MELATONIN
2000 DAILY
Status: DISCONTINUED | OUTPATIENT
Start: 2023-09-30 | End: 2023-10-03 | Stop reason: HOSPADM

## 2023-09-29 RX ORDER — ATORVASTATIN CALCIUM 20 MG/1
20 TABLET, FILM COATED ORAL DAILY
Status: DISCONTINUED | OUTPATIENT
Start: 2023-09-30 | End: 2023-10-03 | Stop reason: HOSPADM

## 2023-09-29 RX ORDER — CHLORHEXIDINE GLUCONATE ORAL RINSE 1.2 MG/ML
15 SOLUTION DENTAL
Status: DISCONTINUED | OUTPATIENT
Start: 2023-09-29 | End: 2023-10-03 | Stop reason: HOSPADM

## 2023-09-29 RX ORDER — DIPHENHYDRAMINE HCL 25 MG
25 TABLET ORAL EVERY 6 HOURS PRN
Status: DISCONTINUED | OUTPATIENT
Start: 2023-09-29 | End: 2023-10-03 | Stop reason: HOSPADM

## 2023-09-29 RX ORDER — ARIPIPRAZOLE 2 MG/1
2 TABLET ORAL DAILY
Status: CANCELLED | OUTPATIENT
Start: 2023-09-30

## 2023-09-29 RX ORDER — ARIPIPRAZOLE 2 MG/1
2 TABLET ORAL DAILY
Status: DISCONTINUED | OUTPATIENT
Start: 2023-09-30 | End: 2023-10-03 | Stop reason: HOSPADM

## 2023-09-29 RX ORDER — LEVALBUTEROL INHALATION SOLUTION 1.25 MG/3ML
1.25 SOLUTION RESPIRATORY (INHALATION)
Status: DISCONTINUED | OUTPATIENT
Start: 2023-09-30 | End: 2023-10-03 | Stop reason: HOSPADM

## 2023-09-29 RX ORDER — LORAZEPAM 0.5 MG/1
0.5 TABLET ORAL 2 TIMES DAILY
Status: CANCELLED | OUTPATIENT
Start: 2023-09-29

## 2023-09-29 RX ORDER — ENOXAPARIN SODIUM 100 MG/ML
40 INJECTION SUBCUTANEOUS DAILY
Status: DISCONTINUED | OUTPATIENT
Start: 2023-09-30 | End: 2023-10-03 | Stop reason: HOSPADM

## 2023-09-29 RX ORDER — AZELASTINE 1 MG/ML
1 SPRAY, METERED NASAL 2 TIMES DAILY
Status: DISCONTINUED | OUTPATIENT
Start: 2023-09-29 | End: 2023-10-03 | Stop reason: HOSPADM

## 2023-09-29 RX ADMIN — CEFEPIME 2000 MG: 2 INJECTION, POWDER, FOR SOLUTION INTRAVENOUS at 15:44

## 2023-09-29 RX ADMIN — GUAIFENESIN 600 MG: 600 TABLET ORAL at 21:08

## 2023-09-29 RX ADMIN — METHYLPREDNISOLONE SODIUM SUCCINATE 125 MG: 125 INJECTION, POWDER, FOR SOLUTION INTRAMUSCULAR; INTRAVENOUS at 17:18

## 2023-09-29 RX ADMIN — IPRATROPIUM BROMIDE AND ALBUTEROL SULFATE 3 ML: 2.5; .5 SOLUTION RESPIRATORY (INHALATION) at 17:18

## 2023-09-29 RX ADMIN — ONDANSETRON 4 MG: 2 INJECTION INTRAMUSCULAR; INTRAVENOUS at 14:40

## 2023-09-29 RX ADMIN — MONTELUKAST 10 MG: 10 TABLET, FILM COATED ORAL at 21:08

## 2023-09-29 RX ADMIN — DIPHENHYDRAMINE HYDROCHLORIDE 25 MG: 25 TABLET ORAL at 21:08

## 2023-09-29 RX ADMIN — SODIUM CHLORIDE 1000 ML: 0.9 INJECTION, SOLUTION INTRAVENOUS at 14:41

## 2023-09-29 RX ADMIN — AZELASTINE HYDROCHLORIDE 1 SPRAY: 137 SPRAY, METERED NASAL at 21:09

## 2023-09-29 RX ADMIN — FLUTICASONE PROPIONATE 1 SPRAY: 50 SPRAY, METERED NASAL at 21:10

## 2023-09-29 RX ADMIN — FLUTICASONE PROPIONATE 1 PUFF: 110 AEROSOL, METERED RESPIRATORY (INHALATION) at 21:13

## 2023-09-29 RX ADMIN — ACETAMINOPHEN 650 MG: 325 TABLET, FILM COATED ORAL at 23:22

## 2023-09-29 RX ADMIN — BUSPIRONE HYDROCHLORIDE 30 MG: 10 TABLET ORAL at 21:48

## 2023-09-29 RX ADMIN — CEFTRIAXONE SODIUM 1000 MG: 10 INJECTION, POWDER, FOR SOLUTION INTRAVENOUS at 21:09

## 2023-09-29 RX ADMIN — ARIPIPRAZOLE 10 MG: 5 TABLET ORAL at 21:08

## 2023-09-29 RX ADMIN — CHLORHEXIDINE GLUCONATE 15 ML: 1.2 SOLUTION ORAL at 21:11

## 2023-09-29 RX ADMIN — ALBUTEROL SULFATE 2 PUFF: 90 AEROSOL, METERED RESPIRATORY (INHALATION) at 21:11

## 2023-09-29 NOTE — PLAN OF CARE
Problem: PAIN - ADULT  Goal: Verbalizes/displays adequate comfort level or baseline comfort level  Description: Interventions:  - Encourage patient to monitor pain and request assistance  - Assess pain using appropriate pain scale  - Administer analgesics based on type and severity of pain and evaluate response  - Implement non-pharmacological measures as appropriate and evaluate response  - Consider cultural and social influences on pain and pain management  - Notify physician/advanced practitioner if interventions unsuccessful or patient reports new pain  Outcome: Progressing     Problem: INFECTION - ADULT  Goal: Absence or prevention of progression during hospitalization  Description: INTERVENTIONS:  - Assess and monitor for signs and symptoms of infection  - Monitor lab/diagnostic results  - Monitor all insertion sites, i.e. indwelling lines, tubes, and drains  - Monitor endotracheal if appropriate and nasal secretions for changes in amount and color  - [place] appropriate cooling/warming therapies per order  - Administer medications as ordered  - Instruct and encourage patient and family to use good hand hygiene technique  - Identify and instruct in appropriate isolation precautions for identified infection/condition  Outcome: Progressing     Problem: SAFETY ADULT  Goal: Patient will remain free of falls  Description: INTERVENTIONS:  - Educate patient/family on patient safety including physical limitations  - Instruct patient to call for assistance with activity   - Consult OT/PT to assist with strengthening/mobility   - Keep Call bell within reach  - Keep bed low and locked with side rails adjusted as appropriate  - Keep care items and personal belongings within reach  - Initiate and maintain comfort rounds  - Make Fall Risk Sign visible to staff  - Apply yellow socks and bracelet for high fall risk patients  - Consider moving patient to room near nurses station  Outcome: Progressing  Goal: Maintain or return to baseline ADL function  Description: INTERVENTIONS:  -  Assess patient's ability to carry out ADLs; assess patient's baseline for ADL function and identify physical deficits which impact ability to perform ADLs (bathing, care of mouth/teeth, toileting, grooming, dressing, etc.)  - Assess/evaluate cause of self-care deficits   - Assess range of motion  - Assess patient's mobility; develop plan if impaired  - Assess patient's need for assistive devices and provide as appropriate  - Encourage maximum independence but intervene and supervise when necessary  - Involve family in performance of ADLs  - Assess for home care needs following discharge   - Consider OT consult to assist with ADL evaluation and planning for discharge  - Provide patient education as appropriate  Outcome: Progressing  Goal: Maintains/Returns to pre admission functional level  Description: INTERVENTIONS:  - Perform BMAT or MOVE assessment daily.   - Set and communicate daily mobility goal to care team and patient/family/caregiver.    - Collaborate with rehabilitation services on mobility goals if consulted  - Out of bed for toileting  - Record patient progress and toleration of activity level   Outcome: Progressing     Problem: DISCHARGE PLANNING  Goal: Discharge to home or other facility with appropriate resources  Description: INTERVENTIONS:  - Identify barriers to discharge w/patient and caregiver  - Arrange for needed discharge resources and transportation as appropriate  - Identify discharge learning needs (meds, wound care, etc.)  - Arrange for interpretive services to assist at discharge as needed  - Refer to Case Management Department for coordinating discharge planning if the patient needs post-hospital services based on physician/advanced practitioner order or complex needs related to functional status, cognitive ability, or social support system  Outcome: Progressing     Problem: Knowledge Deficit  Goal: Patient/family/caregiver demonstrates understanding of disease process, treatment plan, medications, and discharge instructions  Description: Complete learning assessment and assess knowledge base.   Interventions:  - Provide teaching at level of understanding  - Provide teaching via preferred learning methods  Outcome: Progressing

## 2023-09-29 NOTE — TELEPHONE ENCOUNTER
Hi, this is Andrew Lara. I'm actually calling this time on behalf of my Sister Rosa tSubbs [id number]. She is up away watching her grandchildren and has gotten really sick over the last two days. It's similar to the same thing he had in May. It needs to be put on antibiotics. She was on [address] believe it was. She is up there watching them for the weekend. I'm going to go up to try to help her later on this afternoon after work. She it's more so just the sinus pressure cough very nasally. She gets herself to a point with the coughing Jags that she spits up some and it varies from a yellowish to a green low grade temp. Nothing over 100, but I'm going to drive up and she's very spotty. Her phone number, and you can try calling her, is [phone]. It's her cell phone, but reception up there is a little bit iffy so I was hoping to be able to get some antibiotics that I can take up to her tonight and then I'll be spending the weekend helping watch her. My phone number is [phone], the pharmacy [facility] uses his [facility] in [place], I believe it's [phone]. And that's a half part way up from my house going up to where she is with her grandchildren. Thank you.

## 2023-09-29 NOTE — ASSESSMENT & PLAN NOTE
· Currently taking fluticasone inhaler daily and albuterol rescue inhaler. Also takes Singulair at night.

## 2023-09-29 NOTE — LETTER
October 3, 2023     [address] [place] [address]  [address]    Patient: Noé Escamilla   YOB: 1965   Date of Visit: 9/29/2023       Dear Dr. Sebastien Silvestre:    Thank you for referring Heidi Noriega to me for evaluation. Below are my notes for this consultation. She was admitted to the hospital for bronchitis/viral pneumonia. She initially had leukocytosis with WBC 16.75, tachycardia at , and tachypnea at RR 20, met SIRS criteria in the ED. She received an empiric dose of IV cefepime. She had a chest x-ray on 9/21 that showed no acute cardiopulmonary disease. She has CT chest without contrast on 9/29 that showed multifocal groundglass opacities, may represent viral PNA, in keeping with infectious/inflammatory etiology. She had a repeat chest x-ray on 10/2 that showed subtle opacity in the left mid lung with multifocal bilateral pneumonia on CT. Her antibiotic selection was de-escalated to p.o. cefdinir. She received a total of 3 days of cefdinir, which was discontinued after 3 negative procalcitonin tests. Her respiratory panel was positive for rhinovirus. Pulmonology was consulted due to history of cryptogenic organizing pneumonia. She was given IV Solu-Medrol, with improvement in symptoms. She was discharged on a prednisone taper, DuoNebs, Hycodan (requested by the patient due to symptomatic relief). Plan for outpatient follow-up with primary care physician in 1 week and with pulmonology Dr Valentina Aceves in the next 2 to 4 weeks. Plan for outpatient CT chest repeat imaging in 6 to 8 weeks per recommendation by pulmonology. If you have questions, please do not hesitate to call me. I look forward to following your patient along with you.          Sincerely,        No name on file        CC: No Recipients    Gabriel Castañeda DO  10/3/2023  7:06 PM  [address]  Progress Note  Name: Kennedi Has  MRN: [id number]  Unit/Bed#: S MS 864-82 I Date of Admission: 9/29/2023   Date of Service: 10/3/2023 I Hospital Day: 4    Assessment/Plan   * Bronchitis  Assessment & Plan  Presents with cough, congestion, shortness of breath for the past 2 days. CT chest shows multifocal groundglass opacities. This appearance may represent viral pneumonia, particularly COVID-19, though testing was negative. Appearance is otherwise nonspecific though in keeping with an infectious/inflammatory etiology. Procalcitonin negative x 3. Per pulmonology, PO Cefdinir discontinued on 10/2 (total of 3 days)  Per pulmonology, transitioned to PO prednisone 60 mg today    Plan:  - Follow-up with pulmonology Dr. Valentina Aceves in the next 2-4 weeks. - Discharge on prednisone taper - 50 mg for the next 3 days, decrease by 10 mg every 3 days until completely off  - Ipratropium/albuterol (Duoneb) nebulizer treatments TID  - Continue taking Arnuity 100 mcg  - Discharge with Hycodan prescription  - Tobacco cessation recommended  - Obtain repeat CT chest scan in 6 to 8 weeks, per pulmonology    Asthma  Assessment & Plan  Currently taking fluticasone inhaler daily and albuterol rescue inhaler. Also takes Singulair at night. Takes Arnuity as an outpatient. Plan:  - Follow-up with pulmonology Dr. Valentina Aceves as an outpatient  - See plan under bronchitis    Tobacco use  Assessment & Plan  Current smoker, 7 cigarettes/day. Nicotine patch offered. Plan:  - Tobacco cessation recommended    Depression with anxiety  Assessment & Plan  Continue home dose fluoxetine and buspirone.          Medical Problems       Resolved Problems  Date Reviewed: 10/3/2023   None       Discharging Resident: Gabriel Castañeda DO  Discharging Attending: No att. providers found  PCP: Destini Zamora DO  Admission Date:   Admission Orders (From admission, onward)       Ordered        09/29/23 1627  INPATIENT ADMISSION  Once                          Discharge Date: 10/03/23    Consultations During [facility]:  Pulmonology    Procedures Performed:   None    Significant Findings / Test Results:   XR chest portable   Final Result by Merna Dougherty MD (10/02 0151)      Subtle opacity in the left midlung with multifocal bilateral pneumonia on CT. Workstation performed: [id number]         CT chest without contrast   Final Result by Justina Parekh MD (09/29 1722)      Multifocal groundglass opacities, new from the prior study and less severe than on the earlier CT from 4/3/2021. This appearance may represent viral pneumonia, particularly COVID-19, though testing was negative. Appearance is otherwise nonspecific though    in keeping with an infectious/inflammatory etiology. The study was marked in [facility] for immediate notification. Workstation performed: [id number]         XR chest 2 views   Final Result by Susan Ledezma MD (09/29 1626)      No acute cardiopulmonary disease. Workstation performed: [id number]               Incidental Findings:   CT chest - multifocal groundglass opacities, new from the prior study and less severe than on the earlier CT from 4/3/2021. This appearance may represent viral pneumonia, particularly COVID-19, though testing was negative. Appearance is otherwise nonspecific though,  in keeping with an infectious/inflammatory etiology  Patient will require follow-up CT chest in 6-8 weeks    I reviewed the above mentioned incidental findings with the patient and/or family and they expressed understanding. Test Results Pending at Discharge (will require follow up):   None     Outpatient Tests Requested:  Patient will require follow-up CT chest in 6-8 weeks    Complications: None    Reason for Admission: SOB, productive cough, congestion, fever, recent sick contact     Hospital Course:   Bi Hummel is a 60 y.o. female patient who originally presented to the hospital on 9/29/2023 due to SOB, productive cough, congestion, fever, and recent sick contact with granddaughter. Upon presentation to the emergency room, she was afebrile, normotensive, tachycardic at , tachypneic at RR 20, and saturating well on room air. Her labs were significant for leukocytosis at 16.75. She had a negative procalcitonin, negative lactic acid, and negative COVID/flu/RSV. Her urine strep pneumo and urine Legionella testing were both negative. Respiratory panel was positive for rhinovirus. Chest x-ray performed on 9/21 showed no acute cardiopulmonary disease. CT chest performed on 9/29 showed multifocal groundglass opacities, may represent viral pneumonia. Chest x-ray performed on 10/2 showed a subtle opacity in the left midlung field with multifocal bilateral pneumonia seen on CT. In the ED, she met sepsis criteria. She received 1 L of normal saline bolus, a dose of IV 2 g of cefepime and 1 g of IV cefepime in the ED. She reports taking 1 dose of cefdinir prior to arrival to the hospital, which was prescribed by her PCP for possible sinus infection. Her antibiotic selection was de-escalated to oral cefdinir, which she received a total of 3 days of, and was discontinued after 3 negative procalcitonin tests. Pulmonology was consulted due to history of cryptogenic organizing pneumonia. She was given IV Solu-Medrol and transitioned to oral prednisone prior to discharge. She reported improvement in symptoms during her hospital stay. She was discharged on a prednisone taper, DuoNebs, Hycodan (requested by the patient due to symptomatic relief). Plan for outpatient follow-up with primary care physician in 1 week and with pulmonology Dr Marco Monreal in the next 2 to 4 weeks. Plan for outpatient CT chest repeat imaging in 6 to 8 weeks per recommendation pulmonology. Patient is in agreement with plan for discharge. Stable for discharge. Please see above list of diagnoses and related plan for additional information.      Condition at Discharge: good    Discharge Day Visit / Exam:   Subjective: Patient was seen and examined at bedside. She states that she still has upper respiratory congestion but is feeling much better compared to yesterday. She reports being ready to go home and will follow-up with pulmonology as an outpatient. She is requesting a prescription for Hycodan upon discharge due to symptomatic relief. She does not have any other complaints at this time. Vitals: Blood Pressure: 139/82 (10/03/23 1410)  Pulse: 88 (10/03/23 1410)  Temperature: 98.5 °F (36.9 °C) (10/03/23 1410)  Temp Source: Oral (10/02/23 0724)  Respirations: 18 (10/03/23 1410)  Weight - Scale: 82 kg (180 lb 11.2 oz) (10/02/23 0538)  SpO2: 93 % (10/03/23 1410)  Exam:   Physical Exam  Vitals and nursing note reviewed. Constitutional:       General: She is not in acute distress. Appearance: She is well-developed. HENT:      Head: Normocephalic and atraumatic. Nose: Congestion present. No rhinorrhea. Eyes:      Conjunctiva/sclera: Conjunctivae normal.   Cardiovascular:      Rate and Rhythm: Normal rate and regular rhythm. Heart sounds: No murmur heard. Pulmonary:      Effort: Pulmonary effort is normal. No respiratory distress. Breath sounds: Normal breath sounds. No wheezing or rales. Abdominal:      Palpations: Abdomen is soft. Tenderness: There is no abdominal tenderness. Musculoskeletal:         General: No swelling. Cervical back: Neck supple. Skin:     General: Skin is warm and dry. Capillary Refill: Capillary refill takes less than 2 seconds. Neurological:      Mental Status: She is alert. Psychiatric:         Mood and Affect: Mood normal.          Discussion with Family: Patient to update family. Discharge instructions/Information to patient and family:   See after visit summary for information provided to patient and family.       Provisions for Follow-Up Care:  See after visit summary for information related to follow-up care and any pertinent home health orders. Disposition:   Home    Planned Readmission: No    Discharge Medications:  See after visit summary for reconciled discharge medications provided to patient and/or family. **Please Note: This note may have been constructed using a voice recognition system**    Natalie Crowder DO  10/3/2023  3:26 PM  Signed  Progress Note - Pulmonary   Strickland Aid 60 y.o. female MRN: [id number]  Unit/Bed#: S -01 Encounter: [id number]      Assessment & Recommendations:  Acute respiratory insufficiency  IS, OOB-chair, ambulation as able    Asthma with acute exacerbation  Wean prednisone to 50mg daily tomorrow - can then taper by 10mg every 3 days until off  Can resume Arnuity 100mcg at discharge  Should be discharged with nebulizer and equipment and can use duonebs TID for next 2-4 weeks   Will schedule follow up in next 2-4 weeks in the pulmonary office    Rhinovirus infection - supportive care for now    Abnormal CT chest with ground glass infiltrates - plan for repeat CT imaging at 6-8 week interval    Nicotine dependence - needs complete tobacco cessation, encouraged NRT for now, discuss further chantix as outpatient    She is stable for d/c home, discussed with Dr. Kaur FOX      Subjective:   Feeling improved, is fatigued but improving, no sputum production, continues with coughing episodes but notes improvements with hycodan. Continued anxiety noted. Objective:     Vitals: Blood pressure 123/68, pulse 78, temperature 97.7 °F (36.5 °C), resp. rate 17, weight 82 kg (180 lb 11.2 oz), SpO2 91 %, not currently breastfeeding. , 94% RA during exam, Body mass index is 30.07 kg/m².     No intake or output data in the 24 hours ending 10/03/23 0957      Physical Exam  Gen: Awake, alert, oriented x 3, no acute distress  HEENT: Mucous membranes moist, no oral lesions, no thrush  NECK: No accessory muscle use, JVP not elevated  Cardiac: Regular, single S1, single S2, no murmurs, no rubs, no gallops  Lungs: slight scattered mixed bronchial BS and rhonchi, no wheeze or rales appreciated  Abdomen: normoactive bowel sounds, soft nontender, nondistended, no rebound or rigidity, no guarding  Extremities: no cyanosis, no clubbing, no edema    Labs: I have personally reviewed pertinent lab results.   Laboratory and Diagnostics  Results from last 7 days   Lab Units 10/03/23  0456 10/02/23  0534 10/01/23  0459 09/30/23  0537 09/29/23  1423   WBC Thousand/uL 12.43* 15.13* 16.14* 13.83* 16.75*   HEMOGLOBIN g/dL 11.4* 11.2* 11.2* 11.7 13.3   HEMATOCRIT % 35.6 34.7* 34.5* 35.9 40.2   PLATELETS Thousands/uL 241 236 210 192 193   NEUTROS PCT %  --   --  77*  --  83*   MONOS PCT %  --   --  7  --  5   MONO PCT % 5  --   --   --   --    EOS PCT % 1  --  1  --  1     Results from last 7 days   Lab Units 10/03/23  0456 10/02/23  0534 10/01/23  0459 09/30/23  0537 09/29/23  1423   SODIUM mmol/L 139 138 139 138 137   POTASSIUM mmol/L 4.4 4.2 3.7 3.9 3.8   CHLORIDE mmol/L 106 105 105 106 103   CO2 mmol/L 25 26 27 24 23   ANION GAP mmol/L 8 7 7 8 11   BUN mg/dL 14 10 14 12 11   CREATININE mg/dL 0.61 0.54* 0.63 0.59* 0.68   CALCIUM mg/dL 9.0 9.1 9.0 9.0 9.5   GLUCOSE RANDOM mg/dL 160* 149* 109 144* 117   ALT U/L  --   --  17  --  13   AST U/L  --   --  20  --  14   ALK PHOS U/L  --   --  72  --  86   ALBUMIN g/dL  --   --  3.6  --  4.3   TOTAL BILIRUBIN mg/dL  --   --  0.31  --  0.59          Results from last 7 days   Lab Units 09/29/23  1504   INR  0.97          Results from last 7 days   Lab Units 09/29/23  1504   LACTIC ACID mmol/L 1.4     Results from last 7 days   Lab Units 10/01/23  0459   CRP mg/L 112.8*                 Results from last 7 days   Lab Units 10/01/23  0459 09/30/23  0537 09/29/23  1423   PROCALCITONIN ng/ml <0.05 <0.05 <0.05       Microbiology:  RP2 10/1/2023 - (+) Rhinovirus  Bld Cx NGTD  Strep/legionella ag neg    Imaging and other studies: I have personally reviewed pertinent reports. and I have personally reviewed pertinent films in PACS  CXR 10/1/2023 - mild scattered alveolar infiltrates, no PTX  CT Chest 9/29/2023 - scattered ground glass infiltrates in mid and lower lung fields bilaterally, mildly enlarged level 7 LN 1.4cm, no sig effusions, no PTX      Yared Bustos DO, [address], [place]  10/2/2023  4:56 PM  Attested  [address]  Progress Note  Name: Anila Vines  MRN: [id number]  Unit/Bed#: S -34 I Date of Admission: 9/29/2023   Date of Service: 10/2/2023 I Hospital Day: 3    Assessment/Plan   * Bronchitis  Assessment & Plan  Presents complaining of cough congestion shortness of breath since 2 days. CT chest shows multifocal groundglass opacities. This appearance may represent viral pneumonia, particularly COVID-19, though testing was negative. Appearance is otherwise nonspecific though in keeping with an infectious/inflammatory etiology. Procalcitonin negative x 3. Per pulmonology, PO Cefdinir discontinued on 10/2    Plan:  Per pulmonology, continue IV steroids, transition to PO prednisone 60 mg daily starting tomorrow and discharge  Continue bronchodilator regimen and Singulair. Cough lozenges q1 hr prn  Ocean spray q1 hr prn  Tylenol prn pain and fever. Tobacco cessation recommended    Asthma  Assessment & Plan  Currently taking fluticasone inhaler daily and albuterol rescue inhaler. Also takes Singulair at night. Takes Arnuity as an outpatient. Plan:  - Continue bronchodilator regimen and Singulair.    - Follows with pulmonology Dr. Saadia Bustos as an outpatient    Tobacco use  Assessment & Plan  Current smoker, 7 cigarettes/day. Nicotine patch offered. Plan:  - Tobacco cessation recommended    Depression with anxiety  Assessment & Plan  Continue home dose fluoxetine and buspirone.        VTE Pharmacologic Prophylaxis: VTE Score: 4 Moderate Risk (Score 3-4) - Pharmacological DVT Prophylaxis Ordered: enoxaparin (Lovenox). Patient Centered Rounds: I performed bedside rounds with nursing staff today. Discussions with Specialists or Other Care Team Provider: Pulmonology    Education and Discussions with Family / Patient: patient to update family. Current Length of Stay: 3 day(s)  Current Patient Status: Inpatient   Discharge Plan: Anticipate discharge in 24-48 hrs to home. Code Status: Level 1 - Full Code    Subjective:   Patient seen and examined at bedside. She continues to feel tired and has a hoarse voice. Her coughing is better today, but she did not sleep well last night. Overall, she states that she feels better compared to when she first came in. Denies chest pain, palpitations, abdominal pain, N/V/C/D. Objective:     Vitals:   Temp (24hrs), Av °F (36.7 °C), Min:97.6 °F (36.4 °C), Max:98.5 °F (36.9 °C)    Temp:  [97.6 °F (36.4 °C)-98.5 °F (36.9 °C)] 98.5 °F (36.9 °C)  HR:  [73-85] 85  Resp:  [15-18] 15  BP: (107-130)/(62-68) 124/68  SpO2:  [89 %-95 %] 90 %  Body mass index is 30.07 kg/m². Input and Output Summary (last 24 hours):   No intake or output data in the 24 hours ending 10/02/23 1655    Physical Exam:   Physical Exam  Vitals and nursing note reviewed. Constitutional:       General: She is not in acute distress. Appearance: She is well-developed. HENT:      Head: Normocephalic and atraumatic. Comments: Mild bilateral maxillary sinus ttp  Eyes:      Conjunctiva/sclera: Conjunctivae normal.   Cardiovascular:      Rate and Rhythm: Normal rate and regular rhythm. Heart sounds: No murmur heard. Pulmonary:      Effort: Pulmonary effort is normal. No respiratory distress. Breath sounds: Normal breath sounds. Abdominal:      Palpations: Abdomen is soft. Tenderness: There is no abdominal tenderness. Musculoskeletal:         General: No swelling. Cervical back: Neck supple.    Skin:     General: Skin is warm and dry. Capillary Refill: Capillary refill takes less than 2 seconds. Neurological:      Mental Status: She is alert. Psychiatric:         Mood and Affect: Mood normal.          Additional Data:     Labs:  Results from last 7 days   Lab Units 10/02/23  0534 10/01/23  0459   WBC Thousand/uL 15.13* 16.14*   HEMOGLOBIN g/dL 11.2* 11.2*   HEMATOCRIT % 34.7* 34.5*   PLATELETS Thousands/uL 236 210   NEUTROS PCT %  --  77*   LYMPHS PCT %  --  14   MONOS PCT %  --  7   EOS PCT %  --  1     Results from last 7 days   Lab Units 10/02/23  0534 10/01/23  0459   SODIUM mmol/L 138 139   POTASSIUM mmol/L 4.2 3.7   CHLORIDE mmol/L 105 105   CO2 mmol/L 26 27   BUN mg/dL 10 14   CREATININE mg/dL 0.54* 0.63   ANION GAP mmol/L 7 7   CALCIUM mg/dL 9.1 9.0   ALBUMIN g/dL  --  3.6   TOTAL BILIRUBIN mg/dL  --  0.31   ALK PHOS U/L  --  72   ALT U/L  --  17   AST U/L  --  20   GLUCOSE RANDOM mg/dL 149* 109     Results from last 7 days   Lab Units 09/29/23  1504   INR  0.97             Results from last 7 days   Lab Units 10/01/23  0459 09/30/23  0537 09/29/23  1504 09/29/23  1423   LACTIC ACID mmol/L  --   --  1.4  --    PROCALCITONIN ng/ml <0.05 <0.05  --  <0.05       Lines/Drains:  Invasive Devices       Peripheral Intravenous Line  Duration             Peripheral IV 10/01/23 Right Antecubital <1 day                          Imaging: Reviewed radiology reports from this admission including: chest xray and chest CT scan    Recent Cultures (last 7 days):   Results from last 7 days   Lab Units 09/30/23  0537 09/29/23  1504   BLOOD CULTURE   --  No Growth at 48 hrs. No Growth at 48 hrs.    LEGIONELLA URINARY ANTIGEN  Negative  --        Last 24 Hours Medication List:   Current Facility-Administered Medications   Medication Dose Route Frequency Provider Last Rate   • acetaminophen  650 mg Oral Q6H PRN Tito Kinsey DO     • albuterol  2 puff Inhalation Q6H PRN Loni Maloney MD     • ARIPiprazole  10 mg Oral HS Loni Josh oLndono MD     • ARIPiprazole  2 mg Oral Daily Loni Maloney MD     • atorvastatin  20 mg Oral Daily Loni Maloney MD     • azelastine  1 spray Each Nare BID Loni Maloney MD     • benzonatate  100 mg Oral TID PRN Jasvir Dove DO     • budesonide  0.5 mg Nebulization Q12H Kyle Gallegos DO     • busPIRone  30 mg Oral Daily Loni Maloney MD     • chlorhexidine  15 mL Swish & Spit HS Loni Maloney MD     • cholecalciferol  2,000 Units Oral Daily Loni Maloney MD     • diphenhydrAMINE  25 mg Oral Q6H PRN Loni Maloney MD     • enoxaparin  40 mg Subcutaneous Daily Loni Maloney MD     • FLUoxetine  10 mg Oral QAM Loni Maloney MD     • fluticasone  1 spray Each Nare BID Loni Maloney MD     • guaiFENesin  600 mg Oral Q12H [address] [place] [address], MD     • HYDROcodone Bit-Homatrop MBr  5 mL Oral Q6H PRN Enio Gray MD     • ipratropium  0.5 mg Nebulization TID Arthur Vyas MD     • levalbuterol  1.25 mg Nebulization TID Arthur Vyas MD     • LORazepam  0.5 mg Oral BID PRN Loni Maloney MD     • methylPREDNISolone sodium succinate  40 mg Intravenous Q12H [address], DO     • montelukast  10 mg Oral HS Loni Maloney MD     • phenol  1 spray Mouth/Throat Q2H PRN Jasvir Dove DO     • [START ON 10/3/2023] predniSONE  60 mg Oral Daily Shae Mcdowell DO     • saccharomyces boulardii  250 mg Oral BID Enio Gray MD     • sodium chloride  1 spray Each [facility] PRN Jj Humphrey MD          Today, Patient Was Seen By: Monet Boston DO    **Please Note: This note may have been constructed using a voice recognition system. **  Attestation signed by Carlos Beaulieu MD at 10/2/2023  7:06 PM:    Date of Service: 10/02/23    I have seen and examined Jailene Curiel personally and have reviewed the medical record independently. I reviewed the subjective and objective complaints as detailed in the chart and discussed the management plan in detail with the resident.  I agree with the resident physician plan of care and offer the following addendum to the below statements by the resident physician      Subjective & A/P:  Patient denies any chest pain. Breathing is improving. However still complains of generalized weakness. Voice is still hoarse. No other complaints. Patient denies any fevers or chills. Able to ambulate well. Appetite is stable. Bronchitis: On slow steroid taper. Currently on IV steroids, plan to transition the patient to oral steroids. Less likely infectious etiology. Discontinue antibiotic. Throat: We will start patient on lozenges q. hourly as needed, and Ocean nasal spray as needed as well. Physical Exam:   General Exam: Alert and Oriented x 3, NAD  Eyes: MARTÍN  Neck: Supple. CVS: S1, S2 Luce, RRR.   R/S: Clear to auscultate anteriorly. Abd: Soft, NT, ND, BS+ve  Extremities: No edema noted. Skin: No acute Rash noted. CNS: No acute FND. Moves all 4 extremities. Psych: Co-operative, Not agitated. Augustus Brown DO  10/2/2023 10:00 AM  Signed  Progress Note - Pulmonary   Jan Gruber 60 y.o. female MRN: [id number]  Unit/Bed#: S -01 Encounter: [id number]      Assessment/Plan:    Acute respiratory insufficiency due to asthma exacerbation, precipitated by rhinovirus-continues to struggle with cough, fatigue and shortness of breath, though overall improved. Plan to transition to prednisone 60 mg daily starting tomorrow. Continue bronchodilator regimen and Singulair. Takes Arnuity as an outpatient. Follows with Dr. Chio Weber as an outpatient    Abnormal chest CT with multifocal groundglass opacities-due to viral pneumonia. Would plan to repeat CT in 6-8 weeks. On cefdinir -total antibiotic day #4.   Given rhinovirus on RP2 and negative procalcitonin x3, can discontinue antibiotics    Ongoing tobacco use-encourage smoking cessation    Chief Complaint: "I have fatigue"    Subjective:   Patient continues to struggle with cough and significant fatigue. She does feel somewhat better since admission. Objective:     Vitals: Blood pressure 107/62, pulse 73, temperature 97.6 °F (36.4 °C), temperature source Oral, resp. rate 18, weight 82 kg (180 lb 11.2 oz), SpO2 93 %, not currently breastfeeding.,  Room air, Body mass index is 30.07 kg/m². No intake or output data in the 24 hours ending 10/02/23 0954    Physical Exam:      General:  Awake, alert, no distress   HEENT: No scleral icterus, EOMI, moist mucosa    Heart:  Regular rate and rhythm, no murmur   Lungs: Few crackles, right greater than left   Abdomen: Soft, nontender, normal bowel sounds   Extremities: No clubbing, cyanosis or edema    Labs: I have personally reviewed pertinent lab results. Results from last 7 days   Lab Units 10/02/23  0534 10/01/23  0459 09/30/23  0537   WBC Thousand/uL 15.13* 16.14* 13.83*   HEMOGLOBIN g/dL 11.2* 11.2* 11.7   HEMATOCRIT % 34.7* 34.5* 35.9   PLATELETS Thousands/uL 236 210 192         Results from last 7 days   Lab Units 10/02/23  0534 10/01/23  0459 09/30/23  0537 09/29/23  1423   POTASSIUM mmol/L 4.2 3.7 3.9 3.8   CHLORIDE mmol/L 105 105 106 103   CO2 mmol/L 26 27 24 23   BUN mg/dL 10 14 12 11   CREATININE mg/dL 0.54* 0.63 0.59* 0.68   CALCIUM mg/dL 9.1 9.0 9.0 9.5   ALK PHOS U/L  --  72  --  86   ALT U/L  --  17  --  13   AST U/L  --  20  --  14     Results from last 7 days   Lab Units 09/29/23  1504   INR  0.97       RP2 panel positive for rhino/enterovirus  Procalcitonin negative x3    Farhan Lozano DO  10/1/2023  1:12 PM  Attested  [address] [facility]  Progress Note  Name: Lizabeth Navarro  MRN: [id number]  Unit/Bed#: S -01 I Date of Admission: 9/29/2023   Date of Service: 10/1/2023 I Hospital Day: 2    Assessment/Plan   * Bronchitis  Assessment & Plan  Presents complaining of cough congestion shortness of breath since 2 days.    CT chest shows multifocal groundglass opacities, new from the prior study and less severe than on the earlier CT from 4/3/2021. This appearance may represent viral pneumonia, particularly COVID-19, though testing was negative. Appearance is otherwise nonspecific though in keeping with an infectious/inflammatory etiology. Plan:  Oral cefdinir 300 mg every 12 hours  DuoNeb every 6 hours. F/u blood cultures. Tylenol as needed for pain and fever. Tobacco cessation recommended. Nicotine patch offered. Asthma  Assessment & Plan  Currently taking fluticasone inhaler daily and albuterol rescue inhaler. Also takes Singulair at night. Tobacco use  Assessment & Plan  Current smoker, 7 cigarettes/day. Nicotine patch offered. Depression with anxiety  Assessment & Plan  Continue home dose fluoxetine and buspirone. Subjective/Objective     Subjective:   MICHELLE o/n, pt has no complaints this morning. Breathing ease may be slightly improved subjectively. Day 3 of abx. All tests have thus far returned negative, denoting viral infection in asthmatic more likely cause. A few sick contacts in recent past w/ milder but similar sx, no known causative microbe in those cases per pt. Has not been happy w/ some tx or bedside manner from resident(s), strongly prefers no residents at the hospital further participate in her care. Objective:  Vitals: Blood pressure 119/74, pulse 83, temperature 98 °F (36.7 °C), resp. rate 20, weight 79 kg (174 lb 2.6 oz), SpO2 95 %, not currently breastfeeding. ,Body mass index is 28.98 kg/m². No intake or output data in the 24 hours ending 10/01/23 1312    Invasive Devices       Peripheral Intravenous Line  Duration             Peripheral IV 09/29/23 Right;Ventral (anterior) Forearm 1 day                    Physical Exam: Physical Exam  Vitals and nursing note reviewed. Constitutional:       General: She is not in acute distress. Appearance: She is well-developed. HENT:      Head: Normocephalic and atraumatic.    Eyes:      Conjunctiva/sclera: Conjunctivae normal.   Cardiovascular:      Rate and Rhythm: Normal rate and regular rhythm. Heart sounds: No murmur heard. Pulmonary:      Effort: Pulmonary effort is normal. No respiratory distress. Abdominal:      Palpations: Abdomen is soft. Tenderness: There is no abdominal tenderness. There is no guarding. Musculoskeletal:         General: No swelling. Cervical back: Neck supple. Right lower leg: No edema. Left lower leg: No edema. Skin:     General: Skin is warm and dry. Neurological:      General: No focal deficit present. Mental Status: She is alert and oriented to person, place, and time. Cranial Nerves: No cranial nerve deficit. Motor: No weakness. Psychiatric:         Mood and Affect: Mood is anxious. Behavior: Behavior is agitated. Lab, Imaging and other studies: I have personally reviewed pertinent reports. VTE Pharmacologic Prophylaxis: Enoxaparin (Lovenox)  VTE Mechanical Prophylaxis: sequential compression device    Attestation signed by Tae Way DO at 10/1/2023  2:33 PM:  Standard Teaching Supervising Statement    I have reviewed the note performed and documented by the resident. I personally performed the required components/examined the patient. I agree with the resident's findings and plan of care with the following additions/exceptions:    Had quite a bad night with coughing. Felt more short of breath today. Labs/studies:  Results from last 7 days   Lab Units 10/01/23  0459 09/30/23  0537 09/29/23  1423   WBC Thousand/uL 16.14* 13.83* 16.75*     Assessment and plan:    Bronchitis    Asthma    Depression with anxiety    Tobacco use    Acute respiratory insufficiency secondary to tracheobronchitis with evidence of atypical pneumonia and asthma exacerbation currently on cefdinir. Improved. Follow-up on RP 2. Continue antitussives  Asthma exacerbation. Started on steroids and nebs.   Appreciate pulmonology evaluation. Leukocytosis. Likely secondary to steroids. Ruling out viral infection. Anxiety/depression. Mood stable continue buspirone abilify and fluoxetine  Tobacco user. Nicotine patch. DO Aniat Rojas MD  9/30/2023 12:04 PM  Attested  [address]  Progress Note  Name: Zoë Isaac  MRN: [id number]  Unit/Bed#: S -57 I Date of Admission: 9/29/2023   Date of Service: 9/30/2023 I Hospital Day: 1    Assessment/Plan   * Bronchitis  Assessment & Plan  Presents complaining of cough congestion shortness of breath since 2 days. CT chest shows multifocal groundglass opacities, new from the prior study and less severe than on the earlier CT from 4/3/2021. This appearance may represent viral pneumonia, particularly COVID-19, though testing was negative. Appearance is otherwise nonspecific though in keeping with an infectious/inflammatory etiology. Plan:  Oral cefdinir 300 mg every 12 hours  DuoNeb every 6 hours. F/u blood cultures. Tylenol as needed for pain and fever. Tobacco cessation recommended. Nicotine patch offered. Tobacco use  Assessment & Plan  Current smoker, 7 cigarettes/day. Nicotine patch offered. Depression with anxiety  Assessment & Plan  Continue home dose fluoxetine and buspirone. Asthma  Assessment & Plan  Currently taking fluticasone inhaler daily and albuterol rescue inhaler. Also takes Singulair at night. VTE Pharmacologic Prophylaxis: VTE Score: 4 Moderate Risk (Score 3-4) - Pharmacological DVT Prophylaxis Ordered: enoxaparin (Lovenox). Patient Centered Rounds: I performed bedside rounds with nursing staff today. Discussions with Specialists or Other Care Team Provider: None    Education and Discussions with Family / Patient: Patient. Current Length of Stay: 1 day(s)  Current Patient Status: Inpatient   Discharge Plan: Anticipate discharge in 24-48 hrs to home.     Code Status: Level 1 - Full Code    Subjective:   Patient examined at bedside. No overnight events reported. She mentions improvement in her cough and sputum. She also reports no feverish feeling overnight. But she still feels little short of breath whenever she tries to ambulate. She denies any headaches, dizziness, chest pain, palpitations, pain abdomen, pain elsewhere in the body. She also declines any changes in bladder or bowel status. Objective:     Vitals:   Temp (24hrs), Av.1 °F (36.7 °C), Min:97.5 °F (36.4 °C), Max:98.7 °F (37.1 °C)    Temp:  [97.5 °F (36.4 °C)-98.7 °F (37.1 °C)] 97.5 °F (36.4 °C)  HR:  [] 82  Resp:  [16-22] 18  BP: (113-129)/(58-77) 114/63  SpO2:  [89 %-98 %] 96 %  Body mass index is 29.35 kg/m². Input and Output Summary (last 24 hours): Intake/Output Summary (Last 24 hours) at 2023 1202  Last data filed at 2023 0855  Gross per 24 hour   Intake 230 ml   Output --   Net 230 ml       Physical Exam:   Physical Exam  Vitals and nursing note reviewed. Constitutional:       General: She is not in acute distress. Appearance: She is well-developed. HENT:      Head: Normocephalic and atraumatic. Eyes:      Conjunctiva/sclera: Conjunctivae normal.   Cardiovascular:      Rate and Rhythm: Normal rate and regular rhythm. Heart sounds: No murmur heard. Pulmonary:      Effort: Pulmonary effort is normal. No respiratory distress. Breath sounds: Normal breath sounds. No wheezing, rhonchi or rales. Abdominal:      Palpations: Abdomen is soft. Tenderness: There is no abdominal tenderness. Musculoskeletal:         General: No swelling. Cervical back: Neck supple. Right lower leg: No edema. Left lower leg: No edema. Skin:     General: Skin is warm and dry. Capillary Refill: Capillary refill takes less than 2 seconds. Neurological:      General: No focal deficit present. Mental Status: She is alert and oriented to person, place, and time. Sensory: No sensory deficit. Motor: No weakness. Psychiatric:         Mood and Affect: Mood normal.          Additional Data:     Labs:  Results from last 7 days   Lab Units 09/30/23  0537 09/29/23  1423   WBC Thousand/uL 13.83* 16.75*   HEMOGLOBIN g/dL 11.7 13.3   HEMATOCRIT % 35.9 40.2   PLATELETS Thousands/uL 192 193   NEUTROS PCT %  --  83*   LYMPHS PCT %  --  10*   MONOS PCT %  --  5   EOS PCT %  --  1     Results from last 7 days   Lab Units 09/30/23  0537 09/29/23  1423   SODIUM mmol/L 138 137   POTASSIUM mmol/L 3.9 3.8   CHLORIDE mmol/L 106 103   CO2 mmol/L 24 23   BUN mg/dL 12 11   CREATININE mg/dL 0.59* 0.68   ANION GAP mmol/L 8 11   CALCIUM mg/dL 9.0 9.5   ALBUMIN g/dL  --  4.3   TOTAL BILIRUBIN mg/dL  --  0.59   ALK PHOS U/L  --  86   ALT U/L  --  13   AST U/L  --  14   GLUCOSE RANDOM mg/dL 144* 117     Results from last 7 days   Lab Units 09/29/23  1504   INR  0.97             Results from last 7 days   Lab Units 09/30/23  0537 09/29/23  1504 09/29/23  1423   LACTIC ACID mmol/L  --  1.4  --    PROCALCITONIN ng/ml <0.05  --  <0.05       Lines/Drains:  Invasive Devices       Peripheral Intravenous Line  Duration             Peripheral IV 09/29/23 Right;Ventral (anterior) Forearm <1 day                          Imaging: Personally reviewed the following imaging: chest CT scan    Recent Cultures (last 7 days):   Results from last 7 days   Lab Units 09/29/23  1504   BLOOD CULTURE  Received in Microbiology Lab. Culture in Progress. Received in Microbiology Lab. Culture in Progress.        Last 24 Hours Medication List:   Current Facility-Administered Medications   Medication Dose Route Frequency Provider Last Rate   • acetaminophen  650 mg Oral Q6H PRN Tito Tavera DO     • albuterol  2 puff Inhalation Q6H PRN Loni Maloney MD     • ARIPiprazole  10 mg Oral HS Loni Maloney MD     • ARIPiprazole  2 mg Oral Daily Loni Maloney MD     • atorvastatin  20 mg Oral Daily Lulu Godinez MD     • azelastine  1 spray Each Nare BID Loni Maloney MD     • busPIRone  30 mg Oral Daily Loin Maloney MD     • cefdinir  300 mg Oral Q12H Aissatou Crandall MD     • chlorhexidine  15 mL Swish & Spit HS Loni Maloney MD     • cholecalciferol  2,000 Units Oral Daily Loni Maloney MD     • diphenhydrAMINE  25 mg Oral Q6H PRN Loni Maloney MD     • enoxaparin  40 mg Subcutaneous Daily Loni Maloney MD     • FLUoxetine  10 mg Oral QAM Loni Maloney MD     • fluticasone  1 spray Each Nare BID Loni Maloney MD     • fluticasone  1 puff Inhalation Daily Loni Maloney MD     • guaiFENesin  600 mg Oral Q12H [address] [place] [address]     • ipratropium  0.5 mg Nebulization TID Moses Barraza MD     • levalbuterol  1.25 mg Nebulization TID Moses Barraza MD     • LORazepam  0.5 mg Oral BID PRN Loni Maloney MD     • montelukast  10 mg Oral HS Loni Maloney MD     • saccharomyces boulardii  250 mg Oral BID Linda Vazquez MD          Today, Patient Was Seen By: Linda Vazquez MD    **Please Note: This note may have been constructed using a voice recognition system. **  Attestation signed by Ellen Gonzalez DO at 9/30/2023  2:53 PM:  Standard Teaching Supervising Statement    I have reviewed the note performed and documented by the resident. I personally performed the required components/examined the patient. I agree with the resident's findings and plan of care with the following additions/exceptions:    Patient feeling a little bit better. Tolerating ceftriaxone. Still a bit congested. Requesting a probiotic. Labs/studies:  Results from last 7 days   Lab Units 09/30/23  0537 09/29/23  1423   WBC Thousand/uL 13.83* 16.75*     CT chest without contrast    Result Date: 9/29/2023  Impression: Multifocal groundglass opacities, new from the prior study and less severe than on the earlier CT from 4/3/2021. This appearance may represent viral pneumonia, particularly COVID-19, though testing was negative.  Appearance is otherwise nonspecific though  in keeping with an infectious/inflammatory etiology. The study was marked in [facility] for immediate notification. Workstation performed: [id number]       Assessment and plan:    Bronchitis    Asthma    Depression with anxiety    Tobacco use    Tracheobronchitis with viral pneumonia/superimposed infection. Currently on ceftriaxone. Improved. Transitioning to cefdinir. Continue bronchodilators. [place] ptosis. May be secondary to steroids. Anxiety/depression.   Mood stable continue buspirone abilify and fluoxetine    Tatiana Rueda, DO

## 2023-09-29 NOTE — ED ATTENDING ATTESTATION
9/29/2023  Cain NEWMAN MD, saw and evaluated the patient. I have discussed the patient with the resident/non-physician practitioner and agree with the resident's/non-physician practitioner's findings, Plan of Care, and MDM as documented in the resident's/non-physician practitioner's note, except where noted. All available labs and Radiology studies were reviewed. I was present for key portions of any procedure(s) performed by the resident/non-physician practitioner and I was immediately available to provide assistance. At this point I agree with the current assessment done in the Emergency Department. I have conducted an independent evaluation of this patient a history and physical is as follows:    62-year-old female with history of cryptogenic organizing pneumonia and asthma presenting for evaluation of 1 week of cough with nasal congestion and sinus pressure with shortness of breath that started this morning. Reports fever to 100.4 degrees this morning. Cough is productive of green and yellow sputum. Reports feeling generally weak and fatigued. She had nausea and posttussive emesis yesterday. The patient called her PCP and was prescribed cefdinir. She took the first dose yesterday but vomited afterwards. Reports chest discomfort and abdominal pain while coughing and vomiting but no chest pain in the absence of coughing. Physical Exam  Vitals and nursing note reviewed. Constitutional:       General: She is not in acute distress. Appearance: She is well-developed. She is ill-appearing. She is not toxic-appearing or diaphoretic. HENT:      Head: Normocephalic and atraumatic. Right Ear: External ear normal.      Left Ear: External ear normal.      Nose: Nose normal.   Eyes:      Conjunctiva/sclera: Conjunctivae normal.   Cardiovascular:      Rate and Rhythm: Normal rate and regular rhythm. Pulses: Normal pulses. Heart sounds: Normal heart sounds. No murmur heard. No friction rub. No gallop. Pulmonary:      Effort: Respiratory distress (tachypneic, with dyspnea with minimal exertion) present. Breath sounds: Rales (scattered) present. No wheezing. Comments: Bronchospastic sounding cough  Abdominal:      General: Bowel sounds are normal. There is no distension. Palpations: Abdomen is soft. Tenderness: There is no abdominal tenderness. There is no guarding. Musculoskeletal:         General: No deformity. Normal range of motion. Cervical back: Normal range of motion and neck supple. Right lower leg: No edema. Left lower leg: No edema. Comments: No calf swelling or tenderness   Skin:     General: Skin is warm and dry. Neurological:      Mental Status: She is alert and oriented to person, place, and time. Motor: No abnormal muscle tone. Psychiatric:         Mood and Affect: Mood normal.         ED Course  ED Course as of 09/29/23 1839   Fri Sep 29, 2023   1439 Patient is ill-appearing. She is tachypneic with conversational dyspnea though satting 95 to 96% on room air. She meet SIRS criteria with tachycardia, tachypnea, leukocytosis to 16.75. Sepsis work-up ordered and cefepime ordered for management of suspected pneumonia.   Patient has a penicillin allergy but has tolerated cefepime in the past.         Critical Care Time  Procedures

## 2023-09-29 NOTE — H&P
[facility]  H&P  Name: Neris Negrete 60 y.o. female I MRN: [id number]  Unit/Bed#: S -01 I Date of Admission: 9/29/2023   Date of Service: 9/29/2023 I Hospital Day: 0      Assessment/Plan   * Bronchitis  Assessment & Plan  · Presents complaining of cough congestion shortness of breath since 2 days. · Yellow and green sputum when coughing. Associated with sinus pressure and pain. · Today morning patient noted a fever of 100.4 F. She endorses feeling warm and having chills. · On POA, satting 95% on RA. She is afebrile, tachycardic. Labs show WBC count is 16.75, meets SIRS criteria ( and leukocytosis). · Pro-Niraj negative and lactic acid is normal limits. · COVID, influenza, RSV negative. Troponins at 0,2 hour are unremarkable. · CT chest shows multifocal groundglass opacities, new from the prior study and less severe than on the earlier CT from 4/3/2021. This appearance may represent viral pneumonia, particularly COVID-19, though testing was negative. Appearance is otherwise nonspecific though in keeping with an infectious/inflammatory etiology. · In the ED she received Solu-Medrol injection, 1 L normal saline bolus, 2 g cefepime. Plan:  · IV ceftriaxone 1 g every 24 hours. · DuoNeb every 6 hours. · F/u blood cultures. · Tylenol as needed for pain and fever. · Tobacco cessation recommended. · Nicotine patch offered. Tobacco use  Assessment & Plan  · Current smoker, 7 cigarettes/day. · Nicotine patch offered. Depression with anxiety  Assessment & Plan  · Continue home dose fluoxetine and buspirone. Asthma  Assessment & Plan  · Currently taking fluticasone inhaler daily and albuterol rescue inhaler. Also takes Singulair at night. VTE Pharmacologic Prophylaxis: VTE Score: 4 Moderate Risk (Score 3-4) - Pharmacological DVT Prophylaxis Ordered: enoxaparin (Lovenox).   Code Status: Prior   Discussion with family: Updated  (son) at bedside. Anticipated Length of Stay: Patient will be admitted on an inpatient basis with an anticipated length of stay of greater than 2 midnights secondary to pneumonia. Chief Complaint: Cough and SOB     History of Present Illness:  Bhaskar Salvador is a 60 y.o. female with a PMH of asthma, cryptogenic organizing pneumonia, depression with anxiety and tobacco use who presents with cough, congestion and shortness of breath for 2 days. Associated with sinus pressure and pain, wet cough with yellow green sputum. She had a 100.4 F fever today in the morning. Relieved by Tylenol. Denies using her inhalers due to inability to inhale in the medication. Endorses coughing and having an episode of vomiting. Otherwise denies nausea, diarrhea, abdominal pain and any recent travel history. Her  has an ongoing cough and met her grand daughter who has been down with a cold recently. She is a current smoker and socially drinks. Review of Systems:  Review of Systems   Constitutional: Positive for chills and fever. HENT: Positive for sinus pressure and sinus pain. Negative for ear pain and sore throat. Eyes: Negative for pain and visual disturbance. Respiratory: Positive for cough and shortness of breath. Cardiovascular: Negative for chest pain and palpitations. Gastrointestinal: Negative for abdominal pain and vomiting. Genitourinary: Negative for dysuria and hematuria. Musculoskeletal: Negative for arthralgias and back pain. Skin: Negative for color change and rash. Neurological: Negative for seizures and syncope. All other systems reviewed and are negative.       Past Medical and Surgical History:   Past Medical History:   Diagnosis Date   • Allergic    • Allergic rhinitis 1987   • Allergies    • Anesthesia complication     V TACH after her reduction mammoplasty, done at [address],, states had a little vent arrhythmia after sinus sx also   • Anxiety    • Asthma 2021   • Chronic rhinitis 02/18/2020 • Depression    • Ethmoid sinusitis    • GERD (gastroesophageal reflux disease)     no issues since 2021   • Maxillary sinusitis    • Multiple allergies    • Myofascial pain syndrome    • Nasal turbinate hypertrophy    • Pneumonia    • Sleep apnea     not currently using CPAP   • Sleep apnea, obstructive    • Wears glasses        Past Surgical History:   Procedure Laterality Date   • INCISION AND DRAINAGE OF WOUND Left 3/23/2023    Procedure: INCISION AND DRAINAGE (I&D) LEFT ORAL ABSCESS, EXTRACTION OF TOOTH #19;  Surgeon: Mychal Coronado DMD;  Location: [facility];  Service: Maxillofacial   • LAPAROSCOPY      with vaginal hysterectomy; 11/3/2003 rso   • OOPHORECTOMY Left 2004   • PARTIAL HYSTERECTOMY  2004   • OR NSL/SINUS NDSC MAX ANTROST W/RMVL TISS MAX SINUS N/A 9/30/2016    Procedure: IMAGE GUIDED FUNCTIONAL ENDOSCOPIC SINUS SURGERY;  Surgeon: Viet Rasheed MD;  Location: [facility];  Service: ENT   • OR NSL/SINUS NDSC MAX ANTROST W/RMVL TISS MAX SINUS Bilateral 9/2/2022    Procedure: middle turbinectomy/medialization, possible revision functional endoscopic sinus surgery, inferior turbinate reduction;  Surgeon: Viet Rasheed MD;  Location: [facility];  Service: ENT   • REDUCTION MAMMAPLASTY Bilateral 02/27/2015   • SINUS SURGERY     • TOOTH EXTRACTION Right 3/16/2019    Procedure: EXTRACTION TOOTH #1 (Impacted Third Molar Tooth); Surgeon: Karlo Fletcher DDS;  Location: [facility];  Service: Maxillofacial   • TUBAL LIGATION     • WISDOM TOOTH EXTRACTION         Meds/Allergies:  Prior to Admission medications    Medication Sig Start Date End Date Taking?  Authorizing Provider   albuterol (Ventolin HFA) 90 mcg/act inhaler Inhale 2 puffs every 6 (six) hours as needed for wheezing 10/4/22   Martin De La Torre MD   ARIPiprazole (ABILIFY) 10 mg tablet Take 5 mg by mouth daily 5/12/21   Historical Provider, MD   ARIPiprazole (ABILIFY) 2 mg tablet take 1 tablet by mouth IN THE MORNING WATCH FOR SEDATION 6/15/23 Historical Provider, MD   atorvastatin (LIPITOR) 20 mg tablet Take 1 tablet (20 mg total) by mouth daily 5/9/23   Jorge Luis Goodwin,    azelastine (ASTELIN) 0.1 % nasal spray instill 1 spray into each nostril twice a day 9/16/21   Ashley Nj MD   Azelastine-Fluticasone 740-01 MCG/ACT SUSP 1-2 sprays into each nostril in the morning 1/5/23   Ashley Nj MD   busPIRone (BUSPAR) 30 MG tablet Take 10 mg by mouth 2/25/18   Historical Provider, MD   cefdinir (OMNICEF) 300 mg capsule Take 1 capsule (300 mg total) by mouth every 12 (twelve) hours for 5 days 9/29/23 10/4/23  Sheldon Muñiz, DO   chlorhexidine (PERIDEX) 0.12 % solution RINSE WITH 30 MILLILTERS FOR 30 SECONDS THEN SPIT OUT AT BEDTIME 5/18/22   Historical Provider, MD   Cholecalciferol (Vitamin D) 50 MCG (2000 UT) tablet Take 2,000 Units by mouth daily    Historical Provider, MD   diphenhydrAMINE (BENADRYL) 25 mg tablet Take 25 mg by mouth every 6 (six) hours as needed for itching    Historical Provider, MD   escitalopram (LEXAPRO) 10 mg tablet 10 mg 5/24/22   Historical Provider, MD   escitalopram (LEXAPRO) 20 mg tablet Take 20 mg by mouth daily  Patient not taking: Reported on 8/9/2023 1/19/22   Historical Provider, MD   fluconazole (DIFLUCAN) 150 mg tablet take 1 tablet by mouth to START then take 1 tablet by mouth every 72 hours until finished  Patient not taking: Reported on 8/9/2023 4/8/23   Historical Provider, MD   FLUoxetine (PROzac) 10 mg capsule Take 10 mg by mouth every morning  Patient not taking: Reported on 8/9/2023 6/21/23   Historical Provider, MD   FLUoxetine (PROzac) 20 mg capsule  7/8/23   Historical Provider, MD   fluticasone (Arnuity Ellipta) 100 MCG/ACT AEPB inhaler Inhale 1 puff daily 5/22/23 8/20/23  Nathan Reza, DO   Fluticasone Furoate-Vilanterol (Breo Ellipta) 100-25 mcg/actuation inhaler Inhale 1 puff daily Rinse mouth after use. Patient taking differently: Inhale 1 puff daily Rinse mouth after use.  Takes as needed for flares. 2/27/23 5/28/23  Stephanie Reza DO   ipratropium (ATROVENT) 0.03 % nasal spray 2 sprays into each nostril 3 (three) times a day as needed for rhinitis (nasal drip, congestion) 2/28/22   Joanna Bey MD   LORazepam (ATIVAN) 0.5 mg tablet Take 0.5 mg by mouth 2 (two) times a day as needed 3/19/20   Historical Provider, MD   mometasone (NASONEX) 50 mcg/act nasal spray 2 sprays into each nostril daily States nasal irrigation from a compounding pharmacy, prescribed by Dr Anna Farias Provider, MD   Mometasone Furoate POWD Use in the morning 1 mg capsule added to sinus rinse daily 8/21/23   Joanna Bey MD   montelukast (SINGULAIR) 10 mg tablet Take 1 tablet (10 mg total) by mouth daily at bedtime 8/9/23 11/7/23  Stephanie Reza DO   tobramycin (TOBREX) 0.3 % SOLN instill 1 drop INTO AFFECTED EYE(S) every 4 hours while awake  Patient not taking: Reported on 8/9/2023 4/5/23   Historical Provider, MD     I have reviewed home medications with patient personally. Allergies:    Allergies   Allergen Reactions   • Other      Most all antibiotics- hives, hypotensive    "no problem with clindamycin."   • Augmentin Es-600  [Amoxicillin-Pot Clavulanate]    • Cefuroxime    • Erythromycin    • Levofloxacin    • Morphine    • Morphine And Related Hives   • Oxycodone-Acetaminophen    • Penicillins    • Percocet [Oxycodone-Acetaminophen] Hives   • Sulfa Antibiotics    • Tetracyclines & Related        Social History:  Marital Status: /Civil Union   Occupation: -  Patient Pre-hospital Living Situation: Home  Patient Pre-hospital Level of Mobility: walks  Patient Pre-hospital Diet Restrictions: none  Substance Use History:   Social History     Substance and Sexual Activity   Alcohol Use Not Currently    Comment: Social one or two a month     Social History     Tobacco Use   Smoking Status Some Days   • Packs/day: 0.25   • Years: 25.00   • Total pack years: 6.25   • Types: Cigarettes   • Start date: 1/1/1995   Smokeless Tobacco Never   Tobacco Comments    patient states havent smoked cigarettes since end of march 2021 (states occasional "slips" here or there,inst no smoking before sx)     Social History     Substance and Sexual Activity   Drug Use No       Family History:  Family History   Problem Relation Age of Onset   • CLEM disease Mother    • Atrial fibrillation Mother    • Atrial fibrillation Father    • Heart disease Father    • Diabetes Maternal Grandmother    • Hypertension Maternal Grandmother    • Heart failure Maternal Grandmother    • Colon cancer Maternal Grandfather    • Diabetes Maternal Grandfather    • Stroke Maternal Grandfather    • Cancer Paternal Grandfather    • Endometrial cancer Cousin    • Breast cancer Cousin    • Multiple sclerosis Sister    • Hyperlipidemia Sister    • Thyroid disease Sister    • No Known Problems Son    • No Known Problems Son        Physical Exam:     Vitals:   Blood Pressure: 125/58 (09/29/23 1711)  Pulse: 81 (09/29/23 1711)  Temperature: 98.7 °F (37.1 °C) (09/29/23 1255)  Temp Source: Oral (09/29/23 1255)  Respirations: 20 (09/29/23 1711)  SpO2: 96 % (09/29/23 1711)    Physical Exam  Vitals and nursing note reviewed. Constitutional:       General: She is not in acute distress. Appearance: She is well-developed. HENT:      Head: Normocephalic and atraumatic. Eyes:      Conjunctiva/sclera: Conjunctivae normal.   Cardiovascular:      Rate and Rhythm: Normal rate and regular rhythm. Heart sounds: No murmur heard. Pulmonary:      Effort: Respiratory distress present. Breath sounds: Normal breath sounds. Abdominal:      Palpations: Abdomen is soft. Tenderness: There is no abdominal tenderness. Musculoskeletal:         General: No swelling. Cervical back: Neck supple. Skin:     General: Skin is warm and dry. Capillary Refill: Capillary refill takes less than 2 seconds. Neurological:      Mental Status: She is alert. Psychiatric:         Mood and Affect: Mood normal.          Additional Data:     Lab Results:  Results from last 7 days   Lab Units 09/29/23  1423   WBC Thousand/uL 16.75*   HEMOGLOBIN g/dL 13.3   HEMATOCRIT % 40.2   PLATELETS Thousands/uL 193   NEUTROS PCT % 83*   LYMPHS PCT % 10*   MONOS PCT % 5   EOS PCT % 1     Results from last 7 days   Lab Units 09/29/23  1423   SODIUM mmol/L 137   POTASSIUM mmol/L 3.8   CHLORIDE mmol/L 103   CO2 mmol/L 23   BUN mg/dL 11   CREATININE mg/dL 0.68   ANION GAP mmol/L 11   CALCIUM mg/dL 9.5   ALBUMIN g/dL 4.3   TOTAL BILIRUBIN mg/dL 0.59   ALK PHOS U/L 86   ALT U/L 13   AST U/L 14   GLUCOSE RANDOM mg/dL 117     Results from last 7 days   Lab Units 09/29/23  1504   INR  0.97             Results from last 7 days   Lab Units 09/29/23  1504 09/29/23  1423   LACTIC ACID mmol/L 1.4  --    PROCALCITONIN ng/ml  --  <0.05       Lines/Drains:  Invasive Devices     Peripheral Intravenous Line  Duration           Peripheral IV 09/29/23 Right;Ventral (anterior) Forearm <1 day                    Imaging: Reviewed radiology reports from this admission including: chest xray  CT chest without contrast   Final Result by Celena Marinelli MD (09/29 1722)      Multifocal groundglass opacities, new from the prior study and less severe than on the earlier CT from 4/3/2021. This appearance may represent viral pneumonia, particularly COVID-19, though testing was negative. Appearance is otherwise nonspecific though    in keeping with an infectious/inflammatory etiology. The study was marked in [facility] for immediate notification. Workstation performed: [id number]         XR chest 2 views   Final Result by Kassidy Escobar MD (09/29 1626)      No acute cardiopulmonary disease. Workstation performed: [id number]             EKG and Other Studies Reviewed on Admission:   · EKG: NSR. HR 92.    ** Please Note: This note has been constructed using a voice recognition system.  **

## 2023-09-29 NOTE — ASSESSMENT & PLAN NOTE
· Presents complaining of cough congestion shortness of breath since 2 days. · Yellow and green sputum when coughing. Associated with sinus pressure and pain. · Today morning patient noted a fever of 100.4 F. She endorses feeling warm and having chills. · On POA, satting 95% on RA. She is afebrile, tachycardic. Labs show WBC count is 16.75, meets SIRS criteria ( and leukocytosis). · Pro-Niraj negative and lactic acid is normal limits. · COVID, influenza, RSV negative. Troponins at 0,2 hour are unremarkable. · CT chest shows multifocal groundglass opacities, new from the prior study and less severe than on the earlier CT from 4/3/2021. This appearance may represent viral pneumonia, particularly COVID-19, though testing was negative. Appearance is otherwise nonspecific though in keeping with an infectious/inflammatory etiology. · In the ED she received Solu-Medrol injection, 1 L normal saline bolus, 2 g cefepime. Plan:  · IV ceftriaxone 1 g every 24 hours. · DuoNeb every 6 hours. · F/u blood cultures. · Tylenol as needed for pain and fever. · Tobacco cessation recommended. · Nicotine patch offered.

## 2023-09-29 NOTE — ED PROVIDER NOTES
History  Chief Complaint   Patient presents with   • Shortness of Breath     Pt states she wok eup this morning with SOB. Pt states she has been treated for a sinus infection with ABX. HPI    50-year-old female with hx of cryptogenic organizing pneumonia presents with cough, congestion and SOB. She started a week ago with sinus pressure, productive cough with yellow or green sputum, chest pain, and yesterday night she developed nausea and vomiting due to coughing. SOB started this morning. She reports fever of 100.4 °F this morning, weakness and fatigue. She has been alternating between Tylenol and ibuprofen every 6 hours since yesterday. The patient called her PCP who prescribed cefdinir, but she only took one dose. Prior to Admission Medications   Prescriptions Last Dose Informant Patient Reported? Taking? ARIPiprazole (ABILIFY) 10 mg tablet  Self Yes No   Sig: Take 5 mg by mouth daily   ARIPiprazole (ABILIFY) 2 mg tablet  Self Yes No   Sig: take 1 tablet by mouth IN THE MORNING WATCH FOR SEDATION   Azelastine-Fluticasone 137-50 MCG/ACT SUSP  Self No No   Si-2 sprays into each nostril in the morning   Cholecalciferol (Vitamin D) 50 MCG (2000 UT) tablet  Self Yes No   Sig: Take 2,000 Units by mouth daily   FLUoxetine (PROzac) 10 mg capsule  Self Yes No   Sig: Take 10 mg by mouth every morning   Patient not taking: Reported on 2023   FLUoxetine (PROzac) 20 mg capsule  Self Yes No   Fluticasone Furoate-Vilanterol (Breo Ellipta) 100-25 mcg/actuation inhaler   No No   Sig: Inhale 1 puff daily Rinse mouth after use. Patient taking differently: Inhale 1 puff daily Rinse mouth after use. Takes as needed for flares.    LORazepam (ATIVAN) 0.5 mg tablet  Self Yes No   Sig: Take 0.5 mg by mouth 2 (two) times a day as needed   Mometasone Furoate POWD   No No   Sig: Use in the morning 1 mg capsule added to sinus rinse daily   albuterol (Ventolin HFA) 90 mcg/act inhaler  Self No No   Sig: Inhale 2 puffs every 6 (six) hours as needed for wheezing   atorvastatin (LIPITOR) 20 mg tablet  Self No No   Sig: Take 1 tablet (20 mg total) by mouth daily   azelastine (ASTELIN) 0.1 % nasal spray  Self No No   Sig: instill 1 spray into each nostril twice a day   busPIRone (BUSPAR) 30 MG tablet  Self Yes No   Sig: Take 10 mg by mouth   cefdinir (OMNICEF) 300 mg capsule   No No   Sig: Take 1 capsule (300 mg total) by mouth every 12 (twelve) hours for 5 days   chlorhexidine (PERIDEX) 0.12 % solution  Self Yes No   Sig: RINSE WITH 30 MILLILTERS FOR 30 SECONDS THEN SPIT OUT AT BEDTIME   diphenhydrAMINE (BENADRYL) 25 mg tablet  Self Yes No   Sig: Take 25 mg by mouth every 6 (six) hours as needed for itching   escitalopram (LEXAPRO) 10 mg tablet  Self Yes No   Sig: 10 mg   escitalopram (LEXAPRO) 20 mg tablet  Self Yes No   Sig: Take 20 mg by mouth daily   Patient not taking: Reported on 2023   fluconazole (DIFLUCAN) 150 mg tablet  Self Yes No   Sig: take 1 tablet by mouth to START then take 1 tablet by mouth every 72 hours until finished   Patient not taking: Reported on 2023   fluticasone (Arnuity Ellipta) 100 MCG/ACT AEPB inhaler  Self No No   Sig: Inhale 1 puff daily   ipratropium (ATROVENT) 0.03 % nasal spray  Self No No   Si sprays into each nostril 3 (three) times a day as needed for rhinitis (nasal drip, congestion)   mometasone (NASONEX) 50 mcg/act nasal spray  Self Yes No   Si sprays into each nostril daily States nasal irrigation from a compounding pharmacy, prescribed by Dr Sheldon Carmichael   montelukast (SINGULAIR) 10 mg tablet   No No   Sig: Take 1 tablet (10 mg total) by mouth daily at bedtime   tobramycin (TOBREX) 0.3 % SOLN  Self Yes No   Sig: instill 1 drop INTO AFFECTED EYE(S) every 4 hours while awake   Patient not taking: Reported on 2023      Facility-Administered Medications: None       Past Medical History:   Diagnosis Date   • Allergic    • Allergic rhinitis    • Allergies    • Anesthesia complication     V TACH after her reduction mammoplasty, done at [facility] ([facility]),, states had a little vent arrhythmia after sinus sx also   • Anxiety    • Asthma 2021   • Chronic rhinitis 02/18/2020   • Depression    • Ethmoid sinusitis    • GERD (gastroesophageal reflux disease)     no issues since 2021   • Maxillary sinusitis    • Multiple allergies    • Myofascial pain syndrome    • Nasal turbinate hypertrophy    • Pneumonia    • Sleep apnea     not currently using CPAP   • Sleep apnea, obstructive    • Wears glasses        Past Surgical History:   Procedure Laterality Date   • INCISION AND DRAINAGE OF WOUND Left 3/23/2023    Procedure: INCISION AND DRAINAGE (I&D) LEFT ORAL ABSCESS, EXTRACTION OF TOOTH #19;  Surgeon: Malu Hurley DMD;  Location: [facility];  Service: Maxillofacial   • LAPAROSCOPY      with vaginal hysterectomy; 11/3/2003 rso   • OOPHORECTOMY Left 2004   • PARTIAL HYSTERECTOMY  2004   • CA NSL/SINUS [address] MAX ANTROST W/RMVL TISS MAX SINUS N/A 9/30/2016    Procedure: IMAGE GUIDED FUNCTIONAL ENDOSCOPIC SINUS SURGERY;  Surgeon: Ashley Nj MD;  Location[facility];  Service: ENT   • CA NSL/SINUS [address] MAX ANTROST W/RMVL TISS MAX SINUS Bilateral 9/2/2022    Procedure: middle turbinectomy/medialization, possible revision functional endoscopic sinus surgery, inferior turbinate reduction;  Surgeon: Ashley Nj MD;  [facility];  Service: ENT   • REDUCTION MAMMAPLASTY Bilateral 02/27/2015   • SINUS SURGERY     • TOOTH EXTRACTION Right 3/16/2019    Procedure: EXTRACTION TOOTH #1 (Impacted Third Molar Tooth);   Surgeon: Cesar Rodas DDS;  Location: [facility];  Service: Maxillofacial   • TUBAL LIGATION     • WISDOM TOOTH EXTRACTION         Family History   Problem Relation Age of Onset   • CLEM disease Mother    • Atrial fibrillation Mother    • Atrial fibrillation Father    • Heart disease Father    • Diabetes Maternal Grandmother    • Hypertension Maternal Grandmother    • Heart failure Maternal Grandmother    • Colon cancer Maternal Grandfather    • Diabetes Maternal Grandfather    • Stroke Maternal Grandfather    • Cancer Paternal Grandfather    • Endometrial cancer Cousin    • Breast cancer Cousin    • Multiple sclerosis Sister    • Hyperlipidemia Sister    • Thyroid disease Sister    • No Known Problems Son    • No Known Problems Son      I have reviewed and agree with the history as documented. E-Cigarette/Vaping   • E-Cigarette Use Never User      E-Cigarette/Vaping Substances   • Nicotine No    • THC No    • CBD No    • Flavoring No    • Other No    • Unknown No      Social History     Tobacco Use   • Smoking status: Some Days     Packs/day: 0.25     Years: 25.00     Total pack years: 6.25     Types: Cigarettes     Start date: 1/1/1995   • Smokeless tobacco: Never   • Tobacco comments:     patient states havent smoked cigarettes since end of march 2021 (states occasional "slips" here or there,inst no smoking before sx)   Vaping Use   • Vaping Use: Never used   Substance Use Topics   • Alcohol use: Not Currently     Comment: Social one or two a month   • Drug use: No        Review of Systems   Constitutional: Positive for chills, fatigue and fever. HENT: Positive for congestion, postnasal drip, rhinorrhea and sore throat. Negative for ear pain. Eyes: Negative for photophobia and visual disturbance. Respiratory: Positive for cough (productive, green/yellow sputum) and shortness of breath. Cardiovascular: Positive for chest pain. Negative for palpitations. Gastrointestinal: Positive for nausea and vomiting. Negative for abdominal pain. Genitourinary: Negative for dysuria, frequency and hematuria. Musculoskeletal: Positive for myalgias. Skin: Negative for color change. Neurological: Negative for dizziness, syncope and headaches. All other systems reviewed and are negative.       Physical Exam  ED Triage Vitals   Temperature Pulse Respirations Blood Pressure SpO2   09/29/23 1255 09/29/23 1255 09/29/23 1255 09/29/23 1255 09/29/23 1255   98.7 °F (37.1 °C) 100 20 129/64 95 %      Temp Source Heart Rate Source Patient Position - Orthostatic VS BP Location FiO2 (%)   09/29/23 1255 09/29/23 1255 09/29/23 1255 09/29/23 1255 --   Oral Monitor Sitting Right arm       Pain Score       09/29/23 1823       No Pain             Orthostatic Vital Signs  Vitals:    09/29/23 1330 09/29/23 1711 09/29/23 1812 09/29/23 1914   BP: 117/77 125/58 126/58    Pulse: 90 81 86 85   Patient Position - Orthostatic VS:  Lying         Physical Exam  Vitals and nursing note reviewed. Constitutional:       General: She is not in acute distress. Appearance: She is well-developed. She is ill-appearing. HENT:      Head: Normocephalic and atraumatic. Mouth/Throat:      Mouth: Mucous membranes are moist.   Eyes:      Conjunctiva/sclera: Conjunctivae normal.      Pupils: Pupils are equal, round, and reactive to light. Cardiovascular:      Rate and Rhythm: Normal rate and regular rhythm. Heart sounds: No murmur heard. Pulmonary:      Effort: Tachypnea present. No respiratory distress. Breath sounds: Normal breath sounds. No wheezing. Comments: Crackles in R lower lung base. Chest:      Chest wall: No tenderness. Abdominal:      Palpations: Abdomen is soft. Tenderness: There is no abdominal tenderness. Musculoskeletal:         General: No swelling. Cervical back: Neck supple. Right lower leg: No edema. Left lower leg: No edema. Skin:     General: Skin is warm and dry. Capillary Refill: Capillary refill takes less than 2 seconds. Neurological:      General: No focal deficit present. Mental Status: She is alert and oriented to person, place, and time.    Psychiatric:         Mood and Affect: Mood normal.         ED Medications  Medications   LORazepam (ATIVAN) tablet 0.5 mg (has no administration in time range)   azelastine (ASTELIN) 0.1 % nasal spray 1 spray (1 spray Each Nare Given 9/29/23 2109)   chlorhexidine (PERIDEX) 0.12 % oral rinse 15 mL (15 mL Swish & Spit Given 9/29/23 2111)   diphenhydrAMINE (BENADRYL) tablet 25 mg (25 mg Oral Given 9/29/23 2108)   albuterol (PROVENTIL HFA,VENTOLIN HFA) inhaler 2 puff (2 puffs Inhalation Given 9/29/23 2111)   cholecalciferol (VITAMIN D3) tablet 2,000 Units (has no administration in time range)   atorvastatin (LIPITOR) tablet 20 mg (has no administration in time range)   FLUoxetine (PROzac) capsule 10 mg (has no administration in time range)   montelukast (SINGULAIR) tablet 10 mg (10 mg Oral Given 9/29/23 2108)   fluticasone (FLOVENT HFA) 110 MCG/ACT inhaler 1 puff (1 puff Inhalation Given 9/29/23 2113)   fluticasone (FLONASE) 50 mcg/act nasal spray 1 spray (1 spray Each Nare Given 9/29/23 2110)   busPIRone (BUSPAR) tablet 30 mg (30 mg Oral Given 9/29/23 2148)   ARIPiprazole (ABILIFY) tablet 2 mg (has no administration in time range)   enoxaparin (LOVENOX) subcutaneous injection 40 mg (has no administration in time range)   ceftriaxone (ROCEPHIN) 1 g/50 mL in dextrose IVPB (1,000 mg Intravenous New Bag 9/29/23 2109)   guaiFENesin (MUCINEX) 12 hr tablet 600 mg (600 mg Oral Given 9/29/23 2108)   azithromycin (ZITHROMAX) 500 mg in sodium chloride 0.9 % 250 mL IVPB (500 mg Intravenous Not Given 9/29/23 2102)   ARIPiprazole (ABILIFY) tablet 10 mg (10 mg Oral Given 9/29/23 2108)   levalbuterol (XOPENEX) inhalation solution 1.25 mg (has no administration in time range)   ipratropium (ATROVENT) 0.02 % inhalation solution 0.5 mg (has no administration in time range)   sodium chloride 0.9 % bolus 1,000 mL (1,000 mL Intravenous New Bag 9/29/23 1441)   ondansetron (ZOFRAN) injection 4 mg (4 mg Intravenous Given 9/29/23 1440)   cefepime (MAXIPIME) 2 g/50 mL dextrose IVPB (0 mg Intravenous Stopped 9/29/23 1614)   methylPREDNISolone sodium succinate (Solu-MEDROL) injection 125 mg (125 mg Intravenous Given 9/29/23 1718)       Diagnostic Studies  Results Reviewed     Procedure Component Value Units Date/Time    Blood culture #1 [[id number]] Collected: 09/29/23 1504    Lab Status: Preliminary result Specimen: Blood from Arm, Left Updated: 09/29/23 2001     Blood Culture Received in Microbiology Lab. Culture in Progress. Blood culture #2 [[id number]] Collected: 09/29/23 1504    Lab Status: Preliminary result Specimen: Blood from Arm, Right Updated: 09/29/23 2001     Blood Culture Received in Microbiology Lab. Culture in Progress. HS Troponin I 4hr [[id number]]  (Normal) Collected: 09/29/23 1901    Lab Status: Final result Specimen: Blood from Hand, Left Updated: 09/29/23 1930     hs TnI 4hr 3 ng/L      Delta 4hr hsTnI 1 ng/L     HS Troponin I 2hr [[id number]]  (Normal) Collected: 09/29/23 1710    Lab Status: Final result Specimen: Blood Updated: 09/29/23 1751     hs TnI 2hr 3 ng/L      Delta 2hr hsTnI 1 ng/L     B-Type Natriuretic Peptide(BNP) [[id number]]  (Normal) Collected: 09/29/23 1504    Lab Status: Final result Specimen: Blood from Arm, Right Updated: 09/29/23 1624     BNP 32 pg/mL     Procalcitonin [[id number]]  (Normal) Collected: 09/29/23 1423    Lab Status: Final result Specimen: Blood from Arm, Right Updated: 09/29/23 1556     Procalcitonin <0.05 ng/ml     Protime-INR [[id number]]  (Normal) Collected: 09/29/23 1504    Lab Status: Final result Specimen: Blood from Arm, Right Updated: 09/29/23 1543     Protime 13.5 seconds      INR 0.97    HS Troponin 0hr (reflex protocol) [[id number]]  (Normal) Collected: 09/29/23 1504    Lab Status: Final result Specimen: Blood from Arm, Right Updated: 09/29/23 1537     hs TnI 0hr 2 ng/L     Lactic acid, plasma (w/reflex if result > 2.0) [[id number]]  (Normal) Collected: 09/29/23 1504    Lab Status: Final result Specimen: Blood from Arm, Right Updated: 09/29/23 1528     LACTIC ACID 1.4 mmol/L     Narrative:      Result may be elevated if tourniquet was used during collection.     FLU/RSV/COVID - if FLU/RSV clinically relevant [[id number]]  (Normal) Collected: 09/29/23 1423    Lab Status: Final result Specimen: Nares from Nose Updated: 09/29/23 1521     SARS-CoV-2 Negative     INFLUENZA A PCR Negative     INFLUENZA B PCR Negative     RSV PCR Negative    Narrative:      FOR PEDIATRIC PATIENTS - copy/paste COVID Guidelines URL to browser: [email or web address]    SARS-CoV-2 assay is a Nucleic Acid Amplification assay intended for the  qualitative detection of nucleic acid from SARS-CoV-2 in nasopharyngeal  swabs. Results are for the presumptive identification of SARS-CoV-2 RNA. Positive results are indicative of infection with SARS-CoV-2, the virus  causing COVID-19, but do not rule out bacterial infection or co-infection  with other viruses. Laboratories within the [facility] and its  [facility] are required to report all positive results to the appropriate  public health authorities. Negative results do not preclude SARS-CoV-2  infection and should not be used as the sole basis for treatment or other  patient management decisions. Negative results must be combined with  clinical observations, patient history, and epidemiological information. This test has not been FDA cleared or approved. This test has been authorized by FDA under an Emergency Use Authorization  (EUA). This test is only authorized for the duration of time the  declaration that circumstances exist justifying the authorization of the  emergency use of an in vitro diagnostic tests for detection of SARS-CoV-2  virus and/or diagnosis of COVID-19 infection under section 564(b)(1) of  the Act, 21 U. S.C. 037CZU-1(L)(1), unless the authorization is terminated  or revoked sooner. The test has been validated but independent review by FDA  and CLIA is pending. Test performed using [employer] GeneXpert: This RT-PCR assay targets N2,  a region unique to SARS-CoV-2.  A conserved region in the E-gene was chosen  for pan-Sarbecovirus detection which includes SARS-CoV-2. According to CMS-2020-01-R, this platform meets the definition of high-throughput technology.     Comprehensive metabolic panel [[id number]] Collected: 09/29/23 1423    Lab Status: Final result Specimen: Blood from Arm, Right Updated: 09/29/23 1449     Sodium 137 mmol/L      Potassium 3.8 mmol/L      Chloride 103 mmol/L      CO2 23 mmol/L      ANION GAP 11 mmol/L      BUN 11 mg/dL      Creatinine 0.68 mg/dL      Glucose 117 mg/dL      Calcium 9.5 mg/dL      AST 14 U/L      ALT 13 U/L      Alkaline Phosphatase 86 U/L      Total Protein 7.6 g/dL      Albumin 4.3 g/dL      Total Bilirubin 0.59 mg/dL      eGFR 96 ml/min/1.73sq m     Narrative:      National Kidney Disease Foundation guidelines for Chronic Kidney Disease (CKD):   •  Stage 1 with normal or high GFR (GFR > 90 mL/min/1.73 square meters)  •  Stage 2 Mild CKD (GFR = 60-89 mL/min/1.73 square meters)  •  Stage 3A Moderate CKD (GFR = 45-59 mL/min/1.73 square meters)  •  Stage 3B Moderate CKD (GFR = 30-44 mL/min/1.73 square meters)  •  Stage 4 Severe CKD (GFR = 15-29 mL/min/1.73 square meters)  •  Stage 5 End Stage CKD (GFR <15 mL/min/1.73 square meters)  Note: GFR calculation is accurate only with a steady state creatinine    CBC and differential [[id number]]  (Abnormal) Collected: 09/29/23 1423    Lab Status: Final result Specimen: Blood from Arm, Right Updated: 09/29/23 1432     WBC 16.75 Thousand/uL      RBC 4.52 Million/uL      Hemoglobin 13.3 g/dL      Hematocrit 40.2 %      MCV 89 fL      MCH 29.4 pg      MCHC 33.1 g/dL      RDW 12.9 %      MPV 12.1 fL      Platelets 699 Thousands/uL      nRBC 0 /100 WBCs      Neutrophils Relative 83 %      Immat GRANS % 1 %      Lymphocytes Relative 10 %      Monocytes Relative 5 %      Eosinophils Relative 1 %      Basophils Relative 0 %      Neutrophils Absolute 13.83 Thousands/µL      Immature Grans Absolute 0.13 Thousand/uL      Lymphocytes Absolute 1.72 Thousands/µL      Monocytes Absolute 0.91 Thousand/µL      Eosinophils Absolute 0.14 Thousand/µL      Basophils Absolute 0.02 Thousands/µL                  CT chest without contrast   Final Result by Buffy Cline MD (09/29 1722)      Multifocal groundglass opacities, new from the prior study and less severe than on the earlier CT from 4/3/2021. This appearance may represent viral pneumonia, particularly COVID-19, though testing was negative. Appearance is otherwise nonspecific though    in keeping with an infectious/inflammatory etiology. The study was marked in [facility] for immediate notification. Workstation performed: [id number]         XR chest 2 views   Final Result by Gilberto Adams MD (09/29 1626)      No acute cardiopulmonary disease. Workstation performed: [id number]               Procedures  ECG 12 Lead Documentation Only    Date/Time: 9/29/2023 1:55 PM    Performed by: Micky Mccauley MD  Authorized by: Micky Mccauley MD    Indications / Diagnosis:  Chest pain, SOB  ECG reviewed by me, the ED Provider: yes    Patient location:  ED  Previous ECG:     Previous ECG:  Compared to current    Comparison ECG info:  08/23/2022    Similarity:  No change  Rate:     ECG rate:  92    ECG rate assessment: normal    Rhythm:     Rhythm: sinus rhythm    QRS:     QRS axis:  Normal    QRS intervals:  Normal  ST segments:     ST segments:  Normal  T waves:     T waves: normal    Comments:      No STEMI, no ischemic changes. Normal AR and QT intervals. ED Course  ED Course as of 09/29/23 2206   Fri Sep 29, 2023   1451 WBC(!): 16.75                                       MDM  59 yo female with hx of asthma presents with cough, congestion, and SOB. Pt meets SIRS criteria with tachycardia, tachypnea and leukocytosis of 16.75, with possible source as bronchitis vs pneumonia. Pt started on IV cefepime, and blood cultures obtained.  Pt admitted to inpatient team for further management and treatment of sepsis and bronchitis. Disposition  Final diagnoses:   Bronchitis   Sepsis ([address])     Time reflects when diagnosis was documented in both MDM as applicable and the Disposition within this note     Time User Action Codes Description Comment    9/29/2023  4:27 PM Evette Covadal Add [J40] Bronchitis     9/29/2023  4:27 PM Evette Covadal Add [A41.9] Sepsis [facility])       ED Disposition     ED Disposition   Admit    Condition   Stable    Date/Time   Fri Sep 29, 2023  4:28 PM    Comment   Case was discussed with Dr Maog Gamez and the patient's admission status was agreed to be Admission Status: inpatient status to the service of Dr. Mago Gamez.           Follow-up Information    None         Current Discharge Medication List      CONTINUE these medications which have NOT CHANGED    Details   albuterol (Ventolin HFA) 90 mcg/act inhaler Inhale 2 puffs every 6 (six) hours as needed for wheezing  Qty: 18 g, Refills: 3    Comments: Substitution to a formulary equivalent within the same pharmaceutical class is authorized. Associated Diagnoses: Shortness of breath; Pulmonary air trapping      !! ARIPiprazole (ABILIFY) 10 mg tablet Take 5 mg by mouth daily      !! ARIPiprazole (ABILIFY) 2 mg tablet take 1 tablet by mouth IN THE MORNING WATCH FOR SEDATION      atorvastatin (LIPITOR) 20 mg tablet Take 1 tablet (20 mg total) by mouth daily  Qty: 90 tablet, Refills: 1    Associated Diagnoses: Mixed hyperlipidemia      azelastine (ASTELIN) 0.1 % nasal spray instill 1 spray into each nostril twice a day  Qty: 30 mL, Refills: 11    Associated Diagnoses: Chronic rhinitis      Azelastine-Fluticasone 137-50 MCG/ACT SUSP 1-2 sprays into each nostril in the morning  Qty: 23 g, Refills: 11    Associated Diagnoses: Rhinosinusitis; Allergic rhinitis due to animal (cat) (dog) hair and dander;  Allergic rhinitis due to house dust mite      busPIRone (BUSPAR) 30 MG tablet Take 10 mg by mouth      cefdinir (OMNICEF) 300 mg capsule Take 1 capsule (300 mg total) by mouth every 12 (twelve) hours for 5 days  Qty: 10 capsule, Refills: 0    Associated Diagnoses: Acute non-recurrent maxillary sinusitis      chlorhexidine (PERIDEX) 0.12 % solution RINSE WITH 30 MILLILTERS FOR 30 SECONDS THEN SPIT OUT AT BEDTIME      Cholecalciferol (Vitamin D) 50 MCG (2000 UT) tablet Take 2,000 Units by mouth daily      diphenhydrAMINE (BENADRYL) 25 mg tablet Take 25 mg by mouth every 6 (six) hours as needed for itching      !! escitalopram (LEXAPRO) 10 mg tablet 10 mg      !! escitalopram (LEXAPRO) 20 mg tablet Take 20 mg by mouth daily      fluconazole (DIFLUCAN) 150 mg tablet take 1 tablet by mouth to START then take 1 tablet by mouth every 72 hours until finished      !! FLUoxetine (PROzac) 10 mg capsule Take 10 mg by mouth every morning      !! FLUoxetine (PROzac) 20 mg capsule       fluticasone (Arnuity Ellipta) 100 MCG/ACT AEPB inhaler Inhale 1 puff daily  Qty: 90 blister, Refills: 0    Associated Diagnoses: Moderate persistent asthma without complication      Fluticasone Furoate-Vilanterol (Breo Ellipta) 100-25 mcg/actuation inhaler Inhale 1 puff daily Rinse mouth after use. Qty: 180 blister, Refills: 0    Associated Diagnoses:  Moderate persistent asthma without complication      ipratropium (ATROVENT) 0.03 % nasal spray 2 sprays into each nostril 3 (three) times a day as needed for rhinitis (nasal drip, congestion)  Qty: 30 mL, Refills: 11    Associated Diagnoses: Chronic rhinitis      LORazepam (ATIVAN) 0.5 mg tablet Take 0.5 mg by mouth 2 (two) times a day as needed      mometasone (NASONEX) 50 mcg/act nasal spray 2 sprays into each nostril daily States nasal irrigation from a compounding pharmacy, prescribed by Dr Lokesh HARRIS Use in the morning 1 mg capsule added to sinus rinse daily  Qty: 0.03 g, Refills: 11    Associated Diagnoses: Rhinosinusitis      montelukast (SINGULAIR) 10 mg tablet Take 1 tablet (10 mg total) by mouth daily at bedtime  Qty: 90 tablet, Refills: 0    Associated Diagnoses: Uncomplicated asthma, unspecified asthma severity, unspecified whether persistent; Allergic rhinitis due to house dust mite      tobramycin (TOBREX) 0.3 % SOLN instill 1 drop INTO AFFECTED EYE(S) every 4 hours while awake       !! - Potential duplicate medications found. Please discuss with provider. No discharge procedures on file. PDMP Review       Value Time User    PDMP Reviewed  Yes 4/3/2021  5:43 AM Rosalio Kayleigh, DO           ED Provider  Attending physically available and evaluated Rey Jean. I managed the patient along with the ED Attending.     Electronically Signed by         Fatemeh Reinoso MD  09/29/23 0186

## 2023-09-30 LAB
ANION GAP SERPL CALCULATED.3IONS-SCNC: 8 MMOL/L
BUN SERPL-MCNC: 12 MG/DL (ref 5–25)
CALCIUM SERPL-MCNC: 9 MG/DL (ref 8.4–10.2)
CHLORIDE SERPL-SCNC: 106 MMOL/L (ref 96–108)
CO2 SERPL-SCNC: 24 MMOL/L (ref 21–32)
CREAT SERPL-MCNC: 0.59 MG/DL (ref 0.6–1.3)
ERYTHROCYTE [DISTWIDTH] IN BLOOD BY AUTOMATED COUNT: 12.9 % (ref 11.6–15.1)
GFR SERPL CREATININE-BSD FRML MDRD: 100 ML/MIN/1.73SQ M
GLUCOSE SERPL-MCNC: 144 MG/DL (ref 65–140)
HCT VFR BLD AUTO: 35.9 % (ref 34.8–46.1)
HGB BLD-MCNC: 11.7 G/DL (ref 11.5–15.4)
L PNEUMO1 AG UR QL IA.RAPID: NEGATIVE
MCH RBC QN AUTO: 29.5 PG (ref 26.8–34.3)
MCHC RBC AUTO-ENTMCNC: 32.6 G/DL (ref 31.4–37.4)
MCV RBC AUTO: 90 FL (ref 82–98)
PLATELET # BLD AUTO: 192 THOUSANDS/UL (ref 149–390)
PMV BLD AUTO: 11.9 FL (ref 8.9–12.7)
POTASSIUM SERPL-SCNC: 3.9 MMOL/L (ref 3.5–5.3)
PROCALCITONIN SERPL-MCNC: <0.05 NG/ML
RBC # BLD AUTO: 3.97 MILLION/UL (ref 3.81–5.12)
S PNEUM AG UR QL: NEGATIVE
SODIUM SERPL-SCNC: 138 MMOL/L (ref 135–147)
WBC # BLD AUTO: 13.83 THOUSAND/UL (ref 4.31–10.16)

## 2023-09-30 PROCEDURE — 94640 AIRWAY INHALATION TREATMENT: CPT

## 2023-09-30 PROCEDURE — 99232 SBSQ HOSP IP/OBS MODERATE 35: CPT | Performed by: INTERNAL MEDICINE

## 2023-09-30 PROCEDURE — 85027 COMPLETE CBC AUTOMATED: CPT

## 2023-09-30 PROCEDURE — 94760 N-INVAS EAR/PLS OXIMETRY 1: CPT

## 2023-09-30 PROCEDURE — 87449 NOS EACH ORGANISM AG IA: CPT | Performed by: HOSPITALIST

## 2023-09-30 PROCEDURE — 84145 PROCALCITONIN (PCT): CPT | Performed by: HOSPITALIST

## 2023-09-30 PROCEDURE — 80048 BASIC METABOLIC PNL TOTAL CA: CPT

## 2023-09-30 RX ORDER — BENZONATATE 100 MG/1
100 CAPSULE ORAL 3 TIMES DAILY PRN
Status: DISCONTINUED | OUTPATIENT
Start: 2023-09-30 | End: 2023-10-03 | Stop reason: HOSPADM

## 2023-09-30 RX ORDER — CEFDINIR 300 MG/1
300 CAPSULE ORAL EVERY 12 HOURS SCHEDULED
Status: DISCONTINUED | OUTPATIENT
Start: 2023-09-30 | End: 2023-10-02

## 2023-09-30 RX ORDER — SACCHAROMYCES BOULARDII 250 MG
250 CAPSULE ORAL 2 TIMES DAILY
Status: DISCONTINUED | OUTPATIENT
Start: 2023-09-30 | End: 2023-10-03 | Stop reason: HOSPADM

## 2023-09-30 RX ORDER — HYDROCODONE BITARTRATE AND HOMATROPINE METHYLBROMIDE ORAL SOLUTION 5; 1.5 MG/5ML; MG/5ML
5 LIQUID ORAL EVERY 6 HOURS PRN
Status: DISCONTINUED | OUTPATIENT
Start: 2023-09-30 | End: 2023-10-03 | Stop reason: HOSPADM

## 2023-09-30 RX ADMIN — ACETAMINOPHEN 650 MG: 325 TABLET, FILM COATED ORAL at 23:44

## 2023-09-30 RX ADMIN — ATORVASTATIN CALCIUM 20 MG: 20 TABLET, FILM COATED ORAL at 08:47

## 2023-09-30 RX ADMIN — FLUOXETINE 10 MG: 10 CAPSULE ORAL at 08:49

## 2023-09-30 RX ADMIN — IPRATROPIUM BROMIDE 0.5 MG: 0.5 SOLUTION RESPIRATORY (INHALATION) at 14:11

## 2023-09-30 RX ADMIN — PHENOL 1 SPRAY: 1.4 SPRAY ORAL at 20:29

## 2023-09-30 RX ADMIN — FLUTICASONE PROPIONATE 1 SPRAY: 50 SPRAY, METERED NASAL at 17:28

## 2023-09-30 RX ADMIN — HYDROCODONE BITARTRATE AND HOMATROPINE METHYLBROMIDE 5 ML: 1.5; 5 SOLUTION ORAL at 23:44

## 2023-09-30 RX ADMIN — IPRATROPIUM BROMIDE 0.5 MG: 0.5 SOLUTION RESPIRATORY (INHALATION) at 07:13

## 2023-09-30 RX ADMIN — LEVALBUTEROL HYDROCHLORIDE 1.25 MG: 1.25 SOLUTION RESPIRATORY (INHALATION) at 20:33

## 2023-09-30 RX ADMIN — CHLORHEXIDINE GLUCONATE 15 ML: 1.2 SOLUTION ORAL at 21:14

## 2023-09-30 RX ADMIN — ALBUTEROL SULFATE 2 PUFF: 90 AEROSOL, METERED RESPIRATORY (INHALATION) at 08:49

## 2023-09-30 RX ADMIN — BENZONATATE 100 MG: 100 CAPSULE ORAL at 20:28

## 2023-09-30 RX ADMIN — Medication 250 MG: at 21:14

## 2023-09-30 RX ADMIN — FLUTICASONE PROPIONATE 1 PUFF: 110 AEROSOL, METERED RESPIRATORY (INHALATION) at 08:49

## 2023-09-30 RX ADMIN — LORAZEPAM 0.5 MG: 0.5 TABLET ORAL at 19:24

## 2023-09-30 RX ADMIN — Medication 250 MG: at 10:41

## 2023-09-30 RX ADMIN — LORAZEPAM 0.5 MG: 0.5 TABLET ORAL at 10:53

## 2023-09-30 RX ADMIN — ARIPIPRAZOLE 10 MG: 5 TABLET ORAL at 21:15

## 2023-09-30 RX ADMIN — GUAIFENESIN 600 MG: 600 TABLET ORAL at 20:29

## 2023-09-30 RX ADMIN — LEVALBUTEROL HYDROCHLORIDE 1.25 MG: 1.25 SOLUTION RESPIRATORY (INHALATION) at 14:12

## 2023-09-30 RX ADMIN — Medication 2000 UNITS: at 08:47

## 2023-09-30 RX ADMIN — FLUTICASONE PROPIONATE 1 SPRAY: 50 SPRAY, METERED NASAL at 08:49

## 2023-09-30 RX ADMIN — HYDROCODONE BITARTRATE AND HOMATROPINE METHYLBROMIDE 5 ML: 1.5; 5 SOLUTION ORAL at 17:25

## 2023-09-30 RX ADMIN — ACETAMINOPHEN 650 MG: 325 TABLET, FILM COATED ORAL at 05:21

## 2023-09-30 RX ADMIN — BUSPIRONE HYDROCHLORIDE 30 MG: 10 TABLET ORAL at 21:14

## 2023-09-30 RX ADMIN — GUAIFENESIN 600 MG: 600 TABLET ORAL at 08:47

## 2023-09-30 RX ADMIN — MONTELUKAST 10 MG: 10 TABLET, FILM COATED ORAL at 21:14

## 2023-09-30 RX ADMIN — ENOXAPARIN SODIUM 40 MG: 40 INJECTION SUBCUTANEOUS at 08:47

## 2023-09-30 RX ADMIN — LEVALBUTEROL HYDROCHLORIDE 1.25 MG: 1.25 SOLUTION RESPIRATORY (INHALATION) at 07:13

## 2023-09-30 RX ADMIN — AZELASTINE HYDROCHLORIDE 1 SPRAY: 137 SPRAY, METERED NASAL at 17:28

## 2023-09-30 RX ADMIN — IPRATROPIUM BROMIDE 0.5 MG: 0.5 SOLUTION RESPIRATORY (INHALATION) at 20:33

## 2023-09-30 RX ADMIN — CEFDINIR 300 MG: 300 CAPSULE ORAL at 20:28

## 2023-09-30 RX ADMIN — AZELASTINE HYDROCHLORIDE 1 SPRAY: 137 SPRAY, METERED NASAL at 08:53

## 2023-09-30 NOTE — UTILIZATION REVIEW
Initial Clinical Review    Admission: Date/Time/Statement:   Admission Orders (From admission, onward)     Ordered        09/29/23 1627  INPATIENT ADMISSION  Once                      Orders Placed This Encounter   Procedures   • INPATIENT ADMISSION     Standing Status:   Standing     Number of Occurrences:   1     Order Specific Question:   Level of Care     Answer:   Med Surg [16]     Order Specific Question:   Estimated length of stay     Answer:   Not Applicable     ED Arrival Information     Expected   -    Arrival   9/29/2023 12:46    Acuity   Urgent            Means of arrival   Walk-In    Escorted by   Self    Service   Hospitalist    Admission type   Emergency            Arrival complaint   cough, sob            Chief Complaint   Patient presents with   • Shortness of Breath     Pt states she wok eup this morning with SOB. Pt states she has been treated for a sinus infection with ABX. Initial Presentation: 60 y.o. female with a PMH of asthma, cryptogenic organizing pneumonia, depression with anxiety and tobacco use who presents with cough, congestion and shortness of breath for 2 days. Associated with sinus pressure and pain, wet cough with yellow green sputum. She had a 100.4 F fever today in the morning. Relieved by Tylenol. Denies using her inhalers due to inability to inhale in the medication. Endorses coughing and having an episode of vomiting. Her  has an ongoing cough and met her grand daughter who has been down with a cold recently. She is a current smoker and socially drinks. Plan: Inpatient admission for evaluation and treatment of bronchitis, tobacco use, depression with anxiety, asthma: IV ceftriaxone, DuoNeb q 6 hrs, blood cultures, nicotine patch offered, continue home fluoxetine and buspirone. Date: 9/30   Day 2:     Internal medicine: Oral cefdinir 300 mg every 12 hours. DuoNeb every 6 hours. Follow blood cultures. Nicotine patch.  Continue home dose fluoxetine and buspirone. Date: 10/1    Day 3: Has surpassed a 2nd midnight with active treatments and services, which include monitoring of SOB on ambulation. ED Triage Vitals   Temperature Pulse Respirations Blood Pressure SpO2   09/29/23 1255 09/29/23 1255 09/29/23 1255 09/29/23 1255 09/29/23 1255   98.7 °F (37.1 °C) 100 20 129/64 95 %      Temp Source Heart Rate Source Patient Position - Orthostatic VS BP Location FiO2 (%)   09/29/23 1255 09/29/23 1255 09/29/23 1255 09/29/23 1255 --   Oral Monitor Sitting Right arm       Pain Score       09/29/23 1823       No Pain          Wt Readings from Last 1 Encounters:   09/30/23 80 kg (176 lb 5.9 oz)     Additional Vital Signs:     Date/Time Temp Pulse Resp BP MAP (mmHg) SpO2 O2 Device   09/30/23 0856 -- -- -- -- -- 96 % None (Room air)   09/30/23 08:19:11 97.5 °F (36.4 °C) 82 18 114/63 80 89 % Abnormal  --   09/30/23 0714 -- -- -- -- -- 94 % None (Room air)   09/30/23 07:02:47 97.6 °F (36.4 °C) 67 18 114/59 77 92 % --   09/29/23 22:21:52 98.2 °F (36.8 °C) 76 16 113/62 79 92 % --   09/29/23 1914 -- 85 -- -- -- 94 % None (Room air)   09/29/23 18:12:17 98.6 °F (37 °C) 86 -- 126/58 81 98 % --   09/29/23 1711 -- 81 20 125/58 83 96 % None (Room air)   09/29/23 1339 -- -- -- -- -- -- None (Room air)   09/29/23 1330 -- 90 22 117/77 92 95 % --     Pertinent Labs/Diagnostic Test Results:   CT chest without contrast   Final Result by Siobhan Mendez MD (09/29 1722)      Multifocal groundglass opacities, new from the prior study and less severe than on the earlier CT from 4/3/2021. This appearance may represent viral pneumonia, particularly COVID-19, though testing was negative. Appearance is otherwise nonspecific though    in keeping with an infectious/inflammatory etiology. The study was marked in [facility] for immediate notification. Workstation performed: [id number]         XR chest 2 views   Final Result by Asael Lara MD (09/29 1626)      No acute cardiopulmonary disease. Workstation performed: [id number]           Results from last 7 days   Lab Units 09/29/23  1423   SARS-COV-2  Negative     Results from last 7 days   Lab Units 09/30/23  0537 09/29/23  1423   WBC Thousand/uL 13.83* 16.75*   HEMOGLOBIN g/dL 11.7 13.3   HEMATOCRIT % 35.9 40.2   PLATELETS Thousands/uL 192 193   NEUTROS ABS Thousands/µL  --  13.83*         Results from last 7 days   Lab Units 09/30/23  0537 09/29/23  1423   SODIUM mmol/L 138 137   POTASSIUM mmol/L 3.9 3.8   CHLORIDE mmol/L 106 103   CO2 mmol/L 24 23   ANION GAP mmol/L 8 11   BUN mg/dL 12 11   CREATININE mg/dL 0.59* 0.68   EGFR ml/min/1.73sq m 100 96   CALCIUM mg/dL 9.0 9.5     Results from last 7 days   Lab Units 09/29/23  1423   AST U/L 14   ALT U/L 13   ALK PHOS U/L 86   TOTAL PROTEIN g/dL 7.6   ALBUMIN g/dL 4.3   TOTAL BILIRUBIN mg/dL 0.59         Results from last 7 days   Lab Units 09/30/23  0537 09/29/23  1423   GLUCOSE RANDOM mg/dL 144* 117           Results from last 7 days   Lab Units 09/29/23  1901 09/29/23  1710 09/29/23  1504   HS TNI 0HR ng/L  --   --  2   HS TNI 2HR ng/L  --  3  --    HSTNI D2 ng/L  --  1  --    HS TNI 4HR ng/L 3  --   --    HSTNI D4 ng/L 1  --   --          Results from last 7 days   Lab Units 09/29/23  1504   PROTIME seconds 13.5   INR  0.97         Results from last 7 days   Lab Units 09/30/23  0537 09/29/23  1423   PROCALCITONIN ng/ml <0.05 <0.05     Results from last 7 days   Lab Units 09/29/23  1504   LACTIC ACID mmol/L 1.4             Results from last 7 days   Lab Units 09/29/23  1504   BNP pg/mL 32           Results from last 7 days   Lab Units 09/29/23  1423   INFLUENZA A PCR  Negative   INFLUENZA B PCR  Negative   RSV PCR  Negative           Results from last 7 days   Lab Units 09/29/23  1504   BLOOD CULTURE  Received in Microbiology Lab. Culture in Progress. Received in Microbiology Lab. Culture in Progress.          ED Treatment:   Medication Administration from 09/29/2023 1246 to 09/29/2023 1643       Date/Time Order Dose Route Action     09/29/2023 1441 EDT sodium chloride 0.9 % bolus 1,000 mL 1,000 mL Intravenous New Bag     09/29/2023 1440 EDT ondansetron (ZOFRAN) injection 4 mg 4 mg Intravenous Given     09/29/2023 1544 EDT cefepime (MAXIPIME) 2 g/50 mL dextrose IVPB 2,000 mg Intravenous New Bag     09/29/2023 1718 EDT methylPREDNISolone sodium succinate (Solu-MEDROL) injection 125 mg 125 mg Intravenous Given     09/29/2023 1718 EDT ipratropium-albuterol (DUO-NEB) 0.5-2.5 mg/3 mL inhalation solution 3 mL 3 mL Nebulization Given        Past Medical History:   Diagnosis Date   • Allergic    • Allergic rhinitis 1987   • Allergies    • Anesthesia complication     V TACH after her reduction mammoplasty, done at [facility]),, states had a little vent arrhythmia after sinus sx also   • Anxiety    • Asthma 2021   • Chronic rhinitis 02/18/2020   • Depression    • Ethmoid sinusitis    • GERD (gastroesophageal reflux disease)     no issues since 2021   • Maxillary sinusitis    • Multiple allergies    • Myofascial pain syndrome    • Nasal turbinate hypertrophy    • Pneumonia    • Sleep apnea     not currently using CPAP   • Sleep apnea, obstructive    • Wears glasses      Present on Admission:  • Asthma  • Tobacco use  • Depression with anxiety      Admitting Diagnosis: Cough [R05.9]  SOB (shortness of breath) [R06.02]  Bronchitis [J40]  Sepsis ([address]) [A41.9]  Age/Sex: 60 y.o. female  Admission Orders:  Scheduled Medications:  ARIPiprazole, 10 mg, Oral, HS  ARIPiprazole, 2 mg, Oral, Daily  atorvastatin, 20 mg, Oral, Daily  azelastine, 1 spray, Each Nare, BID  busPIRone, 30 mg, Oral, Daily  cefdinir, 300 mg, Oral, Q12H YASEMIN  chlorhexidine, 15 mL, Swish & Spit, HS  cholecalciferol, 2,000 Units, Oral, Daily  enoxaparin, 40 mg, Subcutaneous, Daily  FLUoxetine, 10 mg, Oral, QAM  fluticasone, 1 spray, Each Nare, BID  fluticasone, 1 puff, Inhalation, Daily  guaiFENesin, 600 mg, Oral, Q12H YASEMIN  ipratropium, 0.5 mg, Nebulization, TID  levalbuterol, 1.25 mg, Nebulization, TID  montelukast, 10 mg, Oral, HS  saccharomyces boulardii, 250 mg, Oral, BID      Continuous IV Infusions:     PRN Meds:  acetaminophen, 650 mg, Oral, Q6H PRN  albuterol, 2 puff, Inhalation, Q6H PRN  diphenhydrAMINE, 25 mg, Oral, Q6H PRN  LORazepam, 0.5 mg, Oral, BID PRN        None    Network Utilization Review Department  ATTENTION: Please call with any questions or concerns to [phone] and carefully listen to the prompts so that you are directed to the right person. All voicemails are confidential.  Juancarlos Machado all requests for admission clinical reviews, approved or denied determinations and any other requests to dedicated fax number below belonging to the campus where the patient is receiving treatment.  List of dedicated fax numbers for the Facilities:  [place] DENIALS (Administrative/Medical Necessity) [phone] [facility] (Maternity/NICU/Pediatrics) [phone]   [address] [place] [address] Drive [phone]   [facility] [address] [facility] [phone]   [facility] [address] [facility] [address] [address] [facility] [phone]   [address] [facility] 1300 [facility] [address] [place] [address] [phone]

## 2023-09-30 NOTE — ASSESSMENT & PLAN NOTE
· Presents complaining of cough congestion shortness of breath since 2 days. · CT chest shows multifocal groundglass opacities, new from the prior study and less severe than on the earlier CT from 4/3/2021. This appearance may represent viral pneumonia, particularly COVID-19, though testing was negative. Appearance is otherwise nonspecific though in keeping with an infectious/inflammatory etiology. Plan:  · Oral cefdinir 300 mg every 12 hours  · DuoNeb every 6 hours. · F/u blood cultures. · Tylenol as needed for pain and fever. · Tobacco cessation recommended. · Nicotine patch offered.

## 2023-09-30 NOTE — PLAN OF CARE
Problem: PAIN - ADULT  Goal: Verbalizes/displays adequate comfort level or baseline comfort level  Description: Interventions:  - Encourage patient to monitor pain and request assistance  - Assess pain using appropriate pain scale  - Administer analgesics based on type and severity of pain and evaluate response  - Implement non-pharmacological measures as appropriate and evaluate response  - Consider cultural and social influences on pain and pain management  - Notify physician/advanced practitioner if interventions unsuccessful or patient reports new pain  Outcome: Progressing     Problem: INFECTION - ADULT  Goal: Absence or prevention of progression during hospitalization  Description: INTERVENTIONS:  - Assess and monitor for signs and symptoms of infection  - Monitor lab/diagnostic results  - Monitor all insertion sites, i.e. indwelling lines, tubes, and drains  - Monitor endotracheal if appropriate and nasal secretions for changes in amount and color  - [place] appropriate cooling/warming therapies per order  - Administer medications as ordered  - Instruct and encourage patient and family to use good hand hygiene technique  - Identify and instruct in appropriate isolation precautions for identified infection/condition  Outcome: Progressing     Problem: SAFETY ADULT  Goal: Patient will remain free of falls  Description: INTERVENTIONS:  - Educate patient/family on patient safety including physical limitations  - Instruct patient to call for assistance with activity   - Consult OT/PT to assist with strengthening/mobility   - Keep Call bell within reach  - Keep bed low and locked with side rails adjusted as appropriate  - Keep care items and personal belongings within reach  - Initiate and maintain comfort rounds  - Make Fall Risk Sign visible to staff  - Offer Toileting every 2 Hours, in advance of need  - Apply yellow socks and bracelet for high fall risk patients  - Consider moving patient to room near nurses station  Outcome: Progressing  Goal: Maintain or return to baseline ADL function  Description: INTERVENTIONS:  -  Assess patient's ability to carry out ADLs; assess patient's baseline for ADL function and identify physical deficits which impact ability to perform ADLs (bathing, care of mouth/teeth, toileting, grooming, dressing, etc.)  - Assess/evaluate cause of self-care deficits   - Assess range of motion  - Assess patient's mobility; develop plan if impaired  - Assess patient's need for assistive devices and provide as appropriate  - Encourage maximum independence but intervene and supervise when necessary  - Involve family in performance of ADLs  - Assess for home care needs following discharge   - Consider OT consult to assist with ADL evaluation and planning for discharge  - Provide patient education as appropriate  Outcome: Progressing  Goal: Maintains/Returns to pre admission functional level  Description: INTERVENTIONS:  - Perform BMAT or MOVE assessment daily.   - Set and communicate daily mobility goal to care team and patient/family/caregiver. - Collaborate with rehabilitation services on mobility goals if consulted  - Perform Range of Motion 3 times a day. - Reposition patient every 2 hours.   - Dangle patient 3 times a day  - Stand patient 3 times a day  - Ambulate patient 3 times a day  - Out of bed to chair 3 times a day   - Out of bed for meals 3 times a day  - Out of bed for toileting  - Record patient progress and toleration of activity level   Outcome: Progressing     Problem: DISCHARGE PLANNING  Goal: Discharge to home or other facility with appropriate resources  Description: INTERVENTIONS:  - Identify barriers to discharge w/patient and caregiver  - Arrange for needed discharge resources and transportation as appropriate  - Identify discharge learning needs (meds, wound care, etc.)  - Arrange for interpretive services to assist at discharge as needed  - Refer to Case Management Department for coordinating discharge planning if the patient needs post-hospital services based on physician/advanced practitioner order or complex needs related to functional status, cognitive ability, or social support system  Outcome: Progressing     Problem: Knowledge Deficit  Goal: Patient/family/caregiver demonstrates understanding of disease process, treatment plan, medications, and discharge instructions  Description: Complete learning assessment and assess knowledge base.   Interventions:  - Provide teaching at level of understanding  - Provide teaching via preferred learning methods  Outcome: Progressing

## 2023-10-01 ENCOUNTER — APPOINTMENT (INPATIENT)
Dept: RADIOLOGY | Facility: HOSPITAL | Age: 58
End: 2023-10-01
Payer: COMMERCIAL

## 2023-10-01 LAB
ALBUMIN SERPL BCP-MCNC: 3.6 G/DL (ref 3.5–5)
ALP SERPL-CCNC: 72 U/L (ref 34–104)
ALT SERPL W P-5'-P-CCNC: 17 U/L (ref 7–52)
ANION GAP SERPL CALCULATED.3IONS-SCNC: 7 MMOL/L
AST SERPL W P-5'-P-CCNC: 20 U/L (ref 13–39)
B PARAP IS1001 DNA NPH QL NAA+NON-PROBE: NOT DETECTED
B PERT.PT PRMT NPH QL NAA+NON-PROBE: NOT DETECTED
BASOPHILS # BLD AUTO: 0.03 THOUSANDS/ÂΜL (ref 0–0.1)
BASOPHILS NFR BLD AUTO: 0 % (ref 0–1)
BILIRUB SERPL-MCNC: 0.31 MG/DL (ref 0.2–1)
BUN SERPL-MCNC: 14 MG/DL (ref 5–25)
C PNEUM DNA NPH QL NAA+NON-PROBE: NOT DETECTED
CALCIUM SERPL-MCNC: 9 MG/DL (ref 8.4–10.2)
CHLORIDE SERPL-SCNC: 105 MMOL/L (ref 96–108)
CO2 SERPL-SCNC: 27 MMOL/L (ref 21–32)
CREAT SERPL-MCNC: 0.63 MG/DL (ref 0.6–1.3)
CRP SERPL QL: 112.8 MG/L
EOSINOPHIL # BLD AUTO: 0.09 THOUSAND/ÂΜL (ref 0–0.61)
EOSINOPHIL NFR BLD AUTO: 1 % (ref 0–6)
ERYTHROCYTE [DISTWIDTH] IN BLOOD BY AUTOMATED COUNT: 13.1 % (ref 11.6–15.1)
FLUAV RNA NPH QL NAA+NON-PROBE: NOT DETECTED
FLUBV RNA NPH QL NAA+NON-PROBE: NOT DETECTED
GFR SERPL CREATININE-BSD FRML MDRD: 98 ML/MIN/1.73SQ M
GLUCOSE SERPL-MCNC: 109 MG/DL (ref 65–140)
HADV DNA NPH QL NAA+NON-PROBE: NOT DETECTED
HCOV 229E RNA NPH QL NAA+NON-PROBE: NOT DETECTED
HCOV HKU1 RNA NPH QL NAA+NON-PROBE: NOT DETECTED
HCOV NL63 RNA NPH QL NAA+NON-PROBE: NOT DETECTED
HCOV OC43 RNA NPH QL NAA+NON-PROBE: NOT DETECTED
HCT VFR BLD AUTO: 34.5 % (ref 34.8–46.1)
HGB BLD-MCNC: 11.2 G/DL (ref 11.5–15.4)
HMPV RNA NPH QL NAA+NON-PROBE: NOT DETECTED
HPIV1 RNA NPH QL NAA+NON-PROBE: NOT DETECTED
HPIV2 RNA NPH QL NAA+NON-PROBE: NOT DETECTED
HPIV3 RNA NPH QL NAA+NON-PROBE: NOT DETECTED
HPIV4 RNA NPH QL NAA+NON-PROBE: NOT DETECTED
IMM GRANULOCYTES # BLD AUTO: 0.21 THOUSAND/UL (ref 0–0.2)
IMM GRANULOCYTES NFR BLD AUTO: 1 % (ref 0–2)
LYMPHOCYTES # BLD AUTO: 2.23 THOUSANDS/ÂΜL (ref 0.6–4.47)
LYMPHOCYTES NFR BLD AUTO: 14 % (ref 14–44)
M PNEUMO DNA NPH QL NAA+NON-PROBE: NOT DETECTED
MCH RBC QN AUTO: 29.7 PG (ref 26.8–34.3)
MCHC RBC AUTO-ENTMCNC: 32.5 G/DL (ref 31.4–37.4)
MCV RBC AUTO: 92 FL (ref 82–98)
MONOCYTES # BLD AUTO: 1.12 THOUSAND/ÂΜL (ref 0.17–1.22)
MONOCYTES NFR BLD AUTO: 7 % (ref 4–12)
NEUTROPHILS # BLD AUTO: 12.46 THOUSANDS/ÂΜL (ref 1.85–7.62)
NEUTS SEG NFR BLD AUTO: 77 % (ref 43–75)
NRBC BLD AUTO-RTO: 0 /100 WBCS
PLATELET # BLD AUTO: 210 THOUSANDS/UL (ref 149–390)
PMV BLD AUTO: 12.1 FL (ref 8.9–12.7)
POTASSIUM SERPL-SCNC: 3.7 MMOL/L (ref 3.5–5.3)
PROCALCITONIN SERPL-MCNC: <0.05 NG/ML
PROT SERPL-MCNC: 6.4 G/DL (ref 6.4–8.4)
RBC # BLD AUTO: 3.77 MILLION/UL (ref 3.81–5.12)
RSV RNA NPH QL NAA+NON-PROBE: NOT DETECTED
RV+EV RNA NPH QL NAA+NON-PROBE: DETECTED
SARS-COV-2 RNA NPH QL NAA+NON-PROBE: NOT DETECTED
SODIUM SERPL-SCNC: 139 MMOL/L (ref 135–147)
WBC # BLD AUTO: 16.14 THOUSAND/UL (ref 4.31–10.16)

## 2023-10-01 PROCEDURE — 99223 1ST HOSP IP/OBS HIGH 75: CPT | Performed by: INTERNAL MEDICINE

## 2023-10-01 PROCEDURE — 85025 COMPLETE CBC W/AUTO DIFF WBC: CPT

## 2023-10-01 PROCEDURE — 0202U NFCT DS 22 TRGT SARS-COV-2: CPT | Performed by: NURSE PRACTITIONER

## 2023-10-01 PROCEDURE — 71045 X-RAY EXAM CHEST 1 VIEW: CPT

## 2023-10-01 PROCEDURE — 94760 N-INVAS EAR/PLS OXIMETRY 1: CPT

## 2023-10-01 PROCEDURE — 84145 PROCALCITONIN (PCT): CPT | Performed by: INTERNAL MEDICINE

## 2023-10-01 PROCEDURE — 80053 COMPREHEN METABOLIC PANEL: CPT

## 2023-10-01 PROCEDURE — 94640 AIRWAY INHALATION TREATMENT: CPT

## 2023-10-01 PROCEDURE — 99232 SBSQ HOSP IP/OBS MODERATE 35: CPT | Performed by: INTERNAL MEDICINE

## 2023-10-01 PROCEDURE — 86140 C-REACTIVE PROTEIN: CPT | Performed by: NURSE PRACTITIONER

## 2023-10-01 RX ORDER — BUDESONIDE 0.5 MG/2ML
0.5 INHALANT ORAL
Status: DISCONTINUED | OUTPATIENT
Start: 2023-10-01 | End: 2023-10-01

## 2023-10-01 RX ORDER — BUDESONIDE 0.5 MG/2ML
0.5 INHALANT ORAL
Status: DISCONTINUED | OUTPATIENT
Start: 2023-10-01 | End: 2023-10-03 | Stop reason: HOSPADM

## 2023-10-01 RX ORDER — METHYLPREDNISOLONE SODIUM SUCCINATE 40 MG/ML
40 INJECTION, POWDER, LYOPHILIZED, FOR SOLUTION INTRAMUSCULAR; INTRAVENOUS EVERY 8 HOURS SCHEDULED
Status: DISCONTINUED | OUTPATIENT
Start: 2023-10-01 | End: 2023-10-01

## 2023-10-01 RX ORDER — METHYLPREDNISOLONE SODIUM SUCCINATE 40 MG/ML
40 INJECTION, POWDER, LYOPHILIZED, FOR SOLUTION INTRAMUSCULAR; INTRAVENOUS EVERY 12 HOURS SCHEDULED
Status: COMPLETED | OUTPATIENT
Start: 2023-10-01 | End: 2023-10-02

## 2023-10-01 RX ADMIN — Medication 2000 UNITS: at 08:49

## 2023-10-01 RX ADMIN — METHYLPREDNISOLONE SODIUM SUCCINATE 40 MG: 40 INJECTION, POWDER, FOR SOLUTION INTRAMUSCULAR; INTRAVENOUS at 21:31

## 2023-10-01 RX ADMIN — MONTELUKAST 10 MG: 10 TABLET, FILM COATED ORAL at 21:53

## 2023-10-01 RX ADMIN — ENOXAPARIN SODIUM 40 MG: 40 INJECTION SUBCUTANEOUS at 08:49

## 2023-10-01 RX ADMIN — LORAZEPAM 0.5 MG: 0.5 TABLET ORAL at 19:40

## 2023-10-01 RX ADMIN — FLUTICASONE PROPIONATE 1 PUFF: 110 AEROSOL, METERED RESPIRATORY (INHALATION) at 09:01

## 2023-10-01 RX ADMIN — AZELASTINE HYDROCHLORIDE 1 SPRAY: 137 SPRAY, METERED NASAL at 17:17

## 2023-10-01 RX ADMIN — IPRATROPIUM BROMIDE 0.5 MG: 0.5 SOLUTION RESPIRATORY (INHALATION) at 07:19

## 2023-10-01 RX ADMIN — AZELASTINE HYDROCHLORIDE 1 SPRAY: 137 SPRAY, METERED NASAL at 09:00

## 2023-10-01 RX ADMIN — LEVALBUTEROL HYDROCHLORIDE 1.25 MG: 1.25 SOLUTION RESPIRATORY (INHALATION) at 07:19

## 2023-10-01 RX ADMIN — BUDESONIDE 0.5 MG: 0.5 INHALANT ORAL at 20:09

## 2023-10-01 RX ADMIN — HYDROCODONE BITARTRATE AND HOMATROPINE METHYLBROMIDE 5 ML: 1.5; 5 SOLUTION ORAL at 10:35

## 2023-10-01 RX ADMIN — HYDROCODONE BITARTRATE AND HOMATROPINE METHYLBROMIDE 5 ML: 1.5; 5 SOLUTION ORAL at 17:16

## 2023-10-01 RX ADMIN — LEVALBUTEROL HYDROCHLORIDE 1.25 MG: 1.25 SOLUTION RESPIRATORY (INHALATION) at 13:24

## 2023-10-01 RX ADMIN — IPRATROPIUM BROMIDE 0.5 MG: 0.5 SOLUTION RESPIRATORY (INHALATION) at 20:09

## 2023-10-01 RX ADMIN — FLUTICASONE PROPIONATE 1 SPRAY: 50 SPRAY, METERED NASAL at 09:00

## 2023-10-01 RX ADMIN — IPRATROPIUM BROMIDE 0.5 MG: 0.5 SOLUTION RESPIRATORY (INHALATION) at 13:24

## 2023-10-01 RX ADMIN — Medication 250 MG: at 08:49

## 2023-10-01 RX ADMIN — BUSPIRONE HYDROCHLORIDE 30 MG: 10 TABLET ORAL at 20:47

## 2023-10-01 RX ADMIN — CEFDINIR 300 MG: 300 CAPSULE ORAL at 20:43

## 2023-10-01 RX ADMIN — Medication 250 MG: at 17:16

## 2023-10-01 RX ADMIN — FLUTICASONE PROPIONATE 1 SPRAY: 50 SPRAY, METERED NASAL at 17:17

## 2023-10-01 RX ADMIN — GUAIFENESIN 600 MG: 600 TABLET ORAL at 08:49

## 2023-10-01 RX ADMIN — FLUOXETINE 10 MG: 10 CAPSULE ORAL at 09:00

## 2023-10-01 RX ADMIN — LEVALBUTEROL HYDROCHLORIDE 1.25 MG: 1.25 SOLUTION RESPIRATORY (INHALATION) at 20:09

## 2023-10-01 RX ADMIN — ATORVASTATIN CALCIUM 20 MG: 20 TABLET, FILM COATED ORAL at 08:49

## 2023-10-01 RX ADMIN — ARIPIPRAZOLE 10 MG: 5 TABLET ORAL at 21:53

## 2023-10-01 RX ADMIN — METHYLPREDNISOLONE SODIUM SUCCINATE 40 MG: 40 INJECTION, POWDER, FOR SOLUTION INTRAMUSCULAR; INTRAVENOUS at 10:29

## 2023-10-01 RX ADMIN — CEFDINIR 300 MG: 300 CAPSULE ORAL at 08:49

## 2023-10-01 RX ADMIN — GUAIFENESIN 600 MG: 600 TABLET ORAL at 20:43

## 2023-10-01 RX ADMIN — CHLORHEXIDINE GLUCONATE 15 ML: 1.2 SOLUTION ORAL at 21:54

## 2023-10-01 NOTE — PLAN OF CARE
Problem: PAIN - ADULT  Goal: Verbalizes/displays adequate comfort level or baseline comfort level  Description: Interventions:  - Encourage patient to monitor pain and request assistance  - Assess pain using appropriate pain scale  - Administer analgesics based on type and severity of pain and evaluate response  - Implement non-pharmacological measures as appropriate and evaluate response  - Consider cultural and social influences on pain and pain management  - Notify physician/advanced practitioner if interventions unsuccessful or patient reports new pain  Outcome: Progressing     Problem: INFECTION - ADULT  Goal: Absence or prevention of progression during hospitalization  Description: INTERVENTIONS:  - Assess and monitor for signs and symptoms of infection  - Monitor lab/diagnostic results  - Monitor all insertion sites, i.e. indwelling lines, tubes, and drains  - Monitor endotracheal if appropriate and nasal secretions for changes in amount and color  - [place] appropriate cooling/warming therapies per order  - Administer medications as ordered  - Instruct and encourage patient and family to use good hand hygiene technique  - Identify and instruct in appropriate isolation precautions for identified infection/condition  Outcome: Progressing     Problem: SAFETY ADULT  Goal: Patient will remain free of falls  Description: INTERVENTIONS:  - Educate patient/family on patient safety including physical limitations  - Instruct patient to call for assistance with activity   - Consult OT/PT to assist with strengthening/mobility   - Keep Call bell within reach  - Keep bed low and locked with side rails adjusted as appropriate  - Keep care items and personal belongings within reach  - Initiate and maintain comfort rounds  - Make Fall Risk Sign visible to staff  - Offer Toileting every 2 Hours, in advance of need  - Apply yellow socks and bracelet for high fall risk patients  - Consider moving patient to room near nurses station  Outcome: Progressing  Goal: Maintain or return to baseline ADL function  Description: INTERVENTIONS:  -  Assess patient's ability to carry out ADLs; assess patient's baseline for ADL function and identify physical deficits which impact ability to perform ADLs (bathing, care of mouth/teeth, toileting, grooming, dressing, etc.)  - Assess/evaluate cause of self-care deficits   - Assess range of motion  - Assess patient's mobility; develop plan if impaired  - Assess patient's need for assistive devices and provide as appropriate  - Encourage maximum independence but intervene and supervise when necessary  - Involve family in performance of ADLs  - Assess for home care needs following discharge   - Consider OT consult to assist with ADL evaluation and planning for discharge  - Provide patient education as appropriate  Outcome: Progressing  Goal: Maintains/Returns to pre admission functional level  Description: INTERVENTIONS:  - Perform BMAT or MOVE assessment daily.   - Set and communicate daily mobility goal to care team and patient/family/caregiver. - Collaborate with rehabilitation services on mobility goals if consulted  - Perform Range of Motion 3 times a day. - Reposition patient every 2 hours.   - Dangle patient 3 times a day  - Stand patient 3 times a day  - Ambulate patient 3 times a day  - Out of bed to chair 3 times a day   - Out of bed for meals 3 times a day  - Out of bed for toileting  - Record patient progress and toleration of activity level   Outcome: Progressing     Problem: DISCHARGE PLANNING  Goal: Discharge to home or other facility with appropriate resources  Description: INTERVENTIONS:  - Identify barriers to discharge w/patient and caregiver  - Arrange for needed discharge resources and transportation as appropriate  - Identify discharge learning needs (meds, wound care, etc.)  - Arrange for interpretive services to assist at discharge as needed  - Refer to Case Management Department for coordinating discharge planning if the patient needs post-hospital services based on physician/advanced practitioner order or complex needs related to functional status, cognitive ability, or social support system  Outcome: Progressing     Problem: Knowledge Deficit  Goal: Patient/family/caregiver demonstrates understanding of disease process, treatment plan, medications, and discharge instructions  Description: Complete learning assessment and assess knowledge base.   Interventions:  - Provide teaching at level of understanding  - Provide teaching via preferred learning methods  Outcome: Progressing

## 2023-10-01 NOTE — UTILIZATION REVIEW
NOTIFICATION OF INPATIENT ADMISSION   AUTHORIZATION REQUEST   SERVICING FACILITY:   [address] [place] [address]. [address]  Tax ID: [id number]  NPI: [id number]   ATTENDING PROVIDER:  Attending Name and NPI#: Vik Akhtar [[id number]]  Address: [address] [place] [address]  Phone: [phone]     ADMISSION INFORMATION:  Place of Service: [address]patient [address]  Place of Service Code: 21  Inpatient Admission Date/Time: 9/29/23  4:27 PM  Discharge Date/Time: No discharge date for patient encounter. Admitting Diagnosis Code/Description:  Cough [R05.9]  SOB (shortness of breath) [R06.02]  Bronchitis [J40]  Sepsis ([address]) [A41.9]     UTILIZATION REVIEW CONTACT:  Florence Gamez Utilization   Network Utilization Review Department  Phone: [phone]  Fax: [phone]  Email: [email or web address]  Contact for approvals/pending authorizations, clinical reviews, and discharge. PHYSICIAN ADVISORY SERVICES:  Medical Necessity Denial & [id number] Review  Phone: [phone]  Fax: [phone]  Email: [email or web address] Patient answered NO to all of the above 4 questions.

## 2023-10-01 NOTE — PROGRESS NOTES
[address] [facility]  Progress Note  Name: Renate Samuels  MRN: [id number]  Unit/Bed#: S -41 I Date of Admission: 9/29/2023   Date of Service: 10/1/2023 I Hospital Day: 2    Assessment/Plan   * Bronchitis  Assessment & Plan  · Presents complaining of cough congestion shortness of breath since 2 days. · CT chest shows multifocal groundglass opacities, new from the prior study and less severe than on the earlier CT from 4/3/2021. This appearance may represent viral pneumonia, particularly COVID-19, though testing was negative. Appearance is otherwise nonspecific though in keeping with an infectious/inflammatory etiology. Plan:  · Oral cefdinir 300 mg every 12 hours  · DuoNeb every 6 hours. · F/u blood cultures. · Tylenol as needed for pain and fever. · Tobacco cessation recommended. · Nicotine patch offered. Asthma  Assessment & Plan  · Currently taking fluticasone inhaler daily and albuterol rescue inhaler. Also takes Singulair at night. Tobacco use  Assessment & Plan  · Current smoker, 7 cigarettes/day. · Nicotine patch offered. Depression with anxiety  Assessment & Plan  · Continue home dose fluoxetine and buspirone. Subjective/Objective     Subjective:   MICHELLE o/n, pt has no complaints this morning. Breathing ease may be slightly improved subjectively. Day 3 of abx. All tests have thus far returned negative, denoting viral infection in asthmatic more likely cause. A few sick contacts in recent past w/ milder but similar sx, no known causative microbe in those cases per pt. Has not been happy w/ some tx or bedside manner from resident(s), strongly prefers no residents at the hospital further participate in her care. Objective:  Vitals: Blood pressure 119/74, pulse 83, temperature 98 °F (36.7 °C), resp. rate 20, weight 79 kg (174 lb 2.6 oz), SpO2 95 %, not currently breastfeeding. ,Body mass index is 28.98 kg/m².     No intake or output data in the 24 hours ending 10/01/23 1312    Invasive Devices     Peripheral Intravenous Line  Duration           Peripheral IV 09/29/23 Right;Ventral (anterior) Forearm 1 day                Physical Exam: Physical Exam  Vitals and nursing note reviewed. Constitutional:       General: She is not in acute distress. Appearance: She is well-developed. HENT:      Head: Normocephalic and atraumatic. Eyes:      Conjunctiva/sclera: Conjunctivae normal.   Cardiovascular:      Rate and Rhythm: Normal rate and regular rhythm. Heart sounds: No murmur heard. Pulmonary:      Effort: Pulmonary effort is normal. No respiratory distress. Abdominal:      Palpations: Abdomen is soft. Tenderness: There is no abdominal tenderness. There is no guarding. Musculoskeletal:         General: No swelling. Cervical back: Neck supple. Right lower leg: No edema. Left lower leg: No edema. Skin:     General: Skin is warm and dry. Neurological:      General: No focal deficit present. Mental Status: She is alert and oriented to person, place, and time. Cranial Nerves: No cranial nerve deficit. Motor: No weakness. Psychiatric:         Mood and Affect: Mood is anxious. Behavior: Behavior is agitated. Lab, Imaging and other studies: I have personally reviewed pertinent reports.     VTE Pharmacologic Prophylaxis: Enoxaparin (Lovenox)  VTE Mechanical Prophylaxis: sequential compression device

## 2023-10-01 NOTE — CONSULTS
Consultation - Pulmonary Medicine   Evy Lady Navas 60 y.o. female MRN: [id number]  Unit/Bed#: S -01 Encounter: [id number]      Assessment/Plan:    1. Acute pulmonary insufficiency likely multifaceted as listed below        -Currently on room air-94%, does not wear home O2        -Maintain saturations greater than 88%        -Pulmonary toileting: Deep breathing cough, OOB as tolerated, IS Q 1 hr    2. Abnormal chest CT w/ H/O presumed         -4/6/2021-bronchoscopy        -4/2021-completed 12-week course of antibiotics        -Performed bilateral scattered groundglass opacity        -We will order RP 2, sputum, CRP        -Initiated: IV Solu-Medrol 40 mg 3 times daily        -Procalcitonin negative x3, WBC trending up- 16.14 , afebrile        -If patient does not respond to treatment may need to consider bronchoscopy        -Day # 3/5-cefdinir    3. Mild persistent eosinophilic asthma possible mild acute exacerbation        -Eos- 690-- 560-- 540-- 110-- 660-- 140-- 90        -Inpatient: Steroids as above, budesonide twice daily, Xopenex/Atrovent 3 times daily         -Home regimen: Breo 100 over 25 mcg 1 puff daily, Arnuity 100 mg 1 puff daily, Proventil 2 puffs every 6 as needed, and ProAir 2 puffs every 6 as needed            4. Recurrent sinusitis and allergic rhinitis        -Follows with ENT        -Continue singular    5. Active tobacco abuse        -1/2 Pack per day        -Encourage and educated on tobacco cessation        -NRT per primary team           History of Present Illness   Physician Requesting Consult: Federico Gunn DO  Reason for Consult / Principal Problem: Shortness of breath  Hx and PE limited by: Nothing  Chief Complaint: " I feel like I am swollen"  HPI: Karl Osorio is a 60 y.o.  female who presented to [address] [place] [address] with complaints of shortness of breath. Patient says medical history of asthma, COPD, depression, anxiety, and tobacco abuse.   Patient reports that she has been having increased sinus pressure along with the cough with some yellow to green sputum production. Patient reports 101 fever in the a.m. Patient reports that her coughing and overall malaise became significant and she came to emergency department to be further evaluated. Upon ED admission patient was administered cefepime 125 mg IV Solu-Medrol, and DuoNeb. Pulmonary was consulted for abnormal chest CT. Today upon examination patient has significant cough with some raspy voice. Patient reports that she feels that her throat is swelling and she is feeling chest tightness. Patient reports that she does have some pleuritic pain secondary to forceful coughing. Patient reports intermittent fevers with chills. Patient is very anxious about the trajectory of the course here given her previous hospitalization. From a pulmonary standpoint, patient follows with Dr. Saadia Bustos for her asthma. Patient reports she is an every day half pack a day smoker. Patient reports she started smoking around the age of 24. Reviewed PFT testing in 2021 showed normal spirometry. Home regimen Breo 100 over 25 mcg 1 puff daily, Arnuity 100 mg 1 puff daily, Proventil 2 puffs every 6 as needed, and ProAir 2 puffs every 6 as needed. Patient is currently not on oxygen therapy. Patient worked as a phlebotomist.  Patient denies any symptoms of GERD or MALU. Patient does report history of seasonal allergies in which she is maintained on Singulair. Patient does report history of postnasal drip which she sees ENT and utilizes nasal spray. She denies any recent acute exposures to dust, mold, spices, or silica. Consults    Review of Systems   Constitutional: Negative for chills and fever. HENT: Negative for ear pain and sore throat. Eyes: Negative for pain and visual disturbance. Respiratory: Positive for cough and shortness of breath. Negative for apnea, choking, chest tightness, wheezing and stridor. Cardiovascular: Negative for chest pain and palpitations. Gastrointestinal: Negative for abdominal pain and vomiting. Genitourinary: Negative for dysuria and hematuria. Musculoskeletal: Negative for arthralgias and back pain. Skin: Negative for color change and rash. Neurological: Negative for seizures and syncope. Psychiatric/Behavioral: Negative for agitation and behavioral problems. All other systems reviewed and are negative.       Historical Information   Past Medical History:   Diagnosis Date   • Allergic    • Allergic rhinitis 1987   • Allergies    • Anesthesia complication     V TACH after her reduction mammoplasty, done at [place],, states had a little vent arrhythmia after sinus sx also   • Anxiety    • Asthma 2021   • Chronic rhinitis 02/18/2020   • Depression    • Ethmoid sinusitis    • GERD (gastroesophageal reflux disease)     no issues since 2021   • Maxillary sinusitis    • Multiple allergies    • Myofascial pain syndrome    • Nasal turbinate hypertrophy    • Pneumonia    • Sleep apnea     not currently using CPAP   • Sleep apnea, obstructive    • Wears glasses      Past Surgical History:   Procedure Laterality Date   • INCISION AND DRAINAGE OF WOUND Left 3/23/2023    Procedure: INCISION AND DRAINAGE (I&D) LEFT ORAL ABSCESS, EXTRACTION OF TOOTH #19;  Surgeon: Denisse Ignacio DMD;  Location: [facility];  Service: Maxillofacial   • LAPAROSCOPY      with vaginal hysterectomy; 11/3/2003 rso   • OOPHORECTOMY Left 2004   • PARTIAL HYSTERECTOMY  2004   • [place] NSL/SINUS [address] MAX ANTROST W/RMVL TISS MAX SINUS N/A 9/30/2016    Procedure: IMAGE GUIDED FUNCTIONAL ENDOSCOPIC SINUS SURGERY;  Surgeon: Paris Pinto MD;  Location[facility];  Service: ENT   • NC NSL/SINUS [address] MAX ANTROST W/RMVL TISS MAX SINUS Bilateral 9/2/2022    Procedure: middle turbinectomy/medialization, possible revision functional endoscopic sinus surgery, inferior turbinate reduction;  Surgeon: Paris Pinto MD;  [facility];  Service: ENT   • REDUCTION MAMMAPLASTY Bilateral 02/27/2015   • SINUS SURGERY     • TOOTH EXTRACTION Right 3/16/2019    Procedure: EXTRACTION TOOTH #1 (Impacted Third Molar Tooth);   Surgeon: Delbert Haque DDS;  Location[facility];  Service: Maxillofacial   • TUBAL LIGATION     • WISDOM TOOTH EXTRACTION       Social History   Social History     Substance and Sexual Activity   Alcohol Use Not Currently    Comment: Social one or two a month     Social History     Substance and Sexual Activity   Drug Use No     Social History     Tobacco Use   Smoking Status Some Days   • Packs/day: 0.25   • Years: 25.00   • Total pack years: 6.25   • Types: Cigarettes   • Start date: 1/1/1995   Smokeless Tobacco Never   Tobacco Comments    patient states havent smoked cigarettes since end of march 2021 (states occasional "slips" here or there,inst no smoking before sx)     E-Cigarette/Vaping   • E-Cigarette Use Never User      E-Cigarette/Vaping Substances   • Nicotine No    • THC No    • CBD No    • Flavoring No    • Other No    • Unknown No      Occupational History: Noncontributory    Family History:   Family History   Problem Relation Age of Onset   • CLEM disease Mother    • Atrial fibrillation Mother    • Atrial fibrillation Father    • Heart disease Father    • Diabetes Maternal Grandmother    • Hypertension Maternal Grandmother    • Heart failure Maternal Grandmother    • Colon cancer Maternal Grandfather    • Diabetes Maternal Grandfather    • Stroke Maternal Grandfather    • Cancer Paternal Grandfather    • Endometrial cancer Cousin    • Breast cancer Cousin    • Multiple sclerosis Sister    • Hyperlipidemia Sister    • Thyroid disease Sister    • No Known Problems Son    • No Known Problems Son        Meds/Allergies   pertinent pulmonary meds have been reviewed    Allergies   Allergen Reactions   • Other      Most all antibiotics- hives, hypotensive    "no problem with clindamycin."   • Augmentin Es-600 [Amoxicillin-Pot Clavulanate]    • Cefuroxime    • Erythromycin    • Levofloxacin    • Morphine    • Morphine And Related Hives   • Oxycodone-Acetaminophen    • Penicillins    • Percocet [Oxycodone-Acetaminophen] Hives   • Sulfa Antibiotics    • Tetracyclines & Related        Objective   Vitals: Blood pressure 119/74, pulse 83, temperature 98 °F (36.7 °C), resp. rate 20, weight 79 kg (174 lb 2.6 oz), SpO2 95 %, not currently breastfeeding. ra,Body mass index is 28.98 kg/m². No intake or output data in the 24 hours ending 10/01/23 1010  Invasive Devices     Peripheral Intravenous Line  Duration           Peripheral IV 09/29/23 Right;Ventral (anterior) Forearm 1 day                Physical Exam  Constitutional:       General: She is not in acute distress. Appearance: Normal appearance. She is normal weight. She is not ill-appearing. HENT:      Head: Normocephalic and atraumatic. Nose: Nose normal. No congestion or rhinorrhea. Mouth/Throat:      Mouth: Mucous membranes are dry. Pharynx: Oropharynx is clear. No oropharyngeal exudate or posterior oropharyngeal erythema. Cardiovascular:      Rate and Rhythm: Normal rate and regular rhythm. Pulses: Normal pulses. Heart sounds: Normal heart sounds. No murmur heard. No friction rub. No gallop. Pulmonary:      Effort: Pulmonary effort is normal. No tachypnea, bradypnea, accessory muscle usage or respiratory distress. Breath sounds: Decreased air movement present. Decreased breath sounds present. No wheezing, rhonchi or rales. Comments: Some diminished aeration  Chest:      Chest wall: No tenderness. Abdominal:      General: Abdomen is flat. Bowel sounds are normal. There is no distension. Palpations: Abdomen is soft. There is no mass. Musculoskeletal:         General: No swelling or tenderness. Normal range of motion. Cervical back: Normal range of motion. No rigidity or tenderness.    Skin:     General: Skin is warm and dry. Coloration: Skin is not jaundiced or pale. Neurological:      General: No focal deficit present. Mental Status: She is alert and oriented to person, place, and time. Mental status is at baseline. Psychiatric:         Mood and Affect: Mood normal.         Behavior: Behavior normal.         Lab Results:   I have personally reviewed pertinent lab results. , ABG: No results found for: "PHART", "NTU2FML", "PO2ART", "BKX7RDQ", "S4YJMNZC", "BEART", "SOURCE", BNP: No results found for: "BNP", CBC:   Lab Results   Component Value Date    WBC 16.14 (H) 10/01/2023    HGB 11.2 (L) 10/01/2023    HCT 34.5 (L) 10/01/2023    MCV 92 10/01/2023     10/01/2023    RBC 3.77 (L) 10/01/2023    MCH 29.7 10/01/2023    MCHC 32.5 10/01/2023    RDW 13.1 10/01/2023    MPV 12.1 10/01/2023    NRBC 0 10/01/2023   , CMP:   Lab Results   Component Value Date    SODIUM 139 10/01/2023    K 3.7 10/01/2023     10/01/2023    CO2 27 10/01/2023    BUN 14 10/01/2023    CREATININE 0.63 10/01/2023    CALCIUM 9.0 10/01/2023    AST 20 10/01/2023    ALT 17 10/01/2023    ALKPHOS 72 10/01/2023    EGFR 98 10/01/2023   , PT/INR: No results found for: "PT", "INR"        Imaging Studies: I have personally reviewed pertinent films in PACS     Chest CT-multifocal groundglass opacity    EKG, Pathology, and Other Studies: I have personally reviewed pertinent films in PACS     5/4/2023-Echo EF 09% grade 1 diastolic dysfunction    Pulmonary Results (PFTs, PSG): I have personally reviewed pertinent films in PACS     Results:  FEV1/FVC Ratio: 81 %  FEV1: 2.88 L     107 % predicted  Forced Vital Capacity: 3.53 L    105 % predicted  After administration of bronchodilator:  Not administered     Lung volumes:  Total Lung Capacity 122 % predicted Residual volume 161 % predicted     DLCO corrected for patients hemoglobin level: 75 % predicted     Flow volume loop:  normal     Interpretation:     •  Normal spirometry  •  Lung volumes with mild degree of air trapping  •  Normal diffusion capacity         Code Status: Level 1 - Full Code    Portions of the record may have been created with voice recognition software. Occasional wrong word or "sound a like" substitutions may have occurred due to the inherent limitations of voice recognition software. Read the chart carefully and recognize, using context, where substitutions have occurred.

## 2023-10-02 LAB
ANION GAP SERPL CALCULATED.3IONS-SCNC: 7 MMOL/L
ATRIAL RATE: 92 BPM
BUN SERPL-MCNC: 10 MG/DL (ref 5–25)
CALCIUM SERPL-MCNC: 9.1 MG/DL (ref 8.4–10.2)
CHLORIDE SERPL-SCNC: 105 MMOL/L (ref 96–108)
CO2 SERPL-SCNC: 26 MMOL/L (ref 21–32)
CREAT SERPL-MCNC: 0.54 MG/DL (ref 0.6–1.3)
ERYTHROCYTE [DISTWIDTH] IN BLOOD BY AUTOMATED COUNT: 13 % (ref 11.6–15.1)
GFR SERPL CREATININE-BSD FRML MDRD: 103 ML/MIN/1.73SQ M
GLUCOSE SERPL-MCNC: 149 MG/DL (ref 65–140)
HCT VFR BLD AUTO: 34.7 % (ref 34.8–46.1)
HGB BLD-MCNC: 11.2 G/DL (ref 11.5–15.4)
MCH RBC QN AUTO: 29.6 PG (ref 26.8–34.3)
MCHC RBC AUTO-ENTMCNC: 32.3 G/DL (ref 31.4–37.4)
MCV RBC AUTO: 92 FL (ref 82–98)
P AXIS: 35 DEGREES
PLATELET # BLD AUTO: 236 THOUSANDS/UL (ref 149–390)
PMV BLD AUTO: 11.4 FL (ref 8.9–12.7)
POTASSIUM SERPL-SCNC: 4.2 MMOL/L (ref 3.5–5.3)
PR INTERVAL: 144 MS
QRS AXIS: 13 DEGREES
QRSD INTERVAL: 80 MS
QT INTERVAL: 360 MS
QTC INTERVAL: 445 MS
RBC # BLD AUTO: 3.79 MILLION/UL (ref 3.81–5.12)
SODIUM SERPL-SCNC: 138 MMOL/L (ref 135–147)
T WAVE AXIS: 46 DEGREES
VENTRICULAR RATE: 92 BPM
WBC # BLD AUTO: 15.13 THOUSAND/UL (ref 4.31–10.16)

## 2023-10-02 PROCEDURE — 99232 SBSQ HOSP IP/OBS MODERATE 35: CPT | Performed by: INTERNAL MEDICINE

## 2023-10-02 PROCEDURE — 94760 N-INVAS EAR/PLS OXIMETRY 1: CPT

## 2023-10-02 PROCEDURE — 93010 ELECTROCARDIOGRAM REPORT: CPT | Performed by: INTERNAL MEDICINE

## 2023-10-02 PROCEDURE — 80048 BASIC METABOLIC PNL TOTAL CA: CPT

## 2023-10-02 PROCEDURE — 85027 COMPLETE CBC AUTOMATED: CPT

## 2023-10-02 PROCEDURE — 94640 AIRWAY INHALATION TREATMENT: CPT

## 2023-10-02 RX ORDER — ECHINACEA PURPUREA EXTRACT 125 MG
1 TABLET ORAL
Status: DISCONTINUED | OUTPATIENT
Start: 2023-10-02 | End: 2023-10-03 | Stop reason: HOSPADM

## 2023-10-02 RX ORDER — PREDNISONE 20 MG/1
60 TABLET ORAL DAILY
Status: DISCONTINUED | OUTPATIENT
Start: 2023-10-03 | End: 2023-10-03 | Stop reason: HOSPADM

## 2023-10-02 RX ADMIN — ARIPIPRAZOLE 10 MG: 5 TABLET ORAL at 22:29

## 2023-10-02 RX ADMIN — LEVALBUTEROL HYDROCHLORIDE 1.25 MG: 1.25 SOLUTION RESPIRATORY (INHALATION) at 07:17

## 2023-10-02 RX ADMIN — LORAZEPAM 0.5 MG: 0.5 TABLET ORAL at 19:37

## 2023-10-02 RX ADMIN — BUDESONIDE 0.5 MG: 0.5 INHALANT ORAL at 07:17

## 2023-10-02 RX ADMIN — METHYLPREDNISOLONE SODIUM SUCCINATE 40 MG: 40 INJECTION, POWDER, FOR SOLUTION INTRAMUSCULAR; INTRAVENOUS at 08:01

## 2023-10-02 RX ADMIN — CEFDINIR 300 MG: 300 CAPSULE ORAL at 07:59

## 2023-10-02 RX ADMIN — GUAIFENESIN 600 MG: 600 TABLET ORAL at 22:29

## 2023-10-02 RX ADMIN — FLUTICASONE PROPIONATE 1 SPRAY: 50 SPRAY, METERED NASAL at 17:20

## 2023-10-02 RX ADMIN — ENOXAPARIN SODIUM 40 MG: 40 INJECTION SUBCUTANEOUS at 08:02

## 2023-10-02 RX ADMIN — IPRATROPIUM BROMIDE 0.5 MG: 0.5 SOLUTION RESPIRATORY (INHALATION) at 07:17

## 2023-10-02 RX ADMIN — ACETAMINOPHEN 650 MG: 325 TABLET, FILM COATED ORAL at 17:18

## 2023-10-02 RX ADMIN — AZELASTINE HYDROCHLORIDE 1 SPRAY: 137 SPRAY, METERED NASAL at 08:02

## 2023-10-02 RX ADMIN — HYDROCODONE BITARTRATE AND HOMATROPINE METHYLBROMIDE 5 ML: 1.5; 5 SOLUTION ORAL at 07:59

## 2023-10-02 RX ADMIN — BUDESONIDE 0.5 MG: 0.5 INHALANT ORAL at 20:11

## 2023-10-02 RX ADMIN — BUSPIRONE HYDROCHLORIDE 30 MG: 10 TABLET ORAL at 23:40

## 2023-10-02 RX ADMIN — AZELASTINE HYDROCHLORIDE 1 SPRAY: 137 SPRAY, METERED NASAL at 17:20

## 2023-10-02 RX ADMIN — HYDROCODONE BITARTRATE AND HOMATROPINE METHYLBROMIDE 5 ML: 1.5; 5 SOLUTION ORAL at 23:40

## 2023-10-02 RX ADMIN — LEVALBUTEROL HYDROCHLORIDE 1.25 MG: 1.25 SOLUTION RESPIRATORY (INHALATION) at 20:11

## 2023-10-02 RX ADMIN — LEVALBUTEROL HYDROCHLORIDE 1.25 MG: 1.25 SOLUTION RESPIRATORY (INHALATION) at 13:34

## 2023-10-02 RX ADMIN — ATORVASTATIN CALCIUM 20 MG: 20 TABLET, FILM COATED ORAL at 08:01

## 2023-10-02 RX ADMIN — HYDROCODONE BITARTRATE AND HOMATROPINE METHYLBROMIDE 5 ML: 1.5; 5 SOLUTION ORAL at 17:18

## 2023-10-02 RX ADMIN — FLUTICASONE PROPIONATE 1 SPRAY: 50 SPRAY, METERED NASAL at 08:02

## 2023-10-02 RX ADMIN — FLUOXETINE 10 MG: 10 CAPSULE ORAL at 08:02

## 2023-10-02 RX ADMIN — METHYLPREDNISOLONE SODIUM SUCCINATE 40 MG: 40 INJECTION, POWDER, FOR SOLUTION INTRAMUSCULAR; INTRAVENOUS at 22:29

## 2023-10-02 RX ADMIN — IPRATROPIUM BROMIDE 0.5 MG: 0.5 SOLUTION RESPIRATORY (INHALATION) at 20:11

## 2023-10-02 RX ADMIN — GUAIFENESIN 600 MG: 600 TABLET ORAL at 08:01

## 2023-10-02 RX ADMIN — Medication 250 MG: at 17:18

## 2023-10-02 RX ADMIN — IPRATROPIUM BROMIDE 0.5 MG: 0.5 SOLUTION RESPIRATORY (INHALATION) at 13:34

## 2023-10-02 RX ADMIN — HYDROCODONE BITARTRATE AND HOMATROPINE METHYLBROMIDE 5 ML: 1.5; 5 SOLUTION ORAL at 00:21

## 2023-10-02 RX ADMIN — MONTELUKAST 10 MG: 10 TABLET, FILM COATED ORAL at 22:29

## 2023-10-02 RX ADMIN — Medication 2000 UNITS: at 08:01

## 2023-10-02 RX ADMIN — Medication 250 MG: at 08:01

## 2023-10-02 RX ADMIN — CHLORHEXIDINE GLUCONATE 15 ML: 1.2 SOLUTION ORAL at 22:30

## 2023-10-02 NOTE — PLAN OF CARE
Problem: PAIN - ADULT  Goal: Verbalizes/displays adequate comfort level or baseline comfort level  Description: Interventions:  - Encourage patient to monitor pain and request assistance  - Assess pain using appropriate pain scale  - Administer analgesics based on type and severity of pain and evaluate response  - Implement non-pharmacological measures as appropriate and evaluate response  - Consider cultural and social influences on pain and pain management  - Notify physician/advanced practitioner if interventions unsuccessful or patient reports new pain  Outcome: Progressing     Problem: INFECTION - ADULT  Goal: Absence or prevention of progression during hospitalization  Description: INTERVENTIONS:  - Assess and monitor for signs and symptoms of infection  - Monitor lab/diagnostic results  - Monitor all insertion sites, i.e. indwelling lines, tubes, and drains  - Monitor endotracheal if appropriate and nasal secretions for changes in amount and color  - [place] appropriate cooling/warming therapies per order  - Administer medications as ordered  - Instruct and encourage patient and family to use good hand hygiene technique  - Identify and instruct in appropriate isolation precautions for identified infection/condition  Outcome: Progressing     Problem: SAFETY ADULT  Goal: Patient will remain free of falls  Description: INTERVENTIONS:  - Educate patient/family on patient safety including physical limitations  - Instruct patient to call for assistance with activity   - Consult OT/PT to assist with strengthening/mobility   - Keep Call bell within reach  - Keep bed low and locked with side rails adjusted as appropriate  - Keep care items and personal belongings within reach  - Initiate and maintain comfort rounds  - Make Fall Risk Sign visible to staff  - Offer Toileting every 2 Hours, in advance of need  - Apply yellow socks and bracelet for high fall risk patients  - Consider moving patient to room near nurses station  Outcome: Progressing  Goal: Maintain or return to baseline ADL function  Description: INTERVENTIONS:  -  Assess patient's ability to carry out ADLs; assess patient's baseline for ADL function and identify physical deficits which impact ability to perform ADLs (bathing, care of mouth/teeth, toileting, grooming, dressing, etc.)  - Assess/evaluate cause of self-care deficits   - Assess range of motion  - Assess patient's mobility; develop plan if impaired  - Assess patient's need for assistive devices and provide as appropriate  - Encourage maximum independence but intervene and supervise when necessary  - Involve family in performance of ADLs  - Assess for home care needs following discharge   - Consider OT consult to assist with ADL evaluation and planning for discharge  - Provide patient education as appropriate  Outcome: Progressing  Goal: Maintains/Returns to pre admission functional level  Description: INTERVENTIONS:  - Perform BMAT or MOVE assessment daily.   - Set and communicate daily mobility goal to care team and patient/family/caregiver. - Collaborate with rehabilitation services on mobility goals if consulted  - Perform Range of Motion 3 times a day. - Reposition patient every 2 hours.   - Dangle patient 3 times a day  - Stand patient 3 times a day  - Ambulate patient 3 times a day  - Out of bed to chair 3 times a day   - Out of bed for meals 3 times a day  - Out of bed for toileting  - Record patient progress and toleration of activity level   Outcome: Progressing     Problem: DISCHARGE PLANNING  Goal: Discharge to home or other facility with appropriate resources  Description: INTERVENTIONS:  - Identify barriers to discharge w/patient and caregiver  - Arrange for needed discharge resources and transportation as appropriate  - Identify discharge learning needs (meds, wound care, etc.)  - Arrange for interpretive services to assist at discharge as needed  - Refer to Case Management Department for coordinating discharge planning if the patient needs post-hospital services based on physician/advanced practitioner order or complex needs related to functional status, cognitive ability, or social support system  Outcome: Progressing     Problem: Knowledge Deficit  Goal: Patient/family/caregiver demonstrates understanding of disease process, treatment plan, medications, and discharge instructions  Description: Complete learning assessment and assess knowledge base.   Interventions:  - Provide teaching at level of understanding  - Provide teaching via preferred learning methods  Outcome: Progressing

## 2023-10-02 NOTE — ASSESSMENT & PLAN NOTE
· Current smoker, 7 cigarettes/day. · Nicotine patch offered.     Plan:  - Tobacco cessation recommended

## 2023-10-02 NOTE — ASSESSMENT & PLAN NOTE
· Currently taking fluticasone inhaler daily and albuterol rescue inhaler. Also takes Singulair at night. Takes Arnuity as an outpatient.      Plan:  - Continue bronchodilator regimen and Singulair.    - Follows with pulmonology Dr. Alesia Pardo as an outpatient

## 2023-10-02 NOTE — PROGRESS NOTES
[address] [facility]  Progress Note  Name: Jasen Carlson  MRN: [id number]  Unit/Bed#: S -11 I Date of Admission: 9/29/2023   Date of Service: 10/2/2023 I Hospital Day: 3    Assessment/Plan   * Bronchitis  Assessment & Plan  · Presents complaining of cough congestion shortness of breath since 2 days. · CT chest shows multifocal groundglass opacities. This appearance may represent viral pneumonia, particularly COVID-19, though testing was negative. Appearance is otherwise nonspecific though in keeping with an infectious/inflammatory etiology. · Procalcitonin negative x 3. Per pulmonology, PO Cefdinir discontinued on 10/2    Plan:  · Per pulmonology, continue IV steroids, transition to PO prednisone 60 mg daily starting tomorrow and discharge  · Continue bronchodilator regimen and Singulair. · Cough lozenges q1 hr prn  · [place] spray q1 hr prn  · Tylenol prn pain and fever. · Tobacco cessation recommended    Asthma  Assessment & Plan  · Currently taking fluticasone inhaler daily and albuterol rescue inhaler. Also takes Singulair at night. Takes Arnuity as an outpatient. Plan:  - Continue bronchodilator regimen and Singulair.    - Follows with pulmonology Dr. Whit Fields as an outpatient    Tobacco use  Assessment & Plan  · Current smoker, 7 cigarettes/day. · Nicotine patch offered. Plan:  - Tobacco cessation recommended    Depression with anxiety  Assessment & Plan  · Continue home dose fluoxetine and buspirone. VTE Pharmacologic Prophylaxis: VTE Score: 4 Moderate Risk (Score 3-4) - Pharmacological DVT Prophylaxis Ordered: enoxaparin (Lovenox). Patient Centered Rounds: I performed bedside rounds with nursing staff today. Discussions with Specialists or Other Care Team Provider: Pulmonology    Education and Discussions with Family / Patient: patient to update family.      Current Length of Stay: 3 day(s)  Current Patient Status: Inpatient   Discharge Plan: Anticipate discharge in 24-48 hrs to home. Code Status: Level 1 - Full Code    Subjective:   Patient seen and examined at bedside. She continues to feel tired and has a hoarse voice. Her coughing is better today, but she did not sleep well last night. Overall, she states that she feels better compared to when she first came in. Denies chest pain, palpitations, abdominal pain, N/V/C/D. Objective:     Vitals:   Temp (24hrs), Av °F (36.7 °C), Min:97.6 °F (36.4 °C), Max:98.5 °F (36.9 °C)    Temp:  [97.6 °F (36.4 °C)-98.5 °F (36.9 °C)] 98.5 °F (36.9 °C)  HR:  [73-85] 85  Resp:  [15-18] 15  BP: (107-130)/(62-68) 124/68  SpO2:  [89 %-95 %] 90 %  Body mass index is 30.07 kg/m². Input and Output Summary (last 24 hours):   No intake or output data in the 24 hours ending 10/02/23 4610    Physical Exam:   Physical Exam  Vitals and nursing note reviewed. Constitutional:       General: She is not in acute distress. Appearance: She is well-developed. HENT:      Head: Normocephalic and atraumatic. Comments: Mild bilateral maxillary sinus ttp  Eyes:      Conjunctiva/sclera: Conjunctivae normal.   Cardiovascular:      Rate and Rhythm: Normal rate and regular rhythm. Heart sounds: No murmur heard. Pulmonary:      Effort: Pulmonary effort is normal. No respiratory distress. Breath sounds: Normal breath sounds. Abdominal:      Palpations: Abdomen is soft. Tenderness: There is no abdominal tenderness. Musculoskeletal:         General: No swelling. Cervical back: Neck supple. Skin:     General: Skin is warm and dry. Capillary Refill: Capillary refill takes less than 2 seconds. Neurological:      Mental Status: She is alert.    Psychiatric:         Mood and Affect: Mood normal.          Additional Data:     Labs:  Results from last 7 days   Lab Units 10/02/23  0534 10/01/23  0459   WBC Thousand/uL 15.13* 16.14*   HEMOGLOBIN g/dL 11.2* 11.2*   HEMATOCRIT % 34.7* 34.5*   PLATELETS Thousands/uL 236 210   NEUTROS PCT %  --  77*   LYMPHS PCT %  --  14   MONOS PCT %  --  7   EOS PCT %  --  1     Results from last 7 days   Lab Units 10/02/23  0534 10/01/23  0459   SODIUM mmol/L 138 139   POTASSIUM mmol/L 4.2 3.7   CHLORIDE mmol/L 105 105   CO2 mmol/L 26 27   BUN mg/dL 10 14   CREATININE mg/dL 0.54* 0.63   ANION GAP mmol/L 7 7   CALCIUM mg/dL 9.1 9.0   ALBUMIN g/dL  --  3.6   TOTAL BILIRUBIN mg/dL  --  0.31   ALK PHOS U/L  --  72   ALT U/L  --  17   AST U/L  --  20   GLUCOSE RANDOM mg/dL 149* 109     Results from last 7 days   Lab Units 09/29/23  1504   INR  0.97             Results from last 7 days   Lab Units 10/01/23  0459 09/30/23  0537 09/29/23  1504 09/29/23  1423   LACTIC ACID mmol/L  --   --  1.4  --    PROCALCITONIN ng/ml <0.05 <0.05  --  <0.05       Lines/Drains:  Invasive Devices     Peripheral Intravenous Line  Duration           Peripheral IV 10/01/23 Right Antecubital <1 day                      Imaging: Reviewed radiology reports from this admission including: chest xray and chest CT scan    Recent Cultures (last 7 days):   Results from last 7 days   Lab Units 09/30/23  0537 09/29/23  1504   BLOOD CULTURE   --  No Growth at 48 hrs. No Growth at 48 hrs.    LEGIONELLA URINARY ANTIGEN  Negative  --        Last 24 Hours Medication List:   Current Facility-Administered Medications   Medication Dose Route Frequency Provider Last Rate   • acetaminophen  650 mg Oral Q6H PRN Tito Vang DO     • albuterol  2 puff Inhalation Q6H PRN Loni Maloney MD     • ARIPiprazole  10 mg Oral HS Loni Maloney MD     • ARIPiprazole  2 mg Oral Daily Loni Maloney MD     • atorvastatin  20 mg Oral Daily Loni Maloney MD     • azelastine  1 spray Each Nare BID Loni Maloney MD     • benzonatate  100 mg Oral TID PRN Nilsa Edmonds DO     • budesonide  0.5 mg Nebulization Q12H Kyle El, DO     • busPIRone  30 mg Oral Daily Loni Maloney MD     • chlorhexidine  15 mL Swish & Spit HS Loni Robles Wright MD     • cholecalciferol  2,000 Units Oral Daily Loni Maloney MD     • diphenhydrAMINE  25 mg Oral Q6H PRN Loni Maloney MD     • enoxaparin  40 mg Subcutaneous Daily Loni Maloney MD     • FLUoxetine  10 mg Oral QAM Loni Maloney MD     • fluticasone  1 spray Each Nare BID Loni Maloney MD     • guaiFENesin  600 mg Oral Q12H [address] Zoe Maldonado MD     • HYDROcodone Bit-Homatrop MBr  5 mL Oral Q6H PRN Stefanie Nieto MD     • ipratropium  0.5 mg Nebulization TID Zoe Maldonado MD     • levalbuterol  1.25 mg Nebulization TID Zoe Maldonado MD     • LORazepam  0.5 mg Oral BID PRN Loni Maloney MD     • methylPREDNISolone sodium succinate  40 mg Intravenous Q12H [address], DO     • montelukast  10 mg Oral HS Loni Maloney MD     • phenol  1 spray Mouth/Throat Q2H PRN Nataly Augustine DO     • [START ON 10/3/2023] predniSONE  60 mg Oral Daily Shae Mcdowell DO     • saccharomyces boulardii  250 mg Oral BID Stefanie Nieto MD     • sodium chloride  1 spray Each Jing Mean PRN Scar Gardiner MD          Today, Patient Was Seen By: Joselo La DO    **Please Note: This note may have been constructed using a voice recognition system. **

## 2023-10-02 NOTE — ASSESSMENT & PLAN NOTE
· Presents complaining of cough congestion shortness of breath since 2 days. · CT chest shows multifocal groundglass opacities. This appearance may represent viral pneumonia, particularly COVID-19, though testing was negative. Appearance is otherwise nonspecific though in keeping with an infectious/inflammatory etiology. · Procalcitonin negative x 3. Per pulmonology, PO Cefdinir discontinued on 10/2    Plan:  · Per pulmonology, continue IV steroids, transition to PO prednisone 60 mg daily starting tomorrow and discharge  · Continue bronchodilator regimen and Singulair. · Cough lozenges q1 hr prn  · [place] spray q1 hr prn  · Tylenol prn pain and fever.   · Tobacco cessation recommended

## 2023-10-02 NOTE — PROGRESS NOTES
Progress Note - Pulmonary   Malon Bumpers 60 y.o. female MRN: [id number]  Unit/Bed#: S -01 Encounter: [id number]      Assessment/Plan:    · Acute respiratory insufficiency due to asthma exacerbation, precipitated by rhinovirus-continues to struggle with cough, fatigue and shortness of breath, though overall improved. Plan to transition to prednisone 60 mg daily starting tomorrow. Continue bronchodilator regimen and Singulair. Takes Arnuity as an outpatient. Follows with Dr. Jensen De La Rosa as an outpatient    · Abnormal chest CT with multifocal groundglass opacities-due to viral pneumonia. Would plan to repeat CT in 6-8 weeks. On cefdinir -total antibiotic day #4. Given rhinovirus on RP2 and negative procalcitonin x3, can discontinue antibiotics    · Ongoing tobacco use-encourage smoking cessation    Chief Complaint: "I have fatigue"    Subjective:   Patient continues to struggle with cough and significant fatigue. She does feel somewhat better since admission. Objective:     Vitals: Blood pressure 107/62, pulse 73, temperature 97.6 °F (36.4 °C), temperature source Oral, resp. rate 18, weight 82 kg (180 lb 11.2 oz), SpO2 93 %, not currently breastfeeding.,  Room air, Body mass index is 30.07 kg/m². No intake or output data in the 24 hours ending 10/02/23 0954    Physical Exam:      General:  Awake, alert, no distress   HEENT: No scleral icterus, EOMI, moist mucosa    Heart:  Regular rate and rhythm, no murmur   Lungs: Few crackles, right greater than left   Abdomen: Soft, nontender, normal bowel sounds   Extremities: No clubbing, cyanosis or edema    Labs: I have personally reviewed pertinent lab results.     Results from last 7 days   Lab Units 10/02/23  0534 10/01/23  0459 09/30/23 0537   WBC Thousand/uL 15.13* 16.14* 13.83*   HEMOGLOBIN g/dL 11.2* 11.2* 11.7   HEMATOCRIT % 34.7* 34.5* 35.9   PLATELETS Thousands/uL 236 210 192         Results from last 7 days   Lab Units 10/02/23  0534 10/01/23  0459 09/30/23  0537 09/29/23  1423   POTASSIUM mmol/L 4.2 3.7 3.9 3.8   CHLORIDE mmol/L 105 105 106 103   CO2 mmol/L 26 27 24 23   BUN mg/dL 10 14 12 11   CREATININE mg/dL 0.54* 0.63 0.59* 0.68   CALCIUM mg/dL 9.1 9.0 9.0 9.5   ALK PHOS U/L  --  72  --  86   ALT U/L  --  17  --  13   AST U/L  --  20  --  14     Results from last 7 days   Lab Units 09/29/23  1504   INR  0.97       RP2 panel positive for rhino/enterovirus  Procalcitonin negative x3

## 2023-10-02 NOTE — PLAN OF CARE
Problem: PAIN - ADULT  Goal: Verbalizes/displays adequate comfort level or baseline comfort level  Description: Interventions:  - Encourage patient to monitor pain and request assistance  - Assess pain using appropriate pain scale  - Administer analgesics based on type and severity of pain and evaluate response  - Implement non-pharmacological measures as appropriate and evaluate response  - Consider cultural and social influences on pain and pain management  - Notify physician/advanced practitioner if interventions unsuccessful or patient reports new pain  Outcome: Progressing     Problem: INFECTION - ADULT  Goal: Absence or prevention of progression during hospitalization  Description: INTERVENTIONS:  - Assess and monitor for signs and symptoms of infection  - Monitor lab/diagnostic results  - Monitor all insertion sites, i.e. indwelling lines, tubes, and drains  - Monitor endotracheal if appropriate and nasal secretions for changes in amount and color  - [place] appropriate cooling/warming therapies per order  - Administer medications as ordered  - Instruct and encourage patient and family to use good hand hygiene technique  - Identify and instruct in appropriate isolation precautions for identified infection/condition  Outcome: Progressing     Problem: SAFETY ADULT  Goal: Patient will remain free of falls  Description: INTERVENTIONS:  - Educate patient/family on patient safety including physical limitations  - Instruct patient to call for assistance with activity   - Consult OT/PT to assist with strengthening/mobility   - Keep Call bell within reach  - Keep bed low and locked with side rails adjusted as appropriate  - Keep care items and personal belongings within reach  - Initiate and maintain comfort rounds  - Make Fall Risk Sign visible to staff  - Offer Toileting every  Hours, in advance of need  - Initiate/Maintain alarm  - Obtain necessary fall risk management equipment:   - Apply yellow socks and bracelet for high fall risk patients  - Consider moving patient to room near nurses station  Outcome: Progressing  Goal: Maintain or return to baseline ADL function  Description: INTERVENTIONS:  -  Assess patient's ability to carry out ADLs; assess patient's baseline for ADL function and identify physical deficits which impact ability to perform ADLs (bathing, care of mouth/teeth, toileting, grooming, dressing, etc.)  - Assess/evaluate cause of self-care deficits   - Assess range of motion  - Assess patient's mobility; develop plan if impaired  - Assess patient's need for assistive devices and provide as appropriate  - Encourage maximum independence but intervene and supervise when necessary  - Involve family in performance of ADLs  - Assess for home care needs following discharge   - Consider OT consult to assist with ADL evaluation and planning for discharge  - Provide patient education as appropriate  Outcome: Progressing  Goal: Maintains/Returns to pre admission functional level  Description: INTERVENTIONS:  - Perform BMAT or MOVE assessment daily.   - Set and communicate daily mobility goal to care team and patient/family/caregiver. - Collaborate with rehabilitation services on mobility goals if consulted  - Perform Range of Motion  times a day. - Reposition patient every  hours.   - Dangle patient  times a day  - Stand patient  times a day  - Ambulate patient  times a day  - Out of bed to chair  times a day   - Out of bed for meals times a day  - Out of bed for toileting  - Record patient progress and toleration of activity level   Outcome: Progressing     Problem: DISCHARGE PLANNING  Goal: Discharge to home or other facility with appropriate resources  Description: INTERVENTIONS:  - Identify barriers to discharge w/patient and caregiver  - Arrange for needed discharge resources and transportation as appropriate  - Identify discharge learning needs (meds, wound care, etc.)  - Arrange for interpretive services to assist at discharge as needed  - Refer to Case Management Department for coordinating discharge planning if the patient needs post-hospital services based on physician/advanced practitioner order or complex needs related to functional status, cognitive ability, or social support system  Outcome: Progressing     Problem: Knowledge Deficit  Goal: Patient/family/caregiver demonstrates understanding of disease process, treatment plan, medications, and discharge instructions  Description: Complete learning assessment and assess knowledge base.   Interventions:  - Provide teaching at level of understanding  - Provide teaching via preferred learning methods  Outcome: Progressing

## 2023-10-03 VITALS
HEART RATE: 88 BPM | OXYGEN SATURATION: 93 % | TEMPERATURE: 98.5 F | RESPIRATION RATE: 18 BRPM | WEIGHT: 180.7 LBS | BODY MASS INDEX: 30.07 KG/M2 | DIASTOLIC BLOOD PRESSURE: 82 MMHG | SYSTOLIC BLOOD PRESSURE: 139 MMHG

## 2023-10-03 LAB
ANION GAP SERPL CALCULATED.3IONS-SCNC: 8 MMOL/L
BASOPHILS # BLD MANUAL: 0 THOUSAND/UL (ref 0–0.1)
BASOPHILS NFR MAR MANUAL: 0 % (ref 0–1)
BUN SERPL-MCNC: 14 MG/DL (ref 5–25)
CALCIUM SERPL-MCNC: 9 MG/DL (ref 8.4–10.2)
CHLORIDE SERPL-SCNC: 106 MMOL/L (ref 96–108)
CO2 SERPL-SCNC: 25 MMOL/L (ref 21–32)
CREAT SERPL-MCNC: 0.61 MG/DL (ref 0.6–1.3)
EOSINOPHIL # BLD MANUAL: 0.12 THOUSAND/UL (ref 0–0.4)
EOSINOPHIL NFR BLD MANUAL: 1 % (ref 0–6)
ERYTHROCYTE [DISTWIDTH] IN BLOOD BY AUTOMATED COUNT: 13 % (ref 11.6–15.1)
GFR SERPL CREATININE-BSD FRML MDRD: 99 ML/MIN/1.73SQ M
GLUCOSE SERPL-MCNC: 160 MG/DL (ref 65–140)
HCT VFR BLD AUTO: 35.6 % (ref 34.8–46.1)
HGB BLD-MCNC: 11.4 G/DL (ref 11.5–15.4)
LG PLATELETS BLD QL SMEAR: PRESENT
LYMPHOCYTES # BLD AUTO: 0.99 THOUSAND/UL (ref 0.6–4.47)
LYMPHOCYTES # BLD AUTO: 8 % (ref 14–44)
MCH RBC QN AUTO: 29.7 PG (ref 26.8–34.3)
MCHC RBC AUTO-ENTMCNC: 32 G/DL (ref 31.4–37.4)
MCV RBC AUTO: 93 FL (ref 82–98)
METAMYELOCYTES NFR BLD MANUAL: 1 % (ref 0–1)
MONOCYTES # BLD AUTO: 0.62 THOUSAND/UL (ref 0–1.22)
MONOCYTES NFR BLD: 5 % (ref 4–12)
MYELOCYTES NFR BLD MANUAL: 2 % (ref 0–1)
NEUTROPHILS # BLD MANUAL: 10.32 THOUSAND/UL (ref 1.85–7.62)
NEUTS SEG NFR BLD AUTO: 83 % (ref 43–75)
PLATELET # BLD AUTO: 241 THOUSANDS/UL (ref 149–390)
PLATELET BLD QL SMEAR: ADEQUATE
PMV BLD AUTO: 11.5 FL (ref 8.9–12.7)
POTASSIUM SERPL-SCNC: 4.4 MMOL/L (ref 3.5–5.3)
RBC # BLD AUTO: 3.84 MILLION/UL (ref 3.81–5.12)
RBC MORPH BLD: NORMAL
SODIUM SERPL-SCNC: 139 MMOL/L (ref 135–147)
WBC # BLD AUTO: 12.43 THOUSAND/UL (ref 4.31–10.16)

## 2023-10-03 PROCEDURE — 99232 SBSQ HOSP IP/OBS MODERATE 35: CPT | Performed by: INTERNAL MEDICINE

## 2023-10-03 PROCEDURE — 85027 COMPLETE CBC AUTOMATED: CPT

## 2023-10-03 PROCEDURE — 80048 BASIC METABOLIC PNL TOTAL CA: CPT

## 2023-10-03 PROCEDURE — 94760 N-INVAS EAR/PLS OXIMETRY 1: CPT

## 2023-10-03 PROCEDURE — 85007 BL SMEAR W/DIFF WBC COUNT: CPT

## 2023-10-03 PROCEDURE — 99239 HOSP IP/OBS DSCHRG MGMT >30: CPT | Performed by: INTERNAL MEDICINE

## 2023-10-03 PROCEDURE — 94640 AIRWAY INHALATION TREATMENT: CPT

## 2023-10-03 RX ORDER — HYDROCODONE BITARTRATE AND HOMATROPINE METHYLBROMIDE ORAL SOLUTION 5; 1.5 MG/5ML; MG/5ML
5 LIQUID ORAL EVERY 6 HOURS PRN
Qty: 120 ML | Refills: 0 | Status: SHIPPED | OUTPATIENT
Start: 2023-10-03

## 2023-10-03 RX ORDER — ARIPIPRAZOLE 10 MG/1
10 TABLET ORAL
Refills: 0 | Status: CANCELLED | OUTPATIENT
Start: 2023-10-03

## 2023-10-03 RX ORDER — PREDNISONE 10 MG/1
TABLET ORAL
Qty: 45 TABLET | Refills: 0 | Status: SHIPPED | OUTPATIENT
Start: 2023-10-04 | End: 2023-10-19

## 2023-10-03 RX ORDER — BUSPIRONE HYDROCHLORIDE 30 MG/1
30 TABLET ORAL DAILY
Qty: 30 TABLET | Refills: 0 | Status: CANCELLED | OUTPATIENT
Start: 2023-10-04 | End: 2023-11-03

## 2023-10-03 RX ORDER — IPRATROPIUM BROMIDE AND ALBUTEROL SULFATE 2.5; .5 MG/3ML; MG/3ML
3 SOLUTION RESPIRATORY (INHALATION) 3 TIMES DAILY
Qty: 252 ML | Refills: 0 | Status: SHIPPED | OUTPATIENT
Start: 2023-10-03 | End: 2023-10-31

## 2023-10-03 RX ADMIN — ARIPIPRAZOLE 2 MG: 2 TABLET ORAL at 08:53

## 2023-10-03 RX ADMIN — FLUTICASONE PROPIONATE 1 SPRAY: 50 SPRAY, METERED NASAL at 08:55

## 2023-10-03 RX ADMIN — Medication 2000 UNITS: at 08:52

## 2023-10-03 RX ADMIN — BUDESONIDE 0.5 MG: 0.5 INHALANT ORAL at 07:26

## 2023-10-03 RX ADMIN — IPRATROPIUM BROMIDE 0.5 MG: 0.5 SOLUTION RESPIRATORY (INHALATION) at 13:53

## 2023-10-03 RX ADMIN — ENOXAPARIN SODIUM 40 MG: 40 INJECTION SUBCUTANEOUS at 08:52

## 2023-10-03 RX ADMIN — ATORVASTATIN CALCIUM 20 MG: 20 TABLET, FILM COATED ORAL at 08:53

## 2023-10-03 RX ADMIN — PREDNISONE 60 MG: 20 TABLET ORAL at 08:52

## 2023-10-03 RX ADMIN — HYDROCODONE BITARTRATE AND HOMATROPINE METHYLBROMIDE 5 ML: 1.5; 5 SOLUTION ORAL at 08:52

## 2023-10-03 RX ADMIN — LORAZEPAM 0.5 MG: 0.5 TABLET ORAL at 13:50

## 2023-10-03 RX ADMIN — IPRATROPIUM BROMIDE 0.5 MG: 0.5 SOLUTION RESPIRATORY (INHALATION) at 07:26

## 2023-10-03 RX ADMIN — AZELASTINE HYDROCHLORIDE 1 SPRAY: 137 SPRAY, METERED NASAL at 08:55

## 2023-10-03 RX ADMIN — GUAIFENESIN 600 MG: 600 TABLET ORAL at 08:52

## 2023-10-03 RX ADMIN — LEVALBUTEROL HYDROCHLORIDE 1.25 MG: 1.25 SOLUTION RESPIRATORY (INHALATION) at 13:53

## 2023-10-03 RX ADMIN — FLUOXETINE 10 MG: 10 CAPSULE ORAL at 08:54

## 2023-10-03 RX ADMIN — Medication 250 MG: at 08:52

## 2023-10-03 RX ADMIN — LEVALBUTEROL HYDROCHLORIDE 1.25 MG: 1.25 SOLUTION RESPIRATORY (INHALATION) at 07:26

## 2023-10-03 NOTE — ASSESSMENT & PLAN NOTE
· Currently taking fluticasone inhaler daily and albuterol rescue inhaler. Also takes Singulair at night. Takes Arnuity as an outpatient.      Plan:  - Follow-up with pulmonology Dr. Marco Monreal as an outpatient  - See plan under bronchitis

## 2023-10-03 NOTE — PROGRESS NOTES
Progress Note - Pulmonary   [place] Ali 60 y.o. female MRN: [id number]  Unit/Bed#: S -01 Encounter: [id number]      Assessment & Recommendations:  1. Acute respiratory insufficiency  · IS, OOB-chair, ambulation as able    2. Asthma with acute exacerbation  · Wean prednisone to 50mg daily tomorrow - can then taper by 10mg every 3 days until off  · Can resume Arnuity 100mcg at discharge  · Should be discharged with nebulizer and equipment and can use duonebs TID for next 2-4 weeks   · Will schedule follow up in next 2-4 weeks in the pulmonary office    3. Rhinovirus infection - supportive care for now    4. Abnormal CT chest with ground glass infiltrates - plan for repeat CT imaging at 6-8 week interval    5. Nicotine dependence - needs complete tobacco cessation, encouraged NRT for now, discuss further chantix as outpatient    She is stable for d/c home, discussed with Dr. Saige OFX      Subjective:   Feeling improved, is fatigued but improving, no sputum production, continues with coughing episodes but notes improvements with hycodan. Continued anxiety noted. Objective:     Vitals: Blood pressure 123/68, pulse 78, temperature 97.7 °F (36.5 °C), resp. rate 17, weight 82 kg (180 lb 11.2 oz), SpO2 91 %, not currently breastfeeding. , 94% RA during exam, Body mass index is 30.07 kg/m². No intake or output data in the 24 hours ending 10/03/23 0957      Physical Exam  Gen: Awake, alert, oriented x 3, no acute distress  HEENT: Mucous membranes moist, no oral lesions, no thrush  NECK: No accessory muscle use, JVP not elevated  Cardiac: Regular, single S1, single S2, no murmurs, no rubs, no gallops  Lungs: slight scattered mixed bronchial BS and rhonchi, no wheeze or rales appreciated  Abdomen: normoactive bowel sounds, soft nontender, nondistended, no rebound or rigidity, no guarding  Extremities: no cyanosis, no clubbing, no edema    Labs:  I have personally reviewed pertinent lab results. Laboratory and Diagnostics  Results from last 7 days   Lab Units 10/03/23  0456 10/02/23  0534 10/01/23  0459 09/30/23  0537 09/29/23  1423   WBC Thousand/uL 12.43* 15.13* 16.14* 13.83* 16.75*   HEMOGLOBIN g/dL 11.4* 11.2* 11.2* 11.7 13.3   HEMATOCRIT % 35.6 34.7* 34.5* 35.9 40.2   PLATELETS Thousands/uL 241 236 210 192 193   NEUTROS PCT %  --   --  77*  --  83*   MONOS PCT %  --   --  7  --  5   MONO PCT % 5  --   --   --   --    EOS PCT % 1  --  1  --  1     Results from last 7 days   Lab Units 10/03/23  0456 10/02/23  0534 10/01/23  0459 09/30/23  0537 09/29/23  1423   SODIUM mmol/L 139 138 139 138 137   POTASSIUM mmol/L 4.4 4.2 3.7 3.9 3.8   CHLORIDE mmol/L 106 105 105 106 103   CO2 mmol/L 25 26 27 24 23   ANION GAP mmol/L 8 7 7 8 11   BUN mg/dL 14 10 14 12 11   CREATININE mg/dL 0.61 0.54* 0.63 0.59* 0.68   CALCIUM mg/dL 9.0 9.1 9.0 9.0 9.5   GLUCOSE RANDOM mg/dL 160* 149* 109 144* 117   ALT U/L  --   --  17  --  13   AST U/L  --   --  20  --  14   ALK PHOS U/L  --   --  72  --  86   ALBUMIN g/dL  --   --  3.6  --  4.3   TOTAL BILIRUBIN mg/dL  --   --  0.31  --  0.59          Results from last 7 days   Lab Units 09/29/23  1504   INR  0.97          Results from last 7 days   Lab Units 09/29/23  1504   LACTIC ACID mmol/L 1.4     Results from last 7 days   Lab Units 10/01/23  0459   CRP mg/L 112.8*                 Results from last 7 days   Lab Units 10/01/23  0459 09/30/23  0537 09/29/23  1423   PROCALCITONIN ng/ml <0.05 <0.05 <0.05       Microbiology:  RP2 10/1/2023 - (+) Rhinovirus  Bld Cx NGTD  Strep/legionella ag neg    Imaging and other studies: I have personally reviewed pertinent reports. and I have personally reviewed pertinent films in PACS  CXR 10/1/2023 - mild scattered alveolar infiltrates, no PTX  CT Chest 9/29/2023 - scattered ground glass infiltrates in mid and lower lung fields bilaterally, mildly enlarged level 7 LN 1.4cm, no sig effusions, no PTX      Yared Reza DO, [facility]. [facility]

## 2023-10-03 NOTE — PROGRESS NOTES
[address] [facility]  Progress Note  Name: Zoë Isaac  MRN: [id number]  Unit/Bed#: S -12 I Date of Admission: 9/29/2023   Date of Service: 10/3/2023 I Hospital Day: 4    Assessment/Plan   * Bronchitis  Assessment & Plan  · Presents with cough, congestion, shortness of breath for the past 2 days. · CT chest shows multifocal groundglass opacities. This appearance may represent viral pneumonia, particularly COVID-19, though testing was negative. Appearance is otherwise nonspecific though in keeping with an infectious/inflammatory etiology. · Procalcitonin negative x 3. Per pulmonology, PO Cefdinir discontinued on 10/2 (total of 3 days)  · Per pulmonology, transitioned to PO prednisone 60 mg today    Plan:  - Follow-up with pulmonology Dr. Kiera Lewis in the next 2-4 weeks. - Discharge on prednisone taper - 50 mg for the next 3 days, decrease by 10 mg every 3 days until completely off  - Ipratropium/albuterol (Duoneb) nebulizer treatments TID  - Continue taking Arnuity 100 mcg  - Discharge with Hycodan prescription  - Tobacco cessation recommended  - Obtain repeat CT chest scan in 6 to 8 weeks, per pulmonology    Asthma  Assessment & Plan  · Currently taking fluticasone inhaler daily and albuterol rescue inhaler. Also takes Singulair at night. Takes Arnuity as an outpatient. Plan:  - Follow-up with pulmonology Dr. Kiera Lewis as an outpatient  - See plan under bronchitis    Tobacco use  Assessment & Plan  · Current smoker, 7 cigarettes/day. · Nicotine patch offered. Plan:  - Tobacco cessation recommended    Depression with anxiety  Assessment & Plan  · Continue home dose fluoxetine and buspirone.           Medical Problems     Resolved Problems  Date Reviewed: 10/3/2023   None       Discharging Resident: Chelsea Kumar DO  Discharging Attending: No att. providers found  PCP: Marilin Omalley DO  Admission Date:   Admission Orders (From admission, onward)     Ordered        09/29/23 8908 INPATIENT ADMISSION  Once                      Discharge Date: 10/03/23    Consultations During Hospital Stay:  · Pulmonology    Procedures Performed:   · None    Significant Findings / Test Results:   XR chest portable   Final Result by Mikie Mueller MD (10/02 0155)      Subtle opacity in the left midlung with multifocal bilateral pneumonia on CT. Workstation performed: [id number]         CT chest without contrast   Final Result by Buffy Cline MD (09/29 0482)      Multifocal groundglass opacities, new from the prior study and less severe than on the earlier CT from 4/3/2021. This appearance may represent viral pneumonia, particularly COVID-19, though testing was negative. Appearance is otherwise nonspecific though    in keeping with an infectious/inflammatory etiology. The study was marked in [facility] for immediate notification. Workstation performed: [id number]         XR chest 2 views   Final Result by Gilberto Adams MD (09/29 1626)      No acute cardiopulmonary disease. Workstation performed: [id number]         ·   ·     Incidental Findings:   · CT chest - multifocal groundglass opacities, new from the prior study and less severe than on the earlier CT from 4/3/2021. This appearance may represent viral pneumonia, particularly COVID-19, though testing was negative. Appearance is otherwise nonspecific though,  in keeping with an infectious/inflammatory etiology  · Patient will require follow-up CT chest in 6-8 weeks    · I reviewed the above mentioned incidental findings with the patient and/or family and they expressed understanding. Test Results Pending at Discharge (will require follow up):   · None     Outpatient Tests Requested:  Patient will require follow-up CT chest in 6-8 weeks    Complications: None    Reason for Admission: SOB, productive cough, congestion, fever, recent sick contact     Hospital Course:   Rachael Young is a 60 y.o. female patient who originally presented to the hospital on 9/29/2023 due to SOB, productive cough, congestion, fever, and recent sick contact with granddaughter. Upon presentation to the emergency room, she was afebrile, normotensive, tachycardic at , tachypneic at RR 20, and saturating well on room air. Her labs were significant for leukocytosis at 16.75. She had a negative procalcitonin, negative lactic acid, and negative COVID/flu/RSV. Her urine strep pneumo and urine Legionella testing were both negative. Respiratory panel was positive for rhinovirus. Chest x-ray performed on 9/21 showed no acute cardiopulmonary disease. CT chest performed on 9/29 showed multifocal groundglass opacities, may represent viral pneumonia. Chest x-ray performed on 10/2 showed a subtle opacity in the left midlung field with multifocal bilateral pneumonia seen on CT. In the ED, she met sepsis criteria. She received 1 L of normal saline bolus, a dose of IV 2 g of cefepime and 1 g of IV cefepime in the ED. She reports taking 1 dose of cefdinir prior to arrival to the hospital, which was prescribed by her PCP for possible sinus infection. Her antibiotic selection was de-escalated to oral cefdinir, which she received a total of 3 days of, and was discontinued after 3 negative procalcitonin tests. Pulmonology was consulted due to history of cryptogenic organizing pneumonia. She was given IV Solu-Medrol and transitioned to oral prednisone prior to discharge. She reported improvement in symptoms during her hospital stay. She was discharged on a prednisone taper, Cristina, Hycodan (requested by the patient due to symptomatic relief). Plan for outpatient follow-up with primary care physician in 1 week and with pulmonology Dr Valentina Aceves in the next 2 to 4 weeks. Plan for outpatient CT chest repeat imaging in 6 to 8 weeks per recommendation pulmonology. Patient is in agreement with plan for discharge. Stable for discharge.       Please see above list of diagnoses and related plan for additional information. Condition at Discharge: good    Discharge Day Visit / Exam:   Subjective: Patient was seen and examined at bedside. She states that she still has upper respiratory congestion but is feeling much better compared to yesterday. She reports being ready to go home and will follow-up with pulmonology as an outpatient. She is requesting a prescription for Hycodan upon discharge due to symptomatic relief. She does not have any other complaints at this time. Vitals: Blood Pressure: 139/82 (10/03/23 1410)  Pulse: 88 (10/03/23 1410)  Temperature: 98.5 °F (36.9 °C) (10/03/23 1410)  Temp Source: Oral (10/02/23 0724)  Respirations: 18 (10/03/23 1410)  Weight - Scale: 82 kg (180 lb 11.2 oz) (10/02/23 0538)  SpO2: 93 % (10/03/23 1410)  Exam:   Physical Exam  Vitals and nursing note reviewed. Constitutional:       General: She is not in acute distress. Appearance: She is well-developed. HENT:      Head: Normocephalic and atraumatic. Nose: Congestion present. No rhinorrhea. Eyes:      Conjunctiva/sclera: Conjunctivae normal.   Cardiovascular:      Rate and Rhythm: Normal rate and regular rhythm. Heart sounds: No murmur heard. Pulmonary:      Effort: Pulmonary effort is normal. No respiratory distress. Breath sounds: Normal breath sounds. No wheezing or rales. Abdominal:      Palpations: Abdomen is soft. Tenderness: There is no abdominal tenderness. Musculoskeletal:         General: No swelling. Cervical back: Neck supple. Skin:     General: Skin is warm and dry. Capillary Refill: Capillary refill takes less than 2 seconds. Neurological:      Mental Status: She is alert. Psychiatric:         Mood and Affect: Mood normal.          Discussion with Family: Patient to update family. Discharge instructions/Information to patient and family:   See after visit summary for information provided to patient and family. Provisions for Follow-Up Care:  See after visit summary for information related to follow-up care and any pertinent home health orders. Disposition:   Home    Planned Readmission: No    Discharge Medications:  See after visit summary for reconciled discharge medications provided to patient and/or family.       **Please Note: This note may have been constructed using a voice recognition system**

## 2023-10-03 NOTE — ASSESSMENT & PLAN NOTE
· Currently taking fluticasone inhaler daily and albuterol rescue inhaler. Also takes Singulair at night. Takes Arnuity as an outpatient.      Plan:  - Follow-up with pulmonology Dr. Whit Fields as an outpatient  - See plan under bronchitis

## 2023-10-03 NOTE — DISCHARGE INSTR - AVS FIRST PAGE
Dear Lyubov Ortiz,     It was our pleasure to care for you here at [address] [place] [address]. It is our hope that we were always able to exceed the expected standards for your care during your stay. You were hospitalized due to bronchitis/viral pneumonia. You were cared for on the 4th floor by Darryl Kelley DO under the service of Kishan Evnas MD with the [facility] who covers for your primary care physician (PCP), Tay Caldwell DO, while you were hospitalized. If you have any questions or concerns related to this hospitalization, you may contact us at [phone]. For follow up as well as any medication refills, we recommend that you follow up with your primary care physician. A registered nurse will reach out to you by phone within a few days after your discharge to answer any additional questions that you may have after going home. However, at this time we provide for you here, the most important instructions / recommendations at discharge:     Notable Medication Adjustments -   Please stop taking cefdinir. Please take Prednisone 50 mg tomorrow (10/4), 10/5, and 10/6. Then take Prednisone 40 mg on 10/7, 10/8, and 10/9. Then take Prednisone 30 mg 10/10, 10/11, and 10/12. Then take Prednisone 20 mg 10/13, 10/14, and 10/15. Then take Prednisone 10 mg 10/16, 10/17, and 10/18. Use your Ipratropium/albuterol (Duoneb) nebulizer treatments three times per day. Continue taking Arnuity 100 mcg. You may take Hycodan 5 mL every 6 hours as needed for cough. Testing Required after Discharge -   Please follow-up with pulmonology Dr Lis Formean guarding getting a repeat CT chest scan in 6 to 8 weeks. ** Please contact your PCP to request testing orders for any of the testing recommended here **  Important follow up information -   Please follow-up with your primary care physician Dr. Kateryna Mark in 1 week.   Please follow-up with pulmonology Dr. Lis Foreman in the next 2-4 weeks Other Instructions -   Please return to the emergency department if you have difficulty breathing, chest pain, high fevers, or any other symptoms concerning to you. Please review this entire after visit summary as additional general instructions including medication list, appointments, activity, diet, any pertinent wound care, and other additional recommendations from your care team that may be provided for you.       Sincerely,     Zari Carmichael, DO

## 2023-10-03 NOTE — INCIDENTAL FINDINGS
The following findings require follow up:  Radiographic finding    CT chest without contrast: Multifocal groundglass opacities, new from the prior study and less severe than on the earlier CT from 4/3/2021. This appearance may represent viral pneumonia, particularly COVID-19, though testing was negative. Appearance is otherwise nonspecific though, in keeping with an infectious/inflammatory etiology.      Follow up required: Repeat CT chest   Follow up should be done within 6-8 week(s)    Please notify the following clinician to assist with the follow up:   Dr. Marquita Coe or Dr Steele Beams

## 2023-10-03 NOTE — ASSESSMENT & PLAN NOTE
· Presents with cough, congestion, shortness of breath for the past 2 days. · CT chest shows multifocal groundglass opacities. This appearance may represent viral pneumonia, particularly COVID-19, though testing was negative. Appearance is otherwise nonspecific though in keeping with an infectious/inflammatory etiology. · Procalcitonin negative x 3. Per pulmonology, PO Cefdinir discontinued on 10/2 (total of 3 days)  · Per pulmonology, transitioned to PO prednisone 60 mg today    Plan:  - Follow-up with pulmonology Dr. Deanne Dimas in the next 2-4 weeks.   - Discharge on prednisone taper - 50 mg for the next 3 days, decrease by 10 mg every 3 days until completely off  - Ipratropium/albuterol (Duoneb) nebulizer treatments TID  - Continue taking Arnuity 100 mcg  - Discharge with Hycodan prescription  - Tobacco cessation recommended  - Obtain repeat CT chest scan in 6 to 8 weeks, per pulmonology

## 2023-10-03 NOTE — ASSESSMENT & PLAN NOTE
· Presents with cough, congestion, shortness of breath for the past 2 days. · CT chest shows multifocal groundglass opacities. This appearance may represent viral pneumonia, particularly COVID-19, though testing was negative. Appearance is otherwise nonspecific though in keeping with an infectious/inflammatory etiology. · Procalcitonin negative x 3. Per pulmonology, PO Cefdinir discontinued on 10/2 (total of 3 days)  · Per pulmonology, transitioned to PO prednisone 60 mg today    Plan:  - Follow-up with pulmonology Dr. Guanakito Rodriguez in the next 2-4 weeks.   - Discharge on prednisone taper - 50 mg for the next 3 days, decrease by 10 mg every 3 days until completely off  - Ipratropium/albuterol (Duoneb) nebulizer treatments TID  - Continue taking Arnuity 100 mcg  - Discharge with Hycodan prescription  - Tobacco cessation recommended  - Obtain repeat CT chest scan in 6 to 8 weeks, per pulmonology

## 2023-10-03 NOTE — DISCHARGE SUMMARY
[address] [facility]  Discharge- Akhil Velasquez 1965, 60 y.o. female MRN: [id number]  Unit/Bed#: S -01 Encounter: [id number]  Primary Care Provider: Vernell Noonan DO   Date and time admitted to hospital: 9/29/2023  1:07 PM    * Bronchitis  Assessment & Plan  · Presents with cough, congestion, shortness of breath for the past 2 days. · CT chest shows multifocal groundglass opacities. This appearance may represent viral pneumonia, particularly COVID-19, though testing was negative. Appearance is otherwise nonspecific though in keeping with an infectious/inflammatory etiology. · Procalcitonin negative x 3. Per pulmonology, PO Cefdinir discontinued on 10/2 (total of 3 days)  · Per pulmonology, transitioned to PO prednisone 60 mg today    Plan:  - Follow-up with pulmonology Dr. Karri Dumont in the next 2-4 weeks. - Discharge on prednisone taper - 50 mg for the next 3 days, decrease by 10 mg every 3 days until completely off  - Ipratropium/albuterol (Duoneb) nebulizer treatments TID  - Continue taking Arnuity 100 mcg  - Discharge with Hycodan prescription  - Tobacco cessation recommended  - Obtain repeat CT chest scan in 6 to 8 weeks, per pulmonology    Asthma  Assessment & Plan  · Currently taking fluticasone inhaler daily and albuterol rescue inhaler. Also takes Singulair at night. Takes Arnuity as an outpatient. Plan:  - Follow-up with pulmonology Dr. Karri Dumont as an outpatient  - See plan under bronchitis    Tobacco use  Assessment & Plan  · Current smoker, 7 cigarettes/day. · Nicotine patch offered. Plan:  - Tobacco cessation recommended    Depression with anxiety  Assessment & Plan  · Continue home dose fluoxetine and buspirone.        Medical Problems     Resolved Problems  Date Reviewed: 10/3/2023   None       Discharging Resident: Herbert Phillip DO  Discharging Attending: No att. providers found  PCP: Vernell Noonan DO  Admission Date:   Admission Orders (From admission, onward) Ordered        09/29/23 1627  INPATIENT ADMISSION  Once                      Discharge Date: 10/03/23    Consultations During Hospital Stay:  · Pulmonology    Procedures Performed:   · None    Significant Findings / Test Results:   XR chest portable   Final Result by Augustine Murguia MD (10/02 1732)      Subtle opacity in the left midlung with multifocal bilateral pneumonia on CT. Workstation performed: [id number]         CT chest without contrast   Final Result by Blanquita Rubin MD (09/29 1722)      Multifocal groundglass opacities, new from the prior study and less severe than on the earlier CT from 4/3/2021. This appearance may represent viral pneumonia, particularly COVID-19, though testing was negative. Appearance is otherwise nonspecific though    in keeping with an infectious/inflammatory etiology. The study was marked in [facility] for immediate notification. Workstation performed: [id number]         XR chest 2 views   Final Result by Gladys Shukla MD (09/29 1626)      No acute cardiopulmonary disease. Workstation performed: [id number]         ·   ·     Incidental Findings:   · CT chest - multifocal groundglass opacities, new from the prior study and less severe than on the earlier CT from 4/3/2021. This appearance may represent viral pneumonia, particularly COVID-19, though testing was negative. Appearance is otherwise nonspecific though,  in keeping with an infectious/inflammatory etiology  · Patient will require follow-up CT chest in 6-8 weeks  ·   · I reviewed the above mentioned incidental findings with the patient and/or family and they expressed understanding. Test Results Pending at Discharge (will require follow up):   · None     Outpatient Tests Requested:  · Patient will require follow-up CT chest in 6-8 weeks    Complications: None    Reason for Admission: SOB, productive cough, congestion, fever, recent sick contact     Hospital Course:   Shera Mcardle Aparna España is a 60 y.o. female patient who originally presented to the hospital on 9/29/2023 due to SOB, productive cough, congestion, fever, and recent sick contact with granddaughter. Upon presentation to the emergency room, she was afebrile, normotensive, tachycardic at , tachypneic at RR 20, and saturating well on room air. Her labs were significant for leukocytosis at 16.75. She had a negative procalcitonin, negative lactic acid, and negative COVID/flu/RSV. Her urine strep pneumo and urine Legionella testing were both negative. Respiratory panel was positive for rhinovirus. Chest x-ray performed on 9/21 showed no acute cardiopulmonary disease. CT chest performed on 9/29 showed multifocal groundglass opacities, may represent viral pneumonia. Chest x-ray performed on 10/2 showed a subtle opacity in the left midlung field with multifocal bilateral pneumonia seen on CT. In the ED, she met sepsis criteria. She received 1 L of normal saline bolus, a dose of IV 2 g of cefepime and 1 g of IV cefepime in the ED. She reports taking 1 dose of cefdinir prior to arrival to the hospital, which was prescribed by her PCP for possible sinus infection. Her antibiotic selection was de-escalated to oral cefdinir, which she received a total of 3 days of, and was discontinued after 3 negative procalcitonin tests. Pulmonology was consulted due to history of cryptogenic organizing pneumonia. She was given IV Solu-Medrol and transitioned to oral prednisone prior to discharge. She reported improvement in symptoms during her hospital stay. She was discharged on a prednisone taper, Maria Elena Evansdan (requested by the patient due to symptomatic relief). Plan for outpatient follow-up with primary care physician in 1 week and with pulmonology Dr Andrea Purvis in the next 2 to 4 weeks. Plan for outpatient CT chest repeat imaging in 6 to 8 weeks per recommendation pulmonology. Patient is in agreement with plan for discharge. Stable for discharge. Please see above list of diagnoses and related plan for additional information. Condition at Discharge: good    Discharge Day Visit / Exam:   Subjective:  Patient was seen and examined at bedside. She states that she still has upper respiratory congestion but is feeling much better compared to yesterday. She reports being ready to go home and will follow-up with pulmonology as an outpatient. She is requesting a prescription for Hycodan upon discharge due to symptomatic relief. She does not have any other complaints at this time. Vitals: Blood Pressure: 139/82 (10/03/23 1410)  Pulse: 88 (10/03/23 1410)  Temperature: 98.5 °F (36.9 °C) (10/03/23 1410)  Temp Source: Oral (10/02/23 0724)  Respirations: 18 (10/03/23 1410)  Weight - Scale: 82 kg (180 lb 11.2 oz) (10/02/23 0538)  SpO2: 93 % (10/03/23 1410)  Exam:   Physical Exam  Vitals and nursing note reviewed. Constitutional:       General: She is not in acute distress. Appearance: She is well-developed. HENT:      Head: Normocephalic and atraumatic. Nose: Congestion present. No rhinorrhea. Eyes:      Conjunctiva/sclera: Conjunctivae normal.   Cardiovascular:      Rate and Rhythm: Normal rate and regular rhythm. Heart sounds: No murmur heard. Pulmonary:      Effort: Pulmonary effort is normal. No respiratory distress. Breath sounds: Normal breath sounds. Abdominal:      Palpations: Abdomen is soft. Tenderness: There is no abdominal tenderness. Musculoskeletal:         General: No swelling. Cervical back: Neck supple. Skin:     General: Skin is warm and dry. Capillary Refill: Capillary refill takes less than 2 seconds. Neurological:      Mental Status: She is alert. Psychiatric:         Mood and Affect: Mood normal.          Discussion with Family: patient to update family.      Discharge instructions/Information to patient and family:   See after visit summary for information provided to patient and family. Provisions for Follow-Up Care:  See after visit summary for information related to follow-up care and any pertinent home health orders. Disposition:   Home    Planned Readmission: No    Discharge Medications:  See after visit summary for reconciled discharge medications provided to patient and/or family.       **Please Note: This note may have been constructed using a voice recognition system**

## 2023-10-03 NOTE — CASE MANAGEMENT
Case Management Discharge Planning Note    Patient name Lyubov Marking  Location S /S [address] [facility] Donya Burr 422-01 MRN [id number]  : 1965 Date 10/3/2023       Current Admission Date: 2023  Current Admission Diagnosis:Bronchitis   Patient Active Problem List    Diagnosis Date Noted   • Bronchitis 2023   • Major depressive disorder, single episode, moderate ([address]) 2023   • Dysphonia 2023   • Hypertrophy of both inferior nasal turbinates 2022   • Chronic rhinitis 2022   • Abnormal CT of the chest 2021   • Acid-fast bacteria present 2021   • Abnormal TSH 2021   • Hyperglycemia 04/10/2021   • Chronic sinusitis 2021   • Hyponatremia 2021   • Abnormal thyroid function test 2021   • Allergy to multiple antibiotics 2021   • Allergic rhinitis due to animal (cat) (dog) hair and dander 2019   • Intrinsic atopic dermatitis 2019   • Allergy to cephalosporin 2019   • Allergy to tetracycline 2019   • Allergy to 4-quinolone agent 2019   • Allergy to mold 2019   • Ataxia 2019   • S/P bilateral breast reduction 2019   • Transient right leg weakness 2019   • Nasal septal deviation 2019   • Gastroesophageal reflux disease 2019   • Laryngeal edema 2019   • Gluten intolerance 2019   • Chronic seasonal allergic rhinitis due to pollen 2019   • Allergic rhinitis due to house dust mite 2019   • Allergic conjunctivitis of both eyes 2019   • Eosinophilia 2019   • Tobacco use 2019   • Facial cellulitis 2019   • Obstructive sleep apnea syndrome 2019   • Lumbar radiculopathy 2017   • HTN (hypertension) 2017   • Laryngopharyngeal reflux 2016   • Hyperlipidemia 10/31/2014   • Fibromyalgia 2013   • Asthma 2013   • Depression with anxiety 2013   • Atrophic vaginitis 12/10/2012      LOS (days): 4  Geometric Mean LOS (GMLOS) (days): 2.90  Days to GMLOS:-1.1     OBJECTIVE:  Risk of Unplanned Readmission Score: 17.77         Current admission status: Inpatient   Preferred Pharmacy:   [address] [place] [address] [address] [address]  [address] [facility] [address] [facility] [address]  Phone: [phone] Fax: [address], [address] [place] [address]  Phone: [phone] Fax: [phone]    Primary Care Provider: Naila Pardo DO    Primary Insurance: [facility] REP  Secondary Insurance:     DISCHARGE DETAILS:                                     DME Referral Provided  Referral made for DME?: Yes  DME referral completed for the following items[de-identified] Nebulizer  DME Supplier Name[de-identified] [employer]    Other Referral/Resources/Interventions Provided:  Interventions: DME  Referral Comments: [facility] notified CM of patient need for nebulizer. CM f/u with patient re: same - pt choice for [facility] as available from consignment - nebulizer ordered through [facility]. CM delivered nebulizer to patient room from [facility].

## 2023-10-04 ENCOUNTER — TELEPHONE (OUTPATIENT)
Dept: PULMONOLOGY | Facility: CLINIC | Age: 58
End: 2023-10-04

## 2023-10-04 DIAGNOSIS — J45.40 MODERATE PERSISTENT ASTHMA WITHOUT COMPLICATION: Chronic | ICD-10-CM

## 2023-10-04 LAB
BACTERIA BLD CULT: NORMAL
BACTERIA BLD CULT: NORMAL
DME PARACHUTE DELIVERY DATE ACTUAL: NORMAL
DME PARACHUTE DELIVERY DATE REQUESTED: NORMAL
DME PARACHUTE ITEM DESCRIPTION: NORMAL
DME PARACHUTE ORDER STATUS: NORMAL
DME PARACHUTE SUPPLIER NAME: NORMAL
DME PARACHUTE SUPPLIER PHONE: NORMAL

## 2023-10-04 NOTE — UTILIZATION REVIEW
NOTIFICATION OF ADMISSION DISCHARGE   This is a Notification of Discharge from [address] [facility]. Please be advised that this patient has been discharge from our facility. Below you will find the admission and discharge date and time including the patient’s disposition. UTILIZATION REVIEW CONTACT:  Wanda Mix  Utilization   Network Utilization Review Department  Phone: [phone] x carefully listen to the prompts. All voicemails are confidential.  Email: [email or web address]     ADMISSION INFORMATION  PRESENTATION DATE: 9/29/2023  1:07 PM  OBERVATION ADMISSION DATE:   INPATIENT ADMISSION DATE: 9/29/23  4:27 PM   DISCHARGE DATE: 10/3/2023  5:30 PM   DISPOSITION:Home/Self Care    Network Utilization Review Department  ATTENTION: Please call with any questions or concerns to [phone] and carefully listen to the prompts so that you are directed to the right person. All voicemails are confidential.   For Discharge needs, contact Care Management DC Support Team at [phone] opt. 2  Send all requests for admission clinical reviews, approved or denied determinations and any other requests to dedicated fax number below belonging to the campus where the patient is receiving treatment.  List of dedicated fax numbers for the Facilities:  [place] DENIALS (Administrative/Medical Necessity) [phone]   DISCHARGE SUPPORT TEAM (Network) [phone] [facility] (M[facility]/Pediatric[facility]) [phone]   [address] [facility] [address] [place] [address] [phone]   [facility] [[email or web address]] [address] [facility] [phone]   [address] [place] [address]  [facility] [phone]

## 2023-10-05 ENCOUNTER — TRANSITIONAL CARE MANAGEMENT (OUTPATIENT)
Dept: INTERNAL MEDICINE CLINIC | Facility: CLINIC | Age: 58
End: 2023-10-05

## 2023-10-05 RX ORDER — FLUTICASONE FUROATE 100 UG/1
1 POWDER RESPIRATORY (INHALATION) DAILY
Qty: 90 BLISTER | Refills: 1 | Status: SHIPPED | OUTPATIENT
Start: 2023-10-05 | End: 2024-01-03

## 2023-10-15 NOTE — QUICK NOTE
Eosinophilic asthma with exacerbation a/e/b SOB, respiratory insufficiency, cough requiring, Pulmonology consult, Steroids, Nebs, CT chest.        Findings: Pulmonology consult 10/1: Mild persistent eosinophilic asthma possible mild acute exacerbation        -Eos- 690-- 560-- 540-- 110-- 660-- 140-- 90        -Inpatient: Steroids as above, budesonide twice daily, Xopenex/Atrovent 3 times daily

## 2023-10-15 NOTE — ASSESSMENT & PLAN NOTE
Currently taking fluticasone inhaler daily and albuterol rescue inhaler. Also takes Singulair at night. Takes Arnuity as an outpatient.      Plan:  - Follow-up with pulmonology Dr. Kashmir Olsen as an outpatient  - See plan under bronchitis

## 2023-10-16 RX ORDER — FLUOXETINE HYDROCHLORIDE 40 MG/1
CAPSULE ORAL
COMMUNITY
Start: 2023-09-14

## 2023-10-17 ENCOUNTER — OFFICE VISIT (OUTPATIENT)
Dept: PULMONOLOGY | Facility: CLINIC | Age: 58
End: 2023-10-17
Payer: COMMERCIAL

## 2023-10-17 VITALS
HEART RATE: 93 BPM | DIASTOLIC BLOOD PRESSURE: 66 MMHG | TEMPERATURE: 97.1 F | SYSTOLIC BLOOD PRESSURE: 140 MMHG | OXYGEN SATURATION: 95 %

## 2023-10-17 DIAGNOSIS — F17.211 CIGARETTE NICOTINE DEPENDENCE IN REMISSION: ICD-10-CM

## 2023-10-17 DIAGNOSIS — J45.41 MODERATE PERSISTENT ASTHMA WITH ACUTE EXACERBATION: Chronic | ICD-10-CM

## 2023-10-17 DIAGNOSIS — J30.81 ALLERGIC RHINITIS DUE TO ANIMAL (CAT) (DOG) HAIR AND DANDER: ICD-10-CM

## 2023-10-17 DIAGNOSIS — R93.89 ABNORMAL CT OF THE CHEST: Primary | ICD-10-CM

## 2023-10-17 DIAGNOSIS — B34.8 RHINOVIRUS INFECTION: ICD-10-CM

## 2023-10-17 PROCEDURE — 99215 OFFICE O/P EST HI 40 MIN: CPT | Performed by: INTERNAL MEDICINE

## 2023-10-17 RX ORDER — FLUOXETINE 10 MG/1
10 CAPSULE ORAL EVERY MORNING
COMMUNITY
Start: 2023-10-12

## 2023-10-17 NOTE — PATIENT INSTRUCTIONS
Continue arnuity  Change nebulizer to as needed every 6 hours   Continue singulair and sinus regimen from Dr. González Staples completely tobacco free  Repeat CT in November  Follow up in 3 months  Get flu and RSV vaccines in 2 weeks

## 2023-10-23 NOTE — TELEPHONE ENCOUNTER
I attempted to contact patient again via phone call to discuss blood results  Unfortunately, patient did not answer the phone  I left a voicemail with instructions to call back       Diana Mayes MD  Pulmonary and Critical Care Fellow, PGY-4  [facility] no fracture, (+) [place]

## 2023-10-30 ENCOUNTER — RA CDI HCC (OUTPATIENT)
Dept: OTHER | Facility: HOSPITAL | Age: 58
End: 2023-10-30

## 2023-10-30 NOTE — PROGRESS NOTES
[address] coding opportunities       Chart reviewed, no opportunity found: [address] Review     Patients Insurance     Medicare Insurance: [employer]

## 2023-11-06 DIAGNOSIS — J45.909 UNCOMPLICATED ASTHMA, UNSPECIFIED ASTHMA SEVERITY, UNSPECIFIED WHETHER PERSISTENT: Chronic | ICD-10-CM

## 2023-11-06 DIAGNOSIS — J30.89 ALLERGIC RHINITIS DUE TO HOUSE DUST MITE: ICD-10-CM

## 2023-11-06 RX ORDER — MONTELUKAST SODIUM 10 MG/1
10 TABLET ORAL
Qty: 90 TABLET | Refills: 0 | Status: SHIPPED | OUTPATIENT
Start: 2023-11-06

## 2023-11-07 ENCOUNTER — RA CDI HCC (OUTPATIENT)
Dept: OTHER | Facility: HOSPITAL | Age: 58
End: 2023-11-07

## 2023-11-08 ENCOUNTER — APPOINTMENT (OUTPATIENT)
Dept: LAB | Facility: CLINIC | Age: 58
End: 2023-11-08
Payer: COMMERCIAL

## 2023-11-08 DIAGNOSIS — E03.9 HYPOTHYROIDISM, UNSPECIFIED TYPE: ICD-10-CM

## 2023-11-08 DIAGNOSIS — J84.116 CRYPTOGENIC ORGANIZING PNEUMONIA (HCC): ICD-10-CM

## 2023-11-08 DIAGNOSIS — E78.2 MIXED HYPERLIPIDEMIA: ICD-10-CM

## 2023-11-08 DIAGNOSIS — E04.1 THYROID NODULE: ICD-10-CM

## 2023-11-08 LAB
ALBUMIN SERPL BCP-MCNC: 4.4 G/DL (ref 3.5–5)
ALP SERPL-CCNC: 90 U/L (ref 34–104)
ALT SERPL W P-5'-P-CCNC: 17 U/L (ref 7–52)
ANION GAP SERPL CALCULATED.3IONS-SCNC: 6 MMOL/L
AST SERPL W P-5'-P-CCNC: 17 U/L (ref 13–39)
BASOPHILS # BLD AUTO: 0.01 THOUSANDS/ÂΜL (ref 0–0.1)
BASOPHILS NFR BLD AUTO: 0 % (ref 0–1)
BILIRUB SERPL-MCNC: 0.31 MG/DL (ref 0.2–1)
BUN SERPL-MCNC: 13 MG/DL (ref 5–25)
CALCIUM SERPL-MCNC: 9.6 MG/DL (ref 8.4–10.2)
CHLORIDE SERPL-SCNC: 103 MMOL/L (ref 96–108)
CHOLEST SERPL-MCNC: 164 MG/DL
CO2 SERPL-SCNC: 31 MMOL/L (ref 21–32)
CREAT SERPL-MCNC: 0.76 MG/DL (ref 0.6–1.3)
EOSINOPHIL # BLD AUTO: 0.16 THOUSAND/ÂΜL (ref 0–0.61)
EOSINOPHIL NFR BLD AUTO: 3 % (ref 0–6)
ERYTHROCYTE [DISTWIDTH] IN BLOOD BY AUTOMATED COUNT: 12.5 % (ref 11.6–15.1)
GFR SERPL CREATININE-BSD FRML MDRD: 86 ML/MIN/1.73SQ M
GLUCOSE P FAST SERPL-MCNC: 75 MG/DL (ref 65–99)
HCT VFR BLD AUTO: 43.8 % (ref 34.8–46.1)
HDLC SERPL-MCNC: 39 MG/DL
HGB BLD-MCNC: 14.5 G/DL (ref 11.5–15.4)
IMM GRANULOCYTES # BLD AUTO: 0.05 THOUSAND/UL (ref 0–0.2)
IMM GRANULOCYTES NFR BLD AUTO: 1 % (ref 0–2)
LDLC SERPL CALC-MCNC: 85 MG/DL (ref 0–100)
LYMPHOCYTES # BLD AUTO: 1.8 THOUSANDS/ÂΜL (ref 0.6–4.47)
LYMPHOCYTES NFR BLD AUTO: 29 % (ref 14–44)
MCH RBC QN AUTO: 30.1 PG (ref 26.8–34.3)
MCHC RBC AUTO-ENTMCNC: 33.1 G/DL (ref 31.4–37.4)
MCV RBC AUTO: 91 FL (ref 82–98)
MONOCYTES # BLD AUTO: 0.46 THOUSAND/ÂΜL (ref 0.17–1.22)
MONOCYTES NFR BLD AUTO: 8 % (ref 4–12)
NEUTROPHILS # BLD AUTO: 3.65 THOUSANDS/ÂΜL (ref 1.85–7.62)
NEUTS SEG NFR BLD AUTO: 59 % (ref 43–75)
NRBC BLD AUTO-RTO: 0 /100 WBCS
PLATELET # BLD AUTO: 182 THOUSANDS/UL (ref 149–390)
PMV BLD AUTO: 12.5 FL (ref 8.9–12.7)
POTASSIUM SERPL-SCNC: 4.5 MMOL/L (ref 3.5–5.3)
PROT SERPL-MCNC: 6.9 G/DL (ref 6.4–8.4)
RBC # BLD AUTO: 4.82 MILLION/UL (ref 3.81–5.12)
SODIUM SERPL-SCNC: 140 MMOL/L (ref 135–147)
TRIGL SERPL-MCNC: 202 MG/DL
TSH SERPL DL<=0.05 MIU/L-ACNC: 1.22 UIU/ML (ref 0.45–4.5)
WBC # BLD AUTO: 6.13 THOUSAND/UL (ref 4.31–10.16)

## 2023-11-08 PROCEDURE — 80061 LIPID PANEL: CPT

## 2023-11-08 PROCEDURE — 80053 COMPREHEN METABOLIC PANEL: CPT

## 2023-11-08 PROCEDURE — 36415 COLL VENOUS BLD VENIPUNCTURE: CPT

## 2023-11-08 PROCEDURE — 85025 COMPLETE CBC W/AUTO DIFF WBC: CPT

## 2023-11-08 PROCEDURE — 84443 ASSAY THYROID STIM HORMONE: CPT

## 2023-11-08 RX ORDER — ATORVASTATIN CALCIUM 20 MG/1
20 TABLET, FILM COATED ORAL DAILY
Qty: 90 TABLET | Refills: 1 | Status: SHIPPED | OUTPATIENT
Start: 2023-11-08

## 2023-11-08 NOTE — PROGRESS NOTES
[address] [facility] coding opportunities       Chart reviewed, no opportunity found: CHART REVIEWED, [address]     Patients Insurance     Medicare Insurance: [employer]

## 2023-11-09 ENCOUNTER — OFFICE VISIT (OUTPATIENT)
Dept: INTERNAL MEDICINE CLINIC | Facility: CLINIC | Age: 58
End: 2023-11-09
Payer: COMMERCIAL

## 2023-11-09 VITALS
RESPIRATION RATE: 15 BRPM | WEIGHT: 180 LBS | OXYGEN SATURATION: 97 % | BODY MASS INDEX: 29.99 KG/M2 | HEIGHT: 65 IN | HEART RATE: 84 BPM

## 2023-11-09 DIAGNOSIS — E78.2 MIXED HYPERLIPIDEMIA: Primary | ICD-10-CM

## 2023-11-09 DIAGNOSIS — E55.9 VITAMIN D DEFICIENCY: ICD-10-CM

## 2023-11-09 DIAGNOSIS — E03.9 HYPOTHYROIDISM, UNSPECIFIED TYPE: ICD-10-CM

## 2023-11-09 DIAGNOSIS — Z23 ENCOUNTER FOR IMMUNIZATION: ICD-10-CM

## 2023-11-09 DIAGNOSIS — J98.8 CHRONIC RESPIRATORY INFECTION: ICD-10-CM

## 2023-11-09 DIAGNOSIS — E04.1 THYROID NODULE: ICD-10-CM

## 2023-11-09 DIAGNOSIS — Z12.11 SCREENING FOR COLON CANCER: ICD-10-CM

## 2023-11-09 DIAGNOSIS — Z12.83 SKIN EXAM, SCREENING FOR CANCER: ICD-10-CM

## 2023-11-09 DIAGNOSIS — J84.116 CRYPTOGENIC ORGANIZING PNEUMONIA (HCC): ICD-10-CM

## 2023-11-09 DIAGNOSIS — Z13.820 SCREENING FOR OSTEOPOROSIS: ICD-10-CM

## 2023-11-09 PROCEDURE — 99215 OFFICE O/P EST HI 40 MIN: CPT | Performed by: INTERNAL MEDICINE

## 2023-11-09 PROCEDURE — G0008 ADMIN INFLUENZA VIRUS VAC: HCPCS | Performed by: INTERNAL MEDICINE

## 2023-11-09 PROCEDURE — 90686 IIV4 VACC NO PRSV 0.5 ML IM: CPT | Performed by: INTERNAL MEDICINE

## 2023-11-09 NOTE — PROGRESS NOTES
Assessment/Plan:    #Bronchitis  -hospitalized and treated with steroids and duonebs 9/29/23  -now breathing better  -seeing pulmonary  -CT chest with multifocal groundglass opacity and will repeat CT chest  -has quit smoking  -will encourage to see allergist due to frequent infections  -now on arnuity and singular with relief and has not had to use albuterol      #Leukocytosis  -resolved with smoking cessation  -WBC previously elevated but now down to normal again  -flow cytometry benign 2023    #Thyroid Nodule  -noted on carotid US  -US thyroid with stable nodules that do not require further surveillance 2023     #Facial Cellulitis  -previous infection over right side and had wisdom teeth removal  -had infection over left side  -woke up with vertigo and left cheek swelling that worsened  -went to urgent care and started on antibiotics but still got worse  -went to ER and started on steroids, clindamycin, ceftriaxone and had I&D and tooth extraction with OMS  -now on cefdinir and clindamycin and slightly better  -saw OMS and stable, will see dentist  -completed clindamycin and cefdinir  -cultures grew prevotella and peptoniphilus  -possible immune deficiency but CBC, CMP, ESR, CRP, IgM, IgA, IgG, IgE, CH50, flow cytometry all normal  -encouraged to see allergist    #Syncope  -2 episodes at home  -ECHO 2023 with EF 55% trace MVR  -ZIO with 5 beats SVT, fastest 6 beats average rate 145bpm and max 218bpm  -carotid US with <50% stenosis b/l    #Hypothyroid  -TSH 1.216 and stable on levothyroxine     #HLD  -LDL  85 HDL 39 and stable on atorvastatin 20mg daily  -continue to diet and exercise    #Rhinitis  -chronic  -possibly allergy related  -allergy panel negative and skin testing not positive either but has allergy symptoms  -previously on allergy shots without much relief  -seeing ENT  -underwent clarifax prodcedure but still has symptoms  -currently has left maxillary sinus congestion  -treated with  cefdinir after going to ER 10/31/22  -remains on astelin as needed     #Anxiety  -remains on abilify, buspar, lorazepam, prozac  -has periodic breakthrough episodes  -seeing psychiatry monthly     #Cryptogenic Organizing Pneumonia  -resolved  -seeing pulmonology yearly  -underwent previous bronchoscopy and bronchoalveolar lavage in the past  -treated with steroids  -pulmonary worked up for autoimmune disease but negative  -N[place]-3 ab and MPO <.2     #MALU  -remains on CPAP and compliant with machine     #LPR  -previously treated with PPI and H2 blocker     #Previous Palpitations  -dexamethasone suppression test, 24 hour urine cortisol, 5HIAA, metanephrine and catecholamine all normal  -was on betablocker for symptomatic relief    #Surgery  -previous unilateral oophorectomy and hysterectomy  -secondary to uterine bleeding     #Health Maintenance  -routine labs and followup 6 months  -TDAP, colonoscopy, DEXA pending  -shingrix pending  -mammogram 2023 up to date  -flu vaccine today  -covid booster holding off  -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 No problem-specific Assessment & Plan notes found for this encounter. Diagnoses and all orders for this visit:    Mixed hyperlipidemia  -     CBC and differential; Future  -     Comprehensive metabolic panel; Future  -     Lipid Panel with Direct LDL reflex; Future    Encounter for immunization  -     influenza vaccine, quadrivalent, 0.5 mL, preservative-free, for adult and pediatric patients 6 mos+ (AFLURIA, FLUARIX, FLULAVAL, FLUZONE)  -     CBC and differential; Future  -     Comprehensive metabolic panel;  Future    Cryptogenic organizing pneumonia ([address])  -     CBC and differential; Future  -     Comprehensive metabolic panel; Future    Thyroid nodule  -     CBC and differential; Future  -     Comprehensive metabolic panel; Future    Screening for colon cancer  -     Ambulatory Referral to Colorectal Surgery; Future  -     CBC and differential; Future  -     Comprehensive metabolic panel; Future    Skin exam, screening for cancer  -     Ambulatory referral to Dermatology; Future  -     CBC and differential; Future  -     Comprehensive metabolic panel; Future    Vitamin D deficiency  -     Vitamin D 25 hydroxy; Future    Screening for osteoporosis  -     DXA bone density spine hip and pelvis; Future    Chronic respiratory infection  -     Ambulatory Referral to Allergy;  Future            Current Outpatient Medications:     albuterol (Ventolin HFA) 90 mcg/act inhaler, Inhale 2 puffs every 6 (six) hours as needed for wheezing, Disp: 18 g, Rfl: 3    ARIPiprazole (ABILIFY) 10 mg tablet, Take 10 mg by mouth daily at bedtime, Disp: , Rfl:     ARIPiprazole (ABILIFY) 2 mg tablet, take 1 tablet by mouth IN THE MORNING WATCH FOR SEDATION, Disp: , Rfl:     atorvastatin (LIPITOR) 20 mg tablet, take 1 tablet by mouth once daily, Disp: 90 tablet, Rfl: 1    azelastine (ASTELIN) 0.1 % nasal spray, instill 1 spray into each nostril twice a day, Disp: 30 mL, Rfl: 11    Azelastine-Fluticasone 137-50 MCG/ACT SUSP, 1-2 sprays into each nostril in the morning, Disp: 23 g, Rfl: 11    busPIRone (BUSPAR) 30 MG tablet, Take 30 mg by mouth, Disp: , Rfl:     chlorhexidine (PERIDEX) 0.12 % solution, RINSE WITH 30 MILLILTERS FOR 30 SECONDS THEN SPIT OUT AT BEDTIME, Disp: , Rfl:     Cholecalciferol (Vitamin D) 50 MCG (2000 UT) tablet, Take 2,000 Units by mouth daily, Disp: , Rfl:     diphenhydrAMINE (BENADRYL) 25 mg tablet, Take 25 mg by mouth every 6 (six) hours as needed for itching, Disp: , Rfl:     fluconazole (DIFLUCAN) 150 mg tablet, take 1 tablet by mouth to START then take 1 tablet by mouth every 72 hours until finished (Patient not taking: Reported on 8/9/2023), Disp: , Rfl:     FLUoxetine (PROzac) 10 mg capsule, Take 10 mg by mouth every morning, Disp: , Rfl:     FLUoxetine (PROzac) 20 mg capsule, Take 40 mg by mouth daily (Patient not taking: Reported on 10/17/2023), Disp: , Rfl:     FLUoxetine (PROzac) 40 MG capsule, take 1 capsule by mouth every morning with food, Disp: , Rfl:     fluticasone (Arnuity Ellipta) 100 MCG/ACT AEPB inhaler, Inhale 1 puff daily, Disp: 90 blister, Rfl: 1    HYDROcodone Bit-Homatrop MBr (HYCODAN) 5-1.5 mg/5 mL syrup, Take 5 mL by mouth every 6 (six) hours as needed for cough Max Daily Amount: 20 mL (Patient not taking: Reported on 10/17/2023), Disp: 120 mL, Rfl: 0    LORazepam (ATIVAN) 0.5 mg tablet, Take 0.5 mg by mouth 2 (two) times a day as needed, Disp: , Rfl:     mometasone (NASONEX) 50 mcg/act nasal spray, 2 sprays into each nostril daily States nasal irrigation from a compounding pharmacy, prescribed by Dr Delroes Avelar, Disp: , Rfl:     Mometasone Furoate POWD, Use in the morning 1 mg capsule added to sinus rinse daily, Disp: 0.03 g, Rfl: 11    montelukast (SINGULAIR) 10 mg tablet, take 1 tablet by mouth at bedtime, Disp: 90 tablet, Rfl: 0    Subjective:      Patient ID: Abeba Combs is a 60 y.o. female. HPI    Patient presents for a routine checkup. She was hospitalized for pneumonia and bronchitis and discharged home on prednisone and DuoNebs. She will repeat a CT of his chest at this time. Her CT chest previously showed multifocal groundglass opacities. She has quit smoking. She is slowly breathing much better. She is trying to keep active. Her LDL is 85 and HDL 39 currently well controlled with atorvastatin. Her TSH is 1.216 and stable on levothyroxine. She will continue with this. She will receive the flu vaccine today. We discussed RSV vaccination and she we will hold off on this for now due to the risk of possible Guillain-Barré with this vaccination.   She does have a family history of MS in her sister and would like to hold off on anything that could alter her neurologic system. Her ultrasound of the thyroid came back with stable nodules. She is on arnuity as well as Singulair which has been helping her with breathing. She will continue with this. She does have an atypical nevus over her face and we will refer her to dermatology. She is due for DEXA scan as well as a colonoscopy. She is also due to follow-up with an allergist infections. She will return to care in 6 months. The following portions of the patient's history were reviewed and updated as appropriate: allergies, current medications, past family history, past medical history, past social history, past surgical history and problem list.    Review of Systems   Constitutional:  Negative for activity change, appetite change, chills, fatigue and fever. HENT:  Negative for congestion, ear pain, facial swelling, hearing loss, sore throat, tinnitus and trouble swallowing. Eyes:  Negative for photophobia and visual disturbance. Respiratory:  Negative for cough, shortness of breath and wheezing. Cardiovascular:  Negative for chest pain and leg swelling. Gastrointestinal:  Negative for abdominal distention, abdominal pain, blood in stool, nausea and vomiting. Genitourinary:  Negative for difficulty urinating, dysuria and pelvic pain. Musculoskeletal:  Negative for arthralgias, back pain, gait problem, joint swelling, myalgias, neck pain and neck stiffness. Skin:  Negative for rash and wound. Neurological:  Negative for dizziness, tremors, light-headedness and headaches. Objective:      Pulse 84   Resp 15   Ht 5' 5" (1.651 m)   Wt 81.6 kg (180 lb)   LMP  (LMP Unknown)   SpO2 97%   BMI 29.95 kg/m²          Physical Exam  Vitals reviewed. Constitutional:       Appearance: Normal appearance. She is well-developed. HENT:      Head: Normocephalic and atraumatic.       Right Ear: Tympanic membrane, ear canal and external ear normal. There is no impacted cerumen. Left Ear: Tympanic membrane, ear canal and external ear normal. There is no impacted cerumen. Nose: Nose normal. No congestion. Mouth/Throat:      Pharynx: Oropharynx is clear. No oropharyngeal exudate or posterior oropharyngeal erythema. Eyes:      Conjunctiva/sclera: Conjunctivae normal.      Pupils: Pupils are equal, round, and reactive to light. Neck:      Thyroid: No thyromegaly. Vascular: No JVD. Cardiovascular:      Rate and Rhythm: Normal rate and regular rhythm. Pulses: Normal pulses. Heart sounds: Normal heart sounds. No murmur heard. Pulmonary:      Effort: Pulmonary effort is normal. No respiratory distress. Breath sounds: Normal breath sounds. No stridor. No wheezing, rhonchi or rales. Abdominal:      General: Bowel sounds are normal. There is no distension. Palpations: Abdomen is soft. There is no mass. Tenderness: There is no abdominal tenderness. There is no guarding or rebound. Musculoskeletal:         General: No tenderness. Normal range of motion. Cervical back: Normal range of motion and neck supple. No rigidity or tenderness. Right lower leg: No edema. Left lower leg: No edema. Lymphadenopathy:      Cervical: No cervical adenopathy. Skin:     General: Skin is warm and dry. Findings: No erythema or rash. Neurological:      Mental Status: She is alert and oriented to person, place, and time. Mental status is at baseline. Deep Tendon Reflexes: Reflexes are normal and symmetric. Psychiatric:         Mood and Affect: Mood normal.         Behavior: Behavior normal.         Thought Content: Thought content normal.         Judgment: Judgment normal.           This note was completed in part utilizing [employer] direct voice recognition software.    Grammatical errors, random word insertion, spelling mistakes, and incomplete sentences may be an occasional consequence of the system secondary to software limitations, ambient noise and hardware issues. At the time of dictation, efforts were made to edit, clarify and /or correct errors. Please read the chart carefully and recognize, using context, where substitutions have occurred. If you have any questions or concerns about the context, text or information contained within the body of this dictation, please contact myself, the provider, for further clarification.

## 2023-11-20 ENCOUNTER — HOSPITAL ENCOUNTER (OUTPATIENT)
Dept: RADIOLOGY | Facility: MEDICAL CENTER | Age: 58
Discharge: HOME | End: 2023-11-20
Payer: COMMERCIAL

## 2023-11-20 DIAGNOSIS — R93.89 ABNORMAL CT OF THE CHEST: ICD-10-CM

## 2023-11-20 PROCEDURE — G1004 CDSM NDSC: HCPCS

## 2023-11-20 PROCEDURE — 71250 CT THORAX DX C-: CPT

## 2023-12-28 ENCOUNTER — TELEPHONE (OUTPATIENT)
Age: 58
End: 2023-12-28

## 2023-12-28 NOTE — LETTER
Taylor Navas  [address]  [place] [address]        FLEXIBLE COLONOSCOPY INSTRUCTIONS  PLEASE NOTE...  AS OF JUNE 1, 2014, OUR OFFICE REQUIRES 72 HOURS NOTICE OF CANCELLATION/RESCHEDULE OF A PROCEDURE TO AVOID INCURRING A MISSED APPOINTMENT FEE.    Your Colonoscopy Procedure has been scheduled at:  [facility]   [address] [place] [address]    The date of your procedure is: February 9, 2024       The hospital facility will contact you the evening prior to your scheduled procedure with your arrival time.     Use the bowel preparation as directed.    Check with your family doctor if you are taking a blood thinner (Coumadin, Plavix, Xarelto, Pradaxa, Gingko biloba, Ginseng, Feverfew, Mulat's Wort).  We suggest stopping these for 3 days.  Special instructions may be needed if you are taking aspirin or any aspirin-containing medication.  Check with your family physician.      If you are on DIABETIC MEDICATION (tablets or insulin) your doctor may make changes in your preparation.    Take all medications usual unless otherwise instructed.    As always, if you have any questions or concerns, feel free to call the office.  Our  staff will be happy to connect you with one of the nurses to answer any questions or address any concerns you have regarding your procedure.      Do not wear any jewelry the day of your procedure including wedding rings.    Arrangements must be made for a responsible party to drive you home from the procedure.  If you do not have a responsible family member or friend to drive you home, the procedure will not be done.  Vast or a taxi is not acceptable.    It is important you notify our office of any insurance changes prior to your procedure and, if necessary, supply us with referrals from your primary care physician.              COLONOSCOPY PREPARATION INSTRUCTIONS    Purchase (prescription not required):  · 238 gram  bottle of Miralax® (Glycolax®)  · 4 Dulcolax® (Bisacodyl) Laxative Tablets  · 64 oz. bottle of Gatorade® or your preference of a non-carbonated clear liquid - NOT RED OR PURPLE     One Day Prior to Colonoscopy Procedure  · Nothing to eat the day before your procedure, only clear liquids.  · It is important that you drink plenty of clear liquids throughout the day to prevent dehydration.    Clear Liquids include:  o Water/Iced Tea/Lemonade/Gatorade®/Black Coffee or tea (no milk or creamers  o Soft drinks: orange, ginger ale, cola, Pepsi®, Sprite®, 7Up®  o Handy-Aid® (lemonade or orange flavors only)  o Strained fruit juices without pulp such as apple, white grape, white cranberry  o Jell-O®, lemon, lime or orange (no fruit or toppings)  o Popsicles, Italian Ice (No Ice Cream, sherbets, or fruit bars)  o Chicken or beef bouillon/broth  DO NOT EAT OR DRINK ANYTHING RED OR PURPLE  DO NOT DRINK ANY ALCOHOLIC BEVERAGES  DIABETIC PATIENTS: Consult your physician    At 4:00 pm, take (2) Dulcolax® (Bisacodyl) Laxative Tablets. Swallow the tablets whole with an 8 oz. glass of water. At 8:00 pm, take the additional (2) Dulcolax® (Bisacodyl) Laxative Tablets with 8 oz. of water. The package may direct you not to exceed (2) tablets at any time but for the purpose of this examination, you should take (4) total.    Mix the 238 gm of Miralax® in 64 oz. of Gatorade® and shake the solution until the Miralax® is dissolved. You will drink half (32 oz) of this solution this evening, beginning between 4 and 6 o'clock. Drink 8 oz glassfuls at your own pace. It may take several hours to drink the solution.    Remember to stay close to toilet facilities.    DAY OF COLONOSCOPY PROCEDURE    Five (5) hours before your procedure, drink the other half (32 oz) of the Miralax®/Gatorade® mixture within a two (2) hour period. This may require you to get up very early if you are scheduled for an early procedure.    NOTHING IS TO BE TAKEN BY MOUTH 3  HOURS PRIOR TO PROCEDURE.     If you use an inhaler, please bring it with you to your procedure.

## 2023-12-28 NOTE — PROGRESS NOTES
Colon and Rectal Surgery   Taylor Navas 58 y.o. female MRN: [id number]   Encounter: [id number]  01/02/24   10:28 AM        ASSESSMENT:    Taylor presents today, overdue for colonoscopy 2022, she has been having occasional spotting with bleeding but is chronic by her report, no pain, hemoglobin is normal at last check, we discussed colonoscopy scheduling.    PLAN:  Colonoscopy scheduled      HPI  Taylor Navas is a 58 y.o. female who is here for consultation.     Last colonoscopy on 2/28/2017, with a 5 year recall. Family history of colon cancer in maternal grandfather.     Historical Information   Past Medical History:   Diagnosis Date    Allergic     Allergic rhinitis 1987    Allergies     Anesthesia complication     V TACH after her reduction mammoplasty, done at ,, states had a little vent arrhythmia after sinus sx also    Anxiety     Asthma 2021    Chronic rhinitis 02/18/2020    Depression     Ethmoid sinusitis     GERD (gastroesophageal reflux disease)     no issues since 2021    Maxillary sinusitis     Multiple allergies     Myofascial pain syndrome     Nasal turbinate hypertrophy     Pneumonia     Sleep apnea     not currently using CPAP    Sleep apnea, obstructive     Wears glasses      Past Surgical History:   Procedure Laterality Date    INCISION AND DRAINAGE OF WOUND Left 3/23/2023    Procedure: INCISION AND DRAINAGE (I&D) LEFT ORAL ABSCESS, EXTRACTION OF TOOTH #19;  Surgeon: Will Baker DMD;  Location: [facility];  Service: Maxillofacial    LAPAROSCOPY      with vaginal hysterectomy; 11/3/2003 rso    OOPHORECTOMY Left 2004    PARTIAL HYSTERECTOMY  2004    NC NSL/SINUS NDSC MAX ANTROST W/RMVL TISS MAX SINUS N/A 9/30/2016    Procedure: IMAGE GUIDED FUNCTIONAL ENDOSCOPIC SINUS SURGERY;  Surgeon: Roberto Toledo MD;  Location: [facility];  Service: ENT    NC NSL/SINUS NDSC MAX ANTROST W/RMVL TISS MAX SINUS Bilateral 9/2/2022    Procedure: middle turbinectomy/medialization, possible revision functional  endoscopic sinus surgery, inferior turbinate reduction;  Surgeon: Roberto Toledo MD;  Location: [facility];  Service: ENT    REDUCTION MAMMAPLASTY Bilateral 02/27/2015    SINUS SURGERY      TOOTH EXTRACTION Right 3/16/2019    Procedure: EXTRACTION TOOTH #1 (Impacted Third Molar Tooth);  Surgeon: Leyda Lyons DDS;  Location: [facility];  Service: Maxillofacial    TUBAL LIGATION      WISDOM TOOTH EXTRACTION         Meds/Allergies       Current Outpatient Medications:     albuterol (Ventolin HFA) 90 mcg/act inhaler, Inhale 2 puffs every 6 (six) hours as needed for wheezing, Disp: 18 g, Rfl: 3    ARIPiprazole (ABILIFY) 10 mg tablet, Take 10 mg by mouth daily at bedtime, Disp: , Rfl:     ARIPiprazole (ABILIFY) 2 mg tablet, take 1 tablet by mouth IN THE MORNING WATCH FOR SEDATION, Disp: , Rfl:     atorvastatin (LIPITOR) 20 mg tablet, take 1 tablet by mouth once daily, Disp: 90 tablet, Rfl: 1    azelastine (ASTELIN) 0.1 % nasal spray, instill 1 spray into each nostril twice a day, Disp: 30 mL, Rfl: 11    Azelastine-Fluticasone 137-50 MCG/ACT SUSP, 1-2 sprays into each nostril in the morning, Disp: 23 g, Rfl: 11    busPIRone (BUSPAR) 30 MG tablet, Take 30 mg by mouth, Disp: , Rfl:     chlorhexidine (PERIDEX) 0.12 % solution, RINSE WITH 30 MILLILTERS FOR 30 SECONDS THEN SPIT OUT AT BEDTIME, Disp: , Rfl:     Cholecalciferol (Vitamin D) 50 MCG (2000 UT) tablet, Take 2,000 Units by mouth daily, Disp: , Rfl:     diphenhydrAMINE (BENADRYL) 25 mg tablet, Take 25 mg by mouth every 6 (six) hours as needed for itching, Disp: , Rfl:     fluconazole (DIFLUCAN) 150 mg tablet, take 1 tablet by mouth to START then take 1 tablet by mouth every 72 hours until finished (Patient not taking: Reported on 8/9/2023), Disp: , Rfl:     FLUoxetine (PROzac) 10 mg capsule, Take 10 mg by mouth every morning, Disp: , Rfl:     FLUoxetine (PROzac) 20 mg capsule, Take 40 mg by mouth daily (Patient not taking: Reported on 10/17/2023), Disp: , Rfl:      "FLUoxetine (PROzac) 40 MG capsule, take 1 capsule by mouth every morning with food, Disp: , Rfl:     fluticasone (Arnuity Ellipta) 100 MCG/ACT AEPB inhaler, Inhale 1 puff daily, Disp: 90 blister, Rfl: 1    HYDROcodone Bit-Homatrop MBr (HYCODAN) 5-1.5 mg/5 mL syrup, Take 5 mL by mouth every 6 (six) hours as needed for cough Max Daily Amount: 20 mL (Patient not taking: Reported on 10/17/2023), Disp: 120 mL, Rfl: 0    LORazepam (ATIVAN) 0.5 mg tablet, Take 0.5 mg by mouth 2 (two) times a day as needed, Disp: , Rfl:     mometasone (NASONEX) 50 mcg/act nasal spray, 2 sprays into each nostril daily States nasal irrigation from a compounding pharmacy, prescribed by Dr Toledo, Disp: , Rfl:     Mometasone Furoate POWD, Use in the morning 1 mg capsule added to sinus rinse daily, Disp: 0.03 g, Rfl: 11    montelukast (SINGULAIR) 10 mg tablet, take 1 tablet by mouth at bedtime, Disp: 90 tablet, Rfl: 0      Allergies   Allergen Reactions    Other      Most all antibiotics- hives, hypotensive    \"no problem with clindamycin.\"    Augmentin Es-600  [Amoxicillin-Pot Clavulanate]     Cefuroxime     Erythromycin     Levofloxacin     Morphine     Morphine And Related Hives    Oxycodone-Acetaminophen     Penicillins     Percocet [Oxycodone-Acetaminophen] Hives    Sulfa Antibiotics     Tetracyclines & Related          Social History   Social History     Substance and Sexual Activity   Alcohol Use Not Currently    Comment: Social one or two a month     Social History     Substance and Sexual Activity   Drug Use No     Social History     Tobacco Use   Smoking Status Some Days    Current packs/day: 0.25    Average packs/day: 0.3 packs/day for 29.0 years (7.3 ttl pk-yrs)    Types: Cigarettes    Start date: 1/1/1995   Smokeless Tobacco Never   Tobacco Comments    patient states havent smoked cigarettes since end of march 2021 (states occasional \"slips\" here or there,inst no smoking before sx)    Hasn't smoked since end of September " "        Family History:   Family History   Problem Relation Age of Onset    CLEM disease Mother     Atrial fibrillation Mother     Atrial fibrillation Father     Heart disease Father     Diabetes Maternal Grandmother     Hypertension Maternal Grandmother     Heart failure Maternal Grandmother     Colon cancer Maternal Grandfather     Diabetes Maternal Grandfather     Stroke Maternal Grandfather     Cancer Paternal Grandfather     Endometrial cancer Cousin     Breast cancer Cousin     Multiple sclerosis Sister     Hyperlipidemia Sister     Thyroid disease Sister     No Known Problems Son     No Known Problems Son        Review of Systems    Objective     Current Vitals:   Vitals:    01/02/24 1011   Weight: 85.3 kg (188 lb)   Height: 5' 5\" (1.651 m)         Physical Exam:  General:no distress  Pulm:no increased work of breathing, clear bilateral  CV:sinus  I have spent a total time of 20 minutes on 01/02/24 in caring for this patient including Diagnostic results, Reviewing / ordering tests, medicine, procedures  , and Obtaining or reviewing history  .        "

## 2024-01-02 ENCOUNTER — PREP FOR PROCEDURE (OUTPATIENT)
Age: 59
End: 2024-01-02

## 2024-01-02 ENCOUNTER — OFFICE VISIT (OUTPATIENT)
Age: 59
End: 2024-01-02
Payer: COMMERCIAL

## 2024-01-02 VITALS — WEIGHT: 188 LBS | HEIGHT: 65 IN | BODY MASS INDEX: 31.32 KG/M2

## 2024-01-02 DIAGNOSIS — Z12.11 SCREENING FOR COLON CANCER: ICD-10-CM

## 2024-01-02 DIAGNOSIS — Z80.0 FAMILY HISTORY OF COLON CANCER: Primary | ICD-10-CM

## 2024-01-02 PROCEDURE — 99213 OFFICE O/P EST LOW 20 MIN: CPT | Performed by: COLON & RECTAL SURGERY

## 2024-01-02 NOTE — TELEPHONE ENCOUNTER
[facility] 1/2 colon consult, last fc 2/28/17 fam hx colon ca MGF, BMI 31     Scheduled DE 2/9 [facility]   Handed PW to patient

## 2024-01-10 ENCOUNTER — OFFICE VISIT (OUTPATIENT)
Dept: INTERNAL MEDICINE CLINIC | Facility: CLINIC | Age: 59
End: 2024-01-10
Payer: COMMERCIAL

## 2024-01-10 VITALS
WEIGHT: 182 LBS | RESPIRATION RATE: 16 BRPM | SYSTOLIC BLOOD PRESSURE: 122 MMHG | BODY MASS INDEX: 30.32 KG/M2 | TEMPERATURE: 98.6 F | DIASTOLIC BLOOD PRESSURE: 80 MMHG | OXYGEN SATURATION: 97 % | HEART RATE: 86 BPM | HEIGHT: 65 IN

## 2024-01-10 DIAGNOSIS — F32.5 MAJOR DEPRESSIVE DISORDER WITH SINGLE EPISODE, IN FULL REMISSION (HCC): ICD-10-CM

## 2024-01-10 DIAGNOSIS — G47.33 OBSTRUCTIVE SLEEP APNEA SYNDROME: ICD-10-CM

## 2024-01-10 DIAGNOSIS — F41.9 ANXIETY: ICD-10-CM

## 2024-01-10 DIAGNOSIS — J45.40 MODERATE PERSISTENT ASTHMA WITHOUT COMPLICATION: Chronic | ICD-10-CM

## 2024-01-10 DIAGNOSIS — E78.01 FAMILIAL HYPERCHOLESTEROLEMIA: Primary | Chronic | ICD-10-CM

## 2024-01-10 DIAGNOSIS — Z12.31 ENCOUNTER FOR SCREENING MAMMOGRAM FOR BREAST CANCER: ICD-10-CM

## 2024-01-10 PROBLEM — R93.89 ABNORMAL CT OF THE CHEST: Status: RESOLVED | Noted: 2021-04-27 | Resolved: 2024-01-10

## 2024-01-10 PROBLEM — L03.211 FACIAL CELLULITIS: Status: RESOLVED | Noted: 2019-03-14 | Resolved: 2024-01-10

## 2024-01-10 PROBLEM — R94.6 ABNORMAL THYROID FUNCTION TEST: Status: RESOLVED | Noted: 2021-04-03 | Resolved: 2024-01-10

## 2024-01-10 PROBLEM — I10 HTN (HYPERTENSION): Chronic | Status: RESOLVED | Noted: 2017-03-03 | Resolved: 2024-01-10

## 2024-01-10 PROBLEM — B34.8 RHINOVIRUS INFECTION: Status: RESOLVED | Noted: 2023-10-17 | Resolved: 2024-01-10

## 2024-01-10 PROBLEM — R89.9 ACID-FAST BACTERIA PRESENT: Status: RESOLVED | Noted: 2021-04-27 | Resolved: 2024-01-10

## 2024-01-10 PROBLEM — E87.1 HYPONATREMIA: Status: RESOLVED | Noted: 2021-04-03 | Resolved: 2024-01-10

## 2024-01-10 PROBLEM — J40 BRONCHITIS: Status: RESOLVED | Noted: 2023-09-29 | Resolved: 2024-01-10

## 2024-01-10 PROBLEM — R49.0 DYSPHONIA: Status: RESOLVED | Noted: 2023-01-08 | Resolved: 2024-01-10

## 2024-01-10 PROBLEM — D72.10 EOSINOPHILIA: Status: RESOLVED | Noted: 2019-03-16 | Resolved: 2024-01-10

## 2024-01-10 PROBLEM — R79.89 ABNORMAL TSH: Status: RESOLVED | Noted: 2021-04-20 | Resolved: 2024-01-10

## 2024-01-10 PROBLEM — R73.9 HYPERGLYCEMIA: Status: RESOLVED | Noted: 2021-04-10 | Resolved: 2024-01-10

## 2024-01-10 PROBLEM — R29.898 TRANSIENT RIGHT LEG WEAKNESS: Status: RESOLVED | Noted: 2019-12-09 | Resolved: 2024-01-10

## 2024-01-10 PROCEDURE — 99215 OFFICE O/P EST HI 40 MIN: CPT | Performed by: INTERNAL MEDICINE

## 2024-01-10 NOTE — ASSESSMENT & PLAN NOTE
-with panic and depression  -on prozac 50mg daily, buspar 30mg daily  -will defer management to Psych Dr. Thorpe and therapist

## 2024-01-10 NOTE — ASSESSMENT & PLAN NOTE
-currently in remission  -will defer med management to Dr. Thorpe.  Patient on prozac 50mg daily, abilify 12mg daily

## 2024-01-10 NOTE — ASSESSMENT & PLAN NOTE
-self reduced atorvastatin to 10mg daily with improvement of myalgia  -recheck lipid panel in the future  -adhere to low fat, low cholesterol diet
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
## 2024-01-23 ENCOUNTER — OFFICE VISIT (OUTPATIENT)
Dept: PULMONOLOGY | Facility: CLINIC | Age: 59
End: 2024-01-23
Payer: COMMERCIAL

## 2024-01-23 VITALS
WEIGHT: 182 LBS | TEMPERATURE: 98.2 F | BODY MASS INDEX: 30.29 KG/M2 | SYSTOLIC BLOOD PRESSURE: 122 MMHG | OXYGEN SATURATION: 95 % | DIASTOLIC BLOOD PRESSURE: 80 MMHG | HEART RATE: 82 BPM

## 2024-01-23 DIAGNOSIS — F17.211 CIGARETTE NICOTINE DEPENDENCE IN REMISSION: ICD-10-CM

## 2024-01-23 DIAGNOSIS — J30.89 ALLERGIC RHINITIS DUE TO HOUSE DUST MITE: ICD-10-CM

## 2024-01-23 DIAGNOSIS — J45.40 MODERATE PERSISTENT ASTHMA WITHOUT COMPLICATION: Primary | Chronic | ICD-10-CM

## 2024-01-23 PROCEDURE — 99214 OFFICE O/P EST MOD 30 MIN: CPT | Performed by: INTERNAL MEDICINE

## 2024-01-23 NOTE — H&P (VIEW-ONLY)
Pulmonary Follow Up Note   Taylor Navas 58 y.o. female MRN: [id number]  1/23/2024      Assessment:  Shortness of breath  Resolved, hold on further spirometry given improvements     Asthma with recent exacerbation secondary to rhinovirus  Continue Arnuity 100mcg daily   Continue Singulair 10mg daily   YISSEL prn  Flu vaccine yearly, COVID x 3, Pneumovax 2021 - consider Prevnar 20 at age 64, RSV vaccine encouraged  Follow up 6months or sooner as needed     Recurrent sinusitis and allergic rhinitis   Continue upper airway regimen per Dr. Toledo     Eosinophilia - continue to monitor, negative ANCA panels, neg NE RAST and normal IgE     Abnormal CT chest with ground glass infiltrates - resolved, secondary viral pneumonitis      Nicotine dependence - congratulated on remaining tobacco free, discussed methods to maintain cessation, consider LDCT chest in Nov 2024 for lung cancer screening and will discuss at next visit      History of presumed , resolved and resolved hypoxic respiratory failure      Plan:    Diagnoses and all orders for this visit:    Moderate persistent asthma without complication    Allergic rhinitis due to house dust mite    Cigarette nicotine dependence in remission        No follow-ups on file.    History of Present Illness   HPI:  Taylor Navas is a 58 y.o. female who has chronic sinusitis, anxiety/depression, HTN, nicotine dependence in remission, and prior cryptogenic organizing pneumonia. Patient was admitted at [facility] from 04/03-04/13/2021 with acute respiratory failure with hypoxia. Patient was treated with Cefepime for 7 days. She required ICU level of care due to HFNC and no improvement on antibiotic therapy. Patient underwent bronchoscopy with BAL on 04/07/2021 with negative cultures, gram stain, and cytology. She was started on empiric steroid treatment. Autoimmune panel and HP negative. CT chest wutg multifocal peribronchial consolidation. She was treated for cryptogenic organizing  pneumonia and improved on steroids and was eventually weaned off O2 prior to discharge. Steroids were tapered slowly. BAL eventually reported AFB positive for MAC and Candida which were thought to be contaminants. Repeat CT chest after completing steroid treatment with resolution of previous imaging findings.  She was last seen in the office in August 2023. She had admission in late September 2023 for asthma exacerbation secondary to rhinovirus infection.   She was last seen in Oct 2023.    She reports feeling improved. She did have respiratory infection over holidays.  She has recovered and was using nebulizer once daily.  She is using Arnuity daily. She denied purulent sputum, no fevers or chills, no chest pain, no pleurisy. She reports being tobacco free.     Review of Systems   Constitutional:  Negative for activity change, chills and fever.   HENT:  Negative for mouth sores, nosebleeds, sore throat and trouble swallowing.    Respiratory:  Negative for cough, chest tightness, shortness of breath and wheezing.    Cardiovascular:  Negative for chest pain and leg swelling.   Gastrointestinal:  Negative for abdominal pain, nausea and vomiting.   Endocrine: Negative for cold intolerance and heat intolerance.   Musculoskeletal:  Negative for gait problem.   Allergic/Immunologic: Negative for immunocompromised state.   Neurological:  Negative for light-headedness and headaches.   Hematological:  Negative for adenopathy.   Psychiatric/Behavioral:  Negative for sleep disturbance. The patient is not nervous/anxious.        Historical Information   Past Medical History:   Diagnosis Date    Abnormal CT of the chest     Abnormal thyroid function test     Abnormal TSH     Acid-fast bacteria present     Allergic     Allergic rhinitis 1987    Allergies     Anesthesia complication     V TACH after her reduction mammoplasty, done at ,, states had a little vent arrhythmia after sinus sx also    Anxiety     Asthma 2021     Bronchitis     Chronic rhinitis 02/18/2020    Depression     Depression with anxiety     Dysphonia     Eosinophilia     Ethmoid sinusitis     Facial cellulitis     GERD (gastroesophageal reflux disease)     no issues since 2021    HTN (hypertension)     Hyperglycemia     Hyponatremia     Maxillary sinusitis     Multiple allergies     Myofascial pain syndrome     Nasal turbinate hypertrophy     Pneumonia     Rhinovirus infection     Sleep apnea     not currently using CPAP    Sleep apnea, obstructive     Transient right leg weakness     Wears glasses      Past Surgical History:   Procedure Laterality Date    BRONCHOSCOPY  2021    INCISION AND DRAINAGE OF WOUND Left 03/23/2023    Procedure: INCISION AND DRAINAGE (I&D) LEFT ORAL ABSCESS, EXTRACTION OF TOOTH #19;  Surgeon: Will Baker DMD;  Location: [facility];  Service: Maxillofacial    LAPAROSCOPY      with vaginal hysterectomy; 11/3/2003 rso    OOPHORECTOMY Left 2004    PARTIAL HYSTERECTOMY  2004    NC NSL/SINUS NDSC MAX ANTROST W/RMVL TISS MAX SINUS N/A 09/30/2016    Procedure: IMAGE GUIDED FUNCTIONAL ENDOSCOPIC SINUS SURGERY;  Surgeon: Roberto Toledo MD;  Location: [facility];  Service: ENT    NC NSL/SINUS NDSC MAX ANTROST W/RMVL TISS MAX SINUS Bilateral 09/02/2022    Procedure: middle turbinectomy/medialization, possible revision functional endoscopic sinus surgery, inferior turbinate reduction;  Surgeon: Roberto Toledo MD;  Location: [facility];  Service: ENT    REDUCTION MAMMAPLASTY Bilateral 02/27/2015    SINUS SURGERY      TOOTH EXTRACTION Right 03/16/2019    Procedure: EXTRACTION TOOTH #1 (Impacted Third Molar Tooth);  Surgeon: Leyda Lyons DDS;  Location: [facility];  Service: Maxillofacial    TUBAL LIGATION      WISDOM TOOTH EXTRACTION       Family History   Problem Relation Age of Onset    CLEM disease Mother     Atrial fibrillation Mother     Atrial fibrillation Father     Heart disease Father     Diabetes Maternal Grandmother     Hypertension  Maternal Grandmother     Heart failure Maternal Grandmother     Colon cancer Maternal Grandfather     Diabetes Maternal Grandfather     Stroke Maternal Grandfather     Cancer Paternal Grandfather     Endometrial cancer Cousin     Breast cancer Cousin     Multiple sclerosis Sister     Hyperlipidemia Sister     Thyroid disease Sister     No Known Problems Son     No Known Problems Son          Meds/Allergies     Current Outpatient Medications:     albuterol (Ventolin HFA) 90 mcg/act inhaler, Inhale 2 puffs every 6 (six) hours as needed for wheezing, Disp: 18 g, Rfl: 3    ARIPiprazole (ABILIFY) 10 mg tablet, Take 10 mg by mouth daily at bedtime, Disp: , Rfl:     ARIPiprazole (ABILIFY) 2 mg tablet, take 1 tablet by mouth IN THE MORNING WATCH FOR SEDATION, Disp: , Rfl:     atorvastatin (LIPITOR) 20 mg tablet, take 1 tablet by mouth once daily (Patient taking differently: Take 10 mg by mouth daily), Disp: 90 tablet, Rfl: 1    azelastine (ASTELIN) 0.1 % nasal spray, instill 1 spray into each nostril twice a day, Disp: 30 mL, Rfl: 11    Azelastine-Fluticasone 137-50 MCG/ACT SUSP, 1-2 sprays into each nostril in the morning, Disp: 23 g, Rfl: 11    busPIRone (BUSPAR) 30 MG tablet, Take 30 mg by mouth daily, Disp: , Rfl:     chlorhexidine (PERIDEX) 0.12 % solution, , Disp: , Rfl:     Cholecalciferol (Vitamin D) 50 MCG (2000 UT) tablet, Take 2,000 Units by mouth daily, Disp: , Rfl:     diphenhydrAMINE (BENADRYL) 25 mg tablet, Take 25 mg by mouth every 6 (six) hours as needed for itching, Disp: , Rfl:     FLUoxetine (PROzac) 10 mg capsule, Take 10 mg by mouth every morning, Disp: , Rfl:     FLUoxetine (PROzac) 40 MG capsule, take 1 capsule by mouth every morning with food, Disp: , Rfl:     LORazepam (ATIVAN) 0.5 mg tablet, Take 0.5 mg by mouth 2 (two) times a day as needed, Disp: , Rfl:     mometasone (NASONEX) 50 mcg/act nasal spray, 2 sprays into each nostril daily States nasal irrigation from a compounding pharmacy,  "prescribed by Dr Toledo, Disp: , Rfl:     Mometasone Furoate POWD, Use in the morning 1 mg capsule added to sinus rinse daily, Disp: 0.03 g, Rfl: 11    montelukast (SINGULAIR) 10 mg tablet, take 1 tablet by mouth at bedtime, Disp: 90 tablet, Rfl: 0    fluticasone (Arnuity Ellipta) 100 MCG/ACT AEPB inhaler, Inhale 1 puff daily, Disp: 90 blister, Rfl: 1  Allergies   Allergen Reactions    Other      Most all antibiotics- hives, hypotensive    \"no problem with clindamycin.\"    Augmentin Es-600  [Amoxicillin-Pot Clavulanate]     Cefuroxime     Erythromycin     Levofloxacin     Morphine     Morphine And Related Hives    Oxycodone-Acetaminophen     Penicillins     Percocet [Oxycodone-Acetaminophen] Hives    Sulfa Antibiotics     Tetracyclines & Related        Vitals: Blood pressure 122/80, pulse 82, temperature 98.2 °F (36.8 °C), temperature source Tympanic, weight 82.6 kg (182 lb), SpO2 95%, not currently breastfeeding. Body mass index is 30.29 kg/m². Oxygen Therapy  SpO2: 95 %  Oxygen Therapy: None (Room air)      Physical Exam  Physical Exam  Vitals reviewed.   Constitutional:       General: She is not in acute distress.     Appearance: Normal appearance. She is well-developed and normal weight. She is not ill-appearing, toxic-appearing or diaphoretic.   HENT:      Head: Normocephalic and atraumatic.      Right Ear: External ear normal.      Left Ear: External ear normal.      Nose: Nose normal. No congestion or rhinorrhea.      Mouth/Throat:      Mouth: Mucous membranes are moist.      Pharynx: Oropharynx is clear. No oropharyngeal exudate.   Eyes:      General: No scleral icterus.        Right eye: No discharge.         Left eye: No discharge.      Conjunctiva/sclera: Conjunctivae normal.      Pupils: Pupils are equal, round, and reactive to light.   Neck:      Vascular: No JVD.      Trachea: No tracheal deviation.   Cardiovascular:      Rate and Rhythm: Normal rate and regular rhythm.      Heart sounds: Normal heart " sounds. No murmur heard.     No gallop.   Pulmonary:      Effort: Pulmonary effort is normal. No respiratory distress.      Breath sounds: Normal breath sounds. No stridor. No wheezing, rhonchi or rales.   Abdominal:      General: Bowel sounds are normal. There is no distension.      Palpations: Abdomen is soft.      Tenderness: There is no abdominal tenderness. There is no guarding or rebound.   Musculoskeletal:         General: No deformity.      Right lower leg: No edema.      Left lower leg: No edema.   Lymphadenopathy:      Cervical: No cervical adenopathy.   Skin:     General: Skin is warm and dry.      Coloration: Skin is not jaundiced.      Findings: No erythema or rash.   Neurological:      General: No focal deficit present.      Mental Status: She is alert and oriented to person, place, and time. Mental status is at baseline.   Psychiatric:         Mood and Affect: Mood normal.         Behavior: Behavior normal.         Thought Content: Thought content normal.         Labs: I have personally reviewed pertinent lab results.  Lab Results   Component Value Date    WBC 6.13 11/08/2023    HGB 14.5 11/08/2023    HCT 43.8 11/08/2023    MCV 91 11/08/2023     11/08/2023     Lab Results   Component Value Date    GLUCOSE 100 04/20/2015    CALCIUM 9.6 11/08/2023     04/20/2015    K 4.5 11/08/2023    CO2 31 11/08/2023     11/08/2023    BUN 13 11/08/2023    CREATININE 0.76 11/08/2023     Lab Results   Component Value Date    IGE 5.53 04/08/2023     Lab Results   Component Value Date    ALT 17 11/08/2023    AST 17 11/08/2023    ALKPHOS 90 11/08/2023       Abs Eos [id number]  4/8/2023 - Abs Eos 660  10/29/2022  - ANCA neg, MPO/PR3 neg, NE RAST neg, IgE 291     RP2 10/1/2023 - (+) Rhinovirus  Bld Cx NGTD  Strep/legionella ag neg      Imaging and other studies: new images and reports personally reviewed  CT Chest 11/20/2023 - resolved ground glass infiltrates    CXR 10/1/2023 - mild scattered alveolar  infiltrates, no PTX      CT Chest 9/29/2023 - scattered ground glass infiltrates in mid and lower lung fields bilaterally, mildly enlarged level 7 LN 1.4cm, no sig effusions, no PTX      CXR 10/31/2022 - no focal infiltrates, no effusions, no PTX     Pulmonary function testing:   10/4/2022 - Ratio 79%, FVC 97%, FEV1 99% - normal spirometry    Yared Reza, , FACP  [facility]

## 2024-01-23 NOTE — PROGRESS NOTES
Pulmonary Follow Up Note   Taylor Navas 58 y.o. female MRN: [id number]  1/23/2024      Assessment:  Shortness of breath  Resolved, hold on further spirometry given improvements     Asthma with recent exacerbation secondary to rhinovirus  Continue Arnuity 100mcg daily   Continue Singulair 10mg daily   YISSEL prn  Flu vaccine yearly, COVID x 3, Pneumovax 2021 - consider Prevnar 20 at age 64, RSV vaccine encouraged  Follow up 6months or sooner as needed     Recurrent sinusitis and allergic rhinitis   Continue upper airway regimen per Dr. Toledo     Eosinophilia - continue to monitor, negative ANCA panels, neg NE RAST and normal IgE     Abnormal CT chest with ground glass infiltrates - resolved, secondary viral pneumonitis      Nicotine dependence - congratulated on remaining tobacco free, discussed methods to maintain cessation, consider LDCT chest in Nov 2024 for lung cancer screening and will discuss at next visit      History of presumed , resolved and resolved hypoxic respiratory failure      Plan:    Diagnoses and all orders for this visit:    Moderate persistent asthma without complication    Allergic rhinitis due to house dust mite    Cigarette nicotine dependence in remission        No follow-ups on file.    History of Present Illness   HPI:  Taylor Navas is a 58 y.o. female who has chronic sinusitis, anxiety/depression, HTN, nicotine dependence in remission, and prior cryptogenic organizing pneumonia. Patient was admitted at [facility] from 04/03-04/13/2021 with acute respiratory failure with hypoxia. Patient was treated with Cefepime for 7 days. She required ICU level of care due to HFNC and no improvement on antibiotic therapy. Patient underwent bronchoscopy with BAL on 04/07/2021 with negative cultures, gram stain, and cytology. She was started on empiric steroid treatment. Autoimmune panel and HP negative. CT chest wutg multifocal peribronchial consolidation. She was treated for cryptogenic organizing  pneumonia and improved on steroids and was eventually weaned off O2 prior to discharge. Steroids were tapered slowly. BAL eventually reported AFB positive for MAC and Candida which were thought to be contaminants. Repeat CT chest after completing steroid treatment with resolution of previous imaging findings.  She was last seen in the office in August 2023. She had admission in late September 2023 for asthma exacerbation secondary to rhinovirus infection.   She was last seen in Oct 2023.    She reports feeling improved. She did have respiratory infection over holidays.  She has recovered and was using nebulizer once daily.  She is using Arnuity daily. She denied purulent sputum, no fevers or chills, no chest pain, no pleurisy. She reports being tobacco free.     Review of Systems   Constitutional:  Negative for activity change, chills and fever.   HENT:  Negative for mouth sores, nosebleeds, sore throat and trouble swallowing.    Respiratory:  Negative for cough, chest tightness, shortness of breath and wheezing.    Cardiovascular:  Negative for chest pain and leg swelling.   Gastrointestinal:  Negative for abdominal pain, nausea and vomiting.   Endocrine: Negative for cold intolerance and heat intolerance.   Musculoskeletal:  Negative for gait problem.   Allergic/Immunologic: Negative for immunocompromised state.   Neurological:  Negative for light-headedness and headaches.   Hematological:  Negative for adenopathy.   Psychiatric/Behavioral:  Negative for sleep disturbance. The patient is not nervous/anxious.        Historical Information   Past Medical History:   Diagnosis Date    Abnormal CT of the chest     Abnormal thyroid function test     Abnormal TSH     Acid-fast bacteria present     Allergic     Allergic rhinitis 1987    Allergies     Anesthesia complication     V TACH after her reduction mammoplasty, done at ,, states had a little vent arrhythmia after sinus sx also    Anxiety     Asthma 2021     Bronchitis     Chronic rhinitis 02/18/2020    Depression     Depression with anxiety     Dysphonia     Eosinophilia     Ethmoid sinusitis     Facial cellulitis     GERD (gastroesophageal reflux disease)     no issues since 2021    HTN (hypertension)     Hyperglycemia     Hyponatremia     Maxillary sinusitis     Multiple allergies     Myofascial pain syndrome     Nasal turbinate hypertrophy     Pneumonia     Rhinovirus infection     Sleep apnea     not currently using CPAP    Sleep apnea, obstructive     Transient right leg weakness     Wears glasses      Past Surgical History:   Procedure Laterality Date    BRONCHOSCOPY  2021    INCISION AND DRAINAGE OF WOUND Left 03/23/2023    Procedure: INCISION AND DRAINAGE (I&D) LEFT ORAL ABSCESS, EXTRACTION OF TOOTH #19;  Surgeon: Will Baker DMD;  Location: [facility];  Service: Maxillofacial    LAPAROSCOPY      with vaginal hysterectomy; 11/3/2003 rso    OOPHORECTOMY Left 2004    PARTIAL HYSTERECTOMY  2004    MT NSL/SINUS NDSC MAX ANTROST W/RMVL TISS MAX SINUS N/A 09/30/2016    Procedure: IMAGE GUIDED FUNCTIONAL ENDOSCOPIC SINUS SURGERY;  Surgeon: Roberto Toledo MD;  Location: [facility];  Service: ENT    MT NSL/SINUS NDSC MAX ANTROST W/RMVL TISS MAX SINUS Bilateral 09/02/2022    Procedure: middle turbinectomy/medialization, possible revision functional endoscopic sinus surgery, inferior turbinate reduction;  Surgeon: Roberto Toledo MD;  Location: [facility];  Service: ENT    REDUCTION MAMMAPLASTY Bilateral 02/27/2015    SINUS SURGERY      TOOTH EXTRACTION Right 03/16/2019    Procedure: EXTRACTION TOOTH #1 (Impacted Third Molar Tooth);  Surgeon: Leyda Lyons DDS;  Location: [facility];  Service: Maxillofacial    TUBAL LIGATION      WISDOM TOOTH EXTRACTION       Family History   Problem Relation Age of Onset    CLEM disease Mother     Atrial fibrillation Mother     Atrial fibrillation Father     Heart disease Father     Diabetes Maternal Grandmother     Hypertension  Maternal Grandmother     Heart failure Maternal Grandmother     Colon cancer Maternal Grandfather     Diabetes Maternal Grandfather     Stroke Maternal Grandfather     Cancer Paternal Grandfather     Endometrial cancer Cousin     Breast cancer Cousin     Multiple sclerosis Sister     Hyperlipidemia Sister     Thyroid disease Sister     No Known Problems Son     No Known Problems Son          Meds/Allergies     Current Outpatient Medications:     albuterol (Ventolin HFA) 90 mcg/act inhaler, Inhale 2 puffs every 6 (six) hours as needed for wheezing, Disp: 18 g, Rfl: 3    ARIPiprazole (ABILIFY) 10 mg tablet, Take 10 mg by mouth daily at bedtime, Disp: , Rfl:     ARIPiprazole (ABILIFY) 2 mg tablet, take 1 tablet by mouth IN THE MORNING WATCH FOR SEDATION, Disp: , Rfl:     atorvastatin (LIPITOR) 20 mg tablet, take 1 tablet by mouth once daily (Patient taking differently: Take 10 mg by mouth daily), Disp: 90 tablet, Rfl: 1    azelastine (ASTELIN) 0.1 % nasal spray, instill 1 spray into each nostril twice a day, Disp: 30 mL, Rfl: 11    Azelastine-Fluticasone 137-50 MCG/ACT SUSP, 1-2 sprays into each nostril in the morning, Disp: 23 g, Rfl: 11    busPIRone (BUSPAR) 30 MG tablet, Take 30 mg by mouth daily, Disp: , Rfl:     chlorhexidine (PERIDEX) 0.12 % solution, , Disp: , Rfl:     Cholecalciferol (Vitamin D) 50 MCG (2000 UT) tablet, Take 2,000 Units by mouth daily, Disp: , Rfl:     diphenhydrAMINE (BENADRYL) 25 mg tablet, Take 25 mg by mouth every 6 (six) hours as needed for itching, Disp: , Rfl:     FLUoxetine (PROzac) 10 mg capsule, Take 10 mg by mouth every morning, Disp: , Rfl:     FLUoxetine (PROzac) 40 MG capsule, take 1 capsule by mouth every morning with food, Disp: , Rfl:     LORazepam (ATIVAN) 0.5 mg tablet, Take 0.5 mg by mouth 2 (two) times a day as needed, Disp: , Rfl:     mometasone (NASONEX) 50 mcg/act nasal spray, 2 sprays into each nostril daily States nasal irrigation from a compounding pharmacy,  "prescribed by Dr Toledo, Disp: , Rfl:     Mometasone Furoate POWD, Use in the morning 1 mg capsule added to sinus rinse daily, Disp: 0.03 g, Rfl: 11    montelukast (SINGULAIR) 10 mg tablet, take 1 tablet by mouth at bedtime, Disp: 90 tablet, Rfl: 0    fluticasone (Arnuity Ellipta) 100 MCG/ACT AEPB inhaler, Inhale 1 puff daily, Disp: 90 blister, Rfl: 1  Allergies   Allergen Reactions    Other      Most all antibiotics- hives, hypotensive    \"no problem with clindamycin.\"    Augmentin Es-600  [Amoxicillin-Pot Clavulanate]     Cefuroxime     Erythromycin     Levofloxacin     Morphine     Morphine And Related Hives    Oxycodone-Acetaminophen     Penicillins     Percocet [Oxycodone-Acetaminophen] Hives    Sulfa Antibiotics     Tetracyclines & Related        Vitals: Blood pressure 122/80, pulse 82, temperature 98.2 °F (36.8 °C), temperature source Tympanic, weight 82.6 kg (182 lb), SpO2 95%, not currently breastfeeding. Body mass index is 30.29 kg/m². Oxygen Therapy  SpO2: 95 %  Oxygen Therapy: None (Room air)      Physical Exam  Physical Exam  Vitals reviewed.   Constitutional:       General: She is not in acute distress.     Appearance: Normal appearance. She is well-developed and normal weight. She is not ill-appearing, toxic-appearing or diaphoretic.   HENT:      Head: Normocephalic and atraumatic.      Right Ear: External ear normal.      Left Ear: External ear normal.      Nose: Nose normal. No congestion or rhinorrhea.      Mouth/Throat:      Mouth: Mucous membranes are moist.      Pharynx: Oropharynx is clear. No oropharyngeal exudate.   Eyes:      General: No scleral icterus.        Right eye: No discharge.         Left eye: No discharge.      Conjunctiva/sclera: Conjunctivae normal.      Pupils: Pupils are equal, round, and reactive to light.   Neck:      Vascular: No JVD.      Trachea: No tracheal deviation.   Cardiovascular:      Rate and Rhythm: Normal rate and regular rhythm.      Heart sounds: Normal heart " sounds. No murmur heard.     No gallop.   Pulmonary:      Effort: Pulmonary effort is normal. No respiratory distress.      Breath sounds: Normal breath sounds. No stridor. No wheezing, rhonchi or rales.   Abdominal:      General: Bowel sounds are normal. There is no distension.      Palpations: Abdomen is soft.      Tenderness: There is no abdominal tenderness. There is no guarding or rebound.   Musculoskeletal:         General: No deformity.      Right lower leg: No edema.      Left lower leg: No edema.   Lymphadenopathy:      Cervical: No cervical adenopathy.   Skin:     General: Skin is warm and dry.      Coloration: Skin is not jaundiced.      Findings: No erythema or rash.   Neurological:      General: No focal deficit present.      Mental Status: She is alert and oriented to person, place, and time. Mental status is at baseline.   Psychiatric:         Mood and Affect: Mood normal.         Behavior: Behavior normal.         Thought Content: Thought content normal.         Labs: I have personally reviewed pertinent lab results.  Lab Results   Component Value Date    WBC 6.13 11/08/2023    HGB 14.5 11/08/2023    HCT 43.8 11/08/2023    MCV 91 11/08/2023     11/08/2023     Lab Results   Component Value Date    GLUCOSE 100 04/20/2015    CALCIUM 9.6 11/08/2023     04/20/2015    K 4.5 11/08/2023    CO2 31 11/08/2023     11/08/2023    BUN 13 11/08/2023    CREATININE 0.76 11/08/2023     Lab Results   Component Value Date    IGE 5.53 04/08/2023     Lab Results   Component Value Date    ALT 17 11/08/2023    AST 17 11/08/2023    ALKPHOS 90 11/08/2023       Abs Eos [id number]  4/8/2023 - Abs Eos 660  10/29/2022  - ANCA neg, MPO/PR3 neg, NE RAST neg, IgE 291     RP2 10/1/2023 - (+) Rhinovirus  Bld Cx NGTD  Strep/legionella ag neg      Imaging and other studies: new images and reports personally reviewed  CT Chest 11/20/2023 - resolved ground glass infiltrates    CXR 10/1/2023 - mild scattered alveolar  infiltrates, no PTX      CT Chest 9/29/2023 - scattered ground glass infiltrates in mid and lower lung fields bilaterally, mildly enlarged level 7 LN 1.4cm, no sig effusions, no PTX      CXR 10/31/2022 - no focal infiltrates, no effusions, no PTX     Pulmonary function testing:   10/4/2022 - Ratio 79%, FVC 97%, FEV1 99% - normal spirometry    Yared Reza, , FACP  [facility]

## 2024-02-08 ENCOUNTER — ANESTHESIA (OUTPATIENT)
Dept: ANESTHESIOLOGY | Facility: HOSPITAL | Age: 59
End: 2024-02-08

## 2024-02-08 ENCOUNTER — ANESTHESIA EVENT (OUTPATIENT)
Dept: ANESTHESIOLOGY | Facility: HOSPITAL | Age: 59
End: 2024-02-08

## 2024-02-08 RX ORDER — SODIUM CHLORIDE, SODIUM LACTATE, POTASSIUM CHLORIDE, CALCIUM CHLORIDE 600; 310; 30; 20 MG/100ML; MG/100ML; MG/100ML; MG/100ML
125 INJECTION, SOLUTION INTRAVENOUS CONTINUOUS
Status: CANCELLED | OUTPATIENT
Start: 2024-02-08

## 2024-02-09 ENCOUNTER — ANESTHESIA EVENT (OUTPATIENT)
Dept: GASTROENTEROLOGY | Facility: AMBULARY SURGERY CENTER | Age: 59
End: 2024-02-09

## 2024-02-09 ENCOUNTER — HOSPITAL ENCOUNTER (OUTPATIENT)
Dept: GASTROENTEROLOGY | Facility: AMBULARY SURGERY CENTER | Age: 59
Setting detail: OUTPATIENT SURGERY
End: 2024-02-09
Attending: COLON & RECTAL SURGERY
Payer: COMMERCIAL

## 2024-02-09 ENCOUNTER — ANESTHESIA (OUTPATIENT)
Dept: GASTROENTEROLOGY | Facility: AMBULARY SURGERY CENTER | Age: 59
End: 2024-02-09

## 2024-02-09 VITALS
SYSTOLIC BLOOD PRESSURE: 132 MMHG | HEIGHT: 65 IN | TEMPERATURE: 97.9 F | RESPIRATION RATE: 17 BRPM | WEIGHT: 184 LBS | HEART RATE: 72 BPM | OXYGEN SATURATION: 18 % | BODY MASS INDEX: 30.66 KG/M2 | DIASTOLIC BLOOD PRESSURE: 78 MMHG

## 2024-02-09 DIAGNOSIS — Z80.0 FAMILY HISTORY OF COLON CANCER: ICD-10-CM

## 2024-02-09 PROCEDURE — 45380 COLONOSCOPY AND BIOPSY: CPT | Performed by: COLON & RECTAL SURGERY

## 2024-02-09 PROCEDURE — 88305 TISSUE EXAM BY PATHOLOGIST: CPT | Performed by: PATHOLOGY

## 2024-02-09 RX ORDER — SODIUM CHLORIDE, SODIUM LACTATE, POTASSIUM CHLORIDE, CALCIUM CHLORIDE 600; 310; 30; 20 MG/100ML; MG/100ML; MG/100ML; MG/100ML
INJECTION, SOLUTION INTRAVENOUS CONTINUOUS PRN
Status: DISCONTINUED | OUTPATIENT
Start: 2024-02-09 | End: 2024-02-09

## 2024-02-09 RX ORDER — PROPOFOL 10 MG/ML
INJECTION, EMULSION INTRAVENOUS AS NEEDED
Status: DISCONTINUED | OUTPATIENT
Start: 2024-02-09 | End: 2024-02-09

## 2024-02-09 RX ADMIN — PROPOFOL 50 MG: 10 INJECTION, EMULSION INTRAVENOUS at 11:02

## 2024-02-09 RX ADMIN — Medication 40 MG: at 11:07

## 2024-02-09 RX ADMIN — PROPOFOL 100 MG: 10 INJECTION, EMULSION INTRAVENOUS at 10:57

## 2024-02-09 RX ADMIN — SODIUM CHLORIDE, SODIUM LACTATE, POTASSIUM CHLORIDE, AND CALCIUM CHLORIDE: .6; .31; .03; .02 INJECTION, SOLUTION INTRAVENOUS at 10:57

## 2024-02-09 RX ADMIN — PROPOFOL 50 MG: 10 INJECTION, EMULSION INTRAVENOUS at 11:14

## 2024-02-09 RX ADMIN — PROPOFOL 50 MG: 10 INJECTION, EMULSION INTRAVENOUS at 11:08

## 2024-02-09 RX ADMIN — PROPOFOL 50 MG: 10 INJECTION, EMULSION INTRAVENOUS at 11:00

## 2024-02-09 NOTE — ANESTHESIA PREPROCEDURE EVALUATION
Procedure:  PRE-OP ONLY    Relevant Problems   CARDIO   (+) Hyperlipidemia      GI/HEPATIC   (+) Gastroesophageal reflux disease      MUSCULOSKELETAL   (+) Fibromyalgia      NEURO/PSYCH   (+) Anxiety   (+) Fibromyalgia   (+) Major depressive disorder with single episode, in full remission (HCC)      PULMONARY   (+) Asthma   (+) Obstructive sleep apnea syndrome      Respiratory   (+) Laryngopharyngeal reflux      Nervous and Auditory   (+) Lumbar radiculopathy      Other   (+) S/P bilateral breast reduction   (+) Tobacco use      Recently quit smoking     Physical Exam    Airway       Dental       Cardiovascular      Pulmonary      Other Findings  post-pubertal.      Anesthesia Plan  ASA Score- 3     Anesthesia Type- IV sedation with anesthesia with ASA Monitors.         Additional Monitors:     Airway Plan:            Plan Factors-    Chart reviewed.    Patient summary reviewed.    Patient is not a current smoker.              Induction- intravenous.    Postoperative Plan-     Informed Consent-

## 2024-02-09 NOTE — INTERVAL H&P NOTE
2017 was her last colonoscopy.  Screening colonoscopy,discussed in a face-to-face, personal, informed consent process, the benefits, alternatives, risks including not limited to bleeding, missed lesion, perforation requiring emergent surgery discussed/understood.    H&P reviewed. After examining the patient I find no changes in the patients condition since the H&P had been written.    Vitals:    02/09/24 0941   BP: 123/59   Pulse: 79   Resp: 18   Temp: (!) 97.1 °F (36.2 °C)   SpO2: 95%

## 2024-02-09 NOTE — ANESTHESIA PREPROCEDURE EVALUATION
Procedure:  COLONOSCOPY    Relevant Problems   CARDIO   (+) Hyperlipidemia      GI/HEPATIC   (+) Gastroesophageal reflux disease      MUSCULOSKELETAL   (+) Fibromyalgia      NEURO/PSYCH   (+) Anxiety   (+) Fibromyalgia   (+) Major depressive disorder with single episode, in full remission (HCC)      PULMONARY   (+) Asthma   (+) Obstructive sleep apnea syndrome      Recently quit smoking     Physical Exam    Airway    Mallampati score: III  TM Distance: >3 FB  Neck ROM: full     Dental   Comment: Secured teeth, no loose teeth as per patient, No notable dental hx     Cardiovascular      Pulmonary      Other Findings  post-pubertal.      Anesthesia Plan  ASA Score- 2     Anesthesia Type- IV sedation with anesthesia with ASA Monitors.         Additional Monitors:     Airway Plan:            Plan Factors-    Chart reviewed.    Patient summary reviewed.    Patient is not a current smoker.              Induction- intravenous.    Postoperative Plan-     Informed Consent- Anesthetic plan and risks discussed with patient.  I personally reviewed this patient with the CRNA. Discussed and agreed on the Anesthesia Plan with the CRNA..

## 2024-02-09 NOTE — ANESTHESIA POSTPROCEDURE EVALUATION
Post-Op Assessment Note    CV Status:  Stable  Pain Score: 0    Pain management: adequate       Mental Status:  Arousable   Hydration Status:  Stable   PONV Controlled:  None   Airway Patency:  Patent     Post Op Vitals Reviewed: Yes      Staff: Anesthesiologist, CRNA               BP      Temp      Pulse     Resp      SpO2

## 2024-02-12 PROCEDURE — 88305 TISSUE EXAM BY PATHOLOGIST: CPT | Performed by: PATHOLOGY

## 2024-03-15 DIAGNOSIS — J30.89 ALLERGIC RHINITIS DUE TO HOUSE DUST MITE: ICD-10-CM

## 2024-03-15 DIAGNOSIS — J45.909 UNCOMPLICATED ASTHMA, UNSPECIFIED ASTHMA SEVERITY, UNSPECIFIED WHETHER PERSISTENT: Chronic | ICD-10-CM

## 2024-03-15 RX ORDER — MONTELUKAST SODIUM 10 MG/1
10 TABLET ORAL
Qty: 90 TABLET | Refills: 1 | Status: SHIPPED | OUTPATIENT
Start: 2024-03-15

## 2024-03-15 NOTE — TELEPHONE ENCOUNTER
Reason for call:   [x] Refill   [] Prior Auth  [] Other:     Office:   [] PCP/Provider -   [x] Specialty/Provider - Pulm / [place]    Medication: montelukast    Dose/Frequency: 10mg qhs    Quantity: 90    Pharmacy: [id number] - [place] - [address] [place] [address]     Does the patient have enough for 3 days?   [] Yes   [x] No - Send as HP to POD

## 2024-03-21 NOTE — PROGRESS NOTES
Pulmonary Follow Up Note   Rey Jean 60 y.o. female MRN: [id number]  10/17/2023      Assessment:  Shortness of breath  Clinically improved and exacerbation resolved  Consider repeat spirometry with next visit     Asthma with recent exacerbation secondary to rhinovirus  Continue Arnuity 100mcg daily - if symptoms worsen off OCS consider Arnuity 200mcg vs Breo trial  Continue Singulair 10mg daily   Flu vaccine yearly, COVID x 3, Pneumovax 2021 - consider Prevnar 20 at age 71 - advised FLU and RSV vaccines in 2 weeks   Follow up 3months or sooner as needed     Recurrent sinusitis and allergic rhinitis   Continue upper airway regimen per Dr. Angy Castro     Eosinophilia - continue to monitor, negative ANCA panels, neg NE RAST and normal IgE     Abnormal CT chest with ground glass infiltrates - plan for repeat CT imaging at 6-8 week interval - suspect related to acute viral pneuomonitis    Nicotine dependence - congratulated on remaining tobacco free, discussed methods to maintain cessation, declines any adjuncts at this time    History of presumed , resolved and resolved hypoxic respiratory failure      Plan:    Diagnoses and all orders for this visit:    Abnormal CT of the chest  -     CT chest wo contrast; Future    Moderate persistent asthma with acute exacerbation    Allergic rhinitis due to animal (cat) (dog) hair and dander    Rhinovirus infection    Cigarette nicotine dependence in remission    Other orders  -     FLUoxetine (PROzac) 40 MG capsule; take 1 capsule by mouth every morning with food  -     FLUoxetine (PROzac) 10 mg capsule; Take 10 mg by mouth every morning        Return in about 3 months (around 1/17/2024) for Recheck. History of Present Illness   HPI:  Rey Jean is a 60 y.o. female who has chronic sinusitis, anxiety/depression, HTN, nicotine dependence in remission, and prior cryptogenic organizing pneumonia.  Patient was admitted at [facility] from 04/03-04/13/2021 with acute respiratory failure with hypoxia. Patient was treated with Cefepime for 7 days. She required ICU level of care due to HFNC and no improvement on antibiotic therapy. Patient underwent bronchoscopy with BAL on 04/07/2021 with negative cultures, gram stain, and cytology. She was started on empiric steroid treatment. Autoimmune panel and HP negative. CT chest wutg multifocal peribronchial consolidation. She was treated for cryptogenic organizing pneumonia and improved on steroids and was eventually weaned off O2 prior to discharge. Steroids were tapered slowly. BAL eventually reported AFB positive for MAC and Candida which were thought to be contaminants. Repeat CT chest after completing steroid treatment with resolution of previous imaging findings. She was last seen in the office in August 2023. She had admission in late September 2023 for asthma exacerbation secondary to rhinovirus infection. She presents today for follow up. She reports feeling improved. She notes basically back to baseline. She is using nebulizer typically once daily out of habit. She has scant sputum production, denied rest or nocturnal dyspnea. She is completing OCS taper tomorrow. She denied rashes, improved GERD symptoms, no fevers or chills. She has remained tobacco free since discharged and congratulated on this. She has noted increased sinus symptoms and resumed steroid irrigation. Review of Systems   Constitutional:  Negative for activity change, chills and fever. HENT:  Positive for postnasal drip and rhinorrhea. Negative for mouth sores, sore throat and trouble swallowing. Respiratory:  Positive for cough. Negative for chest tightness, shortness of breath and wheezing. Cardiovascular:  Negative for chest pain and leg swelling. Gastrointestinal:  Negative for abdominal pain, nausea and vomiting. Musculoskeletal:  Negative for gait problem. Allergic/Immunologic: Positive for environmental allergies.  Negative for immunocompromised state. Neurological:  Negative for headaches. Hematological:  Negative for adenopathy. Psychiatric/Behavioral:  Negative for sleep disturbance. The patient is not nervous/anxious. Historical Information   Past Medical History:   Diagnosis Date    Allergic     Allergic rhinitis 1987    Allergies     Anesthesia complication     V TACH after her reduction mammoplasty, done at [address],, states had a little vent arrhythmia after sinus sx also    Anxiety     Asthma 2021    Chronic rhinitis 02/18/2020    Depression     Ethmoid sinusitis     GERD (gastroesophageal reflux disease)     no issues since 2021    Maxillary sinusitis     Multiple allergies     Myofascial pain syndrome     Nasal turbinate hypertrophy     Pneumonia     Sleep apnea     not currently using CPAP    Sleep apnea, obstructive     Wears glasses      Past Surgical History:   Procedure Laterality Date    INCISION AND DRAINAGE OF WOUND Left 3/23/2023    Procedure: INCISION AND DRAINAGE (I&D) LEFT ORAL ABSCESS, EXTRACTION OF TOOTH #19;  Surgeon: Gonzalez Barker DMD;  Location: [facility];  Service: Maxillofacial    LAPAROSCOPY      with vaginal hysterectomy; 11/3/2003 rso    OOPHORECTOMY Left 2004    PARTIAL HYSTERECTOMY  2004    [place] NSL/SINUS [address] MAX ANTROST W/RMVL TISS MAX SINUS N/A 9/30/2016    Procedure: IMAGE GUIDED FUNCTIONAL ENDOSCOPIC SINUS SURGERY;  Surgeon: Heather Tai MD;  Location: [facility];  Service: ENT    [place] NSL/SINUS [address] MAX ANTROST W/RMVL TISS MAX SINUS Bilateral 9/2/2022    Procedure: middle turbinectomy/medialization, possible revision functional endoscopic sinus surgery, inferior turbinate reduction;  Surgeon: Heather Tai MD;  Location: [facility];  Service: ENT    REDUCTION MAMMAPLASTY Bilateral 02/27/2015    SINUS SURGERY      TOOTH EXTRACTION Right 3/16/2019    Procedure: EXTRACTION TOOTH #1 (Impacted Third Molar Tooth);   Surgeon: Khushbu Solomon DDS;  Location: [facility];  Service: Maxillofacial TUBAL LIGATION      WISDOM TOOTH EXTRACTION       Family History   Problem Relation Age of Onset    CLEM disease Mother     Atrial fibrillation Mother     Atrial fibrillation Father     Heart disease Father     Diabetes Maternal Grandmother     Hypertension Maternal Grandmother     Heart failure Maternal Grandmother     Colon cancer Maternal Grandfather     Diabetes Maternal Grandfather     Stroke Maternal Grandfather     Cancer Paternal Grandfather     Endometrial cancer Cousin     Breast cancer Cousin     Multiple sclerosis Sister     Hyperlipidemia Sister     Thyroid disease Sister     No Known Problems Son     No Known Problems Son          Meds/Allergies     Current Outpatient Medications:     albuterol (Ventolin HFA) 90 mcg/act inhaler, Inhale 2 puffs every 6 (six) hours as needed for wheezing, Disp: 18 g, Rfl: 3    ARIPiprazole (ABILIFY) 10 mg tablet, Take 10 mg by mouth daily at bedtime, Disp: , Rfl:     ARIPiprazole (ABILIFY) 2 mg tablet, take 1 tablet by mouth IN THE MORNING WATCH FOR SEDATION, Disp: , Rfl:     atorvastatin (LIPITOR) 20 mg tablet, Take 1 tablet (20 mg total) by mouth daily, Disp: 90 tablet, Rfl: 1    azelastine (ASTELIN) 0.1 % nasal spray, instill 1 spray into each nostril twice a day, Disp: 30 mL, Rfl: 11    Azelastine-Fluticasone 137-50 MCG/ACT SUSP, 1-2 sprays into each nostril in the morning, Disp: 23 g, Rfl: 11    busPIRone (BUSPAR) 30 MG tablet, Take 30 mg by mouth, Disp: , Rfl:     chlorhexidine (PERIDEX) 0.12 % solution, RINSE WITH 30 MILLILTERS FOR 30 SECONDS THEN SPIT OUT AT BEDTIME, Disp: , Rfl:     Cholecalciferol (Vitamin D) 50 MCG (2000 UT) tablet, Take 2,000 Units by mouth daily, Disp: , Rfl:     diphenhydrAMINE (BENADRYL) 25 mg tablet, Take 25 mg by mouth every 6 (six) hours as needed for itching, Disp: , Rfl:     FLUoxetine (PROzac) 10 mg capsule, Take 10 mg by mouth every morning, Disp: , Rfl:     FLUoxetine (PROzac) 40 MG capsule, take 1 capsule by mouth every morning with food, Disp: , Rfl:     fluticasone (Arnuity Ellipta) 100 MCG/ACT AEPB inhaler, Inhale 1 puff daily, Disp: 90 blister, Rfl: 1    ipratropium-albuterol (DUO-NEB) 0.5-2.5 mg/3 mL nebulizer solution, Take 3 mL by nebulization 3 (three) times a day for 28 days, Disp: 252 mL, Rfl: 0    LORazepam (ATIVAN) 0.5 mg tablet, Take 0.5 mg by mouth 2 (two) times a day as needed, Disp: , Rfl:     mometasone (NASONEX) 50 mcg/act nasal spray, 2 sprays into each nostril daily States nasal irrigation from a compounding pharmacy, prescribed by Dr Isidro Mortimer, Disp: , Rfl:     Mometasone Furoate POWD, Use in the morning 1 mg capsule added to sinus rinse daily, Disp: 0.03 g, Rfl: 11    montelukast (SINGULAIR) 10 mg tablet, Take 1 tablet (10 mg total) by mouth daily at bedtime, Disp: 90 tablet, Rfl: 0    predniSONE 10 mg tablet, Take 5 tablets (50 mg total) by mouth daily for 3 days, THEN 4 tablets (40 mg total) daily for 3 days, THEN 3 tablets (30 mg total) daily for 3 days, THEN 2 tablets (20 mg total) daily for 3 days, THEN 1 tablet (10 mg total) daily for 3 days.  Do not start before October 4, 2023., Disp: 45 tablet, Rfl: 0    fluconazole (DIFLUCAN) 150 mg tablet, take 1 tablet by mouth to START then take 1 tablet by mouth every 72 hours until finished (Patient not taking: Reported on 8/9/2023), Disp: , Rfl:     FLUoxetine (PROzac) 20 mg capsule, Take 40 mg by mouth daily (Patient not taking: Reported on 10/17/2023), Disp: , Rfl:     HYDROcodone Bit-Homatrop MBr (HYCODAN) 5-1.5 mg/5 mL syrup, Take 5 mL by mouth every 6 (six) hours as needed for cough Max Daily Amount: 20 mL (Patient not taking: Reported on 10/17/2023), Disp: 120 mL, Rfl: 0  Allergies   Allergen Reactions    Other      Most all antibiotics- hives, hypotensive    "no problem with clindamycin."    Augmentin Es-600  [Amoxicillin-Pot Clavulanate]     Cefuroxime     Erythromycin     Levofloxacin     Morphine     Morphine And Related Hives    Oxycodone-Acetaminophen Penicillins     Percocet [Oxycodone-Acetaminophen] Hives    Sulfa Antibiotics     Tetracyclines & Related        Vitals: Blood pressure 140/66, pulse 93, temperature (!) 97.1 °F (36.2 °C), SpO2 95 %, not currently breastfeeding. There is no height or weight on file to calculate BMI. Oxygen Therapy  SpO2: 95 %      Physical Exam  Physical Exam  Vitals reviewed. Constitutional:       General: She is not in acute distress. Appearance: Normal appearance. She is well-developed and normal weight. She is not ill-appearing, toxic-appearing or diaphoretic. HENT:      Head: Normocephalic and atraumatic. Right Ear: External ear normal.      Left Ear: External ear normal.      Nose: Congestion and rhinorrhea present. Mouth/Throat:      Mouth: Mucous membranes are moist.      Pharynx: Oropharynx is clear. No oropharyngeal exudate. Eyes:      General: No scleral icterus. Right eye: No discharge. Left eye: No discharge. Conjunctiva/sclera: Conjunctivae normal.      Pupils: Pupils are equal, round, and reactive to light. Neck:      Vascular: No JVD. Trachea: No tracheal deviation. Cardiovascular:      Rate and Rhythm: Normal rate and regular rhythm. Heart sounds: Normal heart sounds. No murmur heard. No gallop. Pulmonary:      Effort: Pulmonary effort is normal. No respiratory distress. Breath sounds: Normal breath sounds. No stridor. No wheezing, rhonchi or rales. Comments: Clear, no wheeze, no rales  Abdominal:      General: Bowel sounds are normal. There is no distension. Palpations: Abdomen is soft. Tenderness: There is no abdominal tenderness. There is no guarding or rebound. Musculoskeletal:         General: No deformity. Right lower leg: No edema. Left lower leg: No edema. Lymphadenopathy:      Cervical: No cervical adenopathy. Skin:     General: Skin is warm and dry. Coloration: Skin is not jaundiced.       Findings: No erythema or rash. Neurological:      General: No focal deficit present. Mental Status: She is alert and oriented to person, place, and time. Mental status is at baseline. Psychiatric:         Mood and Affect: Mood normal.         Behavior: Behavior normal.         Thought Content: Thought content normal.         Labs: I have personally reviewed pertinent lab results. Lab Results   Component Value Date    WBC 12.43 (H) 10/03/2023    HGB 11.4 (L) 10/03/2023    HCT 35.6 10/03/2023    MCV 93 10/03/2023     10/03/2023     Lab Results   Component Value Date    GLUCOSE 100 04/20/2015    CALCIUM 9.0 10/03/2023     04/20/2015    K 4.4 10/03/2023    CO2 25 10/03/2023     10/03/2023    BUN 14 10/03/2023    CREATININE 0.61 10/03/2023     Lab Results   Component Value Date    IGE 5.53 04/08/2023     Lab Results   Component Value Date    ALT 17 10/01/2023    AST 20 10/01/2023    ALKPHOS 72 10/01/2023       Abs Eos [id number]  4/8/2023 - Abs Eos 660  10/29/2022  - ANCA neg, MPO/PR3 neg, NE RAST neg, IgE 291    RP2 10/1/2023 - (+) Rhinovirus  Bld Cx NGTD  Strep/legionella ag neg        Imaging and other studies: new images and reports personally reviewed  CXR 10/1/2023 - mild scattered alveolar infiltrates, no PTX     CT Chest 9/29/2023 - scattered ground glass infiltrates in mid and lower lung fields bilaterally, mildly enlarged level 7 LN 1.4cm, no sig effusions, no PTX     CXR 10/31/2022 - no focal infiltrates, no effusions, no PTX     Pulmonary function testing:   10/4/2022 - Ratio 79%, FVC 97%, FEV1 99% - normal spirometry    Yared Reza DO, Quint[facility] Fairy [facility] None

## 2024-06-20 ENCOUNTER — APPOINTMENT (OUTPATIENT)
Dept: LAB | Facility: CLINIC | Age: 59
End: 2024-06-20
Payer: COMMERCIAL

## 2024-06-20 DIAGNOSIS — F33.1 MAJOR DEPRESSIVE DISORDER, RECURRENT EPISODE, MODERATE (HCC): ICD-10-CM

## 2024-06-20 DIAGNOSIS — E78.01 FAMILIAL HYPERCHOLESTEROLEMIA: Chronic | ICD-10-CM

## 2024-06-20 DIAGNOSIS — Z79.899 ENCOUNTER FOR LONG-TERM (CURRENT) USE OF OTHER MEDICATIONS: ICD-10-CM

## 2024-06-20 LAB
25(OH)D3 SERPL-MCNC: 26.3 NG/ML (ref 30–100)
ALBUMIN SERPL BCG-MCNC: 4.3 G/DL (ref 3.5–5)
ALP SERPL-CCNC: 89 U/L (ref 34–104)
ALT SERPL W P-5'-P-CCNC: 17 U/L (ref 7–52)
ANION GAP SERPL CALCULATED.3IONS-SCNC: 6 MMOL/L (ref 4–13)
AST SERPL W P-5'-P-CCNC: 17 U/L (ref 13–39)
BASOPHILS # BLD AUTO: 0.03 THOUSANDS/ÂΜL (ref 0–0.1)
BASOPHILS NFR BLD AUTO: 0 % (ref 0–1)
BILIRUB SERPL-MCNC: 0.48 MG/DL (ref 0.2–1)
BUN SERPL-MCNC: 10 MG/DL (ref 5–25)
CALCIUM SERPL-MCNC: 9.6 MG/DL (ref 8.4–10.2)
CHLORIDE SERPL-SCNC: 105 MMOL/L (ref 96–108)
CHOLEST SERPL-MCNC: 156 MG/DL
CO2 SERPL-SCNC: 27 MMOL/L (ref 21–32)
CREAT SERPL-MCNC: 0.74 MG/DL (ref 0.6–1.3)
EOSINOPHIL # BLD AUTO: 1.01 THOUSAND/ÂΜL (ref 0–0.61)
EOSINOPHIL NFR BLD AUTO: 9 % (ref 0–6)
ERYTHROCYTE [DISTWIDTH] IN BLOOD BY AUTOMATED COUNT: 12.3 % (ref 11.6–15.1)
EST. AVERAGE GLUCOSE BLD GHB EST-MCNC: 120 MG/DL
GFR SERPL CREATININE-BSD FRML MDRD: 88 ML/MIN/1.73SQ M
GLUCOSE P FAST SERPL-MCNC: 96 MG/DL (ref 65–99)
HBA1C MFR BLD: 5.8 %
HCT VFR BLD AUTO: 45.1 % (ref 34.8–46.1)
HDLC SERPL-MCNC: 52 MG/DL
HGB BLD-MCNC: 14.8 G/DL (ref 11.5–15.4)
IMM GRANULOCYTES # BLD AUTO: 0.04 THOUSAND/UL (ref 0–0.2)
IMM GRANULOCYTES NFR BLD AUTO: 0 % (ref 0–2)
LDLC SERPL CALC-MCNC: 70 MG/DL (ref 0–100)
LYMPHOCYTES # BLD AUTO: 2.19 THOUSANDS/ÂΜL (ref 0.6–4.47)
LYMPHOCYTES NFR BLD AUTO: 20 % (ref 14–44)
MCH RBC QN AUTO: 29.9 PG (ref 26.8–34.3)
MCHC RBC AUTO-ENTMCNC: 32.8 G/DL (ref 31.4–37.4)
MCV RBC AUTO: 91 FL (ref 82–98)
MONOCYTES # BLD AUTO: 0.64 THOUSAND/ÂΜL (ref 0.17–1.22)
MONOCYTES NFR BLD AUTO: 6 % (ref 4–12)
NEUTROPHILS # BLD AUTO: 7.06 THOUSANDS/ÂΜL (ref 1.85–7.62)
NEUTS SEG NFR BLD AUTO: 65 % (ref 43–75)
NONHDLC SERPL-MCNC: 104 MG/DL
NRBC BLD AUTO-RTO: 0 /100 WBCS
PLATELET # BLD AUTO: 215 THOUSANDS/UL (ref 149–390)
PMV BLD AUTO: 12.9 FL (ref 8.9–12.7)
POTASSIUM SERPL-SCNC: 4.3 MMOL/L (ref 3.5–5.3)
PROT SERPL-MCNC: 6.9 G/DL (ref 6.4–8.4)
RBC # BLD AUTO: 4.95 MILLION/UL (ref 3.81–5.12)
SODIUM SERPL-SCNC: 138 MMOL/L (ref 135–147)
TRIGL SERPL-MCNC: 169 MG/DL
TSH SERPL DL<=0.05 MIU/L-ACNC: 1.09 UIU/ML (ref 0.45–4.5)
WBC # BLD AUTO: 10.97 THOUSAND/UL (ref 4.31–10.16)

## 2024-06-20 PROCEDURE — 82306 VITAMIN D 25 HYDROXY: CPT

## 2024-06-20 PROCEDURE — 85025 COMPLETE CBC W/AUTO DIFF WBC: CPT

## 2024-06-20 PROCEDURE — 80053 COMPREHEN METABOLIC PANEL: CPT

## 2024-06-20 PROCEDURE — 84443 ASSAY THYROID STIM HORMONE: CPT

## 2024-06-20 PROCEDURE — 83036 HEMOGLOBIN GLYCOSYLATED A1C: CPT

## 2024-06-20 PROCEDURE — 80061 LIPID PANEL: CPT

## 2024-06-20 PROCEDURE — 36415 COLL VENOUS BLD VENIPUNCTURE: CPT

## 2024-06-21 ENCOUNTER — OFFICE VISIT (OUTPATIENT)
Dept: INTERNAL MEDICINE CLINIC | Facility: CLINIC | Age: 59
End: 2024-06-21
Payer: COMMERCIAL

## 2024-06-21 VITALS
HEART RATE: 98 BPM | RESPIRATION RATE: 16 BRPM | SYSTOLIC BLOOD PRESSURE: 122 MMHG | BODY MASS INDEX: 33.86 KG/M2 | OXYGEN SATURATION: 97 % | DIASTOLIC BLOOD PRESSURE: 78 MMHG | HEIGHT: 62 IN | TEMPERATURE: 97.6 F | WEIGHT: 184 LBS

## 2024-06-21 DIAGNOSIS — Z23 ENCOUNTER FOR IMMUNIZATION: ICD-10-CM

## 2024-06-21 DIAGNOSIS — Z00.00 MEDICARE ANNUAL WELLNESS VISIT, SUBSEQUENT: ICD-10-CM

## 2024-06-21 DIAGNOSIS — E55.9 VITAMIN D INSUFFICIENCY: ICD-10-CM

## 2024-06-21 DIAGNOSIS — E78.2 MIXED HYPERLIPIDEMIA: ICD-10-CM

## 2024-06-21 DIAGNOSIS — F32.0 CURRENT MILD EPISODE OF MAJOR DEPRESSIVE DISORDER WITHOUT PRIOR EPISODE (HCC): ICD-10-CM

## 2024-06-21 DIAGNOSIS — J45.41 MODERATE PERSISTENT ASTHMA WITH ACUTE EXACERBATION: Primary | Chronic | ICD-10-CM

## 2024-06-21 DIAGNOSIS — J30.1 CHRONIC SEASONAL ALLERGIC RHINITIS DUE TO POLLEN: ICD-10-CM

## 2024-06-21 DIAGNOSIS — R73.03 PREDIABETES: ICD-10-CM

## 2024-06-21 DIAGNOSIS — E78.01 FAMILIAL HYPERCHOLESTEROLEMIA: Chronic | ICD-10-CM

## 2024-06-21 DIAGNOSIS — D72.10 EOSINOPHILIA, UNSPECIFIED TYPE: ICD-10-CM

## 2024-06-21 PROCEDURE — 90677 PCV20 VACCINE IM: CPT | Performed by: INTERNAL MEDICINE

## 2024-06-21 PROCEDURE — 99214 OFFICE O/P EST MOD 30 MIN: CPT | Performed by: INTERNAL MEDICINE

## 2024-06-21 PROCEDURE — G0439 PPPS, SUBSEQ VISIT: HCPCS | Performed by: INTERNAL MEDICINE

## 2024-06-21 PROCEDURE — G0009 ADMIN PNEUMOCOCCAL VACCINE: HCPCS | Performed by: INTERNAL MEDICINE

## 2024-06-21 RX ORDER — ATORVASTATIN CALCIUM 20 MG/1
20 TABLET, FILM COATED ORAL DAILY
Qty: 90 TABLET | Refills: 1 | Status: SHIPPED | OUTPATIENT
Start: 2024-06-21

## 2024-06-21 RX ORDER — FLUTICASONE FUROATE AND VILANTEROL 100; 25 UG/1; UG/1
1 POWDER RESPIRATORY (INHALATION) DAILY
Qty: 60 BLISTER | Refills: 2 | Status: SHIPPED | OUTPATIENT
Start: 2024-06-21

## 2024-06-21 RX ORDER — PREDNISONE 10 MG/1
TABLET ORAL
Qty: 21 TABLET | Refills: 0 | Status: SHIPPED | OUTPATIENT
Start: 2024-06-21 | End: 2024-07-01

## 2024-06-21 RX ORDER — FLUCONAZOLE 150 MG/1
TABLET ORAL
COMMUNITY
Start: 2024-04-05

## 2024-06-21 NOTE — PROGRESS NOTES
Ambulatory Visit  Name: Taylor Navas      : 1965      MRN: [id number]  Encounter Provider: Pilar Calvillo DO  Encounter Date: 2024   Encounter department: [facility] INTERNAL MEDICINE    Assessment & Plan   1. Moderate persistent asthma with acute exacerbation  Assessment & Plan:  -triggered by URI and allergic rhinitis  -currently exacerbated due to allergies  -will try switching from Arnuity 100mcg/act to BREO if insurance allows  -rx 10d prednisone taper, SE discussed  -continue singulair and nebs  Orders:  -     predniSONE 10 mg tablet; Take 4 tablets (40 mg total) by mouth daily for 2 days, THEN 3 tablets (30 mg total) daily for 2 days, THEN 2 tablets (20 mg total) daily for 2 days, THEN 1 tablet (10 mg total) daily for 2 days, THEN 0.5 tablets (5 mg total) daily for 2 days.  -     Fluticasone Furoate-Vilanterol 100-25 mcg/actuation inhaler; Inhale 1 puff daily Rinse mouth after use.  -     CBC and differential; Future; Expected date: 2024  -     Basic metabolic panel; Future; Expected date: 2024  2. Current mild episode of major depressive disorder without prior episode (HCC)  Assessment & Plan:  -currently mild in severity  -meds managed by Dr. Thorpe, currently on prozac 80mg daily and abilify 12mg daily  3. Prediabetes  Assessment & Plan:  -A1c is 5.8  -adhere to low glycemic diet  -will monitor  4. Chronic seasonal allergic rhinitis due to pollen  Assessment & Plan:  -currently flared  -continue singulair, azelastine-fluticasone nasal spray  5. Mixed hyperlipidemia  Assessment & Plan:  -returned to atorvastatin 20mg daily, recommend she continue on present dose  Orders:  -     atorvastatin (LIPITOR) 20 mg tablet; Take 1 tablet (20 mg total) by mouth daily  6. Eosinophilia, unspecified type  Comments:  -secondary to allergic rhinitis  -monitor  Orders:  -     CBC and differential; Future; Expected date: 2024  7. Vitamin D insufficiency  Comments:  -increase  maintenance vitamin D3 to at least 4000units daily  8. Familial hypercholesterolemia  Assessment & Plan:  -returned to atorvastatin 20mg daily, recommend she continue on present dose  9. Medicare annual wellness visit, subsequent  10. Encounter for immunization  -     Pneumococcal Conjugate Vaccine 20-valent (Pcv20)      Depression Screening and Follow-up Plan: Patient's depression screening was positive with a PHQ-9 score of 6. Continue regular follow-up with their mental health provider who is managing their mental health condition(s). Pt follows with psychiatrist Dr. Thorpe      Preventive health issues were discussed with patient, and age appropriate screening tests were ordered as noted in patient's After Visit Summary. Personalized health advice and appropriate referrals for health education or preventive services given if needed, as noted in patient's After Visit Summary.    History of Present Illness     HPI   Has had bad allergies this season.  Has had to use neb treatment this morning on top of her arnuity    She increased atorvastatin to 20mg daily without myalgias now.    Prozac adjusted to 80mg from last visit.  Abilify 12mg remains.  PHQ-2/9 Depression Screening    Little interest or pleasure in doing things: 1 - several days  Feeling down, depressed, or hopeless: 1 - several days  Trouble falling or staying asleep, or sleeping too much: 1 - several days  Feeling tired or having little energy: 1 - several days  Poor appetite or overeatin - several days  Feeling bad about yourself - or that you are a failure or have let yourself or your family down: 1 - several days  Trouble concentrating on things, such as reading the newspaper or watching television: 0 - not at all  Moving or speaking so slowly that other people could have noticed. Or the opposite - being so fidgety or restless that you have been moving around a lot more than usual: 0 - not at all  Thoughts that you would be better off dead, or of  hurting yourself in some way: 0 - not at all  PHQ-9 Score: 6  PHQ-9 Interpretation: Mild depression         Patient Care Team:  Pilar Calvillo DO as PCP - General (Internal Medicine)  Mike Montgomery MD as PCP - PCP-[facility] (RTE)  Mike Montgomery MD as PCP - PCP-[facility] (RTE)  MD German Hobbs MD (Psychiatry)  [facility] (Optometry)  Jorge Moya MD (Colon and Rectal Surgery)  Yared Reza DO (Pulmonary Disease)    Review of Systems   Constitutional:  Positive for activity change and fatigue. Negative for fever and unexpected weight change.   HENT:  Positive for voice change.    Respiratory:  Positive for cough, shortness of breath and wheezing.    Cardiovascular:  Negative for chest pain, palpitations and leg swelling.     Medical History Reviewed by provider this encounter:  Tobacco  Allergies  Meds  Problems  Med Hx  Surg Hx  Fam Hx       Annual Wellness Visit Questionnaire   Taylor is here for her Subsequent Wellness visit. Last Medicare Wellness visit information reviewed, patient interviewed and updates made to the record today.      Health Risk Assessment:   Patient rates overall health as very good. Patient feels that their physical health rating is same. Patient is satisfied with their life. Eyesight was rated as same. Hearing was rated as same. Patient feels that their emotional and mental health rating is same. Patients states they are never, rarely angry. Patient states they are sometimes unusually tired/fatigued. Pain experienced in the last 7 days has been none. Patient states that she has experienced no weight loss or gain in last 6 months.     Depression Screening:   PHQ-9 Score: 6      Fall Risk Screening:   In the past year, patient has experienced: no history of falling in past year      Urinary Incontinence Screening:   Patient has not leaked urine accidently in the last six months.     Home Safety:  Patient does not have trouble  with stairs inside or outside of their home. Patient has working smoke alarms and has working carbon monoxide detector. Home safety hazards include: none.     Nutrition:   Current diet is Regular.     Medications:   Patient is currently taking over-the-counter supplements. OTC medications include: see medication list. Patient is able to manage medications.     Activities of Daily Living (ADLs)/Instrumental Activities of Daily Living (IADLs):   Walk and transfer into and out of bed and chair?: Yes  Dress and groom yourself?: Yes    Bathe or shower yourself?: Yes    Feed yourself? Yes  Do your laundry/housekeeping?: Yes  Manage your money, pay your bills and track your expenses?: Yes  Make your own meals?: Yes    Do your own shopping?: Yes    Durable Medical Equipment Suppliers  [employer]    Previous Hospitalizations:   Any hospitalizations or ED visits within the last 12 months?: Yes    How many hospitalizations have you had in the last year?: 1-2    Advance Care Planning:   Living will: No    Durable POA for healthcare: No    Advanced directive: No    ACP document given: Yes      Cognitive Screening:   Provider or family/friend/caregiver concerned regarding cognition?: No    PREVENTIVE SCREENINGS      Cardiovascular Screening:    General: Screening Not Indicated and History Lipid Disorder      Diabetes Screening:     General: Screening Current      Colorectal Cancer Screening:     General: Screening Current      Breast Cancer Screening:     General: Screening Current      Cervical Cancer Screening:    General: Screening Not Indicated      Osteoporosis Screening:    General: Screening Current      Abdominal Aortic Aneurysm (AAA) Screening:        General: Screening Not Indicated      Lung Cancer Screening:     General: Screening Not Indicated      Hepatitis C Screening:    General: Screening Current    Screening, Brief Intervention, and Referral to Treatment (SBIRT)    Screening  Typical number of drinks in a day:  0  Typical number of drinks in a week: 1  Interpretation: Low risk drinking behavior.    AUDIT-C Screenin) How often did you have a drink containing alcohol in the past year? 2 to 4 times a month  2) How many drinks did you have on a typical day when you were drinking in the past year? 1 to 2  3) How often did you have 6 or more drinks on one occasion in the past year? never    AUDIT-C Score: 2  Interpretation: Score 0-2 (female): Negative screen for alcohol misuse    Single Item Drug Screening:  How often have you used an illegal drug (including marijuana) or a prescription medication for non-medical reasons in the past year? never    Single Item Drug Screen Score: 0  Interpretation: Negative screen for possible drug use disorder    Brief Intervention  Alcohol & drug use screenings were reviewed. No concerns regarding substance use disorder identified.     Time Spent  Time spent screening/evaluating the patient for alcohol misuse: 1 minutes.     Other Counseling Topics:   Car/seat belt/driving safety, skin self-exam, sunscreen and regular weightbearing exercise and calcium and vitamin D intake. Immunizations discussed.  Due for prevnar 20 today.  Consider updated COVID vaccine, yearly influenza vaccine, RSV vaccine and shingrix.    Social Determinants of Health     Financial Resource Strain: Low Risk  (2023)    Overall Financial Resource Strain (CARDIA)    • Difficulty of Paying Living Expenses: Not hard at all   Food Insecurity: No Food Insecurity (2024)    Hunger Vital Sign    • Worried About Running Out of Food in the Last Year: Never true    • Ran Out of Food in the Last Year: Never true   Transportation Needs: No Transportation Needs (2024)    PRAPARE - Transportation    • Lack of Transportation (Medical): No    • Lack of Transportation (Non-Medical): No   Housing Stability: Low Risk  (2024)    Housing Stability Vital Sign    • Unable to Pay for Housing in the Last Year: No    • Number  "of Times Moved in the Last Year: 0    • Homeless in the Last Year: No   Utilities: Not At Risk (6/20/2024)    [facility] Utilities    • Threatened with loss of utilities: No     No results found.    Objective     /78 (BP Location: Left arm, Patient Position: Sitting, Cuff Size: Large)   Pulse 98   Temp 97.6 °F (36.4 °C) (Tympanic Core)   Resp 16   Ht 5' 2\" (1.575 m)   Wt 83.5 kg (184 lb)   LMP  (LMP Unknown)   SpO2 97%   BMI 33.65 kg/m²     Physical Exam  Vitals reviewed.   Constitutional:       Appearance: She is obese.   HENT:      Head: Normocephalic.      Right Ear: Tympanic membrane and ear canal normal.      Left Ear: Tympanic membrane and ear canal normal.      Nose: Nose normal.      Mouth/Throat:      Mouth: Mucous membranes are moist.   Eyes:      Extraocular Movements: Extraocular movements intact.      Conjunctiva/sclera: Conjunctivae normal.      Pupils: Pupils are equal, round, and reactive to light.      Comments: Wears glasses   Neck:      Thyroid: No thyromegaly or thyroid tenderness.      Vascular: No carotid bruit.   Cardiovascular:      Rate and Rhythm: Normal rate and regular rhythm.      Pulses: Normal pulses.      Heart sounds: Normal heart sounds.   Abdominal:      General: Bowel sounds are normal. There is no distension.      Palpations: Abdomen is soft.      Tenderness: There is no abdominal tenderness.   Musculoskeletal:      Cervical back: Neck supple. No tenderness.      Right lower leg: No edema.      Left lower leg: No edema.   Lymphadenopathy:      Cervical: No cervical adenopathy.   Skin:     Coloration: Skin is not pale.   Neurological:      Mental Status: She is alert and oriented to person, place, and time.   Psychiatric:         Thought Content: Thought content normal.         Recent Results (from the past 504 hour(s))   Lipid panel    Collection Time: 06/20/24 11:28 AM   Result Value Ref Range    Cholesterol 156 See Comment mg/dL    Triglycerides 169 (H) See Comment mg/dL "    HDL, Direct 52 >=50 mg/dL    LDL Calculated 70 0 - 100 mg/dL    Non-HDL-Chol (CHOL-HDL) 104 mg/dl   Comprehensive metabolic panel    Collection Time: 06/20/24 11:28 AM   Result Value Ref Range    Sodium 138 135 - 147 mmol/L    Potassium 4.3 3.5 - 5.3 mmol/L    Chloride 105 96 - 108 mmol/L    CO2 27 21 - 32 mmol/L    ANION GAP 6 4 - 13 mmol/L    BUN 10 5 - 25 mg/dL    Creatinine 0.74 0.60 - 1.30 mg/dL    Glucose, Fasting 96 65 - 99 mg/dL    Calcium 9.6 8.4 - 10.2 mg/dL    AST 17 13 - 39 U/L    ALT 17 7 - 52 U/L    Alkaline Phosphatase 89 34 - 104 U/L    Total Protein 6.9 6.4 - 8.4 g/dL    Albumin 4.3 3.5 - 5.0 g/dL    Total Bilirubin 0.48 0.20 - 1.00 mg/dL    eGFR 88 ml/min/1.73sq m   TSH, 3rd generation with Free T4 reflex    Collection Time: 06/20/24 11:28 AM   Result Value Ref Range    TSH 3RD GENERATON 1.091 0.450 - 4.500 uIU/mL   CBC and differential    Collection Time: 06/20/24 11:28 AM   Result Value Ref Range    WBC 10.97 (H) 4.31 - 10.16 Thousand/uL    RBC 4.95 3.81 - 5.12 Million/uL    Hemoglobin 14.8 11.5 - 15.4 g/dL    Hematocrit 45.1 34.8 - 46.1 %    MCV 91 82 - 98 fL    MCH 29.9 26.8 - 34.3 pg    MCHC 32.8 31.4 - 37.4 g/dL    RDW 12.3 11.6 - 15.1 %    MPV 12.9 (H) 8.9 - 12.7 fL    Platelets 215 149 - 390 Thousands/uL    nRBC 0 /100 WBCs    Segmented % 65 43 - 75 %    Immature Grans % 0 0 - 2 %    Lymphocytes % 20 14 - 44 %    Monocytes % 6 4 - 12 %    Eosinophils Relative 9 (H) 0 - 6 %    Basophils Relative 0 0 - 1 %    Absolute Neutrophils 7.06 1.85 - 7.62 Thousands/µL    Absolute Immature Grans 0.04 0.00 - 0.20 Thousand/uL    Absolute Lymphocytes 2.19 0.60 - 4.47 Thousands/µL    Absolute Monocytes 0.64 0.17 - 1.22 Thousand/µL    Eosinophils Absolute 1.01 (H) 0.00 - 0.61 Thousand/µL    Basophils Absolute 0.03 0.00 - 0.10 Thousands/µL   Hemoglobin A1C    Collection Time: 06/20/24 11:28 AM   Result Value Ref Range    Hemoglobin A1C 5.8 (H) Normal 4.0-5.6%; PreDiabetic 5.7-6.4%; Diabetic >=6.5%;  Glycemic control for adults with diabetes <7.0% %     mg/dl   Vitamin D 25 hydroxy    Collection Time: 06/20/24 11:28 AM   Result Value Ref Range    Vit D, 25-Hydroxy 26.3 (L) 30.0 - 100.0 ng/mL       Administrative Statements

## 2024-06-21 NOTE — ASSESSMENT & PLAN NOTE
-currently mild in severity  -meds managed by Dr. Thorpe, currently on prozac 80mg daily and abilify 12mg daily

## 2024-06-21 NOTE — PATIENT INSTRUCTIONS
Medicare Preventive Visit Patient Instructions  Thank you for completing your Welcome to Medicare Visit or Medicare Annual Wellness Visit today. Your next wellness visit will be due in one year (6/22/2025).  The screening/preventive services that you may require over the next 5-10 years are detailed below. Some tests may not apply to you based off risk factors and/or age. Screening tests ordered at today's visit but not completed yet may show as past due. Also, please note that scanned in results may not display below.  Preventive Screenings:  Service Recommendations Previous Testing/Comments   Colorectal Cancer Screening  * Colonoscopy    * Fecal Occult Blood Test (FOBT)/Fecal Immunochemical Test (FIT)  * Fecal DNA/Cologuard Test  * Flexible Sigmoidoscopy Age: 45-75 years old   Colonoscopy: every 10 years (may be performed more frequently if at higher risk)  OR  FOBT/FIT: every 1 year  OR  Cologuard: every 3 years  OR  Sigmoidoscopy: every 5 years  Screening may be recommended earlier than age 45 if at higher risk for colorectal cancer. Also, an individualized decision between you and your healthcare provider will decide whether screening between the ages of 76-85 would be appropriate. Colonoscopy: 02/09/2024  FOBT/FIT: Not on file  Cologuard: Not on file  Sigmoidoscopy: Not on file          Breast Cancer Screening Age: 40+ years old  Frequency: every 1-2 years  Not required if history of left and right mastectomy Mammogram: 04/20/2023        Cervical Cancer Screening Between the ages of 21-29, pap smear recommended once every 3 years.   Between the ages of 30-65, can perform pap smear with HPV co-testing every 5 years.   Recommendations may differ for women with a history of total hysterectomy, cervical cancer, or abnormal pap smears in past. Pap Smear: Not on file        Hepatitis C Screening Once for adults born between 1945 and 1965  More frequently in patients at high risk for Hepatitis C Hep C Antibody:  01/02/2021        Diabetes Screening 1-2 times per year if you're at risk for diabetes or have pre-diabetes Fasting glucose: 96 mg/dL (6/20/2024)  A1C: 5.8 % (6/20/2024)      Cholesterol Screening Once every 5 years if you don't have a lipid disorder. May order more often based on risk factors. Lipid panel: 06/20/2024          Other Preventive Screenings Covered by Medicare:  Abdominal Aortic Aneurysm (AAA) Screening: covered once if your at risk. You're considered to be at risk if you have a family history of AAA.  Lung Cancer Screening: covers low dose CT scan once per year if you meet all of the following conditions: (1) Age 55-77; (2) No signs or symptoms of lung cancer; (3) Current smoker or have quit smoking within the last 15 years; (4) You have a tobacco smoking history of at least 20 pack years (packs per day multiplied by number of years you smoked); (5) You get a written order from a healthcare provider.  Glaucoma Screening: covered annually if you're considered high risk: (1) You have diabetes OR (2) Family history of glaucoma OR (3)  aged 50 and older OR (4)  American aged 65 and older  Osteoporosis Screening: covered every 2 years if you meet one of the following conditions: (1) You're estrogen deficient and at risk for osteoporosis based off medical history and other findings; (2) Have a vertebral abnormality; (3) On glucocorticoid therapy for more than 3 months; (4) Have primary hyperparathyroidism; (5) On osteoporosis medications and need to assess response to drug therapy.   Last bone density test (DXA Scan): 02/25/2019.  HIV Screening: covered annually if you're between the age of 15-65. Also covered annually if you are younger than 15 and older than 65 with risk factors for HIV infection. For pregnant patients, it is covered up to 3 times per pregnancy.    Immunizations:  Immunization Recommendations   Influenza Vaccine Annual influenza vaccination during flu season is  recommended for all persons aged >= 6 months who do not have contraindications   Pneumococcal Vaccine   * Pneumococcal conjugate vaccine = PCV13 (Prevnar 13), PCV15 (Vaxneuvance), PCV20 (Prevnar 20)  * Pneumococcal polysaccharide vaccine = PPSV23 (Pneumovax) Adults 19-65 yo with certain risk factors or if 65+ yo  If never received any pneumonia vaccine: recommend Prevnar 20 (PCV20)  Give PCV20 if previously received 1 dose of PCV13 or PPSV23   Hepatitis B Vaccine 3 dose series if at intermediate or high risk (ex: diabetes, end stage renal disease, liver disease)   Respiratory syncytial virus (RSV) Vaccine - COVERED BY MEDICARE PART D  * RSVPreF3 (Arexvy) CDC recommends that adults 60 years of age and older may receive a single dose of RSV vaccine using shared clinical decision-making (SCDM)   Tetanus (Td) Vaccine - COST NOT COVERED BY MEDICARE PART B Following completion of primary series, a booster dose should be given every 10 years to maintain immunity against tetanus. Td may also be given as tetanus wound prophylaxis.   Tdap Vaccine - COST NOT COVERED BY MEDICARE PART B Recommended at least once for all adults. For pregnant patients, recommended with each pregnancy.   Shingles Vaccine (Shingrix) - COST NOT COVERED BY MEDICARE PART B  2 shot series recommended in those 19 years and older who have or will have weakened immune systems or those 50 years and older     Health Maintenance Due:      Topic Date Due   • Breast Cancer Screening: Mammogram  04/20/2025   • Colorectal Cancer Screening  02/07/2029   • HIV Screening  Completed   • Hepatitis C Screening  Completed     Immunizations Due:      Topic Date Due   • Hepatitis A Vaccine (1 of 2 - Risk 2-dose series) Never done   • Pneumococcal Vaccine: Pediatrics (0 to 5 Years) and At-Risk Patients (6 to 64 Years) (2 of 2 - PCV) 05/19/2022   • COVID-19 Vaccine (4 - 2023-24 season) 09/01/2023     Advance Directives   What are advance directives?  Advance directives are  legal documents that state your wishes and plans for medical care. These plans are made ahead of time in case you lose your ability to make decisions for yourself. Advance directives can apply to any medical decision, such as the treatments you want, and if you want to donate organs.   What are the types of advance directives?  There are many types of advance directives, and each state has rules about how to use them. You may choose a combination of any of the following:  Living will:  This is a written record of the treatment you want. You can also choose which treatments you do not want, which to limit, and which to stop at a certain time. This includes surgery, medicine, IV fluid, and tube feedings.   Durable power of  for healthcare (DPAHC):  This is a written record that states who you want to make healthcare choices for you when you are unable to make them for yourself. This person, called a proxy, is usually a family member or a friend. You may choose more than 1 proxy.  Do not resuscitate (DNR) order:  A DNR order is used in case your heart stops beating or you stop breathing. It is a request not to have certain forms of treatment, such as CPR. A DNR order may be included in other types of advance directives.  Medical directive:  This covers the care that you want if you are in a coma, near death, or unable to make decisions for yourself. You can list the treatments you want for each condition. Treatment may include pain medicine, surgery, blood transfusions, dialysis, IV or tube feedings, and a ventilator (breathing machine).  Values history:  This document has questions about your views, beliefs, and how you feel and think about life. This information can help others choose the care that you would choose.  Why are advance directives important?  An advance directive helps you control your care. Although spoken wishes may be used, it is better to have your wishes written down. Spoken wishes can be  misunderstood, or not followed. Treatments may be given even if you do not want them. An advance directive may make it easier for your family to make difficult choices about your care.   Weight Management   Why it is important to manage your weight:  Being overweight increases your risk of health conditions such as heart disease, high blood pressure, type 2 diabetes, and certain types of cancer. It can also increase your risk for osteoarthritis, sleep apnea, and other respiratory problems. Aim for a slow, steady weight loss. Even a small amount of weight loss can lower your risk of health problems.  How to lose weight safely:  A safe and healthy way to lose weight is to eat fewer calories and get regular exercise. You can lose up about 1 pound a week by decreasing the number of calories you eat by 500 calories each day.   Healthy meal plan for weight management:  A healthy meal plan includes a variety of foods, contains fewer calories, and helps you stay healthy. A healthy meal plan includes the following:  Eat whole-grain foods more often.  A healthy meal plan should contain fiber. Fiber is the part of grains, fruits, and vegetables that is not broken down by your body. Whole-grain foods are healthy and provide extra fiber in your diet. Some examples of whole-grain foods are whole-wheat breads and pastas, oatmeal, brown rice, and bulgur.  Eat a variety of vegetables every day.  Include dark, leafy greens such as spinach, kale, ariana greens, and mustard greens. Eat yellow and orange vegetables such as carrots, sweet potatoes, and winter squash.   Eat a variety of fruits every day.  Choose fresh or canned fruit (canned in its own juice or light syrup) instead of juice. Fruit juice has very little or no fiber.  Eat low-fat dairy foods.  Drink fat-free (skim) milk or 1% milk. Eat fat-free yogurt and low-fat cottage cheese. Try low-fat cheeses such as mozzarella and other reduced-fat cheeses.  Choose meat and other  protein foods that are low in fat.  Choose beans or other legumes such as split peas or lentils. Choose fish, skinless poultry (chicken or turkey), or lean cuts of red meat (beef or pork). Before you cook meat or poultry, cut off any visible fat.   Use less fat and oil.  Try baking foods instead of frying them. Add less fat, such as margarine, sour cream, regular salad dressing and mayonnaise to foods. Eat fewer high-fat foods. Some examples of high-fat foods include french fries, doughnuts, ice cream, and cakes.  Eat fewer sweets.  Limit foods and drinks that are high in sugar. This includes candy, cookies, regular soda, and sweetened drinks.  Exercise:  Exercise at least 30 minutes per day on most days of the week. Some examples of exercise include walking, biking, dancing, and swimming. You can also fit in more physical activity by taking the stairs instead of the elevator or parking farther away from stores. Ask your healthcare provider about the best exercise plan for you.      © Copyright [employer] 2018 Information is for End User's use only and may not be sold, redistributed or otherwise used for commercial purposes. All illustrations and images included in CareNotes® are the copyrighted property of [employer] or [employer]

## 2024-06-21 NOTE — ASSESSMENT & PLAN NOTE
-triggered by URI and allergic rhinitis  -currently exacerbated due to allergies  -will try switching from Arnuity 100mcg/act to BREO if insurance allows  -rx 10d prednisone taper, SE discussed  -continue singulair and nebs

## 2024-08-05 ENCOUNTER — HOSPITAL ENCOUNTER (OUTPATIENT)
Dept: RADIOLOGY | Facility: IMAGING CENTER | Age: 59
Discharge: HOME | End: 2024-08-05
Payer: COMMERCIAL

## 2024-08-05 VITALS — HEIGHT: 65 IN | WEIGHT: 180 LBS | BODY MASS INDEX: 29.99 KG/M2

## 2024-08-05 DIAGNOSIS — Z12.31 ENCOUNTER FOR SCREENING MAMMOGRAM FOR BREAST CANCER: ICD-10-CM

## 2024-08-05 PROCEDURE — 77067 SCR MAMMO BI INCL CAD: CPT

## 2024-08-05 PROCEDURE — 77063 BREAST TOMOSYNTHESIS BI: CPT

## 2024-09-04 ENCOUNTER — OFFICE VISIT (OUTPATIENT)
Dept: PULMONOLOGY | Facility: CLINIC | Age: 59
End: 2024-09-04
Payer: COMMERCIAL

## 2024-09-04 VITALS
TEMPERATURE: 96.4 F | WEIGHT: 180 LBS | SYSTOLIC BLOOD PRESSURE: 126 MMHG | BODY MASS INDEX: 29.99 KG/M2 | DIASTOLIC BLOOD PRESSURE: 72 MMHG | HEART RATE: 86 BPM | HEIGHT: 65 IN | OXYGEN SATURATION: 96 %

## 2024-09-04 DIAGNOSIS — F17.211 CIGARETTE NICOTINE DEPENDENCE IN REMISSION: ICD-10-CM

## 2024-09-04 DIAGNOSIS — J30.1 CHRONIC SEASONAL ALLERGIC RHINITIS DUE TO POLLEN: ICD-10-CM

## 2024-09-04 DIAGNOSIS — D72.10 EOSINOPHILIA, UNSPECIFIED TYPE: ICD-10-CM

## 2024-09-04 DIAGNOSIS — J45.41 MODERATE PERSISTENT ASTHMA WITH ACUTE EXACERBATION: Primary | Chronic | ICD-10-CM

## 2024-09-04 PROCEDURE — 95012 NITRIC OXIDE EXP GAS DETER: CPT | Performed by: INTERNAL MEDICINE

## 2024-09-04 PROCEDURE — 99214 OFFICE O/P EST MOD 30 MIN: CPT | Performed by: INTERNAL MEDICINE

## 2024-09-04 RX ORDER — FLUTICASONE FUROATE 200 UG/1
1 POWDER RESPIRATORY (INHALATION) DAILY
Qty: 90 BLISTER | Refills: 0 | Status: SHIPPED | OUTPATIENT
Start: 2024-09-04 | End: 2024-12-03

## 2024-09-04 NOTE — PATIENT INSTRUCTIONS
Stop Singulair  Increase Arnuity 100mcg 2puffs once daily until current supply completed  Then start Arnuity 200mcg 1puff daily  Use albuterol 2puffs every 4 hours as needed for cough, shortness of breath and wheezing  Attempt to remain smoke free  Get CT chest in Nov 2024  Get RSV vaccine now and Flu vaccine October

## 2024-09-04 NOTE — PROGRESS NOTES
Pulmonary Follow Up Note   Taylor Navas 59 y.o. female MRN: [id number]  9/4/2024      Assessment:  Shortness of breath  Suspect primarily related to asthma, further evaluation and treatment as listed below     Asthma with recent exacerbation secondary to rhinovirus  Unable to perform spirometry adequately today  FeNO - 31 - intermediate degree of airway inflammation  Trial of increased Arnuity 200mcg daily   Stop singulair now given depression and anxiety issues  YISSEL prn  Repeat trial at Spirometry and FeNO with next visit   Flu vaccine yearly, COVID x 3, Pneumovax 2021, Prevnar 20 - 2024, RSV vaccine encouraged  Follow up 6 weeks or sooner as needed     Recurrent sinusitis and allergic rhinitis   Continue upper airway regimen per Dr. Toledo     Eosinophilia - continue to monitor, negative ANCA panels, neg NE RAST and normal IgE, may consider repeat in future.  May consider anti-IL-5 therapies if asthma control remains sub-optimal     Abnormal CT chest with ground glass infiltrates - resolved, secondary viral pneumonitis      Nicotine dependence - again needs complete tobacco cessation and discussed further strategies.  Qualifies for LDCT screening for lung cancer, will plan for Nov 2024  I discussed with her that she is a candidate for lung cancer CT screening.     The following Shared Decision-Making points were covered:  Benefits of screening were discussed, including the rates of reduction in death from lung cancer and other causes.  Harms of screening were reviewed, including false positive tests, radiation exposure levels, risks of invasive procedures, risks of complications of screening, and risk of overdiagnosis.  I counseled on the importance of adherence to annual lung cancer LDCT screening, impact of co-morbidities, and ability or willingness to undergo diagnosis and treatment.  I counseled on the importance of maintaining abstinence as a former smoker or was counseled on the importance of smoking  cessation if a current smoker    Review of Eligibility Criteria: She meets all of the criteria for Lung Cancer Screening.   She is 59 y.o.   She has 20 pack year tobacco history and is a current smoker or has quit within the past 15 years  She presents no signs or symptoms of lung cancer    After discussion, the patient decided to elect lung cancer screening.     History of presumed       Plan:    Diagnoses and all orders for this visit:    Moderate persistent asthma with acute exacerbation  -     fluticasone (Arnuity Ellipta) 200 MCG/ACT AEPB inhaler; Inhale 1 puff daily Rinse mouth after use.    Chronic seasonal allergic rhinitis due to pollen    Cigarette nicotine dependence in remission  -     CT lung screening program; Future    Eosinophilia, unspecified type        Return in about 6 weeks (around 10/16/2024) for Recheck.    History of Present Illness   HPI:  Taylor Navas is a 59 y.o. female who has chronic sinusitis, anxiety/depression, HTN, nicotine dependence in remission, and prior cryptogenic organizing pneumonia. Patient was [facility] [place] [facility] 04/03-04/13/2021 with acute respiratory failure with hypoxia. Patient was treated with Cefepime for 7 days. She required ICU level of care due to HFNC and no improvement on antibiotic therapy. Patient underwent bronchoscopy with BAL on 04/07/2021 with negative cultures, gram stain, and cytology. She was started on empiric steroid treatment. Autoimmune panel and HP negative. She was treated for cryptogenic organizing pneumonia and improved on steroids and was eventually weaned off O2 prior to discharge. Steroids were tapered slowly. BAL eventually reported AFB positive for MAC and Candida which were thought to be contaminants. Repeat CT chest after completing steroid treatment with resolution of previous imaging findings.  She had admission in late September 2023 for asthma exacerbation secondary to rhinovirus infection.   She was last seen in Jan 2024 with  plans to continue Arnuity and Singulair.  Also to remain tobacco free. She presents today for follow up.    She reports difficulty with the heat and humidity in the summer. She was seen by her PCP in late June and given OCS and escalated to Breo 100mcg. She reports she felt improved and has since returned to Arnuity over the past 4-6 weeks. She has not required further nebulizer use. She notes no purulent sputum production, no hemoptysis, no SSCP, no nocturnal dyspnea. She notes few cigarettes per week and had changes in her anxiety/depression regimen.  She notes with changes increased tobacco use at time.      Review of Systems   Constitutional:  Negative for activity change, chills and fever.   HENT:  Positive for postnasal drip, rhinorrhea and voice change. Negative for mouth sores, sore throat and trouble swallowing.    Respiratory:  Positive for cough, chest tightness and shortness of breath. Negative for wheezing.    Cardiovascular:  Negative for chest pain and leg swelling.   Gastrointestinal:  Negative for abdominal pain, nausea and vomiting.   Endocrine: Positive for heat intolerance.   Musculoskeletal:  Positive for arthralgias. Negative for gait problem.   Skin:  Negative for rash.   Allergic/Immunologic: Positive for environmental allergies. Negative for immunocompromised state.   Neurological:  Negative for light-headedness and headaches.   Hematological:  Negative for adenopathy.   Psychiatric/Behavioral:  Negative for sleep disturbance. The patient is nervous/anxious.        Historical Information   Past Medical History:   Diagnosis Date    Abnormal CT of the chest     Abnormal thyroid function test     Abnormal TSH     Acid-fast bacteria present     Allergic     Allergic rhinitis 1987    Allergies     Anesthesia complication     V TACH after her reduction mammoplasty, done at ,, states had a little vent arrhythmia after sinus sx also    Anxiety     Asthma 2021    Bronchitis     Chronic rhinitis  02/18/2020    Colon polyp     Depression     Depression with anxiety     Dysphonia     Eosinophilia     Ethmoid sinusitis     Facial cellulitis     GERD (gastroesophageal reflux disease)     no issues since 2021    HTN (hypertension)     Hyperglycemia     Hyponatremia     Maxillary sinusitis     Multiple allergies     Myofascial pain syndrome     Nasal turbinate hypertrophy     Pneumonia     Rhinovirus infection     Sleep apnea     not currently using CPAP    Sleep apnea, obstructive     Transient right leg weakness     Wears glasses      Past Surgical History:   Procedure Laterality Date    BRONCHOSCOPY  2021    INCISION AND DRAINAGE OF WOUND Left 03/23/2023    Procedure: INCISION AND DRAINAGE (I&D) LEFT ORAL ABSCESS, EXTRACTION OF TOOTH #19;  Surgeon: Will Baker DMD;  Location: [facility];  Service: Maxillofacial    LAPAROSCOPY      with vaginal hysterectomy; 11/3/2003 rso    OOPHORECTOMY Left 2004    PARTIAL HYSTERECTOMY  2004    KY NSL/SINUS NDSC MAX ANTROST W/RMVL TISS MAX SINUS N/A 09/30/2016    Procedure: IMAGE GUIDED FUNCTIONAL ENDOSCOPIC SINUS SURGERY;  Surgeon: Roberto Toledo MD;  Location: [facility];  Service: ENT    KY NSL/SINUS NDSC MAX ANTROST W/RMVL TISS MAX SINUS Bilateral 09/02/2022    Procedure: middle turbinectomy/medialization, possible revision functional endoscopic sinus surgery, inferior turbinate reduction;  Surgeon: Roberto Toledo MD;  Location: [facility];  Service: ENT    REDUCTION MAMMAPLASTY Bilateral 02/27/2015    SINUS SURGERY      TOOTH EXTRACTION Right 03/16/2019    Procedure: EXTRACTION TOOTH #1 (Impacted Third Molar Tooth);  Surgeon: Leyda Lyons DDS;  Location: [facility];  Service: Maxillofacial    TUBAL LIGATION      WISDOM TOOTH EXTRACTION       Family History   Problem Relation Age of Onset    CLEM disease Mother     Atrial fibrillation Mother     Atrial fibrillation Father     Heart disease Father     Multiple sclerosis Sister     Hyperlipidemia Sister     Thyroid  disease Sister     Diabetes Maternal Grandmother     Hypertension Maternal Grandmother     Heart failure Maternal Grandmother     Colon cancer Maternal Grandfather     Diabetes Maternal Grandfather     Stroke Maternal Grandfather     Cancer Paternal Grandfather     No Known Problems Son     No Known Problems Son     Endometrial cancer Cousin     Breast cancer Paternal Aunt 90         Meds/Allergies     Current Outpatient Medications:     albuterol (Ventolin HFA) 90 mcg/act inhaler, Inhale 2 puffs every 6 (six) hours as needed for wheezing, Disp: 18 g, Rfl: 3    ARIPiprazole (ABILIFY) 10 mg tablet, Take 10 mg by mouth daily at bedtime, Disp: , Rfl:     ARIPiprazole (ABILIFY) 2 mg tablet, take 1 tablet by mouth IN THE MORNING WATCH FOR SEDATION, Disp: , Rfl:     atorvastatin (LIPITOR) 20 mg tablet, Take 1 tablet (20 mg total) by mouth daily, Disp: 90 tablet, Rfl: 1    azelastine (ASTELIN) 0.1 % nasal spray, instill 1 spray into each nostril twice a day, Disp: 30 mL, Rfl: 11    Azelastine-Fluticasone 137-50 MCG/ACT SUSP, 1-2 sprays into each nostril in the morning, Disp: 23 g, Rfl: 11    busPIRone (BUSPAR) 30 MG tablet, Take 30 mg by mouth daily, Disp: , Rfl:     chlorhexidine (PERIDEX) 0.12 % solution, , Disp: , Rfl:     Cholecalciferol (Vitamin D) 50 MCG (2000 UT) tablet, Take 2,000 Units by mouth daily, Disp: , Rfl:     diphenhydrAMINE (BENADRYL) 25 mg tablet, Take 25 mg by mouth every 6 (six) hours as needed for itching, Disp: , Rfl:     fluconazole (DIFLUCAN) 150 mg tablet, take 1 tablet by mouth to START then take 1 tablet by mouth every 72 hours until finished, Disp: , Rfl:     FLUoxetine (PROzac) 40 MG capsule, 80 mg, Disp: , Rfl:     fluticasone (Arnuity Ellipta) 200 MCG/ACT AEPB inhaler, Inhale 1 puff daily Rinse mouth after use., Disp: 90 blister, Rfl: 0    LORazepam (ATIVAN) 0.5 mg tablet, Take 0.5 mg by mouth 2 (two) times a day as needed, Disp: , Rfl:     mometasone (NASONEX) 50 mcg/act nasal spray, 2  "sprays into each nostril daily States nasal irrigation from a compounding pharmacy, prescribed by Dr Toledo, Disp: , Rfl:     Mometasone Furoate POWD, Use in the morning 1 mg capsule added to sinus rinse daily, Disp: 0.03 g, Rfl: 11  Allergies   Allergen Reactions    Other      Most all antibiotics- hives, hypotensive    \"no problem with clindamycin.\"    Augmentin Es-600  [Amoxicillin-Pot Clavulanate]     Cefuroxime     Erythromycin     Levofloxacin     Morphine     Morphine And Codeine Hives    Oxycodone-Acetaminophen     Penicillins     Percocet [Oxycodone-Acetaminophen] Hives    Sulfa Antibiotics     Tetracyclines & Related        Vitals: Blood pressure 126/72, pulse 86, temperature (!) 96.4 °F (35.8 °C), height 5' 5.25\" (1.657 m), weight 81.6 kg (180 lb), SpO2 96%, not currently breastfeeding. Body mass index is 29.72 kg/m². Oxygen Therapy  SpO2: 96 %      Physical Exam  Physical Exam  Vitals reviewed.   Constitutional:       General: She is not in acute distress.     Appearance: Normal appearance. She is well-developed and normal weight. She is not ill-appearing, toxic-appearing or diaphoretic.   HENT:      Head: Normocephalic and atraumatic.      Right Ear: External ear normal.      Left Ear: External ear normal.      Nose: Rhinorrhea present.      Comments: Mild nasal congestion and bogginess     Mouth/Throat:      Mouth: Mucous membranes are moist.      Pharynx: No oropharyngeal exudate.      Comments: No thrush, mild posterior pharyngeal cobblestoning  Eyes:      General: No scleral icterus.        Right eye: No discharge.         Left eye: No discharge.      Conjunctiva/sclera: Conjunctivae normal.      Pupils: Pupils are equal, round, and reactive to light.   Neck:      Vascular: No JVD.      Trachea: No tracheal deviation.   Cardiovascular:      Rate and Rhythm: Normal rate and regular rhythm.      Heart sounds: Normal heart sounds. No murmur heard.     No gallop.   Pulmonary:      Effort: Pulmonary " effort is normal. No respiratory distress.      Breath sounds: Normal breath sounds. No stridor. No wheezing, rhonchi or rales.   Abdominal:      General: Bowel sounds are normal. There is no distension.      Palpations: Abdomen is soft.      Tenderness: There is no abdominal tenderness. There is no guarding or rebound.   Musculoskeletal:         General: No deformity.      Right lower leg: No edema.      Left lower leg: No edema.   Lymphadenopathy:      Cervical: No cervical adenopathy.   Skin:     General: Skin is warm and dry.      Coloration: Skin is not jaundiced.      Findings: No erythema or rash.   Neurological:      General: No focal deficit present.      Mental Status: She is alert and oriented to person, place, and time. Mental status is at baseline.   Psychiatric:         Mood and Affect: Mood normal.         Behavior: Behavior normal.         Thought Content: Thought content normal.         Labs: I have personally reviewed pertinent lab results.  Lab Results   Component Value Date    WBC 10.97 (H) 06/20/2024    HGB 14.8 06/20/2024    HCT 45.1 06/20/2024    MCV 91 06/20/2024     06/20/2024     Lab Results   Component Value Date    GLUCOSE 100 04/20/2015    CALCIUM 9.6 06/20/2024     04/20/2015    K 4.3 06/20/2024    CO2 27 06/20/2024     06/20/2024    BUN 10 06/20/2024    CREATININE 0.74 06/20/2024     Lab Results   Component Value Date    IGE 5.53 04/08/2023     Lab Results   Component Value Date    ALT 17 06/20/2024    AST 17 06/20/2024    ALKPHOS 89 06/20/2024 6/20/2024 - Abs Eos 1010  4/8/2023 - Abs Eos 660  10/29/2022 - ANCA neg, MPO/PR3 neg, NE RAST neg, IgE 291     RP2 10/1/2023 - (+) Rhinovirus  Bld Cx NGTD  Strep/legionella ag neg      Imaging and other studies: new images and reports personally reviewed  CT Chest 11/20/2023 - resolved ground glass infiltrates     CXR 10/1/2023 - mild scattered alveolar infiltrates, no PTX      CT Chest 9/29/2023 - scattered ground glass  infiltrates in mid and lower lung fields bilaterally, mildly enlarged level 7 LN 1.4cm, no sig effusions, no PTX      CXR 10/31/2022 - no focal infiltrates, no effusions, no PTX     Pulmonary function testin2024 - attempted spirometry but poor performance secondary to cough, not interpretable     10/4/2022 - Ratio 79%, FVC 97%, FEV1 99% - normal spirometry    Yared Reza, DO, FACP  [facility]

## 2024-10-27 ENCOUNTER — RA CDI HCC (OUTPATIENT)
Dept: OTHER | Facility: HOSPITAL | Age: 59
End: 2024-10-27

## 2024-10-29 ENCOUNTER — OFFICE VISIT (OUTPATIENT)
Dept: PULMONOLOGY | Facility: CLINIC | Age: 59
End: 2024-10-29
Payer: COMMERCIAL

## 2024-10-29 VITALS
BODY MASS INDEX: 29.99 KG/M2 | HEART RATE: 88 BPM | WEIGHT: 180 LBS | TEMPERATURE: 97 F | OXYGEN SATURATION: 92 % | HEIGHT: 65 IN | SYSTOLIC BLOOD PRESSURE: 128 MMHG | DIASTOLIC BLOOD PRESSURE: 82 MMHG

## 2024-10-29 DIAGNOSIS — F41.9 ANXIETY: ICD-10-CM

## 2024-10-29 DIAGNOSIS — Z23 NEEDS FLU SHOT: ICD-10-CM

## 2024-10-29 DIAGNOSIS — J45.41 MODERATE PERSISTENT ASTHMA WITH ACUTE EXACERBATION: Primary | Chronic | ICD-10-CM

## 2024-10-29 DIAGNOSIS — K21.9 LARYNGOPHARYNGEAL REFLUX: Chronic | ICD-10-CM

## 2024-10-29 DIAGNOSIS — J30.1 CHRONIC SEASONAL ALLERGIC RHINITIS DUE TO POLLEN: ICD-10-CM

## 2024-10-29 PROBLEM — F17.210 CIGARETTE NICOTINE DEPENDENCE WITHOUT COMPLICATION: Status: ACTIVE | Noted: 2023-10-17

## 2024-10-29 PROCEDURE — G0008 ADMIN INFLUENZA VIRUS VAC: HCPCS

## 2024-10-29 PROCEDURE — 99214 OFFICE O/P EST MOD 30 MIN: CPT | Performed by: INTERNAL MEDICINE

## 2024-10-29 PROCEDURE — 90673 RIV3 VACCINE NO PRESERV IM: CPT

## 2024-10-29 PROCEDURE — 94010 BREATHING CAPACITY TEST: CPT | Performed by: INTERNAL MEDICINE

## 2024-10-29 PROCEDURE — 95012 NITRIC OXIDE EXP GAS DETER: CPT | Performed by: INTERNAL MEDICINE

## 2024-10-29 NOTE — PATIENT INSTRUCTIONS
Remain on Arnuity 200mcg 1puff daily  Use albuterol on as  needed based for shortness of breath and wheeze  Quit smoking completely prior to next visit   You obtain flu shot today

## 2024-10-29 NOTE — PROGRESS NOTES
Pulmonary Follow Up Note   Taylor Navas 59 y.o. female MRN: [id number]  10/29/2024      Assessment:  Shortness of breath  Suspect primarily related to asthma, further evaluation and treatment as listed below     Asthma with recent exacerbation secondary to rhinovirus  Spirometry has normalized  FeNO - 17 - no evidence of significant lower respiratory tract airway inflammation  Continue increased Arnuity 200mcg daily   Remain off singulair given depression and anxiety issues  YISSEL prn  Flu vaccine given today, COVID x 3, Pneumovax 2021, Prevnar 20 - 2024, RSV 2024  Follow up 4 months or sooner as needed     Recurrent sinusitis and allergic rhinitis   Continue upper airway regimen per Dr. Toledo     Eosinophilia - continue to monitor, negative ANCA panels, neg NE RAST and normal IgE, may consider repeat in future.  May consider anti-IL-5 therapies if asthma control remains sub-optimal     Abnormal CT chest with ground glass infiltrates - resolved, secondary viral pneumonitis      Nicotine dependence - again needs complete tobacco cessation and discussed further strategies - if unable to fully quit with next visit she is willing for trial of Chantix.     Qualifies for LDCT screening for lung cancer, she asks to defer to Jan 2025 given  needs - will plan to obtain prior to next visit  I discussed with her that she is a candidate for lung cancer CT screening.      The following Shared Decision-Making points were covered:  Benefits of screening were discussed, including the rates of reduction in death from lung cancer and other causes.  Harms of screening were reviewed, including false positive tests, radiation exposure levels, risks of invasive procedures, risks of complications of screening, and risk of overdiagnosis.  I counseled on the importance of adherence to annual lung cancer LDCT screening, impact of co-morbidities, and ability or willingness to undergo diagnosis and treatment.  I counseled on the  importance of maintaining abstinence as a former smoker or was counseled on the importance of smoking cessation if a current smoker     Review of Eligibility Criteria: She meets all of the criteria for Lung Cancer Screening.   She is 59 y.o.   She has 20 pack year tobacco history and is a current smoker or has quit within the past 15 years  She presents no signs or symptoms of lung cancer     After discussion, the patient decided to elect lung cancer screening.       Plan:    Diagnoses and all orders for this visit:    Moderate persistent asthma with acute exacerbation  -     POCT spirometry  -     POCT FeNO    Chronic seasonal allergic rhinitis due to pollen    Laryngopharyngeal reflux    Anxiety    Needs flu shot  -     influenza vaccine, recombinant, PF, 0.5 mL IM (Flublok)        Return in about 4 months (around 2/28/2025) for Recheck.    History of Present Illness   HPI:  Taylor Navas is a 59 y.o. female who has chronic sinusitis, anxiety/depression, HTN, nicotine dependence in remission, and prior cryptogenic organizing pneumonia. Patient was admitted at [facility] from 04/03-04/13/2021 with acute respiratory failure with hypoxia. Patient was treated with Cefepime for 7 days. She required ICU level of care due to HFNC and no improvement on antibiotic therapy. Patient underwent bronchoscopy with BAL on 04/07/2021 with negative cultures, gram stain, and cytology. She was started on empiric steroid treatment. Autoimmune panel and HP negative. She was treated for cryptogenic organizing pneumonia and improved on steroids and was eventually weaned off O2 prior to discharge. Steroids were tapered slowly. BAL eventually reported AFB positive for MAC and Candida which were thought to be contaminants. Repeat CT chest after completing steroid treatment with resolution of previous imaging findings.  She had admission in late September 2023 for asthma exacerbation secondary to rhinovirus infection.   She was last seen in  September 2024 with plans for trial of increased Arnuity and LDCT Chest in Nov 2024.  She presents today for follow up.    Feeling some improvements on increased ICS. Noted some increased dyspnea with change of seasons but did not use rescue inhaler.  Reports no purulent sputum production. Reports overall good GERD control. She continues to smoke variable amounts and reported added stressors/anxiety and was up to 1/2 ppd now decreasing.      Review of Systems   Constitutional:  Negative for activity change, appetite change and fever.   HENT:  Positive for postnasal drip. Negative for ear pain, mouth sores, rhinorrhea, sneezing, sore throat and trouble swallowing.    Respiratory:  Positive for cough and shortness of breath. Negative for chest tightness and wheezing.    Cardiovascular:  Negative for chest pain and leg swelling.   Gastrointestinal:  Negative for abdominal pain, nausea and vomiting.   Endocrine: Positive for cold intolerance and heat intolerance.   Musculoskeletal:  Negative for myalgias.   Allergic/Immunologic: Positive for environmental allergies. Negative for immunocompromised state.   Neurological:  Negative for headaches.   Hematological:  Negative for adenopathy.   Psychiatric/Behavioral:  Negative for sleep disturbance. The patient is nervous/anxious.        Historical Information   Past Medical History:   Diagnosis Date    Abnormal CT of the chest     Abnormal thyroid function test     Abnormal TSH     Acid-fast bacteria present     Allergic     Allergic rhinitis 1987    Allergies     Anesthesia complication     V TACH after her reduction mammoplasty, done at ,, states had a little vent arrhythmia after sinus sx also    Anxiety     Asthma 2021    Bronchitis     Chronic rhinitis 02/18/2020    Colon polyp     Depression     Depression with anxiety     Dysphonia     Eosinophilia     Ethmoid sinusitis     Facial cellulitis     GERD (gastroesophageal reflux disease)     no issues since 2021    HTN  (hypertension)     Hyperglycemia     Hyponatremia     Maxillary sinusitis     Multiple allergies     Myofascial pain syndrome     Nasal turbinate hypertrophy     Pneumonia     Rhinovirus infection     Sleep apnea     not currently using CPAP    Sleep apnea, obstructive     Transient right leg weakness     Wears glasses      Past Surgical History:   Procedure Laterality Date    BRONCHOSCOPY  2021    INCISION AND DRAINAGE OF WOUND Left 03/23/2023    Procedure: INCISION AND DRAINAGE (I&D) LEFT ORAL ABSCESS, EXTRACTION OF TOOTH #19;  Surgeon: Will Baker DMD;  Location: [facility];  Service: Maxillofacial    LAPAROSCOPY      with vaginal hysterectomy; 11/3/2003 rso    OOPHORECTOMY Left 2004    PARTIAL HYSTERECTOMY  2004    OH NSL/SINUS NDSC MAX ANTROST W/RMVL TISS MAX SINUS N/A 09/30/2016    Procedure: IMAGE GUIDED FUNCTIONAL ENDOSCOPIC SINUS SURGERY;  Surgeon: Roberto Toledo MD;  Location: [facility];  Service: ENT    OH NSL/SINUS NDSC MAX ANTROST W/RMVL TISS MAX SINUS Bilateral 09/02/2022    Procedure: middle turbinectomy/medialization, possible revision functional endoscopic sinus surgery, inferior turbinate reduction;  Surgeon: Roberto Toledo MD;  Location: [facility];  Service: ENT    REDUCTION MAMMAPLASTY Bilateral 02/27/2015    SINUS SURGERY      TOOTH EXTRACTION Right 03/16/2019    Procedure: EXTRACTION TOOTH #1 (Impacted Third Molar Tooth);  Surgeon: Leyda Lyons DDS;  Location: [facility];  Service: Maxillofacial    TUBAL LIGATION      WISDOM TOOTH EXTRACTION       Family History   Problem Relation Age of Onset    CLEM disease Mother     Atrial fibrillation Mother     Atrial fibrillation Father     Heart disease Father     Multiple sclerosis Sister     Hyperlipidemia Sister     Thyroid disease Sister     Diabetes Maternal Grandmother     Hypertension Maternal Grandmother     Heart failure Maternal Grandmother     Colon cancer Maternal Grandfather     Diabetes Maternal Grandfather     Stroke Maternal  Grandfather     Cancer Paternal Grandfather     No Known Problems Son     No Known Problems Son     Endometrial cancer Cousin     Breast cancer Paternal Aunt 90         Meds/Allergies     Current Outpatient Medications:     albuterol (Ventolin HFA) 90 mcg/act inhaler, Inhale 2 puffs every 6 (six) hours as needed for wheezing, Disp: 18 g, Rfl: 3    ARIPiprazole (ABILIFY) 10 mg tablet, Take 10 mg by mouth daily at bedtime, Disp: , Rfl:     ARIPiprazole (ABILIFY) 2 mg tablet, take 1 tablet by mouth IN THE MORNING WATCH FOR SEDATION, Disp: , Rfl:     atorvastatin (LIPITOR) 20 mg tablet, Take 1 tablet (20 mg total) by mouth daily, Disp: 90 tablet, Rfl: 1    busPIRone (BUSPAR) 30 MG tablet, Take 30 mg by mouth daily, Disp: , Rfl:     chlorhexidine (PERIDEX) 0.12 % solution, , Disp: , Rfl:     Cholecalciferol (Vitamin D) 50 MCG (2000 UT) tablet, Take 2,000 Units by mouth daily, Disp: , Rfl:     diphenhydrAMINE (BENADRYL) 25 mg tablet, Take 25 mg by mouth every 6 (six) hours as needed for itching, Disp: , Rfl:     fluconazole (DIFLUCAN) 150 mg tablet, take 1 tablet by mouth to START then take 1 tablet by mouth every 72 hours until finished, Disp: , Rfl:     FLUoxetine (PROzac) 40 MG capsule, 80 mg, Disp: , Rfl:     fluticasone (Arnuity Ellipta) 200 MCG/ACT AEPB inhaler, Inhale 1 puff daily Rinse mouth after use., Disp: 90 blister, Rfl: 0    LORazepam (ATIVAN) 0.5 mg tablet, Take 0.5 mg by mouth 2 (two) times a day as needed, Disp: , Rfl:     Mometasone Furoate POWD, Use in the morning 1 mg capsule added to sinus rinse daily, Disp: 0.03 g, Rfl: 11    azelastine (ASTELIN) 0.1 % nasal spray, instill 1 spray into each nostril twice a day, Disp: 30 mL, Rfl: 11    Azelastine-Fluticasone 137-50 MCG/ACT SUSP, 1-2 sprays into each nostril in the morning, Disp: 23 g, Rfl: 11    mometasone (NASONEX) 50 mcg/act nasal spray, 2 sprays into each nostril daily States nasal irrigation from a compounding pharmacy, prescribed by Dr Toledo,  "Disp: , Rfl:   Allergies   Allergen Reactions    Other      Most all antibiotics- hives, hypotensive    \"no problem with clindamycin.\"    Augmentin Es-600  [Amoxicillin-Pot Clavulanate]     Cefuroxime     Erythromycin     Levofloxacin     Morphine     Morphine And Codeine Hives    Oxycodone-Acetaminophen     Penicillins     Percocet [Oxycodone-Acetaminophen] Hives    Sulfa Antibiotics     Tetracyclines & Related        Vitals: Blood pressure 128/82, pulse 88, temperature (!) 97 °F (36.1 °C), height 5' 5.25\" (1.657 m), weight 81.6 kg (180 lb), SpO2 92%, not currently breastfeeding. Body mass index is 29.72 kg/m². Oxygen Therapy  SpO2: 92 %      Physical Exam  Physical Exam  Vitals reviewed.   Constitutional:       General: She is not in acute distress.     Appearance: Normal appearance. She is well-developed and normal weight. She is not ill-appearing, toxic-appearing or diaphoretic.   HENT:      Head: Normocephalic and atraumatic.      Right Ear: External ear normal.      Left Ear: External ear normal.      Nose: Nose normal.      Mouth/Throat:      Mouth: Mucous membranes are moist.      Pharynx: Oropharynx is clear. No oropharyngeal exudate.      Comments: No thrush  Eyes:      General: No scleral icterus.        Right eye: No discharge.         Left eye: No discharge.      Conjunctiva/sclera: Conjunctivae normal.      Pupils: Pupils are equal, round, and reactive to light.   Neck:      Vascular: No JVD.      Trachea: No tracheal deviation.   Cardiovascular:      Rate and Rhythm: Normal rate and regular rhythm.      Heart sounds: Normal heart sounds. No murmur heard.     No gallop.   Pulmonary:      Effort: Pulmonary effort is normal. No respiratory distress.      Breath sounds: Normal breath sounds. No stridor. No wheezing, rhonchi or rales.   Abdominal:      General: Bowel sounds are normal. There is no distension.      Palpations: Abdomen is soft.      Tenderness: There is no abdominal tenderness. There is no " guarding or rebound.   Musculoskeletal:         General: No deformity.      Right lower leg: No edema.      Left lower leg: No edema.   Lymphadenopathy:      Cervical: No cervical adenopathy.   Skin:     General: Skin is warm and dry.      Coloration: Skin is not jaundiced.      Findings: No erythema or rash.   Neurological:      General: No focal deficit present.      Mental Status: She is alert and oriented to person, place, and time. Mental status is at baseline.   Psychiatric:         Mood and Affect: Mood normal.         Behavior: Behavior normal.         Thought Content: Thought content normal.         Labs: I have personally reviewed pertinent lab results.  Lab Results   Component Value Date    WBC 10.97 (H) 06/20/2024    HGB 14.8 06/20/2024    HCT 45.1 06/20/2024    MCV 91 06/20/2024     06/20/2024     Lab Results   Component Value Date    GLUCOSE 100 04/20/2015    CALCIUM 9.6 06/20/2024     04/20/2015    K 4.3 06/20/2024    CO2 27 06/20/2024     06/20/2024    BUN 10 06/20/2024    CREATININE 0.74 06/20/2024     Lab Results   Component Value Date    IGE 5.53 04/08/2023     Lab Results   Component Value Date    ALT 17 06/20/2024    AST 17 06/20/2024    ALKPHOS 89 06/20/2024 6/20/2024 - Abs Eos 1010  4/8/2023 - Abs Eos 660  10/29/2022 - ANCA neg, MPO/PR3 neg, NE RAST neg, IgE 291     RP2 10/1/2023 - (+) Rhinovirus  Bld Cx NGTD  Strep/legionella ag neg      Imaging and other studies: new images and reports personally reviewed  CT Chest 11/20/2023 - resolved ground glass infiltrates     CXR 10/1/2023 - mild scattered alveolar infiltrates, no PTX      CT Chest 9/29/2023 - scattered ground glass infiltrates in mid and lower lung fields bilaterally, mildly enlarged level 7 LN 1.4cm, no sig effusions, no PTX      CXR 10/31/2022 - no focal infiltrates, no effusions, no PTX     Pulmonary function testing:   10/29/2024 - Ratio 77%, FVC 3.78L (110%, Z 0.8), FEV1 2.91L (110%, Z 0.70) - normal  spirometry    9/4/2024 - attempted spirometry but poor performance secondary to cough, not interpretable      10/4/2022 - Ratio 79%, FVC 97%, FEV1 99% - normal spirometry    Yared Reza DO, [facility]  [facility]    Answers submitted by the patient for this visit:  Pulmonology Questionnaire (Submitted on 10/27/2024)  Chief Complaint: Primary symptoms  Chronicity: recurrent  When did you first notice your symptoms?: 1 to 4 weeks ago  How often do your symptoms occur?: intermittently  Since you first noticed this problem, how has it changed?: gradually improving  Do you have shortness of breath that occurs with effort or exertion?: No  Do you have ear congestion?: No  Do you have heartburn?: No  Do you have fatigue?: No  Do you have nasal congestion?: Yes  Do you have shortness of breath when lying flat?: No  Do you have shortness of breath when you wake up?: No  Do you have sweats?: No  Have you experienced weight loss?: No  Which of the following makes your symptoms worse?: change in weather, exposure to fumes, pollen  Which of the following makes your symptoms better?: steroid inhaler  Risk factors for lung disease: smoking/tobacco exposure

## 2024-12-04 ENCOUNTER — APPOINTMENT (OUTPATIENT)
Dept: LAB | Facility: CLINIC | Age: 59
End: 2024-12-04
Payer: COMMERCIAL

## 2024-12-04 DIAGNOSIS — D72.10 EOSINOPHILIA, UNSPECIFIED TYPE: ICD-10-CM

## 2024-12-04 DIAGNOSIS — J45.41 MODERATE PERSISTENT ASTHMA WITH ACUTE EXACERBATION: Chronic | ICD-10-CM

## 2024-12-04 LAB
ANION GAP SERPL CALCULATED.3IONS-SCNC: 6 MMOL/L (ref 4–13)
BASOPHILS # BLD AUTO: 0.03 THOUSANDS/ÂΜL (ref 0–0.1)
BASOPHILS NFR BLD AUTO: 0 % (ref 0–1)
BUN SERPL-MCNC: 11 MG/DL (ref 5–25)
CALCIUM SERPL-MCNC: 9.9 MG/DL (ref 8.4–10.2)
CHLORIDE SERPL-SCNC: 102 MMOL/L (ref 96–108)
CO2 SERPL-SCNC: 30 MMOL/L (ref 21–32)
CREAT SERPL-MCNC: 0.73 MG/DL (ref 0.6–1.3)
EOSINOPHIL # BLD AUTO: 0.77 THOUSAND/ÂΜL (ref 0–0.61)
EOSINOPHIL NFR BLD AUTO: 8 % (ref 0–6)
ERYTHROCYTE [DISTWIDTH] IN BLOOD BY AUTOMATED COUNT: 12.2 % (ref 11.6–15.1)
GFR SERPL CREATININE-BSD FRML MDRD: 89 ML/MIN/1.73SQ M
GLUCOSE P FAST SERPL-MCNC: 105 MG/DL (ref 65–99)
HCT VFR BLD AUTO: 44.5 % (ref 34.8–46.1)
HGB BLD-MCNC: 14.2 G/DL (ref 11.5–15.4)
IMM GRANULOCYTES # BLD AUTO: 0.03 THOUSAND/UL (ref 0–0.2)
IMM GRANULOCYTES NFR BLD AUTO: 0 % (ref 0–2)
LYMPHOCYTES # BLD AUTO: 2.33 THOUSANDS/ÂΜL (ref 0.6–4.47)
LYMPHOCYTES NFR BLD AUTO: 25 % (ref 14–44)
MCH RBC QN AUTO: 29.2 PG (ref 26.8–34.3)
MCHC RBC AUTO-ENTMCNC: 31.9 G/DL (ref 31.4–37.4)
MCV RBC AUTO: 92 FL (ref 82–98)
MONOCYTES # BLD AUTO: 0.59 THOUSAND/ÂΜL (ref 0.17–1.22)
MONOCYTES NFR BLD AUTO: 6 % (ref 4–12)
NEUTROPHILS # BLD AUTO: 5.56 THOUSANDS/ÂΜL (ref 1.85–7.62)
NEUTS SEG NFR BLD AUTO: 61 % (ref 43–75)
NRBC BLD AUTO-RTO: 0 /100 WBCS
PLATELET # BLD AUTO: 216 THOUSANDS/UL (ref 149–390)
PMV BLD AUTO: 11.2 FL (ref 8.9–12.7)
POTASSIUM SERPL-SCNC: 4.2 MMOL/L (ref 3.5–5.3)
RBC # BLD AUTO: 4.86 MILLION/UL (ref 3.81–5.12)
SODIUM SERPL-SCNC: 138 MMOL/L (ref 135–147)
WBC # BLD AUTO: 9.31 THOUSAND/UL (ref 4.31–10.16)

## 2024-12-04 PROCEDURE — 36415 COLL VENOUS BLD VENIPUNCTURE: CPT

## 2024-12-04 PROCEDURE — 85025 COMPLETE CBC W/AUTO DIFF WBC: CPT

## 2024-12-04 PROCEDURE — 80048 BASIC METABOLIC PNL TOTAL CA: CPT

## 2024-12-05 ENCOUNTER — OFFICE VISIT (OUTPATIENT)
Age: 59
End: 2024-12-05
Payer: COMMERCIAL

## 2024-12-05 VITALS
BODY MASS INDEX: 30.99 KG/M2 | HEART RATE: 90 BPM | OXYGEN SATURATION: 98 % | HEIGHT: 65 IN | SYSTOLIC BLOOD PRESSURE: 130 MMHG | WEIGHT: 186 LBS | DIASTOLIC BLOOD PRESSURE: 70 MMHG | RESPIRATION RATE: 17 BRPM

## 2024-12-05 DIAGNOSIS — E78.01 FAMILIAL HYPERCHOLESTEROLEMIA: Chronic | ICD-10-CM

## 2024-12-05 DIAGNOSIS — J45.40 MODERATE PERSISTENT ASTHMA WITHOUT COMPLICATION: Chronic | ICD-10-CM

## 2024-12-05 DIAGNOSIS — K21.9 GASTROESOPHAGEAL REFLUX DISEASE, UNSPECIFIED WHETHER ESOPHAGITIS PRESENT: ICD-10-CM

## 2024-12-05 DIAGNOSIS — R73.03 PREDIABETES: ICD-10-CM

## 2024-12-05 DIAGNOSIS — J31.0 CHRONIC RHINITIS: Primary | ICD-10-CM

## 2024-12-05 PROBLEM — F17.210 CIGARETTE NICOTINE DEPENDENCE WITHOUT COMPLICATION: Status: RESOLVED | Noted: 2023-10-17 | Resolved: 2024-12-05

## 2024-12-05 PROBLEM — J32.9 CHRONIC SINUSITIS: Status: RESOLVED | Noted: 2021-04-05 | Resolved: 2024-12-05

## 2024-12-05 PROBLEM — Z72.0 TOBACCO USE: Status: RESOLVED | Noted: 2019-03-16 | Resolved: 2024-12-05

## 2024-12-05 PROCEDURE — 99214 OFFICE O/P EST MOD 30 MIN: CPT | Performed by: INTERNAL MEDICINE

## 2024-12-05 PROCEDURE — G2211 COMPLEX E/M VISIT ADD ON: HCPCS | Performed by: INTERNAL MEDICINE

## 2024-12-05 NOTE — PROGRESS NOTES
Name: Taylor Navas      : 1965      MRN: [id number]  Encounter Provider: Pilar Calvillo DO  Encounter Date: 2024   Encounter department: [facility] INTERNAL MEDICINE  :  Assessment & Plan  Chronic rhinitis  -s/p neuromark procedure 2024  -follows with ENT         Moderate persistent asthma without complication  -triggers are URI and allergic rhinitis  -on Arnuity per Pulm  -she is up to date with vaccines except for updated COVID which she declines       Gastroesophageal reflux disease, unspecified whether esophagitis present  -infrequent  -may use pepcid prn         Prediabetes    Orders:    Hemoglobin A1C; Future    Comprehensive metabolic panel; Future    Familial hypercholesterolemia    Orders:    Lipid panel; Future    TSH, 3rd generation with Free T4 reflex; Future    Comprehensive metabolic panel; Future           History of Present Illness     HPI    She presents with her grand-daughter.    Had neuromark procedure in July for rhinitis.  Also had clarifix previously.  Reports fall season was not too bad.    Used Arnuity at higher dose this week.    Review of Systems   Constitutional:  Negative for activity change and appetite change.   HENT:  Positive for dental problem and rhinorrhea.    Respiratory:  Negative for cough, shortness of breath and wheezing.    Cardiovascular:  Negative for chest pain, palpitations and leg swelling.   Genitourinary:  Positive for frequency.   Neurological:  Positive for headaches. Negative for dizziness.   Psychiatric/Behavioral:  Positive for dysphoric mood. The patient is not nervous/anxious.        Medical History Reviewed by provider this encounter:     .  Current Outpatient Medications on File Prior to Visit   Medication Sig Dispense Refill    ARIPiprazole (ABILIFY) 10 mg tablet Take 10 mg by mouth daily at bedtime      ARIPiprazole (ABILIFY) 2 mg tablet take 1 tablet by mouth IN THE MORNING WATCH FOR SEDATION      atorvastatin (LIPITOR) 20  "mg tablet Take 1 tablet (20 mg total) by mouth daily 90 tablet 1    azelastine (ASTELIN) 0.1 % nasal spray instill 1 spray into each nostril twice a day 30 mL 11    Azelastine-Fluticasone 137-50 MCG/ACT SUSP 1-2 sprays into each nostril in the morning 23 g 11    busPIRone (BUSPAR) 30 MG tablet Take 30 mg by mouth daily      chlorhexidine (PERIDEX) 0.12 % solution       Cholecalciferol (Vitamin D) 50 MCG (2000 UT) tablet Take 2,000 Units by mouth daily      fluconazole (DIFLUCAN) 150 mg tablet take 1 tablet by mouth to START then take 1 tablet by mouth every 72 hours until finished      FLUoxetine (PROzac) 40 MG capsule 80 mg      mometasone (NASONEX) 50 mcg/act nasal spray 2 sprays into each nostril daily States nasal irrigation from a compounding pharmacy, prescribed by Dr Toledo      Mometasone Furoate POWD Use in the morning 1 mg capsule added to sinus rinse daily 0.03 g 11    albuterol (Ventolin HFA) 90 mcg/act inhaler Inhale 2 puffs every 6 (six) hours as needed for wheezing 18 g 3    diphenhydrAMINE (BENADRYL) 25 mg tablet Take 25 mg by mouth every 6 (six) hours as needed for itching      fluticasone (Arnuity Ellipta) 200 MCG/ACT AEPB inhaler Inhale 1 puff daily Rinse mouth after use. 90 blister 0    LORazepam (ATIVAN) 0.5 mg tablet Take 0.5 mg by mouth 2 (two) times a day as needed       No current facility-administered medications on file prior to visit.      Social History     Tobacco Use    Smoking status: Former     Current packs/day: 0.25     Average packs/day: 0.3 packs/day for 29.9 years (7.5 ttl pk-yrs)     Types: Cigarettes     Start date: 1/1/1995    Smokeless tobacco: Never    Tobacco comments:     patient states havent smoked cigarettes since end of march 2021 (states occasional \"slips\" here or there,inst no smoking before sx)     Hasn't smoked since end of September   Vaping Use    Vaping status: Never Used   Substance and Sexual Activity    Alcohol use: Yes     Comment: Social one or two a " "month    Drug use: No    Sexual activity: Yes     Partners: Male     Birth control/protection: Post-menopausal        Objective   /70 (BP Location: Left arm, Patient Position: Sitting, Cuff Size: Large)   Pulse 90   Resp 17   Ht 5' 5\" (1.651 m)   Wt 84.4 kg (186 lb)   LMP  (LMP Unknown)   SpO2 98%   BMI 30.95 kg/m²      Physical Exam  Vitals reviewed.   Constitutional:       General: She is not in acute distress.     Appearance: She is obese.   HENT:      Nose: No congestion.   Cardiovascular:      Rate and Rhythm: Normal rate and regular rhythm.      Pulses: Normal pulses.      Heart sounds: Normal heart sounds.   Pulmonary:      Effort: Pulmonary effort is normal. No respiratory distress.      Breath sounds: Normal breath sounds. No wheezing.   Musculoskeletal:      Right lower leg: No edema.   Neurological:      Mental Status: She is alert and oriented to person, place, and time.         Recent Results (from the past week)   CBC and differential    Collection Time: 12/04/24 10:25 AM   Result Value Ref Range    WBC 9.31 4.31 - 10.16 Thousand/uL    RBC 4.86 3.81 - 5.12 Million/uL    Hemoglobin 14.2 11.5 - 15.4 g/dL    Hematocrit 44.5 34.8 - 46.1 %    MCV 92 82 - 98 fL    MCH 29.2 26.8 - 34.3 pg    MCHC 31.9 31.4 - 37.4 g/dL    RDW 12.2 11.6 - 15.1 %    MPV 11.2 8.9 - 12.7 fL    Platelets 216 149 - 390 Thousands/uL    nRBC 0 /100 WBCs    Segmented % 61 43 - 75 %    Immature Grans % 0 0 - 2 %    Lymphocytes % 25 14 - 44 %    Monocytes % 6 4 - 12 %    Eosinophils Relative 8 (H) 0 - 6 %    Basophils Relative 0 0 - 1 %    Absolute Neutrophils 5.56 1.85 - 7.62 Thousands/µL    Absolute Immature Grans 0.03 0.00 - 0.20 Thousand/uL    Absolute Lymphocytes 2.33 0.60 - 4.47 Thousands/µL    Absolute Monocytes 0.59 0.17 - 1.22 Thousand/µL    Eosinophils Absolute 0.77 (H) 0.00 - 0.61 Thousand/µL    Basophils Absolute 0.03 0.00 - 0.10 Thousands/µL   Basic metabolic panel    Collection Time: 12/04/24 10:25 AM   Result " Value Ref Range    Sodium 138 135 - 147 mmol/L    Potassium 4.2 3.5 - 5.3 mmol/L    Chloride 102 96 - 108 mmol/L    CO2 30 21 - 32 mmol/L    ANION GAP 6 4 - 13 mmol/L    BUN 11 5 - 25 mg/dL    Creatinine 0.73 0.60 - 1.30 mg/dL    Glucose, Fasting 105 (H) 65 - 99 mg/dL    Calcium 9.9 8.4 - 10.2 mg/dL    eGFR 89 ml/min/1.73sq m

## 2024-12-05 NOTE — ASSESSMENT & PLAN NOTE
-triggers are URI and allergic rhinitis  -on Arnuity per Pulm  -she is up to date with vaccines except for updated COVID which she declines

## 2024-12-05 NOTE — ASSESSMENT & PLAN NOTE
Orders:    Lipid panel; Future    TSH, 3rd generation with Free T4 reflex; Future    Comprehensive metabolic panel; Future

## 2025-01-02 PROCEDURE — 87070 CULTURE OTHR SPECIMN AEROBIC: CPT

## 2025-01-02 PROCEDURE — 87077 CULTURE AEROBIC IDENTIFY: CPT

## 2025-01-02 PROCEDURE — 87205 SMEAR GRAM STAIN: CPT

## 2025-01-02 PROCEDURE — 87181 SC STD AGAR DILUTION PER AGT: CPT

## 2025-01-28 ENCOUNTER — TELEPHONE (OUTPATIENT)
Dept: PULMONOLOGY | Facility: CLINIC | Age: 60
End: 2025-01-28

## 2025-01-28 NOTE — TELEPHONE ENCOUNTER
Patient called the RX Refill Line. Message is being forwarded to the office.     Patient is requesting   ipratropium-albuterol (DUO-NEB) 0.5-2.5 mg/3 mL nebulizer solution. Currently not on patients medication list. Patient would like this to go to [facility] #[id number]    Please contact patient at [phone]

## 2025-01-29 DIAGNOSIS — J45.41 MODERATE PERSISTENT ASTHMA WITH ACUTE EXACERBATION: Primary | ICD-10-CM

## 2025-01-29 RX ORDER — IPRATROPIUM BROMIDE AND ALBUTEROL SULFATE 2.5; .5 MG/3ML; MG/3ML
3 SOLUTION RESPIRATORY (INHALATION) 4 TIMES DAILY
Qty: 360 ML | Refills: 3 | Status: SHIPPED | OUTPATIENT
Start: 2025-01-29

## 2025-03-10 ENCOUNTER — OFFICE VISIT (OUTPATIENT)
Dept: PULMONOLOGY | Facility: CLINIC | Age: 60
End: 2025-03-10
Payer: COMMERCIAL

## 2025-03-10 VITALS
HEART RATE: 82 BPM | DIASTOLIC BLOOD PRESSURE: 80 MMHG | OXYGEN SATURATION: 98 % | TEMPERATURE: 98.3 F | SYSTOLIC BLOOD PRESSURE: 128 MMHG | HEIGHT: 65 IN | BODY MASS INDEX: 30.95 KG/M2

## 2025-03-10 DIAGNOSIS — J45.40 MODERATE PERSISTENT ASTHMA WITHOUT COMPLICATION: Primary | Chronic | ICD-10-CM

## 2025-03-10 DIAGNOSIS — F17.210 CIGARETTE NICOTINE DEPENDENCE WITHOUT COMPLICATION: ICD-10-CM

## 2025-03-10 DIAGNOSIS — J31.0 CHRONIC RHINITIS: ICD-10-CM

## 2025-03-10 DIAGNOSIS — D72.10 EOSINOPHILIA, UNSPECIFIED TYPE: ICD-10-CM

## 2025-03-10 PROCEDURE — G2211 COMPLEX E/M VISIT ADD ON: HCPCS | Performed by: INTERNAL MEDICINE

## 2025-03-10 PROCEDURE — 99214 OFFICE O/P EST MOD 30 MIN: CPT | Performed by: INTERNAL MEDICINE

## 2025-03-10 NOTE — ASSESSMENT & PLAN NOTE
Congratulated on being tobacco free  Discussed methods to reduce relapse  She does qualify for LDCT chest and this was previously ordered - she was encouraged to schedule in next 6-8 weeks once resolved from mild URI symptoms

## 2025-03-10 NOTE — PATIENT INSTRUCTIONS
Continue arnuity 1puff daily  Continue as needed albuterol/nebulizer use  CONTINUE TO REMAIN SMOKE FREE - GREAT JOB  Continue upper airway and sinus regimen per Dr. Toledo  Get repeat CT chest

## 2025-03-10 NOTE — ASSESSMENT & PLAN NOTE
Spirometry has normalized  Previously FeNO - 17 - no evidence of significant lower respiratory tract airway inflammation  Continue Arnuity 200mcg daily  - consider weaning with next visit if symptoms remain under good control  Remain off singulair given depression and anxiety issues  YISSEL prn  Flu vaccine given today, COVID x 3, Pneumovax 2021, Prevnar 20 - 2024, RSV 2024  Follow up 4 months or sooner as needed

## 2025-03-10 NOTE — PROGRESS NOTES
Pulmonary Follow Up Note   Taylor Navas 60 y.o. female MRN: [id number]  3/10/2025      Name: Taylor Navas      : 1965      MRN: [id number]  Encounter Provider: Yared Reza DO  Encounter Date: 3/10/2025   Encounter department: [facility] [place]  :  Assessment & Plan  Moderate persistent asthma without complication  Spirometry has normalized  Previously FeNO - 17 - no evidence of significant lower respiratory tract airway inflammation  Continue Arnuity 200mcg daily  - consider weaning with next visit if symptoms remain under good control  Remain off singulair given depression and anxiety issues  YISSEL prn  Flu vaccine given today, COVID x 3, Pneumovax , Prevnar  - , RSV   Follow up 4 months or sooner as needed       Cigarette nicotine dependence without complication  Congratulated on being tobacco free  Discussed methods to reduce relapse  She does qualify for LDCT chest and this was previously ordered - she was encouraged to schedule in next 6-8 weeks once resolved from mild URI symptoms       Chronic rhinitis  Continue current upper airway regimen per ENT  Nasonex sinus rinses, azelastine/ICS       Eosinophilia, unspecified type  Continue to monitor            Return in about 4 months (around 7/10/2025) for Recheck.    History of Present Illness   HPI:  Taylor Navas is a 60 y.o. female who has chronic sinusitis, anxiety/depression, HTN, nicotine dependence in remission, and prior cryptogenic organizing pneumonia. Patient was admitted at [facility] from -2021 with acute respiratory failure with hypoxia. Patient was treated with Cefepime for 7 days. She required ICU level of care due to HFNC and no improvement on antibiotic therapy. Patient underwent bronchoscopy with BAL on 2021 with negative cultures, gram stain, and cytology. She was started on empiric steroid treatment. Autoimmune panel and HP negative. She was treated for cryptogenic  organizing pneumonia and improved on steroids and was eventually weaned off O2 prior to discharge. Steroids were tapered slowly. BAL eventually reported AFB positive for MAC and Candida which were thought to be contaminants. Repeat CT chest after completing steroid treatment with resolution of previous imaging findings.  She had admission in late September 2023 for asthma exacerbation secondary to rhinovirus infection.   She was last seen in [place]c 2024 and presents today for follow up.    She reports she has overall improved since last visit - noted URI/sinusitis symptoms in Dec-Jan.  She noted significant stressors with family illnesses and she was smoking a lot at the time. She has since stopped for the past few weeks. She notes rare YISSEL use. She denied purulent sputum production, she denied hemoptysis, no chest pain. She notes post nasal drainage symptoms and recovering from mild URI.      Review of Systems   Constitutional:  Negative for activity change, appetite change and fever.   HENT:  Positive for postnasal drip and sore throat. Negative for ear pain, rhinorrhea, sneezing and trouble swallowing.    Respiratory:  Negative for cough, chest tightness, shortness of breath and wheezing.    Cardiovascular:  Negative for chest pain.   Gastrointestinal:  Negative for abdominal pain, nausea and vomiting.   Endocrine: Positive for cold intolerance.   Musculoskeletal:  Positive for myalgias. Negative for arthralgias and gait problem.   Allergic/Immunologic: Positive for environmental allergies. Negative for immunocompromised state.   Neurological:  Negative for headaches.   Hematological:  Negative for adenopathy.   Psychiatric/Behavioral:  Negative for sleep disturbance.        Historical Information   Past Medical History:   Diagnosis Date    Abnormal CT of the chest 04/27/2021    Abnormal thyroid function test 04/03/2021    Abnormal TSH 04/20/2021    Acid-fast bacteria present 04/27/2021    Allergic     Allergic  rhinitis 1987    Allergies     Anesthesia complication     V TACH after her reduction mammoplasty, done at ,, states had a little vent arrhythmia after sinus sx also    Anxiety     Asthma 2021    Bronchitis 09/29/2023    Chronic rhinitis 02/18/2020    Chronic sinusitis 04/05/2021    Cigarette nicotine dependence without complication 10/17/2023    Colon polyp     Depression     Depression with anxiety 04/20/2013    Dysphonia 01/08/2023    Eosinophilia 03/16/2019    Ethmoid sinusitis     Facial cellulitis 03/14/2019    GERD (gastroesophageal reflux disease)     no issues since 2021    HTN (hypertension) 03/03/2017    Hyperglycemia 04/10/2021    Hyponatremia 04/03/2021    Maxillary sinusitis     Multiple allergies     Myofascial pain syndrome     Nasal turbinate hypertrophy     Pneumonia     Rhinovirus infection 10/17/2023    Sleep apnea     not currently using CPAP    Sleep apnea, obstructive     Tobacco use 03/16/2019    Transient right leg weakness 12/09/2019    Wears glasses      Past Surgical History:   Procedure Laterality Date    BRONCHOSCOPY  2021    INCISION AND DRAINAGE OF WOUND Left 03/23/2023    Procedure: INCISION AND DRAINAGE (I&D) LEFT ORAL ABSCESS, EXTRACTION OF TOOTH #19;  Surgeon: Will Baker DMD;  Location: [facility];  Service: Maxillofacial    LAPAROSCOPY      with vaginal hysterectomy; 11/3/2003 rso    OOPHORECTOMY Left 2004    PARTIAL HYSTERECTOMY  2004    RI NSL/SINUS NDSC MAX ANTROST W/RMVL TISS MAX SINUS N/A 09/30/2016    Procedure: IMAGE GUIDED FUNCTIONAL ENDOSCOPIC SINUS SURGERY;  Surgeon: Roberto Toledo MD;  Location: [facility];  Service: ENT    RI NSL/SINUS NDSC MAX ANTROST W/RMVL TISS MAX SINUS Bilateral 09/02/2022    Procedure: middle turbinectomy/medialization, possible revision functional endoscopic sinus surgery, inferior turbinate reduction;  Surgeon: Roberto Toledo MD;  Location: [facility];  Service: ENT    REDUCTION MAMMAPLASTY Bilateral 02/27/2015    SINUS SURGERY      TOOTH  EXTRACTION Right 03/16/2019    Procedure: EXTRACTION TOOTH #1 (Impacted Third Molar Tooth);  Surgeon: Leyda Lyons DDS;  Location: [facility];  Service: Maxillofacial    TUBAL LIGATION      WISDOM TOOTH EXTRACTION       Family History   Problem Relation Age of Onset    CLEM disease Mother     Atrial fibrillation Mother     Atrial fibrillation Father     Heart disease Father     Multiple sclerosis Sister     Hyperlipidemia Sister     Thyroid disease Sister     Diabetes Maternal Grandmother     Hypertension Maternal Grandmother     Heart failure Maternal Grandmother     Colon cancer Maternal Grandfather     Diabetes Maternal Grandfather     Stroke Maternal Grandfather     Cancer Paternal Grandfather     No Known Problems Son     No Known Problems Son     Endometrial cancer Cousin     Breast cancer Paternal Aunt 90         Meds/Allergies     Current Outpatient Medications:     albuterol (Ventolin HFA) 90 mcg/act inhaler, Inhale 2 puffs every 6 (six) hours as needed for wheezing, Disp: 18 g, Rfl: 3    ARIPiprazole (ABILIFY) 10 mg tablet, Take 10 mg by mouth daily at bedtime, Disp: , Rfl:     ARIPiprazole (ABILIFY) 2 mg tablet, take 1 tablet by mouth IN THE MORNING WATCH FOR SEDATION, Disp: , Rfl:     atorvastatin (LIPITOR) 20 mg tablet, Take 1 tablet (20 mg total) by mouth daily, Disp: 90 tablet, Rfl: 1    azelastine (ASTELIN) 0.1 % nasal spray, instill 1 spray into each nostril twice a day, Disp: 30 mL, Rfl: 11    Azelastine-Fluticasone 137-50 MCG/ACT SUSP, 1-2 sprays into each nostril in the morning, Disp: 23 g, Rfl: 11    busPIRone (BUSPAR) 30 MG tablet, Take 30 mg by mouth daily, Disp: , Rfl:     chlorhexidine (PERIDEX) 0.12 % solution, , Disp: , Rfl:     Cholecalciferol (Vitamin D) 50 MCG (2000 UT) tablet, Take 2,000 Units by mouth daily, Disp: , Rfl:     diphenhydrAMINE (BENADRYL) 25 mg tablet, Take 25 mg by mouth every 6 (six) hours as needed for itching, Disp: , Rfl:     fluconazole (DIFLUCAN) 150 mg tablet,  "take 1 tablet by mouth to START then take 1 tablet by mouth every 72 hours until finished, Disp: , Rfl:     FLUoxetine (PROzac) 40 MG capsule, 80 mg, Disp: , Rfl:     ipratropium-albuterol (DUO-NEB) 0.5-2.5 mg/3 mL nebulizer solution, Take 3 mL by nebulization 4 (four) times a day, Disp: 360 mL, Rfl: 3    LORazepam (ATIVAN) 0.5 mg tablet, Take 0.5 mg by mouth 2 (two) times a day as needed, Disp: , Rfl:     mometasone (NASONEX) 50 mcg/act nasal spray, 2 sprays into each nostril daily States nasal irrigation from a compounding pharmacy, prescribed by Dr Toledo, Disp: , Rfl:     Mometasone Furoate POWD, Use in the morning 1 mg capsule added to sinus rinse daily, Disp: 0.03 g, Rfl: 11    fluticasone (Arnuity Ellipta) 200 MCG/ACT AEPB inhaler, Inhale 1 puff daily Rinse mouth after use., Disp: 90 blister, Rfl: 0  Allergies   Allergen Reactions    Other      Most all antibiotics- hives, hypotensive    \"no problem with clindamycin.\"    Augmentin Es-600  [Amoxicillin-Pot Clavulanate]     Cefuroxime     Erythromycin     Levofloxacin     Morphine     Morphine And Codeine Hives    Oxycodone-Acetaminophen     Penicillins     Percocet [Oxycodone-Acetaminophen] Hives    Sulfa Antibiotics     Tetracyclines & Related        Vitals: Blood pressure 128/80, pulse 82, temperature 98.3 °F (36.8 °C), temperature source Tympanic, height 5' 5\" (1.651 m), SpO2 98%, not currently breastfeeding. Body mass index is 30.95 kg/m². Oxygen Therapy  SpO2: 98 %  Oxygen Therapy: None (Room air)      Physical Exam  Physical Exam  Vitals reviewed.   Constitutional:       General: She is not in acute distress.     Appearance: Normal appearance. She is well-developed and normal weight. She is not ill-appearing, toxic-appearing or diaphoretic.   HENT:      Head: Normocephalic and atraumatic.      Right Ear: External ear normal.      Left Ear: External ear normal.      Nose: Congestion and rhinorrhea present.      Comments: Mild nasal turbinate erythema, no " purulence     Mouth/Throat:      Mouth: Mucous membranes are moist.      Pharynx: Oropharynx is clear. No oropharyngeal exudate.   Eyes:      General: No scleral icterus.        Right eye: No discharge.         Left eye: No discharge.      Conjunctiva/sclera: Conjunctivae normal.      Pupils: Pupils are equal, round, and reactive to light.   Neck:      Vascular: No JVD.      Trachea: No tracheal deviation.   Cardiovascular:      Rate and Rhythm: Normal rate and regular rhythm.      Heart sounds: Normal heart sounds. No murmur heard.     No gallop.   Pulmonary:      Effort: Pulmonary effort is normal. No respiratory distress.      Breath sounds: Normal breath sounds. No stridor. No wheezing, rhonchi or rales.   Abdominal:      General: Bowel sounds are normal. There is no distension.      Palpations: Abdomen is soft.      Tenderness: There is no abdominal tenderness. There is no guarding or rebound.   Musculoskeletal:         General: No deformity.      Right lower leg: No edema.      Left lower leg: No edema.   Lymphadenopathy:      Cervical: No cervical adenopathy.   Skin:     General: Skin is warm and dry.      Coloration: Skin is not jaundiced.      Findings: No erythema or rash.   Neurological:      General: No focal deficit present.      Mental Status: She is alert and oriented to person, place, and time. Mental status is at baseline.   Psychiatric:         Mood and Affect: Mood normal.         Behavior: Behavior normal.         Thought Content: Thought content normal.         Labs: I have personally reviewed pertinent lab results.  Lab Results   Component Value Date    WBC 9.31 12/04/2024    HGB 14.2 12/04/2024    HCT 44.5 12/04/2024    MCV 92 12/04/2024     12/04/2024     Lab Results   Component Value Date    GLUCOSE 100 04/20/2015    CALCIUM 9.9 12/04/2024     04/20/2015    K 4.2 12/04/2024    CO2 30 12/04/2024     12/04/2024    BUN 11 12/04/2024    CREATININE 0.73 12/04/2024     Lab  Results   Component Value Date    IGE 5.53 04/08/2023     Lab Results   Component Value Date    ALT 17 06/20/2024    AST 17 06/20/2024    ALKPHOS 89 06/20/2024 6/20/2024 - Abs Eos 1010  4/8/2023 - Abs Eos 660  10/29/2022 - ANCA neg, MPO/PR3 neg, NE RAST neg, IgE 291     RP2 10/1/2023 - (+) Rhinovirus  Bld Cx NGTD  Strep/legionella ag neg      Imaging and other studies: new images and reports personally reviewed  CT Chest 11/20/2023 - resolved ground glass infiltrates     CXR 10/1/2023 - mild scattered alveolar infiltrates, no PTX      CT Chest 9/29/2023 - scattered ground glass infiltrates in mid and lower lung fields bilaterally, mildly enlarged level 7 LN 1.4cm, no sig effusions, no PTX      CXR 10/31/2022 - no focal infiltrates, no effusions, no PTX     Pulmonary function testing:   10/29/2024 - Ratio 77%, FVC 3.78L (110%, Z 0.8), FEV1 2.91L (110%, Z 0.70) - normal spirometry     9/4/2024 - attempted spirometry but poor performance secondary to cough, not interpretable      10/4/2022 - Ratio 79%, FVC 97%, FEV1 99% - normal spirometry       Yared Reza, , [facility]  [facility]    Answers submitted by the patient for this visit:  Pulmonology Questionnaire (Submitted on 3/9/2025)  Chief Complaint: Primary symptoms  Chronicity: recurrent  When did you first notice your symptoms?: more than 1 year ago  How often do your symptoms occur?: intermittently  Since you first noticed this problem, how has it changed?: gradually improving  Do you have shortness of breath that occurs with effort or exertion?: No  Do you have ear congestion?: No  Do you have heartburn?: No  Do you have fatigue?: No  Do you have nasal congestion?: Yes  Do you have shortness of breath when lying flat?: No  Do you have shortness of breath when you wake up?: No  Do you have sweats?: No  Have you experienced weight loss?: No  Which of the following makes your symptoms worse?: change in weather, exposure to  fumes, exposure to smoke, pollen, strenuous activity, URI  Which of the following makes your symptoms better?: oral steroids, steroid inhaler

## 2025-04-14 ENCOUNTER — OFFICE VISIT (OUTPATIENT)
Age: 60
End: 2025-04-14
Payer: COMMERCIAL

## 2025-04-14 VITALS
SYSTOLIC BLOOD PRESSURE: 124 MMHG | OXYGEN SATURATION: 99 % | TEMPERATURE: 97 F | RESPIRATION RATE: 18 BRPM | DIASTOLIC BLOOD PRESSURE: 78 MMHG | HEIGHT: 65 IN | WEIGHT: 187 LBS | HEART RATE: 88 BPM | BODY MASS INDEX: 31.16 KG/M2

## 2025-04-14 DIAGNOSIS — J01.00 ACUTE NON-RECURRENT MAXILLARY SINUSITIS: Primary | ICD-10-CM

## 2025-04-14 PROCEDURE — G2211 COMPLEX E/M VISIT ADD ON: HCPCS | Performed by: INTERNAL MEDICINE

## 2025-04-14 PROCEDURE — 99213 OFFICE O/P EST LOW 20 MIN: CPT | Performed by: INTERNAL MEDICINE

## 2025-04-14 RX ORDER — CEFDINIR 300 MG/1
300 CAPSULE ORAL EVERY 12 HOURS SCHEDULED
Qty: 10 CAPSULE | Refills: 0 | Status: SHIPPED | OUTPATIENT
Start: 2025-04-14 | End: 2025-04-19

## 2025-04-14 NOTE — PROGRESS NOTES
Name: Taylor Navas      : 1965      MRN: [id number]  Encounter Provider: Pilar Calvillo DO  Encounter Date: 2025   Encounter department: [facility]  :  Assessment & Plan  Acute non-recurrent maxillary sinusitis  -per chart review, was previously prescribed cefdinir 300mg q12 x5d and tolerated medication, will prescribe again.  Has multiple abx allergies.  -continue use of nasal spray  Orders:  •  cefdinir (OMNICEF) 300 mg capsule; Take 1 capsule (300 mg total) by mouth every 12 (twelve) hours for 5 days           History of Present Illness   HPI    Complains of several weeks of nasal symptoms.  Last week was trying to do a lot of weeding.  She had exacerbation of difficulty breathing.  Now every day she feels worse and worse.  Typically when it is just her allergies, she uses steroid nasal rinse with some relief.  She feels tired and had body aches and fever of 100.1 for one day last week.  She has not had to use her inhalers more than her usual.  Took afrin x3d.    Review of Systems   Constitutional:  Negative for fever.   HENT:  Positive for congestion, postnasal drip and rhinorrhea. Negative for sneezing.    Respiratory:  Positive for cough and shortness of breath.    Gastrointestinal:  Negative for diarrhea.   Neurological:  Positive for headaches. Negative for dizziness.         Current Outpatient Medications:   •  cefdinir (OMNICEF) 300 mg capsule, Take 1 capsule (300 mg total) by mouth every 12 (twelve) hours for 5 days, Disp: 10 capsule, Rfl: 0  •  albuterol (Ventolin HFA) 90 mcg/act inhaler, Inhale 2 puffs every 6 (six) hours as needed for wheezing, Disp: 18 g, Rfl: 3  •  ARIPiprazole (ABILIFY) 10 mg tablet, Take 10 mg by mouth daily at bedtime, Disp: , Rfl:   •  ARIPiprazole (ABILIFY) 2 mg tablet, take 1 tablet by mouth IN THE MORNING WATCH FOR SEDATION, Disp: , Rfl:   •  atorvastatin (LIPITOR) 20 mg tablet, Take 1 tablet (20 mg total) by mouth daily, Disp:  "90 tablet, Rfl: 1  •  azelastine (ASTELIN) 0.1 % nasal spray, instill 1 spray into each nostril twice a day (Patient not taking: Reported on 3/10/2025), Disp: 30 mL, Rfl: 11  •  Azelastine-Fluticasone 137-50 MCG/ACT SUSP, 1-2 sprays into each nostril in the morning, Disp: 23 g, Rfl: 11  •  busPIRone (BUSPAR) 30 MG tablet, Take 30 mg by mouth daily, Disp: , Rfl:   •  chlorhexidine (PERIDEX) 0.12 % solution, , Disp: , Rfl:   •  Cholecalciferol (Vitamin D) 50 MCG (2000 UT) tablet, Take 2,000 Units by mouth daily, Disp: , Rfl:   •  diphenhydrAMINE (BENADRYL) 25 mg tablet, Take 25 mg by mouth every 6 (six) hours as needed for itching, Disp: , Rfl:   •  fluconazole (DIFLUCAN) 150 mg tablet, take 1 tablet by mouth to START then take 1 tablet by mouth every 72 hours until finished, Disp: , Rfl:   •  FLUoxetine (PROzac) 40 MG capsule, 80 mg, Disp: , Rfl:   •  fluticasone (Arnuity Ellipta) 200 MCG/ACT AEPB inhaler, Inhale 1 puff daily Rinse mouth after use., Disp: 90 blister, Rfl: 0  •  ipratropium-albuterol (DUO-NEB) 0.5-2.5 mg/3 mL nebulizer solution, Take 3 mL by nebulization 4 (four) times a day, Disp: 360 mL, Rfl: 3  •  LORazepam (ATIVAN) 0.5 mg tablet, Take 0.5 mg by mouth 2 (two) times a day as needed, Disp: , Rfl:   •  mometasone (NASONEX) 50 mcg/act nasal spray, 2 sprays into each nostril daily States nasal irrigation from a compounding pharmacy, prescribed by Dr Toledo, Disp: , Rfl:   •  Mometasone Furoate POWD, Use in the morning 1 mg capsule added to sinus rinse daily, Disp: 0.03 g, Rfl: 11    Objective   /78 (BP Location: Left arm, Patient Position: Sitting, Cuff Size: Standard)   Pulse 88   Temp (!) 97 °F (36.1 °C) (Tympanic Core)   Resp 18   Ht 5' 5\" (1.651 m)   Wt 84.8 kg (187 lb)   LMP  (LMP Unknown)   SpO2 99%   BMI 31.12 kg/m²      Physical Exam  Vitals reviewed.   Constitutional:       General: She is not in acute distress.  HENT:      Head: Normocephalic.      Right Ear: Tympanic membrane " and ear canal normal.      Left Ear: Tympanic membrane and ear canal normal.      Nose: Rhinorrhea present.      Mouth/Throat:      Mouth: Mucous membranes are moist.   Cardiovascular:      Rate and Rhythm: Normal rate and regular rhythm.      Pulses: Normal pulses.      Heart sounds: Normal heart sounds.   Pulmonary:      Effort: Pulmonary effort is normal. No respiratory distress.      Breath sounds: Normal breath sounds. No wheezing.   Musculoskeletal:      Cervical back: No tenderness.   Lymphadenopathy:      Cervical: No cervical adenopathy.   Neurological:      Mental Status: She is alert and oriented to person, place, and time.

## 2025-05-08 DIAGNOSIS — E78.2 MIXED HYPERLIPIDEMIA: ICD-10-CM

## 2025-05-08 RX ORDER — ATORVASTATIN CALCIUM 20 MG/1
20 TABLET, FILM COATED ORAL DAILY
Qty: 90 TABLET | Refills: 1 | Status: SHIPPED | OUTPATIENT
Start: 2025-05-08

## 2025-05-28 DIAGNOSIS — J45.41 MODERATE PERSISTENT ASTHMA WITH ACUTE EXACERBATION: Chronic | ICD-10-CM

## 2025-05-28 NOTE — TELEPHONE ENCOUNTER
Reason for call:   [x] Refill   [] Prior Auth  [] Other:     Office:   [] PCP/Provider -   [x] Specialty/Provider - Pulmonary     Medication: Arnuity Ellipta     Dose/Frequency: 200 mcg/act AEPB inhaler. Inhale 1 puff daily. Rinse mouth after use.     Quantity: 90 blister     Pharmacy: [id number] [address] [phone]     Local Pharmacy   Does the patient have enough for 3 days?   [x] Yes   [] No - Send as HP to POD    Mail Away Pharmacy   Does the patient have enough for 10 days?   [] Yes   [] No - Send as HP to POD

## 2025-05-29 RX ORDER — FLUTICASONE FUROATE 200 UG/1
1 POWDER RESPIRATORY (INHALATION) DAILY
Qty: 90 BLISTER | Refills: 1 | Status: SHIPPED | OUTPATIENT
Start: 2025-05-29 | End: 2025-08-27

## 2025-06-17 ENCOUNTER — RA CDI HCC (OUTPATIENT)
Dept: OTHER | Facility: HOSPITAL | Age: 60
End: 2025-06-17

## 2025-07-23 ENCOUNTER — APPOINTMENT (OUTPATIENT)
Dept: LAB | Facility: CLINIC | Age: 60
End: 2025-07-23
Payer: COMMERCIAL

## 2025-07-23 DIAGNOSIS — Z79.899 DRUG THERAPY: ICD-10-CM

## 2025-07-23 DIAGNOSIS — R73.03 PREDIABETES: ICD-10-CM

## 2025-07-23 DIAGNOSIS — E78.01 FAMILIAL HYPERCHOLESTEROLEMIA: Chronic | ICD-10-CM

## 2025-07-23 DIAGNOSIS — F33.1 MAJOR DEPRESSIVE DISORDER, RECURRENT EPISODE, MODERATE (HCC): ICD-10-CM

## 2025-07-23 LAB
25(OH)D3 SERPL-MCNC: 31.4 NG/ML (ref 30–100)
ALBUMIN SERPL BCG-MCNC: 4.2 G/DL (ref 3.5–5)
ALP SERPL-CCNC: 105 U/L (ref 34–104)
ALT SERPL W P-5'-P-CCNC: 17 U/L (ref 7–52)
ANION GAP SERPL CALCULATED.3IONS-SCNC: 9 MMOL/L (ref 4–13)
AST SERPL W P-5'-P-CCNC: 16 U/L (ref 13–39)
BASOPHILS # BLD AUTO: 0.03 THOUSANDS/ÂΜL (ref 0–0.1)
BASOPHILS NFR BLD AUTO: 0 % (ref 0–1)
BILIRUB SERPL-MCNC: 0.42 MG/DL (ref 0.2–1)
BUN SERPL-MCNC: 12 MG/DL (ref 5–25)
CALCIUM SERPL-MCNC: 9.5 MG/DL (ref 8.4–10.2)
CHLORIDE SERPL-SCNC: 103 MMOL/L (ref 96–108)
CHOLEST SERPL-MCNC: 170 MG/DL (ref ?–200)
CO2 SERPL-SCNC: 26 MMOL/L (ref 21–32)
CREAT SERPL-MCNC: 0.67 MG/DL (ref 0.6–1.3)
EOSINOPHIL # BLD AUTO: 0.85 THOUSAND/ÂΜL (ref 0–0.61)
EOSINOPHIL NFR BLD AUTO: 8 % (ref 0–6)
ERYTHROCYTE [DISTWIDTH] IN BLOOD BY AUTOMATED COUNT: 12.3 % (ref 11.6–15.1)
EST. AVERAGE GLUCOSE BLD GHB EST-MCNC: 128 MG/DL
GFR SERPL CREATININE-BSD FRML MDRD: 95 ML/MIN/1.73SQ M
GLUCOSE P FAST SERPL-MCNC: 94 MG/DL (ref 65–99)
HBA1C MFR BLD: 6.1 %
HCT VFR BLD AUTO: 43.9 % (ref 34.8–46.1)
HDLC SERPL-MCNC: 50 MG/DL
HGB BLD-MCNC: 14.3 G/DL (ref 11.5–15.4)
IMM GRANULOCYTES # BLD AUTO: 0.06 THOUSAND/UL (ref 0–0.2)
IMM GRANULOCYTES NFR BLD AUTO: 1 % (ref 0–2)
LDLC SERPL CALC-MCNC: 78 MG/DL (ref 0–100)
LYMPHOCYTES # BLD AUTO: 2.08 THOUSANDS/ÂΜL (ref 0.6–4.47)
LYMPHOCYTES NFR BLD AUTO: 20 % (ref 14–44)
MCH RBC QN AUTO: 29.4 PG (ref 26.8–34.3)
MCHC RBC AUTO-ENTMCNC: 32.6 G/DL (ref 31.4–37.4)
MCV RBC AUTO: 90 FL (ref 82–98)
MONOCYTES # BLD AUTO: 0.63 THOUSAND/ÂΜL (ref 0.17–1.22)
MONOCYTES NFR BLD AUTO: 6 % (ref 4–12)
NEUTROPHILS # BLD AUTO: 6.83 THOUSANDS/ÂΜL (ref 1.85–7.62)
NEUTS SEG NFR BLD AUTO: 65 % (ref 43–75)
NONHDLC SERPL-MCNC: 120 MG/DL
NRBC BLD AUTO-RTO: 0 /100 WBCS
PLATELET # BLD AUTO: 219 THOUSANDS/UL (ref 149–390)
PMV BLD AUTO: 13 FL (ref 8.9–12.7)
POTASSIUM SERPL-SCNC: 4.5 MMOL/L (ref 3.5–5.3)
PROT SERPL-MCNC: 6.6 G/DL (ref 6.4–8.4)
RBC # BLD AUTO: 4.86 MILLION/UL (ref 3.81–5.12)
SODIUM SERPL-SCNC: 138 MMOL/L (ref 135–147)
TRIGL SERPL-MCNC: 209 MG/DL (ref ?–150)
TSH SERPL DL<=0.05 MIU/L-ACNC: 1.14 UIU/ML (ref 0.45–4.5)
WBC # BLD AUTO: 10.48 THOUSAND/UL (ref 4.31–10.16)

## 2025-07-23 PROCEDURE — 83036 HEMOGLOBIN GLYCOSYLATED A1C: CPT

## 2025-07-23 PROCEDURE — 84443 ASSAY THYROID STIM HORMONE: CPT

## 2025-07-23 PROCEDURE — 82306 VITAMIN D 25 HYDROXY: CPT

## 2025-07-23 PROCEDURE — 36415 COLL VENOUS BLD VENIPUNCTURE: CPT

## 2025-07-23 PROCEDURE — 80053 COMPREHEN METABOLIC PANEL: CPT

## 2025-07-23 PROCEDURE — 85025 COMPLETE CBC W/AUTO DIFF WBC: CPT

## 2025-07-23 PROCEDURE — 80061 LIPID PANEL: CPT

## 2025-07-29 ENCOUNTER — OFFICE VISIT (OUTPATIENT)
Age: 60
End: 2025-07-29
Payer: COMMERCIAL

## 2025-07-29 VITALS
HEIGHT: 65 IN | OXYGEN SATURATION: 98 % | BODY MASS INDEX: 30.99 KG/M2 | HEART RATE: 75 BPM | DIASTOLIC BLOOD PRESSURE: 80 MMHG | TEMPERATURE: 96.6 F | WEIGHT: 186 LBS | RESPIRATION RATE: 15 BRPM | SYSTOLIC BLOOD PRESSURE: 134 MMHG

## 2025-07-29 DIAGNOSIS — Z12.31 ENCOUNTER FOR SCREENING MAMMOGRAM FOR BREAST CANCER: ICD-10-CM

## 2025-07-29 DIAGNOSIS — E55.9 VITAMIN D DEFICIENCY: ICD-10-CM

## 2025-07-29 DIAGNOSIS — F32.0 CURRENT MILD EPISODE OF MAJOR DEPRESSIVE DISORDER WITHOUT PRIOR EPISODE (HCC): ICD-10-CM

## 2025-07-29 DIAGNOSIS — J45.40 MODERATE PERSISTENT ASTHMA WITHOUT COMPLICATION: Primary | Chronic | ICD-10-CM

## 2025-07-29 DIAGNOSIS — J30.1 CHRONIC SEASONAL ALLERGIC RHINITIS DUE TO POLLEN: ICD-10-CM

## 2025-07-29 DIAGNOSIS — E78.01 FAMILIAL HYPERCHOLESTEROLEMIA: Chronic | ICD-10-CM

## 2025-07-29 DIAGNOSIS — Z23 NEED FOR SHINGLES VACCINE: ICD-10-CM

## 2025-07-29 DIAGNOSIS — R73.03 PREDIABETES: ICD-10-CM

## 2025-07-29 PROCEDURE — G0439 PPPS, SUBSEQ VISIT: HCPCS | Performed by: STUDENT IN AN ORGANIZED HEALTH CARE EDUCATION/TRAINING PROGRAM

## 2025-07-29 PROCEDURE — 99214 OFFICE O/P EST MOD 30 MIN: CPT | Performed by: STUDENT IN AN ORGANIZED HEALTH CARE EDUCATION/TRAINING PROGRAM

## 2025-07-29 PROCEDURE — G2211 COMPLEX E/M VISIT ADD ON: HCPCS | Performed by: STUDENT IN AN ORGANIZED HEALTH CARE EDUCATION/TRAINING PROGRAM

## 2025-07-29 RX ORDER — ZOSTER VACCINE RECOMBINANT, ADJUVANTED 50 MCG/0.5
0.5 KIT INTRAMUSCULAR ONCE
Qty: 1 EACH | Refills: 1 | Status: SHIPPED | OUTPATIENT
Start: 2025-07-29 | End: 2025-07-29

## (undated) DEVICE — GLOVE SRG BIOGEL 7.5

## (undated) DEVICE — GLOVE SRG BIOGEL ORTHOPEDIC 7.5

## (undated) DEVICE — STERILE MANDIBLE PACK: Brand: CARDINAL HEALTH

## (undated) DEVICE — 3000CC GUARDIAN II: Brand: GUARDIAN

## (undated) DEVICE — SYRINGE 10ML LL

## (undated) DEVICE — INTENDED FOR TISSUE SEPARATION, AND OTHER PROCEDURES THAT REQUIRE A SHARP SURGICAL BLADE TO PUNCTURE OR CUT.: Brand: BARD-PARKER ® CARBON RIB-BACK BLADES

## (undated) DEVICE — TUBING 1895522 5PK STRAIGHTSHOT TO XPS: Brand: STRAIGHTSHOT®

## (undated) DEVICE — NEEDLE SPINAL 22G X 3.5IN  QUINCKE

## (undated) DEVICE — SHEATH 1912000 5PK 4MM/0DEG STORZ XOMED: Brand: ENDO-SCRUB®

## (undated) DEVICE — CULTURE TUBE AEROBIC

## (undated) DEVICE — BULB SYRINGE,IRRIGATION WITH PROTECTIVE CAP: Brand: DOVER

## (undated) DEVICE — SCD SEQUENTIAL COMPRESSION COMFORT SLEEVE MEDIUM KNEE LENGTH: Brand: KENDALL SCD

## (undated) DEVICE — DECANTER: Brand: UNBRANDED

## (undated) DEVICE — SHEATH 1912010 5PK 4MM/30DEG STORZ XOMED: Brand: ENDO-SCRUB®

## (undated) DEVICE — HEMOSTAT POWDER ADSORB SURGICEL 3GM

## (undated) DEVICE — MAYO STAND COVER: Brand: CONVERTORS

## (undated) DEVICE — SPECIMEN CONTAINER STERILE PEEL PACK

## (undated) DEVICE — DENTAL BURR SIDE WHITE

## (undated) DEVICE — WAND COBLATION REFLEX ULTRA 45

## (undated) DEVICE — STERILE NASAL PACK: Brand: CARDINAL HEALTH

## (undated) DEVICE — DENTAL BURR ROUND WHITE

## (undated) DEVICE — 2000CC GUARDIAN II: Brand: GUARDIAN

## (undated) DEVICE — SUCTION BOVIE ENT

## (undated) DEVICE — TONGUE DEPRESSOR STERILE

## (undated) DEVICE — CULTURE TUBE ANAEROBIC

## (undated) DEVICE — IV CATH INTROCAN 18G X 1 1/4 SAFETY

## (undated) DEVICE — SUT CHROMIC 3-0 SH 27 IN G122H

## (undated) DEVICE — GLOVE INDICATOR PI UNDERGLOVE SZ 7 BLUE

## (undated) DEVICE — SYRINGE 20ML LL

## (undated) DEVICE — NEURO PATTIES 1/2 X 3

## (undated) DEVICE — ANTI-FOG SOLUTION WITH FOAM PAD: Brand: DEVON

## (undated) DEVICE — NEEDLE 25G X 1 1/2

## (undated) DEVICE — GLOVE SRG BIOGEL ECLIPSE 6.5

## (undated) DEVICE — SPONGE STICK WITH PVP-I: Brand: KENDALL